# Patient Record
Sex: MALE | Race: WHITE | NOT HISPANIC OR LATINO | Employment: OTHER | URBAN - METROPOLITAN AREA
[De-identification: names, ages, dates, MRNs, and addresses within clinical notes are randomized per-mention and may not be internally consistent; named-entity substitution may affect disease eponyms.]

---

## 2017-01-04 ENCOUNTER — AMBULATORY - HEALTHEAST (OUTPATIENT)
Dept: SLEEP MEDICINE | Facility: CLINIC | Age: 72
End: 2017-01-04

## 2017-02-15 ENCOUNTER — AMBULATORY - HEALTHEAST (OUTPATIENT)
Dept: CARDIOLOGY | Facility: CLINIC | Age: 72
End: 2017-02-15

## 2017-02-15 DIAGNOSIS — Z79.899 LONG TERM USE OF DRUG: ICD-10-CM

## 2017-02-15 LAB — ALT SERPL W P-5'-P-CCNC: 35 U/L (ref 0–45)

## 2017-02-16 ENCOUNTER — AMBULATORY - HEALTHEAST (OUTPATIENT)
Dept: CARDIOLOGY | Facility: CLINIC | Age: 72
End: 2017-02-16

## 2017-04-20 ENCOUNTER — AMBULATORY - HEALTHEAST (OUTPATIENT)
Dept: CARDIOLOGY | Facility: CLINIC | Age: 72
End: 2017-04-20

## 2017-04-21 ENCOUNTER — COMMUNICATION - HEALTHEAST (OUTPATIENT)
Dept: CARDIOLOGY | Facility: CLINIC | Age: 72
End: 2017-04-21

## 2017-04-21 ENCOUNTER — AMBULATORY - HEALTHEAST (OUTPATIENT)
Dept: CARDIOLOGY | Facility: CLINIC | Age: 72
End: 2017-04-21

## 2017-04-21 ENCOUNTER — OFFICE VISIT - HEALTHEAST (OUTPATIENT)
Dept: CARDIOLOGY | Facility: CLINIC | Age: 72
End: 2017-04-21

## 2017-04-21 DIAGNOSIS — I48.3 TYPICAL ATRIAL FLUTTER (H): ICD-10-CM

## 2017-04-21 LAB
ATRIAL RATE - MUSE: 55 BPM
DIASTOLIC BLOOD PRESSURE - MUSE: NORMAL MMHG
INTERPRETATION ECG - MUSE: NORMAL
P AXIS - MUSE: 51 DEGREES
PR INTERVAL - MUSE: 248 MS
QRS DURATION - MUSE: 166 MS
QT - MUSE: 582 MS
QTC - MUSE: 556 MS
R AXIS - MUSE: 75 DEGREES
SYSTOLIC BLOOD PRESSURE - MUSE: NORMAL MMHG
T AXIS - MUSE: 45 DEGREES
VENTRICULAR RATE- MUSE: 55 BPM

## 2017-04-21 ASSESSMENT — MIFFLIN-ST. JEOR: SCORE: 1716.48

## 2017-05-22 ENCOUNTER — RECORDS - HEALTHEAST (OUTPATIENT)
Dept: LAB | Facility: CLINIC | Age: 72
End: 2017-05-22

## 2017-05-22 LAB
CHOLEST SERPL-MCNC: 163 MG/DL
FASTING STATUS PATIENT QL REPORTED: NORMAL
HDLC SERPL-MCNC: 44 MG/DL
LDLC SERPL CALC-MCNC: 92 MG/DL
TRIGL SERPL-MCNC: 136 MG/DL

## 2017-05-31 ENCOUNTER — AMBULATORY - HEALTHEAST (OUTPATIENT)
Dept: CARDIOLOGY | Facility: CLINIC | Age: 72
End: 2017-05-31

## 2017-07-21 ENCOUNTER — COMMUNICATION - HEALTHEAST (OUTPATIENT)
Dept: CARDIOLOGY | Facility: CLINIC | Age: 72
End: 2017-07-21

## 2017-07-25 ENCOUNTER — AMBULATORY - HEALTHEAST (OUTPATIENT)
Dept: PHYSICAL MEDICINE AND REHAB | Facility: CLINIC | Age: 72
End: 2017-07-25

## 2017-07-25 ENCOUNTER — RECORDS - HEALTHEAST (OUTPATIENT)
Dept: ADMINISTRATIVE | Facility: OTHER | Age: 72
End: 2017-07-25

## 2017-07-25 DIAGNOSIS — M48.061 LUMBAR SPINAL STENOSIS: ICD-10-CM

## 2017-07-28 ENCOUNTER — COMMUNICATION - HEALTHEAST (OUTPATIENT)
Dept: CARDIOLOGY | Facility: CLINIC | Age: 72
End: 2017-07-28

## 2017-07-28 DIAGNOSIS — I10 ESSENTIAL HYPERTENSION: ICD-10-CM

## 2017-07-28 DIAGNOSIS — I25.5 ISCHEMIC CARDIOMYOPATHY: ICD-10-CM

## 2017-07-31 ENCOUNTER — OFFICE VISIT - HEALTHEAST (OUTPATIENT)
Dept: NEUROSURGERY | Facility: CLINIC | Age: 72
End: 2017-07-31

## 2017-07-31 ENCOUNTER — HOSPITAL ENCOUNTER (OUTPATIENT)
Dept: PHYSICAL MEDICINE AND REHAB | Facility: CLINIC | Age: 72
Discharge: HOME | End: 2017-07-31
Attending: FAMILY MEDICINE

## 2017-07-31 DIAGNOSIS — M54.50 LUMBALGIA: ICD-10-CM

## 2017-07-31 DIAGNOSIS — M48.061 SPINAL STENOSIS OF LUMBAR REGION: ICD-10-CM

## 2017-07-31 DIAGNOSIS — M79.18 MYOFASCIAL PAIN: ICD-10-CM

## 2017-07-31 DIAGNOSIS — M54.16 LUMBAR RADICULITIS: ICD-10-CM

## 2017-07-31 DIAGNOSIS — R29.898 WEAKNESS OF BOTH LEGS: ICD-10-CM

## 2017-07-31 DIAGNOSIS — R53.1 WEAKNESS: ICD-10-CM

## 2017-07-31 ASSESSMENT — MIFFLIN-ST. JEOR
SCORE: 1721.02
SCORE: 1721.02

## 2017-08-01 ENCOUNTER — AMBULATORY - HEALTHEAST (OUTPATIENT)
Dept: CARDIOLOGY | Facility: CLINIC | Age: 72
End: 2017-08-01

## 2017-08-07 ENCOUNTER — AMBULATORY - HEALTHEAST (OUTPATIENT)
Dept: NEUROSURGERY | Facility: CLINIC | Age: 72
End: 2017-08-07

## 2017-08-15 ENCOUNTER — COMMUNICATION - HEALTHEAST (OUTPATIENT)
Dept: CARDIOLOGY | Facility: CLINIC | Age: 72
End: 2017-08-15

## 2017-08-15 ENCOUNTER — HOSPITAL ENCOUNTER (OUTPATIENT)
Dept: MRI IMAGING | Facility: HOSPITAL | Age: 72
Discharge: HOME | End: 2017-08-15
Attending: SURGERY

## 2017-08-15 DIAGNOSIS — I48.19 PERSISTENT ATRIAL FIBRILLATION (H): ICD-10-CM

## 2017-08-15 DIAGNOSIS — R53.1 WEAKNESS: ICD-10-CM

## 2017-08-16 ENCOUNTER — RECORDS - HEALTHEAST (OUTPATIENT)
Dept: ADMINISTRATIVE | Facility: OTHER | Age: 72
End: 2017-08-16

## 2017-08-24 ENCOUNTER — AMBULATORY - HEALTHEAST (OUTPATIENT)
Dept: CARDIOLOGY | Facility: CLINIC | Age: 72
End: 2017-08-24

## 2017-09-05 ENCOUNTER — AMBULATORY - HEALTHEAST (OUTPATIENT)
Dept: PHYSICAL MEDICINE AND REHAB | Facility: CLINIC | Age: 72
End: 2017-09-05

## 2017-09-29 ENCOUNTER — RECORDS - HEALTHEAST (OUTPATIENT)
Dept: ADMINISTRATIVE | Facility: OTHER | Age: 72
End: 2017-09-29

## 2017-10-02 ENCOUNTER — COMMUNICATION - HEALTHEAST (OUTPATIENT)
Dept: NEUROSURGERY | Facility: CLINIC | Age: 72
End: 2017-10-02

## 2017-10-08 ENCOUNTER — COMMUNICATION - HEALTHEAST (OUTPATIENT)
Dept: CARDIOLOGY | Facility: CLINIC | Age: 72
End: 2017-10-08

## 2017-10-08 DIAGNOSIS — I48.19 PERSISTENT ATRIAL FIBRILLATION (H): ICD-10-CM

## 2017-10-13 ENCOUNTER — RECORDS - HEALTHEAST (OUTPATIENT)
Dept: ADMINISTRATIVE | Facility: OTHER | Age: 72
End: 2017-10-13

## 2017-10-13 ENCOUNTER — AMBULATORY - HEALTHEAST (OUTPATIENT)
Dept: CARDIOLOGY | Facility: CLINIC | Age: 72
End: 2017-10-13

## 2017-10-16 ENCOUNTER — AMBULATORY - HEALTHEAST (OUTPATIENT)
Dept: NEUROSURGERY | Facility: CLINIC | Age: 72
End: 2017-10-16

## 2017-10-16 ENCOUNTER — OFFICE VISIT - HEALTHEAST (OUTPATIENT)
Dept: NEUROSURGERY | Facility: CLINIC | Age: 72
End: 2017-10-16

## 2017-10-16 DIAGNOSIS — M48.061 LUMBAR SPINAL STENOSIS: ICD-10-CM

## 2017-10-23 ENCOUNTER — OFFICE VISIT - HEALTHEAST (OUTPATIENT)
Dept: CARDIOLOGY | Facility: CLINIC | Age: 72
End: 2017-10-23

## 2017-10-23 DIAGNOSIS — I25.84 CORONARY ATHEROSCLEROSIS DUE TO CALCIFIED CORONARY LESION: ICD-10-CM

## 2017-10-23 DIAGNOSIS — I25.10 CORONARY ATHEROSCLEROSIS DUE TO CALCIFIED CORONARY LESION: ICD-10-CM

## 2017-10-23 ASSESSMENT — MIFFLIN-ST. JEOR: SCORE: 1707.41

## 2017-10-30 ENCOUNTER — COMMUNICATION - HEALTHEAST (OUTPATIENT)
Dept: CARDIOLOGY | Facility: CLINIC | Age: 72
End: 2017-10-30

## 2017-10-30 DIAGNOSIS — I48.19 PERSISTENT ATRIAL FIBRILLATION (H): ICD-10-CM

## 2017-11-09 ENCOUNTER — COMMUNICATION - HEALTHEAST (OUTPATIENT)
Dept: CARDIOLOGY | Facility: CLINIC | Age: 72
End: 2017-11-09

## 2017-12-01 ENCOUNTER — AMBULATORY - HEALTHEAST (OUTPATIENT)
Dept: CARDIOLOGY | Facility: CLINIC | Age: 72
End: 2017-12-01

## 2017-12-06 ENCOUNTER — AMBULATORY - HEALTHEAST (OUTPATIENT)
Dept: CARDIOLOGY | Facility: CLINIC | Age: 72
End: 2017-12-06

## 2017-12-06 DIAGNOSIS — Z79.899 LONG TERM USE OF DRUG: ICD-10-CM

## 2017-12-12 ENCOUNTER — COMMUNICATION - HEALTHEAST (OUTPATIENT)
Dept: SLEEP MEDICINE | Facility: CLINIC | Age: 72
End: 2017-12-12

## 2017-12-20 ENCOUNTER — AMBULATORY - HEALTHEAST (OUTPATIENT)
Dept: SLEEP MEDICINE | Facility: CLINIC | Age: 72
End: 2017-12-20

## 2017-12-20 ENCOUNTER — OFFICE VISIT - HEALTHEAST (OUTPATIENT)
Dept: SLEEP MEDICINE | Facility: CLINIC | Age: 72
End: 2017-12-20

## 2017-12-20 DIAGNOSIS — G47.33 OSA (OBSTRUCTIVE SLEEP APNEA): ICD-10-CM

## 2017-12-20 ASSESSMENT — MIFFLIN-ST. JEOR: SCORE: 1707.41

## 2018-01-02 ENCOUNTER — AMBULATORY - HEALTHEAST (OUTPATIENT)
Dept: SLEEP MEDICINE | Facility: CLINIC | Age: 73
End: 2018-01-02

## 2018-01-02 ENCOUNTER — COMMUNICATION - HEALTHEAST (OUTPATIENT)
Dept: SLEEP MEDICINE | Facility: CLINIC | Age: 73
End: 2018-01-02

## 2018-02-11 ENCOUNTER — COMMUNICATION - HEALTHEAST (OUTPATIENT)
Dept: CARDIOLOGY | Facility: CLINIC | Age: 73
End: 2018-02-11

## 2018-02-11 DIAGNOSIS — I48.19 PERSISTENT ATRIAL FIBRILLATION (H): ICD-10-CM

## 2018-02-27 ENCOUNTER — COMMUNICATION - HEALTHEAST (OUTPATIENT)
Dept: ADMINISTRATIVE | Facility: CLINIC | Age: 73
End: 2018-02-27

## 2018-05-02 ENCOUNTER — RECORDS - HEALTHEAST (OUTPATIENT)
Dept: LAB | Facility: CLINIC | Age: 73
End: 2018-05-02

## 2018-05-02 LAB
ALBUMIN SERPL-MCNC: 3.7 G/DL (ref 3.5–5)
ALP SERPL-CCNC: 98 U/L (ref 45–120)
ALT SERPL W P-5'-P-CCNC: 29 U/L (ref 0–45)
ANION GAP SERPL CALCULATED.3IONS-SCNC: 13 MMOL/L (ref 5–18)
AST SERPL W P-5'-P-CCNC: 30 U/L (ref 0–40)
BILIRUB SERPL-MCNC: 0.5 MG/DL (ref 0–1)
BUN SERPL-MCNC: 22 MG/DL (ref 8–28)
CALCIUM SERPL-MCNC: 9 MG/DL (ref 8.5–10.5)
CHLORIDE BLD-SCNC: 98 MMOL/L (ref 98–107)
CHOLEST SERPL-MCNC: 196 MG/DL
CO2 SERPL-SCNC: 26 MMOL/L (ref 22–31)
CREAT SERPL-MCNC: 1.33 MG/DL (ref 0.7–1.3)
FASTING STATUS PATIENT QL REPORTED: NO
GFR SERPL CREATININE-BSD FRML MDRD: 53 ML/MIN/1.73M2
GLUCOSE BLD-MCNC: 213 MG/DL (ref 70–125)
HDLC SERPL-MCNC: 41 MG/DL
LDLC SERPL CALC-MCNC: 115 MG/DL
POTASSIUM BLD-SCNC: 4.8 MMOL/L (ref 3.5–5)
PROT SERPL-MCNC: 7.1 G/DL (ref 6–8)
PSA SERPL-MCNC: 0.2 NG/ML (ref 0–6.5)
SODIUM SERPL-SCNC: 137 MMOL/L (ref 136–145)
TRIGL SERPL-MCNC: 201 MG/DL

## 2018-05-11 ENCOUNTER — OFFICE VISIT - HEALTHEAST (OUTPATIENT)
Dept: CARDIOLOGY | Facility: CLINIC | Age: 73
End: 2018-05-11

## 2018-05-11 DIAGNOSIS — I25.5 ISCHEMIC CARDIOMYOPATHY: ICD-10-CM

## 2018-05-11 ASSESSMENT — MIFFLIN-ST. JEOR: SCORE: 1722.83

## 2018-05-15 ENCOUNTER — AMBULATORY - HEALTHEAST (OUTPATIENT)
Dept: CARDIOLOGY | Facility: CLINIC | Age: 73
End: 2018-05-15

## 2018-07-06 ENCOUNTER — COMMUNICATION - HEALTHEAST (OUTPATIENT)
Dept: CARDIOLOGY | Facility: CLINIC | Age: 73
End: 2018-07-06

## 2018-07-06 DIAGNOSIS — I48.19 PERSISTENT ATRIAL FIBRILLATION (H): ICD-10-CM

## 2018-08-10 ENCOUNTER — COMMUNICATION - HEALTHEAST (OUTPATIENT)
Dept: CARDIOLOGY | Facility: CLINIC | Age: 73
End: 2018-08-10

## 2018-08-10 DIAGNOSIS — I48.19 PERSISTENT ATRIAL FIBRILLATION (H): ICD-10-CM

## 2018-08-17 ENCOUNTER — COMMUNICATION - HEALTHEAST (OUTPATIENT)
Dept: ADMINISTRATIVE | Facility: CLINIC | Age: 73
End: 2018-08-17

## 2018-09-06 ENCOUNTER — RECORDS - HEALTHEAST (OUTPATIENT)
Dept: LAB | Facility: CLINIC | Age: 73
End: 2018-09-06

## 2018-09-07 LAB — BACTERIA SPEC CULT: NO GROWTH

## 2018-10-24 ENCOUNTER — AMBULATORY - HEALTHEAST (OUTPATIENT)
Dept: CARDIOLOGY | Facility: CLINIC | Age: 73
End: 2018-10-24

## 2018-10-24 ENCOUNTER — RECORDS - HEALTHEAST (OUTPATIENT)
Dept: ADMINISTRATIVE | Facility: OTHER | Age: 73
End: 2018-10-24

## 2018-10-29 ENCOUNTER — OFFICE VISIT - HEALTHEAST (OUTPATIENT)
Dept: CARDIOLOGY | Facility: CLINIC | Age: 73
End: 2018-10-29

## 2018-10-29 DIAGNOSIS — I25.10 CORONARY ATHEROSCLEROSIS DUE TO CALCIFIED CORONARY LESION: ICD-10-CM

## 2018-10-29 DIAGNOSIS — I25.5 ISCHEMIC CARDIOMYOPATHY: ICD-10-CM

## 2018-10-29 DIAGNOSIS — I25.84 CORONARY ATHEROSCLEROSIS DUE TO CALCIFIED CORONARY LESION: ICD-10-CM

## 2018-10-29 ASSESSMENT — MIFFLIN-ST. JEOR: SCORE: 1728.39

## 2018-11-20 ENCOUNTER — AMBULATORY - HEALTHEAST (OUTPATIENT)
Dept: CARDIOLOGY | Facility: CLINIC | Age: 73
End: 2018-11-20

## 2018-11-20 DIAGNOSIS — Z79.899 LONG TERM USE OF DRUG: ICD-10-CM

## 2018-12-20 ENCOUNTER — AMBULATORY - HEALTHEAST (OUTPATIENT)
Dept: CARDIOLOGY | Facility: CLINIC | Age: 73
End: 2018-12-20

## 2018-12-20 ENCOUNTER — OFFICE VISIT - HEALTHEAST (OUTPATIENT)
Dept: SLEEP MEDICINE | Facility: CLINIC | Age: 73
End: 2018-12-20

## 2018-12-20 DIAGNOSIS — G47.33 OBSTRUCTIVE SLEEP APNEA: ICD-10-CM

## 2018-12-20 DIAGNOSIS — Z79.899 LONG TERM USE OF DRUG: ICD-10-CM

## 2018-12-20 DIAGNOSIS — G47.31 CENTRAL SLEEP APNEA: ICD-10-CM

## 2018-12-20 DIAGNOSIS — G47.8 SLEEP DYSFUNCTION WITH SLEEP STAGE DISTURBANCE: ICD-10-CM

## 2018-12-20 LAB
ALT SERPL W P-5'-P-CCNC: 28 U/L (ref 0–45)
TSH SERPL DL<=0.005 MIU/L-ACNC: 9.6 UIU/ML (ref 0.3–5)

## 2018-12-20 ASSESSMENT — MIFFLIN-ST. JEOR: SCORE: 1734.06

## 2018-12-21 ENCOUNTER — AMBULATORY - HEALTHEAST (OUTPATIENT)
Dept: CARDIOLOGY | Facility: CLINIC | Age: 73
End: 2018-12-21

## 2018-12-21 DIAGNOSIS — R79.89 ELEVATED TSH: ICD-10-CM

## 2018-12-21 DIAGNOSIS — Z79.899 LONG TERM CURRENT USE OF AMIODARONE: ICD-10-CM

## 2018-12-24 LAB
AMIODARONE SERPL-MCNC: 1.1 UG/ML (ref 0.5–2)
DESETHYLAMIODARONE SERPL-MCNC: 0.7 UG/ML

## 2018-12-26 ENCOUNTER — AMBULATORY - HEALTHEAST (OUTPATIENT)
Dept: CARDIOLOGY | Facility: CLINIC | Age: 73
End: 2018-12-26

## 2019-02-06 ENCOUNTER — COMMUNICATION - HEALTHEAST (OUTPATIENT)
Dept: CARDIOLOGY | Facility: CLINIC | Age: 74
End: 2019-02-06

## 2019-02-06 DIAGNOSIS — I48.19 PERSISTENT ATRIAL FIBRILLATION (H): ICD-10-CM

## 2019-03-20 ENCOUNTER — COMMUNICATION - HEALTHEAST (OUTPATIENT)
Dept: ADMINISTRATIVE | Facility: CLINIC | Age: 74
End: 2019-03-20

## 2019-04-02 ENCOUNTER — COMMUNICATION - HEALTHEAST (OUTPATIENT)
Dept: CARDIOLOGY | Facility: CLINIC | Age: 74
End: 2019-04-02

## 2019-04-02 DIAGNOSIS — I48.19 PERSISTENT ATRIAL FIBRILLATION (H): ICD-10-CM

## 2019-04-15 ENCOUNTER — RECORDS - HEALTHEAST (OUTPATIENT)
Dept: LAB | Facility: CLINIC | Age: 74
End: 2019-04-15

## 2019-04-15 LAB — TSH SERPL DL<=0.005 MIU/L-ACNC: 10.03 UIU/ML (ref 0.3–5)

## 2019-05-31 ENCOUNTER — AMBULATORY - HEALTHEAST (OUTPATIENT)
Dept: CARDIOLOGY | Facility: CLINIC | Age: 74
End: 2019-05-31

## 2019-05-31 ENCOUNTER — RECORDS - HEALTHEAST (OUTPATIENT)
Dept: ADMINISTRATIVE | Facility: OTHER | Age: 74
End: 2019-05-31

## 2019-06-07 ENCOUNTER — OFFICE VISIT - HEALTHEAST (OUTPATIENT)
Dept: CARDIOLOGY | Facility: CLINIC | Age: 74
End: 2019-06-07

## 2019-06-07 ENCOUNTER — SURGERY - HEALTHEAST (OUTPATIENT)
Dept: CARDIOLOGY | Facility: CLINIC | Age: 74
End: 2019-06-07

## 2019-06-07 ENCOUNTER — COMMUNICATION - HEALTHEAST (OUTPATIENT)
Dept: CARDIOLOGY | Facility: CLINIC | Age: 74
End: 2019-06-07

## 2019-06-07 ENCOUNTER — AMBULATORY - HEALTHEAST (OUTPATIENT)
Dept: CARDIOLOGY | Facility: CLINIC | Age: 74
End: 2019-06-07

## 2019-06-07 DIAGNOSIS — I48.3 TYPICAL ATRIAL FLUTTER (H): ICD-10-CM

## 2019-06-07 DIAGNOSIS — I49.5 SSS (SICK SINUS SYNDROME) (H): ICD-10-CM

## 2019-06-07 LAB
ATRIAL RATE - MUSE: 182 BPM
DIASTOLIC BLOOD PRESSURE - MUSE: NORMAL MMHG
INTERPRETATION ECG - MUSE: NORMAL
P AXIS - MUSE: 260 DEGREES
PR INTERVAL - MUSE: NORMAL MS
QRS DURATION - MUSE: 170 MS
QT - MUSE: 690 MS
QTC - MUSE: 555 MS
R AXIS - MUSE: 109 DEGREES
SYSTOLIC BLOOD PRESSURE - MUSE: NORMAL MMHG
T AXIS - MUSE: 137 DEGREES
VENTRICULAR RATE- MUSE: 39 BPM

## 2019-06-07 ASSESSMENT — MIFFLIN-ST. JEOR: SCORE: 1743.13

## 2019-06-09 ENCOUNTER — COMMUNICATION - HEALTHEAST (OUTPATIENT)
Dept: SCHEDULING | Facility: CLINIC | Age: 74
End: 2019-06-09

## 2019-06-11 ENCOUNTER — SURGERY - HEALTHEAST (OUTPATIENT)
Dept: CARDIOLOGY | Facility: CLINIC | Age: 74
End: 2019-06-11

## 2019-06-11 ASSESSMENT — MIFFLIN-ST. JEOR
SCORE: 1743.13
SCORE: 1753.62

## 2019-06-12 ASSESSMENT — MIFFLIN-ST. JEOR: SCORE: 1716.37

## 2019-06-13 ENCOUNTER — AMBULATORY - HEALTHEAST (OUTPATIENT)
Dept: CARDIOLOGY | Facility: CLINIC | Age: 74
End: 2019-06-13

## 2019-06-13 DIAGNOSIS — Z95.0 BIVENTRICULAR CARDIAC PACEMAKER IN SITU: ICD-10-CM

## 2019-06-14 LAB
HCC DEVICE COMMENTS: NORMAL
HCC DEVICE IMPLANTING PROVIDER: NORMAL
HCC DEVICE MANUFACTURE: NORMAL
HCC DEVICE MODEL: NORMAL
HCC DEVICE SERIAL NUMBER: NORMAL
HCC DEVICE TYPE: NORMAL

## 2019-06-17 ENCOUNTER — COMMUNICATION - HEALTHEAST (OUTPATIENT)
Dept: CARDIOLOGY | Facility: CLINIC | Age: 74
End: 2019-06-17

## 2019-06-18 ENCOUNTER — AMBULATORY - HEALTHEAST (OUTPATIENT)
Dept: CARDIOLOGY | Facility: CLINIC | Age: 74
End: 2019-06-18

## 2019-06-18 ENCOUNTER — RECORDS - HEALTHEAST (OUTPATIENT)
Dept: ADMINISTRATIVE | Facility: OTHER | Age: 74
End: 2019-06-18

## 2019-06-18 DIAGNOSIS — Z95.0 BIVENTRICULAR CARDIAC PACEMAKER IN SITU: ICD-10-CM

## 2019-06-18 ASSESSMENT — MIFFLIN-ST. JEOR: SCORE: 1700.69

## 2019-06-24 ENCOUNTER — AMBULATORY - HEALTHEAST (OUTPATIENT)
Dept: CARDIOLOGY | Facility: CLINIC | Age: 74
End: 2019-06-24

## 2019-06-24 ENCOUNTER — OFFICE VISIT - HEALTHEAST (OUTPATIENT)
Dept: CARDIOLOGY | Facility: CLINIC | Age: 74
End: 2019-06-24

## 2019-06-24 ENCOUNTER — RECORDS - HEALTHEAST (OUTPATIENT)
Dept: ADMINISTRATIVE | Facility: OTHER | Age: 74
End: 2019-06-24

## 2019-06-24 DIAGNOSIS — I25.5 ISCHEMIC CARDIOMYOPATHY: ICD-10-CM

## 2019-06-24 DIAGNOSIS — Z95.0 BIVENTRICULAR CARDIAC PACEMAKER IN SITU: ICD-10-CM

## 2019-06-24 DIAGNOSIS — I10 ESSENTIAL HYPERTENSION: ICD-10-CM

## 2019-06-24 DIAGNOSIS — I48.19 PERSISTENT ATRIAL FIBRILLATION (H): ICD-10-CM

## 2019-06-24 ASSESSMENT — MIFFLIN-ST. JEOR: SCORE: 1707.94

## 2019-07-01 ENCOUNTER — COMMUNICATION - HEALTHEAST (OUTPATIENT)
Dept: CARDIOLOGY | Facility: CLINIC | Age: 74
End: 2019-07-01

## 2019-07-01 DIAGNOSIS — I48.19 PERSISTENT ATRIAL FIBRILLATION (H): ICD-10-CM

## 2019-07-15 ENCOUNTER — AMBULATORY - HEALTHEAST (OUTPATIENT)
Dept: CARDIOLOGY | Facility: CLINIC | Age: 74
End: 2019-07-15

## 2019-07-15 DIAGNOSIS — Z95.0 BIVENTRICULAR CARDIAC PACEMAKER IN SITU: ICD-10-CM

## 2019-08-05 ENCOUNTER — COMMUNICATION - HEALTHEAST (OUTPATIENT)
Dept: CARDIOLOGY | Facility: CLINIC | Age: 74
End: 2019-08-05

## 2019-08-05 DIAGNOSIS — I48.19 PERSISTENT ATRIAL FIBRILLATION (H): ICD-10-CM

## 2019-09-19 ENCOUNTER — AMBULATORY - HEALTHEAST (OUTPATIENT)
Dept: CARDIOLOGY | Facility: CLINIC | Age: 74
End: 2019-09-19

## 2019-09-19 DIAGNOSIS — Z95.0 BIVENTRICULAR CARDIAC PACEMAKER IN SITU: ICD-10-CM

## 2019-09-19 ASSESSMENT — MIFFLIN-ST. JEOR: SCORE: 1695.5

## 2019-10-31 ENCOUNTER — RECORDS - HEALTHEAST (OUTPATIENT)
Dept: LAB | Facility: CLINIC | Age: 74
End: 2019-10-31

## 2019-10-31 LAB
ALBUMIN SERPL-MCNC: 4.4 G/DL (ref 3.5–5)
ALP SERPL-CCNC: 97 U/L (ref 45–120)
ALT SERPL W P-5'-P-CCNC: 20 U/L (ref 0–45)
ANION GAP SERPL CALCULATED.3IONS-SCNC: 12 MMOL/L (ref 5–18)
AST SERPL W P-5'-P-CCNC: 29 U/L (ref 0–40)
BILIRUB SERPL-MCNC: 0.5 MG/DL (ref 0–1)
BUN SERPL-MCNC: 22 MG/DL (ref 8–28)
CALCIUM SERPL-MCNC: 9.3 MG/DL (ref 8.5–10.5)
CHLORIDE BLD-SCNC: 100 MMOL/L (ref 98–107)
CHOLEST SERPL-MCNC: 154 MG/DL
CO2 SERPL-SCNC: 29 MMOL/L (ref 22–31)
CREAT SERPL-MCNC: 1.26 MG/DL (ref 0.7–1.3)
FASTING STATUS PATIENT QL REPORTED: NORMAL
GFR SERPL CREATININE-BSD FRML MDRD: 56 ML/MIN/1.73M2
GLUCOSE BLD-MCNC: 149 MG/DL (ref 70–125)
HDLC SERPL-MCNC: 45 MG/DL
LDLC SERPL CALC-MCNC: 80 MG/DL
POTASSIUM BLD-SCNC: 4.8 MMOL/L (ref 3.5–5)
PROT SERPL-MCNC: 7.5 G/DL (ref 6–8)
SODIUM SERPL-SCNC: 141 MMOL/L (ref 136–145)
TRIGL SERPL-MCNC: 147 MG/DL
TSH SERPL DL<=0.005 MIU/L-ACNC: 6.43 UIU/ML (ref 0.3–5)

## 2019-12-13 ENCOUNTER — RECORDS - HEALTHEAST (OUTPATIENT)
Dept: ADMINISTRATIVE | Facility: OTHER | Age: 74
End: 2019-12-13

## 2019-12-13 ENCOUNTER — AMBULATORY - HEALTHEAST (OUTPATIENT)
Dept: CARDIOLOGY | Facility: CLINIC | Age: 74
End: 2019-12-13

## 2019-12-16 ENCOUNTER — AMBULATORY - HEALTHEAST (OUTPATIENT)
Dept: CARDIOLOGY | Facility: CLINIC | Age: 74
End: 2019-12-16

## 2019-12-16 DIAGNOSIS — Z95.0 BIVENTRICULAR CARDIAC PACEMAKER IN SITU: ICD-10-CM

## 2019-12-16 DIAGNOSIS — I49.5 SSS (SICK SINUS SYNDROME) (H): ICD-10-CM

## 2019-12-19 ENCOUNTER — OFFICE VISIT - HEALTHEAST (OUTPATIENT)
Dept: CARDIOLOGY | Facility: CLINIC | Age: 74
End: 2019-12-19

## 2019-12-19 DIAGNOSIS — I50.32 CHRONIC DIASTOLIC CONGESTIVE HEART FAILURE (H): ICD-10-CM

## 2019-12-19 ASSESSMENT — MIFFLIN-ST. JEOR: SCORE: 1677.36

## 2019-12-31 ENCOUNTER — HOSPITAL ENCOUNTER (OUTPATIENT)
Dept: CARDIOLOGY | Facility: HOSPITAL | Age: 74
Discharge: HOME | End: 2019-12-31
Attending: INTERNAL MEDICINE

## 2019-12-31 DIAGNOSIS — I50.32 CHRONIC DIASTOLIC CONGESTIVE HEART FAILURE (H): ICD-10-CM

## 2019-12-31 LAB
AORTIC ROOT: 3.4 CM
AORTIC VALVE MEAN VELOCITY: 131 CM/S
ASCENDING AORTA: 3.5 CM
AV DIMENSIONLESS INDEX VTI: 0.6
AV MEAN GRADIENT: 8 MMHG
AV PEAK GRADIENT: 17.3 MMHG
AV VALVE AREA: 1.6 CM2
AV VELOCITY RATIO: 0.5
BSA FOR ECHO PROCEDURE: 2.15 M2
CV BLOOD PRESSURE: ABNORMAL MMHG
CV ECHO HEIGHT: 69.3 IN
CV ECHO WEIGHT: 209 LBS
DOP CALC AO PEAK VEL: 208 CM/S
DOP CALC AO VTI: 38.6 CM
DOP CALC LVOT AREA: 2.83 CM2
DOP CALC LVOT DIAMETER: 1.9 CM
DOP CALC LVOT PEAK VEL: 99.8 CM/S
DOP CALC LVOT STROKE VOLUME: 61.8 CM3
DOP CALCLVOT PEAK VEL VTI: 21.8 CM
EJECTION FRACTION: 67 % (ref 55–75)
FRACTIONAL SHORTENING: 27.7 % (ref 28–44)
INTERVENTRICULAR SEPTUM IN END DIASTOLE: 0.95 CM (ref 0.6–1)
IVS/PW RATIO: 0.9
LA AREA 1: 27 CM2
LA AREA 2: 25 CM2
LEFT ATRIUM LENGTH: 5.6 CM
LEFT ATRIUM SIZE: 4.7 CM
LEFT ATRIUM VOLUME INDEX: 47.7 ML/M2
LEFT ATRIUM VOLUME: 102.5 ML
LEFT VENTRICLE DIASTOLIC VOLUME INDEX: 59.5 CM3/M2 (ref 34–74)
LEFT VENTRICLE DIASTOLIC VOLUME: 128 CM3 (ref 62–150)
LEFT VENTRICLE HEART RATE: 69 BPM
LEFT VENTRICLE HEART RATE: 69 BPM
LEFT VENTRICLE MASS INDEX: 105.8 G/M2
LEFT VENTRICLE SYSTOLIC VOLUME INDEX: 19.9 CM3/M2 (ref 11–31)
LEFT VENTRICLE SYSTOLIC VOLUME: 42.8 CM3 (ref 21–61)
LEFT VENTRICULAR INTERNAL DIMENSION IN DIASTOLE: 5.6 CM (ref 4.2–5.8)
LEFT VENTRICULAR INTERNAL DIMENSION IN SYSTOLE: 4.05 CM (ref 2.5–4)
LEFT VENTRICULAR MASS: 227.4 G
LEFT VENTRICULAR OUTFLOW TRACT MEAN GRADIENT: 2 MMHG
LEFT VENTRICULAR OUTFLOW TRACT MEAN VELOCITY: 65.4 CM/S
LEFT VENTRICULAR OUTFLOW TRACT PEAK GRADIENT: 4 MMHG
LEFT VENTRICULAR POSTERIOR WALL IN END DIASTOLE: 1.1 CM (ref 0.6–1)
LV STROKE VOLUME INDEX: 28.7 ML/M2
MITRAL REGURGITANT VELOCITY TIME INTEGRAL: 86 CM
MITRAL VALVE DECELERATION SLOPE: 9350 MM/S2
MITRAL VALVE E/A RATIO: 1.3
MITRAL VALVE PRESSURE HALF-TIME: 39 MS
MR FLOW: 13 CM3
MR MEAN GRADIENT: 21 MMHG
MR MEAN VELOCITY: 186 CM/S
MR PEAK GRADIENT: 69.2 MMHG
MR PISA EROA: 0.1 CM2
MR PISA RADIUS: 0.5 CM
MR PISA VN NYQUIST: 38.5 CM/S
MV AVERAGE E/E' RATIO: 18.1 CM/S
MV DECELERATION TIME: 134 MS
MV E'TISSUE VEL-LAT: 4.25 CM/S
MV E'TISSUE VEL-MED: 9.57 CM/S
MV LATERAL E/E' RATIO: 29.4
MV MEDIAL E/E' RATIO: 13.1
MV PEAK A VELOCITY: 96 CM/S
MV PEAK E VELOCITY: 125 CM/S
MV REGURGITANT VOLUME: 12.5 CC
MV VALVE AREA PRESSURE 1/2 METHOD: 5.6 CM2
NUC REST DIASTOLIC VOLUME INDEX: 3344 LBS
NUC REST SYSTOLIC VOLUME INDEX: 69.25 IN
PISA MR PEAK VEL: 416 CM/S
PR MAX PG: 8 MMHG
PR PEAK VELOCITY: 141 CM/S
TRICUSPID REGURGITATION PEAK PRESSURE GRADIENT: 31.8 MMHG
TRICUSPID VALVE ANULAR PLANE SYSTOLIC EXCURSION: 2 CM
TRICUSPID VALVE PEAK REGURGITANT VELOCITY: 282 CM/S

## 2019-12-31 ASSESSMENT — MIFFLIN-ST. JEOR: SCORE: 1677.36

## 2020-03-18 ENCOUNTER — AMBULATORY - HEALTHEAST (OUTPATIENT)
Dept: CARDIOLOGY | Facility: CLINIC | Age: 75
End: 2020-03-18

## 2020-03-18 DIAGNOSIS — I49.5 SSS (SICK SINUS SYNDROME) (H): ICD-10-CM

## 2020-03-18 DIAGNOSIS — Z95.0 BIVENTRICULAR CARDIAC PACEMAKER IN SITU: ICD-10-CM

## 2020-04-14 ENCOUNTER — COMMUNICATION - HEALTHEAST (OUTPATIENT)
Dept: CARDIOLOGY | Facility: CLINIC | Age: 75
End: 2020-04-14

## 2020-04-14 DIAGNOSIS — I48.19 PERSISTENT ATRIAL FIBRILLATION (H): ICD-10-CM

## 2020-05-12 ENCOUNTER — RECORDS - HEALTHEAST (OUTPATIENT)
Dept: LAB | Facility: CLINIC | Age: 75
End: 2020-05-12

## 2020-05-12 LAB — TSH SERPL DL<=0.005 MIU/L-ACNC: 2.46 UIU/ML (ref 0.3–5)

## 2020-06-18 ENCOUNTER — AMBULATORY - HEALTHEAST (OUTPATIENT)
Dept: CARDIOLOGY | Facility: CLINIC | Age: 75
End: 2020-06-18

## 2020-06-18 DIAGNOSIS — Z95.0 BIVENTRICULAR CARDIAC PACEMAKER IN SITU: ICD-10-CM

## 2020-06-18 DIAGNOSIS — I25.5 ISCHEMIC CARDIOMYOPATHY: ICD-10-CM

## 2020-06-18 DIAGNOSIS — I48.19 PERSISTENT ATRIAL FIBRILLATION (H): ICD-10-CM

## 2020-06-18 DIAGNOSIS — I49.5 SSS (SICK SINUS SYNDROME) (H): ICD-10-CM

## 2020-07-20 ENCOUNTER — COMMUNICATION - HEALTHEAST (OUTPATIENT)
Dept: CARDIOLOGY | Facility: CLINIC | Age: 75
End: 2020-07-20

## 2020-07-20 DIAGNOSIS — I25.5 ISCHEMIC CARDIOMYOPATHY: ICD-10-CM

## 2020-07-20 DIAGNOSIS — I10 ESSENTIAL HYPERTENSION: ICD-10-CM

## 2020-09-23 ENCOUNTER — RECORDS - HEALTHEAST (OUTPATIENT)
Dept: LAB | Facility: CLINIC | Age: 75
End: 2020-09-23

## 2020-09-23 LAB
ALBUMIN SERPL-MCNC: 4.4 G/DL (ref 3.5–5)
ALP SERPL-CCNC: 102 U/L (ref 45–120)
ALT SERPL W P-5'-P-CCNC: 18 U/L (ref 0–45)
ANION GAP SERPL CALCULATED.3IONS-SCNC: 13 MMOL/L (ref 5–18)
AST SERPL W P-5'-P-CCNC: 20 U/L (ref 0–40)
BILIRUB SERPL-MCNC: 0.4 MG/DL (ref 0–1)
BUN SERPL-MCNC: 24 MG/DL (ref 8–28)
CALCIUM SERPL-MCNC: 9 MG/DL (ref 8.5–10.5)
CHLORIDE BLD-SCNC: 101 MMOL/L (ref 98–107)
CHOLEST SERPL-MCNC: 143 MG/DL
CO2 SERPL-SCNC: 25 MMOL/L (ref 22–31)
CREAT SERPL-MCNC: 1.21 MG/DL (ref 0.7–1.3)
FASTING STATUS PATIENT QL REPORTED: NORMAL
GFR SERPL CREATININE-BSD FRML MDRD: 59 ML/MIN/1.73M2
GLUCOSE BLD-MCNC: 225 MG/DL (ref 70–125)
HDLC SERPL-MCNC: 42 MG/DL
LDLC SERPL CALC-MCNC: 72 MG/DL
POTASSIUM BLD-SCNC: 4.6 MMOL/L (ref 3.5–5)
PROT SERPL-MCNC: 7.1 G/DL (ref 6–8)
SODIUM SERPL-SCNC: 139 MMOL/L (ref 136–145)
TRIGL SERPL-MCNC: 144 MG/DL

## 2020-09-24 ENCOUNTER — COMMUNICATION - HEALTHEAST (OUTPATIENT)
Dept: ADMINISTRATIVE | Facility: CLINIC | Age: 75
End: 2020-09-24

## 2020-10-09 ENCOUNTER — AMBULATORY - HEALTHEAST (OUTPATIENT)
Dept: CARDIOLOGY | Facility: CLINIC | Age: 75
End: 2020-10-09

## 2020-10-09 DIAGNOSIS — I45.10 RIGHT BUNDLE BRANCH BLOCK: ICD-10-CM

## 2020-10-09 DIAGNOSIS — I48.19 PERSISTENT ATRIAL FIBRILLATION (H): ICD-10-CM

## 2020-10-09 DIAGNOSIS — I48.3 TYPICAL ATRIAL FLUTTER (H): ICD-10-CM

## 2020-10-09 DIAGNOSIS — I49.5 SSS (SICK SINUS SYNDROME) (H): ICD-10-CM

## 2020-10-09 DIAGNOSIS — I25.5 ISCHEMIC CARDIOMYOPATHY: ICD-10-CM

## 2020-10-09 DIAGNOSIS — Z95.0 BIVENTRICULAR CARDIAC PACEMAKER IN SITU: ICD-10-CM

## 2020-11-04 ENCOUNTER — AMBULATORY - HEALTHEAST (OUTPATIENT)
Dept: CARDIOLOGY | Facility: CLINIC | Age: 75
End: 2020-11-04

## 2020-11-04 ENCOUNTER — RECORDS - HEALTHEAST (OUTPATIENT)
Dept: ADMINISTRATIVE | Facility: OTHER | Age: 75
End: 2020-11-04

## 2020-11-04 ENCOUNTER — COMMUNICATION - HEALTHEAST (OUTPATIENT)
Dept: CARDIOLOGY | Facility: CLINIC | Age: 75
End: 2020-11-04

## 2020-11-06 ENCOUNTER — OFFICE VISIT - HEALTHEAST (OUTPATIENT)
Dept: CARDIOLOGY | Facility: CLINIC | Age: 75
End: 2020-11-06

## 2020-11-06 DIAGNOSIS — I25.10 CORONARY ATHEROSCLEROSIS DUE TO CALCIFIED CORONARY LESION: ICD-10-CM

## 2020-11-06 DIAGNOSIS — I25.84 CORONARY ATHEROSCLEROSIS DUE TO CALCIFIED CORONARY LESION: ICD-10-CM

## 2020-11-06 ASSESSMENT — MIFFLIN-ST. JEOR: SCORE: 1677.36

## 2020-12-06 ENCOUNTER — COMMUNICATION - HEALTHEAST (OUTPATIENT)
Dept: CARDIOLOGY | Facility: CLINIC | Age: 75
End: 2020-12-06

## 2020-12-06 DIAGNOSIS — I25.5 ISCHEMIC CARDIOMYOPATHY: ICD-10-CM

## 2020-12-06 DIAGNOSIS — I10 ESSENTIAL HYPERTENSION: ICD-10-CM

## 2021-01-02 ENCOUNTER — COMMUNICATION - HEALTHEAST (OUTPATIENT)
Dept: CARDIOLOGY | Facility: CLINIC | Age: 76
End: 2021-01-02

## 2021-01-02 DIAGNOSIS — I48.19 PERSISTENT ATRIAL FIBRILLATION (H): ICD-10-CM

## 2021-01-11 ENCOUNTER — AMBULATORY - HEALTHEAST (OUTPATIENT)
Dept: CARDIOLOGY | Facility: CLINIC | Age: 76
End: 2021-01-11

## 2021-01-11 ENCOUNTER — COMMUNICATION - HEALTHEAST (OUTPATIENT)
Dept: CARDIOLOGY | Facility: CLINIC | Age: 76
End: 2021-01-11

## 2021-01-11 DIAGNOSIS — Z95.0 BIVENTRICULAR CARDIAC PACEMAKER IN SITU: ICD-10-CM

## 2021-01-11 DIAGNOSIS — I49.5 SSS (SICK SINUS SYNDROME) (H): ICD-10-CM

## 2021-04-05 ENCOUNTER — RECORDS - HEALTHEAST (OUTPATIENT)
Dept: LAB | Facility: CLINIC | Age: 76
End: 2021-04-05

## 2021-04-06 LAB
PATIENT'S ETHNICITY: ABNORMAL
PATIENT'S RACE: ABNORMAL
SARS-COV-2 AB SERPL QL IA: POSITIVE

## 2021-04-12 ENCOUNTER — AMBULATORY - HEALTHEAST (OUTPATIENT)
Dept: CARDIOLOGY | Facility: CLINIC | Age: 76
End: 2021-04-12

## 2021-04-12 DIAGNOSIS — I49.5 SSS (SICK SINUS SYNDROME) (H): ICD-10-CM

## 2021-04-12 DIAGNOSIS — Z95.0 BIVENTRICULAR CARDIAC PACEMAKER IN SITU: ICD-10-CM

## 2021-05-05 ENCOUNTER — AMBULATORY - HEALTHEAST (OUTPATIENT)
Dept: CARDIOLOGY | Facility: CLINIC | Age: 76
End: 2021-05-05

## 2021-05-05 DIAGNOSIS — R06.09 DYSPNEA ON EXERTION: ICD-10-CM

## 2021-05-05 DIAGNOSIS — I25.5 ISCHEMIC CARDIOMYOPATHY: ICD-10-CM

## 2021-05-06 ENCOUNTER — HOSPITAL ENCOUNTER (OUTPATIENT)
Dept: NUCLEAR MEDICINE | Facility: HOSPITAL | Age: 76
Discharge: HOME | End: 2021-05-06
Attending: INTERNAL MEDICINE
Payer: MEDICARE

## 2021-05-06 ENCOUNTER — HOSPITAL ENCOUNTER (OUTPATIENT)
Dept: CARDIOLOGY | Facility: HOSPITAL | Age: 76
Discharge: HOME | End: 2021-05-06
Attending: INTERNAL MEDICINE
Payer: MEDICARE

## 2021-05-06 DIAGNOSIS — R06.09 DYSPNEA ON EXERTION: ICD-10-CM

## 2021-05-06 DIAGNOSIS — I25.5 ISCHEMIC CARDIOMYOPATHY: ICD-10-CM

## 2021-05-06 LAB
CV STRESS CURRENT BP HE: NORMAL
CV STRESS CURRENT HR HE: 61
CV STRESS CURRENT HR HE: 62
CV STRESS CURRENT HR HE: 63
CV STRESS CURRENT HR HE: 63
CV STRESS CURRENT HR HE: 64
CV STRESS CURRENT HR HE: 65
CV STRESS CURRENT HR HE: 66
CV STRESS DEVIATION TIME HE: NORMAL
CV STRESS ECHO PERCENT HR HE: NORMAL
CV STRESS EXERCISE STAGE HE: NORMAL
CV STRESS FINAL RESTING BP HE: NORMAL
CV STRESS FINAL RESTING HR HE: 62
CV STRESS MAX HR HE: 69
CV STRESS MAX TREADMILL GRADE HE: 0
CV STRESS MAX TREADMILL SPEED HE: 0
CV STRESS PEAK DIA BP HE: NORMAL
CV STRESS PEAK SYS BP HE: NORMAL
CV STRESS PHASE HE: NORMAL
CV STRESS PROTOCOL HE: NORMAL
CV STRESS RESTING PT POSITION HE: NORMAL
CV STRESS ST DEVIATION AMOUNT HE: NORMAL
CV STRESS ST DEVIATION ELEVATION HE: NORMAL
CV STRESS ST EVELATION AMOUNT HE: NORMAL
CV STRESS TEST TYPE HE: NORMAL
CV STRESS TOTAL STAGE TIME MIN 1 HE: NORMAL
NUC STRESS EJECTION FRACTION: 53 %
RATE PRESSURE PRODUCT: 9729
STRESS ECHO BASELINE DIASTOLIC HE: 75
STRESS ECHO BASELINE HR: 61
STRESS ECHO BASELINE SYSTOLIC BP: 144
STRESS ECHO CALCULATED PERCENT HR: 48 %
STRESS ECHO LAST STRESS DIASTOLIC BP: 73
STRESS ECHO LAST STRESS HR: 61
STRESS ECHO LAST STRESS SYSTOLIC BP: 141
STRESS ECHO TARGET HR: 145

## 2021-05-06 ASSESSMENT — MIFFLIN-ST. JEOR: SCORE: 1685.86

## 2021-05-27 VITALS — HEIGHT: 70 IN | BODY MASS INDEX: 30.06 KG/M2 | WEIGHT: 210 LBS

## 2021-05-29 NOTE — PATIENT INSTRUCTIONS - HE
Thomas Steve,    It was a pleasure to see you today at the [facility].     My recommendations after this visit include:  - Please follow up with Dr Brumfield in 3 months  - Please follow up with Felicia Wild as needed  - No changes to your medications    Felicia Wild CNP    What is the [facility]?     The [facility] is a heart failure specialty clinic within [facility].  You will work with your cardiologist, nurse practitioner, and nurses to carefully adjust medications and learn how to live with heart failure.  The Heart Failure Program will help you:      Better understand your chronic heart condition    Feel better and avoid hospital stays    Monitoring for Symptoms      Call the Heart Failure Phone Line ([phone]) if you have any of these symptoms:     Increased shortness of breath/shortness of breath at rest    Waking up at night with difficulty breathing    Unable to lie down for sleep due to symptoms or needing to sit upright for sleep    Weight gain of 2 pounds a day for 2 days in a row OR 5 pounds in 1 week    Increased swelling in your ankles or legs    Dizziness or lightheadedness    Medications       Take your medications as prescribed    Bring all your medications in their original bottles to every appointment    Avoid non-steroidal anti-inflammatory medications (Advil, Aleve, Ibuprofen, Naprosyn, Naproxen, Celebrex)    Do not stop taking your medications or begin taking over-the-counter or herbal medications without first talking to your doctor or nurse practitioner    Diet and Lifestyle       Limit sodium/salt to 2000 mg daily   o Read food labels for sodium content  o Do not add salt when cooking or add salt at the table    Weigh yourself every day and record in your daily weight log   o Call if you gain 2 pounds a day for 2 days in a row OR 5 pounds in 1 week  o Bring daily weight log to every  appointment    Stay active, pace yourself, listen to your body, and rest when tired    Elevate your legs if they are swollen. Ask about using compression/support stockings    Stop smoking    Lose weight if you are overweight    Avoid drinking alcohol or limit amount    Stay updated on your immunizations including flu and pneumonia vaccines

## 2021-05-29 NOTE — TELEPHONE ENCOUNTER
Spoke to patient yesterday, 6-10-19 in great detail regarding pacemaker implant scheduled for 6-10-19.  Reviewed implant process, instructions pre procedure including NPO and medications.  Reviewed risks.    Pt states understanding and agrees to proceed. Reviewed contact information for futher questions or concerns.

## 2021-05-29 NOTE — PROGRESS NOTES
1945  Home:[phone] (home) Cell:[phone] (mobile)  Emergency Contact: Sirisha Steve [phone]    +++Important patient information for CSC/Cath Lab staff : None+++    [facility] EP Cath Lab Procedure Order     Device Implant/Revision:  Procedure: New Implant  Device Type: BIV Pacemaker  Device Company/Device Rep Needed for Procedure: [employer]    Diagnosis:  SSS, flutter with slow VR  Anticipated Case Duration:  Standard  Scheduling Needs/Timeframe:  Atiya would like Tuesday 6-11-19  Anesthesia:  Conscious Sedation  Research Protocol:  No    [facility] EP Cath Lab Prep   Ordering Provider: Dr Rajput  Ordering Date: 6/7/2019  Orders Status: Intial order placed and Order set placed    H&P:  Compled by Dr. Brumfield on 6-7-19 if scheduled within 30 days, pt to schedule with PMD if procedure outside of this timeframe  PCP: Moni Mcclain MD, [phone]    Pre-op Labs: Ordered AM of procedure    Medical Records Pertinent for Procedure:  Cardioversion 7-14-16;   Stress test 6-2-16 EF 48%, Holter 7-31-17 SR, PHUONG 7-14-16 EF 45% and EKG 6-7-19 Aflutter @ 39    Patient Education:    Teach with Patient: Will be completed via phone prior to procedure, and letter was also sent to pt via mail/mychart with written pre-procedural instructions.    Risks Reviewed:     Pacemaker Insertion    <1% for each of the following:  infection, bleeding, hematoma, pneumothorax, subclavian vein thrombosis, cardiac perforation, cardiac tamponade, arrhythmias, pectoral or diaphragmatic stimulation, air embolus, pocket erosion, device interactions.    <4% lead dislodgment, <1% lead fracture or generator  malfunction.    <0.5% CVA or MI.    <0.1% death.    If external defibrillation or CV is needed, 25% risk for superficial burn.    For patients on anticoagulation, the risk of bleeding, hematoma and tamponade are increased.      Biventricular Implants  In addition to standard risks for ICD or pacemaker implant, there is:    90% success of LV  lead placement.    10%  dislodgment (LV lead)    <3% risk venous rupture, cardiac tamponade    Patient counseled re: options if LV lead placement is not feasible transvenously.      Consent: Will be obtained in [facility] day of procedure    Pre-Procedure Instructions that were Reviewed with Patient:  NPO after midnight, Remove all jewelry prior to coming in for procedure, Shower prior to arrival, Notified patient of time and date of procedure by CV , Transportation arrangements needed s/p procedure, Post-procedure follow up process, Sedation plan/orders and Pre-procedure letter was sent to pt by CV     Pre-Procedure Medication Instructions:  Instructions given to pt regarding anticoagulants: Xarelto- instructed to continue anticoagulation uninterrupted through their procedure  Instructions given to pt regarding antiarrhythmic medication: None; N/A;  Amiodarone being held per DND and WTZ on 6-7-19  Instructions for medication, other than anticoagulants/antiarrhythmics listed above, given to pt: to hold Bumex the morning of procedure, and to take remaining medications with small sips of water      Allergies   Allergen Reactions     Codeine Nausea And Vomiting     Vicodin [Hydrocodone-Acetaminophen] Nausea And Vomiting       Current Outpatient Medications:      acetaminophen (TYLENOL) 500 MG tablet, Take 1,000 mg by mouth as needed. , Disp: , Rfl:      ascorbic acid, vitamin C, (ASCORBIC ACID WITH DEMOND HIPS) 500 MG tablet, Take 500 mg by mouth 3 (three) times a day., Disp: , Rfl:      blood sugar diagnostic (GLUCOSE BLOOD) Strp, OneTouch Test strip. Test 4 times daily., Disp: , Rfl:      bumetanide (BUMEX) 1 MG tablet, Take 3 tablets (3 mg total) by mouth daily., Disp: , Rfl: 0     cholecalciferol, vitamin D3, (VITAMIN D3) 2,000 unit cap, Take 1 capsule by mouth daily., Disp: , Rfl:      coenzyme Q10 (CO Q-10) 200 mg capsule, Take 200 mg by mouth daily., Disp: , Rfl:      ferrous sulfate 325 (65  FE) MG tablet, Take 1 tablet by mouth 3 (three) times a day with meals., Disp: , Rfl:      FREESTYLE 28 gauge lancets, , Disp: , Rfl:      gabapentin (NEURONTIN) 600 MG tablet, Take 1,200 mg by mouth 2 (two) times a day. , Disp: , Rfl:      glimepiride (AMARYL) 4 MG tablet, Take 4 mg by mouth every morning before breakfast., Disp: , Rfl:      insulin glargine (LANTUS) 100 unit/mL injection, Inject under the skin bedtime. 30 units, Disp: , Rfl:      LANCETS MISC, OneTouch Delica Lancets. Test 3 to 4 times daily as needed., Disp: , Rfl:      LANTUS SOLOSTAR 100 unit/mL (3 mL) pen, Inject 30 Units under the skin at bedtime. , Disp: , Rfl:      lisinopril (PRINIVIL,ZESTRIL) 5 MG tablet, TAKE ONE AND ONE-HALF TABLETS DAILY, Disp: 135 tablet, Rfl: 1     metFORMIN (GLUCOPHAGE) 1000 MG tablet, Take 1,000 mg by mouth 2 (two) times a day with meals., Disp: , Rfl:      OMEGA-3/DHA/EPA/FISH OIL (FISH OIL-OMEGA-3 FATTY ACIDS) 300-1,000 mg capsule, Take 1 g by mouth daily. , Disp: , Rfl:      omeprazole (PRILOSEC) 20 MG capsule, Take 20 mg by mouth daily., Disp: , Rfl:      rosuvastatin (CRESTOR) 5 MG tablet, Take 5 mg by mouth daily. , Disp: , Rfl:      XARELTO 20 mg Tab, TAKE 1 TABLET DAILY, Disp: 90 tablet, Rfl: 1    Documentation Date:6/7/2019 3:10 PM  Sally Baez RN

## 2021-05-29 NOTE — PATIENT INSTRUCTIONS - HE
Pacemaker Post-operative Checklist      The Device Registered Nurse (RN) reviewed the pacemaker function.      The Device RN did a wound assessment and wound care teaching.    Please call the Device Nurses with any signs of infection or questions regarding wound healing. Device Nurse Line: [phone], Option #3      The Device RN demonstrated and displayed the specific remote monitoring system for your pacemaker.      The Device RN reviewed the Partnership Agreement Form.    Patient Instructions    Do not lift your Left arm above the shoulder height, perform any vigorous arm movements such as swimming, golfing, washing windows, shoveling show, vacuuming or lifting greater than 10-15lbs with the affected arm for 4 weeks from the date of implant. Your last date of restrictions is July 9, 2019.      To reduce the risk of infection, try to avoid any dental procedures for the first 6 weeks after your pacemaker implant. If you have an emergent or urgent dental need during this time, contact the device clinic for a prescription for an antibiotic.      You will receive a device identification (ID) card in the mail from the device  within 6 weeks to replace the temporary ID card you were given in the hospital.      You may travel by any mode of transportation; just show your pacemaker ID card. You may be subject to a hand search or use of a handheld wand, but official should not keep the wand over the implant site for greater than 5-10 seconds.      For any surgery, let your doctor know you have a pacemaker.       Your pacemaker is MRI safe       Most household appliances, including a microwave, will not interfere with your pacemaker function. If you suspect interference, simply move away from the source. Cell phones do not cause a problem. Please refer to the device booklet from the  or their website under the section on electromagnetic interference (JANELL) for further guidelines on things that may  interfere with your pacemaker.       Device Clinic Contact Information  Device Nurses: [phone], Option #3    Device Remote Specialists: [phone], Option #2. For questions about your Remote Transmission or Transmission Schedule  Device Schedulers: [phone], Option #1

## 2021-05-29 NOTE — TELEPHONE ENCOUNTER
Verbal order per Dr. Rajput:  Set patient up for pacemaker, possible CRT-P, MDT, obtain ECHO am prior, evaluate HR and EF, tentative CRT-P implant.    Stay off Amiodarone till procedure, continue Xarelto.        Phone call to patient to discuss Pacemaker implant per Dr. Brumfield and Dr. Rajput.  Pt has been scheduled for Tuesday 6-11-19 with ECHO prior in [facility].  Arrival time 0600.  Left detailed message and will call patient again Monday 6-10-19 am.

## 2021-05-29 NOTE — PATIENT INSTRUCTIONS - HE
Thomas Steve,    It was a pleasure to see you today at the [facility].     My recommendations after this visit include:    Stop amiodarone  You will need pacemaker    W. Steven Brumfield MD, FACC, ZENAIDA

## 2021-05-29 NOTE — TELEPHONE ENCOUNTER
" Patient called device clinic with concerns about bruising at new implant site.  States he thinks he is \"bleeding internally\" and that it feels like \"the faucet is turned on, then turns back off.\"  I asked him more about what he meant by \"faucet\" and he states it just \"feels like there is more pressure under the skin sometimes and then it goes away.\" States incision in intact, no drainage, no bleeding, no redness. Biggest concern is feelings of pressure and large amount of bruising to left side of chest around device site. Denies that device site itself is growing in size, is not projecting out much further than when he left the hospital per his report. Steri strips are intact. States he was also worried that his dog jumped on the device the day he got home but remote from home shows leads are intact.     We discussed that bruising around site is quite common and may even spread a bit more and/or change in colors as time progresses but this is normal. Advised to try cool packs and tylenol/ibuprofen to help with \"pressure\" or soreness. Has post-op appointment tomorrow in clinic and the Device RN will do a full assessment of site and device lead/battery then. He lives about 100 miles away  from clinic, advised to go to local ER should he start seeing actual bleeding from site before then, he agrees. Denies any concerns at this time.    Remedios Madden RN    "

## 2021-05-29 NOTE — TELEPHONE ENCOUNTER
Pt calling that he has a pacemaker procedure scheduled 6/11/19 and knows his Amiodarone is on hold, however wants to know if he should hold Xarelto as well.  Triage RN read to pt the note from 6/7/29 stating Pre-Procedure Medication Instructions not to hold Xarelto and to hold Bumex morning of procedure.  Tessa Collins RN, [facility] RN Triage Nurse Advisor

## 2021-05-30 VITALS — BODY MASS INDEX: 30.78 KG/M2 | WEIGHT: 215 LBS | HEIGHT: 70 IN

## 2021-05-31 VITALS — HEIGHT: 70 IN | WEIGHT: 213 LBS | BODY MASS INDEX: 30.49 KG/M2

## 2021-05-31 VITALS — WEIGHT: 216 LBS | HEIGHT: 70 IN | BODY MASS INDEX: 30.92 KG/M2

## 2021-05-31 VITALS — BODY MASS INDEX: 30.92 KG/M2 | WEIGHT: 216 LBS | HEIGHT: 70 IN

## 2021-05-31 VITALS — HEIGHT: 70 IN | BODY MASS INDEX: 30.49 KG/M2 | WEIGHT: 213 LBS

## 2021-06-01 VITALS — HEIGHT: 70 IN | WEIGHT: 216.4 LBS | BODY MASS INDEX: 30.98 KG/M2

## 2021-06-02 VITALS — WEIGHT: 215 LBS | BODY MASS INDEX: 30.1 KG/M2 | HEIGHT: 71 IN

## 2021-06-02 VITALS — BODY MASS INDEX: 31.21 KG/M2 | HEIGHT: 70 IN | WEIGHT: 218 LBS

## 2021-06-03 ENCOUNTER — RECORDS - HEALTHEAST (OUTPATIENT)
Dept: ADMINISTRATIVE | Facility: CLINIC | Age: 76
End: 2021-06-03

## 2021-06-03 VITALS
HEART RATE: 80 BPM | RESPIRATION RATE: 16 BRPM | SYSTOLIC BLOOD PRESSURE: 124 MMHG | WEIGHT: 213 LBS | BODY MASS INDEX: 31.55 KG/M2 | DIASTOLIC BLOOD PRESSURE: 66 MMHG | HEIGHT: 69 IN

## 2021-06-03 VITALS — BODY MASS INDEX: 31.7 KG/M2 | HEIGHT: 69 IN | WEIGHT: 214 LBS

## 2021-06-03 VITALS — BODY MASS INDEX: 31.5 KG/M2 | WEIGHT: 220 LBS | HEIGHT: 70 IN

## 2021-06-03 VITALS — HEIGHT: 69 IN | WEIGHT: 215.6 LBS | BODY MASS INDEX: 31.93 KG/M2

## 2021-06-03 VITALS — HEIGHT: 70 IN | BODY MASS INDEX: 30.65 KG/M2 | WEIGHT: 214.1 LBS

## 2021-06-04 VITALS — WEIGHT: 209 LBS | HEIGHT: 69 IN | BODY MASS INDEX: 30.96 KG/M2

## 2021-06-04 VITALS
HEIGHT: 69 IN | HEART RATE: 75 BPM | DIASTOLIC BLOOD PRESSURE: 66 MMHG | RESPIRATION RATE: 16 BRPM | SYSTOLIC BLOOD PRESSURE: 108 MMHG | WEIGHT: 209 LBS | BODY MASS INDEX: 30.96 KG/M2

## 2021-06-04 NOTE — PROGRESS NOTES
Cardiology Progress Note    Assessment:  Coronary artery disease, status post coronary artery bypass graft surgery in 2011, stable, no angina  Ischemic cardiomyopathy with mildly depressed LV systolic function, compensated  Persistent atrial flutter status post  cardioversion ×2, recurrent with slow ventricular response, resolved  Persistent atrial fibrillation, recurrent, controlled ventricular response, on anticoagulation  Biventricular pacemaker, normal function  Right bundle branch block  Hypercholesterolemia, suboptimal control due to poor tolerance of statins  Diabetes mellitus  Sleep apnea on CPAP      Plan:  Reassess LV systolic function with echo after implantation of biventricular pacemaker    Clinically he does not appear to be fluid overloaded.  His weight is down from last spring.  I am pessimistic about our ability to restore normal sinus rhythm.  I do not think we should subject him to cardioversion.    Routine follow-up in 6 months    Subjective:   This is 74 y.o. male who comes in today for follow-up visit.  In June of this year he was to be bradycardic with recurrent atrial flutter and slow ventricular response.  He underwent implantation of the biventricular pacemaker.  He was paced out of flutter.  In September he redeveloped atrial fibrillation.  Ventricular rate has been well controlled according to pacemaker interrogations.  The patient is unaware of irregular heart rhythm.  He feels somewhat better than he did before pacemaker insertion although his expectation was much higher.  He denies weight gain.  His medevac he lost weight.  He has no PND and orthopnea.  He denies chest pains    Review of Systems:   General: WNL  Eyes: WNL  Ears/Nose/Throat: Hearing Loss  Lungs: WNL  Heart: WNL  Stomach: WNL  Bladder: WNL  Muscle/Joints: WNL  Skin: WNL  Nervous System: Daytime Sleepiness, WNL(Low Energy)  Mental Health: WNL     Blood: WNL    Objective:   /66 (Patient Site: Left Arm, Patient  "Position: Sitting, Cuff Size: Adult Regular)   Pulse 75   Resp 16   Ht 5' 9.25\" (1.759 m)   Wt 209 lb (94.8 kg)   BMI 30.64 kg/m    Physical Exam:  GENERAL: no distress  NECK: No JVD  LUNGS: Clear to auscultation.  CARDIAC: regular rhythm, S1 & S2 normal.  No heaves, thrills, gallops or murmurs.  ABDOMEN: flat, negative hepatosplenomegaly, soft and non-tender.  EXTREMITIES: No evidence of cyanosis, clubbing or edema.    Current Outpatient Medications   Medication Sig Dispense Refill     ascorbic acid, vitamin C, (ASCORBIC ACID WITH DEMOND HIPS) 500 MG tablet Take 500 mg by mouth 3 (three) times a day.       blood sugar diagnostic (GLUCOSE BLOOD) Strp OneTouch Test strip. Test 4 times daily.       bumetanide (BUMEX) 1 MG tablet Take 3 tablets (3 mg total) by mouth daily.  0     cholecalciferol, vitamin D3, (VITAMIN D3) 2,000 unit cap Take 1 capsule by mouth daily.       coenzyme Q10 (CO Q-10) 200 mg capsule Take 200 mg by mouth daily.       diphenhydrAMINE-acetaminophen (TYLENOL PM)  mg Tab Take 3 tablets by mouth at bedtime as needed.       ferrous sulfate 325 (65 FE) MG tablet Take 1 tablet by mouth 3 (three) times a day with meals.       FREESTYLE 28 gauge lancets        gabapentin (NEURONTIN) 600 MG tablet Take 1,200 mg by mouth 2 (two) times a day.        glimepiride (AMARYL) 4 MG tablet Take 4 mg by mouth every morning before breakfast.       insulin glargine (LANTUS) 100 unit/mL injection Inject under the skin bedtime. 30 units       LANCETS MISC OneTouch Delica Lancets. Test 3 to 4 times daily as needed.       lisinopril (PRINIVIL,ZESTRIL) 5 MG tablet TAKE ONE AND ONE-HALF TABLETS DAILY 135 tablet 1     metFORMIN (GLUCOPHAGE) 1000 MG tablet Take 1,000 mg by mouth 2 (two) times a day with meals.       OMEGA-3/DHA/EPA/FISH OIL (FISH OIL-OMEGA-3 FATTY ACIDS) 300-1,000 mg capsule Take 1 g by mouth daily.        omeprazole (PRILOSEC) 20 MG capsule Take 20 mg by mouth daily.       rosuvastatin (CRESTOR) 5 " MG tablet Take 5 mg by mouth daily.        XARELTO 20 mg tablet TAKE 1 TABLET DAILY 90 tablet 1     SYNTHROID 25 mcg tablet Take 25 mcg by mouth daily.       No current facility-administered medications for this visit.        Cardiographics:    Echo: June 2019    Normal left ventricular size and wall thickness.    Left ventricle ejection fraction is mildly decreased. The estimated left ventricular ejection fraction is 50%.    Right ventricle is mildly dilated with moderate systolic dysfunction.    Severe biatrial enlargement is present.    No hemodynamically significant valvular heart abnormalities.    When compared to the previous study dated 6/1/2016, no significant change.      Stress test: June 2016   1. Lexiscan stress nuclear study is negative for inducible myocardial ischemia; there is a small area of severe nontransmural infarction in the distal anterior and apical segments.  2. Left ventricular ejection fraction is 48%.    Lab Results:       Lab Results   Component Value Date    CHOL 154 10/31/2019    CHOL 196 05/02/2018    CHOL 163 05/22/2017     Lab Results   Component Value Date    HDL 45 10/31/2019    HDL 41 05/02/2018    HDL 44 05/22/2017     Lab Results   Component Value Date    LDLCALC 80 10/31/2019    LDLCALC 115 05/02/2018    LDLCALC 92 05/22/2017     Lab Results   Component Value Date    TRIG 147 10/31/2019    TRIG 201 (H) 05/02/2018    TRIG 136 05/22/2017     BNP   Date Value Ref Range Status   07/07/2016 134 (H) 0 - 67 pg/mL Final       Mac (Steven)  MD Brissa

## 2021-06-04 NOTE — PATIENT INSTRUCTIONS - HE
Thomas Steve,    It was a pleasure to see you today at the [facility].     My recommendations after this visit include:    Echo to reassess the strength of your heart    RONALDO Brumfield MD, FACC, ZENAIDA

## 2021-06-05 VITALS
OXYGEN SATURATION: 95 % | HEIGHT: 69 IN | HEART RATE: 64 BPM | DIASTOLIC BLOOD PRESSURE: 62 MMHG | SYSTOLIC BLOOD PRESSURE: 108 MMHG | WEIGHT: 209 LBS | BODY MASS INDEX: 30.96 KG/M2 | RESPIRATION RATE: 16 BRPM

## 2021-06-08 NOTE — PROGRESS NOTES
Order for Durable Medical Equipment was processed and equipment ordered.   DME provider: [facility]  Date Faxed: 1/4/2017  Ordering Provider: DR. CARLTON  Equipment ordered: CPAP/SUPPLIES

## 2021-06-09 NOTE — TELEPHONE ENCOUNTER
Recommendations reviewed with patient. Verbalized understanding. No further questions/concerns at this time. New prescription sent to [facility].  Pt will monitor BP and return call if further concerns. -kcl

## 2021-06-09 NOTE — TELEPHONE ENCOUNTER
PC from patient concerned about high BPs and heart rates. He also complains of a slight headache off and on. These symptoms all started after his last remote on 6/18/2020. He was concerned that we changed something, but I assured him the device was functioning well and we cannot program device remotely. He historically has persistent AFL and is BIVP 100%. Histograms show heart rates 60-100bpm.     Recent BP and heart rates are as follows:  Today: 167/86; 75bpm  7/19: 152/77; 80bpm  7/18: 151/76; 75bpm  7/17: 150/80; 76bpm    I was concerned about the higher BPs for this patient, however the heart rates are very normal. He has rate response on and we would want to see some heart rate variability even >/=80bpm with ADLs. I did not recommend another remote. He said amiodarone was stopped in June 2019. He saw Dr. Brumfield in 12/2019, medications are up to date. He does take lisinopril 7.5mg daily.     Routing to Dr. Brumfield.    Rose Marie Moralez, RN BSN  [facility]

## 2021-06-10 NOTE — PROGRESS NOTES
"Cardiology Progress Note    Assessment:  Coronary artery disease, status post coronary artery bypass graft surgery in 2011, stable, no angina  Ischemic cardiomyopathy with mildly depressed LV systolic function, no fluid overload and physical exam  Persistent atrial flutter status post recent cardioversion ×2, successful maintenance of normal sinus rhythm with amiodarone  Persistent atrial fibrillation, resolved, no new symptomatic episodes, on anticoagulation  Hyperlipidemia, suboptimal control due to poor tolerance of statins  Diabetes mellitus  Sinus node dysfunction, mild sinus bradycardia, probably asymptomatic  Sleep apnea on CPAP  Fatigue      Plan:  Discontinue carvedilol because of mild sinus bradycardia.  Will consider Holter monitor if bradycardia persists.  I suspect that mild sinus bradycardia is not responsible for her fatigue.  Follow-up in 6 months    Subjective:   This is 72 y.o. male who comes in today for follow-up visit.  He denies heart palpitations.  He has not had syncope.  He does get fatigued easily.  He has not had chest pains.  He has not had weight gain, PND, orthopnea.    Review of Systems:   General: WNL  Eyes: WNL  Ears/Nose/Throat: WNL  Lungs: Shortness of Breath  Heart: Shortness of Breath with activity, Leg Swelling  Stomach: WNL  Bladder: WNL  Muscle/Joints: Joint Pain, Muscle Weakness  Skin: WNL  Nervous System: Loss of Balance  Mental Health: WNL     Blood: Easy Bleeding, Easy Bruising    Objective:   /66 (Patient Site: Left Arm, Patient Position: Sitting, Cuff Size: Adult Regular)  Pulse 60  Resp 18  Ht 5' 10\" (1.778 m)  Wt 215 lb (97.5 kg)  SpO2 100%  BMI 30.85 kg/m2  Physical Exam:  GENERAL: no distress  NECK: No JVD  LUNGS: Clear to auscultation.  CARDIAC: regular rhythm, S1 & S2 normal.  No heaves, thrills, gallops or murmurs.  ABDOMEN: flat, negative hepatosplenomegaly, soft and non-tender.  EXTREMITIES: No evidence of cyanosis, clubbing or edema.    Current " Outpatient Prescriptions   Medication Sig Dispense Refill     acetaminophen (TYLENOL) 500 MG tablet Take 1,000 mg by mouth 3 (three) times a day.       amiodarone (PACERONE) 200 MG tablet Take 1 Tablet (200mg) twice daily until 8 and then decrease to 1 tab a day.. 90 tablet 3     blood sugar diagnostic (GLUCOSE BLOOD) Strp OneTouch Test strip. Test 4 times daily.       bumetanide (BUMEX) 1 MG tablet Take 3 tablets (3 mg total) by mouth daily.  0     carvedilol (COREG) 3.125 MG tablet Take 0.5 tablets (1.56 mg total) by mouth 2 (two) times a day with meals. 90 tablet 11     cholecalciferol, vitamin D3, (VITAMIN D3) 2,000 unit cap Take 1 capsule by mouth daily.       coenzyme Q10 (CO Q-10) 200 mg capsule Take 200 mg by mouth daily.       gabapentin (NEURONTIN) 600 MG tablet Take 1,200 mg by mouth 2 (two) times a day.        glimepiride (AMARYL) 4 MG tablet Take 4 mg by mouth every morning before breakfast.       insulin glargine (LANTUS) 100 unit/mL injection Inject under the skin bedtime. 30 units       LANCETS MISC OneTouch Delica Lancets. Test 3 to 4 times daily as needed.       lisinopril (PRINIVIL,ZESTRIL) 5 MG tablet Take 1.5 tablets (7.5 mg total) by mouth daily. 135 tablet 3     metFORMIN (GLUCOPHAGE) 1000 MG tablet Take 1,000 mg by mouth 2 (two) times a day with meals.       OMEGA-3/DHA/EPA/FISH OIL (FISH OIL-OMEGA-3 FATTY ACIDS) 300-1,000 mg capsule Take 1 g by mouth daily.        omeprazole (PRILOSEC) 20 MG capsule Take 20 mg by mouth daily.       rosuvastatin (CRESTOR) 5 MG tablet Take 5 mg by mouth. Pt taking 1 tab 3 times a week       XARELTO 20 mg Tab TAKE 1 TABLET DAILY 90 tablet 2     No current facility-administered medications for this visit.        Cardiographics:    EC2017 sinus bradycardia rate of 55 bpm first-degree AV block right bundle branch block    Echo: 2016 1. Technically difficult study despite utilization of Definity echo  contrast.  2. The left ventricle is normal in  size. Left ventricular systolic  performance is mild-moderately reduced. The ejection fraction is estimated  to be 40-45%.  3. There is moderate mid to distal anterior hypokinesis.  4. There is mild, to possibly mild-moderate, mitral regurgitation.  5. There is moderate tricuspid regurgitation.  6. The right ventricle appears enlarged and with reduced left ventricular  systolic performance though difficult to quantify due to poor acoustic  imaging.  7. The left atrium is mildly enlarged. The right atrium is mild-moderately  Enlarged.     Stress test: June 2016 1. Lexiscan stress nuclear study is negative for inducible myocardial ischemia; there is a small area of severe nontransmural infarction in the distal anterior and apical segments.  2. Left ventricular ejection fraction is 48%.    Lab Results:       Lab Results   Component Value Date    CHOL 135 04/20/2016    CHOL 176 08/31/2015    CHOL 160 07/18/2014     Lab Results   Component Value Date    HDL 34 (L) 04/20/2016    HDL 37 (L) 08/31/2015    HDL 41 07/18/2014     Lab Results   Component Value Date    LDLCALC 72 04/20/2016    LDLCALC  08/31/2015      Comment:      Invalid, Triglycerides >300    LDLCALC 77 07/18/2014     Lab Results   Component Value Date    TRIG 145 04/20/2016    TRIG 310 (H) 08/31/2015    TRIG 210 (H) 07/18/2014     No components found for: CHOLHDL  BNP   Date Value Ref Range Status   07/07/2016 134 (H) 0 - 67 pg/mL Final       Mac (Steven)  MD Brissa

## 2021-06-12 NOTE — PROGRESS NOTES
I talked with Dr. Mcclain, the primary care provider Mr. Steve regarding clearing him for the surgery with Dr. Samuel.  She stated that the cardiologist will clear him for the surgery if his hemoglobin returns to a level of 12.  She found that he has a hemoglobin level of 8.5, and the repeat lab as of today is at 10.4.  She states that he will be ready for surgery in 2 weeks.  Dr. Mcclain stated that the patient will follow up with Dr. Samuel to set up a time for the surgery.    ANA Samano, MPA-C

## 2021-06-12 NOTE — PATIENT INSTRUCTIONS - HE
Thomas Steve,    It was a pleasure to see you today at the [facility].     My recommendations after this visit include:    Stress test    RONALDO Brumfield MD, FACC, ZENAIDA

## 2021-06-12 NOTE — PROGRESS NOTES
This is a level 2 office consult.  Consult was requested by Mallory.  Thomas Steve  is a 72 y.o. male     Chief complaint: Weak legs    HPI: Patient's symptoms get worse with walking.  Equal bilaterally.  He has had symptoms for years.  He does not get leg pain.  He does not have significant numbness.  He does not have weakness of his arms or hands.  He does not have trouble with bladder control.    PMH/ROS: Generally poor health.  Currently wearing a Holter monitor.  He has the monitor for weakness, fatigue, and decreased energy.  He has a history of coronary artery bypass in 2011.  He has had atrial fibrillation and atrial flutter.  He has been cardioverted twice.  He is anticoagulated.  He has diabetes.  He is on insulin.  He says he has 75% of normal lung function.  He does not have cancer.    Exam: Wearing a Holter monitor.  Reasonably well-developed and well-nourished.  Gait okay.  Able to walk on tiptoes and heels.  Restricted range of motion.  Knee reflexes present ankle reflexes absent.  Good strength.  Sensation okay.  No atrophy.    Studies/imaging: Spinal stenosis at L4-5 and herniated disc L5-S1 on the left    Impression: Lumbar spinal stenosis.    Plan: It is peculiar that the patient does not have pseudo-claudication pain with walking.  Plan cervical MRI to look for spinal cord compression.  Patient is in very poor health.  Evaluation by Dr. Mcclain see if the patient could survive an operation.  It may take visits to  cardiology and pulmonary medicine as part of the assessment of surgical risk.

## 2021-06-12 NOTE — PROGRESS NOTES
Neurosurgery consultation was requested by: Mallory EASON  Pain: Minor back pain   Radicular Pain is present: Denies radicular pain   Lhermitte sign: No  Motor complaints: Weakness in both legs  Sensory complaints: Neuropathy in both feet from diabetes   Gait and balance issues: Yes  Bowel or bladder issues:  Denies   Duration of SX is: years, progressed in last year   The symptoms are worse with: Constant  The symptoms are better with: Constant  Injury: Denies   Severity is: Severe  Patient has tried the following conservative measures: Pt did PT and had a Left L5-S1 injection and had some minor relief.   MIREYA score is:  GERRI Hyatt

## 2021-06-12 NOTE — TELEPHONE ENCOUNTER
Wellness Screening Tool  Symptom Screening:  Do you have one of the following NEW symptoms:    Fever (subjective or >100.0)?  No    A new cough?  No    Shortness of breath?  No     Chills? No     New loss of taste or smell? No     Generalized body aches? No     New persistent headache? No     New sore throat? No     Nausea, vomiting, or diarrhea?  No    Within the past 2 weeks, have you been exposed to someone with a known positive illness below:    COVID-19 (known or suspected)?  No    Chicken pox?  No    Mealses?  No    Pertussis?  No    Patient notified of visitor policy- They may have one person accompany them to their appointment, but they will need to wear a mask and will be screened upon arrival for symptoms: Yes  Pt informed to wear a mask: Yes  Pt notified if they develop any symptoms listed above, prior to their appointment, they are to call the clinic directly at [phone] for further instructions.  Yes  Patient's appointment status: Patient will be seen in clinic as scheduled on 11/6

## 2021-06-12 NOTE — PROGRESS NOTES
Assessment / Plan:     Diagnoses and all orders for this visit:    Lumbalgia  -     Ambulatory referral to Neurosurgery    Lumbar radiculitis  -     Ambulatory referral to Neurosurgery    Spinal stenosis of lumbar region  -     Ambulatory referral to Neurosurgery    Myofascial pain  -     Ambulatory referral to Neurosurgery    Weakness of both legs  -     Ambulatory referral to Neurosurgery          Thomas Steve is a 72 y.o. y.o. male with past medical history significant for type 2 diabetes mellitus, hypertension, coronary artery disease, ischemic cardiomyopathy, persistent atrial fibrillation, right bundle branch block, hyperlipidemia, sleep apnea, congestive heart failure, diabetic peripheral neuropathy associated with type II diabetes mellitus.  Patient status post left TFESI L5-S1 on July 5, 2016 for lumbar radicular pain in the left L5-S1 with complete resolution of the radicular pain.  Today patient returns to the clinic reporting weakness and fatigue of the lower extremity without pain.  Lumbar MRI done on February 13, 2016 showed severe TREFOIL type spinal canal  stenosis at the L4-L5 that is likely contributing to patient current symptoms of neurogenic claudication.  Patient would like to explore possible surgical option to provide him with relief of weakness and the fatigue of the lower extremity.  I discussed this case with Dr. Samuel.  Patient will see Dr. Samuel today to discuss surgical options including laminectomy.  No red flags.    A shared decision making plan was used.  The patient's values and choices were respected.  The following represents what was discussed and decided upon by the physician and the patient.      1.  DIAGNOSTIC TESTS: I reviewed the lumbar MRI imaging and the report with the patient today.  He verbalizes understanding.  2.  PHYSICAL THERAPY: Patient will continue on physical therapy [facility] program.  3.  MEDICATIONS: Patient will continue on gabapentin 600 mg 3 tablets twice  daily.  4.  INTERVENTIONS: No therapeutic injections at this time.  5.  PATIENT EDUCATION: I thoroughly discussed the plan with the patient today.  He verbalizes understanding and agrees with the plan.  6.  FOLLOW-UP: Patient will follow up with me on as-needed basis.  All questions were answered.      ANA Samano, MPA-C      Subjective:     Thomas Steve is a 72 y.o. male who presents today for follow-up regarding chronic low back pain and weakness of the lower extremity.  The patient returns to clinic stating that his most concern is the weakness of the lower extremity.  He denies pain radiating to the lower extremity.  He stated that he would like to have a better quality of life, in order to do more walking and engaged in detail activity. Lumbar MRI of the lumbar spine done on June 13, 2016 shows at the L4-L5 there is severe trefoil-type spinal canal stenosis with impingement upon traversing L5 nerve root, mild bilateral neural foraminal stenosis.  Patient states that he would like to discuss treatment option for the lower extremity weakness and fatigue.  He would like to consider surgical options if available.  At this time he denies urinary or bowel incontinence, saddle anesthesia, unintentional weight loss.  He underwent physical therapy in the past and had therapeutic steroid injection.  He is not sure what provided him with pain relief in the past.  At this time he is taking gabapentin 600 mg 2 tablets twice daily.    Review of Systems:  Bilateral low back pain and weakness and fatigue of the lower extremity.Patient denies urinary or bowel incontinence, unintentional weight loss, saddle anesthesia, fever or chills, balance difficulties.       Objective:   CONSTITUTIONAL:  Vital signs as above.  No acute distress.  The patient is well nourished and well groomed.    PSYCHIATRIC:  The patient is awake, alert, oriented to person, place and time.  The patient is answering questions appropriately with  clear speech.  Normal affect.  SKIN:  Skin over the face, posterior torso, bilateral upper and lower extremities is clean, dry, intact without rashes.  MUSCULOSKELETAL:  Gait is non-antalgic.  The patient is able to heel and toe walk without any difficulty.  Mild tenderness over the L4-L5 L5-S1 Lumbar paraspinal muscles.      The patient has 5/5 strength for the bilateral hip flexors, knee flexors/extensors, ankle dorsiflexors/plantar flexors, ankle evertors/invertors.    NEUROLOGICAL:  2/4 patellar, medial hamstring, achilles reflexes which are symmetric bilaterally.  No ankle clonus bilaterally.  Sensation to light touch is intact in the bilateral L4, L5, and S1 dermatomes.  Negative straight leg raise bilaterally.  Negative at this point laterally.  Negative hip impingement bilaterally.        RESULTS:      RESULTS:  IMPRESSION:   CONCLUSION:  1. Mild to moderate lumbar spondylosis with developmental narrowing of the spinal canal due to shortened pedicles.  2. At L5-S1, left central/subarticular disc extrusion effaces the left lateral recess and impinges upon the left S1 nerve root. Mild central spinal canal stenosis and moderate right neural foraminal stenosis at this level.  3. At L4-L5, severe trefoil type spinal canal stenosis with impingement upon traversing L5 nerve roots. Mild bilateral neural foraminal stenosis.

## 2021-06-13 NOTE — PROGRESS NOTES
"Cardiology Progress Note    Assessment:  Coronary artery disease, status post coronary artery bypass graft surgery in 2011, stable, no angina  Ischemic cardiomyopathy with mildly depressed LV systolic function, compensated  Persistent atrial flutter status post recent cardioversion ×2, successful maintenance of normal sinus rhythm with amiodarone  Persistent atrial fibrillation, resolved, no new symptomatic episodes, on anticoagulation  Hyperlipidemia, suboptimal control due to poor tolerance of statins  Diabetes mellitus  Sinus bradycardia,  resolved after discontinuation of carvedilol  Sleep apnea on CPAP  Low back pain  Anemia    Plan:  He has been stable from cardiovascular standpoint.  He can undergo back surgery without any further cardiac testing.  He should stay on current cardiac medications routine follow-up in 6 months    Subjective:   This is 72 y.o. male who comes in today for follow-up visit.  He reports no new chest symptoms.  He was complaining of fatigue last time.  He was found to have profound anemia.  He responded to iron replacement.  He denies heart palpitations or syncope.    Review of Systems:   General: WNL  Eyes: WNL  Ears/Nose/Throat: WNL  Lungs: WNL  Heart: WNL  Stomach: WNL  Bladder: WNL  Muscle/Joints: WNL  Skin: WNL  Nervous System: WNL  Mental Health: WNL     Blood: WNL    Objective:   /72  Pulse 68  Resp 16  Ht 5' 10\" (1.778 m)  Wt 213 lb (96.6 kg)  BMI 30.56 kg/m2  Physical Exam:  GENERAL: no distress  NECK: No JVD  LUNGS: Clear to auscultation.  CARDIAC: regular rhythm, S1 & S2 normal.  No heaves, thrills, gallops or murmurs.  ABDOMEN: flat, negative hepatosplenomegaly, soft and non-tender.  EXTREMITIES: No evidence of cyanosis, clubbing or edema.    Current Outpatient Prescriptions   Medication Sig Dispense Refill     acetaminophen (TYLENOL) 500 MG tablet Take 1,000 mg by mouth as needed.        amiodarone (PACERONE) 200 MG tablet Take 1 tablet (200 mg total) by mouth " daily. 90 tablet 2     blood sugar diagnostic (GLUCOSE BLOOD) Strp OneTouch Test strip. Test 4 times daily.       bumetanide (BUMEX) 1 MG tablet Take 3 tablets (3 mg total) by mouth daily.  0     cholecalciferol, vitamin D3, (VITAMIN D3) 2,000 unit cap Take 1 capsule by mouth daily.       coenzyme Q10 (CO Q-10) 200 mg capsule Take 200 mg by mouth daily.       ferrous sulfate 325 (65 FE) MG tablet Take 1 tablet by mouth 3 (three) times a day with meals.       gabapentin (NEURONTIN) 600 MG tablet Take 1,200 mg by mouth 2 (two) times a day.        glimepiride (AMARYL) 4 MG tablet Take 4 mg by mouth every morning before breakfast.       insulin glargine (LANTUS) 100 unit/mL injection Inject under the skin bedtime. 30 units       LANCETS MISC OneTouch Delica Lancets. Test 3 to 4 times daily as needed.       lisinopril (PRINIVIL,ZESTRIL) 5 MG tablet TAKE ONE AND ONE-HALF TABLETS DAILY 135 tablet 1     metFORMIN (GLUCOPHAGE) 1000 MG tablet Take 1,000 mg by mouth 2 (two) times a day with meals.       OMEGA-3/DHA/EPA/FISH OIL (FISH OIL-OMEGA-3 FATTY ACIDS) 300-1,000 mg capsule Take 1 g by mouth daily.        omeprazole (PRILOSEC) 20 MG capsule Take 20 mg by mouth daily.       rosuvastatin (CRESTOR) 5 MG tablet Take 5 mg by mouth. Pt taking 1 tab 3 times a week       XARELTO 20 mg Tab TAKE 1 TABLET DAILY 90 tablet 1     No current facility-administered medications for this visit.        Cardiographics:    Echo: June 2016 1. Technically difficult study despite utilization of Definity echo  contrast.  2. The left ventricle is normal in size. Left ventricular systolic  performance is mild-moderately reduced. The ejection fraction is estimated  to be 40-45%.  3. There is moderate mid to distal anterior hypokinesis.  4. There is mild, to possibly mild-moderate, mitral regurgitation.  5. There is moderate tricuspid regurgitation.  6. The right ventricle appears enlarged and with reduced left ventricular  systolic performance though  difficult to quantify due to poor acoustic  imaging.  7. The left atrium is mildly enlarged. The right atrium is mild-moderately  Enlarged.      Stress test: June 2016 1. Lexiscan stress nuclear study is negative for inducible myocardial ischemia; there is a small area of severe nontransmural infarction in the distal anterior and apical segments.  2. Left ventricular ejection fraction is 48%.    Lab Results:       Lab Results   Component Value Date    CHOL 163 05/22/2017    CHOL 135 04/20/2016    CHOL 176 08/31/2015     Lab Results   Component Value Date    HDL 44 05/22/2017    HDL 34 (L) 04/20/2016    HDL 37 (L) 08/31/2015     Lab Results   Component Value Date    LDLCALC 92 05/22/2017    LDLCALC 72 04/20/2016    LDLCALC  08/31/2015      Comment:      Invalid, Triglycerides >300     Lab Results   Component Value Date    TRIG 136 05/22/2017    TRIG 145 04/20/2016    TRIG 310 (H) 08/31/2015     No components found for: CHOLHDL  BNP   Date Value Ref Range Status   07/07/2016 134 (H) 0 - 67 pg/mL Final       Mac (Steven)  MD Brissa

## 2021-06-13 NOTE — PROGRESS NOTES
Pt saw Dr. Samuel 7-31-17 for bilateral leg weakness.  He has since obtained cervical MRI and EMG.  He reports no pain.  Legs remain weak, but not as bad as July. Has neuropathy in feet from diabetes.   No falls. Does not use ambulatory assist device.  Denies bowel/bladder issues.  Hands are constantly sore with some weakness.  MIREYA=2%    Radha Jose RN

## 2021-06-13 NOTE — PROGRESS NOTES
Patient complains of weakness in both legs.  His cervical MRI does not look too bad to me.  He had an EMG that shows a demyelinating polyneuropathy of both legs but also active denervation in bilateral S1 nerve roots pointing to compression.  The patient's cardiologist is Dr. Brumfield.  A note from him says the patient does not need a stress test before surgery unless he has new angina.  The patient says he is not having angina.  The patient was found to be anemic and is getting iron.  A note from [facility] says that Dr. Mcclain says that the patient will be ready for surgery in 2 weeks.  I tried to contact Dr. Mcclain but she is out of the office.  I showed the lumbar MRI pictures to the patient and recommended a decompressive laminectomy at L4-5 and L5-S1 with probable removal of the herniated disc at L5-S1 on the left.  I included in the discussion the nature of the problem, nature of the proposed surgery, rationale for doing it, goals, benefits, alternatives, and significant risks and complications.  I answered all his questions.  He said he was satisfied with the explanation and understood.  He requested surgery.  He gave informed consent to go ahead with surgery.  He is going to call back to work on scheduling.  He is satisfied with the plan.  Total time 25 minutes, more than 50% spent counseling and/or coordinating care.

## 2021-06-14 NOTE — PROGRESS NOTES
Dear Dr. Moni Mcclain MD  [address]  [place] [address],    Thank you for the opportunity to participate in the care of Thomas Steve.     He is a 72 y.o. y/o male patient who comes to the sleep medicine clinic for his yearly PAP follow up.    The patient underwent and in-laboratory polysomnographic study on 11/11/2013 which revealed a complex sleep apnea (apnea hypopnea index at 74 events per  hour).  He has been using BiPAP AUTO SV advanced therapy.  Current PAP settings: EPAP 6 cwp to 11 cwp. PS 0 cwp to 10 cwp. P max= 20 cwp. Rate: auto     He denies any PAP intolerance. He is using the device every night. He is noticing some difficulties due to high pressure in the morning.     30-Days Efficacy and Compliance Data  Residual AHI: 1.0  Leak: 0%  Days used > 4 hours: 100%  Average usage per night: 6 hours 30 minutes    90th percentile EPAP: 9.9 cwp  Average PS: 4.4 cwp.    Mask Tolerance: excellent  Skin irritation: no    His current DME notified him that they won't accept Medicare any longer and he needs a new company.    Past Medical History:   Diagnosis Date     Acute sinusitis      Atrial fibrillation     Resolved after medication and CPAP     Back pain      Diabetes mellitus      Hyperlipemia      Joint pain      Lipoma of neck      Nocturia      Sciatic leg pain      Shoulder impingement syndrome     BILATERAL     Sleep apnea     uses CPAP     Typical atrial flutter 7/7/2016    Demonstrated on 12-lead EKG July 7, 2016       Past Surgical History:   Procedure Laterality Date     ME CABG, VEIN, SINGLE  2011    Description: CABG (CABG);  Proc Date: 09/26/2011;  Comments: LIMA^LAD, SVG^OM, SVG^distal RCA     ME CARDIOVERSION ELECTIVE ARRHYTHMIA EXTERNAL  06/03/2014    Description: Elective Cardioversion External;  Recorded: 06/03/2014;       Social History     Social History     Marital status:      Spouse name: N/A     Number of children: N/A     Years of education: N/A      Occupational History     Not on file.     Social History Main Topics     Smoking status: Former Smoker     Packs/day: 1.50     Years: 23.00     Quit date: 1986     Smokeless tobacco: Not on file     Alcohol use 0.6 oz/week     1 Cans of beer per week     Drug use: No     Sexual activity: No     Other Topics Concern     Not on file     Social History Narrative    .  3 children.  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       Review of Systems:  General: No weight gain, no weight loss  Eyes: No vision changes  ENT: No hearing changes  Cardio: No chest pain, no nocturnal dyspnea  Respiratory: No shortness of breath, no cough  Gastrointestinal: No diarrhea, no constipation  Genitourinary: No excessive urination  Tegumentary: No rashes  Neurological: No seizures, no loss of consciousness  Endo: No heat or cold intolerance.    Current Outpatient Prescriptions   Medication Sig Dispense Refill     acetaminophen (TYLENOL) 500 MG tablet Take 1,000 mg by mouth as needed.        amiodarone (PACERONE) 200 MG tablet Take 1 tablet (200 mg total) by mouth daily. 90 tablet 2     blood sugar diagnostic (GLUCOSE BLOOD) Strp OneTouch Test strip. Test 4 times daily.       bumetanide (BUMEX) 1 MG tablet Take 3 tablets (3 mg total) by mouth daily.  0     cholecalciferol, vitamin D3, (VITAMIN D3) 2,000 unit cap Take 1 capsule by mouth daily.       coenzyme Q10 (CO Q-10) 200 mg capsule Take 200 mg by mouth daily.       ferrous sulfate 325 (65 FE) MG tablet Take 1 tablet by mouth 3 (three) times a day with meals.       FREESTYLE 28 gauge lancets        gabapentin (NEURONTIN) 600 MG tablet Take 1,200 mg by mouth 2 (two) times a day.        glimepiride (AMARYL) 4 MG tablet Take 4 mg by mouth every morning before breakfast.       insulin glargine (LANTUS) 100 unit/mL injection Inject under the skin bedtime. 30 units       LANCETS MISC OneTouch Delica Lancets. Test 3 to 4 times daily as needed.       LANTUS SOLOSTAR 100  "unit/mL (3 mL) pen        lisinopril (PRINIVIL,ZESTRIL) 5 MG tablet TAKE ONE AND ONE-HALF TABLETS DAILY 135 tablet 1     metFORMIN (GLUCOPHAGE) 1000 MG tablet Take 1,000 mg by mouth 2 (two) times a day with meals.       OMEGA-3/DHA/EPA/FISH OIL (FISH OIL-OMEGA-3 FATTY ACIDS) 300-1,000 mg capsule Take 1 g by mouth daily.        omeprazole (PRILOSEC) 20 MG capsule Take 20 mg by mouth daily.       rosuvastatin (CRESTOR) 5 MG tablet Take 5 mg by mouth. Pt taking 1 tab 3 times a week       XARELTO 20 mg Tab TAKE 1 TABLET DAILY 90 tablet 1     No current facility-administered medications for this visit.        Allergies   Allergen Reactions     Codeine Nausea And Vomiting     Vicodin [Hydrocodone-Acetaminophen] Nausea And Vomiting       Physical Exam:  /74  Pulse 60  Ht 5' 10\" (1.778 m)  Wt 213 lb (96.6 kg)  BMI 30.56 kg/m2  BMI:Body mass index is 30.56 kg/(m^2).   GEN: NAD, obese  Neurological: Alert, oriented to time, place, and person.  Psych: normal mood, normal affect     Labs/Studies:     I reviewed the efficacy and compliance report from his device.    Lab Results   Component Value Date    WBC 8.6 10/02/2013    HGB 13.8 (L) 11/07/2013    HCT 38.7 (L) 10/02/2013    MCV 87 10/02/2013     10/02/2013         Chemistry        Component Value Date/Time     11/06/2017 1612    K 4.5 11/06/2017 1612    CL 98 11/06/2017 1612    CO2 28 11/06/2017 1612    BUN 24 11/06/2017 1612    CREATININE 1.23 11/06/2017 1612     11/06/2017 1612        Component Value Date/Time    CALCIUM 9.3 11/06/2017 1612    ALKPHOS 97 11/06/2017 1612    AST 31 11/06/2017 1612    ALT 33 11/06/2017 1612    BILITOT 0.4 11/06/2017 1612            Lab Results   Component Value Date    FERRITIN 32 11/06/2017           Assessment and Plan:  In summary Thomas Steve is a 72 y.o. year old male who is here for follow up.    1. Obstructive Sleep Apnea.  Residual AHI is excellent  Compliance is excellent  Patient is benefiting from " using PAP therapy.  We will change the pressure settings to: EPAP 6 to 11 cwp, PS 0 to 5 cwp, rate = auto  I will write a prescription for supplies.   He will need a new DME company. We checked his insurance's website and looked at companies that cover his zip code.   We will send the new prescription to [facility].  We counseled the patient on the importannce of using his PAP device every night and the risks of not treating sleep apnea.      Patient verbalized understanding of these issues, agrees with the plan and all questions were answered today. Patient was given an opportuntity to voice any other symptoms or concerns not listed above. Patient did not have any other symptoms or concerns.      Patient told to return in 12 months.     Rene Baird MD  [facility] Board Certified in Internal Medicine and Sleep Medicine  [facility].    We spent a total of 25 minutes during this visit and more than 50% of the time was used to  the patient about sleep apnea and treatment options.

## 2021-06-14 NOTE — TELEPHONE ENCOUNTER
Was able to reach patient today, states he just arrives in [place] for the next 3 months yesterday. He denies any shortness of breath, weight gain, edema, and any concerning symptoms at this time. Denies any missed medications even during his travels.     Advised that he pay close attention to sodium intake, medications, weights and any s/s of HF and to call promptly with any changes. Patient verbalized understanding and states he does have a scale to weight himself there so can remain compliant. Denies any other questions/concerns at this time.    Remedios Madden RN

## 2021-06-14 NOTE — PROGRESS NOTES
Order for Durable Medical Equipment was processed and equipment ordered.   DME provider: Derrek  Date Faxed: 12/20/17  Ordering Provider:   Equipment ordered: [facility]/Cpap supply renewal

## 2021-06-15 NOTE — PROGRESS NOTES
The patient has straight Medicare as his insurance, his home address is in [place], which [facility] does not cover. Informed patient of this and to call his insurance to see if they can give him a list of covered vendors.

## 2021-06-15 NOTE — PROGRESS NOTES
Order for Durable Medical Equipment was processed and equipment ordered.   DME provider: [employer]  Date Faxed: 1/02/2018  Ordering Provider: Dr. Baird  Equipment ordered: Supplies

## 2021-06-16 PROBLEM — I49.5 SSS (SICK SINUS SYNDROME) (H): Status: ACTIVE | Noted: 2019-06-07

## 2021-06-16 PROBLEM — Z95.0 BIVENTRICULAR CARDIAC PACEMAKER IN SITU: Status: ACTIVE | Noted: 2019-06-18

## 2021-06-17 NOTE — TELEPHONE ENCOUNTER
Telephone Encounter by Remedios Madden RN at 1/11/2021 11:45 AM     Author: Remedios Madden RN Service: -- Author Type: Registered Nurse    Filed: 1/12/2021  3:12 PM Encounter Date: 1/11/2021 Status: Addendum    : Remedios Madden RN (Registered Nurse)    Related Notes: Original Note by Remedios Madden RN (Registered Nurse) filed at 1/11/2021  5:29 PM         Type: routine remote CRT-P transmission.  Presenting rhythm: atrial fibrillation with biV pacing rate 60 bpm.  Battery/lead status: stable  Arrhythmias: since 10/9/20, AF is persistent, burden 100%, V-rates >/=120 bpm <5%. No ventricular high rate episodes detected. No ventricular high rate episodes detected.   Anticoagulant: Xarelto  Comments: normal pacemaker function. BiV pacing 99.6% (ineffective <0.1%). Routed to device RN for HF diagnostics review.

## 2021-06-17 NOTE — PROGRESS NOTES
"Cardiology Progress Note    Assessment:  Coronary artery disease, status post coronary artery bypass graft surgery in 2011, stable, no angina  Ischemic cardiomyopathy with mildly depressed LV systolic function, compensated  Persistent atrial flutter status post  cardioversion ×2, the most recent from July 2016, successful maintenance of normal sinus rhythm with amiodarone  Persistent atrial fibrillation, resolved, no new symptomatic episodes, on anticoagulation  Hypercholesterolemia, suboptimal control due to poor tolerance of statins  Diabetes mellitus  Sinus bradycardia,   improved after discontinuation of carvedilol  Sleep apnea on CPAP      Plan:  We discussed safety of long-term therapy with amiodarone.  He has routine labs to monitor drug toxicity.  I think would be worthwhile continue amiodarone as long as sinus rhythm is maintained.  He did have significant decline in the exercise tolerance when he was in atrial fibrillation and flutter.    Follow-up in 6 months    Subjective:   This is 73 y.o. male who comes in today for follow-up visit.  He has had uneventful last 6 months.  He denies increasing chest pain or shortness of breath.  His weight has been stable.  He has not experienced heart palpitations or syncope.  He has not had PND, orthopnea, pedal edema    Review of Systems:   General: WNL  Eyes: WNL  Ears/Nose/Throat: Hearing Loss  Lungs: WNL  Heart: WNL  Stomach: WNL  Bladder: WNL  Muscle/Joints: Joint Pain, Muscle Weakness  Skin: Poor Wound Healing  Nervous System: WNL  Mental Health: WNL     Blood: WNL    Objective:   /60 (Patient Site: Left Arm, Patient Position: Sitting, Cuff Size: Adult Regular)  Pulse (!) 56  Resp 20  Ht 5' 10\" (1.778 m)  Wt 216 lb 6.4 oz (98.2 kg) Comment: shoes on  BMI 31.05 kg/m2  Physical Exam:  GENERAL: no distress  NECK: No JVD  LUNGS: Clear to auscultation.  CARDIAC: regular rhythm, S1 & S2 normal.  No heaves, thrills, gallops or murmurs.  ABDOMEN: flat, " negative hepatosplenomegaly, soft and non-tender.  EXTREMITIES: No evidence of cyanosis, clubbing or edema.    Current Outpatient Prescriptions   Medication Sig Dispense Refill     acetaminophen (TYLENOL) 500 MG tablet Take 1,000 mg by mouth as needed.        amiodarone (PACERONE) 200 MG tablet Take 1 tablet (200 mg total) by mouth daily. 90 tablet 2     ascorbic acid, vitamin C, (ASCORBIC ACID WITH DEMOND HIPS) 500 MG tablet Take 500 mg by mouth 3 (three) times a day.       blood sugar diagnostic (GLUCOSE BLOOD) Strp OneTouch Test strip. Test 4 times daily.       bumetanide (BUMEX) 1 MG tablet Take 3 tablets (3 mg total) by mouth daily.  0     cholecalciferol, vitamin D3, (VITAMIN D3) 2,000 unit cap Take 1 capsule by mouth daily.       coenzyme Q10 (CO Q-10) 200 mg capsule Take 200 mg by mouth daily.       ferrous sulfate 325 (65 FE) MG tablet Take 1 tablet by mouth 3 (three) times a day with meals.       FREESTYLE 28 gauge lancets        gabapentin (NEURONTIN) 600 MG tablet Take 1,200 mg by mouth 2 (two) times a day.        glimepiride (AMARYL) 4 MG tablet Take 4 mg by mouth every morning before breakfast.       LANCETS MISC OneTouch Delica Lancets. Test 3 to 4 times daily as needed.       LANTUS SOLOSTAR 100 unit/mL (3 mL) pen Inject 30 Units under the skin at bedtime.        lisinopril (PRINIVIL,ZESTRIL) 5 MG tablet TAKE ONE AND ONE-HALF TABLETS DAILY 135 tablet 1     metFORMIN (GLUCOPHAGE) 1000 MG tablet Take 1,000 mg by mouth 2 (two) times a day with meals.       OMEGA-3/DHA/EPA/FISH OIL (FISH OIL-OMEGA-3 FATTY ACIDS) 300-1,000 mg capsule Take 1 g by mouth daily.        omeprazole (PRILOSEC) 20 MG capsule Take 20 mg by mouth daily.       rosuvastatin (CRESTOR) 5 MG tablet Take 5 mg by mouth daily.        XARELTO 20 mg Tab TAKE 1 TABLET DAILY 90 tablet 1     insulin glargine (LANTUS) 100 unit/mL injection Inject under the skin bedtime. 30 units       No current facility-administered medications for this visit.         Cardiographics:    Echo: June 2016   1. Technically difficult study despite utilization of Definity echo  contrast.  2. The left ventricle is normal in size. Left ventricular systolic  performance is mild-moderately reduced. The ejection fraction is estimated  to be 40-45%.  3. There is moderate mid to distal anterior hypokinesis.  4. There is mild, to possibly mild-moderate, mitral regurgitation.  5. There is moderate tricuspid regurgitation.  6. The right ventricle appears enlarged and with reduced left ventricular  systolic performance though difficult to quantify due to poor acoustic  imaging.  7. The left atrium is mildly enlarged. The right atrium is mild-moderately  Enlarged.      Stress test: June 2016   1. Lexiscan stress nuclear study is negative for inducible myocardial ischemia; there is a small area of severe nontransmural infarction in the distal anterior and apical segments.  2. Left ventricular ejection fraction is 48%.    Lab Results:       Lab Results   Component Value Date    CHOL 196 05/02/2018    CHOL 163 05/22/2017    CHOL 135 04/20/2016     Lab Results   Component Value Date    HDL 41 05/02/2018    HDL 44 05/22/2017    HDL 34 (L) 04/20/2016     Lab Results   Component Value Date    LDLCALC 115 05/02/2018    LDLCALC 92 05/22/2017    LDLCALC 72 04/20/2016     Lab Results   Component Value Date    TRIG 201 (H) 05/02/2018    TRIG 136 05/22/2017    TRIG 145 04/20/2016     No components found for: CHOLHDL  BNP   Date Value Ref Range Status   07/07/2016 134 (H) 0 - 67 pg/mL Final       Mac (Mat Brumfield MD

## 2021-06-17 NOTE — PROGRESS NOTES
Received an email from non-invasive to replace nuclear stress test order as previous in November has . Order from [facility] replaced. Email sent to non-invasive staff, Sharon, that this was done. -[facility]

## 2021-06-19 NOTE — LETTER
Letter by Mac Brumfield MD (Ted) at      Author: Mac Brumfield MD (Ted) Service: -- Author Type: --    Filed:  Encounter Date: 3/20/2019 Status: (Other)         Thomas Steve  [address] [address]      March 20, 2019      Dear Thomas,    This letter is to remind you that you will be due for your follow up appointment with Dr. Steven Brumfield  . To help ensure you are in the best health possible, a regular follow-up with your cardiologist is essential.     Please call our Patient Scheduling Line at [phone] to schedule your appointment at your earliest convenience.  If you have recently scheduled an appointment, please disregard this letter.    We look forward to seeing you again. As always, we are available at the number  above for any questions or concerns you may have.      Sincerely,     The Physicians and Staff of [facility]

## 2021-06-19 NOTE — LETTER
Letter by Aranza Lynn EPS at      Author: Aranza Lynn EPS Service: -- Author Type: --    Filed:  Encounter Date: 7/15/2019 Status: (Other)         Thomas Steve  [address] [address] [facility] [address]      July 15, 2019      Dear Mr. Steve,    RE: Remote Results    We are writing to you regarding your recent Remote Pacemaker check from home. Your transmission was received successfully. Battery status is satisfactory at this time.     Your results are within normal limits.    Your next device appointment will be a clinic visit on September 10th, 2019 at 9:50 am  at our  [place] location, [address].    To schedule or reschedule, please call [phone] and press 1.    NOTE: If you would like to do an extra transmission, please call [phone] and press 3 to speak to a nurse BEFORE transmitting. This ensures that the Device Clinic staff is aware of the reason you are sending a transmission, and can follow-up with you after it has been reviewed.    We will be checking your implanted device from home (remotely) every three months unless otherwise instructed. We will need to see you in the clinic at least once a year. You may need to be seen in the clinic sooner depending on the results of your check.    Please be aware:    The follow-up schedule is like a Physician prescription.    Your remote monitor is paired to your specific implanted device.      Sincerely,    [facility]

## 2021-06-20 NOTE — LETTER
Letter by Mac Brumfield MD (Ted) at      Author: Mac Brumfield MD (Ted) Service: -- Author Type: --    Filed:  Encounter Date: 9/24/2020 Status: (Other)         Thomas Steve  [address] [facility] [address]      September 24, 2020      Dear Mr. Steve,    RE: Remote Results    We are writing to you regarding your recent Remote Pacemaker check from home. Your transmission was received successfully. Battery status is satisfactory at this time.     Your results are showing no significant changes.    Your next device appointment will be a remote check on October 9, 2020; this will occur automatically.    To schedule or reschedule, please call [phone] and press 1.    NOTE: If you would like to do an extra transmission, please call [phone] and press 3 to speak to a nurse BEFORE transmitting. This ensures that the Device Clinic staff is aware of the reason you are sending a transmission, and can follow-up with you after it has been reviewed.    We will be checking your implanted device from home (remotely) every three months unless otherwise instructed. We will need to see you in the clinic at least once a year. You may need to be seen in the clinic sooner depending on the results of your check.    Please be aware:    The follow-up schedule is like a Physician prescription.    Your remote monitor is paired to your specific implanted device.      Sincerely,    [facility]

## 2021-06-20 NOTE — LETTER
Letter by Melisa Puentes RN at      Author: Melisa Puentes RN Service: -- Author Type: --    Filed:  Encounter Date: 10/9/2020 Status: (Other)         Thomas Steve  [address] [facility] [address]      October 9, 2020      Dear Mr. Steve,    RE: Remote Results    We are writing to you regarding your recent Remote Pacemaker check from home. Your transmission was received successfully. Battery status is satisfactory at this time.     Your results are within normal limits.    Your next device appointment will be a remote check on 1/11/21; this will occur automatically.    To schedule or reschedule, please call [phone] and press 1.    NOTE: If you would like to do an extra transmission, please call [phone] and press 3 to speak to a nurse BEFORE transmitting. This ensures that the Device Clinic staff is aware of the reason you are sending a transmission, and can follow-up with you after it has been reviewed.    We will be checking your implanted device from home (remotely) every three months unless otherwise instructed. We will need to see you in the clinic at least once a year. You may need to be seen in the clinic sooner depending on the results of your check.    Please be aware:    The follow-up schedule is like a Physician prescription.    Your remote monitor is paired to your specific implanted device.      Sincerely,    [facility]

## 2021-06-20 NOTE — LETTER
Letter by Debby Christianson at      Author: Debby Christianson Service: -- Author Type: --    Filed:  Encounter Date: 12/16/2019 Status: Signed         Thomas Steve  [address] [facility] [address] [address]      December 17, 2019      Dear Mr. Steve,    RE: Remote Results    We are writing to you regarding your recent Remote Pacemaker check from home. Your transmission was received successfully. Battery status is satisfactory at this time.     Your results are showing no significant changes.    Your next device appointment will be a remote check on March 19, 2020; this will occur automatically.    To schedule or reschedule, please call [phone] and press 1.    NOTE: If you would like to do an extra transmission, please call [phone] and press 3 to speak to a nurse BEFORE transmitting. This ensures that the Device Clinic staff is aware of the reason you are sending a transmission, and can follow-up with you after it has been reviewed.    We will be checking your implanted device from home (remotely) every three months unless otherwise instructed. We will need to see you in the clinic at least once a year. You may need to be seen in the clinic sooner depending on the results of your check.    Please be aware:    The follow-up schedule is like a Physician prescription.    Your remote monitor is paired to your specific implanted device.      Sincerely,    [facility]

## 2021-06-20 NOTE — LETTER
Letter by Rosana Jarrett EPS at      Author: Rosana Jarrett EPS Service: -- Author Type: --    Filed:  Encounter Date: 3/18/2020 Status: (Other)         Thomas Steve  [address] [facility] [address]      March 19, 2020      Dear Mr. Steve,    RE: Remote Results    We are writing to you regarding your recent Remote Pacemaker check from home. Your transmission was received successfully. Battery status is satisfactory at this time.     Your results are within normal limits.    Your next device appointment will be a remote check on June 18, 2020; this will occur automatically.    To schedule or reschedule, please call [phone] and press 1.    NOTE: If you would like to do an extra transmission, please call [phone] and press 3 to speak to a nurse BEFORE transmitting. This ensures that the Device Clinic staff is aware of the reason you are sending a transmission, and can follow-up with you after it has been reviewed.    We will be checking your implanted device from home (remotely) every three months unless otherwise instructed. We will need to see you in the clinic at least once a year. You may need to be seen in the clinic sooner depending on the results of your check.    Please be aware:    The follow-up schedule is like a Physician prescription.    Your remote monitor is paired to your specific implanted device.      Sincerely,    [facility]

## 2021-06-20 NOTE — LETTER
Letter by Melisa Puentes RN at      Author: Melisa Puentes RN Service: -- Author Type: --    Filed:  Encounter Date: 6/18/2020 Status: (Other)         Thomas Steve  [address] [facility] [address]      June 18, 2020      Dear Mr. Steve,    RE: Remote Results    We are writing to you regarding your recent Remote Pacemaker check from home. Your transmission was received successfully. Battery status is satisfactory at this time.     Your results are within normal limits.    Your next device appointment will be a clinic visit.  Please call in August 2020 to schedule.      To schedule or reschedule, please call [phone] and press 1.    NOTE: If you would like to do an extra transmission, please call [phone] and press 3 to speak to a nurse BEFORE transmitting. This ensures that the Device Clinic staff is aware of the reason you are sending a transmission, and can follow-up with you after it has been reviewed.    We will be checking your implanted device from home (remotely) every three months unless otherwise instructed. We will need to see you in the clinic at least once a year. You may need to be seen in the clinic sooner depending on the results of your check.    Please be aware:    The follow-up schedule is like a Physician prescription.    Your remote monitor is paired to your specific implanted device.      Sincerely,    [facility]

## 2021-06-21 NOTE — LETTER
Letter by Debby Christianson at      Author: Debby Christianson Service: -- Author Type: --    Filed:  Encounter Date: 4/12/2021 Status: (Other)         Thomas Steve  [address] [address] [facility] [address]      April 12, 2021      Dear Mr. Steve,    RE: Remote Results    We are writing to you regarding your recent Remote Pacemaker check from home. Your transmission was received successfully. Battery status is satisfactory at this time.     Your results are showing no significant changes.    Your next device appointment will be a remote check on July 22, 2021; this will occur automatically.    To schedule or reschedule, please call [phone] and press 1.    NOTE: If you would like to do an extra transmission, please call [phone] and press 3 to speak to a nurse BEFORE transmitting. This ensures that the Device Clinic staff is aware of the reason you are sending a transmission, and can follow-up with you after it has been reviewed.    We will be checking your implanted device from home (remotely) every three months unless otherwise instructed. We will need to see you in the clinic at least once a year. You may need to be seen in the clinic sooner depending on the results of your check.    Please be aware:    The follow-up schedule is like a Physician prescription.    Your remote monitor is paired to your specific implanted device.      Sincerely,    [facility]

## 2021-06-21 NOTE — PROGRESS NOTES
"Cardiology Progress Note    Assessment:  Coronary artery disease, status post coronary artery bypass graft surgery in 2011, stable, no angina  Ischemic cardiomyopathy with mildly depressed LV systolic function, compensated  Persistent atrial flutter status post  cardioversion ×2, the most recent from July 2016, successful maintenance of normal sinus rhythm with amiodarone  Persistent atrial fibrillation, resolved, no new symptomatic episodes, on anticoagulation  Hypercholesterolemia, suboptimal control due to poor tolerance of statins  Diabetes mellitus  Sinus bradycardia, resolved  Sleep apnea on CPAP      Plan:  Continue current cardiac medications  We discussed watchman device.  At present time he is not interested in pursuing left atrial appendage occlusion.    I encouraged him to stay physically active.    Follow-up in 6 months    Subjective:   This is 73 y.o. male who comes in today for routine follow-up visit.  He reports no new episodes of heart palpitations or syncope.  He has not had chest pain or increasing shortness of breath.  He denies PND and orthopnea.  He has chronic swelling of left lower extremity.    Review of Systems:   General: WNL  Eyes: WNL  Ears/Nose/Throat: Hearing Loss  Lungs: WNL  Heart: WNL  Stomach: WNL  Bladder: WNL  Muscle/Joints: Joint Pain, Muscle Weakness, Muscle Pain  Skin: Poor Wound Healing  Nervous System: Loss of Balance  Mental Health: WNL     Blood: Easy Bleeding, Easy Bruising    Objective:   /58 (Patient Site: Right Arm, Patient Position: Sitting, Cuff Size: Adult Large)  Pulse 60  Resp 20  Ht 5' 10.75\" (1.797 m)  Wt 215 lb (97.5 kg)  BMI 30.2 kg/m2  Physical Exam:  GENERAL: no distress  NECK: No JVD  LUNGS: Clear to auscultation.  CARDIAC: regular rhythm, S1 & S2 normal.  No heaves, thrills, gallops .  Soft ejection murmur at the aortic area  ABDOMEN: flat, negative hepatosplenomegaly, soft and non-tender.  EXTREMITIES: No evidence of cyanosis, clubbing 1+ " edema.    Current Outpatient Prescriptions   Medication Sig Dispense Refill     acetaminophen (TYLENOL) 500 MG tablet Take 1,000 mg by mouth as needed.        ascorbic acid, vitamin C, (ASCORBIC ACID WITH DEMOND HIPS) 500 MG tablet Take 500 mg by mouth 3 (three) times a day.       blood sugar diagnostic (GLUCOSE BLOOD) Strp OneTouch Test strip. Test 4 times daily.       bumetanide (BUMEX) 1 MG tablet Take 3 tablets (3 mg total) by mouth daily.  0     cholecalciferol, vitamin D3, (VITAMIN D3) 2,000 unit cap Take 1 capsule by mouth daily.       coenzyme Q10 (CO Q-10) 200 mg capsule Take 200 mg by mouth daily.       ferrous sulfate 325 (65 FE) MG tablet Take 1 tablet by mouth 3 (three) times a day with meals.       FREESTYLE 28 gauge lancets        gabapentin (NEURONTIN) 600 MG tablet Take 1,200 mg by mouth 2 (two) times a day.        glimepiride (AMARYL) 4 MG tablet Take 4 mg by mouth every morning before breakfast.       LANCETS MISC OneTouch Delica Lancets. Test 3 to 4 times daily as needed.       LANTUS SOLOSTAR 100 unit/mL (3 mL) pen Inject 30 Units under the skin at bedtime.        lisinopril (PRINIVIL,ZESTRIL) 5 MG tablet TAKE ONE AND ONE-HALF TABLETS DAILY 135 tablet 1     metFORMIN (GLUCOPHAGE) 1000 MG tablet Take 1,000 mg by mouth 2 (two) times a day with meals.       OMEGA-3/DHA/EPA/FISH OIL (FISH OIL-OMEGA-3 FATTY ACIDS) 300-1,000 mg capsule Take 1 g by mouth daily.        omeprazole (PRILOSEC) 20 MG capsule Take 20 mg by mouth daily.       PACERONE 200 mg tablet TAKE 1 TABLET DAILY 90 tablet 2     rosuvastatin (CRESTOR) 5 MG tablet Take 5 mg by mouth daily.        XARELTO 20 mg Tab TAKE 1 TABLET DAILY 90 tablet 1     insulin glargine (LANTUS) 100 unit/mL injection Inject under the skin bedtime. 30 units       No current facility-administered medications for this visit.        Cardiographics:    Echo: June 2016   1. Technically difficult study despite utilization of Definity echo  contrast.  2. The left  ventricle is normal in size. Left ventricular systolic  performance is mild-moderately reduced. The ejection fraction is estimated  to be 40-45%.  3. There is moderate mid to distal anterior hypokinesis.  4. There is mild, to possibly mild-moderate, mitral regurgitation.  5. There is moderate tricuspid regurgitation.  6. The right ventricle appears enlarged and with reduced left ventricular  systolic performance though difficult to quantify due to poor acoustic  imaging.  7. The left atrium is mildly enlarged. The right atrium is mild-moderately  Enlarged.      Stress test: June 2016   1. Lexiscan stress nuclear study is negative for inducible myocardial ischemia; there is a small area of severe nontransmural infarction in the distal anterior and apical segments.  2. Left ventricular ejection fraction is 48%.    Lab Results:       Lab Results   Component Value Date    CHOL 196 05/02/2018    CHOL 163 05/22/2017    CHOL 135 04/20/2016     Lab Results   Component Value Date    HDL 41 05/02/2018    HDL 44 05/22/2017    HDL 34 (L) 04/20/2016     Lab Results   Component Value Date    LDLCALC 115 05/02/2018    LDLCALC 92 05/22/2017    LDLCALC 72 04/20/2016     Lab Results   Component Value Date    TRIG 201 (H) 05/02/2018    TRIG 136 05/22/2017    TRIG 145 04/20/2016     BNP   Date Value Ref Range Status   07/07/2016 134 (H) 0 - 67 pg/mL Melida Watts (Steven)  MD Brissa

## 2021-06-21 NOTE — LETTER
Letter by Rika Dixon RDCS at      Author: Rika Dixon RDCS Service: -- Author Type: --    Filed:  Encounter Date: 1/11/2021 Status: (Other)         Thomas Steve  [address] [facility] [address]      January 11, 2021      Dear Mr. Steev,    RE: Remote Results    We are writing to you regarding your recent Remote Pacemaker check from home. Your transmission was received successfully. Battery status is satisfactory at this time.     Your results are being reviewed.  You will be contacted if further information is necessary.     Your next device appointment will be a remote check on April 12, 2021; this will occur automatically.    To schedule or reschedule, please call [phone] and press 1.    NOTE: If you would like to do an extra transmission, please call [phone] and press 3 to speak to a nurse BEFORE transmitting. This ensures that the Device Clinic staff is aware of the reason you are sending a transmission, and can follow-up with you after it has been reviewed.    We will be checking your implanted device from home (remotely) every three months unless otherwise instructed. We will need to see you in the clinic at least once a year. You may need to be seen in the clinic sooner depending on the results of your check.    Please be aware:    The follow-up schedule is like a Physician prescription.    Your remote monitor is paired to your specific implanted device.      Sincerely,    [facility]

## 2021-06-22 NOTE — PROGRESS NOTES
Dear Moni Marin MD  [address]  [place] [address],    Thank you for the opportunity to participate in the care of Thomas Steve.     He is a 73 y.o. y/o male patient who comes to the sleep medicine clinic for follow up.  The patient is here for transfer of care for his sleep disordered breathing.  He was diagnosed with severe obstructive sleep apnea in 11/11/2013.  His apnea hypopnea index was grossly elevated at 74.1 events per hour with the lowest O2 sat of 81%.  He subsequently underwent a titration study that showed that he has central sleep apnea.  The patient has been on ASV since that time.  The patient states that he is doing well and has no complaints.  His review of system is otherwise unremarkable.    Compliance Download data for 30 days:  Pressure setting: ASV  Residual AHI: 0.9 events per hour  Leak: Minimal  Compliance: 97%  Mask Tolerance: Good  Skin irritation: None    Sleep-Wake Cycle:    The patient likes to initiate sleep at around 11:30PM with a sleep latency of within 15 minutes. The patient has 2-3 nocturnal awakenings. Final wake up time is around 7:30-8AM.    Past Medical History:   Diagnosis Date     Acute sinusitis      Atrial fibrillation (H)     Resolved after medication and CPAP     Back pain      Diabetes mellitus (H)      Hyperlipemia      Joint pain      Lipoma of neck      Nocturia      Sciatic leg pain      Shoulder impingement syndrome     BILATERAL     Sleep apnea     uses CPAP     Typical atrial flutter (H) 7/7/2016    Demonstrated on 12-lead EKG July 7, 2016       Past Surgical History:   Procedure Laterality Date     DC CABG, VEIN, SINGLE  2011    Description: CABG (CABG);  Proc Date: 09/26/2011;  Comments: LIMA^LAD, SVG^OM, SVG^distal RCA     DC CARDIOVERSION ELECTIVE ARRHYTHMIA EXTERNAL  06/03/2014    Description: Elective Cardioversion External;  Recorded: 06/03/2014;       Social History     Socioeconomic History     Marital status:       Spouse name: Not on file     Number of children: Not on file     Years of education: Not on file     Highest education level: Not on file   Social Needs     Financial resource strain: Not on file     Food insecurity - worry: Not on file     Food insecurity - inability: Not on file     Transportation needs - medical: Not on file     Transportation needs - non-medical: Not on file   Occupational History     Not on file   Tobacco Use     Smoking status: Former Smoker     Packs/day: 1.50     Years: 23.00     Pack years: 34.50     Last attempt to quit:      Years since quittin.9     Smokeless tobacco: Never Used   Substance and Sexual Activity     Alcohol use: Yes     Alcohol/week: 0.6 oz     Types: 1 Cans of beer per week     Drug use: No     Sexual activity: No   Other Topics Concern     Not on file   Social History Narrative    .  3 children.  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       Review of Systems:  Constitutional: Negative except as noted in HPI.   Eyes: Negative except as noted in HPI.   ENT: Negative except as noted in HPI.   Cardiovascular: Negative except as noted in HPI.   Respiratory: Negative except as noted in HPI.   Gastrointestinal: Negative except as noted in HPI.   Genitourinary: Negative except as noted in HPI.   Musculoskeletal: Negative except as noted in HPI.   Integumentary: Negative except as noted in HPI.   Neurological: Negative except as noted in HPI.   Psychiatric: Negative except as noted in HPI.   Endocrine: Negative except as noted in HPI.   Hematologic/Lymphatic: Negative except as noted in HPI.       Current Outpatient Medications   Medication Sig Dispense Refill     acetaminophen (TYLENOL) 500 MG tablet Take 1,000 mg by mouth as needed.        ascorbic acid, vitamin C, (ASCORBIC ACID WITH DEMOND HIPS) 500 MG tablet Take 500 mg by mouth 3 (three) times a day.       blood sugar diagnostic (GLUCOSE BLOOD) Strp OneTouch Test strip. Test 4 times daily.        "bumetanide (BUMEX) 1 MG tablet Take 3 tablets (3 mg total) by mouth daily.  0     cholecalciferol, vitamin D3, (VITAMIN D3) 2,000 unit cap Take 1 capsule by mouth daily.       coenzyme Q10 (CO Q-10) 200 mg capsule Take 200 mg by mouth daily.       ferrous sulfate 325 (65 FE) MG tablet Take 1 tablet by mouth 3 (three) times a day with meals.       FREESTYLE 28 gauge lancets        gabapentin (NEURONTIN) 600 MG tablet Take 1,200 mg by mouth 2 (two) times a day.        glimepiride (AMARYL) 4 MG tablet Take 4 mg by mouth every morning before breakfast.       insulin glargine (LANTUS) 100 unit/mL injection Inject under the skin bedtime. 30 units       LANCETS MISC OneTouch Delica Lancets. Test 3 to 4 times daily as needed.       LANTUS SOLOSTAR 100 unit/mL (3 mL) pen Inject 30 Units under the skin at bedtime.        lisinopril (PRINIVIL,ZESTRIL) 5 MG tablet TAKE ONE AND ONE-HALF TABLETS DAILY 135 tablet 1     metFORMIN (GLUCOPHAGE) 1000 MG tablet Take 1,000 mg by mouth 2 (two) times a day with meals.       OMEGA-3/DHA/EPA/FISH OIL (FISH OIL-OMEGA-3 FATTY ACIDS) 300-1,000 mg capsule Take 1 g by mouth daily.        omeprazole (PRILOSEC) 20 MG capsule Take 20 mg by mouth daily.       PACERONE 200 mg tablet TAKE 1 TABLET DAILY 90 tablet 2     rosuvastatin (CRESTOR) 5 MG tablet Take 5 mg by mouth daily.        XARELTO 20 mg Tab TAKE 1 TABLET DAILY 90 tablet 1     No current facility-administered medications for this visit.        Allergies   Allergen Reactions     Codeine Nausea And Vomiting     Vicodin [Hydrocodone-Acetaminophen] Nausea And Vomiting       Physical Exam:  /60   Pulse (!) 50   Ht 5' 10.25\" (1.784 m)   Wt 218 lb (98.9 kg)   SpO2 98%   BMI 31.06 kg/m    BMI:Body mass index is 31.06 kg/m .   GEN: NAD, obese  Head: Normocephalic.  EYES: PERRLA, EOMI  ENT: Oropharynx is clear,  mallampatti class 4+ airway.   Neck: Thyroid is moist without erythema  CV: Regular rate and rhythm, S1 & S2 heard.  Within " normal limits  LUNGS: Bilateral breathsounds heard  ABDOMEN: All quadrant positive bowel sounds, soft, no rebound or guarding  MUSCULOSKELETAL: Bilateral trace leg swelling  SKIN: warm, dry, no rashes  Neurological: Alert, oriented to time, place, and person.  Psych: normal mood, normal affect    Labs/Studies:     I reviewed the efficacy and compliance report from his device. Data summarized on the HPI and the compliance flow sheet.     Lab Results   Component Value Date    WBC 8.6 10/02/2013    HGB 13.8 (L) 11/07/2013    HCT 38.7 (L) 10/02/2013    MCV 87 10/02/2013     10/02/2013         Chemistry        Component Value Date/Time     05/02/2018 1450    K 4.8 05/02/2018 1450    CL 98 05/02/2018 1450    CO2 26 05/02/2018 1450    BUN 22 05/02/2018 1450    CREATININE 1.33 (H) 05/02/2018 1450     (H) 05/02/2018 1450        Component Value Date/Time    CALCIUM 9.0 05/02/2018 1450    ALKPHOS 98 05/02/2018 1450    AST 30 05/02/2018 1450    ALT 29 05/02/2018 1450    BILITOT 0.5 05/02/2018 1450            Lab Results   Component Value Date    FERRITIN 32 11/06/2017            Assessment and Plan:  In summary Thomas Steve is a 73 y.o. year old male who is here for review of his compliance download data.    1.  Obstructive/central sleep apnea  I congratulated the patient on his excellent ASV usage.  I will keep him on the same pressure range for now.  2.  Other sleep disturbance     Patient verbalized understanding of these issues, agrees with the plan and all questions were answered today. Patient was given an opportuntity to voice any other symptoms or concerns not listed above. Patient did not have any other symptoms or concerns.      Patient told to return in 24 months. Patient instructed to stop at  to schedule appointment before leaving today.    James Somers DO  Board Certified in Internal Medicine and Sleep Medicine  [facility].    (Note created with Dragon voice  recognition and unintended spelling errors and word substitutions may occur)

## 2021-06-27 NOTE — PROGRESS NOTES
Progress Notes by Melisa Puentes RN at 6/18/2019  1:20 PM     Author: Melisa Puentes RN Service: -- Author Type: Registered Nurse    Filed: 6/18/2019  3:25 PM Encounter Date: 6/18/2019 Status: Signed    : Melisa Puentes RN (Registered Nurse)       DEVICE CLINIC RN POST-OP NOTE    Reason for visit: 1 week post-op pacemaker and wound check.      Device: Medtronic [id number] Percepta Quad CRT-P  Procedure date: 6/11/19  Implant Diagnosis: Sick sinus syndrome, atrial flutter.  Implanting Physician: Dr. Durga Rajput    Assessment  Subjective: Denies pain at the site.   Vitals:   Vitals:    06/18/19 1320   BP: 132/60   Pulse: 64   Resp: 16   Temp: 99.9  F (37.7  C)     Heart Sounds: normal  Lung Sounds: clear to auscultation bilaterally  Incision/device pocket: The steri-strips were removed from the incision and it was cleansed with adhesive remover and alcohol wipes. The incision is clean, dry and intact. There are no signs of infection present. The incision is well healed.    Other: Patient has hematoma at implant site measuring 9.5 cm X 9 cm. Patient reports site appears to be improved in the last two days. Site firm, able to palpate device border.                 Device Findings  Interrogation of device reveals normal sensing and capture thresholds  See the Paceart Report for a full summary of the device information.        Patient Education  Thomas Steve was accompanied today by his wife.      [facility]'s Partnership Agreement for Device Patients and Post-operative Checklist were presented and reviewed with patient at today's appointment.    Signs and symptoms of infection, poor wound healing, and device function were reviewed. Range of motion and weight restrictions for the left side are 4 weeks. He was issued a Carelink remote monitor and instructed on its set-up and use for remote monitoring by the [employer] representative prior to hospital discharge. These instructions were reviewed  with the patient at todays visit.       Plan  In clinic visit with Felicia Wild CNP on 6/24/19, will also see device RN for wound check prior to appointment.   Remote check on 7/15/19.   In clinic visit with device RN on 9/10/19.     Melisa Puentes  RN, BSN  [facility]

## 2021-06-27 NOTE — PROGRESS NOTES
Progress Notes by Mac Brumfield MD (Ted) at 6/7/2019 11:30 AM     Author: Mac Brumfield MD (Ted) Service: -- Author Type: Physician    Filed: 6/7/2019 12:21 PM Encounter Date: 6/7/2019 Status: Signed    : Mac Brumfield MD (Ted) (Physician)           Click to link to [facility] HEART [facility] NOTE    Thank you, Dr. Mcclain, for asking the [facility] to evaluate Mr. Thomas Steve.      Assessment/Recommendations   Assessment:  Coronary artery disease, status post coronary artery bypass graft surgery in 2011, stable, no angina  Ischemic cardiomyopathy with mildly depressed LV systolic function, compensated  Persistent atrial flutter status post  cardioversion ×2, recurrent with slow ventricular response  Persistent atrial fibrillation, resolved, no new symptomatic episodes, on anticoagulation  Right bundle branch block  Hypercholesterolemia, suboptimal control due to poor tolerance of statins  Diabetes mellitus  Sleep apnea on CPAP      Plan:  Worsening exercise tolerance appears to be related to redevelopment of atrial flutter with slow ventricular response.  I am concerned about severity of bradycardia.  I instructed him to stop taking amiodarone.  He should undergo implantation of permanent pacemaker.  I will discuss that with we will obtain echocardiogram prior to device implantation.  We may consider another cardioversion/ablation if pacing does not improve the symptoms.         History of Present Illness    Mr. Thomas Steve is a 74 y.o. male who comes in today for follow-up visit.  He reports worsening exercise tolerance and shortness of breath.  He denies chest pains.  He has not had syncope or heart palpitations.  His weight has been stable.  He does not have PND and orthopnea.    ECG: Personally reviewed.  Atrial flutter typical, ventricular response rates 39 bpm, right bundle branch block    Echo: June 2016   1. Technically  difficult study despite utilization of Definity echo  contrast.  2. The left ventricle is normal in size. Left ventricular systolic  performance is mild-moderately reduced. The ejection fraction is estimated  to be 40-45%.  3. There is moderate mid to distal anterior hypokinesis.  4. There is mild, to possibly mild-moderate, mitral regurgitation.  5. There is moderate tricuspid regurgitation.  6. The right ventricle appears enlarged and with reduced left ventricular  systolic performance though difficult to quantify due to poor acoustic  imaging.  7. The left atrium is mildly enlarged. The right atrium is mild-moderately  Enlarged.      Stress test: June 2016   1. Lexiscan stress nuclear study is negative for inducible myocardial ischemia; there is a small area of severe nontransmural infarction in the distal anterior and apical segments.  2. Left ventricular ejection fraction is 48%.         Physical Examination Review of Systems   Vitals:    06/07/19 1141   BP: 130/50   Pulse: (!) 45   Resp: 10     Body mass index is 31.34 kg/m .  Wt Readings from Last 3 Encounters:   06/07/19 220 lb (99.8 kg)   12/20/18 218 lb (98.9 kg)   10/29/18 215 lb (97.5 kg)     General Appearance:   Alert, cooperative, no distress, appears stated age   Head/ENT: Normocephalic, without obvious abnormality. Membranes moist      EYES:  no scleral icterus, normal conjunctivae   Neck: Supple, symmetrical, trachea midline, no adenopathy, thyroid: not enlarged, symmetric, no carotid bruit or JVD   Chest/Lungs:   Lungs are clear to auscultation, respirations unlabored. No tenderness or deformity    Cardiovascular:   Regular rhythm, S1, S2 normal, no murmur, rub or gallop.   Abdomen:  Soft, non-tender, bowel sounds active all four quadrants,  no masses, no organomegaly   Extremities: no cyanosis or clubbing. No edema   Skin: Skin color, texture, turgor normal, no rashes or lesions.    Psychiatric: Normal affect, calm   Neurologic: Alert and  oriented x 3, moving all four extremities.     General: WNL  Eyes: WNL  Ears/Nose/Throat: WNL  Lungs: WNL  Heart: WNL  Stomach: WNL  Bladder: WNL  Muscle/Joints: Joint Pain, Muscle Weakness  Skin: WNL  Nervous System: Loss of Balance  Mental Health: WNL     Blood: Easy Bleeding, Easy Bruising     Medical History  Surgical History Family History Social History   Past Medical History:   Diagnosis Date   ? Acute sinusitis    ? Atrial fibrillation (H)     Resolved after medication and CPAP   ? Back pain    ? Diabetes mellitus (H)    ? Hyperlipemia    ? Joint pain    ? Lipoma of neck    ? Nocturia    ? Sciatic leg pain    ? Shoulder impingement syndrome     BILATERAL   ? Sleep apnea     uses CPAP   ? Typical atrial flutter (H) 2016    Demonstrated on 12-lead EKG 2016    Past Surgical History:   Procedure Laterality Date   ? NM CABG, VEIN, SINGLE      Description: CABG (CABG);  Proc Date: 2011;  Comments: LIMA^LAD, SVG^OM, SVG^distal RCA   ? NM CARDIOVERSION ELECTIVE ARRHYTHMIA EXTERNAL  2014    Description: Elective Cardioversion External;  Recorded: 2014;    no family history of premature coronary artery disease Social History     Socioeconomic History   ? Marital status:      Spouse name: Not on file   ? Number of children: Not on file   ? Years of education: Not on file   ? Highest education level: Not on file   Occupational History   ? Not on file   Social Needs   ? Financial resource strain: Not on file   ? Food insecurity:     Worry: Not on file     Inability: Not on file   ? Transportation needs:     Medical: Not on file     Non-medical: Not on file   Tobacco Use   ? Smoking status: Former Smoker     Packs/day: 1.50     Years: 23.00     Pack years: 34.50     Last attempt to quit:      Years since quittin.4   ? Smokeless tobacco: Never Used   Substance and Sexual Activity   ? Alcohol use: Yes     Alcohol/week: 0.6 oz     Types: 1 Cans of beer per week   ? Drug use:  No   ? Sexual activity: Never   Lifestyle   ? Physical activity:     Days per week: Not on file     Minutes per session: Not on file   ? Stress: Not on file   Relationships   ? Social connections:     Talks on phone: Not on file     Gets together: Not on file     Attends [employer] service: Not on file     Active member of club or organization: Not on file     Attends meetings of clubs or organizations: Not on file     Relationship status: Not on file   ? Intimate partner violence:     Fear of current or ex partner: Not on file     Emotionally abused: Not on file     Physically abused: Not on file     Forced sexual activity: Not on file   Other Topics Concern   ? Not on file   Social History Narrative    .  3 children.  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          Medications  Allergies   Current Outpatient Medications   Medication Sig Dispense Refill   ? acetaminophen (TYLENOL) 500 MG tablet Take 1,000 mg by mouth as needed.      ? ascorbic acid, vitamin C, (ASCORBIC ACID WITH DEMOND HIPS) 500 MG tablet Take 500 mg by mouth 3 (three) times a day.     ? blood sugar diagnostic (GLUCOSE BLOOD) Strp OneTouch Test strip. Test 4 times daily.     ? bumetanide (BUMEX) 1 MG tablet Take 3 tablets (3 mg total) by mouth daily.  0   ? cholecalciferol, vitamin D3, (VITAMIN D3) 2,000 unit cap Take 1 capsule by mouth daily.     ? coenzyme Q10 (CO Q-10) 200 mg capsule Take 200 mg by mouth daily.     ? ferrous sulfate 325 (65 FE) MG tablet Take 1 tablet by mouth 3 (three) times a day with meals.     ? FREESTYLE 28 gauge lancets      ? gabapentin (NEURONTIN) 600 MG tablet Take 1,200 mg by mouth 2 (two) times a day.      ? glimepiride (AMARYL) 4 MG tablet Take 4 mg by mouth every morning before breakfast.     ? insulin glargine (LANTUS) 100 unit/mL injection Inject under the skin bedtime. 30 units     ? LANCETS MISC OneTouch Delica Lancets. Test 3 to 4 times daily as needed.     ? LANTUS SOLOSTAR 100 unit/mL (3 mL)  pen Inject 30 Units under the skin at bedtime.      ? lisinopril (PRINIVIL,ZESTRIL) 5 MG tablet TAKE ONE AND ONE-HALF TABLETS DAILY 135 tablet 1   ? metFORMIN (GLUCOPHAGE) 1000 MG tablet Take 1,000 mg by mouth 2 (two) times a day with meals.     ? OMEGA-3/DHA/EPA/FISH OIL (FISH OIL-OMEGA-3 FATTY ACIDS) 300-1,000 mg capsule Take 1 g by mouth daily.      ? omeprazole (PRILOSEC) 20 MG capsule Take 20 mg by mouth daily.     ? rosuvastatin (CRESTOR) 5 MG tablet Take 5 mg by mouth daily.      ? XARELTO 20 mg Tab TAKE 1 TABLET DAILY 90 tablet 1     No current facility-administered medications for this visit.       Allergies   Allergen Reactions   ? Codeine Nausea And Vomiting   ? Vicodin [Hydrocodone-Acetaminophen] Nausea And Vomiting         Lab Results    Chemistry/lipid CBC Cardiac Enzymes/BNP/TSH/INR   Lab Results   Component Value Date    CHOL 196 05/02/2018    HDL 41 05/02/2018    LDLCALC 115 05/02/2018    TRIG 201 (H) 05/02/2018    CREATININE 1.33 (H) 05/02/2018    BUN 22 05/02/2018    K 4.8 05/02/2018     05/02/2018    CL 98 05/02/2018    CO2 26 05/02/2018    Lab Results   Component Value Date    WBC 8.6 10/02/2013    HGB 13.8 (L) 11/07/2013    HCT 38.7 (L) 10/02/2013    MCV 87 10/02/2013     10/02/2013    Lab Results   Component Value Date    CKTOTAL 257 (H) 07/18/2014     (H) 07/07/2016    TSH 10.03 (H) 04/15/2019    INR 2.15 (H) 10/21/2011

## 2021-06-27 NOTE — PROGRESS NOTES
Progress Notes by Laura Thakkar RN at 6/24/2019  9:20 AM     Author: Laura Thakkar RN Service: -- Author Type: Registered Nurse    Filed: 6/24/2019 11:07 AM Encounter Date: 6/24/2019 Status: Signed    : Laura Thakkar RN (Registered Nurse)       Mr. Steve is being seen in the Device Clinic today for reassessment of his device pocket hematoma.  There is markedly less ecchymosis present, and what it present is resolving.  The hematoma per palpation continues to measure ~9.5 cm vertically and 8.5-9.0 cm horizontally at its widest point.  The girth of the pocket has gone down since post-op device check on 06/18/19 per pictures from that visit.   My colleague,  Melisa Puentes RN, who evaluated Mr. Steve's pocket at his post-op visit is also in the room today. She concurs with this assessment.  Mr. Steve remains afebrile, please see the full set of vital signs and physical assessment in Felicia Larkin, RN, CNP note from today.  Updated photos attached.  The margins of the hematoma were re-marked at today's visit.  Laura Thakkar RN, MA  Device Nurse  [facility]

## 2021-06-27 NOTE — PROGRESS NOTES
Progress Notes by Felicia Wild CNP at 6/24/2019  9:50 AM     Author: Felicia Wild CNP Service: -- Author Type: Nurse Practitioner    Filed: 6/24/2019 10:22 AM Encounter Date: 6/24/2019 Status: Signed    : Felicia Wild CNP (Nurse Practitioner)           Click to link to [facility] HEART CARE NOTE      Assessment/Recommendations   Assessment:    1. Ischemic cardiomyopathy with systolic dysfunction, improved LVEF 50%, NYHA class III: Compensated.  He states his lower extremity edema has improved since his device implant.  He continues to have fatigue and dyspnea on exertion.  We discussed monitoring symptoms, following a low-sodium diet, monitoring daily weights, and heart failure treatment.  His main concern today is his continued fatigue.  He is hoping that the device will improve this.    2.  Hypertension: Controlled.  Blood pressure 118/60    3.  Persistent atrial flutter: Rate controlled based on device check.  His BIV pacing is 99.6%.  He continues Xarelto for anticoagulation.  Please see device report for details.    4.  Obstructive sleep apnea: He wears his CPAP on a nightly basis.    Plan:  1.  Continue current medications  2.  Start daily weights and continue low-sodium diet  3.  Encouraged regular exercise  4.  Continue CPAP nightly    Thomas Steve will follow up with Dr. Brumfield in 3 months and in the heart failure clinic as needed.     History of Present Illness    Mr. Thomas Steve is a 74 y.o. male seen at [facility] heart failure clinic today for continued follow-up.  He follows up for ischemic cardiomyopathy with systolic dysfunction.  He had an echocardiogram June 11, 2019 which showed an ejection fraction of 50% and right ventricular moderate dysfunction.  His ejection fraction was 40 to 45% back in 2016.  He has a past medical history significant for hypertension, coronary artery disease, hyperlipidemia, persistent atrial  flutter, obstructive sleep apnea on CPAP, diabetes, and lymphedema.  He has a history of CABG in 2011.  He had a CRT-P placed June 11, 2019.    Today, he comes in with continued fatigue and dyspnea on exertion.  He states his lower extremity edema has improved since his device implant.  He denies orthopnea or PND.  He denies any chest pain.  He denies palpitations.  He denies lightheadedness, shortness of breath, orthopnea, PND, palpitations, chest pain and abdominal fullness/bloating.      He is not monitoring home weights.  He is following a low sodium diet.         ECHO 6/11/2019:   Summary       Normal left ventricular size and wall thickness.    Left ventricle ejection fraction is mildly decreased. The estimated left ventricular ejection fraction is 50%.    Right ventricle is mildly dilated with moderate systolic dysfunction.    Severe biatrial enlargement is present.    No hemodynamically significant valvular heart abnormalities.    When compared to the previous study dated 6/1/2016, no significant change.          Physical Examination Review of Systems   Vitals:    06/24/19 0915   BP: 118/60   Pulse: 68   Resp: 20   Temp: 98.2  F (36.8  C)     Body mass index is 31.57 kg/m .  Wt Readings from Last 3 Encounters:   06/24/19 215 lb 9.6 oz (97.8 kg)   06/18/19 214 lb (97.1 kg)   06/12/19 214 lb 1.6 oz (97.1 kg)       General Appearance:     Alert, cooperative and in no acute distress.   ENT/Mouth: membranes moist, no oral lesions or bleeding gums.      EYES:  no scleral icterus, normal conjunctivae   Neck: no carotid bruits or thyromegaly   Chest/Lungs:   lungs are clear to auscultation, no rales or wheezing, respirations unlabored   Cardiovascular:   Regular. Normal first and second heart sounds with no murmurs, rubs, or gallops; the carotid, radial and posterior tibial pulses are intact, Jugular venous pressure normal, trace edema bilateral lower extremities    Abdomen:  Soft, nontender, nondistended, bowel  sounds present   Extremities: no cyanosis or clubbing   Skin: warm, dry. Hematoma improving   Neurologic: mood and affect are appropriate, alert and oriented x3      General: WNL  Eyes: WNL  Ears/Nose/Throat: Hearing Loss  Lungs: WNL  Heart: Shortness of Breath with activity  Stomach: WNL  Bladder: WNL  Muscle/Joints: Joint Pain, Muscle Weakness, Muscle Pain  Skin: WNL  Nervous System: WNL  Mental Health: WNL     Blood: Easy Bruising, Easy Bleeding     Medical History  Surgical History Family History Social History   Past Medical History:   Diagnosis Date   ? Acute sinusitis    ? Atrial fibrillation (H)     Resolved after medication and CPAP   ? Back pain    ? Diabetes mellitus (H)    ? Hyperlipemia    ? Joint pain    ? Lipoma of neck    ? Nocturia    ? Sciatic leg pain    ? Shoulder impingement syndrome     BILATERAL   ? Sleep apnea     uses CPAP   ? Typical atrial flutter (H) 7/7/2016    Demonstrated on 12-lead EKG July 7, 2016    Past Surgical History:   Procedure Laterality Date   ? EP BIV PACEMAKER INSERT N/A 6/11/2019    Procedure: EP Biventricular Pacemaker Insertion;  Surgeon: Durga Rajput MD;  Location: [facility] Cath Lab;  Service: Cardiology   ? MO CABG, VEIN, SINGLE  2011    Description: CABG (CABG);  Proc Date: 09/26/2011;  Comments: LIMA^LAD, SVG^OM, SVG^distal RCA   ? [place] CARDIOVERSION ELECTIVE ARRHYTHMIA EXTERNAL  06/03/2014    Description: Elective Cardioversion External;  Recorded: 06/03/2014;    Family History   Problem Relation Age of Onset   ? Heart disease Mother    ? Snoring Father    ? Heart disease Father    ? Hypertension Father    ? Alzheimer's disease Father    ? No Medical Problems Daughter    ? No Medical Problems Daughter    ? No Medical Problems Daughter    ? COPD Sister    ? Hodgkin's lymphoma Brother    ? No Medical Problems Son    ? Diabetes Sister    ? Drug abuse Sister    ? COPD Brother    ? Heart disease Brother    ? Diabetes Brother    ? Drug abuse Brother     Social  History     Socioeconomic History   ? Marital status:      Spouse name: Not on file   ? Number of children: Not on file   ? Years of education: Not on file   ? Highest education level: Not on file   Occupational History   ? Not on file   Social Needs   ? Financial resource strain: Not on file   ? Food insecurity:     Worry: Not on file     Inability: Not on file   ? Transportation needs:     Medical: Not on file     Non-medical: Not on file   Tobacco Use   ? Smoking status: Former Smoker     Packs/day: 1.50     Years: 23.00     Pack years: 34.50     Last attempt to quit:      Years since quittin.4   ? Smokeless tobacco: Never Used   Substance and Sexual Activity   ? Alcohol use: Yes     Alcohol/week: 0.6 oz     Types: 1 Cans of beer per week   ? Drug use: No   ? Sexual activity: Never   Lifestyle   ? Physical activity:     Days per week: Not on file     Minutes per session: Not on file   ? Stress: Not on file   Relationships   ? Social connections:     Talks on phone: Not on file     Gets together: Not on file     Attends Che[employer] service: Not on file     Active member of club or organization: Not on file     Attends meetings of clubs or organizations: Not on file     Relationship status: Not on file   ? Intimate partner violence:     Fear of current or ex partner: Not on file     Emotionally abused: Not on file     Physically abused: Not on file     Forced sexual activity: Not on file   Other Topics Concern   ? Not on file   Social History Narrative    .  3 children.  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          Medications  Allergies   Current Outpatient Medications   Medication Sig Dispense Refill   ? ascorbic acid, vitamin C, (ASCORBIC ACID WITH DEMOND HIPS) 500 MG tablet Take 500 mg by mouth 3 (three) times a day.     ? blood sugar diagnostic (GLUCOSE BLOOD) Strp OneTouch Test strip. Test 4 times daily.     ? bumetanide (BUMEX) 1 MG tablet Take 3 tablets (3 mg total) by mouth  daily.  0   ? cholecalciferol, vitamin D3, (VITAMIN D3) 2,000 unit cap Take 1 capsule by mouth daily.     ? coenzyme Q10 (CO Q-10) 200 mg capsule Take 200 mg by mouth daily.     ? diphenhydrAMINE-acetaminophen (TYLENOL PM)  mg Tab Take 3 tablets by mouth at bedtime as needed.     ? ferrous sulfate 325 (65 FE) MG tablet Take 1 tablet by mouth 3 (three) times a day with meals.     ? FREESTYLE 28 gauge lancets      ? gabapentin (NEURONTIN) 600 MG tablet Take 1,200 mg by mouth 2 (two) times a day.      ? glimepiride (AMARYL) 4 MG tablet Take 4 mg by mouth every morning before breakfast.     ? insulin glargine (LANTUS) 100 unit/mL injection Inject under the skin bedtime. 30 units     ? LANCETS MISC OneTouch Delica Lancets. Test 3 to 4 times daily as needed.     ? lisinopril (PRINIVIL,ZESTRIL) 5 MG tablet TAKE ONE AND ONE-HALF TABLETS DAILY 135 tablet 1   ? metFORMIN (GLUCOPHAGE) 1000 MG tablet Take 1,000 mg by mouth 2 (two) times a day with meals.     ? OMEGA-3/DHA/EPA/FISH OIL (FISH OIL-OMEGA-3 FATTY ACIDS) 300-1,000 mg capsule Take 1 g by mouth daily.      ? omeprazole (PRILOSEC) 20 MG capsule Take 20 mg by mouth daily.     ? rosuvastatin (CRESTOR) 5 MG tablet Take 5 mg by mouth daily.      ? XARELTO 20 mg Tab TAKE 1 TABLET DAILY 90 tablet 1     No current facility-administered medications for this visit.       Allergies   Allergen Reactions   ? Codeine Nausea And Vomiting   ? Pollen Extracts      Scratchy throat   ? Vicodin [Hydrocodone-Acetaminophen] Nausea And Vomiting         Lab Results    Chemistry CBC BNP   Lab Results   Component Value Date    CREATININE 1.32 (H) 06/11/2019    BUN 37 (H) 06/11/2019     06/11/2019    K 4.5 06/11/2019     06/11/2019    CO2 28 06/11/2019     Creatinine (mg/dL)   Date Value   06/11/2019 1.32 (H)   05/02/2018 1.33 (H)   11/06/2017 1.23   07/21/2017 1.06    Lab Results   Component Value Date    WBC 7.5 06/11/2019    HGB 11.0 (L) 06/11/2019    HCT 34.3 (L) 06/11/2019     MCV 94 06/11/2019     06/11/2019    Lab Results   Component Value Date     (H) 07/07/2016     BNP (pg/mL)   Date Value   07/07/2016 134 (H)   05/09/2016 30   01/02/2014 88 (H)          25 minutes were spent with the patient with greater than 50% spent on education and counseling.      Felicia Wild, [facility]   [facility] Clinic

## 2021-06-29 NOTE — PROGRESS NOTES
Progress Notes by Mac Brumfield MD (Ted) at 11/6/2020  2:30 PM     Author: Mac Brumfield MD (Ted) Service: -- Author Type: Physician    Filed: 11/6/2020  3:12 PM Encounter Date: 11/6/2020 Status: Signed    : Mac Brumfield MD (Ted) (Physician)           Cardiology Progress Note    Assessment:  Coronary artery disease, status post coronary artery bypass graft surgery in 2011, stable, no angina  Ischemic cardiomyopathy with mildly depressed LV systolic function, clinically no fluid overload  Persistent atrial flutter, status post  cardioversion ×2, recurrent with slow ventricular response, resolved  Persistent atrial fibrillation, recurrent, controlled ventricular response, on anticoagulation  Biventricular pacemaker, normal function  Right bundle branch block  Hypercholesterolemia, suboptimal control due to poor tolerance of statins  Diabetes mellitus  Sleep apnea on CPAP  Fatigue    Plan:  I do not get a good sense why he does not feel as good as he think he should.  He does not appear to be fluid overloaded.  Pacemaker is working properly and LV systolic function is normal.  I think we will repeat ischemic evaluation with pharmacological perfusion scan.    Routine follow-up in 1 year    Subjective:   This is 75 y.o. male who comes in today for follow-up visit.  He reports no chest pain but he does feel more tired than before.  He relates that to insertion of permanent pacemaker.  He has not had syncope.  He reports a few pounds weight loss.  He denies PND and orthopnea.  He is compliant with cardiac medications    Review of Systems:   General: WNL  Eyes: WNL  Ears/Nose/Throat: WNL  Lungs: WNL  Heart: WNL  Stomach: WNL  Bladder: WNL  Muscle/Joints: Joint Pain, Muscle Weakness  Skin: WNL  Nervous System: WNL  Mental Health: WNL     Blood: Easy Bleeding, Easy Bruising    Objective:   /62 (Patient Site: Right Arm, Patient Position: Sitting, Cuff Size: Adult Regular)   " Pulse 64   Resp 16   Ht 5' 9.25\" (1.759 m)   Wt 209 lb (94.8 kg) Comment: With shoes.  SpO2 95%   BMI 30.64 kg/m    Physical Exam:  GENERAL: no distress  NECK: No JVD  LUNGS: Clear to auscultation.  CARDIAC: regular rhythm, S1 & S2 normal.  No heaves, thrills, gallops or murmurs.  ABDOMEN: flat, negative hepatosplenomegaly, soft and non-tender.  EXTREMITIES: No evidence of cyanosis, clubbing or edema.    Current Outpatient Medications   Medication Sig Dispense Refill   ? ascorbic acid, vitamin C, (ASCORBIC ACID WITH DEMOND HIPS) 500 MG tablet Take 500 mg by mouth 3 (three) times a day.     ? bumetanide (BUMEX) 1 MG tablet Take 3 tablets (3 mg total) by mouth daily.  0   ? cholecalciferol, vitamin D3, (VITAMIN D3) 2,000 unit cap Take 2 capsules by mouth daily.      ? coenzyme Q10 (CO Q-10) 200 mg capsule Take 200 mg by mouth daily.     ? diphenhydrAMINE-acetaminophen (TYLENOL PM)  mg Tab Take 3 tablets by mouth at bedtime as needed.     ? ferrous sulfate 325 (65 FE) MG tablet Take 1 tablet by mouth 3 (three) times a day with meals.     ? gabapentin (NEURONTIN) 600 MG tablet Take 1,200 mg by mouth 2 (two) times a day.      ? glimepiride (AMARYL) 4 MG tablet Take 4 mg by mouth every morning before breakfast.     ? insulin glargine (LANTUS) 100 unit/mL injection Inject under the skin bedtime. 30 units     ? lisinopriL (PRINIVIL,ZESTRIL) 10 MG tablet Take 1 tablet (10 mg total) by mouth daily. 90 tablet 1   ? metFORMIN (GLUCOPHAGE) 1000 MG tablet Take 1,000 mg by mouth 2 (two) times a day with meals.     ? OMEGA-3/DHA/EPA/FISH OIL (FISH OIL-OMEGA-3 FATTY ACIDS) 300-1,000 mg capsule Take 1 g by mouth daily.      ? omeprazole (PRILOSEC) 20 MG capsule Take 20 mg by mouth daily.     ? rosuvastatin (CRESTOR) 5 MG tablet Take 5 mg by mouth daily.      ? SYNTHROID 25 mcg tablet Take 25 mcg by mouth daily.     ? XARELTO 20 mg tablet TAKE 1 TABLET DAILY 90 tablet 2   ? blood sugar diagnostic (GLUCOSE BLOOD) Strp " OneTouch Test strip. Test 4 times daily.     ? FREESTYLE 28 gauge lancets      ? LANCETS MISC OneTouch Delica Lancets. Test 3 to 4 times daily as needed.       No current facility-administered medications for this visit.        Cardiographics:    Echo: June 2019    Normal left ventricular size and wall thickness.    Left ventricle ejection fraction is mildly decreased. The estimated left ventricular ejection fraction is 50%.    Right ventricle is mildly dilated with moderate systolic dysfunction.    Severe biatrial enlargement is present.    No hemodynamically significant valvular heart abnormalities.    When compared to the previous study dated 6/1/2016, no significant change.      Stress test: June 2016   1. Lexiscan stress nuclear study is negative for inducible myocardial ischemia; there is a small area of severe nontransmural infarction in the distal anterior and apical segments.  2. Left ventricular ejection fraction is 48%.    Lab Results:       Lab Results   Component Value Date    CHOL 143 09/23/2020    CHOL 154 10/31/2019    CHOL 196 05/02/2018     Lab Results   Component Value Date    HDL 42 09/23/2020    HDL 45 10/31/2019    HDL 41 05/02/2018     Lab Results   Component Value Date    LDLCALC 72 09/23/2020    LDLCALC 80 10/31/2019    LDLCALC 115 05/02/2018     Lab Results   Component Value Date    TRIG 144 09/23/2020    TRIG 147 10/31/2019    TRIG 201 (H) 05/02/2018     BNP   Date Value Ref Range Status   07/07/2016 134 (H) 0 - 67 pg/mL Final       Mac (Steven)  MD Brissa

## 2021-07-03 NOTE — ADDENDUM NOTE
Addendum Note by Israel Samuel MD at 7/31/2017  4:25 PM     Author: Israel Samuel MD Service: -- Author Type: Physician    Filed: 7/31/2017  4:25 PM Encounter Date: 7/31/2017 Status: Signed    : Israel Samuel MD (Physician)    Addended by: ISRAEL SAMUEL on: 7/31/2017 04:25 PM        Modules accepted: Orders

## 2021-07-22 ENCOUNTER — ANCILLARY PROCEDURE (OUTPATIENT)
Dept: CARDIOLOGY | Facility: CLINIC | Age: 76
End: 2021-07-22
Attending: INTERNAL MEDICINE
Payer: MEDICARE

## 2021-07-22 DIAGNOSIS — Z95.0 PRESENCE OF BIVENTRICULAR CARDIAC PACEMAKER: ICD-10-CM

## 2021-07-22 PROCEDURE — 93296 REM INTERROG EVL PM/IDS: CPT | Performed by: INTERNAL MEDICINE

## 2021-07-22 PROCEDURE — 93294 REM INTERROG EVL PM/LDLS PM: CPT | Performed by: INTERNAL MEDICINE

## 2021-07-23 LAB
MDC_IDC_EPISODE_DTM: NORMAL
MDC_IDC_EPISODE_DURATION: 10 S
MDC_IDC_EPISODE_DURATION: 11 S
MDC_IDC_EPISODE_DURATION: 16 S
MDC_IDC_EPISODE_DURATION: 7 S
MDC_IDC_EPISODE_DURATION: 9 S
MDC_IDC_EPISODE_ID: 1197
MDC_IDC_EPISODE_ID: 1198
MDC_IDC_EPISODE_ID: 1199
MDC_IDC_EPISODE_ID: 1200
MDC_IDC_EPISODE_ID: 1201
MDC_IDC_EPISODE_ID: 1202
MDC_IDC_EPISODE_ID: 1203
MDC_IDC_EPISODE_ID: 29
MDC_IDC_EPISODE_ID: 30
MDC_IDC_EPISODE_ID: 460
MDC_IDC_EPISODE_TYPE: NORMAL
MDC_IDC_LEAD_IMPLANT_DT: NORMAL
MDC_IDC_LEAD_LOCATION: NORMAL
MDC_IDC_LEAD_LOCATION_DETAIL_1: NORMAL
MDC_IDC_LEAD_MFG: NORMAL
MDC_IDC_LEAD_MODEL: NORMAL
MDC_IDC_LEAD_POLARITY_TYPE: NORMAL
MDC_IDC_LEAD_SERIAL: NORMAL
MDC_IDC_MSMT_BATTERY_DTM: NORMAL
MDC_IDC_MSMT_BATTERY_REMAINING_LONGEVITY: 116 MO
MDC_IDC_MSMT_BATTERY_RRT_TRIGGER: 2.6
MDC_IDC_MSMT_BATTERY_STATUS: NORMAL
MDC_IDC_MSMT_BATTERY_VOLTAGE: 2.99 V
MDC_IDC_MSMT_LEADCHNL_LV_IMPEDANCE_VALUE: 380 OHM
MDC_IDC_MSMT_LEADCHNL_LV_IMPEDANCE_VALUE: 399 OHM
MDC_IDC_MSMT_LEADCHNL_LV_IMPEDANCE_VALUE: 437 OHM
MDC_IDC_MSMT_LEADCHNL_LV_IMPEDANCE_VALUE: 456 OHM
MDC_IDC_MSMT_LEADCHNL_LV_IMPEDANCE_VALUE: 532 OHM
MDC_IDC_MSMT_LEADCHNL_LV_IMPEDANCE_VALUE: 722 OHM
MDC_IDC_MSMT_LEADCHNL_LV_IMPEDANCE_VALUE: 722 OHM
MDC_IDC_MSMT_LEADCHNL_LV_IMPEDANCE_VALUE: 779 OHM
MDC_IDC_MSMT_LEADCHNL_LV_IMPEDANCE_VALUE: 798 OHM
MDC_IDC_MSMT_LEADCHNL_LV_IMPEDANCE_VALUE: 893 OHM
MDC_IDC_MSMT_LEADCHNL_LV_PACING_THRESHOLD_AMPLITUDE: 0.62 V
MDC_IDC_MSMT_LEADCHNL_LV_PACING_THRESHOLD_PULSEWIDTH: 0.4 MS
MDC_IDC_MSMT_LEADCHNL_RA_IMPEDANCE_VALUE: 323 OHM
MDC_IDC_MSMT_LEADCHNL_RA_IMPEDANCE_VALUE: 380 OHM
MDC_IDC_MSMT_LEADCHNL_RA_PACING_THRESHOLD_AMPLITUDE: 0.5 V
MDC_IDC_MSMT_LEADCHNL_RA_PACING_THRESHOLD_PULSEWIDTH: 0.4 MS
MDC_IDC_MSMT_LEADCHNL_RA_SENSING_INTR_AMPL: 0.38 MV
MDC_IDC_MSMT_LEADCHNL_RA_SENSING_INTR_AMPL: 0.38 MV
MDC_IDC_MSMT_LEADCHNL_RV_IMPEDANCE_VALUE: 380 OHM
MDC_IDC_MSMT_LEADCHNL_RV_IMPEDANCE_VALUE: 456 OHM
MDC_IDC_MSMT_LEADCHNL_RV_PACING_THRESHOLD_AMPLITUDE: 0.38 V
MDC_IDC_MSMT_LEADCHNL_RV_PACING_THRESHOLD_PULSEWIDTH: 0.4 MS
MDC_IDC_MSMT_LEADCHNL_RV_SENSING_INTR_AMPL: 9.62 MV
MDC_IDC_MSMT_LEADCHNL_RV_SENSING_INTR_AMPL: 9.62 MV
MDC_IDC_PG_IMPLANT_DTM: NORMAL
MDC_IDC_PG_MFG: NORMAL
MDC_IDC_PG_MODEL: NORMAL
MDC_IDC_PG_SERIAL: NORMAL
MDC_IDC_PG_TYPE: NORMAL
MDC_IDC_SESS_CLINIC_NAME: NORMAL
MDC_IDC_SESS_DTM: NORMAL
MDC_IDC_SESS_TYPE: NORMAL
MDC_IDC_SET_BRADY_AT_MODE_SWITCH_RATE: 171 {BEATS}/MIN
MDC_IDC_SET_BRADY_LOWRATE: 60 {BEATS}/MIN
MDC_IDC_SET_BRADY_MAX_SENSOR_RATE: 130 {BEATS}/MIN
MDC_IDC_SET_BRADY_MAX_TRACKING_RATE: 130 {BEATS}/MIN
MDC_IDC_SET_BRADY_MODE: NORMAL
MDC_IDC_SET_BRADY_PAV_DELAY_LOW: 170 MS
MDC_IDC_SET_BRADY_SAV_DELAY_LOW: 110 MS
MDC_IDC_SET_CRT_LVRV_DELAY: 0 MS
MDC_IDC_SET_CRT_PACED_CHAMBERS: NORMAL
MDC_IDC_SET_LEADCHNL_LV_PACING_AMPLITUDE: 1.75 V
MDC_IDC_SET_LEADCHNL_LV_PACING_ANODE_ELECTRODE_1: NORMAL
MDC_IDC_SET_LEADCHNL_LV_PACING_ANODE_LOCATION_1: NORMAL
MDC_IDC_SET_LEADCHNL_LV_PACING_CAPTURE_MODE: NORMAL
MDC_IDC_SET_LEADCHNL_LV_PACING_CATHODE_ELECTRODE_1: NORMAL
MDC_IDC_SET_LEADCHNL_LV_PACING_CATHODE_LOCATION_1: NORMAL
MDC_IDC_SET_LEADCHNL_LV_PACING_POLARITY: NORMAL
MDC_IDC_SET_LEADCHNL_LV_PACING_PULSEWIDTH: 0.4 MS
MDC_IDC_SET_LEADCHNL_RA_PACING_AMPLITUDE: 2 V
MDC_IDC_SET_LEADCHNL_RA_PACING_ANODE_ELECTRODE_1: NORMAL
MDC_IDC_SET_LEADCHNL_RA_PACING_ANODE_LOCATION_1: NORMAL
MDC_IDC_SET_LEADCHNL_RA_PACING_CAPTURE_MODE: NORMAL
MDC_IDC_SET_LEADCHNL_RA_PACING_CATHODE_ELECTRODE_1: NORMAL
MDC_IDC_SET_LEADCHNL_RA_PACING_CATHODE_LOCATION_1: NORMAL
MDC_IDC_SET_LEADCHNL_RA_PACING_POLARITY: NORMAL
MDC_IDC_SET_LEADCHNL_RA_PACING_PULSEWIDTH: 0.4 MS
MDC_IDC_SET_LEADCHNL_RA_SENSING_ANODE_ELECTRODE_1: NORMAL
MDC_IDC_SET_LEADCHNL_RA_SENSING_ANODE_LOCATION_1: NORMAL
MDC_IDC_SET_LEADCHNL_RA_SENSING_CATHODE_ELECTRODE_1: NORMAL
MDC_IDC_SET_LEADCHNL_RA_SENSING_CATHODE_LOCATION_1: NORMAL
MDC_IDC_SET_LEADCHNL_RA_SENSING_POLARITY: NORMAL
MDC_IDC_SET_LEADCHNL_RA_SENSING_SENSITIVITY: 0.15 MV
MDC_IDC_SET_LEADCHNL_RV_PACING_AMPLITUDE: 2 V
MDC_IDC_SET_LEADCHNL_RV_PACING_ANODE_ELECTRODE_1: NORMAL
MDC_IDC_SET_LEADCHNL_RV_PACING_ANODE_LOCATION_1: NORMAL
MDC_IDC_SET_LEADCHNL_RV_PACING_CAPTURE_MODE: NORMAL
MDC_IDC_SET_LEADCHNL_RV_PACING_CATHODE_ELECTRODE_1: NORMAL
MDC_IDC_SET_LEADCHNL_RV_PACING_CATHODE_LOCATION_1: NORMAL
MDC_IDC_SET_LEADCHNL_RV_PACING_POLARITY: NORMAL
MDC_IDC_SET_LEADCHNL_RV_PACING_PULSEWIDTH: 0.4 MS
MDC_IDC_SET_LEADCHNL_RV_SENSING_ANODE_ELECTRODE_1: NORMAL
MDC_IDC_SET_LEADCHNL_RV_SENSING_ANODE_LOCATION_1: NORMAL
MDC_IDC_SET_LEADCHNL_RV_SENSING_CATHODE_ELECTRODE_1: NORMAL
MDC_IDC_SET_LEADCHNL_RV_SENSING_CATHODE_LOCATION_1: NORMAL
MDC_IDC_SET_LEADCHNL_RV_SENSING_POLARITY: NORMAL
MDC_IDC_SET_LEADCHNL_RV_SENSING_SENSITIVITY: 0.9 MV
MDC_IDC_SET_ZONE_DETECTION_INTERVAL: 350 MS
MDC_IDC_SET_ZONE_DETECTION_INTERVAL: 400 MS
MDC_IDC_SET_ZONE_TYPE: NORMAL
MDC_IDC_STAT_BRADY_DTM_END: NORMAL
MDC_IDC_STAT_BRADY_DTM_START: NORMAL
MDC_IDC_STAT_BRADY_RA_PERCENT_PACED: 0.01 %
MDC_IDC_STAT_BRADY_RV_PERCENT_PACED: 98.05 %
MDC_IDC_STAT_CRT_DTM_END: NORMAL
MDC_IDC_STAT_CRT_DTM_START: NORMAL
MDC_IDC_STAT_CRT_LV_PERCENT_PACED: 98.03 %
MDC_IDC_STAT_CRT_PERCENT_PACED: 98.03 %
MDC_IDC_STAT_EPISODE_RECENT_COUNT: 0
MDC_IDC_STAT_EPISODE_RECENT_COUNT: 0
MDC_IDC_STAT_EPISODE_RECENT_COUNT: 1
MDC_IDC_STAT_EPISODE_RECENT_COUNT_DTM_END: NORMAL
MDC_IDC_STAT_EPISODE_RECENT_COUNT_DTM_START: NORMAL
MDC_IDC_STAT_EPISODE_TOTAL_COUNT: 0
MDC_IDC_STAT_EPISODE_TOTAL_COUNT: 0
MDC_IDC_STAT_EPISODE_TOTAL_COUNT: 460
MDC_IDC_STAT_EPISODE_TOTAL_COUNT_DTM_END: NORMAL
MDC_IDC_STAT_EPISODE_TOTAL_COUNT_DTM_START: NORMAL
MDC_IDC_STAT_EPISODE_TYPE: NORMAL

## 2021-09-08 DIAGNOSIS — I25.5 ISCHEMIC CARDIOMYOPATHY: Primary | ICD-10-CM

## 2021-09-08 RX ORDER — LISINOPRIL 10 MG/1
TABLET ORAL
COMMUNITY
Start: 2021-06-05 | End: 2021-09-08

## 2021-09-08 RX ORDER — LISINOPRIL 10 MG/1
10 TABLET ORAL DAILY
Qty: 90 TABLET | Refills: 0 | Status: ON HOLD | OUTPATIENT
Start: 2021-09-08 | End: 2022-07-06

## 2021-10-11 ENCOUNTER — ANCILLARY PROCEDURE (OUTPATIENT)
Dept: CARDIOLOGY | Facility: CLINIC | Age: 76
End: 2021-10-11
Attending: INTERNAL MEDICINE
Payer: MEDICARE

## 2021-10-11 VITALS
BODY MASS INDEX: 29.49 KG/M2 | HEART RATE: 67 BPM | WEIGHT: 206 LBS | DIASTOLIC BLOOD PRESSURE: 60 MMHG | RESPIRATION RATE: 20 BRPM | SYSTOLIC BLOOD PRESSURE: 118 MMHG | HEIGHT: 70 IN

## 2021-10-11 DIAGNOSIS — I49.5 SSS (SICK SINUS SYNDROME) (H): Primary | ICD-10-CM

## 2021-10-11 DIAGNOSIS — Z95.0 CARDIAC PACEMAKER: ICD-10-CM

## 2021-10-11 DIAGNOSIS — I25.5 ISCHEMIC CARDIOMYOPATHY: ICD-10-CM

## 2021-10-11 LAB
MDC_IDC_EPISODE_DTM: NORMAL
MDC_IDC_EPISODE_DURATION: 10 S
MDC_IDC_EPISODE_DURATION: 13 S
MDC_IDC_EPISODE_DURATION: 13 S
MDC_IDC_EPISODE_DURATION: 34 S
MDC_IDC_EPISODE_DURATION: 6 S
MDC_IDC_EPISODE_DURATION: 9 S
MDC_IDC_EPISODE_DURATION: 9 S
MDC_IDC_EPISODE_ID: 1216
MDC_IDC_EPISODE_ID: 1217
MDC_IDC_EPISODE_ID: 1218
MDC_IDC_EPISODE_ID: 1219
MDC_IDC_EPISODE_ID: 1220
MDC_IDC_EPISODE_ID: 1221
MDC_IDC_EPISODE_ID: 1222
MDC_IDC_EPISODE_ID: 31
MDC_IDC_EPISODE_TYPE: NORMAL
MDC_IDC_LEAD_IMPLANT_DT: NORMAL
MDC_IDC_LEAD_LOCATION: NORMAL
MDC_IDC_LEAD_LOCATION_DETAIL_1: NORMAL
MDC_IDC_LEAD_MFG: NORMAL
MDC_IDC_LEAD_MODEL: NORMAL
MDC_IDC_LEAD_POLARITY_TYPE: NORMAL
MDC_IDC_LEAD_SERIAL: NORMAL
MDC_IDC_MSMT_BATTERY_DTM: NORMAL
MDC_IDC_MSMT_BATTERY_REMAINING_LONGEVITY: 115 MO
MDC_IDC_MSMT_BATTERY_RRT_TRIGGER: 2.6
MDC_IDC_MSMT_BATTERY_STATUS: NORMAL
MDC_IDC_MSMT_BATTERY_VOLTAGE: 2.99 V
MDC_IDC_MSMT_LEADCHNL_LV_IMPEDANCE_VALUE: 437 OHM
MDC_IDC_MSMT_LEADCHNL_LV_IMPEDANCE_VALUE: 456 OHM
MDC_IDC_MSMT_LEADCHNL_LV_IMPEDANCE_VALUE: 475 OHM
MDC_IDC_MSMT_LEADCHNL_LV_IMPEDANCE_VALUE: 551 OHM
MDC_IDC_MSMT_LEADCHNL_LV_IMPEDANCE_VALUE: 551 OHM
MDC_IDC_MSMT_LEADCHNL_LV_IMPEDANCE_VALUE: 798 OHM
MDC_IDC_MSMT_LEADCHNL_LV_IMPEDANCE_VALUE: 798 OHM
MDC_IDC_MSMT_LEADCHNL_LV_IMPEDANCE_VALUE: 855 OHM
MDC_IDC_MSMT_LEADCHNL_LV_IMPEDANCE_VALUE: 874 OHM
MDC_IDC_MSMT_LEADCHNL_LV_IMPEDANCE_VALUE: 931 OHM
MDC_IDC_MSMT_LEADCHNL_LV_PACING_THRESHOLD_AMPLITUDE: 0.62 V
MDC_IDC_MSMT_LEADCHNL_LV_PACING_THRESHOLD_AMPLITUDE: 0.75 V
MDC_IDC_MSMT_LEADCHNL_LV_PACING_THRESHOLD_PULSEWIDTH: 0.4 MS
MDC_IDC_MSMT_LEADCHNL_LV_PACING_THRESHOLD_PULSEWIDTH: 0.4 MS
MDC_IDC_MSMT_LEADCHNL_RA_IMPEDANCE_VALUE: 361 OHM
MDC_IDC_MSMT_LEADCHNL_RA_IMPEDANCE_VALUE: 399 OHM
MDC_IDC_MSMT_LEADCHNL_RA_PACING_THRESHOLD_AMPLITUDE: 0.5 V
MDC_IDC_MSMT_LEADCHNL_RA_PACING_THRESHOLD_PULSEWIDTH: 0.4 MS
MDC_IDC_MSMT_LEADCHNL_RA_SENSING_INTR_AMPL: 0.25 MV
MDC_IDC_MSMT_LEADCHNL_RA_SENSING_INTR_AMPL: 0.38 MV
MDC_IDC_MSMT_LEADCHNL_RV_IMPEDANCE_VALUE: 399 OHM
MDC_IDC_MSMT_LEADCHNL_RV_IMPEDANCE_VALUE: 494 OHM
MDC_IDC_MSMT_LEADCHNL_RV_PACING_THRESHOLD_AMPLITUDE: 0.5 V
MDC_IDC_MSMT_LEADCHNL_RV_PACING_THRESHOLD_PULSEWIDTH: 0.4 MS
MDC_IDC_MSMT_LEADCHNL_RV_SENSING_INTR_AMPL: 11.88 MV
MDC_IDC_MSMT_LEADCHNL_RV_SENSING_INTR_AMPL: 9.62 MV
MDC_IDC_PG_IMPLANT_DTM: NORMAL
MDC_IDC_PG_MFG: NORMAL
MDC_IDC_PG_MODEL: NORMAL
MDC_IDC_PG_SERIAL: NORMAL
MDC_IDC_PG_TYPE: NORMAL
MDC_IDC_SESS_CLINIC_NAME: NORMAL
MDC_IDC_SESS_DTM: NORMAL
MDC_IDC_SESS_TYPE: NORMAL
MDC_IDC_SET_BRADY_LOWRATE: 60 {BEATS}/MIN
MDC_IDC_SET_BRADY_MAX_SENSOR_RATE: 130 {BEATS}/MIN
MDC_IDC_SET_BRADY_MODE: NORMAL
MDC_IDC_SET_CRT_LVRV_DELAY: 0 MS
MDC_IDC_SET_CRT_PACED_CHAMBERS: NORMAL
MDC_IDC_SET_LEADCHNL_LV_PACING_AMPLITUDE: 1.75 V
MDC_IDC_SET_LEADCHNL_LV_PACING_ANODE_ELECTRODE_1: NORMAL
MDC_IDC_SET_LEADCHNL_LV_PACING_ANODE_LOCATION_1: NORMAL
MDC_IDC_SET_LEADCHNL_LV_PACING_CAPTURE_MODE: NORMAL
MDC_IDC_SET_LEADCHNL_LV_PACING_CATHODE_ELECTRODE_1: NORMAL
MDC_IDC_SET_LEADCHNL_LV_PACING_CATHODE_LOCATION_1: NORMAL
MDC_IDC_SET_LEADCHNL_LV_PACING_POLARITY: NORMAL
MDC_IDC_SET_LEADCHNL_LV_PACING_PULSEWIDTH: 0.4 MS
MDC_IDC_SET_LEADCHNL_RA_SENSING_ANODE_ELECTRODE_1: NORMAL
MDC_IDC_SET_LEADCHNL_RA_SENSING_ANODE_LOCATION_1: NORMAL
MDC_IDC_SET_LEADCHNL_RA_SENSING_CATHODE_ELECTRODE_1: NORMAL
MDC_IDC_SET_LEADCHNL_RA_SENSING_CATHODE_LOCATION_1: NORMAL
MDC_IDC_SET_LEADCHNL_RA_SENSING_POLARITY: NORMAL
MDC_IDC_SET_LEADCHNL_RA_SENSING_SENSITIVITY: NORMAL
MDC_IDC_SET_LEADCHNL_RV_PACING_AMPLITUDE: 2 V
MDC_IDC_SET_LEADCHNL_RV_PACING_ANODE_ELECTRODE_1: NORMAL
MDC_IDC_SET_LEADCHNL_RV_PACING_ANODE_LOCATION_1: NORMAL
MDC_IDC_SET_LEADCHNL_RV_PACING_CAPTURE_MODE: NORMAL
MDC_IDC_SET_LEADCHNL_RV_PACING_CATHODE_ELECTRODE_1: NORMAL
MDC_IDC_SET_LEADCHNL_RV_PACING_CATHODE_LOCATION_1: NORMAL
MDC_IDC_SET_LEADCHNL_RV_PACING_POLARITY: NORMAL
MDC_IDC_SET_LEADCHNL_RV_PACING_PULSEWIDTH: 0.4 MS
MDC_IDC_SET_LEADCHNL_RV_SENSING_ANODE_ELECTRODE_1: NORMAL
MDC_IDC_SET_LEADCHNL_RV_SENSING_ANODE_LOCATION_1: NORMAL
MDC_IDC_SET_LEADCHNL_RV_SENSING_CATHODE_ELECTRODE_1: NORMAL
MDC_IDC_SET_LEADCHNL_RV_SENSING_CATHODE_LOCATION_1: NORMAL
MDC_IDC_SET_LEADCHNL_RV_SENSING_POLARITY: NORMAL
MDC_IDC_SET_LEADCHNL_RV_SENSING_SENSITIVITY: 0.9 MV
MDC_IDC_SET_ZONE_DETECTION_INTERVAL: 400 MS
MDC_IDC_SET_ZONE_TYPE: NORMAL
MDC_IDC_STAT_BRADY_AP_VP_PERCENT: 75.24 %
MDC_IDC_STAT_BRADY_AP_VS_PERCENT: 0.05 %
MDC_IDC_STAT_BRADY_AS_VP_PERCENT: 28.03 %
MDC_IDC_STAT_BRADY_AS_VS_PERCENT: 0.7 %
MDC_IDC_STAT_BRADY_DTM_END: NORMAL
MDC_IDC_STAT_BRADY_DTM_START: NORMAL
MDC_IDC_STAT_BRADY_RA_PERCENT_PACED: 0.11 %
MDC_IDC_STAT_BRADY_RV_PERCENT_PACED: 98.97 %
MDC_IDC_STAT_CRT_DTM_END: NORMAL
MDC_IDC_STAT_CRT_DTM_START: NORMAL
MDC_IDC_STAT_CRT_LV_PERCENT_PACED: 98.95 %
MDC_IDC_STAT_CRT_PERCENT_PACED: 98.95 %
MDC_IDC_STAT_EPISODE_RECENT_COUNT: 0
MDC_IDC_STAT_EPISODE_RECENT_COUNT: 0
MDC_IDC_STAT_EPISODE_RECENT_COUNT: 202
MDC_IDC_STAT_EPISODE_RECENT_COUNT_DTM_END: NORMAL
MDC_IDC_STAT_EPISODE_RECENT_COUNT_DTM_START: NORMAL
MDC_IDC_STAT_EPISODE_TOTAL_COUNT: 0
MDC_IDC_STAT_EPISODE_TOTAL_COUNT: 0
MDC_IDC_STAT_EPISODE_TOTAL_COUNT: 460
MDC_IDC_STAT_EPISODE_TOTAL_COUNT_DTM_END: NORMAL
MDC_IDC_STAT_EPISODE_TOTAL_COUNT_DTM_START: NORMAL
MDC_IDC_STAT_EPISODE_TYPE: NORMAL

## 2021-10-11 PROCEDURE — 93281 PM DEVICE PROGR EVAL MULTI: CPT | Performed by: INTERNAL MEDICINE

## 2021-10-11 PROCEDURE — 99214 OFFICE O/P EST MOD 30 MIN: CPT | Performed by: INTERNAL MEDICINE

## 2021-10-11 RX ORDER — FERROUS SULFATE 325(65) MG
1 TABLET ORAL
COMMUNITY

## 2021-10-11 RX ORDER — ROSUVASTATIN CALCIUM 10 MG/1
10 TABLET, COATED ORAL AT BEDTIME
COMMUNITY

## 2021-10-11 RX ORDER — GLIMEPIRIDE 4 MG/1
4 TABLET ORAL DAILY
COMMUNITY
End: 2022-06-27

## 2021-10-11 RX ORDER — BUMETANIDE 1 MG/1
3 TABLET ORAL DAILY
Status: ON HOLD | COMMUNITY
Start: 2021-08-16 | End: 2022-07-06

## 2021-10-11 RX ORDER — GABAPENTIN 600 MG/1
1200 TABLET ORAL 2 TIMES DAILY
Status: ON HOLD | COMMUNITY
End: 2022-06-27

## 2021-10-11 RX ORDER — UBIDECARENONE 200 MG
200 CAPSULE ORAL DAILY
COMMUNITY

## 2021-10-11 RX ORDER — LEVOTHYROXINE SODIUM 25 UG/1
25 TABLET ORAL DAILY
COMMUNITY
Start: 2019-11-13

## 2021-10-11 RX ORDER — ASCORBIC ACID 500 MG
500 TABLET ORAL DAILY
COMMUNITY
End: 2022-01-05

## 2021-10-11 RX ORDER — CHLORAL HYDRATE 500 MG
1 CAPSULE ORAL DAILY
COMMUNITY
End: 2022-01-05

## 2021-10-11 ASSESSMENT — MIFFLIN-ST. JEOR: SCORE: 1667.72

## 2021-10-11 NOTE — PROGRESS NOTES
"    Cardiology Progress Note     Assessment:  Coronary artery disease, status post coronary artery bypass graft surgery in 2011, stable, no angina, negative stress test  Ischemic cardiomyopathy with mildly depressed LV systolic function, clinically no fluid overload  Permanent atrial fibrillation,  controlled ventricular response, on anticoagulation  History of atrial flutter  Biventricular pacemaker, normal function  Right bundle branch block  Hypercholesterolemia, suboptimal control due to poor tolerance of statins  Diabetes mellitus  Sleep apnea on CPAP  Fatigue      Plan:  Continue current medication without changes  We went over the results of recent stress test and pacemaker interrogation.  He does not appear to be fluid overloaded    Follow-up in 1 year    Subjective:   This is 76 year old male who comes in today for follow-up visit.  He reports no chest pain or increasing shortness of breath.  He does get tired after working outside for 2 or 3 hours.  He denies weight gain, PND, orthopnea.  He has not had heart palpitations or syncope.  He tolerates anticoagulation well.  He denies GI or  bleeding    Review of Systems:   Negative other than history of present illness    Objective:   /60 (BP Location: Right arm, Patient Position: Sitting, Cuff Size: Adult Large)   Pulse 67   Resp 20   Ht 1.765 m (5' 9.5\")   Wt 93.4 kg (206 lb)   BMI 29.98 kg/m    Physical Exam:  GENERAL: no distress  NECK: No JVD  LUNGS: Clear to auscultation.  CARDIAC: regular rhythm, S1 & S2 normal.  No heaves, thrills, gallops soft ejection murmur at aortic area  ABDOMEN: flat, negative hepatosplenomegaly, soft and non-tender.  EXTREMITIES: No evidence of cyanosis, clubbing or edema.    Current Outpatient Medications   Medication Sig Dispense Refill     bumetanide (BUMEX) 1 MG tablet Take 1 mg by mouth daily Thre tablets once daily       cholecalciferol 50 MCG (2000 UT) CAPS Take 2 capsules by mouth       coenzyme Q-10 200 MG " CAPS capsule Take 200 mg by mouth       diphenhydrAMINE-acetaminophen (TYLENOL PM)  MG tablet Take 3 tablets by mouth as needed       ferrous sulfate (FEROSUL) 325 (65 Fe) MG tablet Take 1 tablet by mouth 3 times daily       gabapentin (NEURONTIN) 600 MG tablet Take 600 mg by mouth 2 times daily       glimepiride (AMARYL) 4 MG tablet Take 4 mg by mouth daily       insulin glargine (LANTUS SOLOSTAR) 100 UNIT/ML pen Lantus Solostar U-100 Insulin 100 unit/mL (3 mL) subcutaneous pen       levothyroxine (SYNTHROID/LEVOTHROID) 25 MCG tablet Take 25 mcg by mouth daily       lisinopril (ZESTRIL) 10 MG tablet Take 1 tablet (10 mg) by mouth daily 90 tablet 0     metFORMIN (GLUCOPHAGE) 1000 MG tablet Take 1,000 mg by mouth 2 times daily (with meals)       Omega-3 1000 MG capsule Take 1 g by mouth daily       omeprazole (PRILOSEC) 20 MG DR capsule Take 20 mg by mouth daily       rivaroxaban ANTICOAGULANT (XARELTO ANTICOAGULANT) 20 MG TABS tablet Take 20 mg by mouth daily       rosuvastatin (CRESTOR) 10 MG tablet Take 10 mg by mouth daily       vitamin C (ASCORBIC ACID) 500 MG tablet Take 500 mg by mouth daily         Cardiographics:    Pacemaker interrogation: 99% BiV paced, permanent A. fib    Echo: June 2019    Normal left ventricular size and wall thickness.    Left ventricle ejection fraction is mildly decreased. The estimated left ventricular ejection fraction is 50%.    Right ventricle is mildly dilated with moderate systolic dysfunction.    Severe biatrial enlargement is present.    No hemodynamically significant valvular heart abnormalities.    When compared to the previous study dated 6/1/2016, no significant change.      Stress test: June 2016   1. Lexiscan stress nuclear study is negative for inducible myocardial ischemia; there is a small area of severe nontransmural infarction in the distal anterior and apical segments.  2. Left ventricular ejection fraction is 48%.    May 2021     The nuclear stress test is  abnormal.     Nuclear images demonstrate a large area of transmural infarction involving the mid to distal anteroseptal, anterior and apical wall.  Base of the inferior and anterior wall appear hyperdynamic.     The left ventricular ejection fraction at stress is 53% with akinesis of the mid to distal anteroseptal, anterior and apical walls..     The patient is at a low risk of future cardiac ischemic events.     A prior study was conducted on 6/10/2016.  Images appear similar     Lab Results    Chemistry/lipid CBC Cardiac Enzymes/BNP/TSH/INR   Recent Labs   Lab Test 09/23/20  1320   CHOL 143   HDL 42   LDL 72   TRIG 144     Recent Labs   Lab Test 09/23/20  1320 10/31/19  1200 05/02/18  1450   LDL 72 80 115     Recent Labs   Lab Test 09/23/20  1320      POTASSIUM 4.6   CHLORIDE 101   CO2 25   *   BUN 24   CR 1.21   GFRESTIMATED 59*   BARBARA 9.0     Recent Labs   Lab Test 09/23/20  1320 10/31/19  1200 06/11/19  1003   CR 1.21 1.26 1.32*     Recent Labs   Lab Test 06/12/19  0613   A1C 7.2*          Recent Labs   Lab Test 06/11/19  1003   WBC 7.5   HGB 11.0*   HCT 34.3*   MCV 94        Recent Labs   Lab Test 06/11/19  1003   HGB 11.0*    No results for input(s): TROPONINI in the last 64929 hours.  No results for input(s): BNP, NTBNPI, NTBNP in the last 77007 hours.  Recent Labs   Lab Test 05/12/20  1240   TSH 2.46     No results for input(s): INR in the last 94765 hours.

## 2021-10-11 NOTE — LETTER
"10/11/2021    Moni Mcclain MD  [facility] [address]  [facility] [address]    RE: Thomas Steve       Dear Colleague,    I had the pleasure of seeing Thomas Steve in the [facility].        Cardiology Progress Note     Assessment:  Coronary artery disease, status post coronary artery bypass graft surgery in 2011, stable, no angina, negative stress test  Ischemic cardiomyopathy with mildly depressed LV systolic function, clinically no fluid overload  Permanent atrial fibrillation,  controlled ventricular response, on anticoagulation  History of atrial flutter  Biventricular pacemaker, normal function  Right bundle branch block  Hypercholesterolemia, suboptimal control due to poor tolerance of statins  Diabetes mellitus  Sleep apnea on CPAP  Fatigue      Plan:  Continue current medication without changes  We went over the results of recent stress test and pacemaker interrogation.  He does not appear to be fluid overloaded    Follow-up in 1 year    Subjective:   This is 76 year old male who comes in today for follow-up visit.  He reports no chest pain or increasing shortness of breath.  He does get tired after working outside for 2 or 3 hours.  He denies weight gain, PND, orthopnea.  He has not had heart palpitations or syncope.  He tolerates anticoagulation well.  He denies GI or  bleeding    Review of Systems:   Negative other than history of present illness    Objective:   /60 (BP Location: Right arm, Patient Position: Sitting, Cuff Size: Adult Large)   Pulse 67   Resp 20   Ht 1.765 m (5' 9.5\")   Wt 93.4 kg (206 lb)   BMI 29.98 kg/m    Physical Exam:  GENERAL: no distress  NECK: No JVD  LUNGS: Clear to auscultation.  CARDIAC: regular rhythm, S1 & S2 normal.  No heaves, thrills, gallops soft ejection murmur at aortic area  ABDOMEN: flat, negative hepatosplenomegaly, soft and non-tender.  EXTREMITIES: No evidence of " cyanosis, clubbing or edema.    Current Outpatient Medications   Medication Sig Dispense Refill     bumetanide (BUMEX) 1 MG tablet Take 1 mg by mouth daily Thre tablets once daily       cholecalciferol 50 MCG (2000 UT) CAPS Take 2 capsules by mouth       coenzyme Q-10 200 MG CAPS capsule Take 200 mg by mouth       diphenhydrAMINE-acetaminophen (TYLENOL PM)  MG tablet Take 3 tablets by mouth as needed       ferrous sulfate (FEROSUL) 325 (65 Fe) MG tablet Take 1 tablet by mouth 3 times daily       gabapentin (NEURONTIN) 600 MG tablet Take 600 mg by mouth 2 times daily       glimepiride (AMARYL) 4 MG tablet Take 4 mg by mouth daily       insulin glargine (LANTUS SOLOSTAR) 100 UNIT/ML pen Lantus Solostar U-100 Insulin 100 unit/mL (3 mL) subcutaneous pen       levothyroxine (SYNTHROID/LEVOTHROID) 25 MCG tablet Take 25 mcg by mouth daily       lisinopril (ZESTRIL) 10 MG tablet Take 1 tablet (10 mg) by mouth daily 90 tablet 0     metFORMIN (GLUCOPHAGE) 1000 MG tablet Take 1,000 mg by mouth 2 times daily (with meals)       Omega-3 1000 MG capsule Take 1 g by mouth daily       omeprazole (PRILOSEC) 20 MG DR capsule Take 20 mg by mouth daily       rivaroxaban ANTICOAGULANT (XARELTO ANTICOAGULANT) 20 MG TABS tablet Take 20 mg by mouth daily       rosuvastatin (CRESTOR) 10 MG tablet Take 10 mg by mouth daily       vitamin C (ASCORBIC ACID) 500 MG tablet Take 500 mg by mouth daily         Cardiographics:    Pacemaker interrogation: 99% BiV paced, permanent A. fib    Echo: June 2019    Normal left ventricular size and wall thickness.    Left ventricle ejection fraction is mildly decreased. The estimated left ventricular ejection fraction is 50%.    Right ventricle is mildly dilated with moderate systolic dysfunction.    Severe biatrial enlargement is present.    No hemodynamically significant valvular heart abnormalities.    When compared to the previous study dated 6/1/2016, no significant change.      Stress test: June  2016   1. Lexiscan stress nuclear study is negative for inducible myocardial ischemia; there is a small area of severe nontransmural infarction in the distal anterior and apical segments.  2. Left ventricular ejection fraction is 48%.    May 2021     The nuclear stress test is abnormal.     Nuclear images demonstrate a large area of transmural infarction involving the mid to distal anteroseptal, anterior and apical wall.  Base of the inferior and anterior wall appear hyperdynamic.     The left ventricular ejection fraction at stress is 53% with akinesis of the mid to distal anteroseptal, anterior and apical walls..     The patient is at a low risk of future cardiac ischemic events.     A prior study was conducted on 6/10/2016.  Images appear similar     Lab Results    Chemistry/lipid CBC Cardiac Enzymes/BNP/TSH/INR   Recent Labs   Lab Test 09/23/20  1320   CHOL 143   HDL 42   LDL 72   TRIG 144     Recent Labs   Lab Test 09/23/20  1320 10/31/19  1200 05/02/18  1450   LDL 72 80 115     Recent Labs   Lab Test 09/23/20  1320      POTASSIUM 4.6   CHLORIDE 101   CO2 25   *   BUN 24   CR 1.21   GFRESTIMATED 59*   BARBARA 9.0     Recent Labs   Lab Test 09/23/20  1320 10/31/19  1200 06/11/19  1003   CR 1.21 1.26 1.32*     Recent Labs   Lab Test 06/12/19  0613   A1C 7.2*          Recent Labs   Lab Test 06/11/19  1003   WBC 7.5   HGB 11.0*   HCT 34.3*   MCV 94        Recent Labs   Lab Test 06/11/19  1003   HGB 11.0*    No results for input(s): TROPONINI in the last 84955 hours.  No results for input(s): BNP, NTBNPI, NTBNP in the last 49982 hours.  Recent Labs   Lab Test 05/12/20  1240   TSH 2.46     No results for input(s): INR in the last 94621 hours.                     Thank you for allowing me to participate in the care of your patient.      Sincerely,     Steven Brumfield MD     [facility]  cc:   Odin Rivera MD  [address] [place]  [address]

## 2021-10-19 ENCOUNTER — LAB REQUISITION (OUTPATIENT)
Dept: LAB | Facility: CLINIC | Age: 76
End: 2021-10-19
Payer: MEDICARE

## 2021-10-19 DIAGNOSIS — E11.40 TYPE 2 DIABETES MELLITUS WITH DIABETIC NEUROPATHY, UNSPECIFIED (H): ICD-10-CM

## 2021-10-19 DIAGNOSIS — E03.9 HYPOTHYROIDISM, UNSPECIFIED: ICD-10-CM

## 2021-10-19 LAB
ANION GAP SERPL CALCULATED.3IONS-SCNC: 12 MMOL/L (ref 5–18)
BUN SERPL-MCNC: 25 MG/DL (ref 8–28)
CALCIUM SERPL-MCNC: 9.1 MG/DL (ref 8.5–10.5)
CHLORIDE BLD-SCNC: 96 MMOL/L (ref 98–107)
CHOLEST SERPL-MCNC: 129 MG/DL
CO2 SERPL-SCNC: 26 MMOL/L (ref 22–31)
CREAT SERPL-MCNC: 1.15 MG/DL (ref 0.7–1.3)
FASTING STATUS PATIENT QL REPORTED: ABNORMAL
GFR SERPL CREATININE-BSD FRML MDRD: 62 ML/MIN/1.73M2
GLUCOSE BLD-MCNC: 315 MG/DL (ref 70–125)
HDLC SERPL-MCNC: 41 MG/DL
LDLC SERPL CALC-MCNC: 58 MG/DL
POTASSIUM BLD-SCNC: 4.3 MMOL/L (ref 3.5–5)
SODIUM SERPL-SCNC: 134 MMOL/L (ref 136–145)
TRIGL SERPL-MCNC: 152 MG/DL
TSH SERPL DL<=0.005 MIU/L-ACNC: 2.97 UIU/ML (ref 0.3–5)

## 2021-10-19 PROCEDURE — 80048 BASIC METABOLIC PNL TOTAL CA: CPT | Mod: ORL | Performed by: FAMILY MEDICINE

## 2021-10-19 PROCEDURE — 84443 ASSAY THYROID STIM HORMONE: CPT | Mod: ORL | Performed by: FAMILY MEDICINE

## 2021-10-19 PROCEDURE — 80061 LIPID PANEL: CPT | Mod: ORL | Performed by: FAMILY MEDICINE

## 2021-11-05 ENCOUNTER — TRANSFERRED RECORDS (OUTPATIENT)
Dept: HEALTH INFORMATION MANAGEMENT | Facility: CLINIC | Age: 76
End: 2021-11-05
Payer: MEDICARE

## 2021-11-05 LAB
ALT SERPL-CCNC: 23 U/L (ref 7–52)
AST SERPL-CCNC: 14 U/L (ref 13–39)
CREATININE (EXTERNAL): 1.2 MG-DL (ref 0.7–1.3)
GLUCOSE (EXTERNAL): 387 MG-DL (ref 70–105)
POTASSIUM (EXTERNAL): 4.4 MMOL-L (ref 3.5–5.1)

## 2021-11-10 ENCOUNTER — TELEPHONE (OUTPATIENT)
Dept: CARDIOLOGY | Facility: CLINIC | Age: 76
End: 2021-11-10
Payer: MEDICARE

## 2021-11-10 NOTE — TELEPHONE ENCOUNTER
----- Message from Almita Davies sent at 11/10/2021 11:32 AM CST -----  Regarding: TZ pt  General phone call:    Caller: Thomas   Primary cardiologist: SHAZIA   Detailed reason for call: He is experiencing weight loss since 10/26 about 10 pounds and frequent urination.  The device nurse turned off the afib portion of his pacer on 10./26        Best phone number: [phone]   Best time to contact: any  Ok to leave a detailedmessage? yes  Device? Pacer    Additional Info:       LM for patient to discuss his symptoms that he has been having. -[facility]

## 2021-11-11 NOTE — TELEPHONE ENCOUNTER
Called Thomas again and was able to get a hold of him and his wife. He had a list of things that he wanted to discuss that have been going on with him since his visit with [facility] on 10/11/21. It is a long list of non-cardiac related updates after review.      He states that he has been urinating a lot more and he is down about 10 lbs over the last month. He has felt more leg and muscle cramping along with cold hands. He is told his diabetes is in good control; CMP was faxed over from [facility] and kidney function was WNL (will sent to his to scan to chart) He fell while trying to cut down a tree and fractured a rib on his left side. He had a CXR at [facility] in [place], [place]. It revealed some fluid on the left lower lobe of his lung per his wife. He was given an rx for doxycycline. No other med changes were done and the [facility] center had faxed over fairly stable lab work. His wife is concerned about repeat CXR and the fluid that was found on his lungs.  We do not have access to these records- only blood tests.     She also states that his blood pressures have been in the 130-150's systolic and she is a little worried about that. We discussed how this is not too high and it may be due to some pain related to his rib fx. She verbalized understanding. She wanted to update [facility] but writer explained that it sounded like Thomas has a lot of updates and with the weight loss, this does not sound like it is a cardiac update. He is in a permanent Afib and he states his heart rates are controlled in the 60's. Encouraged pt to make appt with his PMD office post- ER visit to discuss above concerns to discuss recent pneumonia. They verbalized understanding. Will send update to [facility]. -[facility]       Dr. Brumfield,  Update only and FYI unless you have anything further. I will get HIS to obtain full ER visit from 11/5 records for thoroughness. Thomas and his wife called in with a list of updates including muscle  "aches and cramps, weight loss of 10 lbs in 1 month, recent rib fx with pneumonia on cxr and some elevated BP's in the 130-150's (perhaps pain related). This all seemed to stem from a ER visit in [place] post- fall while cutting down a tree. He did fracture a rib and was found to have \"fluid\" on his left lung and started on doxycycline. I could get get a firm grasp on what they really wanted from us but I encouraged a follow up visit with his PMD after this ER visit to make sure he is recovering well from this. Sorry for the long message.  Mal   "

## 2021-11-12 NOTE — TELEPHONE ENCOUNTER
Called patient and updated that luis reviewed message and had no further recommendations besides see Dr. Mcclain. He has an appt on 11/22 and she is with Roderick. He will call back if he has any other questions or concerns or if he is recommended to see LUIS. -[facility]

## 2021-11-22 ENCOUNTER — LAB REQUISITION (OUTPATIENT)
Dept: LAB | Facility: CLINIC | Age: 76
End: 2021-11-22
Payer: MEDICARE

## 2021-11-22 DIAGNOSIS — R63.4 ABNORMAL WEIGHT LOSS: ICD-10-CM

## 2021-11-22 LAB — ERYTHROCYTE [SEDIMENTATION RATE] IN BLOOD BY WESTERGREN METHOD: 32 MM/HR (ref 0–15)

## 2021-11-22 PROCEDURE — 85652 RBC SED RATE AUTOMATED: CPT | Mod: ORL | Performed by: FAMILY MEDICINE

## 2022-01-04 ENCOUNTER — MEDICAL CORRESPONDENCE (OUTPATIENT)
Dept: NEUROSURGERY | Facility: CLINIC | Age: 77
End: 2022-01-04
Payer: MEDICARE

## 2022-01-05 ENCOUNTER — OFFICE VISIT (OUTPATIENT)
Dept: NEUROSURGERY | Facility: CLINIC | Age: 77
End: 2022-01-05
Payer: MEDICARE

## 2022-01-05 VITALS
HEART RATE: 86 BPM | OXYGEN SATURATION: 97 % | WEIGHT: 206 LBS | HEIGHT: 70 IN | DIASTOLIC BLOOD PRESSURE: 67 MMHG | BODY MASS INDEX: 29.49 KG/M2 | SYSTOLIC BLOOD PRESSURE: 146 MMHG

## 2022-01-05 DIAGNOSIS — M43.16 SPONDYLOLISTHESIS OF LUMBAR REGION: Primary | ICD-10-CM

## 2022-01-05 PROCEDURE — 99203 OFFICE O/P NEW LOW 30 MIN: CPT | Performed by: SURGERY

## 2022-01-05 ASSESSMENT — MIFFLIN-ST. JEOR: SCORE: 1667.72

## 2022-01-05 NOTE — PROGRESS NOTES
The patient is a 76-year-old male.  He has back pain with pain down the right leg.  He has weakness in both legs.  He gets worse with walking.  He does not have left leg pain.  He does not have trouble with bladder control.  He is in generally poor health.  He has diabetes.  He takes insulin.  He has had a triple coronary artery bypass.  He is on Xarelto.  He has a pacemaker.  He does not have lung disease or cancer.  On MRI he has listhesis at L4-5 with spinal stenosis at that level.  He also has spinal stenosis at L5-S1 with a large disc herniation at L5-S1 on the left.  I showed him the pictures.  I would like to get a CT to look at his facet joints at L4-5 and also flexion-extension views to look for abnormal motion at L4-5.  If his facet joints are okay and he does not move excessively we could do just a decompressive laminectomy without a fusion.  In that case we would do a decompressive laminectomy at L4-5 with foraminotomies and at L5-S1 with foraminotomies plus removal of the herniated disc at L5-S1 on the left.  We talked about the nature of the problem, nature of the surgery, rationale for doing it, goals, benefits, alternatives, and significant risks and complications.  I answered all his questions.  He said he was satisfied with the explanation and understood.  I think he wants to go ahead with surgery.  He is going to let us know.  Total time 30 minutes, more than 50% spent counseling and/or coordinating care.

## 2022-01-05 NOTE — PATIENT INSTRUCTIONS
Orders Placed This Encounter   Procedures     CT Lumbar Spine w/o Contrast     XR Lumbar Spine w Bend Comp G/E 6 Views

## 2022-01-05 NOTE — NURSING NOTE
Neurosurgery consultation was requested by: Dr. Siddiqui  Pain: Low back pain , more towards right side   Radicular Pain is present: right buttock and posterior right thigh and calf   Lhermitte sign: no  Motor complaints: weakness in both legs   Sensory complaints: denies numbness and tingling   Gait and balance issues: denies   Bowel or bladder issues: sometimes    Duration of SX is: chronic but progressed recently.  The symptoms are worse with: moving certain ways   The symptoms are better with: stretching   Injury:denies   Severity is: chronic   Patient has tried the following conservative measures: not recent   MIREYA score is:  GERRI Hyatt

## 2022-01-05 NOTE — LETTER
1/5/2022         RE: Thomas Steve  [address] [facility] [address]        Dear Colleague,    Thank you for referring your patient, Thomas Steve, to the [facility]. Please see a copy of my visit note below.    The patient is a 76-year-old male.  He has back pain with pain down the right leg.  He has weakness in both legs.  He gets worse with walking.  He does not have left leg pain.  He does not have trouble with bladder control.  He is in generally poor health.  He has diabetes.  He takes insulin.  He has had a triple coronary artery bypass.  He is on Xarelto.  He has a pacemaker.  He does not have lung disease or cancer.  On MRI he has listhesis at L4-5 with spinal stenosis at that level.  He also has spinal stenosis at L5-S1 with a large disc herniation at L5-S1 on the left.  I showed him the pictures.  I would like to get a CT to look at his facet joints at L4-5 and also flexion-extension views to look for abnormal motion at L4-5.  If his facet joints are okay and he does not move excessively we could do just a decompressive laminectomy without a fusion.  In that case we would do a decompressive laminectomy at L4-5 with foraminotomies and at L5-S1 with foraminotomies plus removal of the herniated disc at L5-S1 on the left.  We talked about the nature of the problem, nature of the surgery, rationale for doing it, goals, benefits, alternatives, and significant risks and complications.  I answered all his questions.  He said he was satisfied with the explanation and understood.  I think he wants to go ahead with surgery.  He is going to let us know.  Total time 30 minutes, more than 50% spent counseling and/or coordinating care.      Again, thank you for allowing me to participate in the care of your patient.        Sincerely,        Israel Samuel MD

## 2022-01-18 ENCOUNTER — ANCILLARY PROCEDURE (OUTPATIENT)
Dept: CARDIOLOGY | Facility: CLINIC | Age: 77
End: 2022-01-18
Attending: INTERNAL MEDICINE
Payer: MEDICARE

## 2022-01-18 DIAGNOSIS — I49.5 SICK SINUS SYNDROME (H): ICD-10-CM

## 2022-01-18 DIAGNOSIS — Z95.0 PACEMAKER: ICD-10-CM

## 2022-01-18 LAB
MDC_IDC_EPISODE_DTM: NORMAL
MDC_IDC_EPISODE_DURATION: 10 S
MDC_IDC_EPISODE_DURATION: 7 S
MDC_IDC_EPISODE_DURATION: 9 S
MDC_IDC_EPISODE_ID: 1242
MDC_IDC_EPISODE_ID: 1243
MDC_IDC_EPISODE_ID: 1244
MDC_IDC_EPISODE_ID: 1245
MDC_IDC_EPISODE_ID: 1246
MDC_IDC_EPISODE_ID: 1247
MDC_IDC_EPISODE_ID: 1248
MDC_IDC_EPISODE_ID: 32
MDC_IDC_EPISODE_ID: 33
MDC_IDC_EPISODE_ID: 34
MDC_IDC_EPISODE_TYPE: NORMAL
MDC_IDC_LEAD_IMPLANT_DT: NORMAL
MDC_IDC_LEAD_LOCATION: NORMAL
MDC_IDC_LEAD_LOCATION_DETAIL_1: NORMAL
MDC_IDC_LEAD_MFG: NORMAL
MDC_IDC_LEAD_MODEL: NORMAL
MDC_IDC_LEAD_POLARITY_TYPE: NORMAL
MDC_IDC_LEAD_SERIAL: NORMAL
MDC_IDC_MSMT_BATTERY_DTM: NORMAL
MDC_IDC_MSMT_BATTERY_REMAINING_LONGEVITY: 116 MO
MDC_IDC_MSMT_BATTERY_RRT_TRIGGER: 2.6
MDC_IDC_MSMT_BATTERY_STATUS: NORMAL
MDC_IDC_MSMT_BATTERY_VOLTAGE: 3 V
MDC_IDC_MSMT_LEADCHNL_LV_IMPEDANCE_VALUE: 437 OHM
MDC_IDC_MSMT_LEADCHNL_LV_IMPEDANCE_VALUE: 437 OHM
MDC_IDC_MSMT_LEADCHNL_LV_IMPEDANCE_VALUE: 456 OHM
MDC_IDC_MSMT_LEADCHNL_LV_IMPEDANCE_VALUE: 513 OHM
MDC_IDC_MSMT_LEADCHNL_LV_IMPEDANCE_VALUE: 551 OHM
MDC_IDC_MSMT_LEADCHNL_LV_IMPEDANCE_VALUE: 779 OHM
MDC_IDC_MSMT_LEADCHNL_LV_IMPEDANCE_VALUE: 798 OHM
MDC_IDC_MSMT_LEADCHNL_LV_IMPEDANCE_VALUE: 855 OHM
MDC_IDC_MSMT_LEADCHNL_LV_IMPEDANCE_VALUE: 855 OHM
MDC_IDC_MSMT_LEADCHNL_LV_IMPEDANCE_VALUE: 912 OHM
MDC_IDC_MSMT_LEADCHNL_LV_PACING_THRESHOLD_AMPLITUDE: 0.75 V
MDC_IDC_MSMT_LEADCHNL_LV_PACING_THRESHOLD_PULSEWIDTH: 0.4 MS
MDC_IDC_MSMT_LEADCHNL_RA_IMPEDANCE_VALUE: 361 OHM
MDC_IDC_MSMT_LEADCHNL_RA_IMPEDANCE_VALUE: 399 OHM
MDC_IDC_MSMT_LEADCHNL_RA_PACING_THRESHOLD_AMPLITUDE: 0.5 V
MDC_IDC_MSMT_LEADCHNL_RA_PACING_THRESHOLD_PULSEWIDTH: 0.4 MS
MDC_IDC_MSMT_LEADCHNL_RA_SENSING_INTR_AMPL: 0.25 MV
MDC_IDC_MSMT_LEADCHNL_RA_SENSING_INTR_AMPL: 0.38 MV
MDC_IDC_MSMT_LEADCHNL_RV_IMPEDANCE_VALUE: 380 OHM
MDC_IDC_MSMT_LEADCHNL_RV_IMPEDANCE_VALUE: 456 OHM
MDC_IDC_MSMT_LEADCHNL_RV_PACING_THRESHOLD_AMPLITUDE: 0.5 V
MDC_IDC_MSMT_LEADCHNL_RV_PACING_THRESHOLD_PULSEWIDTH: 0.4 MS
MDC_IDC_MSMT_LEADCHNL_RV_SENSING_INTR_AMPL: 10.25 MV
MDC_IDC_MSMT_LEADCHNL_RV_SENSING_INTR_AMPL: 10.25 MV
MDC_IDC_PG_IMPLANT_DTM: NORMAL
MDC_IDC_PG_MFG: NORMAL
MDC_IDC_PG_MODEL: NORMAL
MDC_IDC_PG_SERIAL: NORMAL
MDC_IDC_PG_TYPE: NORMAL
MDC_IDC_SESS_CLINIC_NAME: NORMAL
MDC_IDC_SESS_DTM: NORMAL
MDC_IDC_SESS_TYPE: NORMAL
MDC_IDC_SET_BRADY_LOWRATE: 60 {BEATS}/MIN
MDC_IDC_SET_BRADY_MAX_SENSOR_RATE: 130 {BEATS}/MIN
MDC_IDC_SET_BRADY_MODE: NORMAL
MDC_IDC_SET_CRT_LVRV_DELAY: 0 MS
MDC_IDC_SET_CRT_PACED_CHAMBERS: NORMAL
MDC_IDC_SET_LEADCHNL_LV_PACING_AMPLITUDE: 1.75 V
MDC_IDC_SET_LEADCHNL_LV_PACING_ANODE_ELECTRODE_1: NORMAL
MDC_IDC_SET_LEADCHNL_LV_PACING_ANODE_LOCATION_1: NORMAL
MDC_IDC_SET_LEADCHNL_LV_PACING_CAPTURE_MODE: NORMAL
MDC_IDC_SET_LEADCHNL_LV_PACING_CATHODE_ELECTRODE_1: NORMAL
MDC_IDC_SET_LEADCHNL_LV_PACING_CATHODE_LOCATION_1: NORMAL
MDC_IDC_SET_LEADCHNL_LV_PACING_POLARITY: NORMAL
MDC_IDC_SET_LEADCHNL_LV_PACING_PULSEWIDTH: 0.4 MS
MDC_IDC_SET_LEADCHNL_RA_SENSING_ANODE_ELECTRODE_1: NORMAL
MDC_IDC_SET_LEADCHNL_RA_SENSING_ANODE_LOCATION_1: NORMAL
MDC_IDC_SET_LEADCHNL_RA_SENSING_CATHODE_ELECTRODE_1: NORMAL
MDC_IDC_SET_LEADCHNL_RA_SENSING_CATHODE_LOCATION_1: NORMAL
MDC_IDC_SET_LEADCHNL_RA_SENSING_POLARITY: NORMAL
MDC_IDC_SET_LEADCHNL_RA_SENSING_SENSITIVITY: NORMAL
MDC_IDC_SET_LEADCHNL_RV_PACING_AMPLITUDE: 1.5 V
MDC_IDC_SET_LEADCHNL_RV_PACING_ANODE_ELECTRODE_1: NORMAL
MDC_IDC_SET_LEADCHNL_RV_PACING_ANODE_LOCATION_1: NORMAL
MDC_IDC_SET_LEADCHNL_RV_PACING_CAPTURE_MODE: NORMAL
MDC_IDC_SET_LEADCHNL_RV_PACING_CATHODE_ELECTRODE_1: NORMAL
MDC_IDC_SET_LEADCHNL_RV_PACING_CATHODE_LOCATION_1: NORMAL
MDC_IDC_SET_LEADCHNL_RV_PACING_POLARITY: NORMAL
MDC_IDC_SET_LEADCHNL_RV_PACING_PULSEWIDTH: 0.4 MS
MDC_IDC_SET_LEADCHNL_RV_SENSING_ANODE_ELECTRODE_1: NORMAL
MDC_IDC_SET_LEADCHNL_RV_SENSING_ANODE_LOCATION_1: NORMAL
MDC_IDC_SET_LEADCHNL_RV_SENSING_CATHODE_ELECTRODE_1: NORMAL
MDC_IDC_SET_LEADCHNL_RV_SENSING_CATHODE_LOCATION_1: NORMAL
MDC_IDC_SET_LEADCHNL_RV_SENSING_POLARITY: NORMAL
MDC_IDC_SET_LEADCHNL_RV_SENSING_SENSITIVITY: 0.9 MV
MDC_IDC_SET_ZONE_DETECTION_INTERVAL: 400 MS
MDC_IDC_SET_ZONE_TYPE: NORMAL
MDC_IDC_STAT_BRADY_AP_VP_PERCENT: 0 %
MDC_IDC_STAT_BRADY_AP_VS_PERCENT: 0 %
MDC_IDC_STAT_BRADY_AS_VP_PERCENT: 99.38 %
MDC_IDC_STAT_BRADY_AS_VS_PERCENT: 0.62 %
MDC_IDC_STAT_BRADY_DTM_END: NORMAL
MDC_IDC_STAT_BRADY_DTM_START: NORMAL
MDC_IDC_STAT_BRADY_RA_PERCENT_PACED: 0 %
MDC_IDC_STAT_BRADY_RV_PERCENT_PACED: 99.37 %
MDC_IDC_STAT_CRT_DTM_END: NORMAL
MDC_IDC_STAT_CRT_DTM_START: NORMAL
MDC_IDC_STAT_CRT_LV_PERCENT_PACED: 99.35 %
MDC_IDC_STAT_CRT_PERCENT_PACED: 99.35 %
MDC_IDC_STAT_EPISODE_RECENT_COUNT: 0
MDC_IDC_STAT_EPISODE_RECENT_COUNT_DTM_END: NORMAL
MDC_IDC_STAT_EPISODE_RECENT_COUNT_DTM_START: NORMAL
MDC_IDC_STAT_EPISODE_TOTAL_COUNT: 0
MDC_IDC_STAT_EPISODE_TOTAL_COUNT: 460
MDC_IDC_STAT_EPISODE_TOTAL_COUNT_DTM_END: NORMAL
MDC_IDC_STAT_EPISODE_TOTAL_COUNT_DTM_START: NORMAL
MDC_IDC_STAT_EPISODE_TYPE: NORMAL

## 2022-01-18 PROCEDURE — 93294 REM INTERROG EVL PM/LDLS PM: CPT | Performed by: INTERNAL MEDICINE

## 2022-01-18 PROCEDURE — 93296 REM INTERROG EVL PM/IDS: CPT | Performed by: INTERNAL MEDICINE

## 2022-04-20 DIAGNOSIS — M43.10 ACQUIRED SPONDYLOLISTHESIS: Primary | ICD-10-CM

## 2022-04-26 ENCOUNTER — HOSPITAL ENCOUNTER (OUTPATIENT)
Dept: CT IMAGING | Facility: HOSPITAL | Age: 77
Discharge: HOME | End: 2022-04-26
Attending: SURGERY
Payer: MEDICARE

## 2022-04-26 ENCOUNTER — HOSPITAL ENCOUNTER (OUTPATIENT)
Dept: RADIOLOGY | Facility: HOSPITAL | Age: 77
Discharge: HOME | End: 2022-04-26
Attending: SURGERY
Payer: MEDICARE

## 2022-04-26 DIAGNOSIS — M43.10 ACQUIRED SPONDYLOLISTHESIS: ICD-10-CM

## 2022-04-26 PROCEDURE — 72114 X-RAY EXAM L-S SPINE BENDING: CPT

## 2022-04-26 PROCEDURE — 72131 CT LUMBAR SPINE W/O DYE: CPT

## 2022-04-29 ENCOUNTER — ANCILLARY PROCEDURE (OUTPATIENT)
Dept: CARDIOLOGY | Facility: CLINIC | Age: 77
End: 2022-04-29
Attending: INTERNAL MEDICINE
Payer: MEDICARE

## 2022-04-29 DIAGNOSIS — I50.9 CONGESTIVE HEART FAILURE (CHF) (H): ICD-10-CM

## 2022-04-29 DIAGNOSIS — Z95.0 PACEMAKER: ICD-10-CM

## 2022-04-29 DIAGNOSIS — I49.5 SICK SINUS SYNDROME (H): ICD-10-CM

## 2022-04-29 PROCEDURE — 93296 REM INTERROG EVL PM/IDS: CPT | Performed by: INTERNAL MEDICINE

## 2022-04-29 PROCEDURE — 93294 REM INTERROG EVL PM/LDLS PM: CPT | Performed by: INTERNAL MEDICINE

## 2022-05-03 LAB
MDC_IDC_EPISODE_DTM: NORMAL
MDC_IDC_EPISODE_DURATION: 1 S
MDC_IDC_EPISODE_DURATION: 10 S
MDC_IDC_EPISODE_DURATION: 11 S
MDC_IDC_EPISODE_DURATION: 11 S
MDC_IDC_EPISODE_DURATION: 14 S
MDC_IDC_EPISODE_DURATION: 14 S
MDC_IDC_EPISODE_DURATION: 18 S
MDC_IDC_EPISODE_DURATION: 8 S
MDC_IDC_EPISODE_ID: 1269
MDC_IDC_EPISODE_ID: 1270
MDC_IDC_EPISODE_ID: 1271
MDC_IDC_EPISODE_ID: 1272
MDC_IDC_EPISODE_ID: 1273
MDC_IDC_EPISODE_ID: 1274
MDC_IDC_EPISODE_ID: 1275
MDC_IDC_EPISODE_ID: 461
MDC_IDC_EPISODE_TYPE: NORMAL
MDC_IDC_LEAD_IMPLANT_DT: NORMAL
MDC_IDC_LEAD_LOCATION: NORMAL
MDC_IDC_LEAD_LOCATION_DETAIL_1: NORMAL
MDC_IDC_LEAD_MFG: NORMAL
MDC_IDC_LEAD_MODEL: NORMAL
MDC_IDC_LEAD_POLARITY_TYPE: NORMAL
MDC_IDC_LEAD_SERIAL: NORMAL
MDC_IDC_MSMT_BATTERY_DTM: NORMAL
MDC_IDC_MSMT_BATTERY_REMAINING_LONGEVITY: 111 MO
MDC_IDC_MSMT_BATTERY_RRT_TRIGGER: 2.6
MDC_IDC_MSMT_BATTERY_STATUS: NORMAL
MDC_IDC_MSMT_BATTERY_VOLTAGE: 3 V
MDC_IDC_MSMT_LEADCHNL_LV_IMPEDANCE_VALUE: 361 OHM
MDC_IDC_MSMT_LEADCHNL_LV_IMPEDANCE_VALUE: 380 OHM
MDC_IDC_MSMT_LEADCHNL_LV_IMPEDANCE_VALUE: 418 OHM
MDC_IDC_MSMT_LEADCHNL_LV_IMPEDANCE_VALUE: 437 OHM
MDC_IDC_MSMT_LEADCHNL_LV_IMPEDANCE_VALUE: 532 OHM
MDC_IDC_MSMT_LEADCHNL_LV_IMPEDANCE_VALUE: 684 OHM
MDC_IDC_MSMT_LEADCHNL_LV_IMPEDANCE_VALUE: 703 OHM
MDC_IDC_MSMT_LEADCHNL_LV_IMPEDANCE_VALUE: 779 OHM
MDC_IDC_MSMT_LEADCHNL_LV_IMPEDANCE_VALUE: 798 OHM
MDC_IDC_MSMT_LEADCHNL_LV_IMPEDANCE_VALUE: 874 OHM
MDC_IDC_MSMT_LEADCHNL_LV_PACING_THRESHOLD_AMPLITUDE: 0.62 V
MDC_IDC_MSMT_LEADCHNL_LV_PACING_THRESHOLD_PULSEWIDTH: 0.4 MS
MDC_IDC_MSMT_LEADCHNL_RA_IMPEDANCE_VALUE: 361 OHM
MDC_IDC_MSMT_LEADCHNL_RA_IMPEDANCE_VALUE: 399 OHM
MDC_IDC_MSMT_LEADCHNL_RA_PACING_THRESHOLD_AMPLITUDE: 0.5 V
MDC_IDC_MSMT_LEADCHNL_RA_PACING_THRESHOLD_PULSEWIDTH: 0.4 MS
MDC_IDC_MSMT_LEADCHNL_RA_SENSING_INTR_AMPL: 0.25 MV
MDC_IDC_MSMT_LEADCHNL_RA_SENSING_INTR_AMPL: 0.38 MV
MDC_IDC_MSMT_LEADCHNL_RV_IMPEDANCE_VALUE: 361 OHM
MDC_IDC_MSMT_LEADCHNL_RV_IMPEDANCE_VALUE: 437 OHM
MDC_IDC_MSMT_LEADCHNL_RV_PACING_THRESHOLD_AMPLITUDE: 0.38 V
MDC_IDC_MSMT_LEADCHNL_RV_PACING_THRESHOLD_PULSEWIDTH: 0.4 MS
MDC_IDC_MSMT_LEADCHNL_RV_SENSING_INTR_AMPL: 9.75 MV
MDC_IDC_MSMT_LEADCHNL_RV_SENSING_INTR_AMPL: 9.75 MV
MDC_IDC_PG_IMPLANT_DTM: NORMAL
MDC_IDC_PG_MFG: NORMAL
MDC_IDC_PG_MODEL: NORMAL
MDC_IDC_PG_SERIAL: NORMAL
MDC_IDC_PG_TYPE: NORMAL
MDC_IDC_SESS_CLINIC_NAME: NORMAL
MDC_IDC_SESS_DTM: NORMAL
MDC_IDC_SESS_TYPE: NORMAL
MDC_IDC_SET_BRADY_LOWRATE: 60 {BEATS}/MIN
MDC_IDC_SET_BRADY_MAX_SENSOR_RATE: 130 {BEATS}/MIN
MDC_IDC_SET_BRADY_MODE: NORMAL
MDC_IDC_SET_CRT_LVRV_DELAY: 0 MS
MDC_IDC_SET_CRT_PACED_CHAMBERS: NORMAL
MDC_IDC_SET_LEADCHNL_LV_PACING_AMPLITUDE: 1.75 V
MDC_IDC_SET_LEADCHNL_LV_PACING_ANODE_ELECTRODE_1: NORMAL
MDC_IDC_SET_LEADCHNL_LV_PACING_ANODE_LOCATION_1: NORMAL
MDC_IDC_SET_LEADCHNL_LV_PACING_CAPTURE_MODE: NORMAL
MDC_IDC_SET_LEADCHNL_LV_PACING_CATHODE_ELECTRODE_1: NORMAL
MDC_IDC_SET_LEADCHNL_LV_PACING_CATHODE_LOCATION_1: NORMAL
MDC_IDC_SET_LEADCHNL_LV_PACING_POLARITY: NORMAL
MDC_IDC_SET_LEADCHNL_LV_PACING_PULSEWIDTH: 0.4 MS
MDC_IDC_SET_LEADCHNL_RA_SENSING_ANODE_ELECTRODE_1: NORMAL
MDC_IDC_SET_LEADCHNL_RA_SENSING_ANODE_LOCATION_1: NORMAL
MDC_IDC_SET_LEADCHNL_RA_SENSING_CATHODE_ELECTRODE_1: NORMAL
MDC_IDC_SET_LEADCHNL_RA_SENSING_CATHODE_LOCATION_1: NORMAL
MDC_IDC_SET_LEADCHNL_RA_SENSING_POLARITY: NORMAL
MDC_IDC_SET_LEADCHNL_RA_SENSING_SENSITIVITY: NORMAL
MDC_IDC_SET_LEADCHNL_RV_PACING_AMPLITUDE: 1.5 V
MDC_IDC_SET_LEADCHNL_RV_PACING_ANODE_ELECTRODE_1: NORMAL
MDC_IDC_SET_LEADCHNL_RV_PACING_ANODE_LOCATION_1: NORMAL
MDC_IDC_SET_LEADCHNL_RV_PACING_CAPTURE_MODE: NORMAL
MDC_IDC_SET_LEADCHNL_RV_PACING_CATHODE_ELECTRODE_1: NORMAL
MDC_IDC_SET_LEADCHNL_RV_PACING_CATHODE_LOCATION_1: NORMAL
MDC_IDC_SET_LEADCHNL_RV_PACING_POLARITY: NORMAL
MDC_IDC_SET_LEADCHNL_RV_PACING_PULSEWIDTH: 0.4 MS
MDC_IDC_SET_LEADCHNL_RV_SENSING_ANODE_ELECTRODE_1: NORMAL
MDC_IDC_SET_LEADCHNL_RV_SENSING_ANODE_LOCATION_1: NORMAL
MDC_IDC_SET_LEADCHNL_RV_SENSING_CATHODE_ELECTRODE_1: NORMAL
MDC_IDC_SET_LEADCHNL_RV_SENSING_CATHODE_LOCATION_1: NORMAL
MDC_IDC_SET_LEADCHNL_RV_SENSING_POLARITY: NORMAL
MDC_IDC_SET_LEADCHNL_RV_SENSING_SENSITIVITY: 0.9 MV
MDC_IDC_SET_ZONE_DETECTION_INTERVAL: 400 MS
MDC_IDC_SET_ZONE_TYPE: NORMAL
MDC_IDC_STAT_BRADY_AP_VP_PERCENT: 0 %
MDC_IDC_STAT_BRADY_AP_VS_PERCENT: 0 %
MDC_IDC_STAT_BRADY_AS_VP_PERCENT: 99.37 %
MDC_IDC_STAT_BRADY_AS_VS_PERCENT: 0.63 %
MDC_IDC_STAT_BRADY_DTM_END: NORMAL
MDC_IDC_STAT_BRADY_DTM_START: NORMAL
MDC_IDC_STAT_BRADY_RA_PERCENT_PACED: 0 %
MDC_IDC_STAT_BRADY_RV_PERCENT_PACED: 99.36 %
MDC_IDC_STAT_CRT_DTM_END: NORMAL
MDC_IDC_STAT_CRT_DTM_START: NORMAL
MDC_IDC_STAT_CRT_LV_PERCENT_PACED: 99.34 %
MDC_IDC_STAT_CRT_PERCENT_PACED: 99.34 %
MDC_IDC_STAT_EPISODE_RECENT_COUNT: 0
MDC_IDC_STAT_EPISODE_RECENT_COUNT: 1
MDC_IDC_STAT_EPISODE_RECENT_COUNT_DTM_END: NORMAL
MDC_IDC_STAT_EPISODE_RECENT_COUNT_DTM_START: NORMAL
MDC_IDC_STAT_EPISODE_TOTAL_COUNT: 0
MDC_IDC_STAT_EPISODE_TOTAL_COUNT: 1
MDC_IDC_STAT_EPISODE_TOTAL_COUNT: 460
MDC_IDC_STAT_EPISODE_TOTAL_COUNT_DTM_END: NORMAL
MDC_IDC_STAT_EPISODE_TOTAL_COUNT_DTM_START: NORMAL
MDC_IDC_STAT_EPISODE_TYPE: NORMAL

## 2022-05-10 ENCOUNTER — TELEPHONE (OUTPATIENT)
Dept: NEUROSURGERY | Facility: CLINIC | Age: 77
End: 2022-05-10
Payer: MEDICARE

## 2022-05-10 ENCOUNTER — PREP FOR PROCEDURE (OUTPATIENT)
Dept: NEUROSURGERY | Facility: CLINIC | Age: 77
End: 2022-05-10
Payer: MEDICARE

## 2022-05-10 DIAGNOSIS — M48.061 SPINAL STENOSIS, LUMBAR REGION, WITHOUT NEUROGENIC CLAUDICATION: ICD-10-CM

## 2022-05-10 DIAGNOSIS — M51.26 LUMBAR HERNIATED DISC: Primary | ICD-10-CM

## 2022-05-10 DIAGNOSIS — Z01.818 PRE-OP TESTING: Primary | ICD-10-CM

## 2022-05-10 NOTE — LETTER
Dear Mr. Steve,    This letter will help in preparation for your upcoming surgery. Please contact us with any additional questions you may have regarding your surgery. Contact information for your surgery scheduler:   Dr. Adams: [phone] ~ Vania Drake and Jimmie: [phone] ~ Garry    You are scheduled for: Lumbar Laminectomy  With: Israel Samuel MD  Date/Time: Thursday June 16, 2022 at 7:20 a.m. (time subject to change)  Location: [place] [address]    Check in at the Welcome Desk inside the main doors of the hospital. They will direct you to the surgery waiting area. Please arrive at 5:20 a.m. A nurse from [facility] (surgery admit unit) at the hospital will call you with your exact arrival time to the hospital - typically two hours prior to your scheduled surgery time.     In the event of an emergency surgery case, there may be an adjustment to your start time for surgery.     PREPARING FOR YOUR SURGERY    *Pre-op Physical: 06/08/2022 at 3:00 p.m., with Dr. Mcclain at [facility]. Please arrive at 2:45 p.m..     *Please discuss the necessity of receiving a pneumococcal vaccine prior to surgery at your pre-op physical. Recommended for all patients over the age of 65 or based on certain medical conditions.     *After the pre-op physical is complete, please have your clinic fax the visit note to our office at [phone].    *Pre-op Lab Work: 06/15/2022 at 10:00 a.m., at [facility]. Stop at the Welcome Desk, inside the main doors, and someone will direct you to the outpatient lab.     *Covid-19 Pre-procedure Test: 06/13/2022 at 1:00 p.m. at [facility]. [address] [place] [address]. This is a drive-thru test. The clinic is near the [facility]    *CPAP Machine: Please bring with you day of surgery.    *Readiness Visit: Dr. Samuel's nurse will call  you prior to the day of surgery to go over the results of your pre-op physical/lab results/Covid result, along with additional information you will need for the day of surgery.     *Ensure that you have completed your pre-op physical, along with any other necessary tests/appointments (listed above), prior to your Readiness Visit.                   ADDITIONAL INFORMATION REGARDING YOUR SURGERY    Medications    Bring a list ALL medications, including any over-the-counter vitamins and herbal supplements with you to the hospital on the day of surgery.    DO NOT bring your medications with you the day of surgery.    Please see attached third sheet for more details on medications/vitamins/herbal supplements that should be discontinued prior to your surgery date.     If you are unsure if you should discontinue a medication/ vitamin/herbal supplement, please call our office and discuss with a nurse.    Continue taking your medications/vitamins/herbal supplements unless they are on the attached list.     Failure to follow the instructions regarding medications/vitamins/herbal supplements will result in cancellation of your surgery.    Day BEFORE Surgery    DO NOT shave near your surgical site. This can cause irritation of the skin    Using a washcloth and provided bottle of Hibiclens, shower the night BEFORE surgery, using a half bottle of Hibiclens to wash your body, avoiding face and genitals. The morning OF surgery, shower and use the second half of the bottle to wash your body, avoiding face and genitals. If you are unable to take a shower the morning of surgery, please discuss your options with the nurse at your readiness visit.     NOTHING to eat after 11:00 p.m. the night prior to surgery.    CLEAR LIQUIDS: May have the following liquids up to two (2) hours before your arrival time at the hospital: water, plain black coffee (no cream or milk), plain black tea or plain green tea (no cream or milk), Gatorade or Propel  Water.    SMOKING: Stop smoking as far before surgery as possible, or as directed by your surgeon. NO tobacco products of any kind (cigarettes, e-cigarettes, chewing tobacco) beginning at 11:00 p.m. the night prior to your procedure.     ALCOHOL: You should stop drinking alcohol beginning at 11:00 p.m. the night prior to your procedure    Contact our office if you have symptoms of illness such as a fever of 101 or greater, chills, cough, sore throat, or if you develop a rash or any open sore    Day OF Surgery    If you ve been instructed to take a medication(s) on the morning of surgery, please take with a very small sip of water.    Wear loose & comfortable clothing and flat shoes, Leave jewelry/valuables at home. If you wear contact lenses, remove them at home and wear glasses. Remove any body piercings. Remove nail polish.     Planning for Discharge    Start planning for your care after discharge as soon as you receive this letter.    If you have not made arrangements for someone to take you home and stay with you for the first 48 hours after discharge, your surgery may be cancelled.                        PRE-OPERATIVE MEDICATION INSTRUCTIONS    Review this information with your primary care physician prior to discontinuing any of the medications listed below.  Notify your primary care physician that you have been instructed to discontinue these medications.    TEN (10) Days Prior to Surgery, STOP the Following Medications   Jyotsna-Cary  Anacin  Aspirin  Excedrin  Pepto Bismol    **Before taking ANY over-the-counter medications, check the label for Aspirin   Non-steroidal   Anti-inflammatory Medications (NSAIDS)    Celebrex  Diclofenac (Cataflam)  Etodolac (Iodine)  Fenoprofen (Nalfon)  Ibuprofen (Advil, Motrin, Nuprin)  Indomethacin (Indocin)  Ketoprofen  Ketorolac (Toradol)  Meloxicam (Mobic)  Naproxen (AnaProx, Aleve, Naprosyn)  Relafen (Nabumetone)  Sulindac (Clinoril)   Herbal Supplements (this is a  partial list of herbals to be discontinued)    Nahum Webb  Ephedra  Feverfew  Fish Oil  Flaxseed Oil  Garlic  Emily  Gingko  Ginseng  Goldenseal  Imitrex (Sumatriptan)  Kava  Krill Oil  Licorice  Multi Vitamins  Epi s Wort  Turmeric  Valerian  Vitamin E  Yohimbe   CHECK WITH YOUR PRESCRIBING DOCTOR BEFORE STOPPING ANY BLOOD THINNERS (approximately 7 days prior to surgery)  (Coumadin, Plavix, Eliquis, Xarelto, Pradaxa, Platel, Aggrenox, Effient (Prasugrel), Ticlid)      ALWAYS CHECK WITH YOUR PRESCRIBING DOCTOR REGARDING THE MEDICATIONS LISTED BELOW; RECOMMENDED STOP TIME IS ALSO LISTED      If you are taking Lovenox, discontinue 24 HOURS prior to surgery    If you are taking weight loss medication, discontinue 7 days prior to surgery    If you are taking Metformin or Simvastatin, check with your primary care physician (or whoever has prescribed you this medication) regarding when to discontinue prior to surgery

## 2022-05-10 NOTE — TELEPHONE ENCOUNTER
ORDER FROM: Dr. Samuel    PRE AUTHORIZATION: No PA required    METHOD OF PATIENT CONTACT: Spoke with patient over the phone; Best number to reach him: [phone]    PROCEDURE: Decompressive lumbar laminectomy L4-L5 and L5-S1 bilateral - left side first - plus bilateral foraminotomies plus removal of herniated disc L5-S1 left    SURGICAL DATE: 06.16.22 @ 7:20 a.m. *[facility]*    COVID TEST: 06.13.22 @ [facility]    READINESS VISIT: Please call    PCP, CLINIC, PHONE#: Dr. Moni Mcclain; [facility]; [phone]; Pre-op physical: 06.08.22 @ 3:00 p.m. Cardiac clearance: 06.03.22 w/ Dr. Brumfield at [facility]    FILM INFO: Lumbar MRI: 12.28.21 @ [facility]; Lumbar CT: 04.26.22 @ [facility]; Lumbar XR: 04.26.22 @ [facility]    SURGICAL LETTER: Mailed 05.11.22

## 2022-06-03 ENCOUNTER — OFFICE VISIT (OUTPATIENT)
Dept: CARDIOLOGY | Facility: CLINIC | Age: 77
End: 2022-06-03
Payer: MEDICARE

## 2022-06-03 VITALS
RESPIRATION RATE: 20 BRPM | HEIGHT: 70 IN | HEART RATE: 72 BPM | DIASTOLIC BLOOD PRESSURE: 62 MMHG | WEIGHT: 209.8 LBS | BODY MASS INDEX: 30.03 KG/M2 | SYSTOLIC BLOOD PRESSURE: 124 MMHG

## 2022-06-03 DIAGNOSIS — I25.10 ATHEROSCLEROSIS OF NATIVE CORONARY ARTERY OF NATIVE HEART WITHOUT ANGINA PECTORIS: Primary | ICD-10-CM

## 2022-06-03 DIAGNOSIS — I48.21 PERMANENT ATRIAL FIBRILLATION (H): ICD-10-CM

## 2022-06-03 PROCEDURE — 99214 OFFICE O/P EST MOD 30 MIN: CPT | Performed by: INTERNAL MEDICINE

## 2022-06-03 RX ORDER — ZINC GLUCONATE 50 MG
100 TABLET ORAL DAILY
Status: ON HOLD | COMMUNITY
End: 2022-06-27

## 2022-06-03 NOTE — PATIENT INSTRUCTIONS
Thomas Steve,    It was a pleasure to see you today at the [facility].     My recommendations after this visit include:    Proceed with back surgery without delay for cardiac testing    RONALDO Brumfield MD, FACC, ZENAIDA

## 2022-06-03 NOTE — PROGRESS NOTES
"    Cardiology Progress Note     Assessment:  Coronary artery disease, status post coronary artery bypass graft surgery in 2011, stable, no angina, negative stress test  Ischemic cardiomyopathy with mildly depressed LV systolic function, clinically no fluid overload  Permanent atrial fibrillation,  controlled ventricular response, on anticoagulation  History of atrial flutter  Biventricular pacemaker, normal function  Right bundle branch block  Hypercholesterolemia, suboptimal control due to poor tolerance of statins  Diabetes mellitus  Sleep apnea on CPAP  Fatigue, chronic      Plan:  He appears to be stable from cardiac standpoint to undergo low back surgery.  He does not need any additional cardiac testing before the surgery.    Xarelto can be stopped 5 days before surgery and be restarted within 2 or 3 days after surgery according to orthopedic surgeon preference.  He does not need bridging.  Routine follow-up in 6 months        Subjective:   This is 77 year old male who comes in today for follow-up visit.  He has been having worsening low back pain.  He is scheduled to undergo low back surgery in about 2 weeks from now.  He reports no new cardiac symptoms.  He denies chest pain or shortness of breath.  He has not had weight gain, PND, orthopnea.  He denies heart palpitations syncope.  He continues to feel fatigued easily.  He is hoping to become more energetic after resolution of low back pain.    Review of Systems:   Negative other than history of present illness    Objective:   /62 (BP Location: Left arm, Patient Position: Sitting, Cuff Size: Adult Large)   Pulse 72   Resp 20   Ht 1.765 m (5' 9.5\")   Wt 95.2 kg (209 lb 12.8 oz)   BMI 30.54 kg/m    Physical Exam:  GENERAL: no distress  NECK: No JVD  LUNGS: Clear to auscultation.  CARDIAC: regular rhythm, S1 & S2 normal.  No heaves, thrills, gallops or murmurs.  ABDOMEN: flat, negative hepatosplenomegaly, soft and non-tender.  EXTREMITIES: No " evidence of cyanosis, clubbing or edema.    Current Outpatient Medications   Medication Sig Dispense Refill     bumetanide (BUMEX) 1 MG tablet Take 1 mg by mouth daily Thre tablets once daily       cholecalciferol 50 MCG (2000 UT) CAPS Take 2 capsules by mouth daily       coenzyme Q-10 200 MG CAPS capsule Take 200 mg by mouth daily       diphenhydrAMINE-acetaminophen (TYLENOL PM)  MG tablet Take 3 tablets by mouth as needed       ferrous sulfate (FEROSUL) 325 (65 Fe) MG tablet Take 1 tablet by mouth 3 times daily       gabapentin (NEURONTIN) 600 MG tablet Take 600 mg by mouth 2 times daily       glimepiride (AMARYL) 4 MG tablet Take 4 mg by mouth daily       insulin glargine (LANTUS PEN) 100 UNIT/ML pen Inject 30 Units Subcutaneous daily       levothyroxine (SYNTHROID/LEVOTHROID) 25 MCG tablet Take 25 mcg by mouth daily       lisinopril (ZESTRIL) 10 MG tablet Take 1 tablet (10 mg) by mouth daily 90 tablet 0     metFORMIN (GLUCOPHAGE) 1000 MG tablet Take 1,000 mg by mouth 2 times daily (with meals)       omeprazole (PRILOSEC) 20 MG DR capsule Take 20 mg by mouth 2 times daily       rosuvastatin (CRESTOR) 10 MG tablet Take 10 mg by mouth daily       XARELTO ANTICOAGULANT 20 MG TABS tablet TAKE 1 TABLET DAILY 90 tablet 2     zinc gluconate 50 MG tablet Take 50 mg by mouth 2 times daily         Cardiographics:    Pacemaker interrogation April 2022: 99% BiV paced, permanent A. fib, brief run of 5 beats of nonsustained VT     Echo: June 2019    Normal left ventricular size and wall thickness.    Left ventricle ejection fraction is mildly decreased. The estimated left ventricular ejection fraction is 50%.    Right ventricle is mildly dilated with moderate systolic dysfunction.    Severe biatrial enlargement is present.    No hemodynamically significant valvular heart abnormalities.    When compared to the previous study dated 6/1/2016, no significant change.      Stress test: June 2016   1. Lexiscan stress nuclear  study is negative for inducible myocardial ischemia; there is a small area of severe nontransmural infarction in the distal anterior and apical segments.  2. Left ventricular ejection fraction is 48%.     May 2021     The nuclear stress test is abnormal.     Nuclear images demonstrate a large area of transmural infarction involving the mid to distal anteroseptal, anterior and apical wall.  Base of the inferior and anterior wall appear hyperdynamic.     The left ventricular ejection fraction at stress is 53% with akinesis of the mid to distal anteroseptal, anterior and apical walls..     The patient is at a low risk of future cardiac ischemic events.     A prior study was conducted on 6/10/2016.  Images appear similar     Lab Results    Chemistry/lipid CBC Cardiac Enzymes/BNP/TSH/INR   Recent Labs   Lab Test 10/19/21  1332   CHOL 129   HDL 41   LDL 58   TRIG 152*     Recent Labs   Lab Test 10/19/21  1332 09/23/20  1320 10/31/19  1200   LDL 58 72 80     Recent Labs   Lab Test 10/19/21  1332   *   POTASSIUM 4.3   CHLORIDE 96*   CO2 26   *   BUN 25   CR 1.15   GFRESTIMATED 62   BARBARA 9.1     Recent Labs   Lab Test 10/19/21  1332 09/23/20  1320 10/31/19  1200   CR 1.15 1.21 1.26     Recent Labs   Lab Test 06/12/19  0613   A1C 7.2*          Recent Labs   Lab Test 06/11/19  1003   WBC 7.5   HGB 11.0*   HCT 34.3*   MCV 94        Recent Labs   Lab Test 06/11/19  1003   HGB 11.0*    No results for input(s): TROPONINI in the last 18424 hours.  No results for input(s): BNP, NTBNPI, NTBNP in the last 94303 hours.  Recent Labs   Lab Test 10/19/21  1332   TSH 2.97     No results for input(s): INR in the last 57279 hours.

## 2022-06-03 NOTE — LETTER
"6/3/2022    Moni Mcclain MD  [facility] [address]  [facility] [address]    RE: Thomas Steve       Dear Colleague,     I had the pleasure of seeing Thomas Steve in the [facility].      Cardiology Progress Note     Assessment:  Coronary artery disease, status post coronary artery bypass graft surgery in 2011, stable, no angina, negative stress test  Ischemic cardiomyopathy with mildly depressed LV systolic function, clinically no fluid overload  Permanent atrial fibrillation,  controlled ventricular response, on anticoagulation  History of atrial flutter  Biventricular pacemaker, normal function  Right bundle branch block  Hypercholesterolemia, suboptimal control due to poor tolerance of statins  Diabetes mellitus  Sleep apnea on CPAP  Fatigue, chronic      Plan:  He appears to be stable from cardiac standpoint to undergo low back surgery.  He does not need any additional cardiac testing before the surgery.    Xarelto can be stopped 5 days before surgery and be restarted within 2 or 3 days after surgery according to orthopedic surgeon preference.  He does not need bridging.  Routine follow-up in 6 months        Subjective:   This is 77 year old male who comes in today for follow-up visit.  He has been having worsening low back pain.  He is scheduled to undergo low back surgery in about 2 weeks from now.  He reports no new cardiac symptoms.  He denies chest pain or shortness of breath.  He has not had weight gain, PND, orthopnea.  He denies heart palpitations syncope.  He continues to feel fatigued easily.  He is hoping to become more energetic after resolution of low back pain.    Review of Systems:   Negative other than history of present illness    Objective:   /62 (BP Location: Left arm, Patient Position: Sitting, Cuff Size: Adult Large)   Pulse 72   Resp 20   Ht 1.765 m (5' 9.5\")   Wt 95.2 kg (209 lb 12.8 oz)   BMI 30.54 kg/m    Physical " Exam:  GENERAL: no distress  NECK: No JVD  LUNGS: Clear to auscultation.  CARDIAC: regular rhythm, S1 & S2 normal.  No heaves, thrills, gallops or murmurs.  ABDOMEN: flat, negative hepatosplenomegaly, soft and non-tender.  EXTREMITIES: No evidence of cyanosis, clubbing or edema.    Current Outpatient Medications   Medication Sig Dispense Refill     bumetanide (BUMEX) 1 MG tablet Take 1 mg by mouth daily Thre tablets once daily       cholecalciferol 50 MCG (2000 UT) CAPS Take 2 capsules by mouth daily       coenzyme Q-10 200 MG CAPS capsule Take 200 mg by mouth daily       diphenhydrAMINE-acetaminophen (TYLENOL PM)  MG tablet Take 3 tablets by mouth as needed       ferrous sulfate (FEROSUL) 325 (65 Fe) MG tablet Take 1 tablet by mouth 3 times daily       gabapentin (NEURONTIN) 600 MG tablet Take 600 mg by mouth 2 times daily       glimepiride (AMARYL) 4 MG tablet Take 4 mg by mouth daily       insulin glargine (LANTUS PEN) 100 UNIT/ML pen Inject 30 Units Subcutaneous daily       levothyroxine (SYNTHROID/LEVOTHROID) 25 MCG tablet Take 25 mcg by mouth daily       lisinopril (ZESTRIL) 10 MG tablet Take 1 tablet (10 mg) by mouth daily 90 tablet 0     metFORMIN (GLUCOPHAGE) 1000 MG tablet Take 1,000 mg by mouth 2 times daily (with meals)       omeprazole (PRILOSEC) 20 MG DR capsule Take 20 mg by mouth 2 times daily       rosuvastatin (CRESTOR) 10 MG tablet Take 10 mg by mouth daily       XARELTO ANTICOAGULANT 20 MG TABS tablet TAKE 1 TABLET DAILY 90 tablet 2     zinc gluconate 50 MG tablet Take 50 mg by mouth 2 times daily         Cardiographics:    Pacemaker interrogation April 2022: 99% BiV paced, permanent A. fib, brief run of 5 beats of nonsustained VT     Echo: June 2019    Normal left ventricular size and wall thickness.    Left ventricle ejection fraction is mildly decreased. The estimated left ventricular ejection fraction is 50%.    Right ventricle is mildly dilated with moderate systolic  dysfunction.    Severe biatrial enlargement is present.    No hemodynamically significant valvular heart abnormalities.    When compared to the previous study dated 6/1/2016, no significant change.      Stress test: June 2016   1. Lexiscan stress nuclear study is negative for inducible myocardial ischemia; there is a small area of severe nontransmural infarction in the distal anterior and apical segments.  2. Left ventricular ejection fraction is 48%.     May 2021     The nuclear stress test is abnormal.     Nuclear images demonstrate a large area of transmural infarction involving the mid to distal anteroseptal, anterior and apical wall.  Base of the inferior and anterior wall appear hyperdynamic.     The left ventricular ejection fraction at stress is 53% with akinesis of the mid to distal anteroseptal, anterior and apical walls..     The patient is at a low risk of future cardiac ischemic events.     A prior study was conducted on 6/10/2016.  Images appear similar     Lab Results    Chemistry/lipid CBC Cardiac Enzymes/BNP/TSH/INR   Recent Labs   Lab Test 10/19/21  1332   CHOL 129   HDL 41   LDL 58   TRIG 152*     Recent Labs   Lab Test 10/19/21  1332 09/23/20  1320 10/31/19  1200   LDL 58 72 80     Recent Labs   Lab Test 10/19/21  1332   *   POTASSIUM 4.3   CHLORIDE 96*   CO2 26   *   BUN 25   CR 1.15   GFRESTIMATED 62   BARBARA 9.1     Recent Labs   Lab Test 10/19/21  1332 09/23/20  1320 10/31/19  1200   CR 1.15 1.21 1.26     Recent Labs   Lab Test 06/12/19  0613   A1C 7.2*          Recent Labs   Lab Test 06/11/19  1003   WBC 7.5   HGB 11.0*   HCT 34.3*   MCV 94        Recent Labs   Lab Test 06/11/19  1003   HGB 11.0*    No results for input(s): TROPONINI in the last 18097 hours.  No results for input(s): BNP, NTBNPI, NTBNP in the last 00986 hours.  Recent Labs   Lab Test 10/19/21  1332   TSH 2.97     No results for input(s): INR in the last 72855 hours.         Thank you for allowing me to  participate in the care of your patient.    Sincerely,   Steven Brumfield MD   [facility]

## 2022-06-08 ENCOUNTER — LAB REQUISITION (OUTPATIENT)
Dept: LAB | Facility: CLINIC | Age: 77
End: 2022-06-08
Payer: MEDICARE

## 2022-06-08 DIAGNOSIS — E03.9 HYPOTHYROIDISM, UNSPECIFIED: ICD-10-CM

## 2022-06-08 DIAGNOSIS — E78.5 HYPERLIPIDEMIA, UNSPECIFIED: ICD-10-CM

## 2022-06-08 DIAGNOSIS — E11.40 TYPE 2 DIABETES MELLITUS WITH DIABETIC NEUROPATHY, UNSPECIFIED (H): ICD-10-CM

## 2022-06-08 DIAGNOSIS — I48.19 OTHER PERSISTENT ATRIAL FIBRILLATION (H): ICD-10-CM

## 2022-06-08 LAB
ALBUMIN SERPL-MCNC: 3.8 G/DL (ref 3.5–5)
ALP SERPL-CCNC: 136 U/L (ref 45–120)
ALT SERPL W P-5'-P-CCNC: 22 U/L (ref 0–45)
ANION GAP SERPL CALCULATED.3IONS-SCNC: 12 MMOL/L (ref 5–18)
AST SERPL W P-5'-P-CCNC: 25 U/L (ref 0–40)
BILIRUB SERPL-MCNC: 0.4 MG/DL (ref 0–1)
BUN SERPL-MCNC: 20 MG/DL (ref 8–28)
CALCIUM SERPL-MCNC: 9.1 MG/DL (ref 8.5–10.5)
CHLORIDE BLD-SCNC: 100 MMOL/L (ref 98–107)
CHOLEST SERPL-MCNC: 118 MG/DL
CO2 SERPL-SCNC: 26 MMOL/L (ref 22–31)
CREAT SERPL-MCNC: 1.11 MG/DL (ref 0.7–1.3)
GFR SERPL CREATININE-BSD FRML MDRD: 69 ML/MIN/1.73M2
GLUCOSE BLD-MCNC: 266 MG/DL (ref 70–125)
HDLC SERPL-MCNC: 36 MG/DL
LDLC SERPL CALC-MCNC: 42 MG/DL
POTASSIUM BLD-SCNC: 4 MMOL/L (ref 3.5–5)
PROT SERPL-MCNC: 6.7 G/DL (ref 6–8)
SODIUM SERPL-SCNC: 138 MMOL/L (ref 136–145)
TRIGL SERPL-MCNC: 202 MG/DL
TSH SERPL DL<=0.005 MIU/L-ACNC: 2.05 UIU/ML (ref 0.3–5)

## 2022-06-08 PROCEDURE — 80061 LIPID PANEL: CPT | Mod: ORL | Performed by: FAMILY MEDICINE

## 2022-06-08 PROCEDURE — 84443 ASSAY THYROID STIM HORMONE: CPT | Mod: ORL | Performed by: FAMILY MEDICINE

## 2022-06-08 PROCEDURE — 80053 COMPREHEN METABOLIC PANEL: CPT | Mod: ORL | Performed by: FAMILY MEDICINE

## 2022-06-14 ENCOUNTER — MEDICAL CORRESPONDENCE (OUTPATIENT)
Dept: NEUROSURGERY | Facility: CLINIC | Age: 77
End: 2022-06-14
Payer: MEDICARE

## 2022-06-15 ENCOUNTER — ANESTHESIA EVENT (OUTPATIENT)
Dept: SURGERY | Facility: HOSPITAL | Age: 77
DRG: 518 | End: 2022-06-15
Payer: MEDICARE

## 2022-06-15 ENCOUNTER — TELEPHONE (OUTPATIENT)
Dept: NEUROSURGERY | Facility: CLINIC | Age: 77
End: 2022-06-15

## 2022-06-15 ENCOUNTER — LAB (OUTPATIENT)
Dept: LAB | Facility: CLINIC | Age: 77
End: 2022-06-15
Payer: MEDICARE

## 2022-06-15 DIAGNOSIS — Z01.818 PRE-OP TESTING: ICD-10-CM

## 2022-06-15 LAB
ANION GAP SERPL CALCULATED.3IONS-SCNC: 13 MMOL/L (ref 5–18)
APTT PPP: 30 SECONDS (ref 22–38)
BUN SERPL-MCNC: 26 MG/DL (ref 8–28)
CALCIUM SERPL-MCNC: 9.8 MG/DL (ref 8.5–10.5)
CHLORIDE BLD-SCNC: 101 MMOL/L (ref 98–107)
CLOSURE TME COLL+EPINEP BLD: 116 SECONDS
CO2 SERPL-SCNC: 26 MMOL/L (ref 22–31)
CREAT SERPL-MCNC: 1.15 MG/DL (ref 0.7–1.3)
ERYTHROCYTE [DISTWIDTH] IN BLOOD BY AUTOMATED COUNT: 14.6 % (ref 10–15)
GFR SERPL CREATININE-BSD FRML MDRD: 66 ML/MIN/1.73M2
GLUCOSE BLD-MCNC: 146 MG/DL (ref 70–125)
HCT VFR BLD AUTO: 36.7 % (ref 40–53)
HGB BLD-MCNC: 11.4 G/DL (ref 13.3–17.7)
INR PPP: 1.04 (ref 0.85–1.15)
MCH RBC QN AUTO: 27.9 PG (ref 26.5–33)
MCHC RBC AUTO-ENTMCNC: 31.1 G/DL (ref 31.5–36.5)
MCV RBC AUTO: 90 FL (ref 78–100)
PLATELET # BLD AUTO: 203 10E3/UL (ref 150–450)
POTASSIUM BLD-SCNC: 4.5 MMOL/L (ref 3.5–5)
RBC # BLD AUTO: 4.09 10E6/UL (ref 4.4–5.9)
SODIUM SERPL-SCNC: 140 MMOL/L (ref 136–145)
WBC # BLD AUTO: 8.3 10E3/UL (ref 4–11)

## 2022-06-15 PROCEDURE — 85027 COMPLETE CBC AUTOMATED: CPT

## 2022-06-15 PROCEDURE — 85610 PROTHROMBIN TIME: CPT | Mod: GA

## 2022-06-15 PROCEDURE — 36415 COLL VENOUS BLD VENIPUNCTURE: CPT

## 2022-06-15 PROCEDURE — 85730 THROMBOPLASTIN TIME PARTIAL: CPT | Mod: GA

## 2022-06-15 PROCEDURE — 85576 BLOOD PLATELET AGGREGATION: CPT

## 2022-06-15 PROCEDURE — 82310 ASSAY OF CALCIUM: CPT

## 2022-06-15 RX ORDER — ACETAMINOPHEN 500 MG
TABLET ORAL EVERY 6 HOURS PRN
COMMUNITY

## 2022-06-15 NOTE — TELEPHONE ENCOUNTER
Called patient, discussed surgery, post-op course, expectations, follow up plan.    Reviewed H&P from 06/08/2022 - cleared for surgery  Labs - pending    MRI done on 12/28/2021 - in [place]    To OR as planned.     Check in - 0520    Nothing to eat or drink after midnight the night before surgery.     Reviewed with patient: Arrive 2 hours prior to scheduled surgery.    Bring all pertinent films to hospital the day of surgery.     Continue to refrain from NSAIDS (Ibuprofen, Aleve, Naprosyn), ASA, Over the counter herbal medications or supplements, anti-coagulants and blood thinners.     Patient confirmed they have help/assistance in place at home upon discharge    Instructions: using a washcloth and a bottle of provided Hibiclens, wash your body, avoiding your face and genitals. Preferably, shower the night before surgery and the morning of surgery using a half a bottle each time for your whole body shower.        Patient was informed that we will provide up to 1 week prescription of pain medication for post-operative pain.      Instructed patient about the following: After your surgery, if you will be staying in-patient, a nursing provider team will be monitoring you closely throughout your stay and communicate your health status to your surgeon and other providers.  You will be seen by Advanced Practice Providers (e.g., nurse practitioners, clinic nurse specialist, and physician assistants) who will check on you regularly to assess the status of your surgery.     Jaja Aaron RN, CNRN

## 2022-06-16 ENCOUNTER — ANESTHESIA (OUTPATIENT)
Dept: SURGERY | Facility: HOSPITAL | Age: 77
DRG: 518 | End: 2022-06-16
Payer: MEDICARE

## 2022-06-16 ENCOUNTER — APPOINTMENT (OUTPATIENT)
Dept: RADIOLOGY | Facility: HOSPITAL | Age: 77
DRG: 518 | End: 2022-06-16
Attending: SURGERY
Payer: MEDICARE

## 2022-06-16 ENCOUNTER — HOSPITAL ENCOUNTER (INPATIENT)
Facility: HOSPITAL | Age: 77
LOS: 20 days | Discharge: NURSING, REHAB OR LONG-TERM CARE | DRG: 518 | End: 2022-07-06
Attending: SURGERY | Admitting: SURGERY
Payer: MEDICARE

## 2022-06-16 ENCOUNTER — APPOINTMENT (OUTPATIENT)
Dept: RADIOLOGY | Facility: HOSPITAL | Age: 77
DRG: 518 | End: 2022-06-16
Attending: NURSE PRACTITIONER
Payer: MEDICARE

## 2022-06-16 DIAGNOSIS — G89.29 CHRONIC LEFT SHOULDER PAIN: ICD-10-CM

## 2022-06-16 DIAGNOSIS — M25.512 CHRONIC LEFT SHOULDER PAIN: ICD-10-CM

## 2022-06-16 DIAGNOSIS — I50.21 ACUTE SYSTOLIC HEART FAILURE (H): Primary | ICD-10-CM

## 2022-06-16 DIAGNOSIS — I50.33 ACUTE ON CHRONIC DIASTOLIC CONGESTIVE HEART FAILURE (H): ICD-10-CM

## 2022-06-16 DIAGNOSIS — Z79.4 TYPE 2 DIABETES MELLITUS WITH HYPERGLYCEMIA, WITH LONG-TERM CURRENT USE OF INSULIN (H): ICD-10-CM

## 2022-06-16 DIAGNOSIS — M48.062 SPINAL STENOSIS OF LUMBAR REGION WITH NEUROGENIC CLAUDICATION: ICD-10-CM

## 2022-06-16 DIAGNOSIS — E11.65 TYPE 2 DIABETES MELLITUS WITH HYPERGLYCEMIA, WITH LONG-TERM CURRENT USE OF INSULIN (H): ICD-10-CM

## 2022-06-16 DIAGNOSIS — M48.062 LUMBAR STENOSIS WITH NEUROGENIC CLAUDICATION: ICD-10-CM

## 2022-06-16 DIAGNOSIS — I82.411 ACUTE DEEP VEIN THROMBOSIS (DVT) OF FEMORAL VEIN OF RIGHT LOWER EXTREMITY (H): ICD-10-CM

## 2022-06-16 DIAGNOSIS — M48.061 SPINAL STENOSIS OF LUMBAR REGION, UNSPECIFIED WHETHER NEUROGENIC CLAUDICATION PRESENT: ICD-10-CM

## 2022-06-16 DIAGNOSIS — T81.41XD INFECTION OF SUPERFICIAL INCISIONAL SURGICAL SITE AFTER PROCEDURE, SUBSEQUENT ENCOUNTER: ICD-10-CM

## 2022-06-16 DIAGNOSIS — E56.9 VITAMIN DEFICIENCY: ICD-10-CM

## 2022-06-16 PROBLEM — E03.9 HYPOTHYROIDISM: Status: ACTIVE | Noted: 2022-06-16

## 2022-06-16 PROBLEM — D64.9 NORMOCYTIC ANEMIA: Status: ACTIVE | Noted: 2022-06-16

## 2022-06-16 PROBLEM — R09.81 NASAL CONGESTION: Status: ACTIVE | Noted: 2022-06-16

## 2022-06-16 PROBLEM — K21.9 GASTROESOPHAGEAL REFLUX DISEASE WITHOUT ESOPHAGITIS: Status: ACTIVE | Noted: 2022-06-16

## 2022-06-16 LAB
GLUCOSE BLDC GLUCOMTR-MCNC: 120 MG/DL (ref 70–99)
GLUCOSE BLDC GLUCOMTR-MCNC: 168 MG/DL (ref 70–99)
GLUCOSE BLDC GLUCOMTR-MCNC: 172 MG/DL (ref 70–99)

## 2022-06-16 PROCEDURE — 250N000011 HC RX IP 250 OP 636: Performed by: NURSE ANESTHETIST, CERTIFIED REGISTERED

## 2022-06-16 PROCEDURE — 250N000013 HC RX MED GY IP 250 OP 250 PS 637: Performed by: SURGERY

## 2022-06-16 PROCEDURE — 250N000013 HC RX MED GY IP 250 OP 250 PS 637: Performed by: ANESTHESIOLOGY

## 2022-06-16 PROCEDURE — 99223 1ST HOSP IP/OBS HIGH 75: CPT | Performed by: FAMILY MEDICINE

## 2022-06-16 PROCEDURE — 63048 LAM FACETEC &FORAMOT EA ADDL: CPT | Mod: AS | Performed by: NURSE PRACTITIONER

## 2022-06-16 PROCEDURE — 272N000004 HC RX 272: Performed by: SURGERY

## 2022-06-16 PROCEDURE — 250N000009 HC RX 250: Performed by: SURGERY

## 2022-06-16 PROCEDURE — 250N000013 HC RX MED GY IP 250 OP 250 PS 637: Performed by: NURSE PRACTITIONER

## 2022-06-16 PROCEDURE — 258N000003 HC RX IP 258 OP 636: Performed by: SURGERY

## 2022-06-16 PROCEDURE — 999N000063 XR CROSSTABLE LATERAL LUMBAR SPINE PORTABLE

## 2022-06-16 PROCEDURE — 250N000011 HC RX IP 250 OP 636: Performed by: SURGERY

## 2022-06-16 PROCEDURE — 63047 LAM FACETEC & FORAMOT LUMBAR: CPT | Performed by: SURGERY

## 2022-06-16 PROCEDURE — 250N000009 HC RX 250: Performed by: NURSE ANESTHETIST, CERTIFIED REGISTERED

## 2022-06-16 PROCEDURE — 370N000017 HC ANESTHESIA TECHNICAL FEE, PER MIN: Performed by: SURGERY

## 2022-06-16 PROCEDURE — 250N000025 HC SEVOFLURANE, PER MIN: Performed by: SURGERY

## 2022-06-16 PROCEDURE — 00UT0KZ SUPPLEMENT SPINAL MENINGES WITH NONAUTOLOGOUS TISSUE SUBSTITUTE, OPEN APPROACH: ICD-10-PCS | Performed by: SURGERY

## 2022-06-16 PROCEDURE — 82962 GLUCOSE BLOOD TEST: CPT

## 2022-06-16 PROCEDURE — 999N000141 HC STATISTIC PRE-PROCEDURE NURSING ASSESSMENT: Performed by: SURGERY

## 2022-06-16 PROCEDURE — 272N000001 HC OR GENERAL SUPPLY STERILE: Performed by: SURGERY

## 2022-06-16 PROCEDURE — 258N000003 HC RX IP 258 OP 636: Performed by: ANESTHESIOLOGY

## 2022-06-16 PROCEDURE — 250N000011 HC RX IP 250 OP 636: Performed by: NURSE PRACTITIONER

## 2022-06-16 PROCEDURE — 0SB40ZZ EXCISION OF LUMBOSACRAL DISC, OPEN APPROACH: ICD-10-PCS | Performed by: SURGERY

## 2022-06-16 PROCEDURE — 258N000003 HC RX IP 258 OP 636: Performed by: NURSE ANESTHETIST, CERTIFIED REGISTERED

## 2022-06-16 PROCEDURE — 120N000001 HC R&B MED SURG/OB

## 2022-06-16 PROCEDURE — 63048 LAM FACETEC &FORAMOT EA ADDL: CPT | Performed by: SURGERY

## 2022-06-16 PROCEDURE — 710N000009 HC RECOVERY PHASE 1, LEVEL 1, PER MIN: Performed by: SURGERY

## 2022-06-16 PROCEDURE — 250N000013 HC RX MED GY IP 250 OP 250 PS 637: Performed by: FAMILY MEDICINE

## 2022-06-16 PROCEDURE — 01NB0ZZ RELEASE LUMBAR NERVE, OPEN APPROACH: ICD-10-PCS | Performed by: SURGERY

## 2022-06-16 PROCEDURE — 63047 LAM FACETEC & FORAMOT LUMBAR: CPT | Mod: AS | Performed by: NURSE PRACTITIONER

## 2022-06-16 PROCEDURE — 69990 MICROSURGERY ADD-ON: CPT | Mod: 59 | Performed by: SURGERY

## 2022-06-16 PROCEDURE — 250N000012 HC RX MED GY IP 250 OP 636 PS 637: Performed by: FAMILY MEDICINE

## 2022-06-16 PROCEDURE — 360N000077 HC SURGERY LEVEL 4, PER MIN: Performed by: SURGERY

## 2022-06-16 PROCEDURE — 250N000009 HC RX 250: Performed by: FAMILY MEDICINE

## 2022-06-16 PROCEDURE — 01NR0ZZ RELEASE SACRAL NERVE, OPEN APPROACH: ICD-10-PCS | Performed by: SURGERY

## 2022-06-16 PROCEDURE — C1763 CONN TISS, NON-HUMAN: HCPCS | Performed by: SURGERY

## 2022-06-16 PROCEDURE — 999N000157 HC STATISTIC RCP TIME EA 10 MIN

## 2022-06-16 DEVICE — ALLOGRAFT DURAGEN PLUS 2 X 2 DP5022: Type: IMPLANTABLE DEVICE | Site: SPINE LUMBAR | Status: FUNCTIONAL

## 2022-06-16 RX ORDER — GINSENG 100 MG
CAPSULE ORAL PRN
Status: DISCONTINUED | OUTPATIENT
Start: 2022-06-16 | End: 2022-06-16 | Stop reason: HOSPADM

## 2022-06-16 RX ORDER — LORATADINE 10 MG/1
10 TABLET ORAL DAILY PRN
Status: DISCONTINUED | OUTPATIENT
Start: 2022-06-16 | End: 2022-07-06 | Stop reason: HOSPADM

## 2022-06-16 RX ORDER — ACETAMINOPHEN 325 MG/1
975 TABLET ORAL ONCE
Status: DISCONTINUED | OUTPATIENT
Start: 2022-06-16 | End: 2022-06-16

## 2022-06-16 RX ORDER — PROPOFOL 10 MG/ML
INJECTION, EMULSION INTRAVENOUS PRN
Status: DISCONTINUED | OUTPATIENT
Start: 2022-06-16 | End: 2022-06-16

## 2022-06-16 RX ORDER — FENTANYL CITRATE 50 UG/ML
25 INJECTION, SOLUTION INTRAMUSCULAR; INTRAVENOUS
Status: DISCONTINUED | OUTPATIENT
Start: 2022-06-16 | End: 2022-06-16 | Stop reason: HOSPADM

## 2022-06-16 RX ORDER — LISINOPRIL 5 MG/1
10 TABLET ORAL DAILY
Status: DISCONTINUED | OUTPATIENT
Start: 2022-06-16 | End: 2022-06-29

## 2022-06-16 RX ORDER — FENTANYL CITRATE 50 UG/ML
INJECTION, SOLUTION INTRAMUSCULAR; INTRAVENOUS PRN
Status: DISCONTINUED | OUTPATIENT
Start: 2022-06-16 | End: 2022-06-16

## 2022-06-16 RX ORDER — HYDROMORPHONE HCL IN WATER/PF 6 MG/30 ML
0.2 PATIENT CONTROLLED ANALGESIA SYRINGE INTRAVENOUS
Status: DISCONTINUED | OUTPATIENT
Start: 2022-06-16 | End: 2022-07-06 | Stop reason: HOSPADM

## 2022-06-16 RX ORDER — SODIUM CHLORIDE, SODIUM LACTATE, POTASSIUM CHLORIDE, CALCIUM CHLORIDE 600; 310; 30; 20 MG/100ML; MG/100ML; MG/100ML; MG/100ML
INJECTION, SOLUTION INTRAVENOUS CONTINUOUS
Status: DISCONTINUED | OUTPATIENT
Start: 2022-06-16 | End: 2022-06-16 | Stop reason: HOSPADM

## 2022-06-16 RX ORDER — CEFAZOLIN SODIUM 1 G/3ML
INJECTION, POWDER, FOR SOLUTION INTRAMUSCULAR; INTRAVENOUS
Status: DISCONTINUED
Start: 2022-06-16 | End: 2022-06-16 | Stop reason: HOSPADM

## 2022-06-16 RX ORDER — AMOXICILLIN 500 MG
1200 CAPSULE ORAL DAILY
COMMUNITY

## 2022-06-16 RX ORDER — ONDANSETRON 4 MG/1
4 TABLET, ORALLY DISINTEGRATING ORAL EVERY 30 MIN PRN
Status: DISCONTINUED | OUTPATIENT
Start: 2022-06-16 | End: 2022-06-16 | Stop reason: HOSPADM

## 2022-06-16 RX ORDER — ROSUVASTATIN CALCIUM 10 MG/1
10 TABLET, COATED ORAL AT BEDTIME
Status: DISCONTINUED | OUTPATIENT
Start: 2022-06-16 | End: 2022-07-06 | Stop reason: HOSPADM

## 2022-06-16 RX ORDER — ONDANSETRON 4 MG/1
4 TABLET, ORALLY DISINTEGRATING ORAL EVERY 6 HOURS PRN
Status: DISCONTINUED | OUTPATIENT
Start: 2022-06-16 | End: 2022-07-06 | Stop reason: HOSPADM

## 2022-06-16 RX ORDER — LIDOCAINE 50 MG/G
OINTMENT TOPICAL 2 TIMES DAILY
Status: DISCONTINUED | OUTPATIENT
Start: 2022-06-16 | End: 2022-07-06 | Stop reason: HOSPADM

## 2022-06-16 RX ORDER — NALOXONE HYDROCHLORIDE 0.4 MG/ML
0.4 INJECTION, SOLUTION INTRAMUSCULAR; INTRAVENOUS; SUBCUTANEOUS
Status: DISCONTINUED | OUTPATIENT
Start: 2022-06-16 | End: 2022-06-28

## 2022-06-16 RX ORDER — DEXTROSE MONOHYDRATE 25 G/50ML
25-50 INJECTION, SOLUTION INTRAVENOUS
Status: DISCONTINUED | OUTPATIENT
Start: 2022-06-16 | End: 2022-07-06 | Stop reason: HOSPADM

## 2022-06-16 RX ORDER — ACETAMINOPHEN 325 MG/1
650 TABLET ORAL EVERY 4 HOURS PRN
Status: DISCONTINUED | OUTPATIENT
Start: 2022-06-19 | End: 2022-07-06 | Stop reason: HOSPADM

## 2022-06-16 RX ORDER — NALOXONE HYDROCHLORIDE 0.4 MG/ML
0.2 INJECTION, SOLUTION INTRAMUSCULAR; INTRAVENOUS; SUBCUTANEOUS
Status: DISCONTINUED | OUTPATIENT
Start: 2022-06-16 | End: 2022-06-28

## 2022-06-16 RX ORDER — IPRATROPIUM BROMIDE 21 UG/1
2 SPRAY, METERED NASAL 3 TIMES DAILY
Status: DISCONTINUED | OUTPATIENT
Start: 2022-06-16 | End: 2022-07-06 | Stop reason: HOSPADM

## 2022-06-16 RX ORDER — HYDROMORPHONE HCL IN WATER/PF 6 MG/30 ML
0.4 PATIENT CONTROLLED ANALGESIA SYRINGE INTRAVENOUS
Status: DISCONTINUED | OUTPATIENT
Start: 2022-06-16 | End: 2022-07-06 | Stop reason: HOSPADM

## 2022-06-16 RX ORDER — ONDANSETRON 2 MG/ML
4 INJECTION INTRAMUSCULAR; INTRAVENOUS EVERY 6 HOURS PRN
Status: DISCONTINUED | OUTPATIENT
Start: 2022-06-16 | End: 2022-07-06 | Stop reason: HOSPADM

## 2022-06-16 RX ORDER — LIDOCAINE 40 MG/G
CREAM TOPICAL
Status: DISCONTINUED | OUTPATIENT
Start: 2022-06-16 | End: 2022-06-16 | Stop reason: HOSPADM

## 2022-06-16 RX ORDER — MULTIVITAMIN,THERAPEUTIC
1 TABLET ORAL DAILY
Status: DISCONTINUED | OUTPATIENT
Start: 2022-06-17 | End: 2022-07-06 | Stop reason: HOSPADM

## 2022-06-16 RX ORDER — VITAMIN B COMPLEX
100 TABLET ORAL DAILY
Status: DISCONTINUED | OUTPATIENT
Start: 2022-06-16 | End: 2022-07-06 | Stop reason: HOSPADM

## 2022-06-16 RX ORDER — GLIMEPIRIDE 1 MG/1
4 TABLET ORAL DAILY
Status: DISCONTINUED | OUTPATIENT
Start: 2022-06-17 | End: 2022-06-26

## 2022-06-16 RX ORDER — FERROUS SULFATE 325(65) MG
325 TABLET ORAL
Status: DISCONTINUED | OUTPATIENT
Start: 2022-06-16 | End: 2022-07-06 | Stop reason: HOSPADM

## 2022-06-16 RX ORDER — POLYETHYLENE GLYCOL 3350 17 G/17G
17 POWDER, FOR SOLUTION ORAL DAILY
Status: DISCONTINUED | OUTPATIENT
Start: 2022-06-17 | End: 2022-06-22

## 2022-06-16 RX ORDER — FAMOTIDINE 20 MG/1
20 TABLET, FILM COATED ORAL ONCE
Status: COMPLETED | OUTPATIENT
Start: 2022-06-16 | End: 2022-06-16

## 2022-06-16 RX ORDER — FENTANYL CITRATE 50 UG/ML
25 INJECTION, SOLUTION INTRAMUSCULAR; INTRAVENOUS EVERY 5 MIN PRN
Status: DISCONTINUED | OUTPATIENT
Start: 2022-06-16 | End: 2022-06-16 | Stop reason: HOSPADM

## 2022-06-16 RX ORDER — HYDRALAZINE HYDROCHLORIDE 25 MG/1
25 TABLET, FILM COATED ORAL EVERY 6 HOURS PRN
Status: DISCONTINUED | OUTPATIENT
Start: 2022-06-16 | End: 2022-07-06 | Stop reason: HOSPADM

## 2022-06-16 RX ORDER — NICOTINE POLACRILEX 4 MG
15-30 LOZENGE BUCCAL
Status: DISCONTINUED | OUTPATIENT
Start: 2022-06-16 | End: 2022-07-06 | Stop reason: HOSPADM

## 2022-06-16 RX ORDER — ACETAMINOPHEN 325 MG/1
975 TABLET ORAL EVERY 8 HOURS
Status: COMPLETED | OUTPATIENT
Start: 2022-06-16 | End: 2022-06-19

## 2022-06-16 RX ORDER — AMOXICILLIN 250 MG
1 CAPSULE ORAL 2 TIMES DAILY
Status: DISCONTINUED | OUTPATIENT
Start: 2022-06-16 | End: 2022-06-20

## 2022-06-16 RX ORDER — CEFAZOLIN SODIUM/WATER 2 G/20 ML
2 SYRINGE (ML) INTRAVENOUS
Status: DISCONTINUED | OUTPATIENT
Start: 2022-06-16 | End: 2022-06-16 | Stop reason: HOSPADM

## 2022-06-16 RX ORDER — ASCORBIC ACID 500 MG
500 TABLET ORAL
COMMUNITY

## 2022-06-16 RX ORDER — HYDROMORPHONE HYDROCHLORIDE 1 MG/ML
0.2 INJECTION, SOLUTION INTRAMUSCULAR; INTRAVENOUS; SUBCUTANEOUS EVERY 5 MIN PRN
Status: DISCONTINUED | OUTPATIENT
Start: 2022-06-16 | End: 2022-06-16 | Stop reason: HOSPADM

## 2022-06-16 RX ORDER — MEPERIDINE HYDROCHLORIDE 25 MG/ML
12.5 INJECTION INTRAMUSCULAR; INTRAVENOUS; SUBCUTANEOUS
Status: DISCONTINUED | OUTPATIENT
Start: 2022-06-16 | End: 2022-06-16 | Stop reason: HOSPADM

## 2022-06-16 RX ORDER — ONDANSETRON 2 MG/ML
INJECTION INTRAMUSCULAR; INTRAVENOUS PRN
Status: DISCONTINUED | OUTPATIENT
Start: 2022-06-16 | End: 2022-06-16

## 2022-06-16 RX ORDER — ACETAMINOPHEN 325 MG/1
975 TABLET ORAL ONCE
Status: COMPLETED | OUTPATIENT
Start: 2022-06-16 | End: 2022-06-16

## 2022-06-16 RX ORDER — LIDOCAINE HYDROCHLORIDE 10 MG/ML
INJECTION, SOLUTION INFILTRATION; PERINEURAL PRN
Status: DISCONTINUED | OUTPATIENT
Start: 2022-06-16 | End: 2022-06-16

## 2022-06-16 RX ORDER — OXYCODONE HYDROCHLORIDE 5 MG/1
5 TABLET ORAL EVERY 4 HOURS PRN
Status: DISCONTINUED | OUTPATIENT
Start: 2022-06-16 | End: 2022-06-25

## 2022-06-16 RX ORDER — MULTIVIT WITH MINERALS/LUTEIN
500 TABLET ORAL 3 TIMES DAILY
Status: DISCONTINUED | OUTPATIENT
Start: 2022-06-16 | End: 2022-07-06 | Stop reason: HOSPADM

## 2022-06-16 RX ORDER — BISACODYL 10 MG
10 SUPPOSITORY, RECTAL RECTAL DAILY PRN
Status: DISCONTINUED | OUTPATIENT
Start: 2022-06-16 | End: 2022-07-06 | Stop reason: HOSPADM

## 2022-06-16 RX ORDER — LEVOTHYROXINE SODIUM 25 UG/1
25 TABLET ORAL DAILY
Status: DISCONTINUED | OUTPATIENT
Start: 2022-06-17 | End: 2022-07-06 | Stop reason: HOSPADM

## 2022-06-16 RX ORDER — DEXAMETHASONE SODIUM PHOSPHATE 4 MG/ML
INJECTION, SOLUTION INTRA-ARTICULAR; INTRALESIONAL; INTRAMUSCULAR; INTRAVENOUS; SOFT TISSUE PRN
Status: DISCONTINUED | OUTPATIENT
Start: 2022-06-16 | End: 2022-06-16

## 2022-06-16 RX ORDER — ONDANSETRON 2 MG/ML
4 INJECTION INTRAMUSCULAR; INTRAVENOUS EVERY 30 MIN PRN
Status: DISCONTINUED | OUTPATIENT
Start: 2022-06-16 | End: 2022-06-16 | Stop reason: HOSPADM

## 2022-06-16 RX ORDER — PANTOPRAZOLE SODIUM 40 MG/1
40 TABLET, DELAYED RELEASE ORAL
Status: DISCONTINUED | OUTPATIENT
Start: 2022-06-16 | End: 2022-07-06 | Stop reason: HOSPADM

## 2022-06-16 RX ORDER — OXYCODONE HYDROCHLORIDE 5 MG/1
10 TABLET ORAL EVERY 4 HOURS PRN
Status: DISCONTINUED | OUTPATIENT
Start: 2022-06-16 | End: 2022-06-25

## 2022-06-16 RX ORDER — CEFAZOLIN SODIUM/WATER 2 G/20 ML
2 SYRINGE (ML) INTRAVENOUS SEE ADMIN INSTRUCTIONS
Status: DISCONTINUED | OUTPATIENT
Start: 2022-06-16 | End: 2022-06-16 | Stop reason: HOSPADM

## 2022-06-16 RX ORDER — SODIUM CHLORIDE 9 MG/ML
INJECTION, SOLUTION INTRAVENOUS CONTINUOUS
Status: DISCONTINUED | OUTPATIENT
Start: 2022-06-16 | End: 2022-06-16

## 2022-06-16 RX ORDER — OXYCODONE HYDROCHLORIDE 5 MG/1
5 TABLET ORAL EVERY 4 HOURS PRN
Status: DISCONTINUED | OUTPATIENT
Start: 2022-06-16 | End: 2022-06-16 | Stop reason: HOSPADM

## 2022-06-16 RX ORDER — PROCHLORPERAZINE MALEATE 5 MG
5 TABLET ORAL EVERY 6 HOURS PRN
Status: DISCONTINUED | OUTPATIENT
Start: 2022-06-16 | End: 2022-07-06 | Stop reason: HOSPADM

## 2022-06-16 RX ORDER — GABAPENTIN 300 MG/1
1200 CAPSULE ORAL 2 TIMES DAILY
Status: DISCONTINUED | OUTPATIENT
Start: 2022-06-16 | End: 2022-06-24

## 2022-06-16 RX ORDER — SODIUM CHLORIDE, SODIUM LACTATE, POTASSIUM CHLORIDE, CALCIUM CHLORIDE 600; 310; 30; 20 MG/100ML; MG/100ML; MG/100ML; MG/100ML
INJECTION, SOLUTION INTRAVENOUS CONTINUOUS PRN
Status: DISCONTINUED | OUTPATIENT
Start: 2022-06-16 | End: 2022-06-16

## 2022-06-16 RX ADMIN — INSULIN GLARGINE 30 UNITS: 100 INJECTION, SOLUTION SUBCUTANEOUS at 22:23

## 2022-06-16 RX ADMIN — FERROUS SULFATE TAB 325 MG (65 MG ELEMENTAL FE) 325 MG: 325 (65 FE) TAB at 19:32

## 2022-06-16 RX ADMIN — ACETAMINOPHEN 975 MG: 325 TABLET, FILM COATED ORAL at 19:32

## 2022-06-16 RX ADMIN — MIDAZOLAM 1 MG: 1 INJECTION INTRAMUSCULAR; INTRAVENOUS at 07:56

## 2022-06-16 RX ADMIN — FENTANYL CITRATE 50 MCG: 50 INJECTION, SOLUTION INTRAMUSCULAR; INTRAVENOUS at 09:47

## 2022-06-16 RX ADMIN — LIDOCAINE: 50 OINTMENT TOPICAL at 22:17

## 2022-06-16 RX ADMIN — SENNOSIDES AND DOCUSATE SODIUM 1 TABLET: 8.6; 5 TABLET ORAL at 22:12

## 2022-06-16 RX ADMIN — MIDAZOLAM 1 MG: 1 INJECTION INTRAMUSCULAR; INTRAVENOUS at 07:46

## 2022-06-16 RX ADMIN — HYDROMORPHONE HYDROCHLORIDE 1 MG: 1 INJECTION, SOLUTION INTRAMUSCULAR; INTRAVENOUS; SUBCUTANEOUS at 10:42

## 2022-06-16 RX ADMIN — SODIUM CHLORIDE, POTASSIUM CHLORIDE, SODIUM LACTATE AND CALCIUM CHLORIDE: 600; 310; 30; 20 INJECTION, SOLUTION INTRAVENOUS at 07:39

## 2022-06-16 RX ADMIN — DEXAMETHASONE SODIUM PHOSPHATE 4 MG: 4 INJECTION, SOLUTION INTRA-ARTICULAR; INTRALESIONAL; INTRAMUSCULAR; INTRAVENOUS; SOFT TISSUE at 12:49

## 2022-06-16 RX ADMIN — ROSUVASTATIN CALCIUM 10 MG: 10 TABLET, FILM COATED ORAL at 22:12

## 2022-06-16 RX ADMIN — Medication 100 MCG: at 19:35

## 2022-06-16 RX ADMIN — GABAPENTIN 1200 MG: 300 CAPSULE ORAL at 22:12

## 2022-06-16 RX ADMIN — BUMETANIDE 3 MG: 1 TABLET ORAL at 19:32

## 2022-06-16 RX ADMIN — SODIUM CHLORIDE, POTASSIUM CHLORIDE, SODIUM LACTATE AND CALCIUM CHLORIDE: 600; 310; 30; 20 INJECTION, SOLUTION INTRAVENOUS at 07:23

## 2022-06-16 RX ADMIN — LIDOCAINE HYDROCHLORIDE 30 MG: 10 INJECTION, SOLUTION INFILTRATION; PERINEURAL at 07:56

## 2022-06-16 RX ADMIN — FENTANYL CITRATE 50 MCG: 50 INJECTION, SOLUTION INTRAMUSCULAR; INTRAVENOUS at 10:20

## 2022-06-16 RX ADMIN — DEXAMETHASONE SODIUM PHOSPHATE 4 MG: 4 INJECTION, SOLUTION INTRA-ARTICULAR; INTRALESIONAL; INTRAMUSCULAR; INTRAVENOUS; SOFT TISSUE at 08:34

## 2022-06-16 RX ADMIN — PANTOPRAZOLE SODIUM 40 MG: 40 TABLET, DELAYED RELEASE ORAL at 19:33

## 2022-06-16 RX ADMIN — SUGAMMADEX 200 MG: 100 INJECTION, SOLUTION INTRAVENOUS at 15:20

## 2022-06-16 RX ADMIN — PHENYLEPHRINE HYDROCHLORIDE 100 MCG: 10 INJECTION INTRAVENOUS at 13:23

## 2022-06-16 RX ADMIN — Medication 2 G: at 11:50

## 2022-06-16 RX ADMIN — SODIUM CHLORIDE, POTASSIUM CHLORIDE, SODIUM LACTATE AND CALCIUM CHLORIDE: 600; 310; 30; 20 INJECTION, SOLUTION INTRAVENOUS at 14:36

## 2022-06-16 RX ADMIN — SODIUM CHLORIDE, POTASSIUM CHLORIDE, SODIUM LACTATE AND CALCIUM CHLORIDE: 600; 310; 30; 20 INJECTION, SOLUTION INTRAVENOUS at 10:03

## 2022-06-16 RX ADMIN — PROPOFOL 150 MG: 10 INJECTION, EMULSION INTRAVENOUS at 07:56

## 2022-06-16 RX ADMIN — ONDANSETRON 4 MG: 2 INJECTION INTRAMUSCULAR; INTRAVENOUS at 15:01

## 2022-06-16 RX ADMIN — ROCURONIUM BROMIDE 50 MG: 50 INJECTION, SOLUTION INTRAVENOUS at 08:40

## 2022-06-16 RX ADMIN — FENTANYL CITRATE 100 MCG: 50 INJECTION, SOLUTION INTRAMUSCULAR; INTRAVENOUS at 07:56

## 2022-06-16 RX ADMIN — PHENYLEPHRINE HYDROCHLORIDE 50 MCG: 10 INJECTION INTRAVENOUS at 09:21

## 2022-06-16 RX ADMIN — Medication 2 G: at 07:50

## 2022-06-16 RX ADMIN — SODIUM CHLORIDE, POTASSIUM CHLORIDE, SODIUM LACTATE AND CALCIUM CHLORIDE: 600; 310; 30; 20 INJECTION, SOLUTION INTRAVENOUS at 10:48

## 2022-06-16 RX ADMIN — PHENYLEPHRINE HYDROCHLORIDE 0.3 MCG/KG/MIN: 10 INJECTION INTRAVENOUS at 09:31

## 2022-06-16 RX ADMIN — FAMOTIDINE 20 MG: 20 TABLET ORAL at 07:24

## 2022-06-16 RX ADMIN — DEXAMETHASONE SODIUM PHOSPHATE 4 MG: 4 INJECTION, SOLUTION INTRA-ARTICULAR; INTRALESIONAL; INTRAMUSCULAR; INTRAVENOUS; SOFT TISSUE at 10:13

## 2022-06-16 RX ADMIN — DICLOFENAC SODIUM 2 G: 10 GEL TOPICAL at 22:09

## 2022-06-16 RX ADMIN — LISINOPRIL 10 MG: 5 TABLET ORAL at 19:32

## 2022-06-16 RX ADMIN — PHENYLEPHRINE HYDROCHLORIDE 50 MCG: 10 INJECTION INTRAVENOUS at 09:26

## 2022-06-16 RX ADMIN — Medication 500 MG: at 22:12

## 2022-06-16 RX ADMIN — ROCURONIUM BROMIDE 50 MG: 50 INJECTION, SOLUTION INTRAVENOUS at 07:57

## 2022-06-16 RX ADMIN — ACETAMINOPHEN 975 MG: 325 TABLET ORAL at 07:24

## 2022-06-16 ASSESSMENT — ACTIVITIES OF DAILY LIVING (ADL)
ADLS_ACUITY_SCORE: 22
ADLS_ACUITY_SCORE: 24
ADLS_ACUITY_SCORE: 24
ADLS_ACUITY_SCORE: 22

## 2022-06-16 ASSESSMENT — ENCOUNTER SYMPTOMS: DYSRHYTHMIAS: 1

## 2022-06-16 NOTE — INTERVAL H&P NOTE
"I have reviewed the surgical (or preoperative) H&P that is linked to this encounter, and examined the patient. There are no significant changes    Clinical Conditions Present on Arrival:  Clinically Significant Risk Factors Present on Admission                 # Coagulation Defect: home medication list includes an anticoagulant medication   # Overweight: Estimated body mass index is 29.95 kg/m  as calculated from the following:    Height as of this encounter: 1.778 m (5' 10\").    Weight as of this encounter: 94.7 kg (208 lb 11.2 oz).       "

## 2022-06-16 NOTE — ANESTHESIA PROCEDURE NOTES
Airway       Patient location during procedure: OR       Procedure Start/Stop Times: 6/16/2022 7:59 AM  Staff -        CRNA: Ricky Jones APRN CRNA       Performed By: CRNA  Consent for Airway        Urgency: elective  Indications and Patient Condition       Indications for airway management: demetrius-procedural       Induction type:intravenous       Mask difficulty assessment: 1 - vent by mask    Final Airway Details       Final airway type: endotracheal airway       Successful airway: ETT - single  Endotracheal Airway Details        ETT size (mm): 7.5       Cuffed: yes       Successful intubation technique: direct laryngoscopy       DL Blade Type: Saba 2       Grade View of Cords: 1       Adjucts: stylet and tooth guard       Position: Right       Measured from: lips       Bite block used: None    Post intubation assessment        Placement verified by: capnometry, equal breath sounds and chest rise        Number of attempts at approach: 1       Number of other approaches attempted: 0       Secured with: silk tape       Ease of procedure: easy       Dentition: Intact and Unchanged    Medication(s) Administered   Medication Administration Time: 6/16/2022 7:59 AM

## 2022-06-16 NOTE — PHARMACY-ADMISSION MEDICATION HISTORY
Pharmacy Note - Admission Medication History     ______________________________________________________________________    Prior To Admission (PTA) med list completed and updated in EMR.       PTA Med List   Medication Sig Last Dose     acetaminophen (TYLENOL) 500 MG tablet Take 1,000-1,500 mg by mouth every 6 hours as needed for mild pain Past Week     bumetanide (BUMEX) 1 MG tablet Take 3 mg by mouth daily 6/15/2022     cholecalciferol 50 MCG (2000 UT) CAPS Take 2 capsules by mouth daily 6/9/2022     coenzyme Q-10 200 MG CAPS capsule Take 200 mg by mouth daily 6/9/2022     diphenhydrAMINE-acetaminophen (TYLENOL PM)  MG tablet Take 3 tablets by mouth At Bedtime 6/15/2022     ferrous sulfate (FEROSUL) 325 (65 Fe) MG tablet Take 1 tablet by mouth 3 times daily 6/9/2022     gabapentin (NEURONTIN) 600 MG tablet Take 1,200 mg by mouth 2 times daily 6/16/2022 at am     glimepiride (AMARYL) 4 MG tablet Take 4 mg by mouth daily 6/15/2022     insulin glargine (LANTUS PEN) 100 UNIT/ML pen Inject 30 Units Subcutaneous At Bedtime 6/15/2022     levothyroxine (SYNTHROID/LEVOTHROID) 25 MCG tablet Take 25 mcg by mouth daily 6/16/2022     lisinopril (ZESTRIL) 10 MG tablet Take 1 tablet (10 mg) by mouth daily 6/15/2022     metFORMIN (GLUCOPHAGE) 1000 MG tablet Take 1,000 mg by mouth 2 times daily (with meals) 6/15/2022     Omega-3 Fatty Acids (FISH OIL) 1200 MG capsule Take 1,200 mg by mouth daily 6/9/2022     omeprazole (PRILOSEC) 20 MG DR capsule Take 20 mg by mouth 2 times daily (before meals) 6/16/2022 at am     rosuvastatin (CRESTOR) 10 MG tablet Take 10 mg by mouth At Bedtime 6/15/2022     vitamin C (ASCORBIC ACID) 500 MG tablet Take 500 mg by mouth 3 times daily Take with iron supplement 6/9/2022     XARELTO ANTICOAGULANT 20 MG TABS tablet TAKE 1 TABLET DAILY (Patient taking differently: Take 10 mg by mouth daily (with dinner)) 6/10/2022     zinc gluconate 50 MG tablet Take 100 mg by mouth daily 6/9/2022        Information source(s): Hospital records and [facility]  Method of interview communication: N/A    In the past week, patient estimated taking medication this percent of the time:  greater than 90%.    Allergies were reviewed, assessed, and updated with the patient.      Patient does not use any multi-dose medications prior to admission.    The information provided in this note is only as accurate as the sources available at the time of the update(s).    Thank you for the opportunity to participate in the care of this patient.    Idania Waters [facility]  6/16/2022 6:41 PM

## 2022-06-16 NOTE — PROGRESS NOTES
Arachnoid bleb in surgery, covered with duragen and duraseal. No CSF leak noted. Plan bedrest for 24 hours then advance activity as tolerated.     Melisa Lara CNP  [facility] Neurosurgery  O: [phone]  P: [phone]

## 2022-06-16 NOTE — OR NURSING
PACU note: during handoff from the OR CRNA, there was a possible CSF leak. This RN called VICTORIANO Morelos, to confirm this. According to the operative note, Dr. Obregon states no mention of a CSF leak. The patient is stable at this time. The patient denies pain or headache pain. The patient is A&O x3, BALDEMAR HAMILTONE.

## 2022-06-16 NOTE — ANESTHESIA PREPROCEDURE EVALUATION
Anesthesia Pre-Procedure Evaluation    Patient: Thomas Steve   MRN: [id number] : 1945        Procedure : Procedure(s):  Decompressive lumbar laminectomy Lumbar 4 -Lumbar 5 and Lumbar 5 - Sacral 1 bilateral - left side first - plus bilateral foraminotomies plus removal of herniated disc Lumbar 5 - Sacral 1 left          Past Medical History:   Diagnosis Date     Acute sinusitis      Atrial fibrillation (H)     Resolved after medication and CPAP     Back pain      CHF (congestive heart failure) (H)      Coronary artery disease      Diabetes mellitus (H)      Gastroesophageal reflux disease with esophagitis      HTN (hypertension)      Hyperlipemia      Joint pain      Lipoma of neck      Nocturia      Sciatic leg pain      Shoulder impingement syndrome     BILATERAL     Sleep apnea     uses CPAP     Typical atrial flutter (H) 2016    Demonstrated on 12-lead EKG 2016      Past Surgical History:   Procedure Laterality Date     EP BIV PACEMAKER INSERT N/A 2019    Procedure: EP Biventricular Pacemaker Insertion;  Surgeon: Durga Rajput MD;  Location: [facility] Cath Lab;  Service: Cardiology     SD CARDIOVERSION ELECTIVE ARRHYTHMIA EXTERNAL  2014    Description: Elective Cardioversion External;  Recorded: 2014;     [facility] CABG, VEIN, SINGLE      Description: CABG (CABG);  Proc Date: 2011;  Comments: LIMA^LAD, SVG^OM, SVG^distal RCA      Allergies   Allergen Reactions     Aspirin Other (See Comments)     Contraindicated due to xarelto use     Codeine Nausea and Vomiting     Pollen Extracts [Pollen Extract] Unknown     Scratchy throat     Vicodin [Hydrocodone-Acetaminophen] Nausea and Vomiting      Social History     Tobacco Use     Smoking status: Former Smoker     Packs/day: 1.50     Years: 23.00     Pack years: 34.50     Quit date: 1986     Years since quittin.4     Smokeless tobacco: Never Used   Substance Use Topics     Alcohol use: Yes     Alcohol/week: 1.0  standard drink     Comment: one beer per day      Wt Readings from Last 1 Encounters:   06/16/22 94.7 kg (208 lb 11.2 oz)        Anesthesia Evaluation            ROS/MED HX  ENT/Pulmonary:     (+) sleep apnea, uses CPAP,     Neurologic:  - neg neurologic ROS     Cardiovascular: Comment: 5/2021 NM Stress     The nuclear stress test is abnormal.      Nuclear images demonstrate a large area of transmural infarction   involving the mid to distal anteroseptal, anterior and apical wall.  Base   of the inferior and anterior wall appear hyperdynamic.      The left ventricular ejection fraction at stress is 53% with akinesis   of the mid to distal anteroseptal, anterior and apical walls..      The patient is at a low risk of future cardiac ischemic events.      A prior study was conducted on 6/10/2016.  Images appear similar.      Pacemaker for sick sinus syndrome/CRT-D, he states that the ICD function has been disabled    (+) hypertension--CAD ---CHF pacemaker, dysrhythmias, a-fib,     METS/Exercise Tolerance:     Hematologic:     (+) anemia,     Musculoskeletal:       GI/Hepatic:  - neg GI/hepatic ROS     Renal/Genitourinary:       Endo:     (+) type II DM, Using insulin, Obesity,     Psychiatric/Substance Use:       Infectious Disease:       Malignancy:       Other:            Physical Exam    Airway  airway exam normal      Mallampati: II   TM distance: > 3 FB   Neck ROM: full   Mouth opening: > 3 cm    Respiratory Devices and Support         Dental       (+) chipped and missing    B=Bridge, C=Chipped, L=Loose, M=Missing    Cardiovascular   cardiovascular exam normal       Rhythm and rate: regular and normal     Pulmonary   pulmonary exam normal        breath sounds clear to auscultation           OUTSIDE LABS:  CBC:   Lab Results   Component Value Date    WBC 8.3 06/15/2022    WBC 7.5 06/11/2019    HGB 11.4 (L) 06/15/2022    HGB 11.0 (L) 06/11/2019    HCT 36.7 (L) 06/15/2022    HCT 34.3 (L) 06/11/2019      06/15/2022     06/11/2019     BMP:   Lab Results   Component Value Date     06/15/2022     06/08/2022    POTASSIUM 4.5 06/15/2022    POTASSIUM 4.0 06/08/2022    CHLORIDE 101 06/15/2022    CHLORIDE 100 06/08/2022    CO2 26 06/15/2022    CO2 26 06/08/2022    BUN 26 06/15/2022    BUN 20 06/08/2022    CR 1.15 06/15/2022    CR 1.11 06/08/2022     (H) 06/15/2022     (H) 06/08/2022     COAGS:   Lab Results   Component Value Date    PTT 30 06/15/2022    INR 1.04 06/15/2022     POC: No results found for: BGM, HCG, HCGS  HEPATIC:   Lab Results   Component Value Date    ALBUMIN 3.8 06/08/2022    PROTTOTAL 6.7 06/08/2022    ALT 22 06/08/2022    AST 25 06/08/2022    ALKPHOS 136 (H) 06/08/2022    BILITOTAL 0.4 06/08/2022     OTHER:   Lab Results   Component Value Date    A1C 7.2 (H) 06/12/2019    BARBARA 9.8 06/15/2022    TSH 2.05 06/08/2022    SED 32 (H) 11/22/2021       Anesthesia Plan    ASA Status:  4      Anesthesia Type: General.     - Airway: ETT              Consents    Anesthesia Plan(s) and associated risks, benefits, and realistic alternatives discussed. Questions answered and patient/representative(s) expressed understanding.    - Discussed:     - Discussed with:  Patient         Postoperative Care    Pain management: Multi-modal analgesia.   PONV prophylaxis: Ondansetron (or other 5HT-3), Dexamethasone or Solumedrol     Comments:    Other Comments:     Last dose xarelto 6/10; Magnet available             David Woodard MD

## 2022-06-16 NOTE — OR NURSING
The patient does have periods of apnea. This RN contacted VICTORIANO Morelos and he stated he would change the post anesthesia orders to reflect that the patient does have a potential  Apnea condition.

## 2022-06-16 NOTE — OR NURSING
The patient informed VICTORIANO Morelos, that the patient's BP was elevated and was in the 190/systolic range upon arrival from the OR. At the time this RN called, the patient's BP had declined to the low 170/x range. No intervention was ordered since the blood pressure was normalizing.

## 2022-06-16 NOTE — ANESTHESIA POSTPROCEDURE EVALUATION
Patient: Thomas Steve    Procedure: Procedure(s):  DECOMPRESSIVE LUMBAR LAMINECTOMY LUMBAR 4-LUMBAR 5 AND LUMBAR 5-SACRAL 1 BILATERAL-LEFT SIDE FIRST-PLUS BILATERAL FORAMINOTOMIES PLUS REMOVAL OF HERNIATED DISC LUMBAR 5-SACRAL 1 LEFT       Anesthesia Type:  General    Note:  Disposition: Inpatient   Postop Pain Control: Uneventful            Sign Out: Well controlled pain   PONV: No   Neuro/Psych: Uneventful            Sign Out: Acceptable/Baseline neuro status   Airway/Respiratory: Uneventful            Sign Out: Acceptable/Baseline resp. status   CV/Hemodynamics: Uneventful            Sign Out: Acceptable CV status; No obvious hypovolemia; No obvious fluid overload   Other NRE: NONE   DID A NON-ROUTINE EVENT OCCUR? No           Last vitals:  Vitals Value Taken Time   /80 06/16/22 1707   Temp     Pulse 60 06/16/22 1729   Resp 20 06/16/22 1729   SpO2 100 % 06/16/22 1729   Vitals shown include unvalidated device data.    Electronically Signed By: Jeff Pelayo MD  June 16, 2022  5:37 PM

## 2022-06-16 NOTE — CONSULTS
Assessment & Plan   77-year-old male with CAD, HF MAF, DM2, HTN, atrial fibrillation, hypothyroidism presented for planned neurosurgical intervention to treat herniated lumbar disc.  [facility] consulted for management of diabetes, HTN    Active Problems:    Type 2 diabetes mellitus, with long-term current use of insulin (H)    Benign essential hypertension    CAD (coronary artery disease)    Chronic atrial fibrillation (H)    CORAZON (obstructive sleep apnea)    Lumbar stenosis with neurogenic claudication    Gastroesophageal reflux disease without esophagitis    Hypothyroidism    Normocytic anemia    Chronic left shoulder pain    Nasal congestion    Present on Admission:    Lumbar stenosis with neurogenic claudication      Lumbar spinal stenosis with lumbar disc herniation  -Patient underwent scheduled decompressive lumbar laminectomy of L4-L5, L5-S1 with bilateral foraminotomies and removal of herniated disc in the L1-S1 region with neurosurgery on 6/16/2022.  -Scheduled Tylenol 3 times daily  -Gabapentin 1200 mg p.o. twice daily  -Pain management per neurosurgery  -Rest of postoperative management per neurosurgery    CAD, HFmEF  -Patient with CABG in 2011, SPECT MPI 5/6/2021 abnormal with a large area of transmural infarction of the mid to distal anterior septal, anterior, apical wall.  TTE 12/31/2019 with ejection fraction of 50 to 55%, abnormal septal wall motion consistent with ventricular paced rhythm, mild aortic stenosis and mild to moderate tricuspid insufficiency.  Patient appears euvolemic  -Stop IVF  -Bumex 3 mg p.o. daily  -Simvastatin 10 mg p.o. daily    Chronic atrial fibrillation, sick sinus syndrome  -Ventricular pacemaker in place  -Continue to hold home rivaroxaban, may restart 2 to 3 days after surgery when neurosurgeon allows.    DM2  -No A1C since 2019, will check  -Hold home glimepiride and metformin for now, resume on 6/17 with full breakfast  -Glargine 30 units subcu q. Bedtime  -Sensitive scale  "aspart insulin 3 times daily AC.  -Accu-Chek 3 times daily AC at bedtime  -Hypoglycemic protocol    Nasal congestion, postnasal drip  -Exacerbated in the setting of needing to lie flat for 24 hours  -Start ipratropium nasal spray twice daily    Chronic left shoulder pain  -Start lidocaine and Voltaren trading on and off 4 times daily    HTN  -Lisinopril 10 mg p.o. daily    GERD  -Pantoprazole 40 mg p.o. daily    CORAZON   -CPAP    Hypothyroidism  -TSH 2.05  -Levothyroxine 25 mcg p.o. daily    Normocytic anemia  -Hemoglobin 11.4 with MCV of 90     Clinically Significant Risk Factors Present on Admission               # Coagulation Defect: home medication list includes an anticoagulant medication    # Overweight: Estimated body mass index is 29.95 kg/m  as calculated from the following:    Height as of this encounter: 1.778 m (5' 10\").    Weight as of this encounter: 94.7 kg (208 lb 11.2 oz).        Electrolytes: within normal limits  Fluids: stop IVF  Diet: advance to diabetic per neurosurgery  VTE prophylaxis: SCD boots    COVID19 symptom check 6/16/2022  Fevers/Chills/myalgias: NEGATIVE TO ALL  Sick contacts/COVID19 exposure: NEGATIVE TO ALL  Cough/trouble breathing/SOB/sore throat: NEGATIVE TO ALL  Diarrhea: NEGATIVE    Expected Discharge: 06/17/2022     Anticipated discharge location: home with family    Delays:        Subjective  Cc: Back pain    Pt is new to be today.  Personally conducted extensive chart review prior to visit including labs, imaging, past provider notes and interventions.  Patient says he is feeling all right after surgery with only mild lower back pain.  He states he has had a lot of nasal congestion and laying flat makes it feel like snot is constantly dripping into the back of his throat and is hard for him to clear this while laying flat.  Denies chest pain, cough, shortness of breath, abdominal pain.  States he has chronic left shoulder pain and he knows that it will start hurting " soon.    Review of Systems: History obtained from the patient  General ROS: negative  for  - chills, fatigue, fever  ENT ROS: negative for - headaches, hearing change, positive for nasal congestion, for nasal discharge  Hematological and Lymphatic ROS: negative for - bleeding problems, blood clots, bruising  Respiratory ROS: negative for - cough, pleuritic pain, shortness of breath  Cardiovascular ROS: negative for - chest pain, dyspnea on exertion, edema  Gastrointestinal ROS: negative for - abdominal pain, constipation, diarrhea, and nausea/vomiting  Genito-Urinary ROS: negative for -  dysuria, hematuria  Musculoskeletal ROS: Positive for mild lower back pain and chronic left shoulder pain  Neurological ROS: negative for - behavioral changes, confusion, dizziness, numbness/tingling   Dermatological ROS: negative for pruritus, rash    Objective    Physical Examination:   General appearance - alert, well appearing, and in no distress and oriented to person, place, and time  Mental status - alert, oriented to person, place, and time, normal mood, behavior, speech, dress, motor activity, and thought processes  HEENT - sclera anicteric, left eye normal, right eye normal, nares normal and patent, no erythema,  Respiratory - clear to auscultation, no wheezes, rales or rhonchi, symmetric air entry, no tachypnea, retractions or cyanosis, nasal cannula in place, patient frequently clearing his throat and attempting to spit out phlegm  Cardiac - normal rate, regular rhythm, normal S1, S2, blowing systolic murmur present, rubs, clicks or gallops, no JVD  Abdomen - soft, nontender, nondistended, no masses or organomegaly no rebound tenderness noted bowel sounds normal, no bladder distension noted  Neurological - alert, oriented, normal speech, no focal findings or movement disorder noted  Musculoskeletal - no joint tenderness, deformity or swelling, full range of motion without pain  Extremities - peripheral pulses normal, no  pedal edema, no clubbing or cyanosis  Skin - normal coloration and turgor, no rashes, no suspicious skin lesions noted    Temp:  [98.2  F (36.8  C)-99.8  F (37.7  C)] 99.8  F (37.7  C)  Pulse:  [60-68] 60  Resp:  [13-22] 16  BP: (144-212)/(61-96) 144/67  SpO2:  [94 %-100 %] 96 %      Intake/Output Summary (Last 24 hours) at 6/16/2022 1826  Last data filed at 6/16/2022 1733  Gross per 24 hour   Intake 3505 ml   Output 1450 ml   Net 2055 ml       Results    Recent Results (from the past 24 hour(s))   Glucose by meter    Collection Time: 06/16/22  6:32 AM   Result Value Ref Range    GLUCOSE BY METER POCT 120 (H) 70 - 99 mg/dL   Glucose by meter    Collection Time: 06/16/22  1:37 PM   Result Value Ref Range    GLUCOSE BY METER POCT 168 (H) 70 - 99 mg/dL   Glucose by meter    Collection Time: 06/16/22  3:51 PM   Result Value Ref Range    GLUCOSE BY METER POCT 172 (H) 70 - 99 mg/dL          Lab Results personally reviewed 6/16  Imaging Results personally reviewed 6/16  Discussed with patient and his wife  Reviewed old records     Advanced Care Planning  Discharge Planning discussed with patient and family  Discussed care with patient and family for 70 minutes with greater than 50% of time spent in counseling and coordination of care.    Updated patient's wife Sirisha at bedside on 6/16    Barbara Steward DO  Hospitalist Service  [facility]  Text page via [employer] Paging/Directory     This note was created using dragon dictation, any spelling and grammatical errors are unintentional.

## 2022-06-16 NOTE — ANESTHESIA CARE TRANSFER NOTE
Patient: Thomas Steve    Procedure: Procedure(s):  DECOMPRESSIVE LUMBAR LAMINECTOMY LUMBAR 4-LUMBAR 5 AND LUMBAR 5-SACRAL 1 BILATERAL-LEFT SIDE FIRST-PLUS BILATERAL FORAMINOTOMIES PLUS REMOVAL OF HERNIATED DISC LUMBAR 5-SACRAL 1 LEFT       Diagnosis: Lumbar herniated disc [M51.26]  Spinal stenosis, lumbar region, without neurogenic claudication [M48.061]  Diagnosis Additional Information: No value filed.    Anesthesia Type:   General     Note:    Oropharynx: oropharynx clear of all foreign objects and spontaneously breathing  Level of Consciousness: awake  Oxygen Supplementation: face mask  Level of Supplemental Oxygen (L/min / FiO2): 8  Independent Airway: airway patency satisfactory and stable  Dentition: dentition unchanged  Vital Signs Stable: post-procedure vital signs reviewed and stable  Report to RN Given: handoff report given  Patient transferred to: PACU    Handoff Report: Identifed the Patient, Identified the Reponsible Provider, Reviewed the pertinent medical history, Discussed the surgical course, Reviewed Intra-OP anesthesia mangement and issues during anesthesia, Set expectations for post-procedure period and Allowed opportunity for questions and acknowledgement of understanding      Vitals:  Vitals Value Taken Time   /86 06/16/22 1540   Temp     Pulse 64 06/16/22 1543   Resp 17 06/16/22 1543   SpO2 100 % 06/16/22 1543   Vitals shown include unvalidated device data.    Electronically Signed By: VALERIE Mota CRNA  June 16, 2022  3:45 PM

## 2022-06-17 PROBLEM — M48.061 LUMBAR SPINAL STENOSIS: Status: ACTIVE | Noted: 2022-06-17

## 2022-06-17 LAB
GLUCOSE BLDC GLUCOMTR-MCNC: 161 MG/DL (ref 70–99)
GLUCOSE BLDC GLUCOMTR-MCNC: 171 MG/DL (ref 70–99)
GLUCOSE BLDC GLUCOMTR-MCNC: 238 MG/DL (ref 70–99)
GLUCOSE BLDC GLUCOMTR-MCNC: 242 MG/DL (ref 70–99)
HBA1C MFR BLD: 7.5 %
HGB BLD-MCNC: 9.2 G/DL (ref 13.3–17.7)

## 2022-06-17 PROCEDURE — 36415 COLL VENOUS BLD VENIPUNCTURE: CPT | Performed by: NURSE PRACTITIONER

## 2022-06-17 PROCEDURE — 85018 HEMOGLOBIN: CPT | Performed by: NURSE PRACTITIONER

## 2022-06-17 PROCEDURE — 120N000001 HC R&B MED SURG/OB

## 2022-06-17 PROCEDURE — 999N000157 HC STATISTIC RCP TIME EA 10 MIN

## 2022-06-17 PROCEDURE — 250N000013 HC RX MED GY IP 250 OP 250 PS 637: Performed by: FAMILY MEDICINE

## 2022-06-17 PROCEDURE — 83036 HEMOGLOBIN GLYCOSYLATED A1C: CPT | Performed by: FAMILY MEDICINE

## 2022-06-17 PROCEDURE — 250N000013 HC RX MED GY IP 250 OP 250 PS 637: Performed by: NURSE PRACTITIONER

## 2022-06-17 PROCEDURE — 250N000013 HC RX MED GY IP 250 OP 250 PS 637: Performed by: SURGERY

## 2022-06-17 PROCEDURE — 99233 SBSQ HOSP IP/OBS HIGH 50: CPT | Performed by: INTERNAL MEDICINE

## 2022-06-17 RX ADMIN — Medication 500 MG: at 14:10

## 2022-06-17 RX ADMIN — SENNOSIDES AND DOCUSATE SODIUM 1 TABLET: 8.6; 5 TABLET ORAL at 20:34

## 2022-06-17 RX ADMIN — PANTOPRAZOLE SODIUM 40 MG: 40 TABLET, DELAYED RELEASE ORAL at 16:51

## 2022-06-17 RX ADMIN — METFORMIN HYDROCHLORIDE 1000 MG: 500 TABLET, FILM COATED ORAL at 09:05

## 2022-06-17 RX ADMIN — GLIMEPIRIDE 4 MG: 1 TABLET ORAL at 12:21

## 2022-06-17 RX ADMIN — Medication 500 MG: at 09:10

## 2022-06-17 RX ADMIN — ACETAMINOPHEN 975 MG: 325 TABLET, FILM COATED ORAL at 10:38

## 2022-06-17 RX ADMIN — DICLOFENAC SODIUM 2 G: 10 GEL TOPICAL at 16:48

## 2022-06-17 RX ADMIN — FERROUS SULFATE TAB 325 MG (65 MG ELEMENTAL FE) 325 MG: 325 (65 FE) TAB at 16:50

## 2022-06-17 RX ADMIN — BUMETANIDE 3 MG: 1 TABLET ORAL at 09:09

## 2022-06-17 RX ADMIN — LIDOCAINE: 50 OINTMENT TOPICAL at 20:42

## 2022-06-17 RX ADMIN — ROSUVASTATIN CALCIUM 10 MG: 10 TABLET, FILM COATED ORAL at 20:34

## 2022-06-17 RX ADMIN — Medication 50 MCG: at 09:06

## 2022-06-17 RX ADMIN — INSULIN GLARGINE 30 UNITS: 100 INJECTION, SOLUTION SUBCUTANEOUS at 20:37

## 2022-06-17 RX ADMIN — Medication 500 MG: at 20:34

## 2022-06-17 RX ADMIN — ACETAMINOPHEN 975 MG: 325 TABLET, FILM COATED ORAL at 03:30

## 2022-06-17 RX ADMIN — GABAPENTIN 1200 MG: 300 CAPSULE ORAL at 20:34

## 2022-06-17 RX ADMIN — METFORMIN HYDROCHLORIDE 1000 MG: 500 TABLET, FILM COATED ORAL at 17:36

## 2022-06-17 RX ADMIN — SENNOSIDES AND DOCUSATE SODIUM 1 TABLET: 8.6; 5 TABLET ORAL at 09:09

## 2022-06-17 RX ADMIN — ACETAMINOPHEN 975 MG: 325 TABLET, FILM COATED ORAL at 18:32

## 2022-06-17 RX ADMIN — FERROUS SULFATE TAB 325 MG (65 MG ELEMENTAL FE) 325 MG: 325 (65 FE) TAB at 09:09

## 2022-06-17 RX ADMIN — PANTOPRAZOLE SODIUM 40 MG: 40 TABLET, DELAYED RELEASE ORAL at 06:27

## 2022-06-17 RX ADMIN — GABAPENTIN 1200 MG: 300 CAPSULE ORAL at 09:11

## 2022-06-17 RX ADMIN — DICLOFENAC SODIUM 2 G: 10 GEL TOPICAL at 09:23

## 2022-06-17 RX ADMIN — THERA TABS 1 TABLET: TAB at 09:05

## 2022-06-17 RX ADMIN — LEVOTHYROXINE SODIUM 25 MCG: 0.03 TABLET ORAL at 06:27

## 2022-06-17 RX ADMIN — FERROUS SULFATE TAB 325 MG (65 MG ELEMENTAL FE) 325 MG: 325 (65 FE) TAB at 12:21

## 2022-06-17 ASSESSMENT — ACTIVITIES OF DAILY LIVING (ADL)
ADLS_ACUITY_SCORE: 22

## 2022-06-17 NOTE — PROGRESS NOTES
Assessment & Plan   77-year-old male with CAD, HF MAF, DM2, HTN, atrial fibrillation, hypothyroidism presented for planned neurosurgical intervention to treat herniated lumbar disc.  [facility] consulted for management of diabetes, HTN    Active Problems:    Type 2 diabetes mellitus, with long-term current use of insulin (H)    Benign essential hypertension    CAD (coronary artery disease)    Chronic atrial fibrillation (H)    CORAZON (obstructive sleep apnea)    Lumbar stenosis with neurogenic claudication    Gastroesophageal reflux disease without esophagitis    Hypothyroidism    Normocytic anemia    Chronic left shoulder pain    Nasal congestion    Lumbar spinal stenosis    Present on Admission:    Lumbar stenosis with neurogenic claudication    Lumbar spinal stenosis    6/17 :     Neuro status stable  HUSSEIN drain present  Blood sugars in 200's, change sliding scale insulin - to high resistant      Not medically ready for discharge at this time.        A/p :       Lumbar spinal stenosis with lumbar disc herniation  -Patient underwent scheduled decompressive lumbar laminectomy of L4-L5, L5-S1 with bilateral foraminotomies and removal of herniated disc in the L1-S1 region with neurosurgery on 6/16/2022.  -Scheduled Tylenol 3 times daily  -Gabapentin 1200 mg p.o. twice daily  -Pain management per neurosurgery  -Rest of postoperative management per neurosurgery    CAD, HFmEF  -Patient with CABG in 2011, SPECT MPI 5/6/2021 abnormal with a large area of transmural infarction of the mid to distal anterior septal, anterior, apical wall.  TTE 12/31/2019 with ejection fraction of 50 to 55%, abnormal septal wall motion consistent with ventricular paced rhythm, mild aortic stenosis and mild to moderate tricuspid insufficiency.  Patient appears euvolemic  -Stop IVF  -Bumex 3 mg p.o. daily  -Simvastatin 10 mg p.o. daily    Chronic atrial fibrillation, sick sinus syndrome  -Ventricular pacemaker in place  -Continue to hold home rivaroxaban,  "may restart 2 to 3 days after surgery when neurosurgeon allows.    DM2  -No A1C since 2019, will check  -Hold home glimepiride and metformin for now, resume on 6/17 with full breakfast  -Glargine 30 units subcu q. Bedtime  -Sensitive scale aspart insulin 3 times daily AC.  -Accu-Chek 3 times daily AC at bedtime  -Hypoglycemic protocol    Nasal congestion, postnasal drip  -Exacerbated in the setting of needing to lie flat for 24 hours  -Start ipratropium nasal spray twice daily    Chronic left shoulder pain  -Start lidocaine and Voltaren trading on and off 4 times daily    HTN  -Lisinopril 10 mg p.o. daily    GERD  -Pantoprazole 40 mg p.o. daily    CORAZON   -CPAP    Hypothyroidism  -TSH 2.05  -Levothyroxine 25 mcg p.o. daily    Normocytic anemia  -Hemoglobin 11.4 with MCV of 90     Clinically Significant Risk Factors Present on Admission              # Diabetes, type II: last A1C 7.5 % (Ref range: <=5.6 %)  # Overweight: Estimated body mass index is 29.95 kg/m  as calculated from the following:    Height as of this encounter: 1.778 m (5' 10\").    Weight as of this encounter: 94.7 kg (208 lb 11.2 oz).        Electrolytes: within normal limits  Fluids: stop IVF  Diet: advance to diabetic per neurosurgery  VTE prophylaxis: SCD boots    COVID19 symptom check 6/16/2022  Fevers/Chills/myalgias: NEGATIVE TO ALL  Sick contacts/COVID19 exposure: NEGATIVE TO ALL  Cough/trouble breathing/SOB/sore throat: NEGATIVE TO ALL  Diarrhea: NEGATIVE    Expected Discharge: 06/19/2022     Anticipated discharge location: home    Delays:            Review of Systems: History obtained from the patient  General ROS: negative  for  - chills, fatigue, fever  ENT ROS: negative for - headaches, hearing change, positive for nasal congestion, for nasal discharge  Hematological and Lymphatic ROS: negative for - bleeding problems, blood clots, bruising  Respiratory ROS: negative for - cough, pleuritic pain, shortness of breath  Cardiovascular ROS: " negative for - chest pain, dyspnea on exertion, edema  Gastrointestinal ROS: negative for - abdominal pain, constipation, diarrhea, and nausea/vomiting  Genito-Urinary ROS: negative for -  dysuria, hematuria  Musculoskeletal ROS: Positive for mild lower back pain and chronic left shoulder pain  Neurological ROS: negative for - behavioral changes, confusion, dizziness, numbness/tingling   Dermatological ROS: negative for pruritus, rash    Objective    Physical Examination:   General appearance - alert, well appearing, and in no distress and oriented to person, place, and time  Mental status - alert, oriented to person, place, and time, normal mood, behavior, speech, dress, motor activity, and thought processes  HEENT - sclera anicteric, left eye normal, right eye normal, nares normal and patent, no erythema,  Respiratory - clear to auscultation, no wheezes, rales or rhonchi, symmetric air entry, no tachypnea, retractions or cyanosis, nasal cannula in place, patient frequently clearing his throat and attempting to spit out phlegm  Cardiac - normal rate, regular rhythm, normal S1, S2, blowing systolic murmur present, rubs, clicks or gallops, no JVD  Abdomen - soft, nontender, nondistended, no masses or organomegaly no rebound tenderness noted bowel sounds normal, no bladder distension noted  Neurological - alert, oriented, normal speech, no focal findings or movement disorder noted  Musculoskeletal - no joint tenderness, deformity or swelling, full range of motion without pain  Extremities - peripheral pulses normal, no pedal edema, no clubbing or cyanosis  Skin - normal coloration and turgor, no rashes, no suspicious skin lesions noted    Temp:  [97.5  F (36.4  C)-99.8  F (37.7  C)] 97.5  F (36.4  C)  Pulse:  [60-61] 60  Resp:  [16-20] 18  BP: (101-169)/(51-75) 106/55  SpO2:  [92 %-99 %] 92 %      Intake/Output Summary (Last 24 hours) at 6/16/2022 1826  Last data filed at 6/16/2022 1733  Gross per 24 hour   Intake  3505 ml   Output 1450 ml   Net 2055 ml       Results    Recent Results (from the past 24 hour(s))   Glucose by meter    Collection Time: 06/16/22 10:18 PM   Result Value Ref Range    GLUCOSE BY METER POCT 238 (H) 70 - 99 mg/dL   Hemoglobin    Collection Time: 06/17/22  5:36 AM   Result Value Ref Range    Hemoglobin 9.2 (L) 13.3 - 17.7 g/dL   Hemoglobin A1c    Collection Time: 06/17/22  5:36 AM   Result Value Ref Range    Hemoglobin A1C 7.5 (H) <=5.6 %   Glucose by meter    Collection Time: 06/17/22  7:44 AM   Result Value Ref Range    GLUCOSE BY METER POCT 171 (H) 70 - 99 mg/dL   Glucose by meter    Collection Time: 06/17/22 11:47 AM   Result Value Ref Range    GLUCOSE BY METER POCT 242 (H) 70 - 99 mg/dL   Glucose by meter    Collection Time: 06/17/22  5:13 PM   Result Value Ref Range    GLUCOSE BY METER POCT 161 (H) 70 - 99 mg/dL          Lab Results personally reviewed 6/16  Imaging Results personally reviewed 6/16  Discussed with patient and his wife  Reviewed old records     Advanced Care Planning  Discharge Planning discussed with patient and family  Discussed care with patient and family for 70 minutes with greater than 50% of time spent in counseling and coordination of care.

## 2022-06-17 NOTE — PLAN OF CARE
"  Problem: Bleeding (Spinal Surgery)  Goal: Absence of Bleeding  Outcome: Ongoing, Progressing     Problem: Fluid and Electrolyte Imbalance (Spinal Surgery)  Goal: Fluid and Electrolyte Balance  Outcome: Ongoing, Progressing     Problem: Pain (Spinal Surgery)  Goal: Acceptable Pain Control  Outcome: Ongoing, Progressing     Problem: Postoperative Urinary Retention (Spinal Surgery)  Goal: Effective Urinary Elimination  Outcome: Ongoing, Progressing   Goal Outcome Evaluation:      Patient had a good night. Able to sleep between cares.  Bedrest x24 hours. Minimal pain and declined need for PRN pain meds. Rated pain \"2\" when given scheduled Tylenol. HUSSEIN 70 cc sanguineous drain age. Dressing intact. Carpenter patent  875cc output. Patient is able to communicate his needs, call light within reach.                 "

## 2022-06-17 NOTE — PROGRESS NOTES
"Neurosurgery   June 17, 2022    A/P: Thomas Steve is a 77 year old male POD #1 DECOMPRESSIVE LUMBAR LAMINECTOMY LUMBAR 4-LUMBAR 5 AND LUMBAR 5-SACRAL 1 BILATERAL-LEFT SIDE FIRST-PLUS BILATERAL FORAMINOTOMIES PLUS REMOVAL OF HERNIATED DISC LUMBAR 5-SACRAL 1 LEFT with Dr Samuel. Arachnoid bleb intraop, covered with durgen, duraseal. No CSF leak.     Thomas reports no significant pain. Legs, feet feel same as pre-op with baseline neuropathy. Tolerating bedrest. HUSSEIN drain with serousanginous output.     Discharge timing will be determined by pain control, mobility tolerance, HUSSEIN drain output.     PLAN:   1. Off bedrest at 1600 today. Advance HOB slowly and if tolerating, can increase activity to out of bed. Monitor for headache, nausea   2. HUSSEIN drain - monitor and record output  3. Xarelto (Afib) - resume POD #5, SCD, Lovenox POD #2   4. PT, OT once off bedrest  5. Pain control appears adequate   6. Carpenter out once off bedrest   7. A1C 7.5 - maintain adequate BG control to aide wound healing     HPI: Presented with back pain, right leg pain. Weakness bilateral LE. Worse with walking. No bowel or bladder issues. Diabetes, CABG, AFIB, Xarelto. MRI with listhesis L4-5, spinal stenosis. Stenosis also at L5-S1 with large disc herniation L5-S1 on the left.     Subjective: no real complaints. Baseline neuropathy. No significant back pain. No questions about his bedrest.     Physical Exam  /51 (BP Location: Right arm)   Pulse 60   Temp 97.9  F (36.6  C) (Oral)   Resp 16   Ht 1.778 m (5' 10\")   Wt 94.7 kg (208 lb 11.2 oz)   SpO2 96%   BMI 29.95 kg/m      General: alert, oriented. KIM. Speech clear.     Motor: normal bulk and tone     Strength: Full strength LE bilaterally     Sensation: intact to light touch; baseline neuropathy bilaterally     Incision: Not viewed; surgical dressing in place I did not remove     HUSSEIN: Moderate amount serosanguinous output    Carpenter in place     Labs: reviewed     Imaging: No imaging " required     SAAD Manrique  [facility]  O: [phone]

## 2022-06-17 NOTE — PROGRESS NOTES
COPD EDUCATION by COPD CLINICAL EDUCATOR  10/31/2018 at 6:00 AM by Windy Montoya     Patient reviewed by COPD education team. Patient does not qualify for COPD program.   Patient has home Bipap here. Bipap is clean, functional, setup and ready for use.

## 2022-06-17 NOTE — PROGRESS NOTES
"SPIRITUAL HEALTH SERVICES NOTE  [facility]/    SPIRITUAL ASSESSMENT  Recovering from surgery yesterday   Concerned about his wife (also here in the hospital)  In good spirits  Limited support around him  Growing in his ability to ask for help    CARE PROVIDED  Empathic listening and presence   Normalized/validated his feelings of hopefulness and concern  Explored sources of support  Encouraged asking for help  Facilitated communication with his wife    SPIRITUAL CARE NOTE  Met with Thomas due to admission screening response. He was laying flat on his back due to a laminectomy surgery yesterday. He is in relatively good spirits and seems to be handling it well. At the time of my visit, he had about 2 more hours of laying flat ahead of him. Thomas says that he already feels a difference since surgery. He says that, prior to surgery he noticed progressive weakness in his legs saying \"I would take 10-12 steps and feel like I had run a marathon.\" He notes that his mobility is a concern not only for himself, but for his wife. He notes that she has her own mobility issues and that he often helps care for her. At the time of this visit, she was in the [facility] ED and had not spoken with him since before surgery. At the end of our visit, I obtained her room phone # in the ED so they could talk to one another.     When asked, Thomas identifies his son (who lives in [place]) and some neighbors as his main source of support. He acknowledges that he is reluctant to ask for help, but will do so if necessary. I encouraged him to see this time as an appropriate opportunity to allow others to help.     Visit Length: 15 minutes    Plan of Care: Will remain available for further support as patient/family needs/desires.    Milagro Saba M.Div.      Office: [phone] (for non-urgent requests)  Please Vocera or page through [facility] for time-sensitive requests    "

## 2022-06-17 NOTE — OP NOTE
Prior to surgery I discussed with the patient the nature of the problem, the nature of the proposed surgery, the rationale for doing it, the goals, benefits, alternatives, and significant risks and complications.  I answered all his questions.  He said he was satisfied with the explanation and understood.  He requested surgery.  He gave informed consent to go ahead with surgery.  The operation was performed under general endotracheal anesthesia.  The patient had usual preop precautions like preoperative antibiotics, Carpenter catheter, pneumoboots, and so forth.   He was log rolled from supine to prone.  His body lay on a padded frame.  Pressure points were padded.  The operative site in the lumbar region was shaved, prepped, and draped in the usual manner.  Spinal needles were placed, and a lateral x-ray was taken for localization.  The pedicles of L4 and L5 and S1 were marked on the drape.  A vertical midline incision was made centered on those structures.  The incision was carried down through subcutaneous fat down to the spinous processes.  Paravertebral muscle was taken down off the spinous processes and laminae bilaterally with Valleylab cautery and a Vela elevator.  Angled cerebellar retractors were used to maintain exposure.  An operating microscope was used for micro-dissection using microsurgical techniques.  Another x-ray was taken for localization with a metal probe on the pedicle of L4.  The x-ray was discussed with a radiologist.  The spinous process of L4 was removed.  The stump was waxed.  A midline laminectomy was done at L4 proceeding from caudal to cephalad.  Bone was thinned with a Midas AM8 drill and removed with pneumatic Kerrisons.  At the cephalad end the laminectomy defect was carried from medial to lateral by thinning the bone with the Midas drill and removing the thin shell of remaining bone with 90 degree Kerrisons.  This took us out to the pedicle of L4 on the left.  A similar procedure was  done at L5 to expose the pedicle of L5 on the left.  The pars interarticularis and inferior articular process of L4 were cleaned off with the DoubleDutch cautery on a low setting.  A channel was then drilled from the medial aspect of the pedicle of L4 to the medial aspect of the pedicle of L5 preserving a good healthy strut of pars interarticularis and inferior articular process.  Bone medial to the channel was removed by thinning it with the Midas drill and removing it with Kerrison rongeurs.  A foraminotomy was then done over the L4 nerve root by working from the pedicle of L4 on down and from the pedicle of L5 on up with a combination of Link rongeurs and angled curettes of increasing size.  In this way a good foraminotomy was done using an undercutting technique that preserved the pars interarticularis and inferior articular process and therefore the facet joint.  We could pass a Galan probe out into the L4-5 foramen easily without meeting significant resistance.  A similar laminectomy and foraminotomy were done at L5-S1 on the left.  At this level we explored under the dural sac and found a large extruded free fragment disc.  This was removed with a combination of down-biting curettes and biopsy forceps until no disc could be found in the spinal canal or under the dural sac or under the S1 nerve root.  The disc space was incised and entered with biopsy forceps and cleaned out with both straight and up-biting biopsy forceps.  No further disc could be found down in the disc space on exploring with a long nerve hook including both endplates and up under the annulus.  The microscope and drill and cautery were then moved to the opposite side of the table and similar laminectomies and foraminotomies were done at L4-5 and L5-S1 on the right.  We did not remove disc on the right side.  Hemostasis was obtained with a combination of bone wax and bipolar cautery on a low setting and Surgiflo and epidural Gelfoam soaked in  thrombin.  Blood loss was replaced with Cell Saver.  There was a tiny slit in the dura about 2 mm in length with intact arachnoid and no spinal fluid leak.  This slit was covered with bloody DuraGen and DuraSeal.  Lee Lara CNP, closed.  The wound was copiously irrigated with antibiotics and with Betadine.  A 10 mm flat Cyrus-Peralta drain was left in the epidural space and brought out through a separate stab incision and secured with 2-0 silk.  The retractors were removed.  The muscle fascia was closed with 0 Vicryl.  Subcutaneous fat was closed with 2-0 Vicryl.  Skin was closed with staples.  A sterile dressing was applied.  The patient was log rolled from prone back to supine.  As far as I can tell he tolerated the procedure well.  As far as I can tell no significant complications were encountered.

## 2022-06-17 NOTE — PLAN OF CARE
Problem: Neurologic Impairment (Spinal Surgery)  Goal: Optimal Neurologic Function  Intervention: Optimize Neurologic Function  Recent Flowsheet Documentation  Taken 6/17/2022 1330 by Mireya Kent, RN  Body Position: supine  Taken 6/17/2022 0900 by Mireya Kent, RN  Body Position: supine   Goal Outcome Evaluation:  Patient c/o some lower back and left shoulder discomfort 5/10. Patient requested only the scheduled tylenol which was given. Relief to 2/10. Refused offer of cold pack. HUSSEIN 55 ml of serosanguinous fluids. Has remained flat in bed with occasional rolling side to side. Dressing has remained dry and intact. Numbness and tingling bilaterally both feet and numbness  left leg. Patient states that this is not new. Good strength in lower extremities. Blood sugar checks 171 and 242. Patient refuses sliding scale insulin coverage. This nurse especially encouraged coverage with the 242 level, but patient still refused. Patient aware that flat bedrest will be done at 1600. Patient's wife is a patient down in the ER. Was able to speak with her this afternoon.

## 2022-06-17 NOTE — PLAN OF CARE
Problem: Risk for Delirium  Goal: Optimal Coping  Outcome: Ongoing, Progressing  Goal: Improved Behavioral Control  Outcome: Ongoing, Progressing  Goal: Improved Attention and Thought Clarity  Outcome: Ongoing, Progressing  Goal: Improved Sleep  Outcome: Ongoing, Progressing     Problem: Bleeding (Spinal Surgery)  Goal: Absence of Bleeding  Outcome: Ongoing, Progressing     Problem: Bowel Motility Impaired (Spinal Surgery)  Goal: Effective Bowel Elimination  Outcome: Ongoing, Progressing     Problem: Fluid and Electrolyte Imbalance (Spinal Surgery)  Goal: Fluid and Electrolyte Balance  Outcome: Ongoing, Progressing     Problem: Functional Ability Impaired (Spinal Surgery)  Goal: Optimal Functional Ability  Outcome: Ongoing, Progressing     Problem: Infection (Spinal Surgery)  Goal: Absence of Infection Signs and Symptoms  Outcome: Ongoing, Progressing     Problem: Neurologic Impairment (Spinal Surgery)  Goal: Optimal Neurologic Function  Outcome: Ongoing, Progressing     Problem: Ongoing Anesthesia Effects (Spinal Surgery)  Goal: Anesthesia/Sedation Recovery  Outcome: Ongoing, Progressing  Intervention: Optimize Anesthesia Recovery  Recent Flowsheet Documentation  Taken 6/16/2022 1751 by Telly Luke RN  Safety Promotion/Fall Prevention:   activity supervised   bed alarm on   clutter free environment maintained   fall prevention program maintained   lighting adjusted   room door open   supervised activity     Problem: Pain (Spinal Surgery)  Goal: Acceptable Pain Control  Outcome: Ongoing, Progressing  Intervention: Prevent or Manage Pain  Recent Flowsheet Documentation  Taken 6/16/2022 2208 by Telly Luke, RN  Pain Management Interventions: medication offered but refused     Problem: Postoperative Nausea and Vomiting (Spinal Surgery)  Goal: Nausea and Vomiting Relief  Outcome: Ongoing, Progressing     Problem: Postoperative Urinary Retention (Spinal Surgery)  Goal: Effective Urinary Elimination  Outcome:  Ongoing, Progressing     Problem: Respiratory Compromise (Spinal Surgery)  Goal: Effective Oxygenation and Ventilation  Outcome: Ongoing, Progressing    Pt alert and oriented x4. Pt c/o back pain and was given scheduled acetaminophen at this time, which have been effective. Pt reminded to notify staff if needing PRN pain medication. Pt stated understanding. Pt to be flat, but able to turn side to side for 24 hrs due to an arachnoid bleb. Pt has been supine this shift. Back surgical dressing had slight serosanguinous drainage to distal portion. No drainage coming out of dressing. HUSSEIN drain is intact and draining sanguineous output. Total output from PACU and PM shift was 100 mL. BP trending down since HS bumetanide and lisinopril. BP of 144/67 at 2046. Pt has numbness and tingling to bilateral hands and feet, which is pt's baseline. Pt able to move all extremities. Pt utilizing BiPAP from home for CORAZON. Pt given meds crushed w/ applesauce this shift. Pt tolerating oral liquids. Call light is within reach.

## 2022-06-18 ENCOUNTER — APPOINTMENT (OUTPATIENT)
Dept: OCCUPATIONAL THERAPY | Facility: HOSPITAL | Age: 77
DRG: 518 | End: 2022-06-18
Attending: SURGERY
Payer: MEDICARE

## 2022-06-18 ENCOUNTER — APPOINTMENT (OUTPATIENT)
Dept: OCCUPATIONAL THERAPY | Facility: HOSPITAL | Age: 77
DRG: 518 | End: 2022-06-18
Attending: NURSE PRACTITIONER
Payer: MEDICARE

## 2022-06-18 ENCOUNTER — APPOINTMENT (OUTPATIENT)
Dept: PHYSICAL THERAPY | Facility: HOSPITAL | Age: 77
DRG: 518 | End: 2022-06-18
Attending: NURSE PRACTITIONER
Payer: MEDICARE

## 2022-06-18 LAB
CREAT SERPL-MCNC: 1.16 MG/DL (ref 0.7–1.3)
GFR SERPL CREATININE-BSD FRML MDRD: 65 ML/MIN/1.73M2
GLUCOSE BLDC GLUCOMTR-MCNC: 116 MG/DL (ref 70–99)
GLUCOSE BLDC GLUCOMTR-MCNC: 130 MG/DL (ref 70–99)
GLUCOSE BLDC GLUCOMTR-MCNC: 136 MG/DL (ref 70–99)
GLUCOSE BLDC GLUCOMTR-MCNC: 250 MG/DL (ref 70–99)
GLUCOSE BLDC GLUCOMTR-MCNC: 84 MG/DL (ref 70–99)
HGB BLD-MCNC: 9.1 G/DL (ref 13.3–17.7)

## 2022-06-18 PROCEDURE — 97530 THERAPEUTIC ACTIVITIES: CPT | Mod: GP

## 2022-06-18 PROCEDURE — 97166 OT EVAL MOD COMPLEX 45 MIN: CPT | Mod: GO

## 2022-06-18 PROCEDURE — 36415 COLL VENOUS BLD VENIPUNCTURE: CPT | Performed by: INTERNAL MEDICINE

## 2022-06-18 PROCEDURE — 99233 SBSQ HOSP IP/OBS HIGH 50: CPT | Performed by: INTERNAL MEDICINE

## 2022-06-18 PROCEDURE — 250N000013 HC RX MED GY IP 250 OP 250 PS 637: Performed by: INTERNAL MEDICINE

## 2022-06-18 PROCEDURE — 120N000001 HC R&B MED SURG/OB

## 2022-06-18 PROCEDURE — 250N000013 HC RX MED GY IP 250 OP 250 PS 637: Performed by: NURSE PRACTITIONER

## 2022-06-18 PROCEDURE — 97162 PT EVAL MOD COMPLEX 30 MIN: CPT | Mod: GP

## 2022-06-18 PROCEDURE — 250N000013 HC RX MED GY IP 250 OP 250 PS 637: Performed by: FAMILY MEDICINE

## 2022-06-18 PROCEDURE — 82565 ASSAY OF CREATININE: CPT | Performed by: INTERNAL MEDICINE

## 2022-06-18 PROCEDURE — 250N000011 HC RX IP 250 OP 636: Performed by: NURSE PRACTITIONER

## 2022-06-18 PROCEDURE — 97535 SELF CARE MNGMENT TRAINING: CPT | Mod: GO

## 2022-06-18 PROCEDURE — 250N000013 HC RX MED GY IP 250 OP 250 PS 637: Performed by: SURGERY

## 2022-06-18 PROCEDURE — 85018 HEMOGLOBIN: CPT | Performed by: NURSE PRACTITIONER

## 2022-06-18 PROCEDURE — 97116 GAIT TRAINING THERAPY: CPT | Mod: GP

## 2022-06-18 RX ORDER — METHOCARBAMOL 500 MG/1
500 TABLET, FILM COATED ORAL 4 TIMES DAILY PRN
Status: DISCONTINUED | OUTPATIENT
Start: 2022-06-18 | End: 2022-06-20

## 2022-06-18 RX ORDER — ENOXAPARIN SODIUM 100 MG/ML
40 INJECTION SUBCUTANEOUS EVERY 24 HOURS
Status: DISCONTINUED | OUTPATIENT
Start: 2022-06-18 | End: 2022-06-20

## 2022-06-18 RX ORDER — TAMSULOSIN HYDROCHLORIDE 0.4 MG/1
0.4 CAPSULE ORAL DAILY
Status: DISCONTINUED | OUTPATIENT
Start: 2022-06-18 | End: 2022-07-06 | Stop reason: HOSPADM

## 2022-06-18 RX ADMIN — BUMETANIDE 3 MG: 1 TABLET ORAL at 08:13

## 2022-06-18 RX ADMIN — IPRATROPIUM BROMIDE 2 SPRAY: 21 SPRAY, METERED NASAL at 20:22

## 2022-06-18 RX ADMIN — METHOCARBAMOL 500 MG: 500 TABLET ORAL at 13:17

## 2022-06-18 RX ADMIN — Medication 500 MG: at 08:11

## 2022-06-18 RX ADMIN — GABAPENTIN 1200 MG: 300 CAPSULE ORAL at 08:12

## 2022-06-18 RX ADMIN — THERA TABS 1 TABLET: TAB at 08:12

## 2022-06-18 RX ADMIN — TAMSULOSIN HYDROCHLORIDE 0.4 MG: 0.4 CAPSULE ORAL at 14:57

## 2022-06-18 RX ADMIN — LEVOTHYROXINE SODIUM 25 MCG: 0.03 TABLET ORAL at 06:28

## 2022-06-18 RX ADMIN — INSULIN GLARGINE 30 UNITS: 100 INJECTION, SOLUTION SUBCUTANEOUS at 20:24

## 2022-06-18 RX ADMIN — ACETAMINOPHEN 975 MG: 325 TABLET, FILM COATED ORAL at 18:51

## 2022-06-18 RX ADMIN — ENOXAPARIN SODIUM 40 MG: 40 INJECTION SUBCUTANEOUS at 21:25

## 2022-06-18 RX ADMIN — GLIMEPIRIDE 4 MG: 1 TABLET ORAL at 08:13

## 2022-06-18 RX ADMIN — ROSUVASTATIN CALCIUM 10 MG: 10 TABLET, FILM COATED ORAL at 20:13

## 2022-06-18 RX ADMIN — LISINOPRIL 10 MG: 5 TABLET ORAL at 08:13

## 2022-06-18 RX ADMIN — Medication 500 MG: at 13:17

## 2022-06-18 RX ADMIN — POLYETHYLENE GLYCOL 3350 17 G: 17 POWDER, FOR SOLUTION ORAL at 08:09

## 2022-06-18 RX ADMIN — METFORMIN HYDROCHLORIDE 1000 MG: 500 TABLET, FILM COATED ORAL at 08:12

## 2022-06-18 RX ADMIN — FERROUS SULFATE TAB 325 MG (65 MG ELEMENTAL FE) 325 MG: 325 (65 FE) TAB at 08:12

## 2022-06-18 RX ADMIN — PANTOPRAZOLE SODIUM 40 MG: 40 TABLET, DELAYED RELEASE ORAL at 06:28

## 2022-06-18 RX ADMIN — SENNOSIDES AND DOCUSATE SODIUM 1 TABLET: 8.6; 5 TABLET ORAL at 20:12

## 2022-06-18 RX ADMIN — OXYCODONE HYDROCHLORIDE 5 MG: 5 TABLET ORAL at 08:10

## 2022-06-18 RX ADMIN — ACETAMINOPHEN 975 MG: 325 TABLET, FILM COATED ORAL at 02:18

## 2022-06-18 RX ADMIN — FERROUS SULFATE TAB 325 MG (65 MG ELEMENTAL FE) 325 MG: 325 (65 FE) TAB at 11:42

## 2022-06-18 RX ADMIN — DICLOFENAC SODIUM 2 G: 10 GEL TOPICAL at 08:15

## 2022-06-18 RX ADMIN — OXYCODONE HYDROCHLORIDE 5 MG: 5 TABLET ORAL at 20:12

## 2022-06-18 RX ADMIN — Medication 100 MCG: at 08:11

## 2022-06-18 RX ADMIN — LIDOCAINE: 50 OINTMENT TOPICAL at 20:22

## 2022-06-18 RX ADMIN — METFORMIN HYDROCHLORIDE 1000 MG: 500 TABLET, FILM COATED ORAL at 16:59

## 2022-06-18 RX ADMIN — PANTOPRAZOLE SODIUM 40 MG: 40 TABLET, DELAYED RELEASE ORAL at 16:59

## 2022-06-18 RX ADMIN — GABAPENTIN 1200 MG: 300 CAPSULE ORAL at 20:12

## 2022-06-18 RX ADMIN — ACETAMINOPHEN 975 MG: 325 TABLET, FILM COATED ORAL at 11:42

## 2022-06-18 RX ADMIN — SENNOSIDES AND DOCUSATE SODIUM 1 TABLET: 8.6; 5 TABLET ORAL at 08:13

## 2022-06-18 RX ADMIN — FERROUS SULFATE TAB 325 MG (65 MG ELEMENTAL FE) 325 MG: 325 (65 FE) TAB at 17:00

## 2022-06-18 RX ADMIN — METHOCARBAMOL 500 MG: 500 TABLET ORAL at 21:28

## 2022-06-18 RX ADMIN — Medication 500 MG: at 20:12

## 2022-06-18 ASSESSMENT — ACTIVITIES OF DAILY LIVING (ADL)
ADLS_ACUITY_SCORE: 22
ADLS_ACUITY_SCORE: 22
ADLS_ACUITY_SCORE: 28
ADLS_ACUITY_SCORE: 28
ADLS_ACUITY_SCORE: 22
ADLS_ACUITY_SCORE: 28
PREVIOUS_RESPONSIBILITIES: MEAL PREP;LAUNDRY;HOUSEKEEPING;SHOPPING;MEDICATION MANAGEMENT;FINANCES;DRIVING
ADLS_ACUITY_SCORE: 22
ADLS_ACUITY_SCORE: 28
DEPENDENT_IADLS:: INDEPENDENT

## 2022-06-18 NOTE — PLAN OF CARE
Problem: Pain (Spinal Surgery)  Goal: Acceptable Pain Control  Outcome: Ongoing, Progressing     Problem: Postoperative Urinary Retention (Spinal Surgery)  Goal: Effective Urinary Elimination  Outcome: Ongoing, Progressing     Problem: Respiratory Compromise (Spinal Surgery)  Goal: Effective Oxygenation and Ventilation  Outcome: Ongoing, Progressing   Goal Outcome Evaluation:      Patient continues to report minimal incisional pain. Rated pain between 3-4 throughout the night. Medicated with scheduled Tylenol only. Patient declined offer  for stronger pain meds. Voiding since Carpenter dcd. Patient stated feels like he is emptying his bladder. PVRs were [phone]. Previous shift reported 153.

## 2022-06-18 NOTE — PLAN OF CARE
Patient was on flat bedrest until 1600. After the bedrest period, had of bed was gradually raised while monitoring tolerance.  Patient tolerated having head of bed at 30 degrees. Proceeded to sit by the bedside. Ate 100% supper. Carpenter catheter discontinued at approximately 1800. Patient voided x 1.  ml. Patient using CPAP at bedtime.  Problem: Bleeding (Spinal Surgery)  Goal: Absence of Bleeding  Outcome: Ongoing, Progressing     Problem: Neurologic Impairment (Spinal Surgery)  Goal: Optimal Neurologic Function  Outcome: Ongoing, Progressing  Intervention: Optimize Neurologic Function  Recent Flowsheet Documentation  Taken 6/17/2022 2000 by Brooke Malik, RN  Body Position: sitting up in bed  Taken 6/17/2022 1600 by Brooke Malik, RN  Body Position: supine     Problem: Pain (Spinal Surgery)  Goal: Acceptable Pain Control  Outcome: Ongoing, Progressing   Goal Outcome Evaluation:

## 2022-06-18 NOTE — CONSULTS
Care Management Initial Consult    General Information  Assessment completed with: Sony Thomas  Type of CM/SW Visit: Initial Assessment    Primary Care Provider verified and updated as needed: Yes   Readmission within the last 30 days: no previous admission in last 30 days      Reason for Consult: discharge planning  Advance Care Planning: Advance Care Planning Reviewed: other (see comments) (pt states is working on a DLC but not finished with it yet)          Communication Assessment  Patient's communication style: spoken language (English or Bilingual)    Hearing Difficulty or Deaf: no   Wear Glasses or Blind: yes    Cognitive  Cognitive/Neuro/Behavioral: WDL                      Living Environment:   People in home: spouse  wife Sirisha  Current living Arrangements: house      Able to return to prior arrangements: no (needs TCU)       Family/Social Support:  Care provided by: self  Provides care for: spouse  Marital Status:   Wife, Children  Sirisha       Description of Support System: Supportive, Involved    Support Assessment: Adequate family and caregiver support, Adequate social supports    Current Resources:   Patient receiving home care services: No     Community Resources: None  Equipment currently used at home: raised toilet seat, grab bar, toilet, grab bar, tub/shower, shower chair, walker, rolling, other (see comments) (bipap)  Supplies currently used at home: None    Employment/Financial:  Employment Status:          Financial Concerns:             Lifestyle & Psychosocial Needs:  Social Determinants of Health     Tobacco Use: Medium Risk     Smoking Tobacco Use: Former Smoker     Smokeless Tobacco Use: Never Used   Alcohol Use: Not on file   Financial Resource Strain: Not on file   Food Insecurity: Not on file   Transportation Needs: Not on file   Physical Activity: Not on file   Stress: Not on file   Social Connections: Not on file   Intimate Partner Violence: Not on file   Depression: Not on  "file   Housing Stability: Not on file       Functional Status:  Prior to admission patient needed assistance:   Dependent ADLs:: Independent  Dependent IADLs:: Independent  Assesssment of Functional Status: Not at baseline with mobility, Needs placement in a SNF/TCF for rehabilitation    Mental Health Status:          Chemical Dependency Status:                Values/Beliefs:  Spiritual, Cultural Beliefs, Latter-day Practices, Values that affect care:                 Additional Information:    SW met with pt to complete assessment, address consult, and discuss discharge planning.  Pt and wife live in [place], [place].  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  Per pt, wife has neuropathy in her legs and back.  Pt's wife Sirisha is currently also at [facility] on another floor.  Both pt and wife have recommendations for them to go to TCU.  SW coordinating with pt's wife's KISHORE Nielson today to send referrals to same facilities, as pt and wife would like to go to same TCU if possible.  Pt stated that he had a bad experience at [facility] in 2011 where the facility did not notice that he put on 23 pounds of water weight.  He stated that he tried to tell them.  Three days after leaving TCU he could not breathe and had to be rehospitalized.  Pt re[prted that he has  (secondary insurance) for veterans and he is also \"agent orange disabled.\"  Pt requests that TCU referrals be sent to facilities in [place] as close as possible to [place].  Pt anticipates his son will be able to transport he and wife to TCU.  Pt's son lives in [place], [place].  SW sent TCU referrals to [facility], [facility], [facility], and [facility] (all pending).  Needs PAS.        TONY Thompson, SHABBIR 06/18/22 6:55 PM            "

## 2022-06-18 NOTE — PROGRESS NOTES
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 She has difficulties with mobility.   General Information   Onset of Illness/Injury or Date of Surgery 06/16/22   Referring Physician Melisa Lara, NP   Patient/Family Therapy Goals Statement (PT) return home   Pertinent History of Current Problem (include personal factors and/or comorbidities that impact the POC) 77 year old male POD #2 DECOMPRESSIVE LUMBAR LAMINECTOMY LUMBAR 4-LUMBAR 5 AND LUMBAR 5-SACRAL 1 BILATERAL-LEFT SIDE FIRST-PLUS BILATERAL FORAMINOTOMIES PLUS REMOVAL OF HERNIATED DISC LUMBAR 5-SACRAL 1 LEFT with Dr Samuel. Arachnoid bleb intraop, covered with durgen, duraseal. No CSF leak.   Existing Precautions/Restrictions spinal   Strength (Manual Muscle Testing)   Strength (Manual Muscle Testing) Deficits observed during functional mobility   Bed Mobility   Bed Mobility supine-sit;sit-supine   Supine-Sit [place] (Bed Mobility) supervision;verbal cues   Sit-Supine [place] (Bed Mobility) supervision;verbal cues   Bed Mobility Limitations decreased ability to use arms for pushing/pulling;impaired ability to control trunk for mobility   Impairments Contributing to Impaired Bed Mobility decreased strength   Assistive Device (Bed Mobility) bed rails   Transfers   Transfers sit-stand transfer   Sit-Stand Transfer   Sit-Stand [place] (Transfers) contact guard   Assistive Device (Sit-Stand Transfers) walker, front-wheeled   Gait/Stairs (Locomotion)   [place] Level (Gait) contact guard    Assistive Device (Gait) walker, front-wheeled   Distance in Feet (Required for LE Total Joints) 20'   Pattern (Gait) step-through   Deviations/Abnormal Patterns (Gait) festinating/shuffling;gait speed decreased   Clinical Impression   Criteria for Skilled Therapeutic Intervention Yes, treatment indicated   PT Diagnosis (PT) Impaired functional mobility   Influenced by the following impairments weakness, fatigue, post-op   Functional limitations due to impairments Impaired strength, impaired transfers, impaired gait   Clinical Presentation (PT Evaluation Complexity) Stable/Uncomplicated   Clinical Presentation Rationale Presents as diagnosed   Clinical Decision Making (Complexity) moderate complexity   Planned Therapy Interventions (PT) balance training;bed mobility training;gait training;strengthening;transfer training   Risk & Benefits of therapy have been explained patient   PT Discharge Planning   PT Discharge Recommendation (DC Rec) Transitional Care Facility;home with assist;home with home care physical therapy   PT Rationale for DC Rec Pt is caregiver 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 Pt req. Ax1 with mobility at this time. TCU may be best option.   Plan of Care Review   Plan of Care Reviewed With patient   Total Evaluation Time   Total Evaluation Time (Minutes) 10   Physical Therapy Goals   PT Frequency 2x/day   PT Predicted Duration/Target Date for Goal Attainment 06/25/22   PT Goals Bed Mobility;Transfers;Gait   PT: Bed Mobility Independent;Supine to/from sit;Within precautions   PT: Transfers Modified independent;Sit to/from stand;Bed to/from chair;Assistive device;Within precautions   PT: Gait Supervision/stand-by assist;Rolling walker;Within precautions;Greater than 200 feet       Debby Baez, PT, DPT  6/18/2022

## 2022-06-18 NOTE — PROGRESS NOTES
"Neurosurgery   June 18, 2022    A/P: Thomas Steve is a 77 year old male POD #2 DECOMPRESSIVE LUMBAR LAMINECTOMY LUMBAR 4-LUMBAR 5 AND LUMBAR 5-SACRAL 1 BILATERAL-LEFT SIDE FIRST-PLUS BILATERAL FORAMINOTOMIES PLUS REMOVAL OF HERNIATED DISC LUMBAR 5-SACRAL 1 LEFT with Dr Samuel. Arachnoid bleb intraop, covered with durgen, duraseal. No CSF leak.     Thomas reports expected post op incisional back pain. Legs, feet feel same as pre-op with baseline neuropathy. He has utilized very little pain medicine. Does not like ice on his back.     Discharge timing will be determined by pain control, mobility tolerance, HUSSEIN drain output. Discharge disposition depending on wife's status. She is evidently admitted to P1. Patient would prefer to go home vs TCU. May be ready tomorrow vs Monday.     PLAN:   1.  Continue pain management - seems adequate  2. HUSSEIN drain - monitor and record output; moderate output yet so will likely leave today   3. Xarelto (Afib) - resume POD #5, SCD, Lovenox POD #2   4. PT, OT   5. Carpenter out - need PVR  6. A1C 7.5 - maintain adequate BG control to aide wound healing     HPI: Presented with back pain, right leg pain. Weakness bilateral LE. Worse with walking. No bowel or bladder issues. Diabetes, CABG, AFIB, Xarelto. MRI with listhesis L4-5, spinal stenosis. Stenosis also at L5-S1 with large disc herniation L5-S1 on the left.     Subjective: no real complaints. Baseline neuropathy. Denies headache, nausea, leg pain.     Physical Exam  /57 (BP Location: Right arm)   Pulse 60   Temp 98.1  F (36.7  C) (Oral)   Resp 16   Ht 1.778 m (5' 10\")   Wt 94.7 kg (208 lb 11.2 oz)   SpO2 95%   BMI 29.95 kg/m      General: alert, oriented. KIM. Speech clear.     Motor: normal bulk and tone     Strength: Full strength LE bilaterally     Sensation: intact to light touch; baseline neuropathy bilaterally     Incision: Dressing changed. Area surrounding staples soft, flat, non tender. No drainage noted from wound. " Dressing was Saturday.     HUSSEIN: Moderate amount serosanguinous output. Drain stripped.     Labs: reviewed. Hgb stable 9.1.    Imaging: No imaging required     SAAD Manrique  [facility] Neurosurgery  O: [phone]

## 2022-06-18 NOTE — PLAN OF CARE
Goal Outcome Evaluation:  Problem: Postoperative Urinary Retention (Spinal Surgery)  Goal: Effective Urinary Elimination  Outcome: Ongoing, Progressing   Thomas voided at 1000, PVR= 266,  Patient up ambulating in aguirre and room, Voided again at 1400. PVR = 359. Patient stated that he didn't think he could void any more at the time. Straight cathed for 350 ml.   Problem: Pain (Spinal Surgery)  Goal: Acceptable Pain Control  Outcome: Ongoing, Progressing  Intervention: Prevent or Manage Pain  Recent Flowsheet Documentation  Taken 6/18/2022 1317 by Mireya Kent, RN  Pain Management Interventions: medication (see MAR)  Taken 6/18/2022 0909 by Mireya Kent, RN  Pain Management Interventions: cold applied  Taken 6/18/2022 0810 by Mireya Kent, RN  Pain Management Interventions: medication (see MAR)  Pain level 4-5/10, oxycodone 5 mg given x 1 with much encouragement. Then worked with PT. Pain level remained at 4/10, Ice to area. Patient refused offer of narcotic, Tylenol given with some relief, Robaxin given x1 with some relief noted. HUSSEIN output 75 ml of serosanguinous fluid, Dressing remains dry and intact after it was changed in the AM.  Encouraged up in chair and ambulating with SBA, walker and gait belt

## 2022-06-18 NOTE — PROGRESS NOTES
06/18/22 1059   Quick Adds   Type of Visit Initial Occupational Therapy Evaluation   Living Environment   People in Home spouse   Current Living Arrangements house   Home Accessibility wheelchair accessible  (ramp to enter)   Transportation Anticipated family or friend will provide   Self-Care   Usual Activity Tolerance good   Current Activity Tolerance moderate   Regular Exercise Yes   Activity/Exercise Type walking   Exercise Amount/Frequency 45 mins;daily;other (see comments)  (walked dogs 2x/day)   Equipment Currently Used at Home raised toilet seat;grab bar, toilet;grab bar, tub/shower;shower chair;other (see comments);walker, rolling  (platform 4WW)   Fall history within last six months no   Instrumental Activities of Daily Living (IADL)   Previous Responsibilities meal prep;laundry;housekeeping;shopping;medication management;finances;driving   IADL Comments (S)  Primary caregiver for wife who has mobility difficulties. Pt provides IADL task for wife.   General Information   Onset of Illness/Injury or Date of Surgery 06/16/22   Referring Physician Melisa Lara NP   Patient/Family Therapy Goal Statement (OT) get home and be functional   Additional Occupational Profile Info/Pertinent History of Current Problem 77-year-old male with CAD, HF MAF, DM2, HTN, atrial fibrillation, hypothyroidism presented for planned neurosurgical intervention to treat herniated lumbar disc.  [facility] consulted for management of diabetes, HTN. Patient underwent scheduled decompressive lumbar laminectomy of L4-L5, L5-S1 with bilateral foraminotomies and removal of herniated disc in the L1-S1 region with neurosurgery on 6/16/2022   Existing Precautions/Restrictions spinal;brace worn when out of bed   Cognitive Status Examination   Orientation Status orientation to person, place and time   Visual Perception   Visual Impairment/Limitations corrective lenses full-time;WNL   Sensory   Sensory Quick Adds Other (describe)    Integumentary/Edema   Integumentary/Edema no deficits were identifed   Posture   Posture forward head position   Posture Comments corrected with cue   Range of Motion Comprehensive   Comment, General Range of Motion L shldr limited d/t rot cuff injury 4 yrs ago exacerbated by reinjury this past winter ~100 flex AROM   Strength Comprehensive (MMT)   Comment, General Manual Muscle Testing (MMT) Assessment  WFL   Coordination   Upper Extremity Coordination No deficits were identified   Bed Mobility   Bed Mobility rolling left;rolling right;supine-sit   Rolling Left [place] (Bed Mobility) verbal cues;contact guard   Rolling Right [place] (Bed Mobility) verbal cues;contact guard   Supine-Sit [place] (Bed Mobility) verbal cues;minimum assist (75% patient effort)   Transfers   Transfers sit-stand transfer   Sit-Stand Transfer   Sit-Stand [place] (Transfers) contact guard;verbal cues   Assistive Device (Sit-Stand Transfers) walker, front-wheeled   Balance   Balance Assessment standing balance: dynamic   Balance Comments CGA - 1 minor LOB with CG/Min A to recover during functional mob   Activities of Daily Living   BADL Assessment/Intervention lower body dressing   Lower Body Dressing Assessment/Training   Position (Lower Body Dressing) edge of bed sitting   Comment, (Lower Body Dressing) DIfficulty flexing LE's to bring feet up to hands.   [place] Level (Lower Body Dressing) moderate assist (50% patient effort)   Clinical Impression   Criteria for Skilled Therapeutic Interventions Met (OT) Yes, treatment indicated   OT Diagnosis decreased ADL/IADL skills, decreased functional mobility   OT Problem List-Impairments impacting ADL problems related to;activity tolerance impaired;balance;flexibility;pain;post-surgical precautions   Assessment of Occupational Performance 3-5 Performance Deficits   Identified Performance Deficits dressing, grooming, toileting, bathing, and all IADL   Planned Therapy  Interventions (OT) ADL retraining;transfer training   Clinical Decision Making Complexity (OT) moderate complexity   Anticipated Equipment Needs Upon Discharge (OT) dressing equipment  (pt states wife has AE for drsg)   Risk & Benefits of therapy have been explained evaluation/treatment results reviewed;care plan/treatment goals reviewed;patient   OT Discharge Planning   OT Discharge Recommendation (DC Rec) Transitional Care Facility;home with assist;home with home care occupational therapy   OT Rationale for DC Rec Pt currently requires assist for BADL and functional mobility to prevent falls. Pending progress may be able to return home with increased support/assistance for him and for wife.   Total Evaluation Time (Minutes)   Total Evaluation Time (Minutes) 10   OT Goals   Therapy Frequency (OT) 2 times/day   OT Predicted Duration/Target Date for Goal Attainment 06/22/22   OT Goals Hygiene/Grooming;Lower Body Dressing;Toilet Transfer/Toileting;Transfers   OT: Hygiene/Grooming modified independent   OT: Lower Body Dressing Modified independent   OT: Transfer Modified independent   OT: Toilet Transfer/Toileting Modified independent

## 2022-06-18 NOTE — PROGRESS NOTES
Assessment & Plan   77-year-old male with CAD, HF MAF, DM2, HTN, atrial fibrillation, hypothyroidism presented for planned neurosurgical intervention to treat herniated lumbar disc.  [facility] consulted for management of diabetes, HTN    Active Problems:    Type 2 diabetes mellitus, with long-term current use of insulin (H)    Benign essential hypertension    CAD (coronary artery disease)    Chronic atrial fibrillation (H)    CORAZON (obstructive sleep apnea)    Lumbar stenosis with neurogenic claudication    Gastroesophageal reflux disease without esophagitis    Hypothyroidism    Normocytic anemia    Chronic left shoulder pain    Nasal congestion    Lumbar spinal stenosis    Present on Admission:    Lumbar stenosis with neurogenic claudication    Lumbar spinal stenosis        6/18 :     Neuro status stable  HUSSEIN drain present  Blood sugars in 200's, change sliding scale insulin - to high resistant    Urinary retention, straight cath/segura prn  Started flomax      Not medically ready for discharge at this time.  Tcu versus home        A/p :       Lumbar spinal stenosis with lumbar disc herniation  -Patient underwent scheduled decompressive lumbar laminectomy of L4-L5, L5-S1 with bilateral foraminotomies and removal of herniated disc in the L1-S1 region with neurosurgery on 6/16/2022.  -Scheduled Tylenol 3 times daily  -Gabapentin 1200 mg p.o. twice daily  -Pain management per neurosurgery  -Rest of postoperative management per neurosurgery    CAD, HFmEF  -Patient with CABG in 2011, SPECT MPI 5/6/2021 abnormal with a large area of transmural infarction of the mid to distal anterior septal, anterior, apical wall.  TTE 12/31/2019 with ejection fraction of 50 to 55%, abnormal septal wall motion consistent with ventricular paced rhythm, mild aortic stenosis and mild to moderate tricuspid insufficiency.  Patient appears euvolemic  -Stop IVF  -Bumex 3 mg p.o. daily  -Simvastatin 10 mg p.o. daily    Chronic atrial fibrillation, sick  "sinus syndrome  -Ventricular pacemaker in place  -Continue to hold home rivaroxaban, may restart 2 to 3 days after surgery when neurosurgeon allows.    DM2  -No A1C since 2019, will check  -Hold home glimepiride and metformin for now, resume on 6/17 with full breakfast  -Glargine 30 units subcu q. Bedtime  -Sensitive scale aspart insulin 3 times daily AC.  -Accu-Chek 3 times daily AC at bedtime  -Hypoglycemic protocol    Nasal congestion, postnasal drip  -Exacerbated in the setting of needing to lie flat for 24 hours  -Start ipratropium nasal spray twice daily    Chronic left shoulder pain  -Start lidocaine and Voltaren trading on and off 4 times daily    HTN  -Lisinopril 10 mg p.o. daily    GERD  -Pantoprazole 40 mg p.o. daily    CORAZON   -CPAP    Hypothyroidism  -TSH 2.05  -Levothyroxine 25 mcg p.o. daily    Normocytic anemia  -Hemoglobin 11.4 with MCV of 90     Clinically Significant Risk Factors Present on Admission              # Diabetes, type II: last A1C 7.5 % (Ref range: <=5.6 %)  # Overweight: Estimated body mass index is 29.95 kg/m  as calculated from the following:    Height as of this encounter: 1.778 m (5' 10\").    Weight as of this encounter: 94.7 kg (208 lb 11.2 oz).        Electrolytes: within normal limits  Fluids: stop IVF  Diet: advance to diabetic per neurosurgery  VTE prophylaxis: SCD boots    COVID19 symptom check 6/16/2022  Fevers/Chills/myalgias: NEGATIVE TO ALL  Sick contacts/COVID19 exposure: NEGATIVE TO ALL  Cough/trouble breathing/SOB/sore throat: NEGATIVE TO ALL  Diarrhea: NEGATIVE    Expected Discharge: 06/19/2022     Anticipated discharge location: home    Delays:            Review of Systems: History obtained from the patient  General ROS: negative  for  - chills, fatigue, fever  ENT ROS: negative for - headaches, hearing change, positive for nasal congestion, for nasal discharge  Hematological and Lymphatic ROS: negative for - bleeding problems, blood clots, bruising  Respiratory ROS: " negative for - cough, pleuritic pain, shortness of breath  Cardiovascular ROS: negative for - chest pain, dyspnea on exertion, edema  Gastrointestinal ROS: negative for - abdominal pain, constipation, diarrhea, and nausea/vomiting  Genito-Urinary ROS: negative for -  dysuria, hematuria  Musculoskeletal ROS: Positive for mild lower back pain and chronic left shoulder pain  Neurological ROS: negative for - behavioral changes, confusion, dizziness, numbness/tingling   Dermatological ROS: negative for pruritus, rash    Objective    Physical Examination:   General appearance - alert, well appearing, and in no distress and oriented to person, place, and time  Mental status - alert, oriented to person, place, and time, normal mood, behavior, speech, dress, motor activity, and thought processes  HEENT - sclera anicteric, left eye normal, right eye normal, nares normal and patent, no erythema,  Respiratory - clear to auscultation, no wheezes, rales or rhonchi, symmetric air entry, no tachypnea, retractions or cyanosis, nasal cannula in place, patient frequently clearing his throat and attempting to spit out phlegm  Cardiac - normal rate, regular rhythm, normal S1, S2, blowing systolic murmur present, rubs, clicks or gallops, no JVD  Abdomen - soft, nontender, nondistended, no masses or organomegaly no rebound tenderness noted bowel sounds normal, no bladder distension noted  Neurological - alert, oriented, normal speech, no focal findings or movement disorder noted  Musculoskeletal - no joint tenderness, deformity or swelling, full range of motion without pain  Extremities - peripheral pulses normal, no pedal edema, no clubbing or cyanosis  Skin - normal coloration and turgor, no rashes, no suspicious skin lesions noted    Temp:  [98  F (36.7  C)-100.8  F (38.2  C)] 98.1  F (36.7  C)  Pulse:  [60] 60  Resp:  [16] 16  BP: (103-117)/(55-65) 115/57  SpO2:  [91 %-95 %] 95 %      Intake/Output Summary (Last 24 hours) at 6/16/2022  1826  Last data filed at 6/16/2022 1733  Gross per 24 hour   Intake 3505 ml   Output 1450 ml   Net 2055 ml       Results    Recent Results (from the past 24 hour(s))   Glucose by meter    Collection Time: 06/17/22  5:13 PM   Result Value Ref Range    GLUCOSE BY METER POCT 161 (H) 70 - 99 mg/dL   Glucose by meter    Collection Time: 06/17/22  9:21 PM   Result Value Ref Range    GLUCOSE BY METER POCT 250 (H) 70 - 99 mg/dL   Hemoglobin    Collection Time: 06/18/22  5:39 AM   Result Value Ref Range    Hemoglobin 9.1 (L) 13.3 - 17.7 g/dL   Creatinine    Collection Time: 06/18/22  5:39 AM   Result Value Ref Range    Creatinine 1.16 0.70 - 1.30 mg/dL    GFR Estimate 65 >60 mL/min/1.73m2   Glucose by meter    Collection Time: 06/18/22  7:38 AM   Result Value Ref Range    GLUCOSE BY METER POCT 84 70 - 99 mg/dL   Glucose by meter    Collection Time: 06/18/22 12:36 PM   Result Value Ref Range    GLUCOSE BY METER POCT 130 (H) 70 - 99 mg/dL          Lab Results personally reviewed 6/16  Imaging Results personally reviewed 6/16  Discussed with patient and his wife  Reviewed old records     Advanced Care Planning  Discharge Planning discussed with patient and family  Discussed care with patient and family for 70 minutes with greater than 50% of time spent in counseling and coordination of care.

## 2022-06-19 ENCOUNTER — APPOINTMENT (OUTPATIENT)
Dept: PHYSICAL THERAPY | Facility: HOSPITAL | Age: 77
DRG: 518 | End: 2022-06-19
Attending: SURGERY
Payer: MEDICARE

## 2022-06-19 ENCOUNTER — APPOINTMENT (OUTPATIENT)
Dept: OCCUPATIONAL THERAPY | Facility: HOSPITAL | Age: 77
DRG: 518 | End: 2022-06-19
Attending: SURGERY
Payer: MEDICARE

## 2022-06-19 LAB
ALBUMIN UR-MCNC: NEGATIVE MG/DL
APPEARANCE UR: CLEAR
BILIRUB UR QL STRIP: NEGATIVE
COLOR UR AUTO: NORMAL
ERYTHROCYTE [DISTWIDTH] IN BLOOD BY AUTOMATED COUNT: 15.1 % (ref 10–15)
GLUCOSE BLDC GLUCOMTR-MCNC: 151 MG/DL (ref 70–99)
GLUCOSE BLDC GLUCOMTR-MCNC: 166 MG/DL (ref 70–99)
GLUCOSE BLDC GLUCOMTR-MCNC: 189 MG/DL (ref 70–99)
GLUCOSE BLDC GLUCOMTR-MCNC: 88 MG/DL (ref 70–99)
GLUCOSE UR STRIP-MCNC: NEGATIVE MG/DL
HCT VFR BLD AUTO: 28.4 % (ref 40–53)
HGB BLD-MCNC: 8.9 G/DL (ref 13.3–17.7)
HGB BLD-MCNC: 9.1 G/DL (ref 13.3–17.7)
HGB UR QL STRIP: NEGATIVE
KETONES UR STRIP-MCNC: NEGATIVE MG/DL
LACTATE SERPL-SCNC: 1.6 MMOL/L (ref 0.7–2)
LEUKOCYTE ESTERASE UR QL STRIP: NEGATIVE
MCH RBC QN AUTO: 29.1 PG (ref 26.5–33)
MCHC RBC AUTO-ENTMCNC: 31.3 G/DL (ref 31.5–36.5)
MCV RBC AUTO: 93 FL (ref 78–100)
NITRATE UR QL: NEGATIVE
PH UR STRIP: 5 [PH] (ref 5–7)
PLATELET # BLD AUTO: 152 10E3/UL (ref 150–450)
RBC # BLD AUTO: 3.06 10E6/UL (ref 4.4–5.9)
SP GR UR STRIP: 1.02 (ref 1–1.03)
UROBILINOGEN UR STRIP-MCNC: <2 MG/DL
WBC # BLD AUTO: 13.5 10E3/UL (ref 4–11)

## 2022-06-19 PROCEDURE — 99232 SBSQ HOSP IP/OBS MODERATE 35: CPT | Performed by: INTERNAL MEDICINE

## 2022-06-19 PROCEDURE — 250N000013 HC RX MED GY IP 250 OP 250 PS 637: Performed by: NURSE PRACTITIONER

## 2022-06-19 PROCEDURE — 250N000011 HC RX IP 250 OP 636

## 2022-06-19 PROCEDURE — 87040 BLOOD CULTURE FOR BACTERIA: CPT

## 2022-06-19 PROCEDURE — 85018 HEMOGLOBIN: CPT

## 2022-06-19 PROCEDURE — 81003 URINALYSIS AUTO W/O SCOPE: CPT

## 2022-06-19 PROCEDURE — 83605 ASSAY OF LACTIC ACID: CPT

## 2022-06-19 PROCEDURE — 97116 GAIT TRAINING THERAPY: CPT | Mod: GP

## 2022-06-19 PROCEDURE — 36415 COLL VENOUS BLD VENIPUNCTURE: CPT | Performed by: NURSE PRACTITIONER

## 2022-06-19 PROCEDURE — 36415 COLL VENOUS BLD VENIPUNCTURE: CPT

## 2022-06-19 PROCEDURE — 85018 HEMOGLOBIN: CPT | Performed by: NURSE PRACTITIONER

## 2022-06-19 PROCEDURE — 97530 THERAPEUTIC ACTIVITIES: CPT | Mod: GP

## 2022-06-19 PROCEDURE — 250N000011 HC RX IP 250 OP 636: Performed by: NURSE PRACTITIONER

## 2022-06-19 PROCEDURE — 120N000001 HC R&B MED SURG/OB

## 2022-06-19 PROCEDURE — 250N000013 HC RX MED GY IP 250 OP 250 PS 637: Performed by: INTERNAL MEDICINE

## 2022-06-19 PROCEDURE — 258N000003 HC RX IP 258 OP 636

## 2022-06-19 PROCEDURE — 250N000013 HC RX MED GY IP 250 OP 250 PS 637: Performed by: SURGERY

## 2022-06-19 PROCEDURE — 5A09357 ASSISTANCE WITH RESPIRATORY VENTILATION, LESS THAN 24 CONSECUTIVE HOURS, CONTINUOUS POSITIVE AIRWAY PRESSURE: ICD-10-PCS | Performed by: FAMILY MEDICINE

## 2022-06-19 PROCEDURE — 97110 THERAPEUTIC EXERCISES: CPT | Mod: GP

## 2022-06-19 PROCEDURE — 97535 SELF CARE MNGMENT TRAINING: CPT | Mod: GO

## 2022-06-19 PROCEDURE — 250N000013 HC RX MED GY IP 250 OP 250 PS 637: Performed by: FAMILY MEDICINE

## 2022-06-19 RX ORDER — LIDOCAINE 4 G/G
2 PATCH TOPICAL
Status: DISCONTINUED | OUTPATIENT
Start: 2022-06-19 | End: 2022-07-05

## 2022-06-19 RX ORDER — PIPERACILLIN SODIUM, TAZOBACTAM SODIUM 3; .375 G/15ML; G/15ML
3.38 INJECTION, POWDER, LYOPHILIZED, FOR SOLUTION INTRAVENOUS EVERY 8 HOURS
Status: DISCONTINUED | OUTPATIENT
Start: 2022-06-19 | End: 2022-06-19

## 2022-06-19 RX ORDER — PIPERACILLIN SODIUM, TAZOBACTAM SODIUM 3; .375 G/15ML; G/15ML
3.38 INJECTION, POWDER, LYOPHILIZED, FOR SOLUTION INTRAVENOUS EVERY 8 HOURS
Status: DISCONTINUED | OUTPATIENT
Start: 2022-06-20 | End: 2022-06-24

## 2022-06-19 RX ORDER — PIPERACILLIN SODIUM, TAZOBACTAM SODIUM 3; .375 G/15ML; G/15ML
3.38 INJECTION, POWDER, LYOPHILIZED, FOR SOLUTION INTRAVENOUS ONCE
Status: COMPLETED | OUTPATIENT
Start: 2022-06-19 | End: 2022-06-19

## 2022-06-19 RX ADMIN — PANTOPRAZOLE SODIUM 40 MG: 40 TABLET, DELAYED RELEASE ORAL at 06:05

## 2022-06-19 RX ADMIN — LIDOCAINE: 50 OINTMENT TOPICAL at 21:55

## 2022-06-19 RX ADMIN — Medication 500 MG: at 08:19

## 2022-06-19 RX ADMIN — GLIMEPIRIDE 4 MG: 1 TABLET ORAL at 08:23

## 2022-06-19 RX ADMIN — LEVOTHYROXINE SODIUM 25 MCG: 0.03 TABLET ORAL at 06:05

## 2022-06-19 RX ADMIN — SENNOSIDES AND DOCUSATE SODIUM 1 TABLET: 8.6; 5 TABLET ORAL at 21:06

## 2022-06-19 RX ADMIN — Medication 100 MCG: at 08:24

## 2022-06-19 RX ADMIN — ROSUVASTATIN CALCIUM 10 MG: 10 TABLET, FILM COATED ORAL at 21:06

## 2022-06-19 RX ADMIN — INSULIN GLARGINE 30 UNITS: 100 INJECTION, SOLUTION SUBCUTANEOUS at 21:55

## 2022-06-19 RX ADMIN — ENOXAPARIN SODIUM 40 MG: 40 INJECTION SUBCUTANEOUS at 21:55

## 2022-06-19 RX ADMIN — FERROUS SULFATE TAB 325 MG (65 MG ELEMENTAL FE) 325 MG: 325 (65 FE) TAB at 16:45

## 2022-06-19 RX ADMIN — ACETAMINOPHEN 975 MG: 325 TABLET, FILM COATED ORAL at 02:08

## 2022-06-19 RX ADMIN — ACETAMINOPHEN 650 MG: 325 TABLET, FILM COATED ORAL at 21:06

## 2022-06-19 RX ADMIN — GABAPENTIN 1200 MG: 300 CAPSULE ORAL at 21:06

## 2022-06-19 RX ADMIN — PIPERACILLIN AND TAZOBACTAM 3.38 G: 3; .375 INJECTION, POWDER, LYOPHILIZED, FOR SOLUTION INTRAVENOUS at 21:06

## 2022-06-19 RX ADMIN — THERA TABS 1 TABLET: TAB at 08:23

## 2022-06-19 RX ADMIN — GABAPENTIN 1200 MG: 300 CAPSULE ORAL at 08:19

## 2022-06-19 RX ADMIN — METHOCARBAMOL 500 MG: 500 TABLET ORAL at 06:05

## 2022-06-19 RX ADMIN — DICLOFENAC SODIUM 2 G: 10 GEL TOPICAL at 08:28

## 2022-06-19 RX ADMIN — OXYCODONE HYDROCHLORIDE 5 MG: 5 TABLET ORAL at 12:42

## 2022-06-19 RX ADMIN — VANCOMYCIN HYDROCHLORIDE 1500 MG: 5 INJECTION, POWDER, LYOPHILIZED, FOR SOLUTION INTRAVENOUS at 21:55

## 2022-06-19 RX ADMIN — ONDANSETRON 4 MG: 2 INJECTION INTRAMUSCULAR; INTRAVENOUS at 08:15

## 2022-06-19 RX ADMIN — OXYCODONE HYDROCHLORIDE 5 MG: 5 TABLET ORAL at 08:27

## 2022-06-19 RX ADMIN — BUMETANIDE 3 MG: 1 TABLET ORAL at 08:20

## 2022-06-19 RX ADMIN — Medication 500 MG: at 21:06

## 2022-06-19 RX ADMIN — POLYETHYLENE GLYCOL 3350 17 G: 17 POWDER, FOR SOLUTION ORAL at 08:18

## 2022-06-19 RX ADMIN — LIDOCAINE 2 PATCH: 246 PATCH TOPICAL at 12:22

## 2022-06-19 RX ADMIN — Medication 500 MG: at 14:41

## 2022-06-19 RX ADMIN — PANTOPRAZOLE SODIUM 40 MG: 40 TABLET, DELAYED RELEASE ORAL at 16:44

## 2022-06-19 RX ADMIN — DICLOFENAC SODIUM 2 G: 10 GEL TOPICAL at 16:45

## 2022-06-19 RX ADMIN — METHOCARBAMOL 500 MG: 500 TABLET ORAL at 10:24

## 2022-06-19 RX ADMIN — METFORMIN HYDROCHLORIDE 1000 MG: 500 TABLET, FILM COATED ORAL at 08:24

## 2022-06-19 RX ADMIN — ACETAMINOPHEN 975 MG: 325 TABLET, FILM COATED ORAL at 10:24

## 2022-06-19 RX ADMIN — SENNOSIDES AND DOCUSATE SODIUM 1 TABLET: 8.6; 5 TABLET ORAL at 08:24

## 2022-06-19 RX ADMIN — FERROUS SULFATE TAB 325 MG (65 MG ELEMENTAL FE) 325 MG: 325 (65 FE) TAB at 08:23

## 2022-06-19 RX ADMIN — METFORMIN HYDROCHLORIDE 1000 MG: 500 TABLET, FILM COATED ORAL at 16:45

## 2022-06-19 RX ADMIN — TAMSULOSIN HYDROCHLORIDE 0.4 MG: 0.4 CAPSULE ORAL at 08:22

## 2022-06-19 ASSESSMENT — ACTIVITIES OF DAILY LIVING (ADL)
ADLS_ACUITY_SCORE: 28
ADLS_ACUITY_SCORE: 34
ADLS_ACUITY_SCORE: 28
ADLS_ACUITY_SCORE: 28
ADLS_ACUITY_SCORE: 34
ADLS_ACUITY_SCORE: 28

## 2022-06-19 NOTE — PROGRESS NOTES
Assessment & Plan   77-year-old male with CAD, HF MAF, DM2, HTN, atrial fibrillation, hypothyroidism presented for planned neurosurgical intervention to treat herniated lumbar disc.  [facility] [place] of [place], HTN    Active Problems:    Type 2 diabetes mellitus, with long-term current use of insulin (H)    Benign essential hypertension    CAD (coronary artery disease)    Chronic atrial fibrillation (H)    CORAZON (obstructive sleep apnea)    Lumbar stenosis with neurogenic claudication    Gastroesophageal reflux disease without esophagitis    Hypothyroidism    Normocytic anemia    Chronic left shoulder pain    Nasal congestion    Lumbar spinal stenosis    Present on Admission:    Lumbar stenosis with neurogenic claudication    Lumbar spinal stenosis        6/19 :     Neuro status stable  HUSSEIN drain discontinued  Blood sugars in 200's, change sliding scale insulin - to high resistant    Urinary retention, straight cath/segura prn  Started flomax      Discharge once placement found        A/p :       Lumbar spinal stenosis with lumbar disc herniation  -Patient underwent scheduled decompressive lumbar laminectomy of L4-L5, L5-S1 with bilateral foraminotomies and removal of herniated disc in the L1-S1 region with neurosurgery on 6/16/2022.  -Scheduled Tylenol 3 times daily  -Gabapentin 1200 mg p.o. twice daily  -Pain management per neurosurgery  -Rest of postoperative management per neurosurgery    CAD, HFmEF  -Patient with CABG in 2011, SPECT MPI 5/6/2021 abnormal with a large area of transmural infarction of the mid to distal anterior septal, anterior, apical wall.  TTE 12/31/2019 with ejection fraction of 50 to 55%, abnormal septal wall motion consistent with ventricular paced rhythm, mild aortic stenosis and mild to moderate tricuspid insufficiency.  Patient appears euvolemic  -Stop IVF  -Bumex 3 mg p.o. daily  -Simvastatin 10 mg p.o. daily    Chronic atrial fibrillation, sick sinus syndrome  -Ventricular  "pacemaker in place  -Continue to hold home rivaroxaban, may restart 2 to 3 days after surgery when neurosurgeon allows.    DM2  -No A1C since 2019, will check  -Hold home glimepiride and metformin for now, resume on 6/17 with full breakfast  -Glargine 30 units subcu q. Bedtime  -Sensitive scale aspart insulin 3 times daily AC.  -Accu-Chek 3 times daily AC at bedtime  -Hypoglycemic protocol    Nasal congestion, postnasal drip  -Exacerbated in the setting of needing to lie flat for 24 hours  -Start ipratropium nasal spray twice daily    Chronic left shoulder pain  -Start lidocaine and Voltaren trading on and off 4 times daily    HTN  -Lisinopril 10 mg p.o. daily    GERD  -Pantoprazole 40 mg p.o. daily    CORAZON   -CPAP    Hypothyroidism  -TSH 2.05  -Levothyroxine 25 mcg p.o. daily    Normocytic anemia  -Hemoglobin 11.4 with MCV of 90     Clinically Significant Risk Factors Present on Admission              # Diabetes, type II: last A1C 7.5 % (Ref range: <=5.6 %)  # Overweight: Estimated body mass index is 29.95 kg/m  as calculated from the following:    Height as of this encounter: 1.778 m (5' 10\").    Weight as of this encounter: 94.7 kg (208 lb 11.2 oz).        Electrolytes: within normal limits  Fluids: stop IVF  Diet: advance to diabetic per neurosurgery  VTE prophylaxis: SCD boots    COVID19 symptom check 6/16/2022  Fevers/Chills/myalgias: NEGATIVE TO ALL  Sick contacts/COVID19 exposure: NEGATIVE TO ALL  Cough/trouble breathing/SOB/sore throat: NEGATIVE TO ALL  Diarrhea: NEGATIVE    Expected Discharge: 06/20/2022     Anticipated discharge location: other (comment) (TCU)    Delays:     Placement - TCU          Review of Systems: History obtained from the patient  General ROS: negative  for  - chills, fatigue, fever  ENT ROS: negative for - headaches, hearing change, positive for nasal congestion, for nasal discharge  Hematological and Lymphatic ROS: negative for - bleeding problems, blood clots, bruising  Respiratory " ROS: negative for - cough, pleuritic pain, shortness of breath  Cardiovascular ROS: negative for - chest pain, dyspnea on exertion, edema  Gastrointestinal ROS: negative for - abdominal pain, constipation, diarrhea, and nausea/vomiting  Genito-Urinary ROS: negative for -  dysuria, hematuria  Musculoskeletal ROS: Positive for mild lower back pain and chronic left shoulder pain  Neurological ROS: negative for - behavioral changes, confusion, dizziness, numbness/tingling   Dermatological ROS: negative for pruritus, rash    Objective    Physical Examination:   General appearance - alert, well appearing, and in no distress and oriented to person, place, and time  Mental status - alert, oriented to person, place, and time, normal mood, behavior, speech, dress, motor activity, and thought processes  HEENT - sclera anicteric, left eye normal, right eye normal, nares normal and patent, no erythema,  Respiratory - clear to auscultation, no wheezes, rales or rhonchi, symmetric air entry, no tachypnea, retractions or cyanosis, nasal cannula in place, patient frequently clearing his throat and attempting to spit out phlegm  Cardiac - normal rate, regular rhythm, normal S1, S2, blowing systolic murmur present, rubs, clicks or gallops, no JVD  Abdomen - soft, nontender, nondistended, no masses or organomegaly no rebound tenderness noted bowel sounds normal, no bladder distension noted  Neurological - alert, oriented, normal speech, no focal findings or movement disorder noted  Musculoskeletal - no joint tenderness, deformity or swelling, full range of motion without pain  Extremities - peripheral pulses normal, no pedal edema, no clubbing or cyanosis  Skin - normal coloration and turgor, no rashes, no suspicious skin lesions noted    Temp:  [98  F (36.7  C)-100.7  F (38.2  C)] 100.7  F (38.2  C)  Pulse:  [60-61] 60  Resp:  [17-18] 18  BP: ()/(44-58) 91/44  SpO2:  [92 %-99 %] 93 %      Intake/Output Summary (Last 24 hours) at  6/16/2022 1826  Last data filed at 6/16/2022 1733  Gross per 24 hour   Intake 3505 ml   Output 1450 ml   Net 2055 ml       Results    Recent Results (from the past 24 hour(s))   Glucose by meter    Collection Time: 06/18/22  8:26 PM   Result Value Ref Range    GLUCOSE BY METER POCT 136 (H) 70 - 99 mg/dL   Glucose by meter    Collection Time: 06/19/22  7:53 AM   Result Value Ref Range    GLUCOSE BY METER POCT 88 70 - 99 mg/dL   Hemoglobin    Collection Time: 06/19/22 10:40 AM   Result Value Ref Range    Hemoglobin 9.1 (L) 13.3 - 17.7 g/dL   Glucose by meter    Collection Time: 06/19/22 11:17 AM   Result Value Ref Range    GLUCOSE BY METER POCT 151 (H) 70 - 99 mg/dL   Glucose by meter    Collection Time: 06/19/22  4:35 PM   Result Value Ref Range    GLUCOSE BY METER POCT 189 (H) 70 - 99 mg/dL          Lab Results personally reviewed 6/16  Imaging Results personally reviewed 6/16  Discussed with patient and his wife  Reviewed old records     Advanced Care Planning  Discharge Planning discussed with patient and family  Discussed care with patient and family for 70 minutes with greater than 50% of time spent in counseling and coordination of care.

## 2022-06-19 NOTE — PLAN OF CARE
Goal Outcome Evaluation:    Problem: Pain (Spinal Surgery)  Goal: Acceptable Pain Control  Outcome: Ongoing, Progressing  Intervention: Prevent or Manage Pain  Recent Flowsheet Documentation  Taken 6/19/2022 1242 by Mireya Kent, RN  Pain Management Interventions: medication (see MAR)  Taken 6/19/2022 1024 by Mireya Kent, RN  Pain Management Interventions: medication (see MAR)  Taken 6/19/2022 0827 by Mireya Kent, RN  Pain Management Interventions: medication (see MAR)   Patient c/o nausea in AM> Zofran given x 1 with relief noted. Patient c/o pain 6/10 at 0827, Oxycodone 5 mg given with no significant pain control noted. Robaxin given for 6/10 at 1024 with some relief noted, oxycodone repeated at 1242 for 6/10 pain. Patient did experience more relief to 3/10. Lidocaine patches on. Patient very fatigued today stating he didn't get much sleep last night. Continuous pulse ox on as patient uses a CPAP/BIPAP at home when he sleeps, Patient voided 150 ml at 1215, PVR = 337. Patient requested that we wait to straight cath him and check a little later. Patient voided 100 ml at 1412 with a PVR of 263. Will continue to monitor PVR's. Dressing change done as HUSSEIN site oozing moderate amount of serosanguinous fluid in the AM. No further oozing noted. Encouraged patient to take in more po fluids and to call with any needs

## 2022-06-19 NOTE — SIGNIFICANT EVENT
"Significant Event Note    Time of event: 6:32 PM June 19, 2022    Description of event:  House officer paged about patient spiking a fever. Briefly, patient admitted for laminectomy and is POD#3 today. Patient's current pain is 5/10 which is stable. Will get labs and may start antibiotics pending lab results.    BP (!) 141/50 (BP Location: Right arm, Patient Position: Chair)   Pulse 60   Temp (!) 102.7  F (39.3  C) (Oral)   Resp 16   Ht 1.778 m (5' 10\")   Wt 94.7 kg (208 lb 11.2 oz)   SpO2 93%   BMI 29.95 kg/m        Plan:  -CBC, lactate, blood cultures, UA/UC  -pending these results may start antibiotics   -continue previously scheduled Tylenol     Discussed with: nurse and paged cross-cover hospitalist    Asmita Pearce MD    8:28 PM  WBC returned at 13.6 -- was previously normal. Lactate normal. Blood cultures sent. Patient continuing to have fever. Given this and elevated white count will start on broad spectrum antibiotics at this time.   -start vanc/zosyn      "

## 2022-06-19 NOTE — PLAN OF CARE
Goal Outcome Evaluation:  Pt bladder scanned at 2000 for 492, encouraged pt to get up to toilet to void, pt was succesfull, bladder scanned post void was 32cc. Encouraged pt to call for the need to void.

## 2022-06-19 NOTE — PROGRESS NOTES
"Neurosurgery   June 19, 2022    A/P: Thomas Steve is a 77 year old male POD #3 DECOMPRESSIVE LUMBAR LAMINECTOMY LUMBAR 4-LUMBAR 5 AND LUMBAR 5-SACRAL 1 BILATERAL-LEFT SIDE FIRST-PLUS BILATERAL FORAMINOTOMIES PLUS REMOVAL OF HERNIATED DISC LUMBAR 5-SACRAL 1 LEFT with Dr Samuel. Arachnoid bleb intraop, covered with durgen, duraseal. No CSF leak.     Thomas reports expected post op incisional back pain but does report more pain today than prior days. New reports today of posterior left thigh pain that he feels limits his movement. States its been present since surgery. He does have antigravity. Will monitor for now. Legs, feet feel same as pre-op with baseline neuropathy. He has utilized very little pain medicine. Does not like ice on his back.     Discharge timing will be determined by pain control, mobility tolerance.  Discharge disposition depending on wife's status. She is evidently admitted to . Patient would prefer to go home vs TCU but understands TCU may be beneficial. May be ready tomorrow.     PLAN:   1.  Continue pain management - seems adequate. Hold if sedated.   2. HUSSEIN drain was discontinued   3. Xarelto (Afib) - resume POD #5, SCD, Lovenox POD #2   4. PT, OT   5. Carpenter out - bladder scans mixed  6. A1C 7.5 - maintain adequate BG control to aide wound healing     HPI: Presented with back pain, right leg pain. Weakness bilateral LE. Worse with walking. No bowel or bladder issues. Diabetes, CABG, AFIB, Xarelto. MRI with listhesis L4-5, spinal stenosis. Stenosis also at L5-S1 with large disc herniation L5-S1 on the left.     Subjective: More painful today in his back and posterior left thigh. Some nausea this am. He reports this is not abnormal for him. No BM to date. Will monitor. Baseline neuropathy. Denies headache, nausea, leg pain.     Physical Exam  /54 (BP Location: Right arm)   Pulse 60   Temp 98  F (36.7  C) (Oral)   Resp 18   Ht 1.778 m (5' 10\")   Wt 94.7 kg (208 lb 11.2 oz)   SpO2 99% "   BMI 29.95 kg/m      General: alert, oriented. KIM. Speech clear. Seated in the chair.     Motor: normal bulk and tone     Strength: Full strength LE bilaterally   Left hip flexor more difficult than right.     Sensation: intact to light touch; baseline neuropathy bilaterally     Incision: Dressing changed by nursing.     HUSSEIN: HUSSEIN was removed by nursing.     Labs: reviewed. Hgb stable 9.1 yesterday.     Imaging: No imaging required     SAAD Manrique  [facility] Neurosurgery  O: [phone]

## 2022-06-19 NOTE — PLAN OF CARE
"Goal Outcome Evaluation:                      Problem: Bleeding (Spinal Surgery)  Goal: Absence of Bleeding  Outcome: Ongoing, Progressing     Problem: Pain (Spinal Surgery)  Goal: Acceptable Pain Control  Outcome: Ongoing, Progressing     Problem: Postoperative Urinary Retention (Spinal Surgery)  Goal: Effective Urinary Elimination  Outcome: Ongoing, Progressing   Patient had increased pain this morning. Medicated with PRN Robaxin. At 0200 patient voided (unmeasured output) and had a . Patient refused straight cath stating, \"I will be ok after I take my Bumex in the morning. That's what I do at home. The Water pills 3 of them \"clean\" me out.\" Later voided 200 ml in hat . Encouraged fluids. HUSSEIN 30ml; removed and covered site with gauze.  Dressing had a small amount of bloody drainage. Removed dressing, incision is intact with staples. Covered with primapore.  "

## 2022-06-20 ENCOUNTER — APPOINTMENT (OUTPATIENT)
Dept: RADIOLOGY | Facility: HOSPITAL | Age: 77
DRG: 518 | End: 2022-06-20
Attending: NURSE PRACTITIONER
Payer: MEDICARE

## 2022-06-20 ENCOUNTER — APPOINTMENT (OUTPATIENT)
Dept: PHYSICAL THERAPY | Facility: HOSPITAL | Age: 77
DRG: 518 | End: 2022-06-20
Attending: SURGERY
Payer: MEDICARE

## 2022-06-20 ENCOUNTER — APPOINTMENT (OUTPATIENT)
Dept: CARDIOLOGY | Facility: HOSPITAL | Age: 77
DRG: 518 | End: 2022-06-20
Attending: INTERNAL MEDICINE
Payer: MEDICARE

## 2022-06-20 ENCOUNTER — APPOINTMENT (OUTPATIENT)
Dept: CT IMAGING | Facility: HOSPITAL | Age: 77
DRG: 518 | End: 2022-06-20
Attending: INTERNAL MEDICINE
Payer: MEDICARE

## 2022-06-20 ENCOUNTER — DOCUMENTATION ONLY (OUTPATIENT)
Dept: CARDIOLOGY | Facility: CLINIC | Age: 77
End: 2022-06-20
Payer: MEDICARE

## 2022-06-20 ENCOUNTER — APPOINTMENT (OUTPATIENT)
Dept: ULTRASOUND IMAGING | Facility: HOSPITAL | Age: 77
DRG: 518 | End: 2022-06-20
Attending: INTERNAL MEDICINE
Payer: MEDICARE

## 2022-06-20 ENCOUNTER — APPOINTMENT (OUTPATIENT)
Dept: OCCUPATIONAL THERAPY | Facility: HOSPITAL | Age: 77
DRG: 518 | End: 2022-06-20
Attending: SURGERY
Payer: MEDICARE

## 2022-06-20 LAB
ANION GAP SERPL CALCULATED.3IONS-SCNC: 10 MMOL/L (ref 5–18)
BNP SERPL-MCNC: 108 PG/ML (ref 0–78)
BUN SERPL-MCNC: 65 MG/DL (ref 8–28)
CALCIUM SERPL-MCNC: 9 MG/DL (ref 8.5–10.5)
CHLORIDE BLD-SCNC: 99 MMOL/L (ref 98–107)
CO2 SERPL-SCNC: 25 MMOL/L (ref 22–31)
CREAT SERPL-MCNC: 1.7 MG/DL (ref 0.7–1.3)
CREAT SERPL-MCNC: 2 MG/DL (ref 0.7–1.3)
CREAT UR-MCNC: 63 MG/DL
ERYTHROCYTE [DISTWIDTH] IN BLOOD BY AUTOMATED COUNT: 15.2 % (ref 10–15)
FRACT EXCRET NA UR+SERPL-RTO: NORMAL %
GFR SERPL CREATININE-BSD FRML MDRD: 34 ML/MIN/1.73M2
GFR SERPL CREATININE-BSD FRML MDRD: 41 ML/MIN/1.73M2
GLUCOSE BLD-MCNC: 146 MG/DL (ref 70–125)
GLUCOSE BLDC GLUCOMTR-MCNC: 102 MG/DL (ref 70–99)
GLUCOSE BLDC GLUCOMTR-MCNC: 126 MG/DL (ref 70–99)
GLUCOSE BLDC GLUCOMTR-MCNC: 134 MG/DL (ref 70–99)
GLUCOSE BLDC GLUCOMTR-MCNC: 169 MG/DL (ref 70–99)
HCT VFR BLD AUTO: 27.4 % (ref 40–53)
HGB BLD-MCNC: 8.4 G/DL (ref 13.3–17.7)
LVEF ECHO: NORMAL
MCH RBC QN AUTO: 28.6 PG (ref 26.5–33)
MCHC RBC AUTO-ENTMCNC: 30.7 G/DL (ref 31.5–36.5)
MCV RBC AUTO: 93 FL (ref 78–100)
PLATELET # BLD AUTO: 150 10E3/UL (ref 150–450)
POTASSIUM BLD-SCNC: 4.7 MMOL/L (ref 3.5–5)
RADIOLOGIST FLAGS: ABNORMAL
RBC # BLD AUTO: 2.94 10E6/UL (ref 4.4–5.9)
SODIUM SERPL-SCNC: 134 MMOL/L (ref 136–145)
SODIUM SERPL-SCNC: 135 MMOL/L (ref 136–145)
SODIUM UR-SCNC: <20 MMOL/L
WBC # BLD AUTO: 11.7 10E3/UL (ref 4–11)

## 2022-06-20 PROCEDURE — 999N000208 ECHOCARDIOGRAM COMPLETE

## 2022-06-20 PROCEDURE — 250N000013 HC RX MED GY IP 250 OP 250 PS 637: Performed by: INTERNAL MEDICINE

## 2022-06-20 PROCEDURE — 85027 COMPLETE CBC AUTOMATED: CPT | Performed by: NURSE PRACTITIONER

## 2022-06-20 PROCEDURE — 93306 TTE W/DOPPLER COMPLETE: CPT | Mod: 26 | Performed by: INTERNAL MEDICINE

## 2022-06-20 PROCEDURE — 84300 ASSAY OF URINE SODIUM: CPT | Performed by: INTERNAL MEDICINE

## 2022-06-20 PROCEDURE — 84295 ASSAY OF SERUM SODIUM: CPT | Performed by: SURGERY

## 2022-06-20 PROCEDURE — 250N000011 HC RX IP 250 OP 636

## 2022-06-20 PROCEDURE — 36415 COLL VENOUS BLD VENIPUNCTURE: CPT | Performed by: NURSE PRACTITIONER

## 2022-06-20 PROCEDURE — 80048 BASIC METABOLIC PNL TOTAL CA: CPT | Performed by: NURSE PRACTITIONER

## 2022-06-20 PROCEDURE — 250N000013 HC RX MED GY IP 250 OP 250 PS 637: Performed by: SURGERY

## 2022-06-20 PROCEDURE — 97535 SELF CARE MNGMENT TRAINING: CPT | Mod: GO

## 2022-06-20 PROCEDURE — 255N000002 HC RX 255 OP 636: Performed by: SURGERY

## 2022-06-20 PROCEDURE — 36415 COLL VENOUS BLD VENIPUNCTURE: CPT | Performed by: INTERNAL MEDICINE

## 2022-06-20 PROCEDURE — 76770 US EXAM ABDO BACK WALL COMP: CPT

## 2022-06-20 PROCEDURE — 97530 THERAPEUTIC ACTIVITIES: CPT | Mod: GP

## 2022-06-20 PROCEDURE — 71250 CT THORAX DX C-: CPT

## 2022-06-20 PROCEDURE — 250N000013 HC RX MED GY IP 250 OP 250 PS 637: Performed by: NURSE PRACTITIONER

## 2022-06-20 PROCEDURE — 258N000003 HC RX IP 258 OP 636

## 2022-06-20 PROCEDURE — 97129 THER IVNTJ 1ST 15 MIN: CPT | Mod: GO

## 2022-06-20 PROCEDURE — 71045 X-RAY EXAM CHEST 1 VIEW: CPT

## 2022-06-20 PROCEDURE — 97116 GAIT TRAINING THERAPY: CPT | Mod: GP

## 2022-06-20 PROCEDURE — 999N000157 HC STATISTIC RCP TIME EA 10 MIN

## 2022-06-20 PROCEDURE — 99233 SBSQ HOSP IP/OBS HIGH 50: CPT | Performed by: INTERNAL MEDICINE

## 2022-06-20 PROCEDURE — 258N000003 HC RX IP 258 OP 636: Performed by: INTERNAL MEDICINE

## 2022-06-20 PROCEDURE — 120N000001 HC R&B MED SURG/OB

## 2022-06-20 PROCEDURE — 250N000013 HC RX MED GY IP 250 OP 250 PS 637: Performed by: FAMILY MEDICINE

## 2022-06-20 PROCEDURE — 36415 COLL VENOUS BLD VENIPUNCTURE: CPT | Performed by: SURGERY

## 2022-06-20 PROCEDURE — 250N000011 HC RX IP 250 OP 636: Performed by: INTERNAL MEDICINE

## 2022-06-20 PROCEDURE — 83880 ASSAY OF NATRIURETIC PEPTIDE: CPT | Performed by: INTERNAL MEDICINE

## 2022-06-20 PROCEDURE — 82565 ASSAY OF CREATININE: CPT | Performed by: SURGERY

## 2022-06-20 RX ORDER — HYDROXYZINE HYDROCHLORIDE 25 MG/1
25 TABLET, FILM COATED ORAL EVERY 6 HOURS PRN
Status: DISCONTINUED | OUTPATIENT
Start: 2022-06-20 | End: 2022-07-06 | Stop reason: HOSPADM

## 2022-06-20 RX ORDER — HYDROXYZINE HYDROCHLORIDE 25 MG/1
50 TABLET, FILM COATED ORAL EVERY 6 HOURS PRN
Status: DISCONTINUED | OUTPATIENT
Start: 2022-06-20 | End: 2022-07-06 | Stop reason: HOSPADM

## 2022-06-20 RX ORDER — AMOXICILLIN 250 MG
2 CAPSULE ORAL 2 TIMES DAILY
Status: DISCONTINUED | OUTPATIENT
Start: 2022-06-20 | End: 2022-06-29

## 2022-06-20 RX ORDER — ACETAMINOPHEN 325 MG/1
975 TABLET ORAL EVERY 8 HOURS
Status: DISPENSED | OUTPATIENT
Start: 2022-06-20 | End: 2022-06-23

## 2022-06-20 RX ORDER — HEPARIN SODIUM 5000 [USP'U]/.5ML
5000 INJECTION, SOLUTION INTRAVENOUS; SUBCUTANEOUS EVERY 12 HOURS
Status: DISCONTINUED | OUTPATIENT
Start: 2022-06-20 | End: 2022-06-23

## 2022-06-20 RX ORDER — SODIUM CHLORIDE 9 MG/ML
INJECTION, SOLUTION INTRAVENOUS CONTINUOUS
Status: DISCONTINUED | OUTPATIENT
Start: 2022-06-20 | End: 2022-06-24

## 2022-06-20 RX ADMIN — ACETAMINOPHEN 975 MG: 325 TABLET, FILM COATED ORAL at 21:32

## 2022-06-20 RX ADMIN — Medication 500 MG: at 08:31

## 2022-06-20 RX ADMIN — GLIMEPIRIDE 4 MG: 1 TABLET ORAL at 08:32

## 2022-06-20 RX ADMIN — PERFLUTREN 2.5 ML: 6.52 INJECTION, SUSPENSION INTRAVENOUS at 14:05

## 2022-06-20 RX ADMIN — DICLOFENAC SODIUM 2 G: 10 GEL TOPICAL at 17:25

## 2022-06-20 RX ADMIN — SODIUM CHLORIDE: 900 INJECTION INTRAVENOUS at 12:42

## 2022-06-20 RX ADMIN — VANCOMYCIN HYDROCHLORIDE 1500 MG: 5 INJECTION, POWDER, LYOPHILIZED, FOR SOLUTION INTRAVENOUS at 21:16

## 2022-06-20 RX ADMIN — FERROUS SULFATE TAB 325 MG (65 MG ELEMENTAL FE) 325 MG: 325 (65 FE) TAB at 08:31

## 2022-06-20 RX ADMIN — SODIUM PHOSPHATE, DIBASIC AND SODIUM PHOSPHATE, MONOBASIC 1 ENEMA: 7; 19 ENEMA RECTAL at 17:28

## 2022-06-20 RX ADMIN — DICLOFENAC SODIUM 2 G: 10 GEL TOPICAL at 08:46

## 2022-06-20 RX ADMIN — Medication 500 MG: at 21:33

## 2022-06-20 RX ADMIN — GABAPENTIN 1200 MG: 300 CAPSULE ORAL at 08:31

## 2022-06-20 RX ADMIN — Medication 500 MG: at 13:46

## 2022-06-20 RX ADMIN — PANTOPRAZOLE SODIUM 40 MG: 40 TABLET, DELAYED RELEASE ORAL at 17:20

## 2022-06-20 RX ADMIN — Medication 100 MCG: at 08:38

## 2022-06-20 RX ADMIN — SENNOSIDES AND DOCUSATE SODIUM 1 TABLET: 8.6; 5 TABLET ORAL at 08:31

## 2022-06-20 RX ADMIN — ROSUVASTATIN CALCIUM 10 MG: 10 TABLET, FILM COATED ORAL at 21:33

## 2022-06-20 RX ADMIN — PIPERACILLIN AND TAZOBACTAM 3.38 G: 3; .375 INJECTION, POWDER, FOR SOLUTION INTRAVENOUS at 14:27

## 2022-06-20 RX ADMIN — PIPERACILLIN AND TAZOBACTAM 3.38 G: 3; .375 INJECTION, POWDER, FOR SOLUTION INTRAVENOUS at 22:45

## 2022-06-20 RX ADMIN — INSULIN GLARGINE 30 UNITS: 100 INJECTION, SOLUTION SUBCUTANEOUS at 21:31

## 2022-06-20 RX ADMIN — ACETAMINOPHEN 650 MG: 325 TABLET, FILM COATED ORAL at 05:52

## 2022-06-20 RX ADMIN — IPRATROPIUM BROMIDE 2 SPRAY: 21 SPRAY, METERED NASAL at 21:33

## 2022-06-20 RX ADMIN — SENNOSIDES AND DOCUSATE SODIUM 2 TABLET: 8.6; 5 TABLET ORAL at 21:34

## 2022-06-20 RX ADMIN — FERROUS SULFATE TAB 325 MG (65 MG ELEMENTAL FE) 325 MG: 325 (65 FE) TAB at 13:50

## 2022-06-20 RX ADMIN — LIDOCAINE 2 PATCH: 246 PATCH TOPICAL at 08:45

## 2022-06-20 RX ADMIN — TAMSULOSIN HYDROCHLORIDE 0.4 MG: 0.4 CAPSULE ORAL at 08:31

## 2022-06-20 RX ADMIN — FERROUS SULFATE TAB 325 MG (65 MG ELEMENTAL FE) 325 MG: 325 (65 FE) TAB at 17:20

## 2022-06-20 RX ADMIN — LIDOCAINE: 50 OINTMENT TOPICAL at 21:42

## 2022-06-20 RX ADMIN — LEVOTHYROXINE SODIUM 25 MCG: 0.03 TABLET ORAL at 05:52

## 2022-06-20 RX ADMIN — METFORMIN HYDROCHLORIDE 1000 MG: 500 TABLET, FILM COATED ORAL at 08:31

## 2022-06-20 RX ADMIN — POLYETHYLENE GLYCOL 3350 17 G: 17 POWDER, FOR SOLUTION ORAL at 08:32

## 2022-06-20 RX ADMIN — ACETAMINOPHEN 975 MG: 325 TABLET, FILM COATED ORAL at 14:30

## 2022-06-20 RX ADMIN — TIZANIDINE 4 MG: 4 TABLET ORAL at 21:33

## 2022-06-20 RX ADMIN — HEPARIN SODIUM 5000 UNITS: 10000 INJECTION, SOLUTION INTRAVENOUS; SUBCUTANEOUS at 21:40

## 2022-06-20 RX ADMIN — PIPERACILLIN AND TAZOBACTAM 3.38 G: 3; .375 INJECTION, POWDER, FOR SOLUTION INTRAVENOUS at 02:23

## 2022-06-20 RX ADMIN — THERA TABS 1 TABLET: TAB at 08:31

## 2022-06-20 RX ADMIN — PANTOPRAZOLE SODIUM 40 MG: 40 TABLET, DELAYED RELEASE ORAL at 05:52

## 2022-06-20 RX ADMIN — METHOCARBAMOL 500 MG: 500 TABLET ORAL at 02:16

## 2022-06-20 RX ADMIN — TIZANIDINE 4 MG: 4 TABLET ORAL at 13:50

## 2022-06-20 ASSESSMENT — ACTIVITIES OF DAILY LIVING (ADL)
ADLS_ACUITY_SCORE: 30
ADLS_ACUITY_SCORE: 30
ADLS_ACUITY_SCORE: 34
ADLS_ACUITY_SCORE: 30
ADLS_ACUITY_SCORE: 34
ADLS_ACUITY_SCORE: 34
ADLS_ACUITY_SCORE: 30
ADLS_ACUITY_SCORE: 31

## 2022-06-20 NOTE — PHARMACY-VANCOMYCIN DOSING SERVICE
"Pharmacy Vancomycin Initial Note  Date of Service 2022  Patient's  1945  77 year old, male    Indication: Postoperative Infection    Current estimated CrCl = Estimated Creatinine Clearance: 62.6 mL/min (based on SCr of 1.16 mg/dL).    Creatinine for last 3 days  2022:  5:39 AM Creatinine 1.16 mg/dL    Recent Vancomycin Level(s) for last 3 days  No results found for requested labs within last 72 hours.      Vancomycin IV Administrations (past 72 hours)      No vancomycin orders with administrations in past 72 hours.                Nephrotoxins and other renal medications (From now, onward)    Start     Dose/Rate Route Frequency Ordered Stop    22 0234  piperacillin-tazobactam (ZOSYN) 3.375 g vial to attach to  mL bag        Note to Pharmacy: Extended infusion dosing to start 6 hours after initial infusion.   \"Followed by\" Linked Group Details    3.375 g  over 240 Minutes Intravenous EVERY 8 HOURS 22 2100  piperacillin-tazobactam (ZOSYN) 3.375 g vial to attach to  mL bag        \"Followed by\" Linked Group Details    3.375 g  over 30 Minutes Intravenous ONCE 22  vancomycin (VANCOCIN) 1,500 mg in sodium chloride 0.9 % 250 mL intermittent infusion         1,500 mg  over 90 Minutes Intravenous EVERY 24 HOURS 22  bumetanide (BUMEX) tablet 3 mg         3 mg Oral DAILY 22  lisinopril (ZESTRIL) tablet 10 mg         10 mg Oral DAILY 22            Contrast Orders - past 72 hours (72h ago, onward)    None          [employer] Prediction of Planned Initial Vancomycin Regimen  Regimen: 1500 mg IV every 24 hours.  Start time: 20:54 on 2022  Exposure target: AUC24 (range)400-600 mg/L.hr   AUC24,ss: 474 mg/L.hr  Probability of AUC24 > 400: 70 %  Ctrough,ss: 14.0 mg/L  Probability of Ctrough,ss > 20: 19 %  Probability of nephrotoxicity (Lodise KISHORE ): 9 %   "      Plan:  1. Start vancomycin  1500 mg IV q24h.   2. Vancomycin monitoring method: AUC  3. Vancomycin therapeutic monitoring goal: 400-600 mg*h/L  4. Pharmacy will check vancomycin levels as appropriate in 1-3 Days.    5. Serum creatinine levels will be ordered daily for the first week of therapy and at least twice weekly for subsequent weeks.      Flor Potter RPH

## 2022-06-20 NOTE — PROGRESS NOTES
Chest xray from this morning shows left side lung nodule - recommend pa/lat chest xray or chest CT - results sent to Dr. Francois

## 2022-06-20 NOTE — PROGRESS NOTES
Is the implanted device safe for MRI Exam?  Yes  Is this device 3T compatible? Yes  Device Type: Pacemaker      Device Information:  Make: Medtronic   Model: [id number] Percepta Quad CRT-P    Cardiology Orders for Device Programming     -- Yes -- The patient has a MRI conditional pulse generator and leads from the same     -- Yes -- The pulse generator and leads have been implanted for at least 6 weeks    -- Yes-- The device is implanted in the right or left pectoral region    -- Yes -- There are not any additional active cardiac devices, abandoned leads, lead extenders or adapters    -- Yes -- The device lead impedance measurements are within the normal range. (Manufacture recommendations: Medtronic Advisa and Revo 200-1,500 ohms; Medtronic ICD and CRT's 200-3000 ohms and defibrillation lead impedance   ohms)    -- N/A -- If the patient is pacemaker dependent the thresholds are less than or equal to 2.0V @ 0.4ms.     Date of last in-office/remote Device check: 4/29/2022 (remote used, Auto-cap on)   Results of last in-office Device check:  1.   Right atrium impedance: 399 Ohms   2.   Right ventricle impedance: 437 Ohms   3.   Left ventricle impedance: 874 Ohms      4.   Right atrium threshold: N/A -AF  5.   Right ventricle threshold: 0.375V @ 0.4 ms   6.   Left ventricle threshold: 0.625V @ 0.4 ms      Device programming during the scan guidelines   Pacing Mode (check one): VOO  Pacing Rate: 80  bpm or > intrinsic rates    Remedios Madden RN

## 2022-06-20 NOTE — PROGRESS NOTES
Assessment & Plan   77-year-old male with CAD, HF MAF, DM2, HTN, atrial fibrillation, hypothyroidism presented for planned neurosurgical intervention to treat herniated lumbar disc.  [facility] consulted for management of diabetes, HTN    Active Problems:    Type 2 diabetes mellitus, with long-term current use of insulin (H)    Benign essential hypertension    CAD (coronary artery disease)    Chronic atrial fibrillation (H)    CORAZON (obstructive sleep apnea)    Lumbar stenosis with neurogenic claudication    Gastroesophageal reflux disease without esophagitis    Hypothyroidism    Normocytic anemia    Chronic left shoulder pain    Nasal congestion    Lumbar spinal stenosis    Present on Admission:    Lumbar stenosis with neurogenic claudication    Lumbar spinal stenosis        6/16 : S/p  decompressive lumbar laminectomy of L4-L5, L5-S1 with bilateral foraminotomies and removal of herniated disc in the L1-S1 region with neurosurgery       6/19 : HUSSEIN drain discontinued    6/20    Spiked fever yesterday  No cough or phlegm, no cp, no sob  No abdominal symptoms, no urinary symptoms  Backache same, no new focal weakness    Blood cultures sent 6/20  Urinalysis normal, ordered CT chest, MRI spine ordered  Started on empiric iv antibiotics - zosyn + vancomycin      Creatinine trending up 1.16---2.00 ( baseline around 1.2-1.3)  Held bumex, lisinopril, gabapentin  Gentle iv hydration, FENA, renal ultrasound    Urinary retention  Started flomax, s/p seguar now    Blood sugars stable, change sliding scale insulin - to high resistant        Not medically ready for discharge at this time.  Multi day stay at this time        A/p :       Lumbar spinal stenosis with lumbar disc herniation  -Patient underwent scheduled decompressive lumbar laminectomy of L4-L5, L5-S1 with bilateral foraminotomies and removal of herniated disc in the L1-S1 region with neurosurgery on 6/16/2022.  -Scheduled Tylenol 3 times daily  -Gabapentin 1200 mg p.o. twice  "daily  -Pain management per neurosurgery  -Rest of postoperative management per neurosurgery    CAD, HFmEF  -Patient with CABG in 2011, SPECT MPI 5/6/2021 abnormal with a large area of transmural infarction of the mid to distal anterior septal, anterior, apical wall.  TTE 12/31/2019 with ejection fraction of 50 to 55%, abnormal septal wall motion consistent with ventricular paced rhythm, mild aortic stenosis and mild to moderate tricuspid insufficiency.  Patient appears euvolemic  -Stop IVF  -Bumex 3 mg p.o. daily  -Simvastatin 10 mg p.o. daily    Chronic atrial fibrillation, sick sinus syndrome  -Ventricular pacemaker in place  -Continue to hold home rivaroxaban, may restart 2 to 3 days after surgery when neurosurgeon allows.    DM2  -No A1C since 2019, will check  -Hold home glimepiride and metformin for now, resume on 6/17 with full breakfast  -Glargine 30 units subcu q. Bedtime  -Sensitive scale aspart insulin 3 times daily AC.  -Accu-Chek 3 times daily AC at bedtime  -Hypoglycemic protocol    Nasal congestion, postnasal drip  -Exacerbated in the setting of needing to lie flat for 24 hours  -Start ipratropium nasal spray twice daily    Chronic left shoulder pain  -Start lidocaine and Voltaren trading on and off 4 times daily    HTN  -Lisinopril 10 mg p.o. daily    GERD  -Pantoprazole 40 mg p.o. daily    CORAZON   -CPAP    Hypothyroidism  -TSH 2.05  -Levothyroxine 25 mcg p.o. daily    Normocytic anemia  -Hemoglobin 11.4 with MCV of 90     Clinically Significant Risk Factors Present on Admission              # Diabetes, type II: last A1C 7.5 % (Ref range: <=5.6 %)  # Overweight: Estimated body mass index is 29.95 kg/m  as calculated from the following:    Height as of this encounter: 1.778 m (5' 10\").    Weight as of this encounter: 94.7 kg (208 lb 11.2 oz).        Electrolytes: within normal limits  Fluids: stop IVF  Diet: advance to diabetic per neurosurgery  VTE prophylaxis: SCD boots    COVID19 symptom check " 6/16/2022  Fevers/Chills/myalgias: NEGATIVE TO ALL  Sick contacts/COVID19 exposure: NEGATIVE TO ALL  Cough/trouble breathing/SOB/sore throat: NEGATIVE TO ALL  Diarrhea: NEGATIVE    Expected Discharge: 06/21/2022     Anticipated discharge location: other (comment) (TCU)    Delays:     Placement - TCU          Review of Systems: History obtained from the patient  General ROS: negative  for  - chills, fatigue, fever  ENT ROS: negative for - headaches, hearing change, positive for nasal congestion, for nasal discharge  Hematological and Lymphatic ROS: negative for - bleeding problems, blood clots, bruising  Respiratory ROS: negative for - cough, pleuritic pain, shortness of breath  Cardiovascular ROS: negative for - chest pain, dyspnea on exertion, edema  Gastrointestinal ROS: negative for - abdominal pain, constipation, diarrhea, and nausea/vomiting  Genito-Urinary ROS: negative for -  dysuria, hematuria  Musculoskeletal ROS: Positive for mild lower back pain and chronic left shoulder pain  Neurological ROS: negative for - behavioral changes, confusion, dizziness, numbness/tingling   Dermatological ROS: negative for pruritus, rash    Objective    Physical Examination:   General appearance - alert, well appearing, and in no distress and oriented to person, place, and time  Mental status - alert, oriented to person, place, and time, normal mood, behavior, speech, dress, motor activity, and thought processes  HEENT - sclera anicteric, left eye normal, right eye normal, nares normal and patent, no erythema,  Respiratory - clear to auscultation, no wheezes, rales or rhonchi, symmetric air entry, no tachypnea, retractions or cyanosis, nasal cannula in place, patient frequently clearing his throat and attempting to spit out phlegm  Cardiac - normal rate, regular rhythm, normal S1, S2, blowing systolic murmur present, rubs, clicks or gallops, no JVD  Abdomen - soft, nontender, nondistended, no masses or organomegaly no rebound  tenderness noted bowel sounds normal, no bladder distension noted  Neurological - alert, oriented, normal speech, no focal findings or movement disorder noted  Musculoskeletal - no joint tenderness, deformity or swelling, full range of motion without pain  Extremities - peripheral pulses normal, no pedal edema, no clubbing or cyanosis  Skin - normal coloration and turgor, no rashes, no suspicious skin lesions noted    Temp:  [97.6  F (36.4  C)-102.7  F (39.3  C)] 97.6  F (36.4  C)  Pulse:  [60] 60  Resp:  [16-19] 19  BP: ()/(44-59) 109/55  SpO2:  [90 %-99 %] 99 %      Intake/Output Summary (Last 24 hours) at 6/16/2022 1826  Last data filed at 6/16/2022 1733  Gross per 24 hour   Intake 3505 ml   Output 1450 ml   Net 2055 ml       Results    Recent Results (from the past 24 hour(s))   Glucose by meter    Collection Time: 06/19/22  4:35 PM   Result Value Ref Range    GLUCOSE BY METER POCT 189 (H) 70 - 99 mg/dL   Blood Culture Arm, Left    Collection Time: 06/19/22  6:48 PM    Specimen: Arm, Left; Blood   Result Value Ref Range    Culture No growth after 12 hours    Blood Culture Hand, Right    Collection Time: 06/19/22  6:48 PM    Specimen: Hand, Right; Blood   Result Value Ref Range    Culture No growth after 12 hours    Lactic acid whole blood    Collection Time: 06/19/22  6:48 PM   Result Value Ref Range    Lactic Acid 1.6 0.7 - 2.0 mmol/L   CBC with platelets    Collection Time: 06/19/22  6:48 PM   Result Value Ref Range    WBC Count 13.5 (H) 4.0 - 11.0 10e3/uL    RBC Count 3.06 (L) 4.40 - 5.90 10e6/uL    Hemoglobin 8.9 (L) 13.3 - 17.7 g/dL    Hematocrit 28.4 (L) 40.0 - 53.0 %    MCV 93 78 - 100 fL    MCH 29.1 26.5 - 33.0 pg    MCHC 31.3 (L) 31.5 - 36.5 g/dL    RDW 15.1 (H) 10.0 - 15.0 %    Platelet Count 152 150 - 450 10e3/uL   UA reflex to Microscopic and Culture    Collection Time: 06/19/22  8:10 PM    Specimen: Urine, Carpenter Catheter   Result Value Ref Range    Color Urine Light Yellow Colorless, Straw,  Light Yellow, Yellow    Appearance Urine Clear Clear    Glucose Urine Negative Negative mg/dL    Bilirubin Urine Negative Negative    Ketones Urine Negative Negative mg/dL    Specific Gravity Urine 1.016 1.001 - 1.030    Blood Urine Negative Negative    pH Urine 5.0 5.0 - 7.0    Protein Albumin Urine Negative Negative mg/dL    Urobilinogen Urine <2.0 <2.0 mg/dL    Nitrite Urine Negative Negative    Leukocyte Esterase Urine Negative Negative   Glucose by meter    Collection Time: 06/19/22  8:22 PM   Result Value Ref Range    GLUCOSE BY METER POCT 166 (H) 70 - 99 mg/dL   Glucose by meter    Collection Time: 06/20/22  8:00 AM   Result Value Ref Range    GLUCOSE BY METER POCT 102 (H) 70 - 99 mg/dL   CBC with platelets    Collection Time: 06/20/22 10:19 AM   Result Value Ref Range    WBC Count 11.7 (H) 4.0 - 11.0 10e3/uL    RBC Count 2.94 (L) 4.40 - 5.90 10e6/uL    Hemoglobin 8.4 (L) 13.3 - 17.7 g/dL    Hematocrit 27.4 (L) 40.0 - 53.0 %    MCV 93 78 - 100 fL    MCH 28.6 26.5 - 33.0 pg    MCHC 30.7 (L) 31.5 - 36.5 g/dL    RDW 15.2 (H) 10.0 - 15.0 %    Platelet Count 150 150 - 450 10e3/uL   Basic metabolic panel    Collection Time: 06/20/22 10:19 AM   Result Value Ref Range    Sodium 134 (L) 136 - 145 mmol/L    Potassium 4.7 3.5 - 5.0 mmol/L    Chloride 99 98 - 107 mmol/L    Carbon Dioxide (CO2) 25 22 - 31 mmol/L    Anion Gap 10 5 - 18 mmol/L    Urea Nitrogen 65 (H) 8 - 28 mg/dL    Creatinine 2.00 (H) 0.70 - 1.30 mg/dL    Calcium 9.0 8.5 - 10.5 mg/dL    Glucose 146 (H) 70 - 125 mg/dL    GFR Estimate 34 (L) >60 mL/min/1.73m2   XR Chest Port 1 View    Collection Time: 06/20/22 10:42 AM   Result Value Ref Range    Radiologist flags (Urgent)      Left side lung nodule needing follow-up PA and lateral chest x-ray or chest CT.          Lab Results personally reviewed 6/16  Imaging Results personally reviewed 6/16  Discussed with patient and his wife  Reviewed old records     Advanced Care Planning  Discharge Planning discussed  with patient and family  Discussed care with patient and family for 70 minutes with greater than 50% of time spent in counseling and coordination of care.

## 2022-06-20 NOTE — PROGRESS NOTES
Neurosurgery   June 20, 2022    A/P: Thomas Steve is a 77 year old male POD #4 DECOMPRESSIVE LUMBAR LAMINECTOMY LUMBAR 4-LUMBAR 5 AND LUMBAR 5-SACRAL 1 BILATERAL-LEFT SIDE FIRST-PLUS BILATERAL FORAMINOTOMIES PLUS REMOVAL OF HERNIATED DISC LUMBAR 5-SACRAL 1 LEFT with Dr Samuel. Arachnoid bleb intraop, covered with durgen, duraseal. No CSF leak.     Thomas reports more pain today than the last few days. He has taken very little pain medication since day of surgery. He feels the medication makes him drowsy. Will make adjustments. New yesterday he reported posterior left thigh pain stopping at the knee. Today he feels he also has this pain on the right. He reports significant buttocks pain when standing. He also has incisional back pain. Incision not draining but does seem tight surrounding staples. He has full strength on my exam. He states his legs feel weak when he stands. He had a fever yesterday - Max 101.7. Pan cultured. WBC elevated but could be surgery related. UA looks negative. He did have urinary retention. Carpenter has been placed. BC pending. CXR was not done. Added today. He has not had a bowel movement despite suppository. Enema ordered.     Consider MRI lumbar if no improvement and prior to starting Xarelto. ADDENDUM 1200: Discussed with Dr Samuel concerns of increase in pain, fever. Will order MRI - contrast if meets criteria with bump in creatinine. Labs reviewed. Follow Hgb. Discussed exam and concerns with Dr Francois. IV fluids have been ordered. Lovenox changed to heparin for renal purposes. CXR reviewed - will order two view to start for ovoid seen with rec's for additional imaging.     Not ready for discharge. Unfortunately Thomas's wife is admitted to [facility] as well. Care management is trying to arrange TCU for both of them.     PLAN:   1.  Continue pain management - changed methocarbamol to tizanidine today and to scheduled. Continue tylenol, lidocaine patches. Prn oxycodone. Ice packs. Already  "on 1200 gabapentin twice daily. Steroids considered but diabetic and possible infection.   2. CXR added to work up for fever yesterday. UA negative. hospitalist started Abx. BC pending. WBC elevated but could be surgery related. Two view ordered per rec's with abnormality noted on today XR  3. Xarelto (Afib) - resume POD #5, SCD, Lovenox POD #2   4. PT, OT   5. Carpenter replaced for retention, flomax started.   6. A1C 7.5 - maintain adequate BG control to aide wound healing   7. Push IS   8. No bowel movement since surgery. Enema today. Had suppository over the weekend in addition to daily medications. Constipation likely contributing to urinary retention  9. Monitor labs - Hgb 8.4, creatinine bump 2 today     HPI: Presented with back pain, right leg pain. Weakness bilateral LE. Worse with walking. No bowel or bladder issues. Diabetes, CABG, AFIB, Xarelto. MRI with listhesis L4-5, spinal stenosis. Stenosis also at L5-S1 with large disc herniation L5-S1 on the left.     Subjective: More painful today in his back and bilateral posterior thigh's, buttocks. No nausea today. No leg numbness or tingling more than normal - baseline neuropathy. Passing flatus but no BM since surgery.     Physical Exam  /55 (BP Location: Left arm)   Pulse 60   Temp 97.6  F (36.4  C) (Oral)   Resp 19   Ht 1.778 m (5' 10\")   Wt 94.7 kg (208 lb 11.2 oz)   SpO2 99%   BMI 29.95 kg/m      General: drowsy. oriented. KIM. Speech clear. Seated in the chair.     Motor: normal bulk and tone     Strength: Full strength LE bilaterally     Sensation: intact to light touch; baseline neuropathy bilaterally     Incision: Dressing changed personally. No incisional drainage. Dressing with drainage from HUSSEIN drain site. Incision seems tight, edema surrounding staples. No redness, tenderness.     Labs: reviewed. Ordered recheck for today. No source of infection noted in labs. BC pending.     Imaging: No imaging required. CXR ordered due to fever " yesterday.     SAAD Manrique  [facility]  O: [phone]

## 2022-06-20 NOTE — PLAN OF CARE
Patient was Alert and oriented this morning. However patient stated he was having pain and did not feel well this morning. Vitals have been stable. MD and neurosurgery aware. Creatinine was 2.0 and Urea nitrogen was 65 MD updated.   Started IV fluids NS at 100 ml/hr. Orders for chest xray, CT scan, Kidney ultrasound, Echocardiogram, and MRI orders added. Urine lab sent as well. Waiting to go downstairs for scans. Tizanidine ordered to be given scheduled. Passed onto continue to monitor. Milagro Saba RN     Problem: Bowel Motility Impaired (Spinal Surgery)  Goal: Effective Bowel Elimination  6/20/2022 1612 by Milagro Saba RN  Outcome: Ongoing, Not Progressing  6/20/2022 1612 by Milagro Saba RN  Outcome: Ongoing, Not Progressing     Problem: Fluid and Electrolyte Imbalance (Spinal Surgery)  Goal: Fluid and Electrolyte Balance  6/20/2022 1612 by Milagro Saba RN  Outcome: Ongoing, Not Progressing  6/20/2022 1612 by Milagro Saba RN  Outcome: Ongoing, Not Progressing     Problem: Infection (Spinal Surgery)  Goal: Absence of Infection Signs and Symptoms  Outcome: Ongoing, Not Progressing     Problem: Pain (Spinal Surgery)  Goal: Acceptable Pain Control  Outcome: Ongoing, Not Progressing  Intervention: Prevent or Manage Pain  Recent Flowsheet Documentation  Taken 6/20/2022 1430 by Milagro Saba RN  Pain Management Interventions: medication (see MAR)  Taken 6/20/2022 0910 by Milagro Saba RN  Pain Management Interventions: (lidocaine patch placed) medication (see MAR)   Goal Outcome Evaluation:

## 2022-06-20 NOTE — PLAN OF CARE
Problem: Bowel Motility Impaired (Spinal Surgery)  Goal: Effective Bowel Elimination  Outcome: Ongoing, Progressing   Goal Outcome Evaluation:      Pt has not had a BM since 6/15. Bowel sounds positive. Abdomen appears moderately distended. Pt received Miralax and Senna and has declined supp. Will report status to following shift RN for pt to receive supp in AM.      Problem: Infection (Spinal Surgery)  Goal: Absence of Infection Signs and Symptoms  Outcome: Ongoing, Progressing     Pt having increased weakness and fatigue. Temp found to be 102.7. Blood cultures and lactic were drawn, WBC was 13.5, Lactic 1.6. MD was notified. Vanco and Zosyn were started. Tylenol given for fever. Cont to monitor.       Problem: Postoperative Urinary Retention (Spinal Surgery)  Goal: Effective Urinary Elimination  Outcome: Ongoing, Progressing   Pt unable to void this shift. Found there to be >600 in bladder with bladder scan. Carpenter placed per neurogenic bladder protocol and UA/UC sent.

## 2022-06-21 ENCOUNTER — APPOINTMENT (OUTPATIENT)
Dept: RADIOLOGY | Facility: HOSPITAL | Age: 77
DRG: 518 | End: 2022-06-21
Attending: NURSE PRACTITIONER
Payer: MEDICARE

## 2022-06-21 ENCOUNTER — APPOINTMENT (OUTPATIENT)
Dept: OCCUPATIONAL THERAPY | Facility: HOSPITAL | Age: 77
DRG: 518 | End: 2022-06-21
Attending: SURGERY
Payer: MEDICARE

## 2022-06-21 ENCOUNTER — APPOINTMENT (OUTPATIENT)
Dept: PHYSICAL THERAPY | Facility: HOSPITAL | Age: 77
DRG: 518 | End: 2022-06-21
Attending: SURGERY
Payer: MEDICARE

## 2022-06-21 ENCOUNTER — APPOINTMENT (OUTPATIENT)
Dept: MRI IMAGING | Facility: HOSPITAL | Age: 77
DRG: 518 | End: 2022-06-21
Attending: NURSE PRACTITIONER
Payer: MEDICARE

## 2022-06-21 LAB
ANION GAP SERPL CALCULATED.3IONS-SCNC: 9 MMOL/L (ref 5–18)
BASOPHILS # BLD AUTO: 0 10E3/UL (ref 0–0.2)
BASOPHILS NFR BLD AUTO: 0 %
BUN SERPL-MCNC: 62 MG/DL (ref 8–28)
C REACTIVE PROTEIN LHE: 17.4 MG/DL (ref 0–0.8)
CALCIUM SERPL-MCNC: 8.6 MG/DL (ref 8.5–10.5)
CHLORIDE BLD-SCNC: 103 MMOL/L (ref 98–107)
CO2 SERPL-SCNC: 23 MMOL/L (ref 22–31)
CREAT SERPL-MCNC: 1.75 MG/DL (ref 0.7–1.3)
EOSINOPHIL # BLD AUTO: 0 10E3/UL (ref 0–0.7)
EOSINOPHIL NFR BLD AUTO: 0 %
ERYTHROCYTE [DISTWIDTH] IN BLOOD BY AUTOMATED COUNT: 15.1 % (ref 10–15)
ERYTHROCYTE [SEDIMENTATION RATE] IN BLOOD BY WESTERGREN METHOD: 116 MM/HR (ref 0–15)
GFR SERPL CREATININE-BSD FRML MDRD: 40 ML/MIN/1.73M2
GLUCOSE BLD-MCNC: 121 MG/DL (ref 70–125)
GLUCOSE BLDC GLUCOMTR-MCNC: 142 MG/DL (ref 70–99)
GLUCOSE BLDC GLUCOMTR-MCNC: 146 MG/DL (ref 70–99)
GLUCOSE BLDC GLUCOMTR-MCNC: 150 MG/DL (ref 70–99)
GLUCOSE BLDC GLUCOMTR-MCNC: 208 MG/DL (ref 70–99)
HCT VFR BLD AUTO: 26.7 % (ref 40–53)
HGB BLD-MCNC: 8.1 G/DL (ref 13.3–17.7)
IMM GRANULOCYTES # BLD: 0.1 10E3/UL
IMM GRANULOCYTES NFR BLD: 1 %
LYMPHOCYTES # BLD AUTO: 1.2 10E3/UL (ref 0.8–5.3)
LYMPHOCYTES NFR BLD AUTO: 13 %
MCH RBC QN AUTO: 28.7 PG (ref 26.5–33)
MCHC RBC AUTO-ENTMCNC: 30.3 G/DL (ref 31.5–36.5)
MCV RBC AUTO: 95 FL (ref 78–100)
MONOCYTES # BLD AUTO: 1.2 10E3/UL (ref 0–1.3)
MONOCYTES NFR BLD AUTO: 14 %
NEUTROPHILS # BLD AUTO: 6.5 10E3/UL (ref 1.6–8.3)
NEUTROPHILS NFR BLD AUTO: 72 %
NRBC # BLD AUTO: 0 10E3/UL
NRBC BLD AUTO-RTO: 0 /100
PLATELET # BLD AUTO: 149 10E3/UL (ref 150–450)
POTASSIUM BLD-SCNC: 4.5 MMOL/L (ref 3.5–5)
RBC # BLD AUTO: 2.82 10E6/UL (ref 4.4–5.9)
SODIUM SERPL-SCNC: 135 MMOL/L (ref 136–145)
WBC # BLD AUTO: 8.9 10E3/UL (ref 4–11)

## 2022-06-21 PROCEDURE — 250N000011 HC RX IP 250 OP 636: Performed by: INTERNAL MEDICINE

## 2022-06-21 PROCEDURE — 36415 COLL VENOUS BLD VENIPUNCTURE: CPT | Performed by: NURSE PRACTITIONER

## 2022-06-21 PROCEDURE — 250N000013 HC RX MED GY IP 250 OP 250 PS 637: Performed by: SURGERY

## 2022-06-21 PROCEDURE — 250N000013 HC RX MED GY IP 250 OP 250 PS 637: Performed by: FAMILY MEDICINE

## 2022-06-21 PROCEDURE — 71046 X-RAY EXAM CHEST 2 VIEWS: CPT

## 2022-06-21 PROCEDURE — 85652 RBC SED RATE AUTOMATED: CPT | Performed by: PHYSICIAN ASSISTANT

## 2022-06-21 PROCEDURE — 250N000013 HC RX MED GY IP 250 OP 250 PS 637: Performed by: NURSE PRACTITIONER

## 2022-06-21 PROCEDURE — 86140 C-REACTIVE PROTEIN: CPT | Performed by: PHYSICIAN ASSISTANT

## 2022-06-21 PROCEDURE — 250N000013 HC RX MED GY IP 250 OP 250 PS 637: Performed by: INTERNAL MEDICINE

## 2022-06-21 PROCEDURE — 97116 GAIT TRAINING THERAPY: CPT | Mod: GP

## 2022-06-21 PROCEDURE — 80048 BASIC METABOLIC PNL TOTAL CA: CPT | Performed by: NURSE PRACTITIONER

## 2022-06-21 PROCEDURE — 85025 COMPLETE CBC W/AUTO DIFF WBC: CPT | Performed by: INTERNAL MEDICINE

## 2022-06-21 PROCEDURE — 120N000001 HC R&B MED SURG/OB

## 2022-06-21 PROCEDURE — A9585 GADOBUTROL INJECTION: HCPCS | Performed by: SURGERY

## 2022-06-21 PROCEDURE — 255N000002 HC RX 255 OP 636: Performed by: SURGERY

## 2022-06-21 PROCEDURE — 250N000011 HC RX IP 250 OP 636: Performed by: SURGERY

## 2022-06-21 PROCEDURE — 97535 SELF CARE MNGMENT TRAINING: CPT | Mod: GO

## 2022-06-21 PROCEDURE — 258N000003 HC RX IP 258 OP 636: Performed by: INTERNAL MEDICINE

## 2022-06-21 PROCEDURE — 97530 THERAPEUTIC ACTIVITIES: CPT | Mod: GP

## 2022-06-21 PROCEDURE — 97112 NEUROMUSCULAR REEDUCATION: CPT | Mod: GP

## 2022-06-21 PROCEDURE — 999N000157 HC STATISTIC RCP TIME EA 10 MIN

## 2022-06-21 PROCEDURE — 72158 MRI LUMBAR SPINE W/O & W/DYE: CPT

## 2022-06-21 PROCEDURE — 99233 SBSQ HOSP IP/OBS HIGH 50: CPT | Performed by: INTERNAL MEDICINE

## 2022-06-21 PROCEDURE — 36415 COLL VENOUS BLD VENIPUNCTURE: CPT | Performed by: PHYSICIAN ASSISTANT

## 2022-06-21 PROCEDURE — 250N000011 HC RX IP 250 OP 636

## 2022-06-21 RX ORDER — VANCOMYCIN HYDROCHLORIDE 1 G/200ML
1000 INJECTION, SOLUTION INTRAVENOUS EVERY 24 HOURS
Status: DISCONTINUED | OUTPATIENT
Start: 2022-06-21 | End: 2022-06-22

## 2022-06-21 RX ORDER — GADOBUTROL 604.72 MG/ML
10 INJECTION INTRAVENOUS ONCE
Status: COMPLETED | OUTPATIENT
Start: 2022-06-21 | End: 2022-06-21

## 2022-06-21 RX ORDER — DEXAMETHASONE SODIUM PHOSPHATE 4 MG/ML
4 INJECTION, SOLUTION INTRA-ARTICULAR; INTRALESIONAL; INTRAMUSCULAR; INTRAVENOUS; SOFT TISSUE ONCE
Status: DISCONTINUED | OUTPATIENT
Start: 2022-06-21 | End: 2022-06-21

## 2022-06-21 RX ADMIN — INSULIN GLARGINE 30 UNITS: 100 INJECTION, SOLUTION SUBCUTANEOUS at 21:03

## 2022-06-21 RX ADMIN — LEVOTHYROXINE SODIUM 25 MCG: 0.03 TABLET ORAL at 06:22

## 2022-06-21 RX ADMIN — Medication 500 MG: at 08:57

## 2022-06-21 RX ADMIN — TIZANIDINE 4 MG: 4 TABLET ORAL at 20:50

## 2022-06-21 RX ADMIN — GADOBUTROL 10 ML: 604.72 INJECTION INTRAVENOUS at 11:57

## 2022-06-21 RX ADMIN — PIPERACILLIN AND TAZOBACTAM 3.38 G: 3; .375 INJECTION, POWDER, FOR SOLUTION INTRAVENOUS at 21:55

## 2022-06-21 RX ADMIN — THERA TABS 1 TABLET: TAB at 08:59

## 2022-06-21 RX ADMIN — ROSUVASTATIN CALCIUM 10 MG: 10 TABLET, FILM COATED ORAL at 20:50

## 2022-06-21 RX ADMIN — PANTOPRAZOLE SODIUM 40 MG: 40 TABLET, DELAYED RELEASE ORAL at 06:22

## 2022-06-21 RX ADMIN — FERROUS SULFATE TAB 325 MG (65 MG ELEMENTAL FE) 325 MG: 325 (65 FE) TAB at 17:05

## 2022-06-21 RX ADMIN — LIDOCAINE: 50 OINTMENT TOPICAL at 20:53

## 2022-06-21 RX ADMIN — SENNOSIDES AND DOCUSATE SODIUM 2 TABLET: 8.6; 5 TABLET ORAL at 08:56

## 2022-06-21 RX ADMIN — GLIMEPIRIDE 4 MG: 1 TABLET ORAL at 08:58

## 2022-06-21 RX ADMIN — FERROUS SULFATE TAB 325 MG (65 MG ELEMENTAL FE) 325 MG: 325 (65 FE) TAB at 13:21

## 2022-06-21 RX ADMIN — ACETAMINOPHEN 975 MG: 325 TABLET, FILM COATED ORAL at 21:01

## 2022-06-21 RX ADMIN — PANTOPRAZOLE SODIUM 40 MG: 40 TABLET, DELAYED RELEASE ORAL at 17:05

## 2022-06-21 RX ADMIN — ACETAMINOPHEN 650 MG: 325 TABLET, FILM COATED ORAL at 17:15

## 2022-06-21 RX ADMIN — SODIUM CHLORIDE: 900 INJECTION INTRAVENOUS at 21:59

## 2022-06-21 RX ADMIN — VANCOMYCIN HYDROCHLORIDE 1000 MG: 1 INJECTION, SOLUTION INTRAVENOUS at 20:26

## 2022-06-21 RX ADMIN — DICLOFENAC SODIUM 2 G: 10 GEL TOPICAL at 17:06

## 2022-06-21 RX ADMIN — ACETAMINOPHEN 975 MG: 325 TABLET, FILM COATED ORAL at 06:22

## 2022-06-21 RX ADMIN — ACETAMINOPHEN 975 MG: 325 TABLET, FILM COATED ORAL at 13:21

## 2022-06-21 RX ADMIN — LIDOCAINE 2 PATCH: 246 PATCH TOPICAL at 08:52

## 2022-06-21 RX ADMIN — HEPARIN SODIUM 5000 UNITS: 10000 INJECTION, SOLUTION INTRAVENOUS; SUBCUTANEOUS at 08:56

## 2022-06-21 RX ADMIN — TIZANIDINE 4 MG: 4 TABLET ORAL at 08:59

## 2022-06-21 RX ADMIN — Medication 100 MCG: at 08:57

## 2022-06-21 RX ADMIN — TIZANIDINE 4 MG: 4 TABLET ORAL at 13:21

## 2022-06-21 RX ADMIN — LIDOCAINE: 50 OINTMENT TOPICAL at 13:22

## 2022-06-21 RX ADMIN — POLYETHYLENE GLYCOL 3350 17 G: 17 POWDER, FOR SOLUTION ORAL at 08:59

## 2022-06-21 RX ADMIN — HEPARIN SODIUM 5000 UNITS: 10000 INJECTION, SOLUTION INTRAVENOUS; SUBCUTANEOUS at 20:52

## 2022-06-21 RX ADMIN — Medication 500 MG: at 13:21

## 2022-06-21 RX ADMIN — OXYCODONE HYDROCHLORIDE 5 MG: 5 TABLET ORAL at 08:56

## 2022-06-21 RX ADMIN — PIPERACILLIN AND TAZOBACTAM 3.38 G: 3; .375 INJECTION, POWDER, FOR SOLUTION INTRAVENOUS at 06:29

## 2022-06-21 RX ADMIN — FERROUS SULFATE TAB 325 MG (65 MG ELEMENTAL FE) 325 MG: 325 (65 FE) TAB at 08:57

## 2022-06-21 RX ADMIN — PIPERACILLIN AND TAZOBACTAM 3.38 G: 3; .375 INJECTION, POWDER, FOR SOLUTION INTRAVENOUS at 14:15

## 2022-06-21 RX ADMIN — Medication 500 MG: at 20:50

## 2022-06-21 RX ADMIN — SENNOSIDES AND DOCUSATE SODIUM 2 TABLET: 8.6; 5 TABLET ORAL at 20:50

## 2022-06-21 RX ADMIN — TAMSULOSIN HYDROCHLORIDE 0.4 MG: 0.4 CAPSULE ORAL at 08:57

## 2022-06-21 ASSESSMENT — ACTIVITIES OF DAILY LIVING (ADL)
ADLS_ACUITY_SCORE: 31

## 2022-06-21 NOTE — PROGRESS NOTES
Care Management Follow Up    Length of Stay (days): 5    Expected Discharge Date: 06/22/2022     Concerns to be Addressed: discharge planning       Patient plan of care discussed at interdisciplinary rounds: Yes    Anticipated Discharge Disposition: Transitional Care     Anticipated Discharge Services: Other (see comment) (therapy services)    Anticipated Discharge DME: None    Patient/family educated on Medicare website which has current facility and service quality ratings: yes    Education Provided on the Discharge Plan:  Yes    Patient/Family in Agreement with the Plan: yes    Referrals Placed by CM/ELBA: Post Acute Facilities    Private pay costs discussed: Not applicable    Additional Information: ELBA following up on TCU referrals.  ELBA left message with  at [facility] - everyone is in meeting - she will have Clarisa call ELBA back.  Noted that no return call from [facility] today.  ELBA spoke with Kimberlee at [facility] -  she will review and call ELBA back.  ELBA received a voicemail message later from Kimberlee stating that she is reviewing and wants to make sure we are looking at placing both pt and his wife at same facility.  ELBA spoke with Veda at [facility] - needs updated therapy notes for pt faxed over to her.  ELBA and Donna ([phone]) at [facility][note 2] playing voicemail tag today.  Last message from Donna received today stated that pt was accepted and that they were still reviewing pt's wife.       ELBA updated by Dr Francois that pt had a fever and his kidney function is concerning.  More work up ordered.  Pt will likely not discharge tomorrow - possibly Wednesday.      ELBA met briefly with pt and reviewed that pt accepted at [facility][note 2] and that they are reviewing wife still.  Pt stated that he is okay with going there, although he would prefer going to [facility], as that is closest facility to his home.  He stated that SW could contact  his son Abelardo regarding transportation at discharge.      ELBA spoke with pt's son Abelardo ([phone]) regarding discharge planning.  SW updated Abelardo regarding pt accepted at [facility] and facility still reviewing his mother (pt's wife) for placement at same facility.  He confirmed that he could transport both parents to TCU once they are medically stable.  He stated that later in the day and early evening is usually better for transport, although he shared that he was laid off today from his job so will have some more availability than usual.  He stated that neither he nor his wife Vania ([phone]) have heard from the doctors or a nurse regarding status of pt and wife.  ELBA will speak with doctor tomorrow with request for medical update to son.        TONY Thompson, SHABBIR 06/21/22 6:39 PM

## 2022-06-21 NOTE — PLAN OF CARE
Patient is drowsy throughout shift. He is A&O to person and place. He does know which medications he takes. Enema given. Only smear of stool and small amt of liquid out. Pain is controlled by with oral pain meds. IV abx infusing and IV flushing well. Patient will have MRI at 11 AM tomorrow.  Problem: Bowel Motility Impaired (Spinal Surgery)  Goal: Effective Bowel Elimination  Outcome: Ongoing, Not Progressing     Problem: Bleeding (Spinal Surgery)  Goal: Absence of Bleeding  Outcome: Ongoing, Progressing     Problem: Fluid and Electrolyte Imbalance (Spinal Surgery)  Goal: Fluid and Electrolyte Balance  Outcome: Ongoing, Progressing     Problem: Infection (Spinal Surgery)  Goal: Absence of Infection Signs and Symptoms  Outcome: Ongoing, Progressing     Problem: Risk for Delirium  Goal: Improved Behavioral Control  Intervention: Prevent and Manage Agitation  Recent Flowsheet Documentation  Taken 6/20/2022 1700 by Wen Ramos RN  Complementary Therapy: aromatherapy utilized     Problem: Functional Ability Impaired (Spinal Surgery)  Goal: Optimal Functional Ability  Intervention: Optimize Functional Status  Recent Flowsheet Documentation  Taken 6/20/2022 1700 by Wen Ramos RN  Activity Management: ambulated to bathroom  Positioning/Transfer Devices: pillows     Problem: Neurologic Impairment (Spinal Surgery)  Goal: Optimal Neurologic Function  Intervention: Optimize Neurologic Function  Recent Flowsheet Documentation  Taken 6/20/2022 1700 by Wen Ramos RN  Body Position:   left   turned     Problem: Ongoing Anesthesia Effects (Spinal Surgery)  Goal: Anesthesia/Sedation Recovery  Intervention: Optimize Anesthesia Recovery  Recent Flowsheet Documentation  Taken 6/20/2022 1700 by Wen Ramos, RN  Safety Promotion/Fall Prevention: activity supervised  Administration (IS): instruction provided, follow-up  Level Incentive Spirometer (mL): 2000  Incentive Spirometer Predicted Level (mL): 2500  Number of  Repetitions (IS): 5  Patient Tolerance (IS): good     Problem: Pain (Spinal Surgery)  Goal: Acceptable Pain Control  Intervention: Prevent or Manage Pain  Recent Flowsheet Documentation  Taken 6/20/2022 1800 by Wen Ramos RN  Pain Management Interventions: medication (see MAR)  Taken 6/20/2022 1700 by Wen Ramos RN  Complementary Therapy: aromatherapy utilized     Problem: Respiratory Compromise (Spinal Surgery)  Goal: Effective Oxygenation and Ventilation  Intervention: Optimize Oxygenation and Ventilation  Recent Flowsheet Documentation  Taken 6/20/2022 1700 by Wen Ramos RN  Head of Bed (HOB) Positioning: HOB at 30-45 degrees     Problem: Plan of Care - These are the overarching goals to be used throughout the patient stay.    Goal: Absence of Hospital-Acquired Illness or Injury  Intervention: Identify and Manage Fall Risk  Recent Flowsheet Documentation  Taken 6/20/2022 1700 by Wen Ramos RN  Safety Promotion/Fall Prevention: activity supervised  Intervention: Prevent Skin Injury  Recent Flowsheet Documentation  Taken 6/20/2022 1700 by Wen Ramos RN  Body Position:   left   turned  Intervention: Prevent and Manage VTE (Venous Thromboembolism) Risk  Recent Flowsheet Documentation  Taken 6/20/2022 1700 by Wen Ramos RN  VTE Prevention/Management: SCDs (sequential compression devices) on  Activity Management: ambulated to bathroom  Goal: Optimal Comfort and Wellbeing  Intervention: Monitor Pain and Promote Comfort  Recent Flowsheet Documentation  Taken 6/20/2022 1800 by Wen Ramos RN  Pain Management Interventions: medication (see MAR)   Goal Outcome Evaluation:

## 2022-06-21 NOTE — PLAN OF CARE
Problem: Risk for Delirium  Goal: Improved Sleep  Outcome: Ongoing, Progressing     Problem: Bowel Motility Impaired (Spinal Surgery)  Goal: Effective Bowel Elimination  Outcome: Ongoing, Progressing     Problem: Fluid and Electrolyte Imbalance (Spinal Surgery)  Goal: Fluid and Electrolyte Balance  Outcome: Ongoing, Progressing     Problem: Functional Ability Impaired (Spinal Surgery)  Goal: Optimal Functional Ability  Intervention: Optimize Functional Status  Recent Flowsheet Documentation  Taken 6/21/2022 0045 by Radha Tobias RN  Activity Management: activity adjusted per tolerance  Positioning/Transfer Devices:   pillows   in use     Problem: Infection (Spinal Surgery)  Goal: Absence of Infection Signs and Symptoms  Outcome: Ongoing, Progressing     Problem: Neurologic Impairment (Spinal Surgery)  Goal: Optimal Neurologic Function  Outcome: Ongoing, Progressing  Intervention: Optimize Neurologic Function  Recent Flowsheet Documentation  Taken 6/21/2022 0300 by Radha Tobias RN  Body Position:   turned   supine  Taken 6/21/2022 0045 by Radha Tobias RN  Body Position:   log-rolled   turned   right   left   Goal Outcome Evaluation:    Pt had a quiet night.  Slept the whole night through.  Pt awakes easily to answer orientation questions but falls back to sleep quickly.  Needs anticipated as pt did not use call light.  Carpenter patent with adequate urine output.  Iv fluids infusing at 100ml/hour.  Pt able to move all extremities and has strong grasps.  Abdomen distended. Bowel sounds active though no bm this shift.  Denies nausea.

## 2022-06-21 NOTE — PLAN OF CARE
Problem: Risk for Delirium  Goal: Improved Attention and Thought Clarity  Outcome: Ongoing, Progressing     Problem: Bleeding (Spinal Surgery)  Goal: Absence of Bleeding  Outcome: Ongoing, Progressing     Problem: Bowel Motility Impaired (Spinal Surgery)  Goal: Effective Bowel Elimination  Outcome: Ongoing, Progressing     Problem: Infection (Spinal Surgery)  Goal: Absence of Infection Signs and Symptoms  Outcome: Ongoing, Progressing     Problem: Neurologic Impairment (Spinal Surgery)  Goal: Optimal Neurologic Function  Outcome: Ongoing, Progressing     Problem: Respiratory Compromise (Spinal Surgery)  Goal: Effective Oxygenation and Ventilation  Outcome: Ongoing, Progressing   Goal Outcome Evaluation:      Patient reported pain at 5/10 this morning. Dressing to incision removed by Neurosurgery NP. Noted blood drainage on dressing.Incision intact. No redness or swelling noted around site staples intact, new dressing applied. A&O, Had large BM. Continues on IV antibiotics, CMS intact.  and 150 patient declined sliding scale insulin both times this shift. Went down for MRI and chest xray. Patient had an episode of feeling nauseated this afternoon when PT got patient sitting on EOB, it did pass and was able to ambulate with therapy. Will contiue to monitor. After OT this afternoon patient was very tired and would fall asleep while talking to him NP with neurosurgery was in room at the time. Patient said he was fine just tired. Will continue to monitor

## 2022-06-21 NOTE — PROGRESS NOTES
Assessment & Plan   77-year-old male with CAD, HF MAF, DM2, HTN, atrial fibrillation, hypothyroidism presented for planned neurosurgical intervention to treat herniated lumbar disc.  [facility] [place] consulted for management of diabetes, HTN    Active Problems:    Type 2 diabetes mellitus, with long-term current use of insulin (H)    Benign essential hypertension    CAD (coronary artery disease)    Chronic atrial fibrillation (H)    CORAZON (obstructive sleep apnea)    Lumbar stenosis with neurogenic claudication    Gastroesophageal reflux disease without esophagitis    Hypothyroidism    Normocytic anemia    Chronic left shoulder pain    Nasal congestion    Lumbar spinal stenosis    Present on Admission:    Lumbar stenosis with neurogenic claudication    Lumbar spinal stenosis        6/16 : S/p  decompressive lumbar laminectomy of L4-L5, L5-S1 with bilateral foraminotomies and removal of herniated disc in the L1-S1 region with neurosurgery       6/19 : HUSSEIN drain discontinued    6/21    Spiked fever 6/19 and improving  No cough or phlegm, no cp, no sob  No abdominal symptoms, no urinary symptoms  Backache same, no new focal weakness    Blood cultures sent 6/20 : NGTD  Urinalysis normal, ordered CT chest - negative, MRI spine ordered  Started on empiric iv antibiotics - zosyn + vancomycin      Creatinine trending up 1.16---2.00---1.75 ( baseline around 1.2-1.3)  Held bumex, lisinopril, gabapentin  Gentle iv hydration, FENA, renal ultrasound normal  Nephrology following    Urinary retention  Started flomax, s/p segura now    Blood sugars stable, change sliding scale insulin - to high resistant        Not medically ready for discharge at this time.  Multi day stay at this time        A/p :       Lumbar spinal stenosis with lumbar disc herniation  -Patient underwent scheduled decompressive lumbar laminectomy of L4-L5, L5-S1 with bilateral foraminotomies and removal of herniated disc in the L1-S1 region with neurosurgery on  6/16/2022.  -Scheduled Tylenol 3 times daily  -Gabapentin 1200 mg p.o. twice daily  -Pain management per neurosurgery  -Rest of postoperative management per neurosurgery    CAD, HFmEF  -Patient with CABG in 2011, SPECT MPI 5/6/2021 abnormal with a large area of transmural infarction of the mid to distal anterior septal, anterior, apical wall.  TTE 12/31/2019 with ejection fraction of 50 to 55%, abnormal septal wall motion consistent with ventricular paced rhythm, mild aortic stenosis and mild to moderate tricuspid insufficiency.  Patient appears euvolemic  -Stop IVF  -Bumex 3 mg p.o. daily  -Simvastatin 10 mg p.o. daily    Chronic atrial fibrillation, sick sinus syndrome  -Ventricular pacemaker in place  -Continue to hold home rivaroxaban, may restart 2 to 3 days after surgery when neurosurgeon allows.    DM2  -No A1C since 2019, will check  -Hold home glimepiride and metformin for now, resume on 6/17 with full breakfast  -Glargine 30 units subcu q. Bedtime  -Sensitive scale aspart insulin 3 times daily AC.  -Accu-Chek 3 times daily AC at bedtime  -Hypoglycemic protocol    Nasal congestion, postnasal drip  -Exacerbated in the setting of needing to lie flat for 24 hours  -Start ipratropium nasal spray twice daily    Chronic left shoulder pain  -Start lidocaine and Voltaren trading on and off 4 times daily    HTN  -Lisinopril 10 mg p.o. daily    GERD  -Pantoprazole 40 mg p.o. daily    CORAZON   -CPAP    Hypothyroidism  -TSH 2.05  -Levothyroxine 25 mcg p.o. daily    Normocytic anemia  -Hemoglobin 11.4 with MCV of 90     Clinically Significant Risk Factors Present on Admission                       Electrolytes: within normal limits  Fluids: stop IVF  Diet: advance to diabetic per neurosurgery  VTE prophylaxis: SCD boots    COVID19 symptom check 6/16/2022  Fevers/Chills/myalgias: NEGATIVE TO ALL  Sick contacts/COVID19 exposure: NEGATIVE TO ALL  Cough/trouble breathing/SOB/sore throat: NEGATIVE TO ALL  Diarrhea:  NEGATIVE    Expected Discharge: 06/23/2022     Anticipated discharge location: other (comment) (TCU)    Delays:     Placement - TCU          Review of Systems: History obtained from the patient  General ROS: negative  for  - chills, fatigue, fever  ENT ROS: negative for - headaches, hearing change, positive for nasal congestion, for nasal discharge  Hematological and Lymphatic ROS: negative for - bleeding problems, blood clots, bruising  Respiratory ROS: negative for - cough, pleuritic pain, shortness of breath  Cardiovascular ROS: negative for - chest pain, dyspnea on exertion, edema  Gastrointestinal ROS: negative for - abdominal pain, constipation, diarrhea, and nausea/vomiting  Genito-Urinary ROS: negative for -  dysuria, hematuria  Musculoskeletal ROS: Positive for mild lower back pain and chronic left shoulder pain  Neurological ROS: negative for - behavioral changes, confusion, dizziness, numbness/tingling   Dermatological ROS: negative for pruritus, rash    Objective    Physical Examination:   General appearance - alert, well appearing, and in no distress and oriented to person, place, and time  Mental status - alert, oriented to person, place, and time, normal mood, behavior, speech, dress, motor activity, and thought processes  HEENT - sclera anicteric, left eye normal, right eye normal, nares normal and patent, no erythema,  Respiratory - clear to auscultation, no wheezes, rales or rhonchi, symmetric air entry, no tachypnea, retractions or cyanosis, nasal cannula in place, patient frequently clearing his throat and attempting to spit out phlegm  Cardiac - normal rate, regular rhythm, normal S1, S2, blowing systolic murmur present, rubs, clicks or gallops, no JVD  Abdomen - soft, nontender, nondistended, no masses or organomegaly no rebound tenderness noted bowel sounds normal, no bladder distension noted  Neurological - alert, oriented, normal speech, no focal findings or movement disorder  noted  Musculoskeletal - no joint tenderness, deformity or swelling, full range of motion without pain  Extremities - peripheral pulses normal, no pedal edema, no clubbing or cyanosis  Skin - normal coloration and turgor, no rashes, no suspicious skin lesions noted    Temp:  [97.3  F (36.3  C)-99.1  F (37.3  C)] 99.1  F (37.3  C)  Pulse:  [60-61] 61  Resp:  [18-22] 20  BP: (106-120)/(57-60) 107/58  SpO2:  [93 %-100 %] 93 %      Intake/Output Summary (Last 24 hours) at 6/16/2022 1826  Last data filed at 6/16/2022 1733  Gross per 24 hour   Intake 3505 ml   Output 1450 ml   Net 2055 ml       Results    Recent Results (from the past 24 hour(s))   Glucose by meter    Collection Time: 06/20/22  5:26 PM   Result Value Ref Range    GLUCOSE BY METER POCT 126 (H) 70 - 99 mg/dL   Glucose by meter    Collection Time: 06/20/22  9:22 PM   Result Value Ref Range    GLUCOSE BY METER POCT 169 (H) 70 - 99 mg/dL   Basic metabolic panel    Collection Time: 06/21/22  5:35 AM   Result Value Ref Range    Sodium 135 (L) 136 - 145 mmol/L    Potassium 4.5 3.5 - 5.0 mmol/L    Chloride 103 98 - 107 mmol/L    Carbon Dioxide (CO2) 23 22 - 31 mmol/L    Anion Gap 9 5 - 18 mmol/L    Urea Nitrogen 62 (H) 8 - 28 mg/dL    Creatinine 1.75 (H) 0.70 - 1.30 mg/dL    Calcium 8.6 8.5 - 10.5 mg/dL    Glucose 121 70 - 125 mg/dL    GFR Estimate 40 (L) >60 mL/min/1.73m2   CBC with platelets and differential    Collection Time: 06/21/22  5:35 AM   Result Value Ref Range    WBC Count 8.9 4.0 - 11.0 10e3/uL    RBC Count 2.82 (L) 4.40 - 5.90 10e6/uL    Hemoglobin 8.1 (L) 13.3 - 17.7 g/dL    Hematocrit 26.7 (L) 40.0 - 53.0 %    MCV 95 78 - 100 fL    MCH 28.7 26.5 - 33.0 pg    MCHC 30.3 (L) 31.5 - 36.5 g/dL    RDW 15.1 (H) 10.0 - 15.0 %    Platelet Count 149 (L) 150 - 450 10e3/uL    % Neutrophils 72 %    % Lymphocytes 13 %    % Monocytes 14 %    % Eosinophils 0 %    % Basophils 0 %    % Immature Granulocytes 1 %    NRBCs per 100 WBC 0 <1 /100    Absolute Neutrophils  6.5 1.6 - 8.3 10e3/uL    Absolute Lymphocytes 1.2 0.8 - 5.3 10e3/uL    Absolute Monocytes 1.2 0.0 - 1.3 10e3/uL    Absolute Eosinophils 0.0 0.0 - 0.7 10e3/uL    Absolute Basophils 0.0 0.0 - 0.2 10e3/uL    Absolute Immature Granulocytes 0.1 <=0.4 10e3/uL    Absolute NRBCs 0.0 10e3/uL   Glucose by meter    Collection Time: 06/21/22  7:49 AM   Result Value Ref Range    GLUCOSE BY METER POCT 142 (H) 70 - 99 mg/dL   Glucose by meter    Collection Time: 06/21/22 12:38 PM   Result Value Ref Range    GLUCOSE BY METER POCT 150 (H) 70 - 99 mg/dL   Erythrocyte sedimentation rate auto    Collection Time: 06/21/22  2:26 PM   Result Value Ref Range    Erythrocyte Sedimentation Rate 116 (H) 0 - 15 mm/hr   CRP inflammation    Collection Time: 06/21/22  2:26 PM   Result Value Ref Range    CRP 17.4 (H) 0.0 - 0.8 mg/dL          Lab Results personally reviewed 6/16  Imaging Results personally reviewed 6/16  Discussed with patient and his wife  Reviewed old records     Advanced Care Planning  Discharge Planning discussed with patient and family  Discussed care with patient and family for 70 minutes with greater than 50% of time spent in counseling and coordination of care.

## 2022-06-21 NOTE — CONSULTS
NEPHROLOGY CONSULTATION    REASON FOR CONSULTATION:    Acute kidney injury.    HISTORY OF PRESENT ILLNESS:  Patient is a 77-year-old male with known history of diabetes mellitus type 2 on insulin, longstanding benign hypertension, coronary artery disease, chronic atrial fibrillation, obstructive sleep apnea, lumbar spinal stenosis with neurogenic claudication, gastroesophageal reflux disease, hypothyroidism, chronic nasal congestion, chronic low back pain.    Baseline creatinine 1.11-1.16 earlier this year, estimated EGFR 65-69.  Creatinine 1.16 (6/18/2022)    On 6/16/2022 patient underwent decompressive lumbar laminectomy, also removal of herniated disc at L5-S1 level.  Patient has been doing well.  Urine surgery patient had persistent hypotension requiring administration of phenylephrine.    Follow-up laboratories on 6/20/2022 demonstrated creatinine 2.0, follow-up creatinine today 1.75 (6/21).  Urine output has been satisfactory with 1600 mL on 6/20.    Patient overall is doing well, he denies any chest pain, shortness of breath, no nausea, no vomiting, no uremic symptoms, no peripheral edema.    Laboratories today (6/21) show sodium 135, potassium 4.5, chloride 103, CO2 23, BUN 62, creatinine 1.75, calcium 8.6, hemoglobin 8.1.          REVIEW OF SYSTEMS:   See above.     Past Medical History:   Diagnosis Date     Acute sinusitis      Atrial fibrillation (H)     Resolved after medication and CPAP     Back pain      CHF (congestive heart failure) (H)      Coronary artery disease      Diabetes mellitus (H)      Gastroesophageal reflux disease with esophagitis      HTN (hypertension)      Hyperlipemia      Joint pain      Lipoma of neck      Nocturia      Sciatic leg pain      Shoulder impingement syndrome     BILATERAL     Sleep apnea     uses CPAP     Typical atrial flutter (H) 07/07/2016    Demonstrated on 12-lead EKG July 7, 2016       Social History     Socioeconomic History     Marital status:       Spouse name: Not on file     Number of children: Not on file     Years of education: Not on file     Highest education level: Not on file   Occupational History     Not on file   Tobacco Use     Smoking status: Former Smoker     Packs/day: 1.50     Years: 23.00     Pack years: 34.50     Quit date: 1986     Years since quittin.4     Smokeless tobacco: Never Used   Substance and Sexual Activity     Alcohol use: Yes     Alcohol/week: 1.0 standard drink     Comment: one beer per day     Drug use: No     Sexual activity: Never   Other Topics Concern     Not on file   Social History Narrative    .  3 children.  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     Social Determinants of Health     Financial Resource Strain: Not on file   Food Insecurity: Not on file   Transportation Needs: Not on file   Physical Activity: Not on file   Stress: Not on file   Social Connections: Not on file   Intimate Partner Violence: Not on file   Housing Stability: Not on file       Family History   Problem Relation Age of Onset     Heart Disease Mother      Snoring Father      Heart Disease Father      Hypertension Father      Alzheimer Disease Father      No Known Problems Daughter      No Known Problems Daughter      No Known Problems Daughter      Chronic Obstructive Pulmonary Disease Sister      Hodgkin's lymphoma Brother      No Known Problems Son      Diabetes Sister      Substance Abuse Sister      Chronic Obstructive Pulmonary Disease Brother      Heart Disease Brother      Diabetes Brother      Substance Abuse Brother        Allergies   Allergen Reactions     Aspirin Other (See Comments)     Contraindicated due to xarelto use     Codeine Nausea and Vomiting     Pollen Extracts [Pollen Extract] Unknown     Scratchy throat     Vicodin [Hydrocodone-Acetaminophen] Nausea and Vomiting       MEDICATIONS:    acetaminophen  975 mg Oral Q8H     [Held by provider] bumetanide  3 mg Oral Daily     diclofenac  2 g  "Topical BID     ferrous sulfate  325 mg Oral TID w/meals     [Held by provider] gabapentin  1,200 mg Oral BID     glimepiride  4 mg Oral Daily     heparin ANTICOAGULANT  5,000 Units Subcutaneous Q12H     insulin aspart  1-10 Units Subcutaneous TID AC     insulin aspart  1-7 Units Subcutaneous At Bedtime     insulin glargine  30 Units Subcutaneous At Bedtime     ipratropium  2 spray Both Nostrils TID     levothyroxine  25 mcg Oral Daily     lidocaine  2 patch Transdermal Q24H     lidocaine   Topical BID     lidocaine   Transdermal Q8H [facility]     [Held by provider] lisinopril  10 mg Oral Daily     [Held by provider] metFORMIN  1,000 mg Oral BID w/meals     multivitamin, therapeutic  1 tablet Oral Daily     pantoprazole  40 mg Oral BID AC     piperacillin-tazobactam  3.375 g Intravenous Q8H     polyethylene glycol  17 g Oral Daily     rosuvastatin  10 mg Oral At Bedtime     senna-docusate  2 tablet Oral BID     tamsulosin  0.4 mg Oral Daily     tiZANidine  4 mg Oral TID     vancomycin  1,000 mg Intravenous Q24H     vitamin C  500 mg Oral TID     cholecalciferol  100 mcg Oral Daily         PHYSICAL EXAM    /60 (BP Location: Left arm)   Pulse 60   Temp 98.1  F (36.7  C) (Oral)   Resp 18   Ht 1.778 m (5' 10\")   Wt 94.7 kg (208 lb 11.2 oz)   SpO2 94%   BMI 29.95 kg/m        Intake/Output Summary (Last 24 hours) at 6/21/2022 1405  Last data filed at 6/21/2022 0929  Gross per 24 hour   Intake 2010 ml   Output 1575 ml   Net 435 ml     Resp: 18      GENERAL: Chronically ill and debilitated  HEAD: Normal  HEENT: Equal pupils. Normal hearing. No eyelid edema.  CARDIOVASCULAR: No JVD present. Trade peripheral edema  PULMONARY: No respiratory distress. No cyanosis  GASTROINTESTINAL: No ascites present  MSK: Diffuse muscle wasting.   NEURO: Alert, no gross focal findings  PSYCHIATRIC: Adequate mood and interaction  SKIN: Pale, no jaundice, no rash.    LABORATORIES    Recent Labs   Lab 06/21/22  0535 06/20/22  1019 " 06/19/22  1848   WBC 8.9 11.7* 13.5*   HGB 8.1* 8.4* 8.9*   HCT 26.7* 27.4* 28.4*   * 150 152     Recent Labs   Lab 06/21/22  0535 06/20/22  1556 06/20/22  1019 06/15/22  1527   * 135* 134* 140   CO2 23  --  25 26   BUN 62*  --  65* 26     Recent Labs   Lab 06/15/22  1527   INR 1.04   PTT 30     Invalid input(s): FERRITIN  No results for input(s): IRON in the last 168 hours.    Invalid input(s): TIBC    RADIOLOGY  EXAM: US RENAL COMPLETE  LOCATION: [facility]  DATE/TIME: 6/20/2022 4:12 PM     INDICATION: YOLANDA  COMPARISON: Abdominal ultrasound dated 10/11/2013.  TECHNIQUE: Routine Bilateral Renal and Bladder Ultrasound.     FINDINGS:     RIGHT KIDNEY: 10.9 x 6.5 x 5.7 cm. Renal cortical thickness is 1.5 cm Normal without hydronephrosis or masses.      LEFT KIDNEY: 11.6 x 6.5 x 5.1 cm. Renal cortex thickness is 1.8 cm. Normal without hydronephrosis or masses.      BLADDER: Nondistended                                                                      IMPRESSION:  1.  Normal kidney ultrasound.    ECHOCARDIOGRAM    ASSESSMENT/PLAN:    1. Acute kidney injury.  Acute tubular necrosis.  Secondary to intraoperative hypotension with contribution from required administration of phenylephrine drip during surgery.  Patient has a baseline creatinine 1.11-1.16, creatinine went up to 2.0 on 6/20, renal function is slowly improving at present, creatinine 1.75 on 6/21.  Adequate urine output at present.  Electrolytes are satisfactory.  Volume status is adequate.    Check random urine osmolality, random urine sodium.    Renal ultrasound (6/20)- No hydronephrosis, normal renal size bilaterally   2. S/p lumbar laminectomy (6/16/2022) patient seems to be doing well, continue management as per neurosurgery service.  3. Hypertension.  Adequate control at present.  4. Diabetes mellitus type 2.  We will check urinalysis and assess for urine protein excretion.  Continue diabetes management as per  primary service.    Condition and plan of care discussed with patient at the bedside during the visit.      Wilfred Aranda MD  Nephrology

## 2022-06-21 NOTE — PROGRESS NOTES
Neurosurgery Treatment Plan:   Patient just completed PT and appears to be very lethargic   States that pain is well controlled and denies any radicular lower extremity pain. Feels pain is better than yesterday   Able to ambulate in [place] with walker   No fever since 6/19/2022       Images:   MRI reviewed with postoperative fluid collection unable to rule out possible infection   WBS normal      Plan:   Will obtain CRP and sed rate   Repeat CBC tomorrow am   Will make NPO at midnight for possibility of IR aspiration unsure if large enough fluid collection      Joan French PA-C  [facility] Neurosurgery  O: [phone]

## 2022-06-21 NOTE — PROGRESS NOTES
"Neurosurgery Progress Note  06/21/22      A/P: Thomas Steve is a 77 year old male POD #5 DECOMPRESSIVE LUMBAR LAMINECTOMY LUMBAR 4-LUMBAR 5 AND LUMBAR 5-SACRAL 1 BILATERAL-LEFT SIDE FIRST-PLUS BILATERAL FORAMINOTOMIES PLUS REMOVAL OF HERNIATED DISC LUMBAR 5-SACRAL 1 LEFT with Dr Samuel. Arachnoid bleb intraop, covered with durgen, duraseal. No CSF leak.     Pain today is better, reports no pain radiating down the legs. Incision is without redness, swelling, drainage. With incision clean and pain improving, have low suspicion for infection. Discussed with hospitalist - prefer to do MRI lumbar to evaluate for cause. Patient did have duragen placed intraop which can complicate results but await MRI.     Dispo pending MRI results. Reviewed with social work - planning TCU tomorrow.     PLAN:   1. MRI lumbar today, can consider steroids vs nsaids if pain continues  2. Continue tizanidine and prn oxycodone.   3. On antibiotics, no cause of fever yet identified  4. Resume anticoagulants pending MRI  5. Carpenter replaced for retention, flomax started.   6. A1C 7.5 - maintain adequate BG control to aide wound healing   7.+ BM    HPI: Presented with back pain, right leg pain. Weakness bilateral LE. Worse with walking. No bowel or bladder issues. Diabetes, CABG, AFIB, Xarelto. MRI with listhesis L4-5, spinal stenosis. Stenosis also at L5-S1 with large disc herniation L5-S1 on the left.     Subjective:Denies leg pain today, would be okay with holding off on MRI. Feels he is better able to move around.     Physical Exam  /60 (BP Location: Left arm)   Pulse 60   Temp 98.1  F (36.7  C) (Oral)   Resp 18   Ht 1.778 m (5' 10\")   Wt 94.7 kg (208 lb 11.2 oz)   SpO2 96%   BMI 29.95 kg/m      General:  oriented. KIM. Speech clear. Seated in the chair.     Motor: normal bulk and tone     Strength: Full strength LE bilaterally     Sensation: intact to light touch; baseline neuropathy bilaterally     Incision: Dressing changed " personally. No incisional drainage, no redness or obvious swelling - entire back seems somewhat tight but not localized to incision.     Labs: reviewed. Ordered recheck for today. No source of infection noted in labs. No growth on blood cultures    Imaging: No imaging required. Awaiting MRI     Melisa Lara CNP  [facility] Neurosurgery  O: [phone]  P: [phone]

## 2022-06-22 ENCOUNTER — APPOINTMENT (OUTPATIENT)
Dept: RADIOLOGY | Facility: HOSPITAL | Age: 77
DRG: 518 | End: 2022-06-22
Attending: INTERNAL MEDICINE
Payer: MEDICARE

## 2022-06-22 ENCOUNTER — APPOINTMENT (OUTPATIENT)
Dept: PHYSICAL THERAPY | Facility: HOSPITAL | Age: 77
DRG: 518 | End: 2022-06-22
Attending: SURGERY
Payer: MEDICARE

## 2022-06-22 ENCOUNTER — APPOINTMENT (OUTPATIENT)
Dept: OCCUPATIONAL THERAPY | Facility: HOSPITAL | Age: 77
DRG: 518 | End: 2022-06-22
Attending: SURGERY
Payer: MEDICARE

## 2022-06-22 LAB
ANION GAP SERPL CALCULATED.3IONS-SCNC: 10 MMOL/L (ref 5–18)
BASOPHILS # BLD AUTO: 0 10E3/UL (ref 0–0.2)
BASOPHILS NFR BLD AUTO: 1 %
BUN SERPL-MCNC: 60 MG/DL (ref 8–28)
C REACTIVE PROTEIN LHE: 16 MG/DL (ref 0–?)
CALCIUM SERPL-MCNC: 8.5 MG/DL (ref 8.5–10.5)
CHLORIDE BLD-SCNC: 102 MMOL/L (ref 98–107)
CO2 SERPL-SCNC: 21 MMOL/L (ref 22–31)
CREAT SERPL-MCNC: 1.44 MG/DL (ref 0.7–1.3)
EOSINOPHIL # BLD AUTO: 0.1 10E3/UL (ref 0–0.7)
EOSINOPHIL NFR BLD AUTO: 1 %
ERYTHROCYTE [DISTWIDTH] IN BLOOD BY AUTOMATED COUNT: 14.6 % (ref 10–15)
GFR SERPL CREATININE-BSD FRML MDRD: 50 ML/MIN/1.73M2
GLUCOSE BLD-MCNC: 127 MG/DL (ref 70–125)
GLUCOSE BLDC GLUCOMTR-MCNC: 110 MG/DL (ref 70–99)
GLUCOSE BLDC GLUCOMTR-MCNC: 116 MG/DL (ref 70–99)
GLUCOSE BLDC GLUCOMTR-MCNC: 134 MG/DL (ref 70–99)
GLUCOSE BLDC GLUCOMTR-MCNC: 195 MG/DL (ref 70–99)
HCT VFR BLD AUTO: 25.6 % (ref 40–53)
HGB BLD-MCNC: 8.1 G/DL (ref 13.3–17.7)
IMM GRANULOCYTES # BLD: 0.1 10E3/UL
IMM GRANULOCYTES NFR BLD: 1 %
LYMPHOCYTES # BLD AUTO: 1 10E3/UL (ref 0.8–5.3)
LYMPHOCYTES NFR BLD AUTO: 13 %
MCH RBC QN AUTO: 28.5 PG (ref 26.5–33)
MCHC RBC AUTO-ENTMCNC: 31.6 G/DL (ref 31.5–36.5)
MCV RBC AUTO: 90 FL (ref 78–100)
MONOCYTES # BLD AUTO: 1 10E3/UL (ref 0–1.3)
MONOCYTES NFR BLD AUTO: 12 %
NEUTROPHILS # BLD AUTO: 6.2 10E3/UL (ref 1.6–8.3)
NEUTROPHILS NFR BLD AUTO: 72 %
NRBC # BLD AUTO: 0 10E3/UL
NRBC BLD AUTO-RTO: 0 /100
OSMOLALITY UR: 523 MMOL/KG (ref 100–1200)
PLATELET # BLD AUTO: 188 10E3/UL (ref 150–450)
POTASSIUM BLD-SCNC: 3.8 MMOL/L (ref 3.5–5)
PROCALCITONIN SERPL-MCNC: 0.25 NG/ML (ref 0–0.49)
RBC # BLD AUTO: 2.84 10E6/UL (ref 4.4–5.9)
SODIUM SERPL-SCNC: 133 MMOL/L (ref 136–145)
SODIUM UR-SCNC: <20 MMOL/L
VANCOMYCIN SERPL-MCNC: 19.9 MG/L
WBC # BLD AUTO: 8.3 10E3/UL (ref 4–11)

## 2022-06-22 PROCEDURE — 250N000011 HC RX IP 250 OP 636: Performed by: INTERNAL MEDICINE

## 2022-06-22 PROCEDURE — 97535 SELF CARE MNGMENT TRAINING: CPT | Mod: GO

## 2022-06-22 PROCEDURE — 120N000001 HC R&B MED SURG/OB

## 2022-06-22 PROCEDURE — 250N000013 HC RX MED GY IP 250 OP 250 PS 637: Performed by: FAMILY MEDICINE

## 2022-06-22 PROCEDURE — 258N000003 HC RX IP 258 OP 636: Performed by: INTERNAL MEDICINE

## 2022-06-22 PROCEDURE — 99233 SBSQ HOSP IP/OBS HIGH 50: CPT | Performed by: INTERNAL MEDICINE

## 2022-06-22 PROCEDURE — 250N000011 HC RX IP 250 OP 636: Performed by: SURGERY

## 2022-06-22 PROCEDURE — 80202 ASSAY OF VANCOMYCIN: CPT | Performed by: SURGERY

## 2022-06-22 PROCEDURE — 250N000013 HC RX MED GY IP 250 OP 250 PS 637: Performed by: NURSE PRACTITIONER

## 2022-06-22 PROCEDURE — 84300 ASSAY OF URINE SODIUM: CPT | Performed by: INTERNAL MEDICINE

## 2022-06-22 PROCEDURE — 74018 RADEX ABDOMEN 1 VIEW: CPT

## 2022-06-22 PROCEDURE — 36415 COLL VENOUS BLD VENIPUNCTURE: CPT | Performed by: INTERNAL MEDICINE

## 2022-06-22 PROCEDURE — 250N000013 HC RX MED GY IP 250 OP 250 PS 637: Performed by: INTERNAL MEDICINE

## 2022-06-22 PROCEDURE — 82310 ASSAY OF CALCIUM: CPT | Performed by: INTERNAL MEDICINE

## 2022-06-22 PROCEDURE — 85004 AUTOMATED DIFF WBC COUNT: CPT | Performed by: INTERNAL MEDICINE

## 2022-06-22 PROCEDURE — 250N000011 HC RX IP 250 OP 636

## 2022-06-22 PROCEDURE — 97110 THERAPEUTIC EXERCISES: CPT | Mod: GP

## 2022-06-22 PROCEDURE — 86140 C-REACTIVE PROTEIN: CPT | Performed by: INTERNAL MEDICINE

## 2022-06-22 PROCEDURE — 258N000003 HC RX IP 258 OP 636: Performed by: SURGERY

## 2022-06-22 PROCEDURE — 83935 ASSAY OF URINE OSMOLALITY: CPT | Performed by: INTERNAL MEDICINE

## 2022-06-22 PROCEDURE — 84145 PROCALCITONIN (PCT): CPT | Performed by: INTERNAL MEDICINE

## 2022-06-22 PROCEDURE — 250N000013 HC RX MED GY IP 250 OP 250 PS 637: Performed by: SURGERY

## 2022-06-22 PROCEDURE — 99223 1ST HOSP IP/OBS HIGH 75: CPT | Performed by: INTERNAL MEDICINE

## 2022-06-22 PROCEDURE — 250N000013 HC RX MED GY IP 250 OP 250 PS 637: Performed by: PHYSICIAN ASSISTANT

## 2022-06-22 RX ORDER — POLYETHYLENE GLYCOL 3350 17 G/17G
17 POWDER, FOR SOLUTION ORAL 2 TIMES DAILY
Status: DISCONTINUED | OUTPATIENT
Start: 2022-06-22 | End: 2022-06-29

## 2022-06-22 RX ORDER — BISACODYL 10 MG
10 SUPPOSITORY, RECTAL RECTAL DAILY PRN
Status: DISCONTINUED | OUTPATIENT
Start: 2022-06-22 | End: 2022-06-22

## 2022-06-22 RX ORDER — TIZANIDINE 2 MG/1
2 TABLET ORAL 3 TIMES DAILY
Status: DISCONTINUED | OUTPATIENT
Start: 2022-06-22 | End: 2022-06-24

## 2022-06-22 RX ORDER — CEFAZOLIN SODIUM 1 G/50ML
1250 SOLUTION INTRAVENOUS EVERY 24 HOURS
Status: DISCONTINUED | OUTPATIENT
Start: 2022-06-22 | End: 2022-06-23

## 2022-06-22 RX ADMIN — POLYETHYLENE GLYCOL 3350 17 G: 17 POWDER, FOR SOLUTION ORAL at 08:49

## 2022-06-22 RX ADMIN — PANTOPRAZOLE SODIUM 40 MG: 40 TABLET, DELAYED RELEASE ORAL at 17:36

## 2022-06-22 RX ADMIN — LIDOCAINE: 50 OINTMENT TOPICAL at 22:10

## 2022-06-22 RX ADMIN — POLYETHYLENE GLYCOL 3350 17 G: 17 POWDER, FOR SOLUTION ORAL at 22:17

## 2022-06-22 RX ADMIN — SODIUM CHLORIDE: 900 INJECTION INTRAVENOUS at 19:31

## 2022-06-22 RX ADMIN — INSULIN GLARGINE 30 UNITS: 100 INJECTION, SOLUTION SUBCUTANEOUS at 22:11

## 2022-06-22 RX ADMIN — OXYCODONE HYDROCHLORIDE 10 MG: 5 TABLET ORAL at 22:15

## 2022-06-22 RX ADMIN — ACETAMINOPHEN 975 MG: 325 TABLET, FILM COATED ORAL at 13:17

## 2022-06-22 RX ADMIN — LIDOCAINE: 50 OINTMENT TOPICAL at 13:17

## 2022-06-22 RX ADMIN — FERROUS SULFATE TAB 325 MG (65 MG ELEMENTAL FE) 325 MG: 325 (65 FE) TAB at 08:49

## 2022-06-22 RX ADMIN — HEPARIN SODIUM 5000 UNITS: 10000 INJECTION, SOLUTION INTRAVENOUS; SUBCUTANEOUS at 22:12

## 2022-06-22 RX ADMIN — DICLOFENAC SODIUM 2 G: 10 GEL TOPICAL at 17:39

## 2022-06-22 RX ADMIN — Medication 100 MCG: at 08:48

## 2022-06-22 RX ADMIN — LIDOCAINE 2 PATCH: 246 PATCH TOPICAL at 08:48

## 2022-06-22 RX ADMIN — TAMSULOSIN HYDROCHLORIDE 0.4 MG: 0.4 CAPSULE ORAL at 08:49

## 2022-06-22 RX ADMIN — ACETAMINOPHEN 650 MG: 325 TABLET, FILM COATED ORAL at 09:16

## 2022-06-22 RX ADMIN — PIPERACILLIN AND TAZOBACTAM 3.38 G: 3; .375 INJECTION, POWDER, FOR SOLUTION INTRAVENOUS at 13:17

## 2022-06-22 RX ADMIN — THERA TABS 1 TABLET: TAB at 08:49

## 2022-06-22 RX ADMIN — Medication 500 MG: at 22:15

## 2022-06-22 RX ADMIN — LEVOTHYROXINE SODIUM 25 MCG: 0.03 TABLET ORAL at 06:34

## 2022-06-22 RX ADMIN — ACETAMINOPHEN 975 MG: 325 TABLET, FILM COATED ORAL at 05:30

## 2022-06-22 RX ADMIN — PANTOPRAZOLE SODIUM 40 MG: 40 TABLET, DELAYED RELEASE ORAL at 08:49

## 2022-06-22 RX ADMIN — Medication 500 MG: at 13:17

## 2022-06-22 RX ADMIN — Medication 500 MG: at 08:48

## 2022-06-22 RX ADMIN — FERROUS SULFATE TAB 325 MG (65 MG ELEMENTAL FE) 325 MG: 325 (65 FE) TAB at 13:15

## 2022-06-22 RX ADMIN — TIZANIDINE 2 MG: 2 TABLET ORAL at 08:48

## 2022-06-22 RX ADMIN — PIPERACILLIN AND TAZOBACTAM 3.38 G: 3; .375 INJECTION, POWDER, FOR SOLUTION INTRAVENOUS at 06:15

## 2022-06-22 RX ADMIN — VANCOMYCIN HYDROCHLORIDE 1250 MG: 5 INJECTION, POWDER, LYOPHILIZED, FOR SOLUTION INTRAVENOUS at 22:20

## 2022-06-22 RX ADMIN — DICLOFENAC SODIUM 2 G: 10 GEL TOPICAL at 08:49

## 2022-06-22 RX ADMIN — PIPERACILLIN AND TAZOBACTAM 3.38 G: 3; .375 INJECTION, POWDER, FOR SOLUTION INTRAVENOUS at 22:17

## 2022-06-22 RX ADMIN — SENNOSIDES AND DOCUSATE SODIUM 2 TABLET: 8.6; 5 TABLET ORAL at 22:15

## 2022-06-22 RX ADMIN — SODIUM CHLORIDE: 900 INJECTION INTRAVENOUS at 09:18

## 2022-06-22 RX ADMIN — SENNOSIDES AND DOCUSATE SODIUM 2 TABLET: 8.6; 5 TABLET ORAL at 08:49

## 2022-06-22 RX ADMIN — ROSUVASTATIN CALCIUM 10 MG: 10 TABLET, FILM COATED ORAL at 22:15

## 2022-06-22 RX ADMIN — TIZANIDINE 2 MG: 2 TABLET ORAL at 22:15

## 2022-06-22 RX ADMIN — GLIMEPIRIDE 4 MG: 1 TABLET ORAL at 08:48

## 2022-06-22 RX ADMIN — HEPARIN SODIUM 5000 UNITS: 10000 INJECTION, SOLUTION INTRAVENOUS; SUBCUTANEOUS at 08:49

## 2022-06-22 RX ADMIN — ACETAMINOPHEN 975 MG: 325 TABLET, FILM COATED ORAL at 22:14

## 2022-06-22 RX ADMIN — TIZANIDINE 2 MG: 2 TABLET ORAL at 13:17

## 2022-06-22 RX ADMIN — OXYCODONE HYDROCHLORIDE 5 MG: 5 TABLET ORAL at 06:34

## 2022-06-22 RX ADMIN — FERROUS SULFATE TAB 325 MG (65 MG ELEMENTAL FE) 325 MG: 325 (65 FE) TAB at 17:36

## 2022-06-22 ASSESSMENT — ACTIVITIES OF DAILY LIVING (ADL)
ADLS_ACUITY_SCORE: 30
ADLS_ACUITY_SCORE: 30
ADLS_ACUITY_SCORE: 31
ADLS_ACUITY_SCORE: 30
ADLS_ACUITY_SCORE: 31
ADLS_ACUITY_SCORE: 30
ADLS_ACUITY_SCORE: 31
ADLS_ACUITY_SCORE: 30

## 2022-06-22 NOTE — PROGRESS NOTES
"      RENAL PROGRESS NOTE     CC:     ROS: No new issues present. No nausea. No vomiting. No headache. No fevers. No angina. No shortness of breath. No itching. No uremic symptoms.     Assessment and Plan:     1. Acute kidney injury.  Secondary to intraoperative hypotension with contribution from required administration of phenylephrine drip during surgery.  Patient has a baseline creatinine 1.11-1.16, creatinine went up to 2.0 on 6/20.    Renal function improving.    Creatinine 2.0 (6/20)-->-->1.44 (6/22)    Renal ultrasound (6/20)- No hydronephrosis, normal renal size bilaterally     Monitor renal function    Check vancomycin closely  2. S/p lumbar laminectomy (6/16/2022) patient seems to be doing well, continue management as per neurosurgery service.    On zosyn and vancomycin.    Vancomycin level 19.9 (6/22), continue to closely monitor.   3. Hypertension.  Adequate control at present.  4. Diabetes mellitus type 2.  We will check urinalysis and assess for urine protein excretion.  Continue diabetes management as per primary service.       Wilfred Aranda MD  Nephrology    PHYSICAL EXAM  BP (!) 153/70 (BP Location: Left arm)   Pulse 63   Temp 98.9  F (37.2  C) (Oral)   Resp 20   Ht 1.778 m (5' 10\")   Wt 104.5 kg (230 lb 4.8 oz)   SpO2 93%   BMI 33.04 kg/m          Intake/Output Summary (Last 24 hours) at 6/22/2022 1352  Last data filed at 6/22/2022 0638  Gross per 24 hour   Intake 3701 ml   Output 1825 ml   Net 1876 ml     Resp: 20    Wt Readings from Last 3 Encounters:   06/22/22 104.5 kg (230 lb 4.8 oz)   06/03/22 95.2 kg (209 lb 12.8 oz)   01/05/22 93.4 kg (206 lb)       GENERAL: Chronically ill and debilitated.  HEAD: Normal  HEENT: Equal pupils. Normal hearing. No eyelid edema.  CARDIOVASCULAR: No JVD present.  No peripheral edema  PULMONARY: No respiratory distress. No cyanosis  GASTROINTESTINAL: No ascites present  MSK: Diffuse muscle wasting, no joint deformities  : Carpenter catheter in place, clear " urine in bag.  NEURO: Alert, no gross focal findings  PSYCHIATRIC: Adequate mood and interaction  SKIN: Pale, no jaundice, no rash.    LABORATORIES  Recent Labs   Lab 06/22/22  0659 06/21/22  0535 06/20/22  1019   WBC 8.3 8.9 11.7*   HGB 8.1* 8.1* 8.4*   HCT 25.6* 26.7* 27.4*    149* 150     Recent Labs   Lab 06/22/22  0659 06/21/22  0535 06/20/22  1556 06/20/22  1019   * 135* 135* 134*   CO2 21* 23  --  25   BUN 60* 62*  --  65*     Recent Labs   Lab 06/15/22  1527   INR 1.04   PTT 30     No results for input(s): ABORH in the last 168 hours.  Invalid input(s): MG    RADIOLOGY REPORTS    ECHOCARDIOGRAM    MEDICATIONS    acetaminophen  975 mg Oral Q8H     [Held by provider] bumetanide  3 mg Oral Daily     diclofenac  2 g Topical BID     ferrous sulfate  325 mg Oral TID w/meals     [Held by provider] gabapentin  1,200 mg Oral BID     glimepiride  4 mg Oral Daily     heparin ANTICOAGULANT  5,000 Units Subcutaneous Q12H     insulin aspart  1-10 Units Subcutaneous TID AC     insulin aspart  1-7 Units Subcutaneous At Bedtime     insulin glargine  30 Units Subcutaneous At Bedtime     ipratropium  2 spray Both Nostrils TID     levothyroxine  25 mcg Oral Daily     lidocaine  2 patch Transdermal Q24H     lidocaine   Topical BID     lidocaine   Transdermal Q8H DAX     [Held by provider] lisinopril  10 mg Oral Daily     [Held by provider] metFORMIN  1,000 mg Oral BID w/meals     multivitamin, therapeutic  1 tablet Oral Daily     pantoprazole  40 mg Oral BID AC     piperacillin-tazobactam  3.375 g Intravenous Q8H     polyethylene glycol  17 g Oral Daily     rosuvastatin  10 mg Oral At Bedtime     senna-docusate  2 tablet Oral BID     tamsulosin  0.4 mg Oral Daily     tiZANidine  2 mg Oral TID     vancomycin  1,250 mg Intravenous Q24H     vitamin C  500 mg Oral TID     cholecalciferol  100 mcg Oral Daily     acetaminophen, sore throat lozenge, bisacodyl, glucose **OR** dextrose **OR** glucagon, hydrALAZINE,  HYDROmorphone **OR** HYDROmorphone, hydrOXYzine **OR** hydrOXYzine, loratadine, magnesium hydroxide, naloxone **OR** naloxone **OR** naloxone **OR** naloxone, ondansetron **OR** ondansetron, oxyCODONE **OR** oxyCODONE, prochlorperazine **OR** prochlorperazine      Above laboratories and medications were personally reviewed during evaluation today.    RADIOLOGY  EXAM: US RENAL COMPLETE  LOCATION: [facility]  DATE/TIME: 6/20/2022 4:12 PM     INDICATION: YOLANDA  COMPARISON: Abdominal ultrasound dated 10/11/2013.  TECHNIQUE: Routine Bilateral Renal and Bladder Ultrasound.     FINDINGS:     RIGHT KIDNEY: 10.9 x 6.5 x 5.7 cm. Renal cortical thickness is 1.5 cm Normal without hydronephrosis or masses.      LEFT KIDNEY: 11.6 x 6.5 x 5.1 cm. Renal cortex thickness is 1.8 cm. Normal without hydronephrosis or masses.      BLADDER: Nondistended                                                                      IMPRESSION:  1.  Normal kidney ultrasound.

## 2022-06-22 NOTE — PLAN OF CARE
Problem: Risk for Delirium  Goal: Improved Sleep  Outcome: Ongoing, Progressing   Goal Outcome Evaluation:    Pt slept between cares this shift.  Has a bit more pain this shift and required prn Oxycodone x1 in addition to scheduled tylenol.  Pt stood at the bedside.  NPO for possible procedure today.

## 2022-06-22 NOTE — CONSULTS
Consultation - INFECTIOUS DISEASE CONSULTATION  Thomas Steve ,  1945, MRN [id number]      Lumbar herniated disc [M51.26]  Spinal stenosis, lumbar region, without neurogenic claudication [M48.061]  Lumbar stenosis with neurogenic claudication [M48.062]  Lumbar spinal stenosis [M48.061]    PCP: Moni Mcclain, [phone]   Code status:  Full Code               Assessment:  1. Fever: post op day 3. Workup with MRI suggestive of possible superimposed infection with a rim-enhancing ventral epidural collection extending from the L2 level cranially to the L5-S1 level caudally. CRP and SED elevated. WBC normalized. BC NGTD.   2. YOLANDA: nephrology following  3. CAD, pacer in place  4. Comorbid conditions affecting immune system: DM2    Active Problems:    Type 2 diabetes mellitus, with long-term current use of insulin (H)    Benign essential hypertension    CAD (coronary artery disease)    Chronic atrial fibrillation (H)    CORAZON (obstructive sleep apnea)    Lumbar stenosis with neurogenic claudication    Gastroesophageal reflux disease without esophagitis    Hypothyroidism    Normocytic anemia    Chronic left shoulder pain    Nasal congestion    Lumbar spinal stenosis        Recommendations:   - recommend aspiration of fluid collection; could be sterile due to antibiotic administration however if purulence is removed then would need I&D. Send aerobic and anaerobic cultures  - continue pip-tazo IV and vancomycin IV for now  - further recommendations to follow clinical course  - ID will follow, thanks    Carla Amanda MD  [facility]  [phone]       HPI:    Thomas Steve is a 77 year old male. History is provided by patient and chart.  Admitted on  for planned surgery to treat herniated lumbar disc, s/p decompressive lumbar laminectomy L4-5, L5-S1 with bilateral foraminotomies and removal of herniated disc in L1-S1 region. Did ok post operatively. Had some urinary retention  with segura placed. On 6/19, developed fever to 102.7 and was started on pip-tazo IV. MRI with possible infected fluid collection. ID consulted today for antibiotic management.   He didn't feel warm with the fevers and no fevers since. Not much chills/night sweats. Complains of persistent weakness/fatigue. No abdominal pain. Slight cough. No rashes or diarrhea. Back incision painful states difficult to get comfortable at time. Tolerating antibiotics.   He states he's having a procedure on Friday, unclear what this is.     Chief complaint: Active Problems:    Type 2 diabetes mellitus, with long-term current use of insulin (H)    Benign essential hypertension    CAD (coronary artery disease)    Chronic atrial fibrillation (H)    CORAZON (obstructive sleep apnea)    Lumbar stenosis with neurogenic claudication    Gastroesophageal reflux disease without esophagitis    Hypothyroidism    Normocytic anemia    Chronic left shoulder pain    Nasal congestion    Lumbar spinal stenosis      Medical History  Active Ambulatory Problems     Diagnosis Date Noted     Type 2 diabetes mellitus, with long-term current use of insulin (H)      Benign essential hypertension      CAD (coronary artery disease)      Ischemic cardiomyopathy      Right Bundle Branch Block      Mixed hyperlipidemia      Chronic atrial fibrillation (H)      CORAZON (obstructive sleep apnea)      Congestive Heart Failure      Typical atrial flutter (H) 07/07/2016     Leg pain, bilateral 07/25/2016     Leg swelling 07/25/2016     Venous insufficiency of both lower extremities 07/25/2016     Acquired lymphedema of leg 07/25/2016     Claudication of both lower extremities (H) 07/25/2016     Diabetic peripheral neuropathy associated with type 2 diabetes mellitus (H) 07/25/2016     SSS (sick sinus syndrome) (H) 06/07/2019     Acute systolic heart failure (H)      Biventricular cardiac pacemaker in situ 06/18/2019     Resolved Ambulatory Problems     Diagnosis Date Noted     No  Resolved Ambulatory Problems     Past Medical History:   Diagnosis Date     Acute sinusitis      Atrial fibrillation (H)      Back pain      CHF (congestive heart failure) (H)      Coronary artery disease      Diabetes mellitus (H)      Gastroesophageal reflux disease with esophagitis      HTN (hypertension)      Hyperlipemia      Joint pain      Lipoma of neck      Nocturia      Sciatic leg pain      Shoulder impingement syndrome      Sleep apnea          Surgical History  He  has a past surgical history that includes CABG, VEIN, SINGLE (2011); Pr Cardioversion Elective Arrhythmia External (06/03/2014); Ep Biv Pacemaker Insert (N/A, 6/11/2019); and Laminectomy lumbar two levels (Bilateral, 6/16/2022).       Social History  Reviewed, and he  reports that he quit smoking about 36 years ago. He has a 34.50 pack-year smoking history. He has never used smokeless tobacco. He reports current alcohol use of about 1.0 standard drink of alcohol per week. He reports that he does not use drugs.        Family History  Reviewed and noncontributory to present problem, and family history includes Alzheimer Disease in his father; Chronic Obstructive Pulmonary Disease in his brother and sister; Diabetes in his brother and sister; Heart Disease in his brother, father, and mother; Hodgkin's lymphoma in his brother; Hypertension in his father; No Known Problems in his daughter, daughter, daughter, and son; Snoring in his father; Substance Abuse in his brother and sister.   No FH frequent infections   Psychosocial Needs  Social History     Social History Narrative    .  3 children.  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     Additional psychosocial needs reviewed per nursing assessment.       Allergies   Allergen Reactions     Aspirin Other (See Comments)     Contraindicated due to xarelto use     Codeine Nausea and Vomiting     Pollen Extracts [Pollen Extract] Unknown     Scratchy throat     Vicodin  [Hydrocodone-Acetaminophen] Nausea and Vomiting      Medications Prior to Admission   Medication Sig Dispense Refill Last Dose     acetaminophen (TYLENOL) 500 MG tablet Take 1,000-1,500 mg by mouth every 6 hours as needed for mild pain   Past Week     bumetanide (BUMEX) 1 MG tablet Take 3 mg by mouth daily   6/15/2022     cholecalciferol 50 MCG (2000 UT) CAPS Take 2 capsules by mouth daily   6/9/2022     coenzyme Q-10 200 MG CAPS capsule Take 200 mg by mouth daily   6/9/2022     diphenhydrAMINE-acetaminophen (TYLENOL PM)  MG tablet Take 3 tablets by mouth At Bedtime   6/15/2022     ferrous sulfate (FEROSUL) 325 (65 Fe) MG tablet Take 1 tablet by mouth 3 times daily   6/9/2022     gabapentin (NEURONTIN) 600 MG tablet Take 1,200 mg by mouth 2 times daily   6/16/2022 at am     glimepiride (AMARYL) 4 MG tablet Take 4 mg by mouth daily   6/15/2022     insulin glargine (LANTUS PEN) 100 UNIT/ML pen Inject 30 Units Subcutaneous At Bedtime   6/15/2022     levothyroxine (SYNTHROID/LEVOTHROID) 25 MCG tablet Take 25 mcg by mouth daily   6/16/2022     lisinopril (ZESTRIL) 10 MG tablet Take 1 tablet (10 mg) by mouth daily 90 tablet 0 6/15/2022     metFORMIN (GLUCOPHAGE) 1000 MG tablet Take 1,000 mg by mouth 2 times daily (with meals)   6/15/2022     Omega-3 Fatty Acids (FISH OIL) 1200 MG capsule Take 1,200 mg by mouth daily   6/9/2022     omeprazole (PRILOSEC) 20 MG DR capsule Take 20 mg by mouth 2 times daily (before meals)   6/16/2022 at am     rosuvastatin (CRESTOR) 10 MG tablet Take 10 mg by mouth At Bedtime   6/15/2022     vitamin C (ASCORBIC ACID) 500 MG tablet Take 500 mg by mouth 3 times daily Take with iron supplement   6/9/2022     XARELTO ANTICOAGULANT 20 MG TABS tablet TAKE 1 TABLET DAILY (Patient taking differently: Take 10 mg by mouth daily (with dinner)) 90 tablet 2 6/10/2022     zinc gluconate 50 MG tablet Take 100 mg by mouth daily   6/9/2022        Review of Systems:  A 12 point comprehensive review of  systems was negative except as noted. Physical Exam:  Temp:  [97.5  F (36.4  C)-99.1  F (37.3  C)] 98.9  F (37.2  C)  Pulse:  [61-63] 63  Resp:  [20-22] 20  BP: (107-163)/(58-89) 153/70  SpO2:  [93 %-97 %] 93 %    GEN: alert and oriented x3, NAD  HEAD: atraumatic  ENT: moist membranes, no thrush, anicteric sclera, poor dentition  NECK: supple, no nuchal rigidity  CARDIOVASCULAR: regular rate and rhythm, no murmurs, rubs, or gallops  PULMONARY: lungs clear to ausculation bilaterally  ABDOMEN: soft, nontender, nondistended. Normal bowel sounds  SKIN: no rashes or lesions. No stigma of endocarditis. Incision with some bloody drainage.   LYMPHADENOPATHY: no cervical, supraclavicular lymphadenopathy  PSYCH: grossly intact  MUSCULOSKELETAL: no synovitis               Pertinent Labs  personally reviewed.   CBC RESULTS:   Recent Labs   Lab Test 06/22/22  0659   WBC 8.3   RBC 2.84*   HGB 8.1*   HCT 25.6*   MCV 90   MCH 28.5   MCHC 31.6   RDW 14.6           Last Comprehensive Metabolic Panel:  Sodium   Date Value Ref Range Status   06/22/2022 133 (L) 136 - 145 mmol/L Final     Potassium   Date Value Ref Range Status   06/22/2022 3.8 3.5 - 5.0 mmol/L Final     Chloride   Date Value Ref Range Status   06/22/2022 102 98 - 107 mmol/L Final     Carbon Dioxide (CO2)   Date Value Ref Range Status   06/22/2022 21 (L) 22 - 31 mmol/L Final     Anion Gap   Date Value Ref Range Status   06/22/2022 10 5 - 18 mmol/L Final     Glucose   Date Value Ref Range Status   06/22/2022 127 (H) 70 - 125 mg/dL Final     GLUCOSE BY METER POCT   Date Value Ref Range Status   06/22/2022 116 (H) 70 - 99 mg/dL Final     Urea Nitrogen   Date Value Ref Range Status   06/22/2022 60 (H) 8 - 28 mg/dL Final     Creatinine   Date Value Ref Range Status   06/22/2022 1.44 (H) 0.70 - 1.30 mg/dL Final   02/28/2011 0.75 0.66 - 1.25 mg/dL Final     Comment:     New IDMS-traceable calibration  beginning 5/1/08     GFR Estimate   Date Value Ref Range Status    06/22/2022 50 (L) >60 mL/min/1.73m2 Final     Comment:     Effective December 21, 2021 eGFRcr in adults is calculated using the 2021 CKD-EPI creatinine equation which includes age and gender (Cynthia et al., NEJM, DOI: 10.1056/IHVTvl9958831)   09/23/2020 59 (L) >60 mL/min/1.73m2 Final   02/28/2011 >90 >60 mL/min/1.7m2 Final     Calcium   Date Value Ref Range Status   06/22/2022 8.5 8.5 - 10.5 mg/dL Final       The following microbiology studies were personally reviewed:  No results found for: CULT    Urine Studies    Recent Labs   Lab Test 06/19/22 2010   LEUKEST Negative       Vancomycin Levels    Recent Labs   Lab Test 06/22/22  0659   VANCOMYCIN 19.9       MICROBIOLOGY DATA:  6/19 BC NGTD    Pertinent Radiology  personally reviewed.     1.  Lung parenchymal evaluation is limited by motion-related blurring. No findings to suggest an active lung or airway inflammatory process.  2.  Small left and trace right pleural effusions.  3.  Four-chamber cardiac enlargement with CRT-P in place.  4.  Mild wall thickening of the distal esophagus which is often related to gastroesophageal reflux.  Recent Results (from the past 24 hour(s))   MR Lumbar Spine w/o & w Contrast    Addendum: 6/21/2022    Findings conveyed to the Neurosurgery team (Joan) at 1427 hours on 6/21/2022.      Narrative    EXAM: MR LUMBAR SPINE W/O and W CONTRAST  LOCATION: [facility]  DATE/TIME: 6/21/2022 11:52 AM    INDICATION: History of back pain.  COMPARISON: MRI lumbar spine 12/28/2021  CONTRAST: Gadolinium 10 mL IV  TECHNIQUE: Routine Lumbar Spine MRI without and with IV contrast.    FINDINGS: Nomenclature is based on 5 lumbar type vertebral bodies. Minimal grade 1 anterolisthesis of L4 on L5. Vertebral body heights and alignment otherwise maintained. Mildly heterogeneous bone marrow signal. No focal destructive osseous lesion.   Normal distal spinal cord and cauda equina with conus medullaris at L1-L2. Laminectomies L4-L5  and L5-S1. Patchy enhancement and edema throughout the operative bed. Small 6 mm maximum axial diameter fluid collection along the right L5-S1 facet joint   (series 10, image 15). Midline paraspinal collection largest at the L3-L4 level, measuring 5 x 2 cm in axial dimension (series 10, image 22). Ventral rim-enhancing epidural collection extending from the L2 level cranially to the L5-S1 level caudally   (series 9, image 15), contributing to spinal canal stenosis detailed below. Small dorsal subdural collection/effusion (series 5, image 31). 1.8 x 0.9 cm axial dimension fluid collection within the L5-S1 laminectomy bed that appears to efface the adjacent   caudal thecal sac in combination with the ventral epidural collection and L5-S1 disc extrusion.    T12-L1: Normal disc height and signal. No herniation. Normal facets. No spinal canal or neural foraminal stenosis.     L1-L2: Disc desiccation with relative preservation of height. No herniation. Normal facets. No spinal canal or neural foraminal stenosis.    L2-L3: Disc desiccation with relative preservation of height. No herniation. Disc bulge. Normal facets. No spinal canal or neural foraminal stenosis.     L3-L4: Disc desiccation with relative preservation of height. No herniation. Severe concentric narrowing of the spinal canal secondary to a disc bulge, facet arthropathy and ligamentum flavum thickening. Moderate right and mild left neural foraminal   stenosis.    L4-L5: Disc desiccation with relative preservation of height. No herniation. Disc bulge. Facet arthropathy. Severe spinal canal stenosis. Moderate neural foraminal stenosis bilaterally.    L5-S1: Fluid signal within the disc space, likely on a degenerative or reactive basis with Modic 1 degenerative endplate changes and adjacent inflammatory changes present. Central disc extrusion. Facet arthropathy. Moderate to severe neural foraminal   stenosis bilaterally.      Impression     IMPRESSION:    Combination of postoperative changes, as well as additional findings suggestive of possible superimposed infection with a rim-enhancing ventral epidural collection extending from the L2 level cranially to the L5-S1 level caudally (series 9, image 15),   contributing to multilevel high-grade spinal canal stenosis. Additional multicompartmental collections as detailed above.     Extensive background lumbar spondylosis.

## 2022-06-22 NOTE — PHARMACY-VANCOMYCIN DOSING SERVICE
"Pharmacy Vancomycin Note  Date of Service 2022  Patient's  1945   77 year old, male    Indication: Postoperative Infection  Day of Therapy: 3  Current vancomycin regimen:  1000 mg IV q24h  Current vancomycin monitoring method: AUC  Current vancomycin therapeutic monitoring goal: 400-600 mg*h/L    [employer] Prediction of Current Vancomycin Regimen  Regimen: 1000 mg IV every 24 hours.  Start time: 10:16 on 2022  Exposure target: AUC24 (range)400-600 mg/L.hr   AUC24,ss: 404 mg/L.hr  Probability of AUC24 > 400: 52 %  Ctrough,ss: 12.5 mg/L  Probability of Ctrough,ss > 20: 6 %  Probability of nephrotoxicity (Lodise KISHORE ): 8 %    Current estimated CrCl = Estimated Creatinine Clearance: 52.8 mL/min (A) (based on SCr of 1.44 mg/dL (H)).    Creatinine for last 3 days  2022: 10:19 AM Creatinine 2.00 mg/dL;  3:56 PM Creatinine 1.70 mg/dL  2022:  5:35 AM Creatinine 1.75 mg/dL  2022:  6:59 AM Creatinine 1.44 mg/dL    Recent Vancomycin Levels (past 3 days)  2022:  6:59 AM Vancomycin 19.9 mg/L    Vancomycin IV Administrations (past 72 hours)                   vancomycin (VANCOCIN) 1000 mg in dextrose 5% 200 mL PREMIX (mg) 1,000 mg New Bag 22    vancomycin (VANCOCIN) 1,500 mg in sodium chloride 0.9 % 250 mL intermittent infusion (mg) 1,500 mg New Bag 22     1,500 mg New Bag 22 215                Nephrotoxins and other renal medications (From now, onward)    Start     Dose/Rate Route Frequency Ordered Stop    22 2100  vancomycin (VANCOCIN) 1000 mg in dextrose 5% 200 mL PREMIX         1,000 mg  200 mL/hr over 1 Hours Intravenous EVERY 24 HOURS 22 0941      22 0234  piperacillin-tazobactam (ZOSYN) 3.375 g vial to attach to  mL bag        Note to Pharmacy: Extended infusion dosing to start 6 hours after initial infusion.   \"Followed by\" Linked Group Details    3.375 g  over 240 Minutes Intravenous EVERY 8 HOURS 22 " 2000  [Held by provider]  bumetanide (BUMEX) tablet 3 mg        (Held by provider since Mon 6/20/2022 at 1116 by Gomez Francois MD.Hold Reason: Acute Kidney Injury.)    3 mg Oral DAILY 06/16/22 1858      06/16/22 2000  [Held by provider]  lisinopril (ZESTRIL) tablet 10 mg        (Held by provider since Mon 6/20/2022 at 1116 by Gomez Francois MD.Hold Reason: Acute Kidney Injury.)    10 mg Oral DAILY 06/16/22 1858               Contrast Orders - past 72 hours (72h ago, onward)    Start     Dose/Rate Route Frequency Stop    06/21/22 1200  gadobutrol (GADAVIST) injection 10 mL         10 mL Intravenous ONCE 06/21/22 1157    06/20/22 1405  perflutren lipid microsphere (DEFINITY) injection SUSP 2.5 mL         2.5 mL Intravenous ONCE 06/20/22 1405          Interpretation of levels and current regimen:  Vancomycin level is reflective of -600    Has serum creatinine changed greater than 50% in last 72 hours: Yes    Urine output:  good urine output    Renal Function: Improving    [employer] Prediction of Planned New Vancomycin Regimen  Regimen: 1250 mg IV every 24 hours.  Start time: 10:16 on 06/22/2022  Exposure target: AUC24 (range)400-600 mg/L.hr   AUC24,ss: 495 mg/L.hr  Probability of AUC24 > 400: 85 %  Ctrough,ss: 15.3 mg/L  Probability of Ctrough,ss > 20: 18 %  Probability of nephrotoxicity (Lodise KISHORE 2009): 11 %    Plan:  1. Increase Dose to 1250 mg IV every 24 hours ---Yesterday his vancomycin dose was decreased in response to his creatinine showing no improvement. Today it improved.   2. Vancomycin monitoring method: AUC  3. Vancomycin therapeutic monitoring goal: 400-600 mg*h/L  4. Pharmacy will check vancomycin levels as appropriate in 1-3 Days.  5. Serum creatinine levels will be ordered daily for the first week of therapy and at least twice weekly for subsequent weeks.    Raul Kamara, [facility]

## 2022-06-22 NOTE — PLAN OF CARE
"  Problem: Risk for Delirium  Goal: Improved Attention and Thought Clarity  Outcome: Ongoing, Progressing     Problem: Bleeding (Spinal Surgery)  Goal: Absence of Bleeding  Outcome: Ongoing, Progressing     Problem: Bowel Motility Impaired (Spinal Surgery)  Goal: Effective Bowel Elimination  Outcome: Ongoing, Progressing     Problem: Infection (Spinal Surgery)  Goal: Absence of Infection Signs and Symptoms  Outcome: Ongoing, Progressing     Problem: Neurologic Impairment (Spinal Surgery)  Goal: Optimal Neurologic Function  Outcome: Ongoing, Progressing     Problem: Pain (Spinal Surgery)  Goal: Acceptable Pain Control  Outcome: Ongoing, Progressing  Intervention: Prevent or Manage Pain  Recent Flowsheet Documentation  Taken 6/22/2022 0857 by Aydee Connell RN  Pain Management Interventions: medication (see MAR)     Problem: Postoperative Urinary Retention (Spinal Surgery)  Goal: Effective Urinary Elimination  Outcome: Ongoing, Progressing     Problem: Diabetes Comorbidity  Goal: Blood Glucose Level Within Targeted Range  Outcome: Ongoing, Progressing   Goal Outcome Evaluation:      Patient appears uncomfortable while lying in bed, noted to be readjusting self quite a bit, assisted with NA to get patient up higher in bed, per patient seemed to help.Reports that discomfort Is at incision site. Also c/o feeling \"full\" from lunch and couldn't get comfortable. Had a large BM this morning, declined suppository, abdomen distended, non tender. MD ordered xray abdomen for bowel.  Given PRN and scheduled tylenol today, lidocaine patches in place. Updated about possible CT aspiration per ID. Dressing to incision in place, noted bloody dried drainage.  Continues on IV antibiotics. CMS intact. A&O.   and 110.                 "

## 2022-06-22 NOTE — PLAN OF CARE
Problem: Bowel Motility Impaired (Spinal Surgery)  Goal: Effective Bowel Elimination  Outcome: Ongoing, Progressing     Problem: Fluid and Electrolyte Imbalance (Spinal Surgery)  Goal: Fluid and Electrolyte Balance  Outcome: Ongoing, Progressing     Problem: Pain (Spinal Surgery)  Goal: Acceptable Pain Control  Outcome: Ongoing, Progressing  Intervention: Prevent or Manage Pain  Recent Flowsheet Documentation  Taken 6/21/2022 1700 by Vania Patel RN  Pain Management Interventions: medication (see MAR)     Problem: Postoperative Urinary Retention (Spinal Surgery)  Goal: Effective Urinary Elimination  Outcome: Ongoing, Progressing   Goal Outcome Evaluation:      Pt. pleasant and cooperative this shift, able to make needs known and uses call light when needing help. Carpenter in place, draining clear yellow urine. Refused dinner and HS sliding scale Novolog. C/O pain and was given PRN tylenol at 1715, which was effective. Incision dressing has a small amount of drainage noted. Has been sleepy this shift but able to have conversation, once conversation is stopped pt. has been falling right to sleep but is easily aroused.  Continues on IV ABX and has NS running at 100 mL/hr.

## 2022-06-22 NOTE — PROGRESS NOTES
Care Management Follow Up    Length of Stay (days): 6    Expected Discharge Date: 06/23/2022     Concerns to be Addressed: discharge planning       Patient plan of care discussed at interdisciplinary rounds: Yes    Anticipated Discharge Disposition: Transitional Care     Anticipated Discharge Services: Other (see comment) (therapy services)    Anticipated Discharge DME: None    Patient/family educated on Medicare website which has current facility and service quality ratings: yes    Education Provided on the Discharge Plan:  Yes    Patient/Family in Agreement with the Plan: yes    Referrals Placed by CM/SW: Post Acute Facilities    Private pay costs discussed: Not applicable    Additional Information: ELBA called [facility] and got Donna's voicemail again and left message for her.  On voicemail, it gives another name and number to try (Renita, [phone]).  ELBA called Renita and then Renita also put Donna on the line.  SW spoke to both of them regarding pt status and anticipated discharge date.  They are still reviewing pt's wife - now cannot take pt's wife because wife on observation and their facility is not accepting waiver of three night inpatient stay due to being burned by Medicare on other pts.  ELBA provided with name and number of  over at [facility] (Uma, [phone]) after speaking with Donna and Renita about no one getting back to SW from over there after leaving voicemail message yesterday morning.  SW to keep them updated about if pt will still be coming.  ELBA updated CM for pt's wife that [facility] will not take her due to being on observation status and no three night inpatient stay.      ELBA spoke briefly with Uma for [facility], who stated that she did not get referrals on pt and wife (they were sent back on Saturday).  She stated that maybe she does not have access to referrals over at facility (she is off site).  She will check into this and  try to locate referrals and then call SW back.  Noted that Uma did not call back and so SW left her a voicemail message at end of day asking her to please call back regarding referral status.  SW let her know she was sending referral again on pt.      ELBA spoke with Kimberlee at [facility] - both pt and wife accepted clinically.  However, they are holding admissions for likely rest of week due to remodeling and renovation of rooms at facility.  If anything changes, she will let SW know.      ELBA updated by Dr Francois that pt will still be here a couple more days - anticipate discharge on Thursday.  Pt had fever back on 6/19 - cause not determined.  Pt to have MRI today at 11.        TONY Thompson, SHABBIR 06/22/22 6:48 PM

## 2022-06-22 NOTE — PROGRESS NOTES
"Neurosurgery Progress Note  06/22/22      A/P: Thomas Steve is a 77 year old male POD #6 DECOMPRESSIVE LUMBAR LAMINECTOMY LUMBAR 4-LUMBAR 5 AND LUMBAR 5-SACRAL 1 BILATERAL-LEFT SIDE FIRST-PLUS BILATERAL FORAMINOTOMIES PLUS REMOVAL OF HERNIATED DISC LUMBAR 5-SACRAL 1 LEFT with Dr Samuel. Arachnoid bleb intraop, covered with durgen, duraseal. No CSF leak.     Pain today is better, denies radicular leg pain, currently notes complaint of incisional pain that is worsened with movements but gets relief with medications, less sedated this morning vs yesterday afternoon     No plan for intervention today okay to restart diet will plan for repeat MRI in 1 week, continue to work with PT and continue antibiotics, no fever since 6/19, could discharge if cleared by ID with plans for follow up next week       Discussed patient with Dr. Samuel who agrees with plan     PLAN:   1. Increase activity with PT/OT patient to be in bedside chair for all meals   2. Reduced Tizandine to 2mg as patient with increase lethergy yesterday    3. Consult ID- antibiotics management   4. Continue to hold Xarelto- okay for short acting oral anticoagulant if needed such as Coumadin   5. Carpenter replaced for retention, flomax started. -trail void next week   6. A1C 7.5 - maintain adequate BG control to aide wound healing   7. bowel regimen- has had BM following surgery but feels full   8. Planned CT aspiration will follow results     HPI: Presented with back pain, right leg pain. Weakness bilateral LE. Worse with walking. No bowel or bladder issues. Diabetes, CABG, AFIB, Xarelto. MRI with listhesis L4-5, spinal stenosis. Stenosis also at L5-S1 with large disc herniation L5-S1 on the left.     Subjective: states that he is doing okay today has complaint of incisional pain but denies radicular pain     Physical Exam  BP (!) 153/70 (BP Location: Left arm)   Pulse 63   Temp 98.9  F (37.2  C) (Oral)   Resp 20   Ht 1.778 m (5' 10\")   Wt 104.5 kg (230 lb " 4.8 oz)   SpO2 93%   BMI 33.04 kg/m      General:  oriented. KIM. Speech clear. Seated in the chair.     Motor: normal bulk and tone     Strength: Full strength LE bilaterally     Sensation: intact to light touch; baseline neuropathy bilaterally     Incision: Dressing changed personally. No incisional drainage, no redness or obvious swelling     Labs: reviewed. Ordered recheck for today. No source of infection noted in labs. No growth on blood cultures    Imaging: MRI reviewed personally noted fluid collection infection vs postoperative   Combination of postoperative changes, as well as additional findings suggestive of possible superimposed infection with a rim-enhancing ventral epidural collection extending from the L2 level cranially to the L5-S1 level caudally (series 9, image 15),   contributing to multilevel high-grade spinal canal stenosis. Additional multicompartmental collections as detailed above.     CRP slight decrease from yesterday 16 previously 17.4   Sed rate 116        Joan French PA-C  [facility] Neurosurgery  O: [phone]

## 2022-06-22 NOTE — PROGRESS NOTES
Assessment & Plan   77-year-old male with CAD, HF MAF, DM2, HTN, atrial fibrillation, hypothyroidism presented for planned neurosurgical intervention to treat herniated lumbar disc.  [facility] consulted for management of diabetes, HTN    Active Problems:    Type 2 diabetes mellitus, with long-term current use of insulin (H)    Benign essential hypertension    CAD (coronary artery disease)    Chronic atrial fibrillation (H)    CORAZON (obstructive sleep apnea)    Lumbar stenosis with neurogenic claudication    Gastroesophageal reflux disease without esophagitis    Hypothyroidism    Normocytic anemia    Chronic left shoulder pain    Nasal congestion    Lumbar spinal stenosis    Present on Admission:    Lumbar stenosis with neurogenic claudication    Lumbar spinal stenosis        6/16 : S/p  decompressive lumbar laminectomy of L4-L5, L5-S1 with bilateral foraminotomies and removal of herniated disc in the L1-S1 region with neurosurgery       6/19 : HUSSEIN drain discontinued, started to  Jalen fever, started on zosyn +  vanco      6/22    Fevers improving  No cough or phlegm, no cp, no sob  No abdominal symptoms, no urinary symptoms  Backache same, no new focal weakness    Blood cultures sent 6/20 : NGTD  Urinalysis normal, ordered CT chest - negative  MRI spine : 6/22 : suggestive of possible superimposed infection with a rim-enhancing ventral epidural collection extending from the L2 level cranially to the L5-S1 level caudally  ID following  Plan for aspiration of fluid collection; could be sterile due to antibiotic administration however if purulence is removed then would need I&D. Send aerobic and anaerobic cultures  Continue on empiric iv antibiotics - zosyn + vancomycin    Creatinine trending down :  1.16---2.00---1.75 --1.44 ( baseline around 1.2-1.3)  Held bumex, lisinopril, gabapentin  Gentle iv hydration, FENA, renal ultrasound normal  Nephrology following    Abdominal distension, likely from constipation  KUB :  unremarkable  Continue senna, docusate, miralax, suppository prn      Not medically ready for discharge at this time.  Multi day stay at this time        A/p :         Lumbar spinal stenosis with lumbar disc herniation  -Patient underwent scheduled decompressive lumbar laminectomy of L4-L5, L5-S1 with bilateral foraminotomies and removal of herniated disc in the L1-S1 region with neurosurgery on 6/16/2022.  -Scheduled Tylenol 3 times daily  -Gabapentin 1200 mg p.o. twice daily  -Pain management per neurosurgery  -Rest of postoperative management per neurosurgery        Post op fever 6/19 :   Blood cultures sent 6/20 : NGTD  Urinalysis normal,  CT chest - negative  MRI spine : 6/22 : suggestive of possible superimposed infection with a rim-enhancing ventral epidural collection extending from the L2 level cranially to the L5-S1 level caudally  ID following  Plan for aspiration of fluid collection; could be sterile due to antibiotic administration however if purulence is removed then would need I&D. Send aerobic and anaerobic cultures  Continue on empiric iv antibiotics - zosyn + vancomycin        YOLANDA  Creatinine trending down :  1.16---2.00---1.75 --1.44 ( baseline around 1.2-1.3)  Held bumex, lisinopril, gabapentin  Gentle iv hydration, FENA, renal ultrasound normal  Nephrology following        Post op urinary retention : started flomax, s/p segura, outpatient urology f/u      Abdominal distension, likely from constipation  KUB : unremarkable  Continue senna, docusate, miralax, suppository prn      CAD, HFmEF  -Patient with CABG in 2011, SPECT MPI 5/6/2021 abnormal with a large area of transmural infarction of the mid to distal anterior septal, anterior, apical wall.  TTE 12/31/2019 with ejection fraction of 50 to 55%, abnormal septal wall motion consistent with ventricular paced rhythm, mild aortic stenosis and mild to moderate tricuspid insufficiency.  Patient appears euvolemic  -Stop IVF  -Bumex 3 mg p.o. daily -  on hold due to YOLANDA  -Simvastatin 10 mg p.o. daily      Chronic atrial fibrillation, sick sinus syndrome  -Ventricular pacemaker in place  -Continue to hold home rivaroxaban, may restart post op day 5 - but now holding as patient needs aspiration of fluid collection in spine.      DM2  -No A1C since 2019, will check  -Hold home glimepiride and metformin for now, resume on 6/17 with full breakfast  -Glargine 30 units subcu q. Bedtime  -Sensitive scale aspart insulin 3 times daily AC.  -Accu-Chek 3 times daily AC at bedtime  -Hypoglycemic protocol      Nasal congestion, postnasal drip  -Exacerbated in the setting of needing to lie flat for 24 hours  -Start ipratropium nasal spray twice daily      Chronic left shoulder pain  -Start lidocaine and Voltaren trading on and off 4 times daily      HTN  -Lisinopril 10 mg p.o. daily : holding for YOLANDA      GERD  -Pantoprazole 40 mg p.o. daily      CORAZON   -CPAP      Hypothyroidism  -TSH 2.05  -Levothyroxine 25 mcg p.o. daily      Normocytic anemia  -Hemoglobin 11.4 with MCV of 90     Clinically Significant Risk Factors Present on Admission                       Electrolytes: within normal limits  Fluids: stop IVF  Diet: advance to diabetic per neurosurgery  VTE prophylaxis: SCD boots    COVID19 symptom check 6/16/2022  Fevers/Chills/myalgias: NEGATIVE TO ALL  Sick contacts/COVID19 exposure: NEGATIVE TO ALL  Cough/trouble breathing/SOB/sore throat: NEGATIVE TO ALL  Diarrhea: NEGATIVE    Expected Discharge:    Expected Discharge Date: 06/24/2022    Discharge Delays: Placement - TCU  Destination: other (comment) (TCU)  Discharge Comments: Neurosurgery Clearance. TCU placement.  possibly to IR today for aspiration     Anticipated discharge location: other (comment) (TCU)    Delays:       Discharge Delays: Placement - TCU        Review of Systems: History obtained from the patient  General ROS: negative  for  - chills, fatigue, fever  ENT ROS: negative for - headaches, hearing change,  positive for nasal congestion, for nasal discharge  Hematological and Lymphatic ROS: negative for - bleeding problems, blood clots, bruising  Respiratory ROS: negative for - cough, pleuritic pain, shortness of breath  Cardiovascular ROS: negative for - chest pain, dyspnea on exertion, edema  Gastrointestinal ROS: negative for - abdominal pain, constipation, diarrhea, and nausea/vomiting  Genito-Urinary ROS: negative for -  dysuria, hematuria  Musculoskeletal ROS: Positive for mild lower back pain and chronic left shoulder pain  Neurological ROS: negative for - behavioral changes, confusion, dizziness, numbness/tingling   Dermatological ROS: negative for pruritus, rash    Objective    Physical Examination:   General appearance - alert, well appearing, and in no distress and oriented to person, place, and time  Mental status - alert, oriented to person, place, and time, normal mood, behavior, speech, dress, motor activity, and thought processes  HEENT - sclera anicteric, left eye normal, right eye normal, nares normal and patent, no erythema,  Respiratory - clear to auscultation, no wheezes, rales or rhonchi, symmetric air entry, no tachypnea, retractions or cyanosis, nasal cannula in place, patient frequently clearing his throat and attempting to spit out phlegm  Cardiac - normal rate, regular rhythm, normal S1, S2, blowing systolic murmur present, rubs, clicks or gallops, no JVD  Abdomen - soft, nontender, nondistended, no masses or organomegaly no rebound tenderness noted bowel sounds normal, no bladder distension noted  Neurological - alert, oriented, normal speech, no focal findings or movement disorder noted  Musculoskeletal - no joint tenderness, deformity or swelling, full range of motion without pain  Extremities - peripheral pulses normal, no pedal edema, no clubbing or cyanosis  Skin - normal coloration and turgor, no rashes, no suspicious skin lesions noted    Temp:  [97.5  F (36.4  C)-98.9  F (37.2  C)]  98.4  F (36.9  C)  Pulse:  [61-64] 64  Resp:  [18-22] 18  BP: (111-163)/(66-89) 146/72  SpO2:  [93 %-97 %] 96 %      Intake/Output Summary (Last 24 hours) at 6/16/2022 1826  Last data filed at 6/16/2022 1733  Gross per 24 hour   Intake 3505 ml   Output 1450 ml   Net 2055 ml       Results    Recent Results (from the past 24 hour(s))   Glucose by meter    Collection Time: 06/21/22  8:49 PM   Result Value Ref Range    GLUCOSE BY METER POCT 208 (H) 70 - 99 mg/dL   Sodium random urine    Collection Time: 06/22/22  6:37 AM   Result Value Ref Range    Sodium Urine mmol/L <20 mmol/L   Osmolality urine    Collection Time: 06/22/22  6:37 AM   Result Value Ref Range    Osmolality Urine 523 100 - 1,200 mmol/kg   Vancomycin level    Collection Time: 06/22/22  6:59 AM   Result Value Ref Range    Vancomycin 19.9   mg/L   Basic metabolic panel    Collection Time: 06/22/22  6:59 AM   Result Value Ref Range    Sodium 133 (L) 136 - 145 mmol/L    Potassium 3.8 3.5 - 5.0 mmol/L    Chloride 102 98 - 107 mmol/L    Carbon Dioxide (CO2) 21 (L) 22 - 31 mmol/L    Anion Gap 10 5 - 18 mmol/L    Urea Nitrogen 60 (H) 8 - 28 mg/dL    Creatinine 1.44 (H) 0.70 - 1.30 mg/dL    Calcium 8.5 8.5 - 10.5 mg/dL    Glucose 127 (H) 70 - 125 mg/dL    GFR Estimate 50 (L) >60 mL/min/1.73m2   CRP inflammation    Collection Time: 06/22/22  6:59 AM   Result Value Ref Range    CRP 16.0 (H) 0.0 - <0.8 mg/dL   Procalcitonin    Collection Time: 06/22/22  6:59 AM   Result Value Ref Range    Procalcitonin 0.25 0.00 - 0.49 ng/mL   CBC with platelets and differential    Collection Time: 06/22/22  6:59 AM   Result Value Ref Range    WBC Count 8.3 4.0 - 11.0 10e3/uL    RBC Count 2.84 (L) 4.40 - 5.90 10e6/uL    Hemoglobin 8.1 (L) 13.3 - 17.7 g/dL    Hematocrit 25.6 (L) 40.0 - 53.0 %    MCV 90 78 - 100 fL    MCH 28.5 26.5 - 33.0 pg    MCHC 31.6 31.5 - 36.5 g/dL    RDW 14.6 10.0 - 15.0 %    Platelet Count 188 150 - 450 10e3/uL    % Neutrophils 72 %    % Lymphocytes 13 %    %  Monocytes 12 %    % Eosinophils 1 %    % Basophils 1 %    % Immature Granulocytes 1 %    NRBCs per 100 WBC 0 <1 /100    Absolute Neutrophils 6.2 1.6 - 8.3 10e3/uL    Absolute Lymphocytes 1.0 0.8 - 5.3 10e3/uL    Absolute Monocytes 1.0 0.0 - 1.3 10e3/uL    Absolute Eosinophils 0.1 0.0 - 0.7 10e3/uL    Absolute Basophils 0.0 0.0 - 0.2 10e3/uL    Absolute Immature Granulocytes 0.1 <=0.4 10e3/uL    Absolute NRBCs 0.0 10e3/uL   Glucose by meter    Collection Time: 06/22/22  7:46 AM   Result Value Ref Range    GLUCOSE BY METER POCT 116 (H) 70 - 99 mg/dL   Glucose by meter    Collection Time: 06/22/22 11:54 AM   Result Value Ref Range    GLUCOSE BY METER POCT 110 (H) 70 - 99 mg/dL          Lab Results personally reviewed 6/16  Imaging Results personally reviewed 6/16  Discussed with patient and his wife  Reviewed old records     Advanced Care Planning  Discharge Planning discussed with patient and family  Discussed care with patient and family for 70 minutes with greater than 50% of time spent in counseling and coordination of care.

## 2022-06-23 ENCOUNTER — APPOINTMENT (OUTPATIENT)
Dept: INTERVENTIONAL RADIOLOGY/VASCULAR | Facility: HOSPITAL | Age: 77
DRG: 518 | End: 2022-06-23
Attending: NURSE PRACTITIONER
Payer: MEDICARE

## 2022-06-23 LAB
ALBUMIN SERPL-MCNC: 2.5 G/DL (ref 3.5–5)
ANION GAP SERPL CALCULATED.3IONS-SCNC: 8 MMOL/L (ref 5–18)
ANION GAP SERPL CALCULATED.3IONS-SCNC: 8 MMOL/L (ref 5–18)
BASOPHILS # BLD AUTO: 0 10E3/UL (ref 0–0.2)
BASOPHILS # BLD AUTO: 0 10E3/UL (ref 0–0.2)
BASOPHILS NFR BLD AUTO: 0 %
BASOPHILS NFR BLD AUTO: 1 %
BUN SERPL-MCNC: 41 MG/DL (ref 8–28)
BUN SERPL-MCNC: 41 MG/DL (ref 8–28)
C REACTIVE PROTEIN LHE: 11 MG/DL (ref 0–?)
CALCIUM SERPL-MCNC: 8.6 MG/DL (ref 8.5–10.5)
CALCIUM SERPL-MCNC: 8.6 MG/DL (ref 8.5–10.5)
CHLORIDE BLD-SCNC: 108 MMOL/L (ref 98–107)
CHLORIDE BLD-SCNC: 108 MMOL/L (ref 98–107)
CO2 SERPL-SCNC: 22 MMOL/L (ref 22–31)
CO2 SERPL-SCNC: 22 MMOL/L (ref 22–31)
CREAT SERPL-MCNC: 1.1 MG/DL (ref 0.7–1.3)
CREAT SERPL-MCNC: 1.1 MG/DL (ref 0.7–1.3)
EOSINOPHIL # BLD AUTO: 0.1 10E3/UL (ref 0–0.7)
EOSINOPHIL # BLD AUTO: 0.1 10E3/UL (ref 0–0.7)
EOSINOPHIL NFR BLD AUTO: 1 %
EOSINOPHIL NFR BLD AUTO: 1 %
ERYTHROCYTE [DISTWIDTH] IN BLOOD BY AUTOMATED COUNT: 14.8 % (ref 10–15)
ERYTHROCYTE [DISTWIDTH] IN BLOOD BY AUTOMATED COUNT: 15.1 % (ref 10–15)
GFR SERPL CREATININE-BSD FRML MDRD: 70 ML/MIN/1.73M2
GFR SERPL CREATININE-BSD FRML MDRD: 70 ML/MIN/1.73M2
GLUCOSE BLD-MCNC: 54 MG/DL (ref 70–125)
GLUCOSE BLD-MCNC: 54 MG/DL (ref 70–125)
GLUCOSE BLDC GLUCOMTR-MCNC: 100 MG/DL (ref 70–99)
GLUCOSE BLDC GLUCOMTR-MCNC: 105 MG/DL (ref 70–99)
GLUCOSE BLDC GLUCOMTR-MCNC: 119 MG/DL (ref 70–99)
GLUCOSE BLDC GLUCOMTR-MCNC: 140 MG/DL (ref 70–99)
GLUCOSE BLDC GLUCOMTR-MCNC: 49 MG/DL (ref 70–99)
GLUCOSE BLDC GLUCOMTR-MCNC: 80 MG/DL (ref 70–99)
GLUCOSE BLDC GLUCOMTR-MCNC: 95 MG/DL (ref 70–99)
HCT VFR BLD AUTO: 24.8 % (ref 40–53)
HCT VFR BLD AUTO: 27.9 % (ref 40–53)
HGB BLD-MCNC: 7.8 G/DL (ref 13.3–17.7)
HGB BLD-MCNC: 8.7 G/DL (ref 13.3–17.7)
HOLD SPECIMEN: NORMAL
IMM GRANULOCYTES # BLD: 0.1 10E3/UL
IMM GRANULOCYTES # BLD: 0.1 10E3/UL
IMM GRANULOCYTES NFR BLD: 1 %
IMM GRANULOCYTES NFR BLD: 1 %
INR PPP: 1.35 (ref 0.85–1.15)
LYMPHOCYTES # BLD AUTO: 0.7 10E3/UL (ref 0.8–5.3)
LYMPHOCYTES # BLD AUTO: 1.4 10E3/UL (ref 0.8–5.3)
LYMPHOCYTES NFR BLD AUTO: 17 %
LYMPHOCYTES NFR BLD AUTO: 9 %
MCH RBC QN AUTO: 28.5 PG (ref 26.5–33)
MCH RBC QN AUTO: 28.5 PG (ref 26.5–33)
MCHC RBC AUTO-ENTMCNC: 31.2 G/DL (ref 31.5–36.5)
MCHC RBC AUTO-ENTMCNC: 31.5 G/DL (ref 31.5–36.5)
MCV RBC AUTO: 91 FL (ref 78–100)
MCV RBC AUTO: 92 FL (ref 78–100)
MONOCYTES # BLD AUTO: 1.1 10E3/UL (ref 0–1.3)
MONOCYTES # BLD AUTO: 1.1 10E3/UL (ref 0–1.3)
MONOCYTES NFR BLD AUTO: 13 %
MONOCYTES NFR BLD AUTO: 13 %
NEUTROPHILS # BLD AUTO: 5.6 10E3/UL (ref 1.6–8.3)
NEUTROPHILS # BLD AUTO: 6.4 10E3/UL (ref 1.6–8.3)
NEUTROPHILS NFR BLD AUTO: 67 %
NEUTROPHILS NFR BLD AUTO: 76 %
NRBC # BLD AUTO: 0 10E3/UL
NRBC # BLD AUTO: 0 10E3/UL
NRBC BLD AUTO-RTO: 0 /100
NRBC BLD AUTO-RTO: 0 /100
PHOSPHATE SERPL-MCNC: 3.5 MG/DL (ref 2.5–4.5)
PLATELET # BLD AUTO: 217 10E3/UL (ref 150–450)
PLATELET # BLD AUTO: 230 10E3/UL (ref 150–450)
POTASSIUM BLD-SCNC: 3.4 MMOL/L (ref 3.5–5)
POTASSIUM BLD-SCNC: 3.4 MMOL/L (ref 3.5–5)
PROCALCITONIN SERPL-MCNC: 0.16 NG/ML (ref 0–0.49)
RBC # BLD AUTO: 2.74 10E6/UL (ref 4.4–5.9)
RBC # BLD AUTO: 3.05 10E6/UL (ref 4.4–5.9)
SODIUM SERPL-SCNC: 138 MMOL/L (ref 136–145)
SODIUM SERPL-SCNC: 138 MMOL/L (ref 136–145)
WBC # BLD AUTO: 8.1 10E3/UL (ref 4–11)
WBC # BLD AUTO: 8.2 10E3/UL (ref 4–11)

## 2022-06-23 PROCEDURE — 77003 FLUOROGUIDE FOR SPINE INJECT: CPT

## 2022-06-23 PROCEDURE — 87075 CULTR BACTERIA EXCEPT BLOOD: CPT | Performed by: INTERNAL MEDICINE

## 2022-06-23 PROCEDURE — 250N000013 HC RX MED GY IP 250 OP 250 PS 637: Performed by: PHYSICIAN ASSISTANT

## 2022-06-23 PROCEDURE — 250N000011 HC RX IP 250 OP 636

## 2022-06-23 PROCEDURE — 84145 PROCALCITONIN (PCT): CPT | Performed by: INTERNAL MEDICINE

## 2022-06-23 PROCEDURE — 258N000003 HC RX IP 258 OP 636: Performed by: INTERNAL MEDICINE

## 2022-06-23 PROCEDURE — 80069 RENAL FUNCTION PANEL: CPT | Performed by: INTERNAL MEDICINE

## 2022-06-23 PROCEDURE — 86140 C-REACTIVE PROTEIN: CPT | Performed by: INTERNAL MEDICINE

## 2022-06-23 PROCEDURE — 250N000011 HC RX IP 250 OP 636: Performed by: NURSE PRACTITIONER

## 2022-06-23 PROCEDURE — 258N000001 HC RX 258: Performed by: FAMILY MEDICINE

## 2022-06-23 PROCEDURE — 250N000011 HC RX IP 250 OP 636: Performed by: SURGERY

## 2022-06-23 PROCEDURE — 250N000011 HC RX IP 250 OP 636: Performed by: FAMILY MEDICINE

## 2022-06-23 PROCEDURE — 250N000013 HC RX MED GY IP 250 OP 250 PS 637: Performed by: NURSE PRACTITIONER

## 2022-06-23 PROCEDURE — 85610 PROTHROMBIN TIME: CPT | Performed by: NURSE PRACTITIONER

## 2022-06-23 PROCEDURE — 250N000013 HC RX MED GY IP 250 OP 250 PS 637: Performed by: FAMILY MEDICINE

## 2022-06-23 PROCEDURE — 99232 SBSQ HOSP IP/OBS MODERATE 35: CPT | Performed by: FAMILY MEDICINE

## 2022-06-23 PROCEDURE — 120N000001 HC R&B MED SURG/OB

## 2022-06-23 PROCEDURE — 250N000013 HC RX MED GY IP 250 OP 250 PS 637: Performed by: INTERNAL MEDICINE

## 2022-06-23 PROCEDURE — 36415 COLL VENOUS BLD VENIPUNCTURE: CPT | Performed by: NURSE PRACTITIONER

## 2022-06-23 PROCEDURE — 85025 COMPLETE CBC W/AUTO DIFF WBC: CPT | Performed by: NURSE PRACTITIONER

## 2022-06-23 PROCEDURE — 87070 CULTURE OTHR SPECIMN AEROBIC: CPT | Performed by: INTERNAL MEDICINE

## 2022-06-23 PROCEDURE — 85025 COMPLETE CBC W/AUTO DIFF WBC: CPT | Performed by: INTERNAL MEDICINE

## 2022-06-23 PROCEDURE — 36415 COLL VENOUS BLD VENIPUNCTURE: CPT | Performed by: INTERNAL MEDICINE

## 2022-06-23 PROCEDURE — 99232 SBSQ HOSP IP/OBS MODERATE 35: CPT | Performed by: INTERNAL MEDICINE

## 2022-06-23 PROCEDURE — 258N000003 HC RX IP 258 OP 636: Performed by: SURGERY

## 2022-06-23 PROCEDURE — 250N000009 HC RX 250: Performed by: NURSE PRACTITIONER

## 2022-06-23 PROCEDURE — 62267 INTERDISCAL PERQ ASPIR DX: CPT

## 2022-06-23 PROCEDURE — 250N000013 HC RX MED GY IP 250 OP 250 PS 637: Performed by: SURGERY

## 2022-06-23 RX ORDER — ENOXAPARIN SODIUM 100 MG/ML
1 INJECTION SUBCUTANEOUS EVERY 12 HOURS
Status: DISCONTINUED | OUTPATIENT
Start: 2022-06-23 | End: 2022-06-24

## 2022-06-23 RX ORDER — FLUMAZENIL 0.1 MG/ML
0.2 INJECTION, SOLUTION INTRAVENOUS
Status: DISCONTINUED | OUTPATIENT
Start: 2022-06-23 | End: 2022-07-06 | Stop reason: HOSPADM

## 2022-06-23 RX ORDER — NALOXONE HYDROCHLORIDE 0.4 MG/ML
0.2 INJECTION, SOLUTION INTRAMUSCULAR; INTRAVENOUS; SUBCUTANEOUS
Status: DISCONTINUED | OUTPATIENT
Start: 2022-06-23 | End: 2022-07-06 | Stop reason: HOSPADM

## 2022-06-23 RX ORDER — FENTANYL CITRATE 50 UG/ML
25-50 INJECTION, SOLUTION INTRAMUSCULAR; INTRAVENOUS EVERY 5 MIN PRN
Status: DISCONTINUED | OUTPATIENT
Start: 2022-06-23 | End: 2022-06-24

## 2022-06-23 RX ORDER — NALOXONE HYDROCHLORIDE 0.4 MG/ML
0.4 INJECTION, SOLUTION INTRAMUSCULAR; INTRAVENOUS; SUBCUTANEOUS
Status: DISCONTINUED | OUTPATIENT
Start: 2022-06-23 | End: 2022-07-06 | Stop reason: HOSPADM

## 2022-06-23 RX ADMIN — Medication 100 MCG: at 08:18

## 2022-06-23 RX ADMIN — PANTOPRAZOLE SODIUM 40 MG: 40 TABLET, DELAYED RELEASE ORAL at 08:18

## 2022-06-23 RX ADMIN — IPRATROPIUM BROMIDE 2 SPRAY: 21 SPRAY, METERED NASAL at 08:18

## 2022-06-23 RX ADMIN — FERROUS SULFATE TAB 325 MG (65 MG ELEMENTAL FE) 325 MG: 325 (65 FE) TAB at 16:48

## 2022-06-23 RX ADMIN — PIPERACILLIN AND TAZOBACTAM 3.38 G: 3; .375 INJECTION, POWDER, FOR SOLUTION INTRAVENOUS at 05:34

## 2022-06-23 RX ADMIN — VANCOMYCIN HYDROCHLORIDE 1500 MG: 5 INJECTION, POWDER, LYOPHILIZED, FOR SOLUTION INTRAVENOUS at 20:16

## 2022-06-23 RX ADMIN — ENOXAPARIN SODIUM 100 MG: 100 INJECTION SUBCUTANEOUS at 20:03

## 2022-06-23 RX ADMIN — LIDOCAINE: 50 OINTMENT TOPICAL at 20:04

## 2022-06-23 RX ADMIN — SODIUM CHLORIDE: 900 INJECTION INTRAVENOUS at 14:03

## 2022-06-23 RX ADMIN — LEVOTHYROXINE SODIUM 25 MCG: 0.03 TABLET ORAL at 11:37

## 2022-06-23 RX ADMIN — HYDROXYZINE HYDROCHLORIDE 25 MG: 25 TABLET, FILM COATED ORAL at 21:11

## 2022-06-23 RX ADMIN — LIDOCAINE: 50 OINTMENT TOPICAL at 13:45

## 2022-06-23 RX ADMIN — ONDANSETRON 4 MG: 2 INJECTION INTRAMUSCULAR; INTRAVENOUS at 20:11

## 2022-06-23 RX ADMIN — Medication 500 MG: at 13:45

## 2022-06-23 RX ADMIN — LIDOCAINE 2 PATCH: 246 PATCH TOPICAL at 10:52

## 2022-06-23 RX ADMIN — TIZANIDINE 2 MG: 2 TABLET ORAL at 08:18

## 2022-06-23 RX ADMIN — Medication 500 MG: at 20:06

## 2022-06-23 RX ADMIN — LIDOCAINE HYDROCHLORIDE 5 ML: 10 INJECTION, SOLUTION EPIDURAL; INFILTRATION; INTRACAUDAL; PERINEURAL at 09:36

## 2022-06-23 RX ADMIN — ACETAMINOPHEN 650 MG: 325 TABLET, FILM COATED ORAL at 23:54

## 2022-06-23 RX ADMIN — OXYCODONE HYDROCHLORIDE 10 MG: 5 TABLET ORAL at 03:46

## 2022-06-23 RX ADMIN — TIZANIDINE 2 MG: 2 TABLET ORAL at 20:05

## 2022-06-23 RX ADMIN — ROSUVASTATIN CALCIUM 10 MG: 10 TABLET, FILM COATED ORAL at 20:06

## 2022-06-23 RX ADMIN — PIPERACILLIN AND TAZOBACTAM 3.38 G: 3; .375 INJECTION, POWDER, FOR SOLUTION INTRAVENOUS at 13:45

## 2022-06-23 RX ADMIN — OXYCODONE HYDROCHLORIDE 10 MG: 5 TABLET ORAL at 19:54

## 2022-06-23 RX ADMIN — OXYCODONE HYDROCHLORIDE 10 MG: 5 TABLET ORAL at 23:53

## 2022-06-23 RX ADMIN — DEXTROSE MONOHYDRATE 25 ML: 25 INJECTION, SOLUTION INTRAVENOUS at 06:56

## 2022-06-23 RX ADMIN — SODIUM CHLORIDE: 900 INJECTION INTRAVENOUS at 23:49

## 2022-06-23 RX ADMIN — PIPERACILLIN AND TAZOBACTAM 3.38 G: 3; .375 INJECTION, POWDER, FOR SOLUTION INTRAVENOUS at 22:13

## 2022-06-23 RX ADMIN — FERROUS SULFATE TAB 325 MG (65 MG ELEMENTAL FE) 325 MG: 325 (65 FE) TAB at 08:18

## 2022-06-23 RX ADMIN — THERA TABS 1 TABLET: TAB at 08:18

## 2022-06-23 RX ADMIN — OXYCODONE HYDROCHLORIDE 10 MG: 5 TABLET ORAL at 14:02

## 2022-06-23 RX ADMIN — DICLOFENAC SODIUM 2 G: 10 GEL TOPICAL at 08:18

## 2022-06-23 RX ADMIN — DEXTROSE MONOHYDRATE 25 ML: 25 INJECTION, SOLUTION INTRAVENOUS at 06:34

## 2022-06-23 RX ADMIN — TAMSULOSIN HYDROCHLORIDE 0.4 MG: 0.4 CAPSULE ORAL at 08:18

## 2022-06-23 RX ADMIN — SENNOSIDES AND DOCUSATE SODIUM 2 TABLET: 8.6; 5 TABLET ORAL at 08:18

## 2022-06-23 RX ADMIN — PANTOPRAZOLE SODIUM 40 MG: 40 TABLET, DELAYED RELEASE ORAL at 16:47

## 2022-06-23 RX ADMIN — POLYETHYLENE GLYCOL 3350 17 G: 17 POWDER, FOR SOLUTION ORAL at 20:08

## 2022-06-23 RX ADMIN — TIZANIDINE 2 MG: 2 TABLET ORAL at 13:45

## 2022-06-23 RX ADMIN — MIDAZOLAM HYDROCHLORIDE 1 MG: 1 INJECTION, SOLUTION INTRAMUSCULAR; INTRAVENOUS at 09:32

## 2022-06-23 RX ADMIN — Medication 500 MG: at 08:18

## 2022-06-23 RX ADMIN — FERROUS SULFATE TAB 325 MG (65 MG ELEMENTAL FE) 325 MG: 325 (65 FE) TAB at 11:37

## 2022-06-23 RX ADMIN — DICLOFENAC SODIUM 2 G: 10 GEL TOPICAL at 16:48

## 2022-06-23 RX ADMIN — SENNOSIDES AND DOCUSATE SODIUM 2 TABLET: 8.6; 5 TABLET ORAL at 20:06

## 2022-06-23 RX ADMIN — FENTANYL CITRATE 50 MCG: 50 INJECTION, SOLUTION INTRAMUSCULAR; INTRAVENOUS at 09:36

## 2022-06-23 RX ADMIN — DEXTROSE MONOHYDRATE 25 ML: 25 INJECTION, SOLUTION INTRAVENOUS at 11:37

## 2022-06-23 ASSESSMENT — MIFFLIN-ST. JEOR: SCORE: 1781.25

## 2022-06-23 ASSESSMENT — ACTIVITIES OF DAILY LIVING (ADL)
ADLS_ACUITY_SCORE: 30

## 2022-06-23 NOTE — PRE-PROCEDURE
GENERAL PRE-PROCEDURE:   Procedure:  Fluid aspiration  Date/Time:  6/23/2022 9:19 AM    Written consent obtained?: Yes    Risks and benefits: Risks, benefits and alternatives were discussed    Consent given by:  Patient  Patient states understanding of procedure being performed: Yes    Patient's understanding of procedure matches consent: Yes    Procedure consent matches procedure scheduled: Yes    Expected level of sedation:  Moderate  Appropriately NPO:  Yes  ASA Class:  3  Mallampati  :  Grade 2- soft palate, base of uvula, tonsillar pillars, and portion of posterior pharyngeal wall visible  Lungs:  Lungs clear with good breath sounds bilaterally  Heart:  Normal heart sounds and rate  History & Physical reviewed:  History and physical reviewed and no updates needed  Statement of review:  I have reviewed the lab findings, diagnostic data, medications, and the plan for sedation

## 2022-06-23 NOTE — PROGRESS NOTES
"      RENAL PROGRESS NOTE     CC:     ROS: No new issues present. No nausea. No vomiting. No headache. No fevers. No angina. No shortness of breath. No itching. No uremic symptoms.     Assessment and Plan:     1. Acute kidney injury - Acute Tubular Necrosis - Resolved. Secondary to intraoperative hypotension with contribution from required administration of phenylephrine drip during surgery. Patient has a baseline creatinine 1.11-1.16, creatinine went up to 2.0 on 6/20.    Renal function improving.    Creatinine 2.0 (6/20)-->-->1.44 (6/22)-->1.1 (6/23)    Renal ultrasound (6/20)- No hydronephrosis, normal renal size bilaterally     Check vancomycin closely  2. S/p lumbar laminectomy (6/16/2022) patient seems to be doing well, continue management as per neurosurgery service.    On zosyn and vancomycin.    Vancomycin level 19.9 (6/22), continue to closely monitor.   3. Hypertension.  Adequate control at present.  4. Diabetes mellitus type 2.  We will check urinalysis and assess for urine protein excretion.  Continue diabetes management as per primary service.       Wilfred Aranda MD  Nephrology    PHYSICAL EXAM  BP (!) 144/76 (BP Location: Left arm)   Pulse 62   Temp 97.5  F (36.4  C) (Oral)   Resp 18   Ht 1.778 m (5' 10\")   Wt 104.5 kg (230 lb 4.8 oz)   SpO2 99%   BMI 33.04 kg/m          Intake/Output Summary (Last 24 hours) at 6/22/2022 1352  Last data filed at 6/22/2022 0638  Gross per 24 hour   Intake 3701 ml   Output 1825 ml   Net 1876 ml     Resp: 18    Wt Readings from Last 3 Encounters:   06/22/22 104.5 kg (230 lb 4.8 oz)   06/03/22 95.2 kg (209 lb 12.8 oz)   01/05/22 93.4 kg (206 lb)       GENERAL: Chronically ill and debilitated.  HEAD: Normal  HEENT: Equal pupils. Normal hearing. No eyelid edema.  CARDIOVASCULAR: No JVD present.  No peripheral edema  PULMONARY: No respiratory distress. No cyanosis  GASTROINTESTINAL: No ascites present  MSK: Diffuse muscle wasting, no joint deformities  : Carpenter " catheter in place, clear urine in bag.  NEURO: Alert, no gross focal findings  PSYCHIATRIC: Adequate mood and interaction  SKIN: Pale, no jaundice, no rash.    LABORATORIES  Recent Labs   Lab 06/23/22  0506 06/22/22  0659 06/21/22  0535   WBC 8.1 8.3 8.9   HGB 7.8* 8.1* 8.1*   HCT 24.8* 25.6* 26.7*    188 149*     Recent Labs   Lab 06/23/22  0506 06/22/22  0659 06/21/22  0535     138 133* 135*   CO2 22 22 21* 23   BUN 41*  41* 60* 62*     Recent Labs   Lab 06/23/22  0506   INR 1.35*     No results for input(s): ABORH in the last 168 hours.  Invalid input(s): MG    RADIOLOGY REPORTS    ECHOCARDIOGRAM    MEDICATIONS    acetaminophen  975 mg Oral Q8H     [Held by provider] bumetanide  3 mg Oral Daily     diclofenac  2 g Topical BID     ferrous sulfate  325 mg Oral TID w/meals     [Held by provider] gabapentin  1,200 mg Oral BID     glimepiride  4 mg Oral Daily     heparin ANTICOAGULANT  5,000 Units Subcutaneous Q12H     insulin aspart  1-10 Units Subcutaneous TID AC     insulin aspart  1-7 Units Subcutaneous At Bedtime     insulin glargine  28 Units Subcutaneous At Bedtime     ipratropium  2 spray Both Nostrils TID     levothyroxine  25 mcg Oral Daily     lidocaine  2 patch Transdermal Q24H     lidocaine   Topical BID     lidocaine   Transdermal Q8H DAX     [Held by provider] lisinopril  10 mg Oral Daily     [Held by provider] metFORMIN  1,000 mg Oral BID w/meals     multivitamin, therapeutic  1 tablet Oral Daily     pantoprazole  40 mg Oral BID AC     piperacillin-tazobactam  3.375 g Intravenous Q8H     polyethylene glycol  17 g Oral BID     rosuvastatin  10 mg Oral At Bedtime     senna-docusate  2 tablet Oral BID     tamsulosin  0.4 mg Oral Daily     tiZANidine  2 mg Oral TID     vancomycin  1,500 mg Intravenous Q24H     vitamin C  500 mg Oral TID     cholecalciferol  100 mcg Oral Daily     acetaminophen, sore throat lozenge, bisacodyl, glucose **OR** dextrose **OR** glucagon, fentaNYL, flumazenil,  HOLD MEDICATION (one time), hydrALAZINE, HYDROmorphone **OR** HYDROmorphone, hydrOXYzine **OR** hydrOXYzine, loratadine, magnesium hydroxide, midazolam, naloxone **OR** naloxone **OR** naloxone **OR** naloxone, naloxone **OR** naloxone **OR** naloxone **OR** naloxone, ondansetron **OR** ondansetron, oxyCODONE **OR** oxyCODONE, prochlorperazine **OR** prochlorperazine      Above laboratories and medications were personally reviewed during evaluation today.    RADIOLOGY  EXAM: US RENAL COMPLETE  LOCATION: [facility]  DATE/TIME: 6/20/2022 4:12 PM     INDICATION: YOLANDA  COMPARISON: Abdominal ultrasound dated 10/11/2013.  TECHNIQUE: Routine Bilateral Renal and Bladder Ultrasound.     FINDINGS:     RIGHT KIDNEY: 10.9 x 6.5 x 5.7 cm. Renal cortical thickness is 1.5 cm Normal without hydronephrosis or masses.      LEFT KIDNEY: 11.6 x 6.5 x 5.1 cm. Renal cortex thickness is 1.8 cm. Normal without hydronephrosis or masses.      BLADDER: Nondistended                                                                      IMPRESSION:  1.  Normal kidney ultrasound.

## 2022-06-23 NOTE — PROGRESS NOTES
"  Neurointerventional Surgery  Pre Sedation Assessment    Possible infection    HPI: status post decompressive lumbar laminectomy L4-5, L5-S1. Bilateral foraminotomies POD 7.  Post op fever. MRI suggestive of possible infection.  ID has requested aspiration of the fluid.       Allergies   Allergen Reactions     Aspirin Other (See Comments)     Contraindicated due to xarelto use     Codeine Nausea and Vomiting     Pollen Extracts [Pollen Extract] Unknown     Scratchy throat     Vicodin [Hydrocodone-Acetaminophen] Nausea and Vomiting       Labs: Hgb 8.1, plt 188, INR 1.04    Sedation history: Previous problems with sedation. No   History of sleep apnea Yes    Exam:   /70 (BP Location: Left arm)   Pulse 60   Temp 98.7  F (37.1  C) (Oral)   Resp 20   Ht 5' 10\" (1.778 m)   Wt 230 lb 4.8 oz (104.5 kg)   SpO2 95%   BMI 33.04 kg/m    Neuro: A/O x4, speech clear and fluent. KIM, strength equal  Cardiac: regular  Lungs: clear  Mallampati:  II - Faucial pillars and soft palate may be seen, but uvula is masked by the base of the tongue  ASA: 3 - Severe systemic disease, but not incapacitating  NPO since last evening    Okay to proceed with planned procedure using moderate IV sedation  Procedure its risks, benefits, and alternatives were discussed with the patient.  Questions and answered and they give written and verbal consent to proceed.    VALERIE Mike CNP    Office: [phone]  "

## 2022-06-23 NOTE — PROGRESS NOTES
Assessment & Plan   77-year-old male with CAD, HF MAF, DM2, HTN, atrial fibrillation, hypothyroidism presented for planned neurosurgical intervention to treat herniated lumbar disc.  [facility] consulted for management of diabetes, HTN    Active Problems:    Type 2 diabetes mellitus, with long-term current use of insulin (H)    Benign essential hypertension    CAD (coronary artery disease)    Chronic atrial fibrillation (H)    CORAZON (obstructive sleep apnea)    Lumbar stenosis with neurogenic claudication    Gastroesophageal reflux disease without esophagitis    Hypothyroidism    Normocytic anemia    Chronic left shoulder pain    Nasal congestion    Lumbar spinal stenosis    Present on Admission:    Lumbar stenosis with neurogenic claudication    Lumbar spinal stenosis        6/16 : S/p  decompressive lumbar laminectomy of L4-L5, L5-S1 with bilateral foraminotomies and removal of herniated disc in the L1-S1 region with neurosurgery       6/19 : HUSSEIN drain discontinued, started to  Jalen fever, started on zosyn +  vanco      6/22    Fevers improving  No cough or phlegm, no cp, no sob  No abdominal symptoms, no urinary symptoms  Backache same, no new focal weakness    Blood cultures sent 6/20 : NGTD  Urinalysis normal, ordered CT chest - negative    ID following  Plan for aspiration of fluid collection; could be sterile due to antibiotic administration however if purulence is removed then would need I&D. Send aerobic and anaerobic cultures  Continue on empiric iv antibiotics - zosyn + vancomycin    Creatinine trending down :  1.16---2.00---1.75 --1.44 ( baseline around 1.2-1.3)  Held bumex, lisinopril, gabapentin  Gentle iv hydration, FENA, renal ultrasound normal  Nephrology following    Abdominal distension, likely from constipation  KUB : unremarkable  Continue senna, docusate, miralax, suppository prn      Not medically ready for discharge at this time.  Multi day stay at this time        A/p :         Lumbar spinal stenosis  with lumbar disc herniation  -Patient underwent scheduled decompressive lumbar laminectomy of L4-L5, L5-S1 with bilateral foraminotomies and removal of herniated disc in the L1-S1 region with neurosurgery on 6/16/2022.  -Scheduled Tylenol 3 times daily  -Gabapentin 1200 mg p.o. twice daily  -Post op management per neurosurgery          Post op fever 6/19 :   Blood cultures sent 6/20 : NGTD  Urinalysis normal,  CT chest - negative  MRI spine : 6/22 : suggestive of possible superimposed infection with a rim-enhancing ventral epidural collection extending from the L2 level cranially to the L5-S1 level caudally  ID following  Just back from aspiration of fluid collection when I saw him; could be sterile due to antibiotic administration however if purulence is removed then would need I&D. Send aerobic and anaerobic cultures  Continue on empiric iv antibiotics - zosyn + vancomycin        YOLANDA  Creatinine trending down :  1.16---2.00---1.75 --1.44 ( baseline around 1.2-1.3)  Held bumex, lisinopril, gabapentin  Gentle iv hydration, FENA, renal ultrasound normal  Nephrology following        Post op urinary retention : started flomax, s/p segura, outpatient urology f/u      Abdominal distension, likely from constipation  KUB : unremarkable  Continue senna, docusate, miralax, suppository prn      CAD, HFmEF  -Patient with CABG in 2011, SPECT MPI 5/6/2021 abnormal with a large area of transmural infarction of the mid to distal anterior septal, anterior, apical wall.  TTE 12/31/2019 with ejection fraction of 50 to 55%, abnormal septal wall motion consistent with ventricular paced rhythm, mild aortic stenosis and mild to moderate tricuspid insufficiency.  Patient appears euvolemic  -Stop IVF  -Bumex 3 mg p.o. daily - on hold due to YOLANDA  -Simvastatin 10 mg p.o. daily      Chronic atrial fibrillation, sick sinus syndrome  -Ventricular pacemaker in place  -Continue to hold home rivaroxaban, may start coumadin because it can be  reversed otherwise he is currently on lovenox 1 mg/kg bid which was switched from heparin subcutaneous due to renal recovery.      DM2 with hypoglycemia  -No A1C since 2019, will check  -Hold home glimepiride and metformin for now, resume on 6/17 with full breakfast  -Glargine decreased to 28 units subcu q. Bedtime  -Sensitive scale aspart insulin 3 times daily AC.  -Accu-Chek 3 times daily AC at bedtime  -Hypoglycemic protocol      Nasal congestion, postnasal drip  -Exacerbated in the setting of needing to lie flat for 24 hours  -Start ipratropium nasal spray twice daily      Chronic left shoulder pain  -Start lidocaine and Voltaren trading on and off 4 times daily      HTN  -Lisinopril 10 mg p.o. daily : holding for YOLANDA      GERD  -Pantoprazole 40 mg p.o. daily      CORAZON   -CPAP      Hypothyroidism  -TSH 2.05  -Levothyroxine 25 mcg p.o. daily      Normocytic anemia  -Hemoglobin 11.4 with MCV of 90     Clinically Significant Risk Factors Present on Admission                       Electrolytes: within normal limits  Fluids: stop IVF  Diet: advance to diabetic per neurosurgery  VTE prophylaxis: SCD boots    COVID19 symptom check 6/16/2022  Fevers/Chills/myalgias: NEGATIVE TO ALL  Sick contacts/COVID19 exposure: NEGATIVE TO ALL  Cough/trouble breathing/SOB/sore throat: NEGATIVE TO ALL  Diarrhea: NEGATIVE    Expected Discharge:    Expected Discharge Date: 06/24/2022    Discharge Delays: Placement - TCU  Destination: other (comment) (TCU)  Discharge Comments: TCU placement.     Anticipated discharge location: other (comment) (TCU)    Delays:       Discharge Delays: Placement - TCU        Review of Systems: 5 system review was negative    Objective    Physical Examination:   General appearance - alert, well appearing, and in no distress and oriented to person, place, and time  Mental status - alert, oriented to person, place sleepy post procedure    Respiratory - clear to auscultation, no wheezes,   Cardiac - normal rate,  regular rhythm, normal S1, S2, blowing systolic murmur present, rubs,  Abdomen - soft, nontender, nondistended,   Neurological - alert, oriented, normal speech, no focal findings or movement disorder noted  Musculoskeletal - no joint tenderness, deformity or swelling, full range of motion without pain  Extremities - peripheral pulses normal, no pedal edema, no clubbing or cyanosis  Skin - normal coloration and turgor, no rashes, no suspicious skin lesions noted    Temp:  [97.5  F (36.4  C)-99.1  F (37.3  C)] 99.1  F (37.3  C)  Pulse:  [59-65] 65  Resp:  [18-26] 18  BP: (125-151)/(60-81) 144/72  SpO2:  [90 %-99 %] 95 %      Intake/Output Summary (Last 24 hours) at 6/16/2022 1826  Last data filed at 6/16/2022 1733  Gross per 24 hour   Intake 3505 ml   Output 1450 ml   Net 2055 ml       Results    Recent Results (from the past 24 hour(s))   Glucose by meter    Collection Time: 06/22/22  9:25 PM   Result Value Ref Range    GLUCOSE BY METER POCT 134 (H) 70 - 99 mg/dL   CRP inflammation    Collection Time: 06/23/22  5:06 AM   Result Value Ref Range    CRP 11.0 (H) 0.0 - <0.8 mg/dL   Renal panel    Collection Time: 06/23/22  5:06 AM   Result Value Ref Range    Sodium 138 136 - 145 mmol/L    Potassium 3.4 (L) 3.5 - 5.0 mmol/L    Chloride 108 (H) 98 - 107 mmol/L    Carbon Dioxide (CO2) 22 22 - 31 mmol/L    Anion Gap 8 5 - 18 mmol/L    Urea Nitrogen 41 (H) 8 - 28 mg/dL    Creatinine 1.10 0.70 - 1.30 mg/dL    Calcium 8.6 8.5 - 10.5 mg/dL    Glucose 54 (LL) 70 - 125 mg/dL    Albumin 2.5 (L) 3.5 - 5.0 g/dL    Phosphorus 3.5 2.5 - 4.5 mg/dL    GFR Estimate 70 >60 mL/min/1.73m2   INR    Collection Time: 06/23/22  5:06 AM   Result Value Ref Range    INR 1.35 (H) 0.85 - 1.15   Procalcitonin    Collection Time: 06/23/22  5:06 AM   Result Value Ref Range    Procalcitonin 0.16 0.00 - 0.49 ng/mL   CBC with platelets and differential    Collection Time: 06/23/22  5:06 AM   Result Value Ref Range    WBC Count 8.1 4.0 - 11.0 10e3/uL    RBC  Count 2.74 (L) 4.40 - 5.90 10e6/uL    Hemoglobin 7.8 (L) 13.3 - 17.7 g/dL    Hematocrit 24.8 (L) 40.0 - 53.0 %    MCV 91 78 - 100 fL    MCH 28.5 26.5 - 33.0 pg    MCHC 31.5 31.5 - 36.5 g/dL    RDW 14.8 10.0 - 15.0 %    Platelet Count 217 150 - 450 10e3/uL    % Neutrophils 67 %    % Lymphocytes 17 %    % Monocytes 13 %    % Eosinophils 1 %    % Basophils 1 %    % Immature Granulocytes 1 %    NRBCs per 100 WBC 0 <1 /100    Absolute Neutrophils 5.6 1.6 - 8.3 10e3/uL    Absolute Lymphocytes 1.4 0.8 - 5.3 10e3/uL    Absolute Monocytes 1.1 0.0 - 1.3 10e3/uL    Absolute Eosinophils 0.1 0.0 - 0.7 10e3/uL    Absolute Basophils 0.0 0.0 - 0.2 10e3/uL    Absolute Immature Granulocytes 0.1 <=0.4 10e3/uL    Absolute NRBCs 0.0 10e3/uL   Basic metabolic panel    Collection Time: 06/23/22  5:06 AM   Result Value Ref Range    Sodium 138 136 - 145 mmol/L    Potassium 3.4 (L) 3.5 - 5.0 mmol/L    Chloride 108 (H) 98 - 107 mmol/L    Carbon Dioxide (CO2) 22 22 - 31 mmol/L    Anion Gap 8 5 - 18 mmol/L    Urea Nitrogen 41 (H) 8 - 28 mg/dL    Creatinine 1.10 0.70 - 1.30 mg/dL    Calcium 8.6 8.5 - 10.5 mg/dL    Glucose 54 (LL) 70 - 125 mg/dL    GFR Estimate 70 >60 mL/min/1.73m2   Glucose by meter    Collection Time: 06/23/22  6:51 AM   Result Value Ref Range    GLUCOSE BY METER POCT 80 70 - 99 mg/dL   Glucose by meter    Collection Time: 06/23/22  7:19 AM   Result Value Ref Range    GLUCOSE BY METER POCT 105 (H) 70 - 99 mg/dL   Glucose by meter    Collection Time: 06/23/22  7:48 AM   Result Value Ref Range    GLUCOSE BY METER POCT 95 70 - 99 mg/dL   Abscess Aerobic Bacterial Culture Routine with Gram Stain    Collection Time: 06/23/22  9:44 AM    Specimen: Back, Lower; Abscess   Result Value Ref Range    Gram Stain Result No organisms seen     Gram Stain Result 1+ WBC seen    Glucose by meter    Collection Time: 06/23/22 12:02 PM   Result Value Ref Range    GLUCOSE BY METER POCT 100 (H) 70 - 99 mg/dL   CBC with platelets and differential     Collection Time: 06/23/22  2:34 PM   Result Value Ref Range    WBC Count 8.2 4.0 - 11.0 10e3/uL    RBC Count 3.05 (L) 4.40 - 5.90 10e6/uL    Hemoglobin 8.7 (L) 13.3 - 17.7 g/dL    Hematocrit 27.9 (L) 40.0 - 53.0 %    MCV 92 78 - 100 fL    MCH 28.5 26.5 - 33.0 pg    MCHC 31.2 (L) 31.5 - 36.5 g/dL    RDW 15.1 (H) 10.0 - 15.0 %    Platelet Count 230 150 - 450 10e3/uL    % Neutrophils 76 %    % Lymphocytes 9 %    % Monocytes 13 %    % Eosinophils 1 %    % Basophils 0 %    % Immature Granulocytes 1 %    NRBCs per 100 WBC 0 <1 /100    Absolute Neutrophils 6.4 1.6 - 8.3 10e3/uL    Absolute Lymphocytes 0.7 (L) 0.8 - 5.3 10e3/uL    Absolute Monocytes 1.1 0.0 - 1.3 10e3/uL    Absolute Eosinophils 0.1 0.0 - 0.7 10e3/uL    Absolute Basophils 0.0 0.0 - 0.2 10e3/uL    Absolute Immature Granulocytes 0.1 <=0.4 10e3/uL    Absolute NRBCs 0.0 10e3/uL   Extra Green Top (Lithium Heparin) Tube    Collection Time: 06/23/22  2:34 PM   Result Value Ref Range    Hold Specimen JIC    Glucose by meter    Collection Time: 06/23/22  4:48 PM   Result Value Ref Range    GLUCOSE BY METER POCT 140 (H) 70 - 99 mg/dL

## 2022-06-23 NOTE — PLAN OF CARE
Problem: Bowel Motility Impaired (Spinal Surgery)  Goal: Effective Bowel Elimination  Outcome: Ongoing, Progressing     Problem: Fluid and Electrolyte Imbalance (Spinal Surgery)  Goal: Fluid and Electrolyte Balance  Outcome: Ongoing, Progressing     Problem: Infection (Spinal Surgery)  Goal: Absence of Infection Signs and Symptoms  Outcome: Ongoing, Progressing     Problem: Pain (Spinal Surgery)  Goal: Acceptable Pain Control  Outcome: Ongoing, Progressing  Intervention: Prevent or Manage Pain  Recent Flowsheet Documentation  Taken 6/23/2022 1402 by Shama Marcano RN  Pain Management Interventions: medication (see MAR)     Problem: Postoperative Nausea and Vomiting (Spinal Surgery)  Goal: Nausea and Vomiting Relief  Outcome: Ongoing, Progressing   Goal Outcome Evaluation:             Pt denied pain this morning, but this afternoon was 8/10, received Oxycodone 10mg oral.  Pt has segura cath for urinary retention.  Had aspiration for fluid from lumbar spine this morning in IR, sample collected and sent to lab.

## 2022-06-23 NOTE — PLAN OF CARE
Problem: Risk for Delirium  Goal: Optimal Coping  Outcome: Ongoing, Progressing     Problem: Fluid and Electrolyte Imbalance (Spinal Surgery)  Goal: Fluid and Electrolyte Balance  Outcome: Ongoing, Progressing     Problem: Pain (Spinal Surgery)  Goal: Acceptable Pain Control  6/22/2022 2303 by Vania Patel RN  Outcome: Ongoing, Progressing  6/22/2022 2302 by Vania Patel RN  Outcome: Ongoing, Progressing  Intervention: Prevent or Manage Pain  Recent Flowsheet Documentation  Taken 6/22/2022 1740 by Vania Patel RN  Pain Management Interventions: medication offered but refused     Problem: Postoperative Urinary Retention (Spinal Surgery)  Goal: Effective Urinary Elimination  6/22/2022 2303 by Vania Patel RN  Outcome: Ongoing, Progressing  6/22/2022 2302 by Vania Patel RN  Outcome: Ongoing, Progressing     Problem: Plan of Care - These are the overarching goals to be used throughout the patient stay.    Goal: Optimal Comfort and Wellbeing  Intervention: Monitor Pain and Promote Comfort  Recent Flowsheet Documentation  Taken 6/22/2022 1740 by Vania Patel RN  Pain Management Interventions: medication offered but refused   Goal Outcome Evaluation:        Pt. C/O pain to his back this shift, given Oxycodone. Carpenter in place, draining clear yellow urine. NPO at midnight tonight. Continues to have Chronic numbness and tingling in bilateral hands and feet per pt. Refused sliding scale Novolog at dinner. Refused suppository tonight even with encouragement.

## 2022-06-23 NOTE — PROGRESS NOTES
06/23/22 0500   Home O2/Equipment Set-Up   Home O2 Device CPAP   Pt Owned Device Yes;Functional;Pt. Demonstrates Use

## 2022-06-23 NOTE — PROCEDURES
Neurointerventional Surgery  Post-procedure     Patient Name: Thomas Steve  MRN: [id number]  Date of Procedure: June 23, 2022    Procedure: lumbar fluid aspiration  Radiologist: Chano Rubio MD    Contrast: 0 ml Isovue  Fluoro Time: 0.4 minutes  Air Kerma: 15 mGy    Medications: Versed 1 mg IV                       Fentanyl 50 mcg IV  Sedation Time: 10 minutes    EBL: 0 ml   Complications: none noted    Patient reevaluated immediately prior to sedation and prior to procedure.    Preliminary Report:   (See dictation for full detail)  Lumbar fluid abscess aspiration  0.5 ml reddish, yellowish fluid aspirated and sent to the lab    Assess/Plan:  Bedrest x 1-2 hours post sedation      Chano Rubio MD MD  Emergency pager: [phone]  Office: [phone]

## 2022-06-23 NOTE — PROGRESS NOTES
Care Management Follow Up    Length of Stay (days): 7    Expected Discharge Date: 06/24/2022     Concerns to be Addressed: discharge planning       Patient plan of care discussed at interdisciplinary rounds: Yes    Anticipated Discharge Disposition: Transitional Care     Anticipated Discharge Services: Other (see comment) (therapy services)    Anticipated Discharge DME: None    Patient/family educated on Medicare website which has current facility and service quality ratings: yes    Education Provided on the Discharge Plan:  Yes    Patient/Family in Agreement with the Plan: yes    Referrals Placed by CM/SW: Post Acute Facilities    Private pay costs discussed: Not applicable    Additional Information: ELBA spoke with Renita at [facility], who stated that they were not even sure if could take pt tomorrow now due to just getting in four more people last night through the ER.  They mainly have swing beds - are not a full scale TCU.  SW to check in with Renita tomorrow to see where things are at.      ELBA spoke with Clarisa at [facility] to discuss status of pt and pt's wife referrals.  ELBA updated Clarisa regarding having spoken with Uma briefly yesterday but then no return call received.  Clarisa informed ELBA that if accepted both pt and wife would need second Covid booster.  She requested that ELBA send referral information to Uma for her review via fax at [phone].  She stated that Uma is from their corporate office and is located in the state [facility].  lCarisa also asked that ELBA send the most recent therapy notes on pt's wife along with initial referral information previously sent on both pt and wife.  Discussed that anticipated discharge for both pt and wife is tomorrow.      ELBA updated by Joan from neurosurgery that pt now not discharging until Friday due to needing to do repeat MRI because suspicion for infection.      ELBA met with pt to discuss discharge planning.  ELBA updated pt regarding that [facility] had been unable to accept pt's wife due to her being on observation status.  ELBA informed pt that [facility] is reviewing both pt and wife for potential placement.      ELBA spoke with pt's wife's CM and discussed that this SW would fax requested information on both pt and wife to [place] for [facility].  Pt's wife's CM updated SW that pt's wife was changed to inpatient by UR.  ELBA tried faxing all documentation two times and both times failed.  ELBA scanned all documents to e-mail and will attempt to send secure e-mail to [place].  ELBA left message for Uma asking for her e-mail address so SW can try to send information that way and asked for call back.  ELBA spoke with Renita at [facility] to see if she had an e-mail address for Uma - she did not.  ELBA updated Renita regarding pt's wife's change in status to inpatient.  Pt's wife will still need three day inpatient stay.      ELBA spoke with Clarisa at [facility] again and let her know that had tried to fax referral information to [place] and it was not going through.  ELBA asked for Uma's e-mail address to send her the information.  Clarisa stated she was not in her office at the moment and did not have e-mail address handy.  Clarisa provided ELBA with her own e-mail address to send the information to and she will then forward it to Uma and CC this ELBA so SW will have Uma's e-mail address.  ELBA sent information to Clarisa via secure e-mail.  Noted that ELBA did receive copy of e-mail that Clarisa had sent to Uma.        TONY Thompson, SHABBIR 06/23/22 8:16 AM

## 2022-06-23 NOTE — PROGRESS NOTES
"INFECTIOUS DISEASE FOLLOW UP NOTE    Date: 2022   CHIEF COMPLAINT: No chief complaint on file.       ASSESSMENT:  1. Fever: post op day 3. Workup with MRI suggestive of possible superimposed infection with a rim-enhancing ventral epidural collection extending from the L2 level cranially to the L5-S1 level caudally s/p aspiration  with red/yellow fluid removed-GS pending. CRP and SED elevated. WBC normalized. BC NGTD.   2. YOLANDA: nephrology following  3. CAD, pacer in place  4. Comorbid conditions affecting immune system: DM2    PLAN:  - follow aspiration GS, cultures, could be sterile due to abx  - continue pip-tazo IV and vancomycin IV for now  - further recommendations to follow clinical course  - ID will follow, thanks    Carla Amanda MD  [facility]   On-Call: [phone]     ______________________________________________________________________    SUBJECTIVE / INTERVAL HISTORY: continued pain. Aspiration this morning with small red yellow fluid. Tolerating abx.     ROS: All other systems negative except as listed above.    SH/FH/Habits/PMH reviewed and unchanged.    OBJECTIVE:  BP (!) 144/76 (BP Location: Left arm)   Pulse 62   Temp 97.5  F (36.4  C) (Oral)   Resp 18   Ht 1.778 m (5' 10\")   Wt 104.5 kg (230 lb 4.8 oz)   SpO2 99%   BMI 33.04 kg/m       Resp: 18      Vital Signs  Temp: 97.5  F (36.4  C)  Temp src: Oral  Resp: 18  Pulse: 62  Pulse Rate Source: Monitor  BP: (!) 144/76  BP Location: Left arm    Temp (24hrs), Av.2  F (36.8  C), Min:97.5  F (36.4  C), Max:98.7  F (37.1  C)      GEN: No acute distress.    RESPIRATORY:  Normal breathing pattern.   CARDIOVASCULAR:  Regular   ABDOMEN:  Soft, normal bowel sounds, non-tender,   EXTREMITIES: No edema.  SKIN/HAIR/NAILS:  No rashes  IV: peripheral IV    Antibiotics:  Pip-tazo   vanc    Pertinent labs:  CRP   Date Value Ref Range Status   2022 11.0 (H) 0.0 - <0.8 mg/dL Final      CBC RESULTS:   Recent Labs " Patient Call: Transplant Lab  Reason for call: Discuss lab results       Lab Test 06/23/22  0506   WBC 8.1   RBC 2.74*   HGB 7.8*   HCT 24.8*   MCV 91   MCH 28.5   MCHC 31.5   RDW 14.8         Last Comprehensive Metabolic Panel:  Sodium   Date Value Ref Range Status   06/23/2022 138 136 - 145 mmol/L Final   06/23/2022 138 136 - 145 mmol/L Final     Potassium   Date Value Ref Range Status   06/23/2022 3.4 (L) 3.5 - 5.0 mmol/L Final   06/23/2022 3.4 (L) 3.5 - 5.0 mmol/L Final     Chloride   Date Value Ref Range Status   06/23/2022 108 (H) 98 - 107 mmol/L Final   06/23/2022 108 (H) 98 - 107 mmol/L Final     Carbon Dioxide (CO2)   Date Value Ref Range Status   06/23/2022 22 22 - 31 mmol/L Final   06/23/2022 22 22 - 31 mmol/L Final     Anion Gap   Date Value Ref Range Status   06/23/2022 8 5 - 18 mmol/L Final   06/23/2022 8 5 - 18 mmol/L Final     Glucose   Date Value Ref Range Status   06/23/2022 54 (LL) 70 - 125 mg/dL Final   06/23/2022 54 (LL) 70 - 125 mg/dL Final     GLUCOSE BY METER POCT   Date Value Ref Range Status   06/23/2022 100 (H) 70 - 99 mg/dL Final     Urea Nitrogen   Date Value Ref Range Status   06/23/2022 41 (H) 8 - 28 mg/dL Final   06/23/2022 41 (H) 8 - 28 mg/dL Final     Creatinine   Date Value Ref Range Status   06/23/2022 1.10 0.70 - 1.30 mg/dL Final   06/23/2022 1.10 0.70 - 1.30 mg/dL Final   02/28/2011 0.75 0.66 - 1.25 mg/dL Final     Comment:     New IDMS-traceable calibration  beginning 5/1/08     GFR Estimate   Date Value Ref Range Status   06/23/2022 70 >60 mL/min/1.73m2 Final     Comment:     Effective December 21, 2021 eGFRcr in adults is calculated using the 2021 CKD-EPI creatinine equation which includes age and gender (Cynthia et al., NEJM, DOI: 10.1056/XGVFlg8264524)   06/23/2022 70 >60 mL/min/1.73m2 Final     Comment:     Effective December 21, 2021 eGFRcr in adults is calculated using the 2021 CKD-EPI creatinine equation which includes age and gender (Cynthia cho al., NEJ, DOI: 10.1056/YWAQuc5079125)  Effective December 21, 2021 eGFRcr in adults is  calculated using the 2021 CKD-EPI creatinine equation which includes age and gender (Cynthia et al., NE, DOI: 10.1056/LXPUiv5579888)   09/23/2020 59 (L) >60 mL/min/1.73m2 Final   02/28/2011 >90 >60 mL/min/1.7m2 Final     Calcium   Date Value Ref Range Status   06/23/2022 8.6 8.5 - 10.5 mg/dL Final   06/23/2022 8.6 8.5 - 10.5 mg/dL Final        MICROBIOLOGY DATA:  Personally reviewed.  6/19 BC NGTD  6/23 aspiration pending    RADIOLOGY:  Personally Reviewed.  Recent Results (from the past 24 hour(s))   XR Abdomen Port 1 View    Narrative    EXAM: XR ABDOMEN PORT 1 VIEWS  LOCATION: [facility]  DATE/TIME: 6/22/2022 2:35 PM    INDICATION: abdominal distension  COMPARISON: None.      Impression    IMPRESSION: No distended air-filled loops of small bowel. There is a small amount of throughout the colon. No definite intraperitoneal free air identified. Pacemaker leads are partially included in the field-of-view. Prior median sternotomy noted. There   are surgical staples over the abdomen and pelvis.         Active Problems:    Type 2 diabetes mellitus, with long-term current use of insulin (H)    Benign essential hypertension    CAD (coronary artery disease)    Chronic atrial fibrillation (H)    CORAZON (obstructive sleep apnea)    Lumbar stenosis with neurogenic claudication    Gastroesophageal reflux disease without esophagitis    Hypothyroidism    Normocytic anemia    Chronic left shoulder pain    Nasal congestion    Lumbar spinal stenosis

## 2022-06-23 NOTE — PLAN OF CARE
Problem: Risk for Delirium  Goal: Improved Sleep  Outcome: Ongoing, Progressing   Goal Outcome Evaluation:     Pt slept well for first half of shift, woke up from 02 sats dropping due to placement of forehead probe, when fixed patients 02 went back to mid to high 90's. Pt complained of back pain at 0345, prn oxycodone given with some sips of water. Pt has been npo overnight for aspiration of the fluid at incision site. Morning meds held due to npo status, when inform oncoming shift.

## 2022-06-23 NOTE — PROGRESS NOTES
Neurosurgery Progress Note  06/23/22    A/P: Thomas Steve is a 77 year old male POD #7 DECOMPRESSIVE LUMBAR LAMINECTOMY LUMBAR 4-LUMBAR 5 AND LUMBAR 5-SACRAL 1 BILATERAL-LEFT SIDE FIRST-PLUS BILATERAL FORAMINOTOMIES PLUS REMOVAL OF HERNIATED DISC LUMBAR 5-SACRAL 1 LEFT with Dr Samuel. Arachnoid bleb intraop, covered with durgen, duraseal. No CSF leak.     Thomas is scheduled for fluid aspirate today per ID for fluid collection at surgical site. He is complaining of feeling fatigued and is concerned. Will review meds and see if changes make sense. Check some labs. He endorses he has not slept well all week. Reports he is eating. He denies his abdomen is bigger than usual but also endorses it feels tight. XR yesterday did not show ileus/obstruction. Reports BM earlier this week. Not passing much gas today. Distended abdomen may be impacting breathing effort. Discussed with Hospitalist.     Discharge pending CT aspirate results and clinic stability. Consider repeat MRI lumbar next week.     PLAN:   1. Increase activity with PT/OT patient to be in bedside chair for all meals   2. Reduced Tizandine to 2mg as patient with increase lethergy yesterday    3. Appreciate ID involvement  - antibiotics management   4. Continue to hold Xarelto- okay to use coumadin if needed as its reversible in case we would need to return to OR  5. Carpenter replaced for retention, flomax started. -trail void next week; once constipation improves   6. A1C 7.5 - maintain adequate BG control to aide wound healing   7. bowel regimen- has had BM following surgery but feels full   8. Planned CT aspiration will follow results   9. IV fluids management by other teams.     HPI: Presented with back pain, right leg pain. Weakness bilateral LE. Worse with walking. No bowel or bladder issues. Diabetes, CABG, AFIB, Xarelto. MRI with listhesis L4-5, spinal stenosis. Stenosis also at L5-S1 with large disc herniation L5-S1 on the left.     Subjective: Feels  "fatigued. Worries he is losing all his strength. Pain control tolerable. States he feels the same as he has. When asked about posterior thigh pain he doesn't endorse this. Chronic neuropathy bilateral feet. Feels skin is itchy. No rash noted. Slept good last night but first night in a long time. No nausea. Not passing much gas. BM yesterday. Drinks one beer a day.     Physical Exam  /70 (BP Location: Left arm)   Pulse 60   Temp 98.7  F (37.1  C) (Oral)   Resp 20   Ht 1.778 m (5' 10\")   Wt 104.5 kg (230 lb 4.8 oz)   SpO2 95%   BMI 33.04 kg/m      General: oriented. KIM. Speech clear. Laying in bed.     Motor: normal bulk and tone     Strength: Full strength LE bilaterally     Sensation: intact to light touch; baseline neuropathy bilaterally     Incision: Dressing changed personally. No incisional drainage, no redness or obvious swelling     Labs: reviewed. No growth on blood cultures  CRP continues to improve - 11 today; highest 17.4  WBC normal   Sed rate 116   UA negative     Imaging: MRI reviewed personally reviewed as has Dr Samuel   noted fluid collection infection vs postoperative   Combination of postoperative changes, as well as additional findings suggestive of possible superimposed infection with a rim-enhancing ventral epidural collection extending from the L2 level cranially to the L5-S1 level caudally (series 9, image 15), contributing to multilevel high-grade spinal canal stenosis.     CXR negative    SAAD Manrique  [facility]  O: [phone]        "

## 2022-06-23 NOTE — IR NOTE
Bedside report given to Shama DU RN, no questions at the end of report, pt alert and interacting in plan of care.

## 2022-06-24 ENCOUNTER — APPOINTMENT (OUTPATIENT)
Dept: OCCUPATIONAL THERAPY | Facility: HOSPITAL | Age: 77
DRG: 518 | End: 2022-06-24
Attending: SURGERY
Payer: MEDICARE

## 2022-06-24 LAB
ANION GAP SERPL CALCULATED.3IONS-SCNC: 9 MMOL/L (ref 5–18)
BUN SERPL-MCNC: 23 MG/DL (ref 8–28)
CALCIUM SERPL-MCNC: 8.4 MG/DL (ref 8.5–10.5)
CHLORIDE BLD-SCNC: 109 MMOL/L (ref 98–107)
CO2 SERPL-SCNC: 20 MMOL/L (ref 22–31)
CREAT SERPL-MCNC: 0.88 MG/DL (ref 0.7–1.3)
GFR SERPL CREATININE-BSD FRML MDRD: 89 ML/MIN/1.73M2
GLUCOSE BLD-MCNC: 46 MG/DL (ref 70–125)
GLUCOSE BLDC GLUCOMTR-MCNC: 124 MG/DL (ref 70–99)
GLUCOSE BLDC GLUCOMTR-MCNC: 124 MG/DL (ref 70–99)
GLUCOSE BLDC GLUCOMTR-MCNC: 125 MG/DL (ref 70–99)
GLUCOSE BLDC GLUCOMTR-MCNC: 128 MG/DL (ref 70–99)
GLUCOSE BLDC GLUCOMTR-MCNC: 78 MG/DL (ref 70–99)
GLUCOSE BLDC GLUCOMTR-MCNC: 97 MG/DL (ref 70–99)
POTASSIUM BLD-SCNC: 3.5 MMOL/L (ref 3.5–5)
SODIUM SERPL-SCNC: 138 MMOL/L (ref 136–145)
VANCOMYCIN SERPL-MCNC: 17.8 MG/L

## 2022-06-24 PROCEDURE — 250N000013 HC RX MED GY IP 250 OP 250 PS 637: Performed by: INTERNAL MEDICINE

## 2022-06-24 PROCEDURE — 97535 SELF CARE MNGMENT TRAINING: CPT | Mod: GO

## 2022-06-24 PROCEDURE — 250N000013 HC RX MED GY IP 250 OP 250 PS 637: Performed by: FAMILY MEDICINE

## 2022-06-24 PROCEDURE — 99233 SBSQ HOSP IP/OBS HIGH 50: CPT | Performed by: HOSPITALIST

## 2022-06-24 PROCEDURE — 250N000013 HC RX MED GY IP 250 OP 250 PS 637: Performed by: SURGERY

## 2022-06-24 PROCEDURE — 80048 BASIC METABOLIC PNL TOTAL CA: CPT | Performed by: INTERNAL MEDICINE

## 2022-06-24 PROCEDURE — 250N000011 HC RX IP 250 OP 636

## 2022-06-24 PROCEDURE — 99233 SBSQ HOSP IP/OBS HIGH 50: CPT | Performed by: INTERNAL MEDICINE

## 2022-06-24 PROCEDURE — 250N000011 HC RX IP 250 OP 636: Performed by: HOSPITALIST

## 2022-06-24 PROCEDURE — 250N000013 HC RX MED GY IP 250 OP 250 PS 637: Performed by: NURSE PRACTITIONER

## 2022-06-24 PROCEDURE — 250N000013 HC RX MED GY IP 250 OP 250 PS 637: Performed by: PHYSICIAN ASSISTANT

## 2022-06-24 PROCEDURE — 120N000001 HC R&B MED SURG/OB

## 2022-06-24 PROCEDURE — 258N000001 HC RX 258: Performed by: FAMILY MEDICINE

## 2022-06-24 PROCEDURE — 250N000011 HC RX IP 250 OP 636: Performed by: FAMILY MEDICINE

## 2022-06-24 PROCEDURE — 258N000003 HC RX IP 258 OP 636: Performed by: INTERNAL MEDICINE

## 2022-06-24 PROCEDURE — 36415 COLL VENOUS BLD VENIPUNCTURE: CPT | Performed by: INTERNAL MEDICINE

## 2022-06-24 PROCEDURE — 80202 ASSAY OF VANCOMYCIN: CPT | Performed by: SURGERY

## 2022-06-24 RX ORDER — FUROSEMIDE 10 MG/ML
40 INJECTION INTRAMUSCULAR; INTRAVENOUS EVERY 8 HOURS
Status: DISCONTINUED | OUTPATIENT
Start: 2022-06-24 | End: 2022-06-26

## 2022-06-24 RX ORDER — GABAPENTIN 300 MG/1
600 CAPSULE ORAL 2 TIMES DAILY
Status: DISCONTINUED | OUTPATIENT
Start: 2022-06-24 | End: 2022-07-06 | Stop reason: HOSPADM

## 2022-06-24 RX ORDER — DOXYCYCLINE 100 MG/1
100 CAPSULE ORAL EVERY 12 HOURS SCHEDULED
Status: COMPLETED | OUTPATIENT
Start: 2022-06-24 | End: 2022-07-01

## 2022-06-24 RX ORDER — TIZANIDINE 2 MG/1
2 TABLET ORAL 3 TIMES DAILY PRN
Status: DISCONTINUED | OUTPATIENT
Start: 2022-06-24 | End: 2022-07-06 | Stop reason: HOSPADM

## 2022-06-24 RX ORDER — ACETAMINOPHEN 325 MG/1
975 TABLET ORAL 3 TIMES DAILY
Status: DISCONTINUED | OUTPATIENT
Start: 2022-06-24 | End: 2022-07-06 | Stop reason: HOSPADM

## 2022-06-24 RX ORDER — BISACODYL 10 MG
10 SUPPOSITORY, RECTAL RECTAL DAILY
Status: DISCONTINUED | OUTPATIENT
Start: 2022-06-24 | End: 2022-07-04

## 2022-06-24 RX ORDER — ENOXAPARIN SODIUM 100 MG/ML
40 INJECTION SUBCUTANEOUS EVERY 12 HOURS
Status: DISCONTINUED | OUTPATIENT
Start: 2022-06-25 | End: 2022-06-24

## 2022-06-24 RX ORDER — FUROSEMIDE 10 MG/ML
20 INJECTION INTRAMUSCULAR; INTRAVENOUS EVERY 8 HOURS
Status: DISCONTINUED | OUTPATIENT
Start: 2022-06-24 | End: 2022-06-24

## 2022-06-24 RX ORDER — ENOXAPARIN SODIUM 100 MG/ML
1 INJECTION SUBCUTANEOUS EVERY 12 HOURS
Status: DISCONTINUED | OUTPATIENT
Start: 2022-06-24 | End: 2022-06-29

## 2022-06-24 RX ADMIN — TIZANIDINE 2 MG: 2 TABLET ORAL at 08:46

## 2022-06-24 RX ADMIN — POLYETHYLENE GLYCOL 3350 17 G: 17 POWDER, FOR SOLUTION ORAL at 08:45

## 2022-06-24 RX ADMIN — Medication 500 MG: at 08:46

## 2022-06-24 RX ADMIN — LIDOCAINE 2 PATCH: 246 PATCH TOPICAL at 08:46

## 2022-06-24 RX ADMIN — ENOXAPARIN SODIUM 100 MG: 100 INJECTION SUBCUTANEOUS at 05:33

## 2022-06-24 RX ADMIN — Medication 100 MCG: at 08:46

## 2022-06-24 RX ADMIN — Medication 500 MG: at 14:12

## 2022-06-24 RX ADMIN — DICLOFENAC SODIUM 2 G: 10 GEL TOPICAL at 08:47

## 2022-06-24 RX ADMIN — THERA TABS 1 TABLET: TAB at 08:46

## 2022-06-24 RX ADMIN — GABAPENTIN 600 MG: 300 CAPSULE ORAL at 20:19

## 2022-06-24 RX ADMIN — OXYCODONE HYDROCHLORIDE 10 MG: 5 TABLET ORAL at 16:00

## 2022-06-24 RX ADMIN — DEXTROSE MONOHYDRATE 25 ML: 25 INJECTION, SOLUTION INTRAVENOUS at 07:29

## 2022-06-24 RX ADMIN — LEVOTHYROXINE SODIUM 25 MCG: 0.03 TABLET ORAL at 05:32

## 2022-06-24 RX ADMIN — ACETAMINOPHEN 975 MG: 325 TABLET, FILM COATED ORAL at 20:20

## 2022-06-24 RX ADMIN — GLIMEPIRIDE 4 MG: 1 TABLET ORAL at 08:46

## 2022-06-24 RX ADMIN — OXYCODONE HYDROCHLORIDE 10 MG: 5 TABLET ORAL at 05:32

## 2022-06-24 RX ADMIN — AMOXICILLIN AND CLAVULANATE POTASSIUM 1 TABLET: 875; 125 TABLET, FILM COATED ORAL at 20:20

## 2022-06-24 RX ADMIN — Medication 500 MG: at 21:57

## 2022-06-24 RX ADMIN — PANTOPRAZOLE SODIUM 40 MG: 40 TABLET, DELAYED RELEASE ORAL at 05:33

## 2022-06-24 RX ADMIN — TAMSULOSIN HYDROCHLORIDE 0.4 MG: 0.4 CAPSULE ORAL at 08:46

## 2022-06-24 RX ADMIN — FUROSEMIDE 40 MG: 10 INJECTION, SOLUTION INTRAMUSCULAR; INTRAVENOUS at 12:48

## 2022-06-24 RX ADMIN — FERROUS SULFATE TAB 325 MG (65 MG ELEMENTAL FE) 325 MG: 325 (65 FE) TAB at 08:46

## 2022-06-24 RX ADMIN — FERROUS SULFATE TAB 325 MG (65 MG ELEMENTAL FE) 325 MG: 325 (65 FE) TAB at 12:47

## 2022-06-24 RX ADMIN — ROSUVASTATIN CALCIUM 10 MG: 10 TABLET, FILM COATED ORAL at 20:20

## 2022-06-24 RX ADMIN — FERROUS SULFATE TAB 325 MG (65 MG ELEMENTAL FE) 325 MG: 325 (65 FE) TAB at 16:01

## 2022-06-24 RX ADMIN — SODIUM CHLORIDE: 900 INJECTION INTRAVENOUS at 10:07

## 2022-06-24 RX ADMIN — DICLOFENAC SODIUM 2 G: 10 GEL TOPICAL at 17:58

## 2022-06-24 RX ADMIN — PANTOPRAZOLE SODIUM 40 MG: 40 TABLET, DELAYED RELEASE ORAL at 16:01

## 2022-06-24 RX ADMIN — IPRATROPIUM BROMIDE 2 SPRAY: 21 SPRAY, METERED NASAL at 14:13

## 2022-06-24 RX ADMIN — LIDOCAINE: 50 OINTMENT TOPICAL at 20:22

## 2022-06-24 RX ADMIN — ENOXAPARIN SODIUM 90 MG: 100 INJECTION SUBCUTANEOUS at 20:21

## 2022-06-24 RX ADMIN — SENNOSIDES AND DOCUSATE SODIUM 2 TABLET: 8.6; 5 TABLET ORAL at 08:46

## 2022-06-24 RX ADMIN — OXYCODONE HYDROCHLORIDE 10 MG: 5 TABLET ORAL at 21:57

## 2022-06-24 RX ADMIN — DOXYCYCLINE 100 MG: 100 CAPSULE ORAL at 20:20

## 2022-06-24 RX ADMIN — ACETAMINOPHEN 650 MG: 325 TABLET, FILM COATED ORAL at 16:01

## 2022-06-24 RX ADMIN — FUROSEMIDE 40 MG: 10 INJECTION, SOLUTION INTRAMUSCULAR; INTRAVENOUS at 20:19

## 2022-06-24 RX ADMIN — LIDOCAINE: 50 OINTMENT TOPICAL at 12:48

## 2022-06-24 RX ADMIN — ACETAMINOPHEN 975 MG: 325 TABLET, FILM COATED ORAL at 14:12

## 2022-06-24 RX ADMIN — PIPERACILLIN AND TAZOBACTAM 3.38 G: 3; .375 INJECTION, POWDER, FOR SOLUTION INTRAVENOUS at 05:33

## 2022-06-24 ASSESSMENT — ACTIVITIES OF DAILY LIVING (ADL)
ADLS_ACUITY_SCORE: 30

## 2022-06-24 NOTE — PROGRESS NOTES
Care Management Follow Up    Length of Stay (days): 7    Expected Discharge Date: 06/24/2022      Concerns to be Addressed: discharge planning       Patient plan of care discussed at interdisciplinary rounds: Yes    Anticipated Discharge Disposition: Transitional Care     Anticipated Discharge Services: Other (see comment) (therapy services)    Anticipated Discharge DME: None    Patient/family educated on Medicare website which has current facility and service quality ratings: yes    Education Provided on the Discharge Plan:  Yes    Patient/Family in Agreement with the Plan: yes    Referrals Placed by CM/SW: Post Acute Facilities    Private pay costs discussed: Not applicable    Additional Information: ELBA spent much of the day communicating with both Clarisa and Uma with [facility] via telephone and via e-mail regarding referrals for pt and pt's wife for potential TCU admission.  ELBA informed by Clarisa and Uma that [facility] unable to open full referrals on both pt and pt's wife that SW had sent to them yesterday afternoon.  They were able to open the updated therapy notes for pt's wife.  Clarisa requested that ELBA send facesheets and H & P for both pt and pt's wife, as well as updated therapy notes for pt, to Uma via e-mail and to GREG Mckenna (sent).  Clarisa stated that she does the review of referrals for approval clinically and that Uma (who is in [place]) reviews referrals for the financial piece.  She stated that their business person on site in [place] is on vacation this week and so that is why Uma is involved with taking care of the financials.  Uma requested further documentation be sent to her on pt's wife (notes for the past two days, including UR note discussing rationale for changing pt's wife to inpatient).  ELBA sent this information to Uma via secure e-mail per her request.  Both Clarisa and Uma informed ELBA that both pt and pt's wife look good clinically.  Uma must have their Medicare nurse review pt's wife  for admission via Medicare waiver (pt's wife just changed to inpatient yesterday).  Discussed that pt and pt's wife have both had initial two doses of Covid vaccine as well as one booster.  They would both need to have second boosters here at hospital before coming if they do not want to have to quarantine once they get there.  Uma will call Mayra TEJADA at [phone] tomorrow to update her regarding final confirmation for pt and wife to admit to facility potentially tomorrow.  Son to transport.  No PAS needed for [place] facility.      Number for Uma: [phone]  Numbers for Clarisa: [phone]; cell [phone]        TONY Thompson, SHABBIR 06/23/22 9:07 PM

## 2022-06-24 NOTE — PROGRESS NOTES
"INFECTIOUS DISEASE FOLLOW UP NOTE    Date: 2022   CHIEF COMPLAINT: No chief complaint on file.       ASSESSMENT:  1. Fever: post op day 3. Workup with MRI suggestive of possible superimposed infection with a rim-enhancing ventral epidural collection extending from the L2 level cranially to the L5-S1 level caudally s/p aspiration  with red/yellow fluid removed-GS pending. CRP and SED elevated. WBC normalized. BC NGTD. Active issue.   2. YOLANDA: nephrology following  3. CAD, pacer in place  4. Comorbid conditions affecting immune system: DM2    PLAN:  - follow aspiration-no organism on GS. Fluid red/yellow. Could be sterile due to antibiotics but reassuring not freda pus.   - switch to augmentin and doxycyline PO, plan 7 days  - further recommendations to follow clinical course  - agree with plans for repeat imaging next week per neurosurgery  - follow up with neurosurgery  - please call with questions    Carla Amanda MD  [facility]   On-Call: [phone]     ______________________________________________________________________    SUBJECTIVE / INTERVAL HISTORY: some pain and continued weakness. Tolerating antibiotics.     ROS: All other systems negative except as listed above.    SH/FH/Habits/PMH reviewed and unchanged.    OBJECTIVE:  BP (!) 172/79 (BP Location: Left arm)   Pulse 60   Temp 97.3  F (36.3  C) (Oral)   Resp 20   Ht 1.778 m (5' 10\")   Wt 104.5 kg (230 lb 4.8 oz)   SpO2 99%   BMI 33.04 kg/m       Resp: 20      Vital Signs  Temp: 97.5  F (36.4  C)  Temp src: Oral  Resp: 18  Pulse: 62  Pulse Rate Source: Monitor  BP: (!) 144/76  BP Location: Left arm    Temp (24hrs), Av.2  F (36.8  C), Min:97.5  F (36.4  C), Max:98.7  F (37.1  C)      GEN: No acute distress.    RESPIRATORY:  Normal breathing pattern.   CARDIOVASCULAR:  Regular   ABDOMEN:  Soft, normal bowel sounds, non-tender,   EXTREMITIES: No edema.  SKIN/HAIR/NAILS:  No rashes  IV: peripheral " IV    Antibiotics:  Pip-tazo   vanc    Pertinent labs:  CRP   Date Value Ref Range Status   06/23/2022 11.0 (H) 0.0 - <0.8 mg/dL Final      CBC RESULTS:   Recent Labs   Lab Test 06/23/22  0506   WBC 8.1   RBC 2.74*   HGB 7.8*   HCT 24.8*   MCV 91   MCH 28.5   MCHC 31.5   RDW 14.8         Last Comprehensive Metabolic Panel:  Sodium   Date Value Ref Range Status   06/24/2022 138 136 - 145 mmol/L Final     Potassium   Date Value Ref Range Status   06/24/2022 3.5 3.5 - 5.0 mmol/L Final     Chloride   Date Value Ref Range Status   06/24/2022 109 (H) 98 - 107 mmol/L Final     Carbon Dioxide (CO2)   Date Value Ref Range Status   06/24/2022 20 (L) 22 - 31 mmol/L Final     Anion Gap   Date Value Ref Range Status   06/24/2022 9 5 - 18 mmol/L Final     Glucose   Date Value Ref Range Status   06/24/2022 46 (LL) 70 - 125 mg/dL Final     GLUCOSE BY METER POCT   Date Value Ref Range Status   06/24/2022 125 (H) 70 - 99 mg/dL Final     Urea Nitrogen   Date Value Ref Range Status   06/24/2022 23 8 - 28 mg/dL Final     Creatinine   Date Value Ref Range Status   06/24/2022 0.88 0.70 - 1.30 mg/dL Final   02/28/2011 0.75 0.66 - 1.25 mg/dL Final     Comment:     New IDMS-traceable calibration  beginning 5/1/08     GFR Estimate   Date Value Ref Range Status   06/24/2022 89 >60 mL/min/1.73m2 Final     Comment:     Effective December 21, 2021 eGFRcr in adults is calculated using the 2021 CKD-EPI creatinine equation which includes age and gender (Cynthia cho al., NEJM, DOI: 10.1056/ESYEbs1430580)   09/23/2020 59 (L) >60 mL/min/1.73m2 Final   02/28/2011 >90 >60 mL/min/1.7m2 Final     Calcium   Date Value Ref Range Status   06/24/2022 8.4 (L) 8.5 - 10.5 mg/dL Final        MICROBIOLOGY DATA:  Personally reviewed.  6/19 BC NGTD  6/23 aspiration GS no organisms    RADIOLOGY:  Personally Reviewed.  Recent Results (from the past 24 hour(s))   XR Abdomen Port 1 View    Narrative    EXAM: XR ABDOMEN PORT 1 VIEWS  LOCATION: [facility]  DATE/TIME: 6/22/2022 2:35 PM    INDICATION: abdominal distension  COMPARISON: None.      Impression    IMPRESSION: No distended air-filled loops of small bowel. There is a small amount of throughout the colon. No definite intraperitoneal free air identified. Pacemaker leads are partially included in the field-of-view. Prior median sternotomy noted. There   are surgical staples over the abdomen and pelvis.         Active Problems:    Type 2 diabetes mellitus, with long-term current use of insulin (H)    Benign essential hypertension    CAD (coronary artery disease)    Chronic atrial fibrillation (H)    CORAZON (obstructive sleep apnea)    Lumbar stenosis with neurogenic claudication    Gastroesophageal reflux disease without esophagitis    Hypothyroidism    Normocytic anemia    Chronic left shoulder pain    Nasal congestion    Lumbar spinal stenosis

## 2022-06-24 NOTE — PROGRESS NOTES
"Neurosurgery Progress Note  06/24/22      A/P: Thomas Steve is a 77 year old male POD #8 DECOMPRESSIVE LUMBAR LAMINECTOMY LUMBAR 4-LUMBAR 5 AND LUMBAR 5-SACRAL 1 BILATERAL-LEFT SIDE FIRST-PLUS BILATERAL FORAMINOTOMIES PLUS REMOVAL OF HERNIATED DISC LUMBAR 5-SACRAL 1 LEFT with Dr Samuel. Arachnoid bleb intraop, covered with durgen, duraseal. No CSF leak.     NGTD on fluid aspirate, though imaging findings could be superimposed infection would be robust so soon after surgery. We will plan to repeat an MRI next week. Defer abx plan to ID. Patient requiring supplemental oxygen, diuresis, etc - not yet medically ready for discharge. Will decrease sedating meds to see if this helps with his continued fatigue.     Plan, exam, imaging discussed today with Dr. Samuel         PLAN:   1. Increase activity with PT/OT patient to be in bedside chair for all meals   2. Tizanidine made PRN, scheduled tylenol, resume gabapentin   3. Appreciate ID involvement  - antibiotics management   4. Continue to hold Xarelto- okay for lovenox subcutaneous until discharge, likely okay to resume xarelto at that time  5. Carpenter replaced for retention, flomax started, consider voiding trial after patient adequately diuresed.   6. A1C 7.5 - maintain adequate BG control to aide wound healing   7. bowel regimen- off supp daily      HPI: Presented with back pain, right leg pain. Weakness bilateral LE. Worse with walking. No bowel or bladder issues. Diabetes, CABG, AFIB, Xarelto. MRI with listhesis L4-5, spinal stenosis. Stenosis also at L5-S1 with large disc herniation L5-S1 on the left.     Subjective: Continued fatigue feeling, on oxygen, uncomfortable laying in bed but pain not as big of a complaint as visits prior with me. No nausea/vomiting today.      Physical Exam  BP (!) 172/79 (BP Location: Left arm)   Pulse 60   Temp 97.3  F (36.3  C) (Oral)   Resp 20   Ht 1.778 m (5' 10\")   Wt 104.5 kg (230 lb 4.8 oz)   SpO2 99%   BMI 33.04 kg/m  "     General: oriented. KIM. Speech clear. Laying in bed.     Motor: normal bulk and tone     Strength: Full strength LE bilaterally     Sensation: intact to light touch; baseline neuropathy bilaterally     Incision: Dressing changed personally. No incisional drainage, no redness or obvious swelling     Labs: reviewed. No growth on blood cultures      Imaging: MRI reviewed personally reviewed as has Dr Samuel   noted fluid collection infection vs postoperative   Combination of postoperative changes, as well as additional findings suggestive of possible superimposed infection with a rim-enhancing ventral epidural collection extending from the L2 level cranially to the L5-S1 level caudally (series 9, image 15), contributing to multilevel high-grade spinal canal stenosis.     Melisa Lara CNP  [facility]  O: [phone]  P: [phone]

## 2022-06-24 NOTE — PLAN OF CARE
Problem: Risk for Delirium  Goal: Improved Sleep  Outcome: Ongoing, Progressing     Problem: Pain (Spinal Surgery)  Goal: Acceptable Pain Control  Outcome: Ongoing, Progressing     Problem: Respiratory Compromise (Spinal Surgery)  Goal: Effective Oxygenation and Ventilation  Intervention: Optimize Oxygenation and Ventilation  Recent Flowsheet Documentation  Taken 6/24/2022 0100 by Katherine Amin, RN  Head of Bed (HOB) Positioning: HOB at 20-30 degrees   Goal Outcome Evaluation:  Pt slept well throughout the night, awake only with cares.   Pt complained of back pain at start of shift, prn oxycodone given X2 this shift for pain 7/10. Pt cont to be on 02 at 3LPM and sats are in mid to high 90's.

## 2022-06-24 NOTE — PROGRESS NOTES
"      RENAL PROGRESS NOTE     CC: Acute kidney injury.    ROS: No new issues present.   Resting in bed.  No respiratory distress.  No new issues or complaints      Assessment and Plan:     1. Acute kidney injury - Acute Tubular Necrosis - Resolved. Secondary to intraoperative hypotension with contribution from required administration of phenylephrine drip during surgery. Patient has a baseline creatinine 1.11-1.16, creatinine went up to 2.0 on 6/20.    Renal function back to normal.     Creatinine 2.0 (6/20)-->-->1.44 (6/22)--> 1.1 (6/23)-->0.88 (6/24)    Renal ultrasound (6/20)- No hydronephrosis, normal renal size bilaterally     OK for diuretics to treat edema.   2. S/p lumbar laminectomy (6/16/2022) patient seems to be doing well, continue management as per neurosurgery service.    On zosyn and vancomycin.    Vancomycin level 19.9 (6/22), continue to closely monitor.   3. Hypertension.  Adequate control at present.  4. Diabetes mellitus type 2.  We will check urinalysis and assess for urine protein excretion.  Continue diabetes management as per primary service.     Discussed with Dr. Smith    We sign off.  Call with questions      Wilfred Aranda MD  Nephrology    PHYSICAL EXAM  BP (!) 172/79 (BP Location: Left arm)   Pulse 60   Temp 97.3  F (36.3  C) (Oral)   Resp 20   Ht 1.778 m (5' 10\")   Wt 104.5 kg (230 lb 4.8 oz)   SpO2 99%   BMI 33.04 kg/m          Intake/Output Summary (Last 24 hours) at 6/22/2022 1352  Last data filed at 6/22/2022 0638  Gross per 24 hour   Intake 3701 ml   Output 1825 ml   Net 1876 ml     Resp: 20    Wt Readings from Last 3 Encounters:   06/22/22 104.5 kg (230 lb 4.8 oz)   06/03/22 95.2 kg (209 lb 12.8 oz)   01/05/22 93.4 kg (206 lb)       GENERAL: Chronically ill and debilitated.  HEAD: Normal  HEENT: Equal pupils. Normal hearing. No eyelid edema.  CARDIOVASCULAR: No JVD present.  2+ peripheral edema  PULMONARY: No respiratory distress. No cyanosis  GASTROINTESTINAL: No ascites " present  MSK: Diffuse muscle wasting, no joint deformities  : Carpenter catheter in place, clear urine in bag.  NEURO: Alert, no gross focal findings  PSYCHIATRIC: Adequate mood and interaction  SKIN: Pale, no jaundice, no rash.    LABORATORIES  Recent Labs   Lab 06/23/22  1434 06/23/22  0506 06/22/22  0659   WBC 8.2 8.1 8.3   HGB 8.7* 7.8* 8.1*   HCT 27.9* 24.8* 25.6*    217 188     Recent Labs   Lab 06/24/22  0654 06/23/22  0506 06/22/22  0659    138  138 133*   CO2 20* 22  22 21*   BUN 23 41*  41* 60*     Recent Labs   Lab 06/23/22  0506   INR 1.35*     No results for input(s): ABORH in the last 168 hours.  Invalid input(s): MG    RADIOLOGY REPORTS    ECHOCARDIOGRAM    MEDICATIONS    acetaminophen  975 mg Oral TID     bisacodyl  10 mg Rectal Daily     [Held by provider] bumetanide  3 mg Oral Daily     diclofenac  2 g Topical BID     enoxaparin ANTICOAGULANT  1 mg/kg Subcutaneous Q12H     ferrous sulfate  325 mg Oral TID w/meals     furosemide  40 mg Intravenous Q8H     gabapentin  600 mg Oral BID     [Held by provider] glimepiride  4 mg Oral Daily     insulin aspart  1-10 Units Subcutaneous TID AC     insulin glargine  20 Units Subcutaneous At Bedtime     ipratropium  2 spray Both Nostrils TID     levothyroxine  25 mcg Oral Daily     lidocaine  2 patch Transdermal Q24H     lidocaine   Topical BID     lidocaine   Transdermal Q8H DAX     [Held by provider] lisinopril  10 mg Oral Daily     [Held by provider] metFORMIN  1,000 mg Oral BID w/meals     multivitamin, therapeutic  1 tablet Oral Daily     pantoprazole  40 mg Oral BID AC     piperacillin-tazobactam  3.375 g Intravenous Q8H     polyethylene glycol  17 g Oral BID     rosuvastatin  10 mg Oral At Bedtime     senna-docusate  2 tablet Oral BID     tamsulosin  0.4 mg Oral Daily     vancomycin  1,500 mg Intravenous Q24H     vitamin C  500 mg Oral TID     cholecalciferol  100 mcg Oral Daily     acetaminophen, sore throat lozenge, bisacodyl, glucose  **OR** dextrose **OR** glucagon, flumazenil, HOLD MEDICATION (one time), hydrALAZINE, HYDROmorphone **OR** HYDROmorphone, hydrOXYzine **OR** hydrOXYzine, loratadine, magnesium hydroxide, naloxone **OR** naloxone **OR** naloxone **OR** naloxone, naloxone **OR** naloxone **OR** naloxone **OR** naloxone, ondansetron **OR** ondansetron, oxyCODONE **OR** oxyCODONE, prochlorperazine **OR** prochlorperazine, tiZANidine      Above laboratories and medications were personally reviewed during evaluation today.    RADIOLOGY  EXAM: US RENAL COMPLETE  LOCATION: [facility]  DATE/TIME: 6/20/2022 4:12 PM     INDICATION: YOLANDA  COMPARISON: Abdominal ultrasound dated 10/11/2013.  TECHNIQUE: Routine Bilateral Renal and Bladder Ultrasound.     FINDINGS:     RIGHT KIDNEY: 10.9 x 6.5 x 5.7 cm. Renal cortical thickness is 1.5 cm Normal without hydronephrosis or masses.      LEFT KIDNEY: 11.6 x 6.5 x 5.1 cm. Renal cortex thickness is 1.8 cm. Normal without hydronephrosis or masses.      BLADDER: Nondistended                                                                      IMPRESSION:  1.  Normal kidney ultrasound.

## 2022-06-24 NOTE — PLAN OF CARE
Problem: Risk for Delirium  Goal: Improved Attention and Thought Clarity  Outcome: Ongoing, Progressing     Problem: Pain (Spinal Surgery)  Goal: Acceptable Pain Control  Outcome: Ongoing, Progressing  Intervention: Prevent or Manage Pain  Recent Flowsheet Documentation  Taken 6/23/2022 1651 by Vania Patel, RN  Pain Management Interventions: medication offered but refused   Goal Outcome Evaluation:             Problem: Diabetes Comorbidity  Goal: Blood Glucose Level Within Targeted Range  6/23/2022 2100 by Vania Patel, RN  Outcome: Ongoing, Progressing  6/23/2022 2100 by Vania Patel RN  Outcome: Ongoing, Progressing        Pt. had yellow emesis x2 this shift at 2000, given ondansetron x1 which was effective. Pt. stated he didn't think he ate much today.   C/O pain in back this shift, given Oxycodone at 1954, which was effective.   Carpenter in place, draining radha colored urine   Pt. was restless tonight, repositioned multiple times which finally was effective.   Placed on 2L of oxygen via NC  Dressing to back incision has some dried drainage.

## 2022-06-24 NOTE — PROGRESS NOTES
Care Management Follow Up    Length of Stay (days): 8    Expected Discharge Date: 06/25/2022     Concerns to be Addressed: discharge planning     Patient plan of care discussed at interdisciplinary rounds: Yes    Anticipated Discharge Disposition: Transitional Care     Anticipated Discharge Services: Other (see comment) (therapy services)  Anticipated Discharge DME: None    Patient/family educated on Medicare website which has current facility and service quality ratings: yes  Education Provided on the Discharge Plan:    Patient/Family in Agreement with the Plan: yes    Referrals Placed by CM/SW: Post Acute Facilities  Private pay costs discussed: Not applicable    Additional Information:  Pt accepted to [facility]; Pt's wife was also accepted to this TCU and discharged today w/their son Abelardo ([phone])  transporting.  Abelardo agreeable to transporting Pt when Pt is medically ready to discharge.  Per MD, Pt not ready, may need another day or two.      ELBA spoke with Uma, coordinator for [facility] ([phone]) and Pt's bed will be held throughout weekend.  Pt's immunizations were faxed to Uma (for Covid vax status) at [phone] and emailed via secure email to Uma at [email or web address] as Uma stated she did not receive fax.  Uma will need discharge orders for Pt prior to 3:30 p.m. due to the facility's pharmacy order; they can admit Pt up to 5:30 p.m.  Facility is about 90 minutes from [facility].    ELBA updated Pt in room that he was accepted to TCU and wife was discharging to facility today.  Pt verbalized understanding and expressed happiness he and wife can be at same facility.      ELBA following for progression and discharge planning.      Jonathan Mcelroy, ALEXISSW

## 2022-06-24 NOTE — PROGRESS NOTES
[facility] Hospitalist Progress Note  [facility]  Summary:    77M with -year-old male with CAD, HFmEF, DM2, HTN, atrial fibrillation, hypothyroidism presented for planned neurosurgical intervention to treat herniated lumbar disc. 06/16 underwent decompressive lumbar laminectomy.  06/19 with post-op fever and started on vanco and zosyn.    06/22 MRI with fluid collection with possible rim enhancement which was aspirated.  Cultures negative thus far.  Also with YOLANDA this admission.         Assessment/Plan    #Acute on chronic HFmEF; CAD s/p CABG - volume up.  Stop IVF and start lasix 40mg IV q8h.    #YOLANDA - likely hemodynamic demetrius-operatively.  Resolved.     #post-op fever.  Post-op fluid collection - culture no growth while on abx.  Plan for augmentin and doxy x 1 week and then re-image    #Lumbar spinal stenosis with lumbar disc herniation  -Patient underwent scheduled decompressive lumbar laminectomy of L4-L5, L5-S1 with bilateral foraminotomies and removal of herniated disc in the L1-S1 region with neurosurgery on 6/16/2022.  -Scheduled Tylenol 3 times daily  -Gabapentin 1200 mg p.o. twice daily  -Post op management per neurosurgery     #post op urinary retention : started flomax, s/p segura, outpatient urology f/u     #constipation - bowel regimen.       #Chronic atrial fibrillation, sick sinus syndrome  -Ventricular pacemaker in place  -on lovenox 90mgBID.   Resume xarelto when ok from sx standpoint.     #DM2 with recurrent AM hypoglycemia  -stop glimiperide, hold metformin for now.    -reduce lantus to 20units, sliding scale insulin    -Nasal congestion, postnasal drip  -Exacerbated in the setting of needing to lie flat for 24 hours  -Start ipratropium nasal spray twice daily     -Chronic left shoulder pain  -Start lidocaine and Voltaren trading on and off 4 times daily     -GERD  -Pantoprazole 40 mg p.o. daily     -CORAZON   -CPAP     -Hypothyroidism  -TSH 2.05  -Levothyroxine 25 mcg p.o.  daily     -Normocytic anemia  -Hemoglobin 11.4 with MCV of 90          Clinically Significant Risk Factors Present on Admission                          Checklist:  Code Status: Full Code    Diet: Moderate Consistent Carb (60 g CHO per Meal) Diet  Snacks/Supplements Adult: Glucerna; With Meals  Snacks/Supplements Adult: Gelatein sugar-free; Between Meals    Carpenter Catheter: PRESENT, indication: Retention, Other (Comment) (Removed)  Central Lines: None  DVT px:  As above           Expected Discharge Date: 06/27/2022    Discharge Delays: Placement - TCU  Destination: other (comment) (TCU)  Discharge Comments: Plan for tx of infection  [facility]CU; will hold bed through weekend.  Unable to admit over weekend.       Expected discharge: Monday hopefully    Overnight Events/Subjective/Notable results:  Feeling puffy.  Chronic XAVIER. Feels weak    4 point ROS otherwise negative    Objective    Vital signs in last 24 hours  Temp:  [97.3  F (36.3  C)-99.5  F (37.5  C)] 97.9  F (36.6  C)  Pulse:  [60-75] 61  Resp:  [18-22] 18  BP: (151-172)/(73-79) 163/76  SpO2:  [90 %-99 %] 91 % O2 Device: None (Room air)    Weight:   230 lbs 4.8 oz    Intake/Output last 3 shifts  I/O last 3 completed shifts:  In: 2721 [P.O.:480; I.V.:2241]  Out: 1450 [Urine:1450]  Body mass index is 33.04 kg/m .    Physical Exam  General:  Alert, cooperative, no distress,  Appears stated age  Neurologic:  oriented, facialsymmetry preserved, fluent speech.  Generalized weakness  Psych: calm, mood and affect appropriate to situation  HEENT:  Anicteric, MMM, unremarkable dentition, nasal cannula  CV: RRR no MRG, normal S1 and S2, ++ LE edema  Lungs: CTAB.  Easyrespirations  Abd: soft, NT, normoactive BS  Skin: no rashes or jaundice noted on exposed skin.    Central Lines and Tubes: Carpenter  I have reviewed all labs, medications, imaging studies in the last 24 hours.  Pertinent findings&changes discussed above.    Data     Discussed with: Boni Kate  MD Sarah  Internal Medicine Hospitalist

## 2022-06-24 NOTE — PLAN OF CARE
Problem: Fluid and Electrolyte Imbalance (Spinal Surgery)  Goal: Fluid and Electrolyte Balance  Outcome: Ongoing, Progressing     Problem: Diabetes Comorbidity  Goal: Blood Glucose Level Within Targeted Range  Outcome: Ongoing, Progressing   Goal Outcome Evaluation:             IV fluids discontinued today and IV antibiotics changed to oral antibiotics.  Pt initially via lab had BG of 46 this morning.  Received D50 x 1 this shift and BG has been ok.  Refused insulin at noon BG of 146.  MD decreased Lantus again today and put oral meds on hold for Diabetes.  MD ordered Dulco Supp. Daily and pt refused.  Nurse offered prune juice and/or MOM and pt also refused.  Pt is on Miralax and demetrius-colace scheduled already.

## 2022-06-24 NOTE — PLAN OF CARE
Problem: Oral Intake Inadequate  Goal: Improved Oral Intake  Outcome: Ongoing, Not Progressing     Problem: Diabetes Comorbidity  Goal: Blood Glucose Level Within Targeted Range  Outcome: Ongoing, Not Progressing     Problem: Fluid and Electrolyte Imbalance (Spinal Surgery)  Goal: Fluid and Electrolyte Balance  Outcome: Ongoing, Not Progressing   Goal Outcome Evaluation:

## 2022-06-25 ENCOUNTER — APPOINTMENT (OUTPATIENT)
Dept: OCCUPATIONAL THERAPY | Facility: HOSPITAL | Age: 77
DRG: 518 | End: 2022-06-25
Attending: SURGERY
Payer: MEDICARE

## 2022-06-25 LAB
ANION GAP SERPL CALCULATED.3IONS-SCNC: 9 MMOL/L (ref 5–18)
BACTERIA BLD CULT: NO GROWTH
BACTERIA BLD CULT: NO GROWTH
BASOPHILS # BLD AUTO: 0 10E3/UL (ref 0–0.2)
BASOPHILS NFR BLD AUTO: 0 %
BUN SERPL-MCNC: 23 MG/DL (ref 8–28)
C REACTIVE PROTEIN LHE: 4.8 MG/DL (ref 0–?)
CALCIUM SERPL-MCNC: 8.4 MG/DL (ref 8.5–10.5)
CHLORIDE BLD-SCNC: 106 MMOL/L (ref 98–107)
CO2 SERPL-SCNC: 23 MMOL/L (ref 22–31)
CREAT SERPL-MCNC: 1.05 MG/DL (ref 0.7–1.3)
CREAT SERPL-MCNC: 1.18 MG/DL (ref 0.7–1.3)
EOSINOPHIL # BLD AUTO: 0.2 10E3/UL (ref 0–0.7)
EOSINOPHIL NFR BLD AUTO: 3 %
ERYTHROCYTE [DISTWIDTH] IN BLOOD BY AUTOMATED COUNT: 15.8 % (ref 10–15)
ERYTHROCYTE [SEDIMENTATION RATE] IN BLOOD BY WESTERGREN METHOD: 89 MM/HR (ref 0–15)
GFR SERPL CREATININE-BSD FRML MDRD: 64 ML/MIN/1.73M2
GFR SERPL CREATININE-BSD FRML MDRD: 74 ML/MIN/1.73M2
GLUCOSE BLD-MCNC: 76 MG/DL (ref 70–125)
GLUCOSE BLDC GLUCOMTR-MCNC: 152 MG/DL (ref 70–99)
GLUCOSE BLDC GLUCOMTR-MCNC: 160 MG/DL (ref 70–99)
GLUCOSE BLDC GLUCOMTR-MCNC: 181 MG/DL (ref 70–99)
GLUCOSE BLDC GLUCOMTR-MCNC: 196 MG/DL (ref 70–99)
GLUCOSE BLDC GLUCOMTR-MCNC: 50 MG/DL (ref 70–99)
GLUCOSE BLDC GLUCOMTR-MCNC: 68 MG/DL (ref 70–99)
GLUCOSE BLDC GLUCOMTR-MCNC: 88 MG/DL (ref 70–99)
HCT VFR BLD AUTO: 29.6 % (ref 40–53)
HGB BLD-MCNC: 8.9 G/DL (ref 13.3–17.7)
HOLD SPECIMEN: NORMAL
IMM GRANULOCYTES # BLD: 0.1 10E3/UL
IMM GRANULOCYTES NFR BLD: 1 %
LYMPHOCYTES # BLD AUTO: 1.9 10E3/UL (ref 0.8–5.3)
LYMPHOCYTES NFR BLD AUTO: 25 %
MCH RBC QN AUTO: 28.5 PG (ref 26.5–33)
MCHC RBC AUTO-ENTMCNC: 30.1 G/DL (ref 31.5–36.5)
MCV RBC AUTO: 95 FL (ref 78–100)
MONOCYTES # BLD AUTO: 1.2 10E3/UL (ref 0–1.3)
MONOCYTES NFR BLD AUTO: 16 %
NEUTROPHILS # BLD AUTO: 4.3 10E3/UL (ref 1.6–8.3)
NEUTROPHILS NFR BLD AUTO: 55 %
NRBC # BLD AUTO: 0 10E3/UL
NRBC BLD AUTO-RTO: 0 /100
PLATELET # BLD AUTO: 266 10E3/UL (ref 150–450)
POTASSIUM BLD-SCNC: 3.6 MMOL/L (ref 3.5–5)
RBC # BLD AUTO: 3.12 10E6/UL (ref 4.4–5.9)
SODIUM SERPL-SCNC: 138 MMOL/L (ref 136–145)
WBC # BLD AUTO: 7.6 10E3/UL (ref 4–11)

## 2022-06-25 PROCEDURE — 85652 RBC SED RATE AUTOMATED: CPT | Performed by: NURSE PRACTITIONER

## 2022-06-25 PROCEDURE — 82565 ASSAY OF CREATININE: CPT | Performed by: SURGERY

## 2022-06-25 PROCEDURE — 0S923ZX DRAINAGE OF LUMBAR VERTEBRAL DISC, PERCUTANEOUS APPROACH, DIAGNOSTIC: ICD-10-PCS | Performed by: RADIOLOGY

## 2022-06-25 PROCEDURE — 250N000013 HC RX MED GY IP 250 OP 250 PS 637: Performed by: INTERNAL MEDICINE

## 2022-06-25 PROCEDURE — 86140 C-REACTIVE PROTEIN: CPT | Performed by: NURSE PRACTITIONER

## 2022-06-25 PROCEDURE — 250N000013 HC RX MED GY IP 250 OP 250 PS 637: Performed by: NURSE PRACTITIONER

## 2022-06-25 PROCEDURE — 99233 SBSQ HOSP IP/OBS HIGH 50: CPT | Performed by: HOSPITALIST

## 2022-06-25 PROCEDURE — 85025 COMPLETE CBC W/AUTO DIFF WBC: CPT | Performed by: NURSE PRACTITIONER

## 2022-06-25 PROCEDURE — 250N000011 HC RX IP 250 OP 636: Performed by: NURSE PRACTITIONER

## 2022-06-25 PROCEDURE — 82310 ASSAY OF CALCIUM: CPT | Performed by: HOSPITALIST

## 2022-06-25 PROCEDURE — 250N000011 HC RX IP 250 OP 636: Performed by: HOSPITALIST

## 2022-06-25 PROCEDURE — 97535 SELF CARE MNGMENT TRAINING: CPT | Mod: GO

## 2022-06-25 PROCEDURE — 36415 COLL VENOUS BLD VENIPUNCTURE: CPT | Performed by: SURGERY

## 2022-06-25 PROCEDURE — 120N000001 HC R&B MED SURG/OB

## 2022-06-25 PROCEDURE — 250N000013 HC RX MED GY IP 250 OP 250 PS 637: Performed by: FAMILY MEDICINE

## 2022-06-25 PROCEDURE — 250N000013 HC RX MED GY IP 250 OP 250 PS 637: Performed by: SURGERY

## 2022-06-25 RX ADMIN — GABAPENTIN 600 MG: 300 CAPSULE ORAL at 20:58

## 2022-06-25 RX ADMIN — SENNOSIDES AND DOCUSATE SODIUM 2 TABLET: 8.6; 5 TABLET ORAL at 09:06

## 2022-06-25 RX ADMIN — ENOXAPARIN SODIUM 90 MG: 100 INJECTION SUBCUTANEOUS at 20:34

## 2022-06-25 RX ADMIN — GABAPENTIN 600 MG: 300 CAPSULE ORAL at 09:06

## 2022-06-25 RX ADMIN — DICLOFENAC SODIUM 2 G: 10 GEL TOPICAL at 09:06

## 2022-06-25 RX ADMIN — FUROSEMIDE 40 MG: 10 INJECTION, SOLUTION INTRAMUSCULAR; INTRAVENOUS at 03:55

## 2022-06-25 RX ADMIN — POLYETHYLENE GLYCOL 3350 17 G: 17 POWDER, FOR SOLUTION ORAL at 09:04

## 2022-06-25 RX ADMIN — AMOXICILLIN AND CLAVULANATE POTASSIUM 1 TABLET: 875; 125 TABLET, FILM COATED ORAL at 09:06

## 2022-06-25 RX ADMIN — ENOXAPARIN SODIUM 90 MG: 100 INJECTION SUBCUTANEOUS at 09:07

## 2022-06-25 RX ADMIN — FERROUS SULFATE TAB 325 MG (65 MG ELEMENTAL FE) 325 MG: 325 (65 FE) TAB at 11:47

## 2022-06-25 RX ADMIN — ACETAMINOPHEN 975 MG: 325 TABLET, FILM COATED ORAL at 09:06

## 2022-06-25 RX ADMIN — LIDOCAINE: 50 OINTMENT TOPICAL at 20:51

## 2022-06-25 RX ADMIN — DOXYCYCLINE 100 MG: 100 CAPSULE ORAL at 20:58

## 2022-06-25 RX ADMIN — FUROSEMIDE 40 MG: 10 INJECTION, SOLUTION INTRAMUSCULAR; INTRAVENOUS at 20:46

## 2022-06-25 RX ADMIN — HYDRALAZINE HYDROCHLORIDE 25 MG: 25 TABLET, FILM COATED ORAL at 23:53

## 2022-06-25 RX ADMIN — ACETAMINOPHEN 975 MG: 325 TABLET, FILM COATED ORAL at 20:57

## 2022-06-25 RX ADMIN — FERROUS SULFATE TAB 325 MG (65 MG ELEMENTAL FE) 325 MG: 325 (65 FE) TAB at 09:06

## 2022-06-25 RX ADMIN — LIDOCAINE: 50 OINTMENT TOPICAL at 14:29

## 2022-06-25 RX ADMIN — DICLOFENAC SODIUM 2 G: 10 GEL TOPICAL at 16:28

## 2022-06-25 RX ADMIN — SENNOSIDES AND DOCUSATE SODIUM 2 TABLET: 8.6; 5 TABLET ORAL at 20:57

## 2022-06-25 RX ADMIN — LIDOCAINE 2 PATCH: 246 PATCH TOPICAL at 09:07

## 2022-06-25 RX ADMIN — OXYCODONE HYDROCHLORIDE 5 MG: 5 TABLET ORAL at 06:08

## 2022-06-25 RX ADMIN — AMOXICILLIN AND CLAVULANATE POTASSIUM 1 TABLET: 875; 125 TABLET, FILM COATED ORAL at 20:57

## 2022-06-25 RX ADMIN — DOXYCYCLINE 100 MG: 100 CAPSULE ORAL at 09:05

## 2022-06-25 RX ADMIN — Medication 500 MG: at 09:05

## 2022-06-25 RX ADMIN — LEVOTHYROXINE SODIUM 25 MCG: 0.03 TABLET ORAL at 05:55

## 2022-06-25 RX ADMIN — POLYETHYLENE GLYCOL 3350 17 G: 17 POWDER, FOR SOLUTION ORAL at 20:54

## 2022-06-25 RX ADMIN — PANTOPRAZOLE SODIUM 40 MG: 40 TABLET, DELAYED RELEASE ORAL at 05:58

## 2022-06-25 RX ADMIN — DEXAMETHASONE 3 MG: 2 TABLET ORAL at 11:47

## 2022-06-25 RX ADMIN — DEXAMETHASONE 3 MG: 2 TABLET ORAL at 20:56

## 2022-06-25 RX ADMIN — ACETAMINOPHEN 975 MG: 325 TABLET, FILM COATED ORAL at 14:28

## 2022-06-25 RX ADMIN — FUROSEMIDE 40 MG: 10 INJECTION, SOLUTION INTRAMUSCULAR; INTRAVENOUS at 11:46

## 2022-06-25 RX ADMIN — FERROUS SULFATE TAB 325 MG (65 MG ELEMENTAL FE) 325 MG: 325 (65 FE) TAB at 16:27

## 2022-06-25 RX ADMIN — TAMSULOSIN HYDROCHLORIDE 0.4 MG: 0.4 CAPSULE ORAL at 09:06

## 2022-06-25 RX ADMIN — PANTOPRAZOLE SODIUM 40 MG: 40 TABLET, DELAYED RELEASE ORAL at 16:27

## 2022-06-25 RX ADMIN — HYDRALAZINE HYDROCHLORIDE 25 MG: 25 TABLET, FILM COATED ORAL at 16:27

## 2022-06-25 RX ADMIN — ROSUVASTATIN CALCIUM 10 MG: 10 TABLET, FILM COATED ORAL at 20:57

## 2022-06-25 RX ADMIN — Medication 500 MG: at 14:29

## 2022-06-25 RX ADMIN — Medication 100 MCG: at 09:05

## 2022-06-25 RX ADMIN — THERA TABS 1 TABLET: TAB at 09:06

## 2022-06-25 RX ADMIN — Medication 500 MG: at 20:57

## 2022-06-25 ASSESSMENT — ACTIVITIES OF DAILY LIVING (ADL)
ADLS_ACUITY_SCORE: 30

## 2022-06-25 NOTE — PROGRESS NOTES
Set patient's home CPAP machine up at bedside. Patient is on RA and stated that he can place on himself.    Gloria Nj, RRT

## 2022-06-25 NOTE — PROVIDER NOTIFICATION
Talked with house MD about use of long acting insulin over night and lower intake for dinner. After observing trends from previous days MD states to lower dose to 10 Units at bedtime tonight. ONE TIME ONLY. Skylar White RN on 6/24/2022 at 10:53 PM

## 2022-06-25 NOTE — PLAN OF CARE
"  Problem: Bowel Motility Impaired (Spinal Surgery)  Goal: Effective Bowel Elimination  Outcome: Ongoing, Not Progressing     Problem: Postoperative Urinary Retention (Spinal Surgery)  Goal: Effective Urinary Elimination  Outcome: Ongoing, Not Progressing     Problem: Respiratory Compromise (Spinal Surgery)  Goal: Effective Oxygenation and Ventilation  Outcome: Ongoing, Not Progressing  Intervention: Optimize Oxygenation and Ventilation  Recent Flowsheet Documentation  Taken 6/25/2022 0900 by Shama Marcano RN  Head of Bed (HOB) Positioning: HOB at 20-30 degrees   Goal Outcome Evaluation:          Problem: Bleeding (Spinal Surgery)  Goal: Absence of Bleeding  Outcome: Ongoing, Progressing       Pt had moderate serous sang drainage on old dressing this morning, new dressing applied.  Remains on oxygen at 3l/c and O2 sat 94%.  Had urinary retention and has segura cath.  Neuro spine starting oral Dexamethasone today.  Pt has not had BM x 2 days.  Refused Dulco supp, stated, \"I am passing gas\".  Received Miralax and demetrius-colace.  Also offered MOM and/or prune juice, which pt also declined.              "

## 2022-06-25 NOTE — PROGRESS NOTES
[facility] Hospitalist Progress Note  [facility]  Summary:    77M with -year-old male with CAD, HFmEF, DM2, HTN, atrial fibrillation, hypothyroidism presented for planned neurosurgical intervention to treat herniated lumbar disc. 06/16 underwent decompressive lumbar laminectomy.  06/19 with post-op fever and started on vanco and zosyn.    06/22 MRI with fluid collection with possible rim enhancement which was aspirated.  Cultures negative thus far.  Also with YOLANDA this admission.         Assessment/Plan    #Acute on chronic HFmEF; CAD s/p CABG - volume up with R sided findings.  ECHO with -50%, severe TR, mild MR, mild pulm HTN.  -Stop IVF and start lasix 40mg IV q8h.  -cards f/u    #YOLANDA on CKD3 - baseline 1.1s.  Injury likely hemodynamic demetrius-operatively.  Resolved.     #post-op fever.  Post-op fluid collection - culture no growth while on abx.  Plan for augmentin and doxy x 1 week and then re-image    #Lumbar spinal stenosis with lumbar disc herniation  -Patient underwent scheduled decompressive lumbar laminectomy of L4-L5, L5-S1 with bilateral foraminotomies and removal of herniated disc in the L1-S1 region with neurosurgery on 6/16/2022.  -Scheduled Tylenol 3 times daily  -Gabapentin 1200 mg p.o. twice daily  -Post op management per neurosurgery     #post op urinary retention : started flomax.  Has segura.  voiding trial after diuresed vs. outpatient urology f/u     #constipation - bowel regimen.       #Chronic atrial fibrillation, sick sinus syndrome  -Ventricular pacemaker in place  -lovenox 90mgBID.   Resume xarelto when ok from sx standpoint.     #DM2 with recurrent AM hypoglycemia  -stop glimiperide (got dose 06/24), hold metformin  -reduce lantus to 10units, sliding scale insulin    -Nasal congestion, postnasal drip  -Exacerbated in the setting of needing to lie flat for 24 hours  -Start ipratropium nasal spray twice daily     -Chronic left shoulder pain  -Start lidocaine and Voltaren  trading on and off 4 times daily     -GERD  -Pantoprazole 40 mg p.o. daily     #CORAZON -CPAP     #Hypothyroidism  -TSH 2.05  -Levothyroxine 25 mcg p.o. daily     #Normocytic anemia  -Hemoglobin 11.4 with MCV of 90      Clinically Significant Risk Factors Present on Admission                          Checklist:  Code Status: Full Code    Diet: Moderate Consistent Carb (60 g CHO per Meal) Diet  Snacks/Supplements Adult: Glucerna; With Meals  Snacks/Supplements Adult: Gelatein sugar-free; Between Meals    Carpenter Catheter: PRESENT, indication: Retention, Other (Comment) (Removed)  Central Lines: None  DVT px:  As above           Expected Discharge Date: 06/27/2022    Discharge Delays: Placement - TCU  Destination: other (comment) (TCU)  Discharge Comments: Plan for tx of infection  [facility] TCU; will hold bed through weekend.  Unable to admit over weekend.       Expected discharge: Monday hopefully    Overnight Events/Subjective/Notable results:  AM hypoglycemia 50s.  Only got 10units lantus last night but did get amaryl before it was stopped yesterday  Feeling puffy.  Chronic XAVIER. Feels weak    4 point ROS otherwise negative    Objective    Vital signs in last 24 hours  Temp:  [97.8  F (36.6  C)-98.3  F (36.8  C)] 98.3  F (36.8  C)  Pulse:  [60-61] 61  Resp:  [18-20] 20  BP: (136-163)/(64-76) 141/71  SpO2:  [91 %-96 %] 94 % O2 Device: None (Room air)    Weight:   236 lbs 4.8 oz    Intake/Output last 3 shifts  I/O last 3 completed shifts:  In: 2477 [P.O.:236; I.V.:2241]  Out: 3400 [Urine:3400]  Body mass index is 33.91 kg/m .    Physical Exam  General:  Alert, cooperative, no distress,  Appears stated age  Neurologic:  oriented, facialsymmetry preserved, fluent speech.  Generalized weakness  Psych: calm, mood and affect appropriate to situation  HEENT:  Anicteric, MMM, unremarkable dentition, nasal cannula  CV: RRR no MRG, normal S1 and S2, ++ LE edema  Lungs: CTAB.  Easyrespirations  Abd: soft, NT, normoactive  BS  Skin: no rashes or jaundice noted on exposed skin.    Central Lines and Tubes: Carpenter  I have reviewed all labs, medications, imaging studies in the last 24 hours.  Pertinent findings&changes discussed above.    Data     Discussed with: Boni Smith MD  Internal Medicine Hospitalist

## 2022-06-25 NOTE — PROGRESS NOTES
Care Management Follow Up    Length of Stay (days): 9    Expected Discharge Date: 06/27/2022     Concerns to be Addressed: discharge planning     Patient plan of care discussed at interdisciplinary rounds: Yes    Anticipated Discharge Disposition: Transitional Care [facility]     Anticipated Discharge Services: Other (see comment) (therapy services)  Anticipated Discharge DME: None    Patient/family educated on Medicare website which has current facility and service quality ratings: yes  Education Provided on the Discharge Plan:    Patient/Family in Agreement with the Plan: yes    Referrals Placed by CM/SW: Post Acute Facilities  Private pay costs discussed: Not applicable    Additional Information:  Pt accepted to [facility] TCU and there is a bed hold for over the weekend as he was to discharge there on Friday but this was canceled per MD as pt was not medically ready.  Per neurosurgery pt will need MRI next week, and this can be done as an out patient. Paged neurosurgery to see if pt can have MRI in the AM on Monday before he goes to TCU will wait to hear back from them on this. As long as pt medically ready on Monday they can accept him at [facility] ([place] [phone]) and needs to be there before 1500. Pt son Abelardo is agreeable to transport pt he may need O2? CM to follow for medical progression recommendations and final discharge plan.      Michelle Márquez RN

## 2022-06-25 NOTE — PLAN OF CARE
Problem: Risk for Delirium  Goal: Optimal Coping  Outcome: Ongoing, Progressing  Goal: Improved Behavioral Control  Outcome: Ongoing, Progressing  Goal: Improved Attention and Thought Clarity  Outcome: Ongoing, Progressing  Goal: Improved Sleep  Outcome: Ongoing, Progressing     Problem: Bleeding (Spinal Surgery)  Goal: Absence of Bleeding  Outcome: Ongoing, Progressing     Problem: Bowel Motility Impaired (Spinal Surgery)  Goal: Effective Bowel Elimination  Outcome: Ongoing, Progressing     Problem: Fluid and Electrolyte Imbalance (Spinal Surgery)  Goal: Fluid and Electrolyte Balance  Outcome: Ongoing, Progressing     Problem: Functional Ability Impaired (Spinal Surgery)  Goal: Optimal Functional Ability  Outcome: Ongoing, Progressing  Intervention: Optimize Functional Status  Recent Flowsheet Documentation  Taken 6/24/2022 2000 by Skylar White RN  Activity Management: bedrest  Positioning/Transfer Devices:   pillows   in use     Problem: Infection (Spinal Surgery)  Goal: Absence of Infection Signs and Symptoms  Outcome: Ongoing, Progressing     Problem: Neurologic Impairment (Spinal Surgery)  Goal: Optimal Neurologic Function  Outcome: Ongoing, Progressing  Intervention: Optimize Neurologic Function  Recent Flowsheet Documentation  Taken 6/24/2022 2000 by Skylar White RN  Body Position: position maintained     Problem: Ongoing Anesthesia Effects (Spinal Surgery)  Goal: Anesthesia/Sedation Recovery  Outcome: Ongoing, Progressing  Intervention: Optimize Anesthesia Recovery  Recent Flowsheet Documentation  Taken 6/24/2022 2000 by Skylar White RN  Safety Promotion/Fall Prevention:   bed alarm on   patient and family education     Problem: Pain (Spinal Surgery)  Goal: Acceptable Pain Control  Outcome: Ongoing, Progressing  Intervention: Prevent or Manage Pain  Recent Flowsheet Documentation  Taken 6/24/2022 2000 by Skylar White, RN  Pain Management Interventions:   medication (see MAR)   repositioned    "environmental changes   emotional support     Problem: Postoperative Nausea and Vomiting (Spinal Surgery)  Goal: Nausea and Vomiting Relief  Outcome: Ongoing, Progressing     Problem: Postoperative Urinary Retention (Spinal Surgery)  Goal: Effective Urinary Elimination  Outcome: Ongoing, Progressing     Problem: Respiratory Compromise (Spinal Surgery)  Goal: Effective Oxygenation and Ventilation  Outcome: Ongoing, Progressing  Intervention: Optimize Oxygenation and Ventilation  Recent Flowsheet Documentation  Taken 6/24/2022 2000 by Skylar White RN  Head of Bed (HOB) Positioning: HOB at 30-45 degrees     Problem: Plan of Care - These are the overarching goals to be used throughout the patient stay.    Goal: Plan of Care Review/Shift Note  Description: The Plan of Care Review/Shift note should be completed every shift.  The Outcome Evaluation is a brief statement about your assessment that the patient is improving, declining, or no change.  This information will be displayed automatically on your shift note.  Outcome: Ongoing, Progressing  Flowsheets (Taken 6/24/2022 2222)  Plan of Care Reviewed With: patient  Overall Patient Progress: no change  Goal: Patient-Specific Goal (Individualized)  Description: You can add care plan individualizations to a care plan. Examples of Individualization might be:  \"Parent requests to be called daily at 9am for status\", \"I have a hard time hearing out of my right ear\", or \"Do not touch me to wake me up as it startles me\".  Outcome: Ongoing, Progressing  Goal: Absence of Hospital-Acquired Illness or Injury  Outcome: Ongoing, Progressing  Intervention: Identify and Manage Fall Risk  Recent Flowsheet Documentation  Taken 6/24/2022 2000 by Skylar White RN  Safety Promotion/Fall Prevention:   bed alarm on   patient and family education  Intervention: Prevent Skin Injury  Recent Flowsheet Documentation  Taken 6/24/2022 2000 by Skylar White RN  Body Position: position " "maintained  Intervention: Prevent and Manage VTE (Venous Thromboembolism) Risk  Recent Flowsheet Documentation  Taken 6/24/2022 2000 by Skylar White RN  VTE Prevention/Management: compression stockings on  Activity Management: bedrest  Goal: Optimal Comfort and Wellbeing  Outcome: Ongoing, Progressing  Intervention: Monitor Pain and Promote Comfort  Recent Flowsheet Documentation  Taken 6/24/2022 2000 by Skylar White RN  Pain Management Interventions:   medication (see MAR)   repositioned   environmental changes   emotional support  Goal: Readiness for Transition of Care  Outcome: Ongoing, Progressing     Problem: Diabetes Comorbidity  Goal: Blood Glucose Level Within Targeted Range  Outcome: Ongoing, Progressing     Problem: Oral Intake Inadequate  Goal: Improved Oral Intake  Outcome: Ongoing, Progressing   Goal Outcome Evaluation:    Plan of Care Reviewed With: patient     Overall Patient Progress: no change  Patient alert and orientated X3. States being confused about what to expect from the physician and wants to have a bowel movement tomorrow as he feels \"full of bowel\". Refuses miralx and senna even after nurse educates on use to help with bowels. He will talk with MD tomorrow at rounds. States continued pain in back and can not get comfortable in bed. States ace wraps on legs help with swelling and pain and want to keep in place 24/7. Denies shortness of breath, continues to become short of breath when adjusting position in bed. Doing well on RA.       "

## 2022-06-25 NOTE — PLAN OF CARE
Problem: Postoperative Urinary Retention (Spinal Surgery)  Goal: Effective Urinary Elimination  Outcome: Ongoing, Progressing     Problem: Gas Exchange Impaired  Goal: Optimal Gas Exchange  Outcome: Ongoing, Progressing     Problem: Hyperglycemia  Goal: Blood Glucose Level Within Targeted Range  Outcome: Ongoing, Progressing   Goal Outcome Evaluation:                Pt is alert and oriented x 4,low BG of 50 at 2 am. Pt was asymptomatic. OJ and turkey sambuch given, recheck BG came up to 88,House officer notified. PRn oxycodone given for back pain 6/10 . Pt using CPAP,sats 96 %. Carpenter patent draining radha urine.slept most of the night.

## 2022-06-25 NOTE — PROGRESS NOTES
"Neurosurgery Progress Note  06/25/22      A/P: Thomas Steve is a 77 year old male POD #9 DECOMPRESSIVE LUMBAR LAMINECTOMY LUMBAR 4-LUMBAR 5 AND LUMBAR 5-SACRAL 1 BILATERAL-LEFT SIDE FIRST-PLUS BILATERAL FORAMINOTOMIES PLUS REMOVAL OF HERNIATED DISC LUMBAR 5-SACRAL 1 LEFT with Dr Samuel. Arachnoid bleb intraop, covered with durgen, duraseal. No CSF leak.     Continued weakness/fatigue, overall activity intolerance. Strength testing intact in bed but thomas tells me he does not feel like he can stand with therapy. Trial dexamethasone burst. TCU cannot accept patient over the weekend, will consider repeating MRI Monday prior to discharge pending progress.     PLAN:   1. Daily dressing changes  2. Tizanidine made PRN, scheduled tylenol, resume gabapentin   3. Appreciate ID involvement , plans for oral abx noted   4. Resume xarelto at discharge  5. Carpenter replaced for retention, flomax started - voiding trial this weekend?  6. Discussed bowel regimen today, encouraged meds   7. TCU potentially monday    HPI: Presented with back pain, right leg pain. Weakness bilateral LE. Worse with walking. No bowel or bladder issues. Diabetes, CABG, AFIB, Xarelto. MRI with listhesis L4-5, spinal stenosis. Stenosis also at L5-S1 with large disc herniation L5-S1 on the left.     Subjective: Tired, a little but off/fuzzy feeling - he wonders about low blood sugar. Feels he cannot stand at the bedside due to generalized fatigue and legs are feeling like they cannot hold him. Wife was discharged to TCU yesterday, he looks forward to being back with her.     Physical Exam  BP (!) 141/71 (BP Location: Right arm)   Pulse 61   Temp 98.3  F (36.8  C) (Oral)   Resp 20   Ht 1.778 m (5' 10\")   Wt 107.2 kg (236 lb 4.8 oz)   SpO2 94%   BMI 33.91 kg/m      General: oriented. KIM. Speech clear. Laying in bed.     Motor: normal bulk and tone     Strength: Full strength LE bilaterally in bed, sensation intact to touch. Baseline lower extremity " neuropathy.   Can feel tug on the segura.     Sensation: intact to light touch; baseline neuropathy bilaterally     Incision:     Labs: reviewed. No growth on blood cultures      Imaging: MRI reviewed personally reviewed   noted fluid collection infection vs postoperative   Combination of postoperative changes, as well as additional findings suggestive of possible superimposed infection with a rim-enhancing ventral epidural collection extending from the L2 level cranially to the L5-S1 level caudally (series 9, image 15), contributing to multilevel high-grade spinal canal stenosis.     Melisa Lara CNP  [facility] Neurosurgery  O: [phone]  P: [phone]

## 2022-06-26 ENCOUNTER — APPOINTMENT (OUTPATIENT)
Dept: PHYSICAL THERAPY | Facility: HOSPITAL | Age: 77
DRG: 518 | End: 2022-06-26
Attending: SURGERY
Payer: MEDICARE

## 2022-06-26 LAB
ANION GAP SERPL CALCULATED.3IONS-SCNC: 9 MMOL/L (ref 5–18)
BUN SERPL-MCNC: 28 MG/DL (ref 8–28)
CALCIUM SERPL-MCNC: 8.8 MG/DL (ref 8.5–10.5)
CHLORIDE BLD-SCNC: 103 MMOL/L (ref 98–107)
CO2 SERPL-SCNC: 24 MMOL/L (ref 22–31)
CREAT SERPL-MCNC: 1.26 MG/DL (ref 0.7–1.3)
CREAT SERPL-MCNC: 1.26 MG/DL (ref 0.7–1.3)
GFR SERPL CREATININE-BSD FRML MDRD: 59 ML/MIN/1.73M2
GFR SERPL CREATININE-BSD FRML MDRD: 59 ML/MIN/1.73M2
GLUCOSE BLD-MCNC: 292 MG/DL (ref 70–125)
GLUCOSE BLD-MCNC: 475 MG/DL (ref 70–125)
GLUCOSE BLDC GLUCOMTR-MCNC: 255 MG/DL (ref 70–99)
GLUCOSE BLDC GLUCOMTR-MCNC: 260 MG/DL (ref 70–99)
GLUCOSE BLDC GLUCOMTR-MCNC: 292 MG/DL (ref 70–99)
GLUCOSE BLDC GLUCOMTR-MCNC: 325 MG/DL (ref 70–99)
GLUCOSE BLDC GLUCOMTR-MCNC: 328 MG/DL (ref 70–99)
GLUCOSE BLDC GLUCOMTR-MCNC: 329 MG/DL (ref 70–99)
GLUCOSE BLDC GLUCOMTR-MCNC: 379 MG/DL (ref 70–99)
GLUCOSE BLDC GLUCOMTR-MCNC: 424 MG/DL (ref 70–99)
HOLD SPECIMEN: NORMAL
POTASSIUM BLD-SCNC: 4.1 MMOL/L (ref 3.5–5)
SODIUM SERPL-SCNC: 136 MMOL/L (ref 136–145)

## 2022-06-26 PROCEDURE — 120N000001 HC R&B MED SURG/OB

## 2022-06-26 PROCEDURE — 250N000011 HC RX IP 250 OP 636: Performed by: NURSE PRACTITIONER

## 2022-06-26 PROCEDURE — 97116 GAIT TRAINING THERAPY: CPT | Mod: GP

## 2022-06-26 PROCEDURE — 97110 THERAPEUTIC EXERCISES: CPT | Mod: GP

## 2022-06-26 PROCEDURE — 250N000011 HC RX IP 250 OP 636: Performed by: HOSPITALIST

## 2022-06-26 PROCEDURE — 250N000013 HC RX MED GY IP 250 OP 250 PS 637: Performed by: FAMILY MEDICINE

## 2022-06-26 PROCEDURE — 250N000013 HC RX MED GY IP 250 OP 250 PS 637: Performed by: SURGERY

## 2022-06-26 PROCEDURE — 250N000013 HC RX MED GY IP 250 OP 250 PS 637: Performed by: INTERNAL MEDICINE

## 2022-06-26 PROCEDURE — 82947 ASSAY GLUCOSE BLOOD QUANT: CPT | Performed by: HOSPITALIST

## 2022-06-26 PROCEDURE — 80048 BASIC METABOLIC PNL TOTAL CA: CPT | Performed by: HOSPITALIST

## 2022-06-26 PROCEDURE — 250N000013 HC RX MED GY IP 250 OP 250 PS 637: Performed by: NURSE PRACTITIONER

## 2022-06-26 PROCEDURE — 250N000013 HC RX MED GY IP 250 OP 250 PS 637: Performed by: HOSPITALIST

## 2022-06-26 PROCEDURE — 99233 SBSQ HOSP IP/OBS HIGH 50: CPT | Performed by: HOSPITALIST

## 2022-06-26 PROCEDURE — 36415 COLL VENOUS BLD VENIPUNCTURE: CPT | Performed by: HOSPITALIST

## 2022-06-26 PROCEDURE — 82565 ASSAY OF CREATININE: CPT | Performed by: SURGERY

## 2022-06-26 PROCEDURE — 36415 COLL VENOUS BLD VENIPUNCTURE: CPT | Performed by: SURGERY

## 2022-06-26 RX ORDER — FUROSEMIDE 10 MG/ML
40 INJECTION INTRAMUSCULAR; INTRAVENOUS
Status: DISCONTINUED | OUTPATIENT
Start: 2022-06-26 | End: 2022-06-28

## 2022-06-26 RX ORDER — FUROSEMIDE 10 MG/ML
40 INJECTION INTRAMUSCULAR; INTRAVENOUS 2 TIMES DAILY
Status: DISCONTINUED | OUTPATIENT
Start: 2022-06-26 | End: 2022-06-26

## 2022-06-26 RX ADMIN — TAMSULOSIN HYDROCHLORIDE 0.4 MG: 0.4 CAPSULE ORAL at 08:58

## 2022-06-26 RX ADMIN — FERROUS SULFATE TAB 325 MG (65 MG ELEMENTAL FE) 325 MG: 325 (65 FE) TAB at 12:18

## 2022-06-26 RX ADMIN — POLYETHYLENE GLYCOL 3350 17 G: 17 POWDER, FOR SOLUTION ORAL at 08:56

## 2022-06-26 RX ADMIN — DEXAMETHASONE 3 MG: 2 TABLET ORAL at 20:31

## 2022-06-26 RX ADMIN — GABAPENTIN 600 MG: 300 CAPSULE ORAL at 08:57

## 2022-06-26 RX ADMIN — AMOXICILLIN AND CLAVULANATE POTASSIUM 1 TABLET: 875; 125 TABLET, FILM COATED ORAL at 20:33

## 2022-06-26 RX ADMIN — ENOXAPARIN SODIUM 90 MG: 100 INJECTION SUBCUTANEOUS at 08:59

## 2022-06-26 RX ADMIN — DICLOFENAC SODIUM 2 G: 10 GEL TOPICAL at 09:08

## 2022-06-26 RX ADMIN — ROSUVASTATIN CALCIUM 10 MG: 10 TABLET, FILM COATED ORAL at 20:31

## 2022-06-26 RX ADMIN — POLYETHYLENE GLYCOL 3350 17 G: 17 POWDER, FOR SOLUTION ORAL at 20:26

## 2022-06-26 RX ADMIN — ACETAMINOPHEN 975 MG: 325 TABLET, FILM COATED ORAL at 08:58

## 2022-06-26 RX ADMIN — SENNOSIDES AND DOCUSATE SODIUM 2 TABLET: 8.6; 5 TABLET ORAL at 08:58

## 2022-06-26 RX ADMIN — PANTOPRAZOLE SODIUM 40 MG: 40 TABLET, DELAYED RELEASE ORAL at 16:04

## 2022-06-26 RX ADMIN — FERROUS SULFATE TAB 325 MG (65 MG ELEMENTAL FE) 325 MG: 325 (65 FE) TAB at 16:04

## 2022-06-26 RX ADMIN — DEXAMETHASONE 3 MG: 2 TABLET ORAL at 08:58

## 2022-06-26 RX ADMIN — ENOXAPARIN SODIUM 90 MG: 100 INJECTION SUBCUTANEOUS at 20:27

## 2022-06-26 RX ADMIN — DOXYCYCLINE 100 MG: 100 CAPSULE ORAL at 08:58

## 2022-06-26 RX ADMIN — ACETAMINOPHEN 975 MG: 325 TABLET, FILM COATED ORAL at 13:11

## 2022-06-26 RX ADMIN — Medication 500 MG: at 08:57

## 2022-06-26 RX ADMIN — METFORMIN HYDROCHLORIDE 500 MG: 500 TABLET, FILM COATED ORAL at 08:59

## 2022-06-26 RX ADMIN — ACETAMINOPHEN 975 MG: 325 TABLET, FILM COATED ORAL at 20:30

## 2022-06-26 RX ADMIN — OXYCODONE HYDROCHLORIDE 2.5 MG: 5 TABLET ORAL at 12:33

## 2022-06-26 RX ADMIN — LIDOCAINE: 50 OINTMENT TOPICAL at 20:26

## 2022-06-26 RX ADMIN — THERA TABS 1 TABLET: TAB at 08:58

## 2022-06-26 RX ADMIN — Medication 100 MCG: at 08:57

## 2022-06-26 RX ADMIN — DOXYCYCLINE 100 MG: 100 CAPSULE ORAL at 20:33

## 2022-06-26 RX ADMIN — METFORMIN HYDROCHLORIDE 500 MG: 500 TABLET, FILM COATED ORAL at 16:04

## 2022-06-26 RX ADMIN — PANTOPRAZOLE SODIUM 40 MG: 40 TABLET, DELAYED RELEASE ORAL at 06:44

## 2022-06-26 RX ADMIN — Medication 500 MG: at 20:30

## 2022-06-26 RX ADMIN — SENNOSIDES AND DOCUSATE SODIUM 2 TABLET: 8.6; 5 TABLET ORAL at 20:33

## 2022-06-26 RX ADMIN — Medication 500 MG: at 13:11

## 2022-06-26 RX ADMIN — FERROUS SULFATE TAB 325 MG (65 MG ELEMENTAL FE) 325 MG: 325 (65 FE) TAB at 08:58

## 2022-06-26 RX ADMIN — AMOXICILLIN AND CLAVULANATE POTASSIUM 1 TABLET: 875; 125 TABLET, FILM COATED ORAL at 08:58

## 2022-06-26 RX ADMIN — LIDOCAINE: 50 OINTMENT TOPICAL at 12:18

## 2022-06-26 RX ADMIN — HYDRALAZINE HYDROCHLORIDE 25 MG: 25 TABLET, FILM COATED ORAL at 07:21

## 2022-06-26 RX ADMIN — LIDOCAINE 2 PATCH: 246 PATCH TOPICAL at 08:56

## 2022-06-26 RX ADMIN — LEVOTHYROXINE SODIUM 25 MCG: 0.03 TABLET ORAL at 06:44

## 2022-06-26 RX ADMIN — FUROSEMIDE 40 MG: 10 INJECTION, SOLUTION INTRAMUSCULAR; INTRAVENOUS at 18:50

## 2022-06-26 RX ADMIN — FUROSEMIDE 40 MG: 10 INJECTION, SOLUTION INTRAMUSCULAR; INTRAVENOUS at 04:36

## 2022-06-26 RX ADMIN — GABAPENTIN 600 MG: 300 CAPSULE ORAL at 20:33

## 2022-06-26 ASSESSMENT — ACTIVITIES OF DAILY LIVING (ADL)
ADLS_ACUITY_SCORE: 30

## 2022-06-26 NOTE — PLAN OF CARE
Problem: Infection (Spinal Surgery)  Goal: Absence of Infection Signs and Symptoms  Outcome: Ongoing, Progressing   Afebrile.  Problem: Postoperative Nausea and Vomiting (Spinal Surgery)  Goal: Nausea and Vomiting Relief  Outcome: Ongoing, Progressing   Denies nausea or vomit.  Problem: Postoperative Urinary Retention (Spinal Surgery)  Goal: Effective Urinary Elimination  Outcome: Ongoing, Progressing   Goal Outcome Evaluation:

## 2022-06-26 NOTE — PROGRESS NOTES
"Neurosurgery Progress Note  06/26/22        A/P: Thomas Steve is a 77 year old male POD #10 DECOMPRESSIVE LUMBAR LAMINECTOMY LUMBAR 4-LUMBAR 5 AND LUMBAR 5-SACRAL 1 BILATERAL-LEFT SIDE FIRST-PLUS BILATERAL FORAMINOTOMIES PLUS REMOVAL OF HERNIATED DISC LUMBAR 5-SACRAL 1 LEFT with Dr Samuel. Arachnoid bleb intraop, covered with durgen, duraseal. No CSF leak.     Improvement in fatigue, lower extremity symptoms since addition of steroids yesterday. Will repeat an MRI prior to discharge tomorrow to ensure no worsening then likely two weeks after that. Continue steroids one more day given the improvement.       PLAN:   1. Daily dressing changes  2. Tizanidine made PRN, scheduled tylenol, resume gabapentin   3. Appreciate ID involvement , plans for oral abx noted   4. Resume xarelto at discharge  5. Voiding trial today  6. Dexamethasone x 3 days total  7. TCU potentially Monday      HPI: Presented with back pain, right leg pain. Weakness bilateral LE. Worse with walking. No bowel or bladder issues. Diabetes, CABG, AFIB, Xarelto. MRI with listhesis L4-5, spinal stenosis. Stenosis also at L5-S1 with large disc herniation L5-S1 on the left.     Subjective: More bright, alert. Tells me he ambulated in the hallway today which he had not done for the past few days. Back and legs are feeling a bit better. Felt more comfortable overnight and was able to get some sleep.     Physical Exam  BP (!) 174/76 (BP Location: Left arm)   Pulse 62   Temp 97.5  F (36.4  C) (Oral)   Resp 20   Ht 1.778 m (5' 10\")   Wt 107.2 kg (236 lb 4.8 oz)   SpO2 95%   BMI 33.91 kg/m      General: oriented. KIM. Speech clear. Laying in bed.     Motor: normal bulk and tone     Strength: Full strength LE bilaterally in bed, sensation intact to touch. Baseline lower extremity neuropathy.   Can feel tug on the segura.   Still with lower extremity edema, sacral edema     Sensation: intact to light touch; baseline neuropathy bilaterally     Incision: " visualized yesterday, clean/intact    Labs: reviewed. NGTD on aspiration cultures      Imaging: MRI reviewed personally reviewed   noted fluid collection infection vs postoperative   Combination of postoperative changes, as well as additional findings suggestive of possible superimposed infection with a rim-enhancing ventral epidural collection extending from the L2 level cranially to the L5-S1 level caudally (series 9, image 15), contributing to multilevel high-grade spinal canal stenosis.     Melisa Lara CNP  [facility] Neurosurgery  O: [phone]  P: [phone]

## 2022-06-26 NOTE — PLAN OF CARE
Problem: Bowel Motility Impaired (Spinal Surgery)  Goal: Effective Bowel Elimination  Outcome: Ongoing, Progressing     Problem: Pain (Spinal Surgery)  Goal: Acceptable Pain Control  Outcome: Ongoing, Progressing  Intervention: Prevent or Manage Pain  Recent Flowsheet Documentation  Taken 6/26/2022 0858 by Shama Maracno RN  Pain Management Interventions: repositioned     Problem: Postoperative Urinary Retention (Spinal Surgery)  Goal: Effective Urinary Elimination  Outcome: Ongoing, Progressing   Goal Outcome Evaluation:        Pt just had segura cath removed at 1430 for another void trial.  Pt's pain went from 5-6 to 2-3 after receiving Oxycodone 2.5mg prn.  Pt's BP elevated this morning and reduced after receiving Hydralazine IV.  Tet placed to Hospitalist asking about possible restarting Lisinopril on hold.  Pt's BG this morning 260 and pt refused insulin.  At 12:30pm Bg was 325 and was able to to talk pt into receiving sliding scale insulin.

## 2022-06-26 NOTE — PROGRESS NOTES
[facility] Hospitalist Progress Note  [facility]  Summary:    77M with -year-old male with CAD, HFmEF, DM2, HTN, atrial fibrillation, hypothyroidism presented for planned neurosurgical intervention to treat herniated lumbar disc. 06/16 underwent decompressive lumbar laminectomy.  06/19 with post-op fever and started on vanco and zosyn.    06/22 MRI with fluid collection with possible rim enhancement which was aspirated.  Cultures negative thus far.  Also with YOLANDA this admission and volume overload.         Assessment/Plan    #Acute on chronic HFmEF; CAD s/p CABG - volume up with R sided findings.  ECHO with -50%, severe TR, mild MR, mild pulm HTN.  -Stop IVF and start lasix 40mg IV q8h.  Transition to bumex in near future  -cards f/u as outpatient    #YOLANDA on CKD3 - baseline 1.1-1.2s.  Post-op injury likely hemodynamic demetrius-operatively.  Resolved.     #post-op fever.  Post-op fluid collection - culture no growth while on abx.  Plan for augmentin and doxy x 1 week and then re-image per Nsx.    #Lumbar spinal stenosis with lumbar disc herniation  -Patient underwent scheduled decompressive lumbar laminectomy of L4-L5, L5-S1 with bilateral foraminotomies and removal of herniated disc in the L1-S1 region with neurosurgery on 6/16/2022.  -Scheduled Tylenol 3 times daily  -Gabapentin 1200 mg p.o. twice daily  -Post op management per neurosurgery  -decadron burst per Nsx     #post op urinary retention : started flomax.  Has segura.  voiding trial after diuresed vs. outpatient urology f/u     #constipation - bowel regimen.       #Chronic atrial fibrillation, sick sinus syndrome s/p PPM  -lovenox 90mgBID.   Resume xarelto when ok from sx standpoint.     #DM2 with recurrent AM hypoglycemia - reports he is eating healthier here in hospital with much reduced insulin needs. BGs a bit higher now with steroids.  -stop glimiperide and would not resume  -lantus at reduced dose of 10units, resume metformin at 500BID,  sliding scale insulin    -Nasal congestion, postnasal drip  -Exacerbated in the setting of needing to lie flat for 24 hours  -Start ipratropium nasal spray twice daily     -Chronic left shoulder pain  -Start lidocaine and Voltaren trading on and off 4 times daily     -GERD  -Pantoprazole 40 mg p.o. daily     #CORAZON -CPAP     #Hypothyroidism  -TSH 2.05  -Levothyroxine 25 mcg p.o. daily     #Normocytic anemia  -Hemoglobin 11.4 with MCV of 90      Clinically Significant Risk Factors Present on Admission                          Checklist:  Code Status: Full Code    Diet: Moderate Consistent Carb (60 g CHO per Meal) Diet  Snacks/Supplements Adult: Glucerna; With Meals  Snacks/Supplements Adult: Gelatein sugar-free; Between Meals    Carpenter Catheter: PRESENT, indication: Retention, Other (Comment) (Removed)  Central Lines: None  DVT px:  As above          Expected Discharge Date: 06/27/2022    Discharge Delays: Placement - TCU  Destination: other (comment) (TCU)  Discharge Comments: Plan for tx of infection  [facility] TCU; will hold bed through weekend.  Unable to admit over weekend.       Expected discharge: Monday hopefully    Overnight Events/Subjective/Notable results:  Decadron burst started yesterday.  Ambulating with less pain today  -3.8L  BGs higher with steroids  Still with LE edema    4 point ROS otherwise negative    Objective    Vital signs in last 24 hours  Temp:  [97.5  F (36.4  C)-98.9  F (37.2  C)] 97.5  F (36.4  C)  Pulse:  [60-62] 60  Resp:  [20-22] 20  BP: (141-193)/(71-90) 193/90  SpO2:  [94 %-96 %] 95 % O2 Device: None (Room air)    Weight:   236 lbs 4.8 oz    Intake/Output last 3 shifts  I/O last 3 completed shifts:  In: 200 [P.O.:200]  Out: 4050 [Urine:4050]  Body mass index is 33.91 kg/m .    Physical Exam  General:  Alert, cooperative, no distress,  Appears stated age  Neurologic:  oriented, facialsymmetry preserved, fluent speech.  Generalized weakness  Psych: calm, mood and affect  appropriate to situation  HEENT:  Anicteric, MMM, unremarkable dentition, nasal cannula  CV: RRR no MRG, normal S1 and S2, + LE edema  Lungs: CTAB.  Easyrespirations  Abd: softly distended, NT, normoactive BS  Skin: no rashes or jaundice noted on exposed skin.    Central Lines and Tubes: Carpenter  I have reviewed all labs, medications, imaging studies in the last 24 hours.  Pertinent findings&changes discussed above.    Data       Humble Smith MD  Internal Medicine Hospitalist

## 2022-06-26 NOTE — PROGRESS NOTES
Care Management Follow Up    Length of Stay (days): 10    Expected Discharge Date: 06/27/2022     Concerns to be Addressed: discharge planning     Patient plan of care discussed at interdisciplinary rounds: No    Anticipated Discharge Disposition: [facility]     Anticipated Discharge Services: TCU  Anticipated Discharge DME: None    Patient/family educated on Medicare website which has current facility and service quality ratings: yes  Education Provided on the Discharge Plan: yes   Patient/Family in Agreement with the Plan: yes    Referrals Placed by CM/SW: Post Acute Facilities  Private pay costs discussed: transportation costs    Additional Information:  [facility] transport with O2 set up for pt tomorrow at 1300. His son was going to transport but is starting a new job tomorrow. Son is asking if pt can get the Covid Booster shot before he goes as his mom the pts wife went there Friday and she had to quarantine because she did not get the booster.  I have paged the provider the CM tomorrow will need to look into this and send record to TCU. Pt will also need MRI done in the am before he goes to TCU. We discussed the possible cost of the WC ride and pt is accepting of this.  CM to follow for final discharge.      Michelle Márquez RN

## 2022-06-26 NOTE — PROVIDER NOTIFICATION
Sent text page to attending provider to inform of Hyperglycemia. No new orders, to dose per existing order and monitor.

## 2022-06-26 NOTE — PROGRESS NOTES
Patient blood glucose check before dinner resulted very high. Patient without symptoms. Provider was updated. Serum glucose ordered and collected.Insulin given.  Will recheck and continue to monitor.

## 2022-06-26 NOTE — PROGRESS NOTES
BGs now 400s with decadron.     Increase lantus to 20 for tonight and add carb exchange.  Increase metformin to home dose.    3 more doses decadron planned    Please note Mr Power was having AM hypoglycemia earlier prior steroids and DM regimen will likely need to be de-escalated when decadron completed.    Humble Smith MD  Internal Medicine Hospitalist  6/26/2022

## 2022-06-27 ENCOUNTER — APPOINTMENT (OUTPATIENT)
Dept: RADIOLOGY | Facility: HOSPITAL | Age: 77
DRG: 518 | End: 2022-06-27
Attending: NURSE PRACTITIONER
Payer: MEDICARE

## 2022-06-27 ENCOUNTER — APPOINTMENT (OUTPATIENT)
Dept: OCCUPATIONAL THERAPY | Facility: HOSPITAL | Age: 77
DRG: 518 | End: 2022-06-27
Attending: SURGERY
Payer: MEDICARE

## 2022-06-27 ENCOUNTER — APPOINTMENT (OUTPATIENT)
Dept: PHYSICAL THERAPY | Facility: HOSPITAL | Age: 77
DRG: 518 | End: 2022-06-27
Attending: SURGERY
Payer: MEDICARE

## 2022-06-27 ENCOUNTER — DOCUMENTATION ONLY (OUTPATIENT)
Dept: CARDIOLOGY | Facility: CLINIC | Age: 77
End: 2022-06-27

## 2022-06-27 ENCOUNTER — APPOINTMENT (OUTPATIENT)
Dept: MRI IMAGING | Facility: HOSPITAL | Age: 77
DRG: 518 | End: 2022-06-27
Attending: NURSE PRACTITIONER
Payer: MEDICARE

## 2022-06-27 LAB
GLUCOSE BLDC GLUCOMTR-MCNC: 206 MG/DL (ref 70–99)
GLUCOSE BLDC GLUCOMTR-MCNC: 250 MG/DL (ref 70–99)
GLUCOSE BLDC GLUCOMTR-MCNC: 318 MG/DL (ref 70–99)

## 2022-06-27 PROCEDURE — 97110 THERAPEUTIC EXERCISES: CPT | Mod: GP

## 2022-06-27 PROCEDURE — 250N000013 HC RX MED GY IP 250 OP 250 PS 637: Performed by: INTERNAL MEDICINE

## 2022-06-27 PROCEDURE — 250N000013 HC RX MED GY IP 250 OP 250 PS 637: Performed by: NURSE PRACTITIONER

## 2022-06-27 PROCEDURE — 97116 GAIT TRAINING THERAPY: CPT | Mod: GP

## 2022-06-27 PROCEDURE — 999N000127 HC STATISTIC PERIPHERAL IV START W US GUIDANCE

## 2022-06-27 PROCEDURE — 120N000001 HC R&B MED SURG/OB

## 2022-06-27 PROCEDURE — 97535 SELF CARE MNGMENT TRAINING: CPT | Mod: GO

## 2022-06-27 PROCEDURE — A9585 GADOBUTROL INJECTION: HCPCS | Performed by: SURGERY

## 2022-06-27 PROCEDURE — 250N000011 HC RX IP 250 OP 636: Performed by: HOSPITALIST

## 2022-06-27 PROCEDURE — 71046 X-RAY EXAM CHEST 2 VIEWS: CPT

## 2022-06-27 PROCEDURE — 255N000002 HC RX 255 OP 636: Performed by: SURGERY

## 2022-06-27 PROCEDURE — 250N000011 HC RX IP 250 OP 636: Performed by: NURSE PRACTITIONER

## 2022-06-27 PROCEDURE — 250N000013 HC RX MED GY IP 250 OP 250 PS 637: Performed by: FAMILY MEDICINE

## 2022-06-27 PROCEDURE — G1010 CDSM STANSON: HCPCS

## 2022-06-27 PROCEDURE — 99233 SBSQ HOSP IP/OBS HIGH 50: CPT | Performed by: INTERNAL MEDICINE

## 2022-06-27 PROCEDURE — 72158 MRI LUMBAR SPINE W/O & W/DYE: CPT | Mod: MF

## 2022-06-27 PROCEDURE — 250N000013 HC RX MED GY IP 250 OP 250 PS 637: Performed by: SURGERY

## 2022-06-27 PROCEDURE — 250N000013 HC RX MED GY IP 250 OP 250 PS 637: Performed by: HOSPITALIST

## 2022-06-27 PROCEDURE — 999N000157 HC STATISTIC RCP TIME EA 10 MIN

## 2022-06-27 RX ORDER — ZINC GLUCONATE 50 MG
50 TABLET ORAL DAILY
DISCHARGE
Start: 2022-06-27

## 2022-06-27 RX ORDER — GABAPENTIN 300 MG/1
600 CAPSULE ORAL 2 TIMES DAILY
Start: 2022-06-27 | End: 2022-09-21 | Stop reason: DRUGHIGH

## 2022-06-27 RX ORDER — INSULIN LISPRO 100 [IU]/ML
INJECTION, SOLUTION INTRAVENOUS; SUBCUTANEOUS
Qty: 15 ML | Refills: 0 | Status: SHIPPED | OUTPATIENT
Start: 2022-06-27 | End: 2023-05-26

## 2022-06-27 RX ORDER — AMOXICILLIN 250 MG
2 CAPSULE ORAL 2 TIMES DAILY
Start: 2022-06-27 | End: 2022-07-13

## 2022-06-27 RX ORDER — LIDOCAINE 50 MG/G
OINTMENT TOPICAL 2 TIMES DAILY
Start: 2022-06-27 | End: 2022-09-21

## 2022-06-27 RX ORDER — TAMSULOSIN HYDROCHLORIDE 0.4 MG/1
0.4 CAPSULE ORAL DAILY
Start: 2022-06-28 | End: 2022-09-21

## 2022-06-27 RX ORDER — POLYETHYLENE GLYCOL 3350 17 G/17G
17 POWDER, FOR SOLUTION ORAL 2 TIMES DAILY
Start: 2022-06-27 | End: 2022-07-13

## 2022-06-27 RX ORDER — LIDOCAINE 4 G/G
2 PATCH TOPICAL EVERY 24 HOURS
Start: 2022-06-27 | End: 2022-07-05

## 2022-06-27 RX ORDER — GADOBUTROL 604.72 MG/ML
10 INJECTION INTRAVENOUS ONCE
Status: COMPLETED | OUTPATIENT
Start: 2022-06-27 | End: 2022-06-27

## 2022-06-27 RX ORDER — DOXYCYCLINE 100 MG/1
100 CAPSULE ORAL EVERY 12 HOURS
Qty: 8 CAPSULE | Refills: 0 | Status: SHIPPED | OUTPATIENT
Start: 2022-06-27 | End: 2022-07-06

## 2022-06-27 RX ADMIN — FUROSEMIDE 40 MG: 10 INJECTION, SOLUTION INTRAMUSCULAR; INTRAVENOUS at 08:08

## 2022-06-27 RX ADMIN — DICLOFENAC SODIUM 2 G: 10 GEL TOPICAL at 16:43

## 2022-06-27 RX ADMIN — Medication 500 MG: at 20:35

## 2022-06-27 RX ADMIN — AMOXICILLIN AND CLAVULANATE POTASSIUM 1 TABLET: 875; 125 TABLET, FILM COATED ORAL at 08:03

## 2022-06-27 RX ADMIN — POLYETHYLENE GLYCOL 3350 17 G: 17 POWDER, FOR SOLUTION ORAL at 08:00

## 2022-06-27 RX ADMIN — Medication 500 MG: at 12:55

## 2022-06-27 RX ADMIN — DEXAMETHASONE 3 MG: 2 TABLET ORAL at 08:04

## 2022-06-27 RX ADMIN — LIDOCAINE 1 G: 50 OINTMENT TOPICAL at 12:57

## 2022-06-27 RX ADMIN — PANTOPRAZOLE SODIUM 40 MG: 40 TABLET, DELAYED RELEASE ORAL at 16:42

## 2022-06-27 RX ADMIN — ENOXAPARIN SODIUM 90 MG: 100 INJECTION SUBCUTANEOUS at 20:36

## 2022-06-27 RX ADMIN — ACETAMINOPHEN 650 MG: 325 TABLET, FILM COATED ORAL at 16:42

## 2022-06-27 RX ADMIN — DOXYCYCLINE 100 MG: 100 CAPSULE ORAL at 08:07

## 2022-06-27 RX ADMIN — GABAPENTIN 600 MG: 300 CAPSULE ORAL at 08:03

## 2022-06-27 RX ADMIN — DOXYCYCLINE 100 MG: 100 CAPSULE ORAL at 20:35

## 2022-06-27 RX ADMIN — FERROUS SULFATE TAB 325 MG (65 MG ELEMENTAL FE) 325 MG: 325 (65 FE) TAB at 16:42

## 2022-06-27 RX ADMIN — METFORMIN HYDROCHLORIDE 1000 MG: 500 TABLET, FILM COATED ORAL at 08:01

## 2022-06-27 RX ADMIN — METFORMIN HYDROCHLORIDE 1000 MG: 500 TABLET, FILM COATED ORAL at 16:42

## 2022-06-27 RX ADMIN — ENOXAPARIN SODIUM 90 MG: 100 INJECTION SUBCUTANEOUS at 08:07

## 2022-06-27 RX ADMIN — TAMSULOSIN HYDROCHLORIDE 0.4 MG: 0.4 CAPSULE ORAL at 08:00

## 2022-06-27 RX ADMIN — HYDRALAZINE HYDROCHLORIDE 25 MG: 25 TABLET, FILM COATED ORAL at 08:04

## 2022-06-27 RX ADMIN — FERROUS SULFATE TAB 325 MG (65 MG ELEMENTAL FE) 325 MG: 325 (65 FE) TAB at 12:56

## 2022-06-27 RX ADMIN — ACETAMINOPHEN 975 MG: 325 TABLET, FILM COATED ORAL at 08:00

## 2022-06-27 RX ADMIN — GADOBUTROL 10 ML: 604.72 INJECTION INTRAVENOUS at 12:23

## 2022-06-27 RX ADMIN — PANTOPRAZOLE SODIUM 40 MG: 40 TABLET, DELAYED RELEASE ORAL at 06:22

## 2022-06-27 RX ADMIN — SENNOSIDES AND DOCUSATE SODIUM 2 TABLET: 8.6; 5 TABLET ORAL at 08:04

## 2022-06-27 RX ADMIN — GABAPENTIN 600 MG: 300 CAPSULE ORAL at 20:34

## 2022-06-27 RX ADMIN — LIDOCAINE: 50 OINTMENT TOPICAL at 20:36

## 2022-06-27 RX ADMIN — Medication 500 MG: at 08:00

## 2022-06-27 RX ADMIN — ROSUVASTATIN CALCIUM 10 MG: 10 TABLET, FILM COATED ORAL at 20:35

## 2022-06-27 RX ADMIN — OXYCODONE HYDROCHLORIDE 5 MG: 5 TABLET ORAL at 21:18

## 2022-06-27 RX ADMIN — LEVOTHYROXINE SODIUM 25 MCG: 0.03 TABLET ORAL at 06:22

## 2022-06-27 RX ADMIN — AMOXICILLIN AND CLAVULANATE POTASSIUM 1 TABLET: 875; 125 TABLET, FILM COATED ORAL at 20:36

## 2022-06-27 RX ADMIN — THERA TABS 1 TABLET: TAB at 08:03

## 2022-06-27 RX ADMIN — ACETAMINOPHEN 975 MG: 325 TABLET, FILM COATED ORAL at 12:55

## 2022-06-27 RX ADMIN — LIDOCAINE 2 PATCH: 246 PATCH TOPICAL at 12:50

## 2022-06-27 RX ADMIN — Medication 100 MCG: at 08:11

## 2022-06-27 RX ADMIN — DICLOFENAC SODIUM 2 G: 10 GEL TOPICAL at 08:07

## 2022-06-27 RX ADMIN — FERROUS SULFATE TAB 325 MG (65 MG ELEMENTAL FE) 325 MG: 325 (65 FE) TAB at 08:03

## 2022-06-27 RX ADMIN — FUROSEMIDE 40 MG: 10 INJECTION, SOLUTION INTRAMUSCULAR; INTRAVENOUS at 17:49

## 2022-06-27 RX ADMIN — ACETAMINOPHEN 975 MG: 325 TABLET, FILM COATED ORAL at 20:35

## 2022-06-27 ASSESSMENT — ACTIVITIES OF DAILY LIVING (ADL)
ADLS_ACUITY_SCORE: 30

## 2022-06-27 NOTE — PLAN OF CARE
Problem: Functional Ability Impaired (Spinal Surgery)  Goal: Optimal Functional Ability  Intervention: Optimize Functional Status   Goal Outcome Evaluation:     Up in chair with Lower extremities elevated. Lymphedema wraps on LE. 2+ generalized edema. IV lasix administered as ordered. Surgical dressing intact to lower back. CMS+ Chronic numbness/tingling all extremities. Minimal discomfort-extra strength tylenol administered as scheduled. MRI today at 11am. Will discharge to TCU today at 1300.  Michelle Toure RN

## 2022-06-27 NOTE — PROGRESS NOTES
Is the implanted device safe for MRI Exam?  Yes  Is this device 3T compatible? Yes  Device Type: Pacemaker        Device Information:  Make: Medtronic   Model: [id number] Percepta Quad CRT-P     Cardiology Orders for Device Programming      -- Yes -- The patient has a MRI conditional pulse generator and leads from the same      -- Yes -- The pulse generator and leads have been implanted for at least 6 weeks     -- Yes-- The device is implanted in the right or left pectoral region     -- Yes -- There are not any additional active cardiac devices, abandoned leads, lead extenders or adapters     -- Yes -- The device lead impedance measurements are within the normal range. (Manufacture recommendations: Medtronic Advisa and Revo 200-1,500 ohms; Medtronic ICD and CRT's 200-3000 ohms and defibrillation lead impedance   ohms)     -- N/A -- If the patient is pacemaker dependent the thresholds are less than or equal to 2.0V @ 0.4ms.     Date of last in-office/remote Device check: 6/21/22 (Rep check used, Auto-cap on)   Results of last in-office Device check:  1.   Right atrium impedance: 399 Ohms   2.   Right ventricle impedance: 437 Ohms   3.   Left ventricle impedance: 874 Ohms      4.   Right atrium threshold: N/A -AF  5.   Right ventricle threshold: 0.5V @ 0.4 ms   6.   Left ventricle threshold: 0.75V @ 0.4 ms      Device programming during the scan guidelines   Pacing Mode (check one): VOO  Pacing Rate: 80  bpm or > intrinsic rates     Remedios Madden RN

## 2022-06-27 NOTE — PLAN OF CARE
Patient with very high blood sugars this shift. Patient asymptomatic. Protocol followed, MTACO updated insulin changes made and patient treated.  Report and continue to monitor.  Problem: Bowel Motility Impaired (Spinal Surgery)  Goal: Effective Bowel Elimination  Outcome: Ongoing, Progressing     Problem: Hyperglycemia  Goal: Blood Glucose Level Within Targeted Range  Outcome: Ongoing, Not Progressing   Goal Outcome Evaluation:

## 2022-06-27 NOTE — PLAN OF CARE
Problem: Bowel Motility Impaired (Spinal Surgery)  Goal: Effective Bowel Elimination  Outcome: Ongoing, Progressing   Goal Outcome Evaluation:      Patient states he has had 2 Bms today.      Problem: Neurologic Impairment (Spinal Surgery)  Goal: Optimal Neurologic Function  Outcome: Ongoing, Progressing  Intervention: Optimize Neurologic Function  Recent Flowsheet Documentation  Taken 6/27/2022 1600 by Breanne Maria RN  Body Position: position changed independently     Neuros are being checked every 4 hours and they remain intact.      Problem: Pain (Spinal Surgery)  Goal: Acceptable Pain Control  Outcome: Ongoing, Progressing  Intervention: Prevent or Manage Pain  Recent Flowsheet Documentation  Taken 6/27/2022 1600 by Breanne Maria RN  Complementary Therapy: aromatherapy utilized   Pain in back was rated 5/10.  Patient wanted to take Tylenol instead of Oxycodone.  He also has chronic pain in left shoulder for which he gets Voltaren gel applied to tid.

## 2022-06-27 NOTE — PROGRESS NOTES
"Neurosurgery Progress Note  06/27/22          A/P: Thomas Steve is a 77 year old male POD #11 DECOMPRESSIVE LUMBAR LAMINECTOMY LUMBAR 4-LUMBAR 5 AND LUMBAR 5-SACRAL 1 BILATERAL-LEFT SIDE FIRST-PLUS BILATERAL FORAMINOTOMIES PLUS REMOVAL OF HERNIATED DISC LUMBAR 5-SACRAL 1 LEFT with Dr Samuel. Arachnoid bleb intraop, covered with durgen, duraseal. No CSF leak.     Despite improvements in exam and function, MRI with worsening fluid collection with increased compression on the cauda equina. Thomas currently has no cauda equina symptoms. Discussed imaging with Dr. Samuel, will monitor for next 2-3 days and repeat MRI at that time.      PLAN:   1. Hold off on discharge  2. Continue PT while in house  3. MRI in 3 days  4. Repeat inflammatory markers  5. Neuro checks q4, monitor for s/sx of cauda equina  6. Bladder scan q shift       HPI: Presented with back pain, right leg pain. Weakness bilateral LE. Worse with walking. No bowel or bladder issues. Diabetes, CABG, AFIB, Xarelto. MRI with listhesis L4-5, spinal stenosis. Stenosis also at L5-S1 with large disc herniation L5-S1 on the left.     Subjective: Back and legs again feeling better, walked to the bathroom for bowel movement today. No incontinence.     Physical Exam  BP (!) 158/75 (BP Location: Right arm)   Pulse 59   Temp 98  F (36.7  C) (Oral)   Resp 20   Ht 1.778 m (5' 10\")   Wt 107.2 kg (236 lb 4.8 oz)   SpO2 96%   BMI 33.91 kg/m      General: oriented. KIM. Speech clear. Laying in bed.     Motor: normal bulk and tone     Strength: Full strength LE   Baseline neuropathy  Carpenter out and voiding   Still with lower extremity edema, sacral edema     Sensation: intact to light touch; baseline neuropathy bilaterally     Incision: visualized yesterday, clean/intact    Labs: reviewed. NGTD on aspiration cultures      Imaging: MRI reviewed personally reviewed   Increased fluid collection in the laminectomy bed causing increase compression on the cauda " teresa Lara, CNP  [facility] Neurosurgery  O: [phone]  P: [phone]

## 2022-06-27 NOTE — PROGRESS NOTES
Care Management Follow Up    Length of Stay (days): 11    Expected Discharge Date: 06/29/2022     Concerns to be Addressed: discharge planning     Patient plan of care discussed at interdisciplinary rounds: Yes    Anticipated Discharge Disposition: Transitional Care     Anticipated Discharge Services: Other (see comment) (therapy services)  Anticipated Discharge DME: None    Patient/family educated on Medicare website which has current facility and service quality ratings: yes  Education Provided on the Discharge Plan:  yes  Patient/Family in Agreement with the Plan: yes    Referrals Placed by CM/SW: Post Acute Facilities  Private pay costs discussed: Not applicable    Additional Information:  Pt had been planned to discharge today to [facility]; however, after MRI it was decided by neurosurgery Pt not medically stable to discharge and plan is for Pt to stay and repeat MRI in 2-3 days.    ELBA had transport cancelled and contacted [facility] ([phone]) to notify admissions that Pt unable to discharge.  ELBA requested to speak with admissions; was told that Clarisa was not at her desk.  ELBA asked individual who answered to please ask admissions to call SW back and provided call back number.  ELBA explained that Pt was to discharge this afternoon, Pt's wife is at [facility], and SW would like to talk with admissions about plan for Pt's bed at the TCU.      ELBA/KISHORE following for progression and discharge planning.    Addendum:  2:48 PM  ELBA left voice mail for Pt's son Abelardo updating him of situation and provided call back number.    Clarisa with [facility] [phone] left voice mail asking for call back.  ELBA returned call to [place].  [facility] will hold bed for Pt.  Clarisa inquired if Pt is able to get 2nd Covid booster while at hospital.  ELBA said she would inquire and let TCU know.    ELBA spoke with HUC and Charge; ELBA informed that if MD orders Covid booster it can be administered here at  [facility].      SW will follow up with TCU regarding booster and coordinate new admit date as well as transport.    ALEXIS BartlettSW

## 2022-06-27 NOTE — PROGRESS NOTES
Hospitalist Progress Note    Assessment/Plan  77M with -year-old male with CAD, HFmEF, DM2, HTN, atrial fibrillation, hypothyroidism presented for planned neurosurgical intervention to treat herniated lumbar disc. 06/16 underwent decompressive lumbar laminectomy.  06/19 with post-op fever and started on vanco and zosyn.    06/22 MRI with fluid collection with possible rim enhancement which was aspirated.  Cultures negative thus far.  Also with YOLANDA this admission and volume overload.            #Acute on chronic HFmEF; CAD s/p CABG - volume up with R sided findings.  ECHO with -50%, severe TR, mild MR, mild pulm HTN.  -continue lasix 40mg IV q8h while inpatient.  Transition to bumex at discharge   - lymphedema wraps at TCU   -cards f/u as outpatient     #YOLANDA on CKD3 - baseline 1.1-1.2s.  Post-op injury likely hemodynamic demetrius-operatively.  Resolved.      #post-op fever.  Post-op fluid collection - culture no growth while on abx.  Plan for augmentin and doxy x 1 week and then re-image per Nsx.     #Lumbar spinal stenosis with lumbar disc herniation  -Patient underwent scheduled decompressive lumbar laminectomy of L4-L5, L5-S1 with bilateral foraminotomies and removal of herniated disc in the L1-S1 region with neurosurgery on 6/16/2022.  -Scheduled Tylenol 3 times daily  -Gabapentin 1200 mg p.o. twice daily  -Post op management per neurosurgery  -decadron burst per Nsx     #post op urinary retention : started flomax.  Has segura.  voiding trial after diuresed vs. outpatient urology f/u     #constipation - bowel regimen.       #Chronic atrial fibrillation, sick sinus syndrome s/p PPM  -lovenox 90mgBID.   Resume xarelto when ok from sx standpoint.      #DM2 with recurrent AM hypoglycemia - reports he is eating healthier here in hospital with much reduced insulin needs.  Steroid induced hyperglycemia with sugars 300s-400s - now steroids stopped   -stop glimiperide and would not resume  -lantus increased last night,  on metformin at 1000 BID, sliding scale insulin     -Nasal congestion, postnasal drip  -Exacerbated in the setting of needing to lie flat for 24 hours  -Start ipratropium nasal spray twice daily     -Chronic left shoulder pain  -Start lidocaine and Voltaren trading on and off 4 times daily     -GERD  -Pantoprazole 40 mg p.o. daily     #CORAZON -CPAP     #Hypothyroidism  -TSH 2.05  -Levothyroxine 25 mcg p.o. daily     #Normocytic anemia  -Hemoglobin 11.4 with MCV of 90      Barriers to Discharge:  Pending MRI     Anticipated discharge date/Disposition: probably TCU today     Subjective  Patient reports incisional pain 2-3 severity.  Leg edema has somewhat improved.  Diuresing well.  No chest pain or dyspnea.  On room air.  Good oral intake.  Having BM but abdomen is distended and feels constipated     Objective    Vital signs in last 24 hours  Temp:  [98  F (36.7  C)-98.6  F (37  C)] 98  F (36.7  C)  Pulse:  [58-62] 61  Resp:  [16-22] 16  BP: (141-191)/(40-85) 191/85  SpO2:  [95 %-98 %] 98 % @LASTSAO2(12)@ O2 Device: None (Room air)    Weight:   Wt Readings from Last 3 Encounters:   06/25/22 107.2 kg (236 lb 4.8 oz)   06/03/22 95.2 kg (209 lb 12.8 oz)   01/05/22 93.4 kg (206 lb)      Weight change:     Intake/Output last 3 shifts  I/O last 3 completed shifts:  In: 240 [P.O.:240]  Out: 1825 [Urine:1825]  Body mass index is 33.91 kg/m .    Physical Exam    General Appearance:    Obese male, Alert, cooperative, no distress, appears stated age   Lungs:     Scattered basilar crackles    Cardiovascular:    Regular rate ands rhythm.  Nornal S1, S2.  No murmur, rub or gallop.  2 + LE edema with ace wraps on    Abdomen:     Distended,  non-tender, bowel sounds active all four quadrants   Neurologic:   Awake, alert, oriented x 3.  Grossly nonfocal      Pertinent Labs   Lab Results: personally reviewed.   Recent Labs   Lab 06/26/22  0513 06/25/22  0434 06/24/22  0654 06/23/22  0506    138 138 138  138   CO2 24 23 20* 22  22    BUN 28 23 23 41*  41*   ALBUMIN  --   --   --  2.5*     Recent Labs   Lab 06/25/22  0434 06/23/22  1434 06/23/22  0506   WBC 7.6 8.2 8.1   HGB 8.9* 8.7* 7.8*   HCT 29.6* 27.9* 24.8*    230 217     No results for input(s): CKTOTAL, TROPONINI in the last 168 hours.    Invalid input(s): TROPONINT, CKMBINDEX  Invalid input(s): POCGLUFGR    Medications  Current Facility-Administered Medications   Medication     acetaminophen (TYLENOL) tablet 650 mg     acetaminophen (TYLENOL) tablet 975 mg     amoxicillin-clavulanate (AUGMENTIN) 875-125 MG per tablet 1 tablet     benzocaine-menthol (CEPACOL) 15-3.6 MG lozenge 1 lozenge     bisacodyl (DULCOLAX) Suppository 10 mg     bisacodyl (DULCOLAX) Suppository 10 mg     [Held by provider] bumetanide (BUMEX) tablet 3 mg     glucose gel 15-30 g    Or     dextrose 50 % injection 25-50 mL    Or     glucagon injection 1 mg     diclofenac (VOLTAREN) 1 % topical gel 2 g     doxycycline monohydrate (MONODOX) capsule 100 mg     enoxaparin ANTICOAGULANT (LOVENOX) injection 90 mg     ferrous sulfate (FEROSUL) tablet 325 mg     flumazenil (ROMAZICON) injection 0.2 mg     furosemide (LASIX) injection 40 mg     gabapentin (NEURONTIN) capsule 600 mg     HOLD MEDICATION (one time)     hydrALAZINE (APRESOLINE) tablet 25 mg     HYDROmorphone (DILAUDID) injection 0.2 mg    Or     HYDROmorphone (DILAUDID) injection 0.4 mg     hydrOXYzine (ATARAX) tablet 25 mg    Or     hydrOXYzine (ATARAX) tablet 50 mg     insulin aspart (NovoLOG) injection (RAPID ACTING)     insulin aspart (NovoLOG) injection (RAPID ACTING)     insulin aspart (NovoLOG) injection (RAPID ACTING)     insulin glargine (LANTUS PEN) injection 20 Units     ipratropium (ATROVENT) 0.03 % spray 2 spray     levothyroxine (SYNTHROID/LEVOTHROID) tablet 25 mcg     Lidocaine (LIDOCARE) 4 % Patch 2 patch     lidocaine (XYLOCAINE) 5 % ointment     lidocaine patch in PLACE     [Held by provider] lisinopril (ZESTRIL) tablet 10 mg     loratadine  (CLARITIN) tablet 10 mg     magnesium hydroxide (MILK OF MAGNESIA) suspension 30 mL     metFORMIN (GLUCOPHAGE) tablet 1,000 mg     multivitamin, therapeutic (THERA-VIT) tablet 1 tablet     naloxone (NARCAN) injection 0.2 mg    Or     naloxone (NARCAN) injection 0.4 mg    Or     naloxone (NARCAN) injection 0.2 mg    Or     naloxone (NARCAN) injection 0.4 mg     naloxone (NARCAN) injection 0.2 mg    Or     naloxone (NARCAN) injection 0.4 mg    Or     naloxone (NARCAN) injection 0.2 mg    Or     naloxone (NARCAN) injection 0.4 mg     ondansetron (ZOFRAN ODT) ODT tab 4 mg    Or     ondansetron (ZOFRAN) injection 4 mg     oxyCODONE IR (ROXICODONE) half-tab 2.5-5 mg     pantoprazole (PROTONIX) EC tablet 40 mg     polyethylene glycol (MIRALAX) Packet 17 g     prochlorperazine (COMPAZINE) injection 5 mg    Or     prochlorperazine (COMPAZINE) tablet 5 mg     rosuvastatin (CRESTOR) tablet 10 mg     senna-docusate (SENOKOT-S/PERICOLACE) 8.6-50 MG per tablet 2 tablet     tamsulosin (FLOMAX) capsule 0.4 mg     tiZANidine (ZANAFLEX) tablet 2 mg     vitamin C (ASCORBIC ACID) tablet 500 mg     Vitamin D3 (CHOLECALCIFEROL) tablet 100 mcg       Pertinent Radiology   Radiology Results: Personally reviewed   Results for orders placed or performed during the hospital encounter of 06/16/22   Lateral Lumbar Spine for Level Confirmation [XR LUMBAR SPINE PORT 1  VIEW]    Impression    IMPRESSION: There are 3 needles overlying the posterior soft tissues. The most superior needle is at the level of the L3-L4 disc space. The middle needle is at the level of the L4-L5 disc space. The most inferior needle is at the level of the superior   aspect of the S1 vertebral body.   XR Lumbar Spine Port 1 View    Impression    IMPRESSION: Single portable crosstable lateral radiograph the lumbar spine was obtained. Surgical retraction devices over the posterior soft tissues at the L4 level. There is a surgical probe with the distal tip at the level of the  L4 pedicles. Grade 1   anterolisthesis of L4 on L5.   XR Chest Port 1 View   Result Value Ref Range    Radiologist flags (Urgent)      Left side lung nodule needing follow-up PA and lateral chest x-ray or chest CT.    Impression    IMPRESSION: Poststernotomy changes with multi lead left subclavian venous pacer. Leads are intact. Heart is slightly enlarged but unchanged.    There is a 9 x 5 mm ovoid, relatively dense nodule projecting over the anterior left third rib, not seen before. It's unclear if this is in the rib or the lung.     When patient is able, consider a follow-up formal PA and lateral chest x-ray or a noncontrast chest CT for reevaluation.    Lungs are otherwise clear without signs of pneumonia or failure.      [Access Center: Left side lung nodule needing follow-up PA and lateral chest x-ray or chest CT.       This report will be copied to the [facility] to ensure a provider acknowledges the finding. Access Center is available Monday through Friday 8am-3:30 pm.      US Renal Complete    Impression    IMPRESSION:  1.  Normal kidney ultrasound.   CT Chest w/o Contrast    Impression    IMPRESSION:     1.  Lung parenchymal evaluation is limited by motion-related blurring. No findings to suggest an active lung or airway inflammatory process.  2.  Small left and trace right pleural effusions.  3.  Four-chamber cardiac enlargement with CRT-P in place.  4.  Mild wall thickening of the distal esophagus which is often related to gastroesophageal reflux.     MR Lumbar Spine w/o & w Contrast    Impression    IMPRESSION:    Combination of postoperative changes, as well as additional findings suggestive of possible superimposed infection with a rim-enhancing ventral epidural collection extending from the L2 level cranially to the L5-S1 level caudally (series 9, image 15),   contributing to multilevel high-grade spinal canal stenosis. Additional multicompartmental collections as detailed above.      Extensive background lumbar spondylosis.       XR Chest 2 Views    Impression    IMPRESSION: Poststernotomy changes. Triple lead left subclavian venous pacer again noted.     Lungs are otherwise clear. Heart is slightly enlarged but unchanged. No signs of failure. Small bilateral pleural effusions are again seen noted on the lateral view. Changes of DISH in the thoracic spine.   XR Abdomen Port 1 View    Impression    IMPRESSION: No distended air-filled loops of small bowel. There is a small amount of throughout the colon. No definite intraperitoneal free air identified. Pacemaker leads are partially included in the field-of-view. Prior median sternotomy noted. There   are surgical staples over the abdomen and pelvis.     IR Fine Needle Aspiration w Imaging    Impression    CONCLUSION:     1. Technically successful fluoroscopically-guided aspiration of a posterior paraspinal lumbar fluid collection, with retrieval of 0.5 cc of yellowish, reddish fluid from the L5 level which was sent for further analysis.  _____________________________________________    CPT codes included for physician reference only: 80222/29011   Echocardiogram Complete   Result Value Ref Range    LVEF  45-50% (mildly reduced)          Advanced Care Planning:  Discharge Planning discussed with patient, Neurosurgery, RN      Ewa Cruz MD  Internal Medicine Hospitalist  6/27/2022

## 2022-06-27 NOTE — PLAN OF CARE
Problem: Risk for Delirium  Goal: Optimal Coping  Outcome: Ongoing, Progressing     Problem: Ongoing Anesthesia Effects (Spinal Surgery)  Goal: Anesthesia/Sedation Recovery  Intervention: Optimize Anesthesia Recovery  Recent Flowsheet Documentation  Taken 6/27/2022 0047 by Becki Steen RN  Safety Promotion/Fall Prevention:   activity supervised   assistive device/personal items within reach   fall prevention program maintained   safety round/check completed  Administration (IS): self-administered     Problem: Plan of Care - These are the overarching goals to be used throughout the patient stay.    Goal: Absence of Hospital-Acquired Illness or Injury  Intervention: Identify and Manage Fall Risk  Recent Flowsheet Documentation  Taken 6/27/2022 0047 by Becki Steen RN  Safety Promotion/Fall Prevention:   activity supervised   assistive device/personal items within reach   fall prevention program maintained   safety round/check completed   Goal Outcome Evaluation:      Pt has slept through NOC between rounds. VSS. Denies pain. Utilizes call light appropriately and is able to verbalize needs

## 2022-06-28 ENCOUNTER — APPOINTMENT (OUTPATIENT)
Dept: PHYSICAL THERAPY | Facility: HOSPITAL | Age: 77
DRG: 518 | End: 2022-06-28
Attending: SURGERY
Payer: MEDICARE

## 2022-06-28 LAB
ANION GAP SERPL CALCULATED.3IONS-SCNC: 7 MMOL/L (ref 5–18)
BACTERIA ABSC ANAEROBE+AEROBE CULT: NO GROWTH
BUN SERPL-MCNC: 39 MG/DL (ref 8–28)
CALCIUM SERPL-MCNC: 9.1 MG/DL (ref 8.5–10.5)
CHLORIDE BLD-SCNC: 102 MMOL/L (ref 98–107)
CO2 SERPL-SCNC: 28 MMOL/L (ref 22–31)
CREAT SERPL-MCNC: 1.07 MG/DL (ref 0.7–1.3)
GFR SERPL CREATININE-BSD FRML MDRD: 72 ML/MIN/1.73M2
GLUCOSE BLD-MCNC: 147 MG/DL (ref 70–125)
GLUCOSE BLDC GLUCOMTR-MCNC: 120 MG/DL (ref 70–99)
GLUCOSE BLDC GLUCOMTR-MCNC: 127 MG/DL (ref 70–99)
GLUCOSE BLDC GLUCOMTR-MCNC: 159 MG/DL (ref 70–99)
GLUCOSE BLDC GLUCOMTR-MCNC: 161 MG/DL (ref 70–99)
GLUCOSE BLDC GLUCOMTR-MCNC: 186 MG/DL (ref 70–99)
GLUCOSE BLDC GLUCOMTR-MCNC: 311 MG/DL (ref 70–99)
GRAM STAIN RESULT: NORMAL
GRAM STAIN RESULT: NORMAL
POTASSIUM BLD-SCNC: 4 MMOL/L (ref 3.5–5)
SODIUM SERPL-SCNC: 137 MMOL/L (ref 136–145)

## 2022-06-28 PROCEDURE — 250N000011 HC RX IP 250 OP 636: Performed by: HOSPITALIST

## 2022-06-28 PROCEDURE — 120N000001 HC R&B MED SURG/OB

## 2022-06-28 PROCEDURE — 97116 GAIT TRAINING THERAPY: CPT | Mod: GP

## 2022-06-28 PROCEDURE — 250N000013 HC RX MED GY IP 250 OP 250 PS 637: Performed by: HOSPITALIST

## 2022-06-28 PROCEDURE — 250N000013 HC RX MED GY IP 250 OP 250 PS 637: Performed by: INTERNAL MEDICINE

## 2022-06-28 PROCEDURE — 250N000012 HC RX MED GY IP 250 OP 636 PS 637: Performed by: INTERNAL MEDICINE

## 2022-06-28 PROCEDURE — 999N000157 HC STATISTIC RCP TIME EA 10 MIN

## 2022-06-28 PROCEDURE — 250N000013 HC RX MED GY IP 250 OP 250 PS 637: Performed by: NURSE PRACTITIONER

## 2022-06-28 PROCEDURE — 80048 BASIC METABOLIC PNL TOTAL CA: CPT | Performed by: INTERNAL MEDICINE

## 2022-06-28 PROCEDURE — 250N000009 HC RX 250: Performed by: FAMILY MEDICINE

## 2022-06-28 PROCEDURE — 97535 SELF CARE MNGMENT TRAINING: CPT | Mod: GO

## 2022-06-28 PROCEDURE — 250N000013 HC RX MED GY IP 250 OP 250 PS 637: Performed by: SURGERY

## 2022-06-28 PROCEDURE — 99233 SBSQ HOSP IP/OBS HIGH 50: CPT | Performed by: INTERNAL MEDICINE

## 2022-06-28 PROCEDURE — 97110 THERAPEUTIC EXERCISES: CPT | Mod: GP

## 2022-06-28 PROCEDURE — 250N000013 HC RX MED GY IP 250 OP 250 PS 637: Performed by: FAMILY MEDICINE

## 2022-06-28 PROCEDURE — 36415 COLL VENOUS BLD VENIPUNCTURE: CPT | Performed by: INTERNAL MEDICINE

## 2022-06-28 RX ADMIN — OXYCODONE HYDROCHLORIDE 5 MG: 5 TABLET ORAL at 05:24

## 2022-06-28 RX ADMIN — ACETAMINOPHEN 975 MG: 325 TABLET, FILM COATED ORAL at 08:52

## 2022-06-28 RX ADMIN — LIDOCAINE: 50 OINTMENT TOPICAL at 20:27

## 2022-06-28 RX ADMIN — METFORMIN HYDROCHLORIDE 1000 MG: 500 TABLET, FILM COATED ORAL at 16:26

## 2022-06-28 RX ADMIN — AMOXICILLIN AND CLAVULANATE POTASSIUM 1 TABLET: 875; 125 TABLET, FILM COATED ORAL at 08:53

## 2022-06-28 RX ADMIN — DICLOFENAC SODIUM 2 G: 10 GEL TOPICAL at 16:34

## 2022-06-28 RX ADMIN — TAMSULOSIN HYDROCHLORIDE 0.4 MG: 0.4 CAPSULE ORAL at 08:52

## 2022-06-28 RX ADMIN — LIDOCAINE 2 PATCH: 246 PATCH TOPICAL at 08:50

## 2022-06-28 RX ADMIN — LISINOPRIL 10 MG: 5 TABLET ORAL at 08:52

## 2022-06-28 RX ADMIN — ACETAMINOPHEN 650 MG: 325 TABLET, FILM COATED ORAL at 16:27

## 2022-06-28 RX ADMIN — OXYCODONE HYDROCHLORIDE 2.5 MG: 5 TABLET ORAL at 13:57

## 2022-06-28 RX ADMIN — Medication 100 MCG: at 08:52

## 2022-06-28 RX ADMIN — AMOXICILLIN AND CLAVULANATE POTASSIUM 1 TABLET: 875; 125 TABLET, FILM COATED ORAL at 20:26

## 2022-06-28 RX ADMIN — GABAPENTIN 600 MG: 300 CAPSULE ORAL at 20:26

## 2022-06-28 RX ADMIN — INSULIN GLARGINE 15 UNITS: 100 INJECTION, SOLUTION SUBCUTANEOUS at 22:20

## 2022-06-28 RX ADMIN — PANTOPRAZOLE SODIUM 40 MG: 40 TABLET, DELAYED RELEASE ORAL at 16:26

## 2022-06-28 RX ADMIN — ACETAMINOPHEN 975 MG: 325 TABLET, FILM COATED ORAL at 20:26

## 2022-06-28 RX ADMIN — DOXYCYCLINE 100 MG: 100 CAPSULE ORAL at 08:52

## 2022-06-28 RX ADMIN — Medication 500 MG: at 13:57

## 2022-06-28 RX ADMIN — ROSUVASTATIN CALCIUM 10 MG: 10 TABLET, FILM COATED ORAL at 20:26

## 2022-06-28 RX ADMIN — LEVOTHYROXINE SODIUM 25 MCG: 0.03 TABLET ORAL at 05:24

## 2022-06-28 RX ADMIN — METFORMIN HYDROCHLORIDE 1000 MG: 500 TABLET, FILM COATED ORAL at 08:53

## 2022-06-28 RX ADMIN — THERA TABS 1 TABLET: TAB at 08:53

## 2022-06-28 RX ADMIN — GABAPENTIN 600 MG: 300 CAPSULE ORAL at 08:53

## 2022-06-28 RX ADMIN — FERROUS SULFATE TAB 325 MG (65 MG ELEMENTAL FE) 325 MG: 325 (65 FE) TAB at 16:26

## 2022-06-28 RX ADMIN — DOXYCYCLINE 100 MG: 100 CAPSULE ORAL at 20:35

## 2022-06-28 RX ADMIN — IPRATROPIUM BROMIDE 2 SPRAY: 21 SPRAY, METERED NASAL at 20:27

## 2022-06-28 RX ADMIN — LIDOCAINE: 50 OINTMENT TOPICAL at 12:20

## 2022-06-28 RX ADMIN — Medication 500 MG: at 20:26

## 2022-06-28 RX ADMIN — FUROSEMIDE 40 MG: 10 INJECTION, SOLUTION INTRAMUSCULAR; INTRAVENOUS at 08:53

## 2022-06-28 RX ADMIN — HYDRALAZINE HYDROCHLORIDE 25 MG: 25 TABLET, FILM COATED ORAL at 07:29

## 2022-06-28 RX ADMIN — FERROUS SULFATE TAB 325 MG (65 MG ELEMENTAL FE) 325 MG: 325 (65 FE) TAB at 08:53

## 2022-06-28 RX ADMIN — Medication 500 MG: at 08:52

## 2022-06-28 RX ADMIN — FERROUS SULFATE TAB 325 MG (65 MG ELEMENTAL FE) 325 MG: 325 (65 FE) TAB at 12:18

## 2022-06-28 RX ADMIN — DICLOFENAC SODIUM 2 G: 10 GEL TOPICAL at 08:57

## 2022-06-28 RX ADMIN — PANTOPRAZOLE SODIUM 40 MG: 40 TABLET, DELAYED RELEASE ORAL at 05:24

## 2022-06-28 RX ADMIN — ACETAMINOPHEN 975 MG: 325 TABLET, FILM COATED ORAL at 13:56

## 2022-06-28 ASSESSMENT — ACTIVITIES OF DAILY LIVING (ADL)
ADLS_ACUITY_SCORE: 30

## 2022-06-28 NOTE — PROGRESS NOTES
"Neurosurgery Progress Note  06/27/22      A/P: Thomas Steve is a 77 year old male POD #11 DECOMPRESSIVE LUMBAR LAMINECTOMY LUMBAR 4-LUMBAR 5 AND LUMBAR 5-SACRAL 1 BILATERAL-LEFT SIDE FIRST-PLUS BILATERAL FORAMINOTOMIES PLUS REMOVAL OF HERNIATED DISC LUMBAR 5-SACRAL 1 LEFT with Dr Samuel. Arachnoid bleb intraop, covered with durgen, duraseal. No CSF leak.     Feels that he is doing well today.continued improvement in fatigue denies radicular lower extremity pain presently, notes continued numbness unchanged since prior to surgery. Voiding without difficulty          PLAN:   1. Daily dressing changes  2. Tizanidine made PRN, scheduled tylenol, resume gabapentin   3. Appreciate ID involvement , plans for oral abx noted   4. Increase activity as tolerated  5. Continue to hold xarelto  6. Dexamethasone x 3 days total  7. Will plan repeat MRI Thursday morning or sooner if symptoms progress  8. TCU potentially Friday pending repeat MRI       HPI: Presented with back pain, right leg pain. Weakness bilateral LE. Worse with walking. No bowel or bladder issues. Diabetes, CABG, AFIB, Xarelto. MRI with listhesis L4-5, spinal stenosis. Stenosis also at L5-S1 with large disc herniation L5-S1 on the left.     Subjective: states that he is doing well appears to be comfortable sitting upright in chair states has not laid down much denies radicular lower extremity pain     Physical Exam  BP (!) 186/88 (BP Location: Right arm)   Pulse 60   Temp 97.8  F (36.6  C) (Oral)   Resp 17   Ht 1.778 m (5' 10\")   Wt 107.2 kg (236 lb 4.8 oz)   SpO2 100%   BMI 33.91 kg/m      General: oriented. KIM. Speech clear. Sitting upright in beside chair     Motor: normal bulk and tone     Strength: Full strength LE bilaterally in bed, sensation intact to touch. Baseline lower extremity neuropathy.     Sensation: intact to light touch; baseline neuropathy bilaterally     Incision: well approximated with staples no surrounding erythema, induration, or " drainage     Labs: reviewed. NGTD on aspiration cultures    Imaging: MRI reviewed personally reviewed   noted fluid collection infection vs postoperative   Combination of postoperative changes, as well as additional findings suggestive of possible superimposed infection with a rim-enhancing ventral epidural collection extending from the L2 level cranially to the L5-S1 level caudally (series 9, image 15), contributing to multilevel high-grade spinal canal stenosis.     Joan French PA-C  [facility] Neurosurgery  O: [phone]

## 2022-06-28 NOTE — PLAN OF CARE
Problem: Bowel Motility Impaired (Spinal Surgery)  Goal: Effective Bowel Elimination  Outcome: Ongoing, Progressing     Problem: Functional Ability Impaired (Spinal Surgery)  Goal: Optimal Functional Ability  Outcome: Ongoing, Progressing  Intervention: Optimize Functional Status  Recent Flowsheet Documentation  Taken 6/28/2022 0100 by Radha Fortune, RN  Activity Management: up in chair     Problem: Pain (Spinal Surgery)  Goal: Acceptable Pain Control  Outcome: Ongoing, Progressing  Intervention: Prevent or Manage Pain  Recent Flowsheet Documentation  Taken 6/28/2022 0100 by Radha Fortune, RN  Pain Management Interventions: rest   Goal Outcome Evaluation:      Pt slept in chair with cpap on. Oxycodone x 1 during the shift. Up to brm with walker and sba. Void 200 ml. Pvr 154. Neuro check q4hrs. Pt denies any change in numbness/tingling. Denies any numbness in inner thighs, demetrius area, or buttocks. No incontinence.

## 2022-06-28 NOTE — PROGRESS NOTES
"CLINICAL NUTRITION SERVICES - ASSESSMENT NOTE     Nutrition Prescription    RECOMMENDATIONS FOR MDs/PROVIDERS TO ORDER:  Decrease stool softeners - 6 BM yesterday    Malnutrition Status:    Does not qualify    Recommendations already ordered by Registered Dietitian (RD):  Nutrition Interventions: supplemental drinks provided - decrease glucerna to once daily + continue gel sgf daily = 310 kcal, 30 g protein total    Future/Additional Recommendations:  Adjust supplements pending intake, weight, acceptance, tolerance        CURRENT NUTRITION ORDERS  Nutrition  Diet/Nutrition Received: diabetic diet, moderate consistent CHO  Diet/Feeding Tolerance: good (Pt reports appetite is variable and down from baseline. He is taking the gel sgf and 1 of 3 Glucerna/day. Eating 100% of 3 meals/day with gel sgf and 1 glucerna estimate intake 3979-3456 kcal,  g protein, meeting > 75% of estimated needs)  Intake (%): 100%  Nutrition Interventions: supplemental drinks provided (did not drink now)      LABS  Lab Results Reviewed: reviewed, pertinent  Lab Results Comments: BUN 39 (H), increased. -318 mgl/dl past 24 hours. In poor control-improved today. Eelvated on steroid    MEDICATIONS  Pertinent Medications Reviewed: reviewed, pertinent  Pertinent Medications Comments: iv lasix bid, ss, metoformin, novolog 1u: 16 g cho(increased), lantus (increased), mvi, protonix, miralax, pericolace, daily dulcolax suppository, vit C 500mg, vit D    ANTHROPOMETRICS  Height: 177.8 cm (5' 10\")  Most Recent Weight: 107.2 kg (236 lb 4.8 oz)  - up 6 lb in fluid weight    kg  Body Mass Index (BMI)  BMI (kg/m2): 30.13      Weight for Calculation (kg): 104.5 kg (230 lb 6.1 oz)     ASSESSED NUTRITION NEEDS  Estimated Calorie Needs  Estimated Calorie Need Method: Karie Isbell  Estimated Calorie Requirement (kcal/day): 2460-9356     Estimated/Assessed Needs  Additional Documentation: Asim Isbell Equation (Group)     Fluid " Requirements  Estimated/Assessed Fluid Req (mL/day): 5629-1987  Intake Assessment  Energy/Calorie Requirement Assessment: meeting needs  Protein Requirement Assessment: meeting needs  Fluid Requirement Assessment: meeting needs  Average Calorie Intake (days): 3  Average Protein Intake (days): 3  Tolerance: tolerating    PHYSICAL FINDINGS  See malnutrition section below.  Physical Findings  Overall Physical Appearance: pt c/o diarrhea. He has been constipated and on mulitple scheduled stool softeners including daily suppository since 6/24    MALNUTRITION  Final Malnutrition Summary  Malnutrition Criteria Met?: no    NUTRITION DIAGNOSIS  Nutrition Diagnosis: Inadequate protein-energy intake realted to s/p laminectomy as evidenced by eating < 75% energy needs for 7 days  and average if 53% estiamted protein needs       INTERVENTIONS  Implementation  Nutrition Interventions: supplemental drinks provided - decrease glucerna to once daily + continue gel sgf daily = 310 kcal, 30 g protein total    Goals  Patient Goals:Nutrition Focus  Nutrition Goals:: PO, Glucose, Lab  Glucose Goal:: Blood Glucose <180  Glucose goal status:: Ongoing, Not Progressing  PO Goal:: PO >75%, Meet estimated needs  PO goal status:: Ongoing, Progressing  Lab Goal:: Electrolytes WNL  Lab goal status:: Met     Monitoring/Evaluation  Monitoring/Evaluation  Monitoring: Monitor patient per protocol, Enteral and parenteral nutrition intake, Nutritient needs, Anthropometric measurements, Biochemical Data/labs

## 2022-06-28 NOTE — PLAN OF CARE
Problem: Infection (Spinal Surgery)  Goal: Absence of Infection Signs and Symptoms  Outcome: Ongoing, Not Progressing     Problem: Bowel Motility Impaired (Spinal Surgery)  Goal: Effective Bowel Elimination  Outcome: Ongoing, Progressing     Problem: Functional Ability Impaired (Spinal Surgery)  Goal: Optimal Functional Ability  Outcome: Ongoing, Progressing  Intervention: Optimize Functional Status  Recent Flowsheet Documentation  Taken 6/28/2022 0852 by Shama Marcano, RN  Activity Management:   activity adjusted per tolerance   ambulated in room   up in chair  Positioning/Transfer Devices:   pillows   in use   Goal Outcome Evaluation:                Pt ambulation improving.  Pt has increased fluid, so discharge was canceled yesterday and f/unit(s) MRI 6/30.  Pt continent of B&B and had another medium soft/loose BM today

## 2022-06-28 NOTE — PLAN OF CARE
Problem: Oral Intake Inadequate  Goal: Improved Oral Intake  Outcome: Ongoing, Progressing   Goal Outcome Evaluation:  Intake is fair to good and taking some supplements. Pt is meeting >75% of estimated needs. Increased stooling-recommend reducing scheduled bowel meds.

## 2022-06-28 NOTE — PROGRESS NOTES
Hospitalist Progress Note    Brief history   77M with -year-old male with CAD, HFrEF, DM2, HTN, atrial fibrillation, hypothyroidism presented for planned neurosurgical intervention to treat herniated lumbar disc. 06/16 underwent decompressive lumbar laminectomy.  06/19 with post-op fever and started on vanco and zosyn.    06/22 MRI with fluid collection with possible rim enhancement which was aspirated.  Cultures negative thus far.  ID recommends Augmentin x doxycycline for 1 week.   Clinically has been improving however repeat MRI 6/27 shows increased fluid collection in the laminectomy bed causing increased compression on cauda equina; as well as slightly increased subdural collection at L2-L4 level.      Also with YOLANDA this admission and volume overload requiring IV diuresis     Assessment and plan:     #Post-op fever.  Post-op fluid collection - culture no growth while on abx.  Plan for augmentin and doxy x 1 week.  MRI 6/27 shows increased fluid collection in the laminectomy bed causing increased compression on cauda equina; as well as slightly increased subdural collection at L2-L4 level.  Discharge 6/27 held, monitoring for cauda equina symptoms.  Per Neurosurgery repeat MRI Thursday morning or sooner if symptoms progress       #Acute on chronic HFrEF; CAD s/p CABG - volume up with R sided findings.  ECHO with EF 45-50%, severe TR, mild MR, mild pulm HTN.  - responded well to IV diuresis.  Switch to oral Bumex tomorrow   - lymphedema wraps   -cards f/u as outpatient     #YOLANDA on CKD3 - baseline 1.1-1.2s.  Post-op injury likely hemodynamic demetrius-operatively.  Resolved.         #Lumbar spinal stenosis with lumbar disc herniation  -Patient underwent scheduled decompressive lumbar laminectomy of L4-L5, L5-S1 with bilateral foraminotomies and removal of herniated disc in the L1-S1 region with neurosurgery on 6/16/2022.  -Scheduled Tylenol 3 times daily  -Gabapentin 600 mg p.o. twice daily  -Post op management per  neurosurgery  -decadron burst per Nsx - completed      #post op urinary retention : started flomax.  Has segura.  voiding trial after diuresed vs. outpatient urology f/u     #constipation - resolved, continue bowel regimen.       #Chronic atrial fibrillation, sick sinus syndrome s/p PPM  -was on lovenox 90mgBID - now on hold given increased subdural fluid collection on the MRI 6/27.   - Resume xarelto when ok from sx standpoint.      #DM2 with recurrent AM hypoglycemia - reports he is eating healthier here in hospital with much reduced insulin needs.  Steroid induced hyperglycemia with sugars 300s-400s - last dexamethasone dose 6/27 AM  -stop glimiperide and would not resume  -decrease Lantus to 15 units at HS, mealtime CHO coverage 1:16, low ISS      -Nasal congestion, postnasal drip  -Exacerbated in the setting of needing to lie flat for 24 hours  -Start ipratropium nasal spray twice daily     -Chronic left shoulder pain  -Start lidocaine and Voltaren trading on and off 4 times daily     -GERD  -Pantoprazole 40 mg p.o. daily     #CORAZON -CPAP     #Hypothyroidism  -TSH 2.05  -Levothyroxine 25 mcg p.o. daily     #Normocytic anemia  -Hemoglobin 11.4 with MCV of 90    COVID 19 prevention   Received vaccination x 2 and booster in April 2022.  Will be due for the 2nd booster in August       Barriers to Discharge:  Monitoring for cauda equina, needs repeat MRI     Anticipated discharge date/Disposition: TCU possibly Friday     Subjective  Patient reports minimal back pain. No new tingling or numbness.  Appetite is getting better.  Has regular BM now.      Objective    Vital signs in last 24 hours  Temp:  [97.8  F (36.6  C)-98  F (36.7  C)] 98  F (36.7  C)  Pulse:  [60-66] 60  Resp:  [16-18] 17  BP: (135-186)/(62-88) 135/62  SpO2:  [95 %-100 %] 95 % @LASTSAO2(12)@ O2 Device: None (Room air)    Weight:   Wt Readings from Last 3 Encounters:   06/25/22 107.2 kg (236 lb 4.8 oz)   06/03/22 95.2 kg (209 lb 12.8 oz)   01/05/22 93.4  kg (206 lb)      Weight change:     Intake/Output last 3 shifts  I/O last 3 completed shifts:  In: 940 [P.O.:940]  Out: 2500 [Urine:2500]  Body mass index is 33.91 kg/m .    Physical Exam    General Appearance:    Obese male, sleepy, sitting in a chair    Lungs:     Scattered basilar crackles    Cardiovascular:    Regular rate ands rhythm.  Nornal S1, S2.  No murmur, rub or gallop.  2 + LE edema with ace wraps on    Abdomen:     Distended,  non-tender, bowel sounds active all four quadrants   Neurologic:   Answers appropriately Grossly nonfocal      Pertinent Labs   Lab Results: personally reviewed.   Recent Labs   Lab 06/28/22  0548 06/26/22  0513 06/25/22  0434 06/24/22  0654 06/23/22  0506    136 138   < > 138  138   CO2 28 24 23   < > 22  22   BUN 39* 28 23   < > 41*  41*   ALBUMIN  --   --   --   --  2.5*    < > = values in this interval not displayed.     Recent Labs   Lab 06/25/22  0434 06/23/22  1434 06/23/22  0506   WBC 7.6 8.2 8.1   HGB 8.9* 8.7* 7.8*   HCT 29.6* 27.9* 24.8*    230 217     No results for input(s): CKTOTAL, TROPONINI in the last 168 hours.    Invalid input(s): TROPONINT, CKMBINDEX  Invalid input(s): POCGLUFGR    Medications  Current Facility-Administered Medications   Medication     acetaminophen (TYLENOL) tablet 650 mg     acetaminophen (TYLENOL) tablet 975 mg     amoxicillin-clavulanate (AUGMENTIN) 875-125 MG per tablet 1 tablet     benzocaine-menthol (CEPACOL) 15-3.6 MG lozenge 1 lozenge     bisacodyl (DULCOLAX) Suppository 10 mg     bisacodyl (DULCOLAX) Suppository 10 mg     [Held by provider] bumetanide (BUMEX) tablet 3 mg     glucose gel 15-30 g    Or     dextrose 50 % injection 25-50 mL    Or     glucagon injection 1 mg     diclofenac (VOLTAREN) 1 % topical gel 2 g     doxycycline monohydrate (MONODOX) capsule 100 mg     [Held by provider] enoxaparin ANTICOAGULANT (LOVENOX) injection 90 mg     ferrous sulfate (FEROSUL) tablet 325 mg     flumazenil (ROMAZICON)  injection 0.2 mg     furosemide (LASIX) injection 40 mg     gabapentin (NEURONTIN) capsule 600 mg     HOLD MEDICATION (one time)     hydrALAZINE (APRESOLINE) tablet 25 mg     HYDROmorphone (DILAUDID) injection 0.2 mg    Or     HYDROmorphone (DILAUDID) injection 0.4 mg     hydrOXYzine (ATARAX) tablet 25 mg    Or     hydrOXYzine (ATARAX) tablet 50 mg     insulin aspart (NovoLOG) injection (RAPID ACTING)     insulin aspart (NovoLOG) injection (RAPID ACTING)     insulin aspart (NovoLOG) injection (RAPID ACTING)     insulin glargine (LANTUS PEN) injection 15 Units     ipratropium (ATROVENT) 0.03 % spray 2 spray     levothyroxine (SYNTHROID/LEVOTHROID) tablet 25 mcg     Lidocaine (LIDOCARE) 4 % Patch 2 patch     lidocaine (XYLOCAINE) 5 % ointment     lidocaine patch in PLACE     lisinopril (ZESTRIL) tablet 10 mg     loratadine (CLARITIN) tablet 10 mg     magnesium hydroxide (MILK OF MAGNESIA) suspension 30 mL     metFORMIN (GLUCOPHAGE) tablet 1,000 mg     multivitamin, therapeutic (THERA-VIT) tablet 1 tablet     naloxone (NARCAN) injection 0.2 mg    Or     naloxone (NARCAN) injection 0.4 mg    Or     naloxone (NARCAN) injection 0.2 mg    Or     naloxone (NARCAN) injection 0.4 mg     ondansetron (ZOFRAN ODT) ODT tab 4 mg    Or     ondansetron (ZOFRAN) injection 4 mg     oxyCODONE IR (ROXICODONE) half-tab 2.5-5 mg     pantoprazole (PROTONIX) EC tablet 40 mg     polyethylene glycol (MIRALAX) Packet 17 g     prochlorperazine (COMPAZINE) injection 5 mg    Or     prochlorperazine (COMPAZINE) tablet 5 mg     rosuvastatin (CRESTOR) tablet 10 mg     senna-docusate (SENOKOT-S/PERICOLACE) 8.6-50 MG per tablet 2 tablet     tamsulosin (FLOMAX) capsule 0.4 mg     tiZANidine (ZANAFLEX) tablet 2 mg     vitamin C (ASCORBIC ACID) tablet 500 mg     Vitamin D3 (CHOLECALCIFEROL) tablet 100 mcg       Pertinent Radiology   Radiology Results: Personally reviewed   Results for orders placed or performed during the hospital encounter of 06/16/22    Lateral Lumbar Spine for Level Confirmation [XR LUMBAR SPINE PORT 1  VIEW]    Impression    IMPRESSION: There are 3 needles overlying the posterior soft tissues. The most superior needle is at the level of the L3-L4 disc space. The middle needle is at the level of the L4-L5 disc space. The most inferior needle is at the level of the superior   aspect of the S1 vertebral body.   XR Lumbar Spine Port 1 View    Impression    IMPRESSION: Single portable crosstable lateral radiograph the lumbar spine was obtained. Surgical retraction devices over the posterior soft tissues at the L4 level. There is a surgical probe with the distal tip at the level of the L4 pedicles. Grade 1   anterolisthesis of L4 on L5.   XR Chest Port 1 View   Result Value Ref Range    Radiologist flags (Urgent)      Left side lung nodule needing follow-up PA and lateral chest x-ray or chest CT.    Impression    IMPRESSION: Poststernotomy changes with multi lead left subclavian venous pacer. Leads are intact. Heart is slightly enlarged but unchanged.    There is a 9 x 5 mm ovoid, relatively dense nodule projecting over the anterior left third rib, not seen before. It's unclear if this is in the rib or the lung.     When patient is able, consider a follow-up formal PA and lateral chest x-ray or a noncontrast chest CT for reevaluation.    Lungs are otherwise clear without signs of pneumonia or failure.      [Access Center: Left side lung nodule needing follow-up PA and lateral chest x-ray or chest CT.       This report will be copied to the [facility] to ensure a provider acknowledges the finding. Access Center is available Monday through Friday 8am-3:30 pm.      US Renal Complete    Impression    IMPRESSION:  1.  Normal kidney ultrasound.   CT Chest w/o Contrast    Impression    IMPRESSION:     1.  Lung parenchymal evaluation is limited by motion-related blurring. No findings to suggest an active lung or airway inflammatory process.  2.   Small left and trace right pleural effusions.  3.  Four-chamber cardiac enlargement with CRT-P in place.  4.  Mild wall thickening of the distal esophagus which is often related to gastroesophageal reflux.     MR Lumbar Spine w/o & w Contrast    Impression    IMPRESSION:    Combination of postoperative changes, as well as additional findings suggestive of possible superimposed infection with a rim-enhancing ventral epidural collection extending from the L2 level cranially to the L5-S1 level caudally (series 9, image 15),   contributing to multilevel high-grade spinal canal stenosis. Additional multicompartmental collections as detailed above.     Extensive background lumbar spondylosis.       XR Chest 2 Views    Impression    IMPRESSION: Poststernotomy changes. Triple lead left subclavian venous pacer again noted.     Lungs are otherwise clear. Heart is slightly enlarged but unchanged. No signs of failure. Small bilateral pleural effusions are again seen noted on the lateral view. Changes of DISH in the thoracic spine.   XR Abdomen Port 1 View    Impression    IMPRESSION: No distended air-filled loops of small bowel. There is a small amount of throughout the colon. No definite intraperitoneal free air identified. Pacemaker leads are partially included in the field-of-view. Prior median sternotomy noted. There   are surgical staples over the abdomen and pelvis.     IR Fine Needle Aspiration w Imaging    Impression    CONCLUSION:     1. Technically successful fluoroscopically-guided aspiration of a posterior paraspinal lumbar fluid collection, with retrieval of 0.5 cc of yellowish, reddish fluid from the L5 level which was sent for further analysis.  _____________________________________________    CPT codes included for physician reference only: 65183/51202   Echocardiogram Complete   Result Value Ref Range    LVEF  45-50% (mildly reduced)          Advanced Care Planning:  Discharge Planning discussed with patient,  RUDI Cruz MD  Internal Medicine Hospitalist  6/27/2022

## 2022-06-29 ENCOUNTER — APPOINTMENT (OUTPATIENT)
Dept: ULTRASOUND IMAGING | Facility: HOSPITAL | Age: 77
DRG: 518 | End: 2022-06-29
Attending: PHYSICIAN ASSISTANT
Payer: MEDICARE

## 2022-06-29 ENCOUNTER — TELEPHONE (OUTPATIENT)
Dept: CARDIOLOGY | Facility: CLINIC | Age: 77
End: 2022-06-29

## 2022-06-29 ENCOUNTER — APPOINTMENT (OUTPATIENT)
Dept: RADIOLOGY | Facility: HOSPITAL | Age: 77
DRG: 518 | End: 2022-06-29
Attending: PHYSICIAN ASSISTANT
Payer: MEDICARE

## 2022-06-29 PROBLEM — I82.411 ACUTE DEEP VEIN THROMBOSIS (DVT) OF FEMORAL VEIN OF RIGHT LOWER EXTREMITY (H): Status: ACTIVE | Noted: 2022-06-29

## 2022-06-29 LAB
ALBUMIN UR-MCNC: NEGATIVE MG/DL
ANION GAP SERPL CALCULATED.3IONS-SCNC: 7 MMOL/L (ref 5–18)
APPEARANCE UR: CLEAR
BACTERIA #/AREA URNS HPF: NORMAL /HPF
BILIRUB UR QL STRIP: NEGATIVE
BUN SERPL-MCNC: 33 MG/DL (ref 8–28)
C REACTIVE PROTEIN LHE: 0.6 MG/DL (ref 0–?)
CALCIUM SERPL-MCNC: 8.8 MG/DL (ref 8.5–10.5)
CHLORIDE BLD-SCNC: 105 MMOL/L (ref 98–107)
CO2 SERPL-SCNC: 27 MMOL/L (ref 22–31)
COLOR UR AUTO: NORMAL
CREAT SERPL-MCNC: 0.92 MG/DL (ref 0.7–1.3)
ERYTHROCYTE [DISTWIDTH] IN BLOOD BY AUTOMATED COUNT: 16.5 % (ref 10–15)
ERYTHROCYTE [DISTWIDTH] IN BLOOD BY AUTOMATED COUNT: 16.9 % (ref 10–15)
ERYTHROCYTE [SEDIMENTATION RATE] IN BLOOD BY WESTERGREN METHOD: 37 MM/HR (ref 0–15)
GFR SERPL CREATININE-BSD FRML MDRD: 86 ML/MIN/1.73M2
GLUCOSE BLD-MCNC: 126 MG/DL (ref 70–125)
GLUCOSE BLDC GLUCOMTR-MCNC: 117 MG/DL (ref 70–99)
GLUCOSE BLDC GLUCOMTR-MCNC: 130 MG/DL (ref 70–99)
GLUCOSE BLDC GLUCOMTR-MCNC: 156 MG/DL (ref 70–99)
GLUCOSE BLDC GLUCOMTR-MCNC: 159 MG/DL (ref 70–99)
GLUCOSE BLDC GLUCOMTR-MCNC: 208 MG/DL (ref 70–99)
GLUCOSE BLDC GLUCOMTR-MCNC: 249 MG/DL (ref 70–99)
GLUCOSE UR STRIP-MCNC: NEGATIVE MG/DL
HCT VFR BLD AUTO: 29.3 % (ref 40–53)
HCT VFR BLD AUTO: 31.8 % (ref 40–53)
HGB BLD-MCNC: 9 G/DL (ref 13.3–17.7)
HGB BLD-MCNC: 9.9 G/DL (ref 13.3–17.7)
HGB UR QL STRIP: NEGATIVE
HOLD SPECIMEN: NORMAL
KETONES UR STRIP-MCNC: NEGATIVE MG/DL
LEUKOCYTE ESTERASE UR QL STRIP: NEGATIVE
MCH RBC QN AUTO: 28.5 PG (ref 26.5–33)
MCH RBC QN AUTO: 28.6 PG (ref 26.5–33)
MCHC RBC AUTO-ENTMCNC: 30.7 G/DL (ref 31.5–36.5)
MCHC RBC AUTO-ENTMCNC: 31.1 G/DL (ref 31.5–36.5)
MCV RBC AUTO: 92 FL (ref 78–100)
MCV RBC AUTO: 93 FL (ref 78–100)
NITRATE UR QL: NEGATIVE
PH UR STRIP: 5 [PH] (ref 5–7)
PLATELET # BLD AUTO: 312 10E3/UL (ref 150–450)
PLATELET # BLD AUTO: 324 10E3/UL (ref 150–450)
POTASSIUM BLD-SCNC: 4 MMOL/L (ref 3.5–5)
RADIOLOGIST FLAGS: ABNORMAL
RBC # BLD AUTO: 3.15 10E6/UL (ref 4.4–5.9)
RBC # BLD AUTO: 3.47 10E6/UL (ref 4.4–5.9)
SODIUM SERPL-SCNC: 139 MMOL/L (ref 136–145)
SP GR UR STRIP: 1.02 (ref 1–1.03)
UFH PPP CHRO-ACNC: 0.85 IU/ML
UROBILINOGEN UR STRIP-MCNC: <2 MG/DL
WBC # BLD AUTO: 11.2 10E3/UL (ref 4–11)
WBC # BLD AUTO: 11.2 10E3/UL (ref 4–11)

## 2022-06-29 PROCEDURE — 250N000013 HC RX MED GY IP 250 OP 250 PS 637: Performed by: INTERNAL MEDICINE

## 2022-06-29 PROCEDURE — 36415 COLL VENOUS BLD VENIPUNCTURE: CPT | Performed by: PHYSICIAN ASSISTANT

## 2022-06-29 PROCEDURE — 250N000013 HC RX MED GY IP 250 OP 250 PS 637: Performed by: SURGERY

## 2022-06-29 PROCEDURE — 85520 HEPARIN ASSAY: CPT | Performed by: INTERNAL MEDICINE

## 2022-06-29 PROCEDURE — 87040 BLOOD CULTURE FOR BACTERIA: CPT | Performed by: PHYSICIAN ASSISTANT

## 2022-06-29 PROCEDURE — 85652 RBC SED RATE AUTOMATED: CPT | Performed by: PHYSICIAN ASSISTANT

## 2022-06-29 PROCEDURE — 85027 COMPLETE CBC AUTOMATED: CPT | Performed by: PHYSICIAN ASSISTANT

## 2022-06-29 PROCEDURE — 250N000013 HC RX MED GY IP 250 OP 250 PS 637: Performed by: FAMILY MEDICINE

## 2022-06-29 PROCEDURE — 85027 COMPLETE CBC AUTOMATED: CPT | Performed by: INTERNAL MEDICINE

## 2022-06-29 PROCEDURE — 99233 SBSQ HOSP IP/OBS HIGH 50: CPT | Performed by: INTERNAL MEDICINE

## 2022-06-29 PROCEDURE — 86140 C-REACTIVE PROTEIN: CPT | Performed by: PHYSICIAN ASSISTANT

## 2022-06-29 PROCEDURE — 93970 EXTREMITY STUDY: CPT

## 2022-06-29 PROCEDURE — 999N000157 HC STATISTIC RCP TIME EA 10 MIN

## 2022-06-29 PROCEDURE — 81003 URINALYSIS AUTO W/O SCOPE: CPT | Performed by: PHYSICIAN ASSISTANT

## 2022-06-29 PROCEDURE — 250N000013 HC RX MED GY IP 250 OP 250 PS 637: Performed by: HOSPITALIST

## 2022-06-29 PROCEDURE — 120N000001 HC R&B MED SURG/OB

## 2022-06-29 PROCEDURE — 250N000013 HC RX MED GY IP 250 OP 250 PS 637: Performed by: NURSE PRACTITIONER

## 2022-06-29 PROCEDURE — 71046 X-RAY EXAM CHEST 2 VIEWS: CPT

## 2022-06-29 PROCEDURE — 250N000011 HC RX IP 250 OP 636: Performed by: INTERNAL MEDICINE

## 2022-06-29 PROCEDURE — 80048 BASIC METABOLIC PNL TOTAL CA: CPT | Performed by: INTERNAL MEDICINE

## 2022-06-29 PROCEDURE — 36415 COLL VENOUS BLD VENIPUNCTURE: CPT | Performed by: INTERNAL MEDICINE

## 2022-06-29 RX ORDER — OXYCODONE HYDROCHLORIDE 5 MG/1
5 TABLET ORAL EVERY 4 HOURS PRN
Status: DISCONTINUED | OUTPATIENT
Start: 2022-06-29 | End: 2022-07-06 | Stop reason: HOSPADM

## 2022-06-29 RX ORDER — LISINOPRIL 20 MG/1
20 TABLET ORAL DAILY
Status: DISCONTINUED | OUTPATIENT
Start: 2022-06-29 | End: 2022-07-06 | Stop reason: HOSPADM

## 2022-06-29 RX ORDER — HEPARIN SODIUM 10000 [USP'U]/100ML
0-5000 INJECTION, SOLUTION INTRAVENOUS CONTINUOUS
Status: DISCONTINUED | OUTPATIENT
Start: 2022-06-29 | End: 2022-07-06

## 2022-06-29 RX ORDER — AMOXICILLIN 250 MG
2 CAPSULE ORAL 2 TIMES DAILY PRN
Status: DISCONTINUED | OUTPATIENT
Start: 2022-06-29 | End: 2022-07-06 | Stop reason: HOSPADM

## 2022-06-29 RX ORDER — POLYETHYLENE GLYCOL 3350 17 G/17G
17 POWDER, FOR SOLUTION ORAL DAILY PRN
Status: DISCONTINUED | OUTPATIENT
Start: 2022-06-29 | End: 2022-07-06 | Stop reason: HOSPADM

## 2022-06-29 RX ADMIN — LEVOTHYROXINE SODIUM 25 MCG: 0.03 TABLET ORAL at 06:30

## 2022-06-29 RX ADMIN — DICLOFENAC SODIUM 2 G: 10 GEL TOPICAL at 09:17

## 2022-06-29 RX ADMIN — METFORMIN HYDROCHLORIDE 1000 MG: 500 TABLET, FILM COATED ORAL at 17:03

## 2022-06-29 RX ADMIN — DOXYCYCLINE 100 MG: 100 CAPSULE ORAL at 19:37

## 2022-06-29 RX ADMIN — Medication 500 MG: at 13:19

## 2022-06-29 RX ADMIN — DICLOFENAC SODIUM 2 G: 10 GEL TOPICAL at 17:05

## 2022-06-29 RX ADMIN — LIDOCAINE 2 PATCH: 246 PATCH TOPICAL at 09:15

## 2022-06-29 RX ADMIN — PANTOPRAZOLE SODIUM 40 MG: 40 TABLET, DELAYED RELEASE ORAL at 17:03

## 2022-06-29 RX ADMIN — TAMSULOSIN HYDROCHLORIDE 0.4 MG: 0.4 CAPSULE ORAL at 09:15

## 2022-06-29 RX ADMIN — OXYCODONE HYDROCHLORIDE 2.5 MG: 5 TABLET ORAL at 09:15

## 2022-06-29 RX ADMIN — Medication 500 MG: at 09:13

## 2022-06-29 RX ADMIN — AMOXICILLIN AND CLAVULANATE POTASSIUM 1 TABLET: 875; 125 TABLET, FILM COATED ORAL at 19:37

## 2022-06-29 RX ADMIN — TRAZODONE HYDROCHLORIDE 25 MG: 50 TABLET ORAL at 23:01

## 2022-06-29 RX ADMIN — HYDROXYZINE HYDROCHLORIDE 50 MG: 25 TABLET, FILM COATED ORAL at 19:40

## 2022-06-29 RX ADMIN — OXYCODONE HYDROCHLORIDE 5 MG: 5 TABLET ORAL at 02:56

## 2022-06-29 RX ADMIN — PANTOPRAZOLE SODIUM 40 MG: 40 TABLET, DELAYED RELEASE ORAL at 06:30

## 2022-06-29 RX ADMIN — FERROUS SULFATE TAB 325 MG (65 MG ELEMENTAL FE) 325 MG: 325 (65 FE) TAB at 17:03

## 2022-06-29 RX ADMIN — LISINOPRIL 20 MG: 20 TABLET ORAL at 09:16

## 2022-06-29 RX ADMIN — INSULIN GLARGINE 15 UNITS: 100 INJECTION, SOLUTION SUBCUTANEOUS at 21:13

## 2022-06-29 RX ADMIN — ACETAMINOPHEN 975 MG: 325 TABLET, FILM COATED ORAL at 09:14

## 2022-06-29 RX ADMIN — HEPARIN SODIUM 1800 UNITS/HR: 10000 INJECTION, SOLUTION INTRAVENOUS at 14:18

## 2022-06-29 RX ADMIN — THERA TABS 1 TABLET: TAB at 09:15

## 2022-06-29 RX ADMIN — ACETAMINOPHEN 650 MG: 325 TABLET, FILM COATED ORAL at 00:28

## 2022-06-29 RX ADMIN — Medication 500 MG: at 20:59

## 2022-06-29 RX ADMIN — BUMETANIDE 3 MG: 1 TABLET ORAL at 09:14

## 2022-06-29 RX ADMIN — GABAPENTIN 600 MG: 300 CAPSULE ORAL at 09:13

## 2022-06-29 RX ADMIN — AMOXICILLIN AND CLAVULANATE POTASSIUM 1 TABLET: 875; 125 TABLET, FILM COATED ORAL at 09:13

## 2022-06-29 RX ADMIN — ACETAMINOPHEN 975 MG: 325 TABLET, FILM COATED ORAL at 13:19

## 2022-06-29 RX ADMIN — GABAPENTIN 600 MG: 300 CAPSULE ORAL at 20:59

## 2022-06-29 RX ADMIN — ACETAMINOPHEN 975 MG: 325 TABLET, FILM COATED ORAL at 20:59

## 2022-06-29 RX ADMIN — LIDOCAINE: 50 OINTMENT TOPICAL at 12:47

## 2022-06-29 RX ADMIN — DOXYCYCLINE 100 MG: 100 CAPSULE ORAL at 09:15

## 2022-06-29 RX ADMIN — FERROUS SULFATE TAB 325 MG (65 MG ELEMENTAL FE) 325 MG: 325 (65 FE) TAB at 12:47

## 2022-06-29 RX ADMIN — FERROUS SULFATE TAB 325 MG (65 MG ELEMENTAL FE) 325 MG: 325 (65 FE) TAB at 09:14

## 2022-06-29 RX ADMIN — ROSUVASTATIN CALCIUM 10 MG: 10 TABLET, FILM COATED ORAL at 20:59

## 2022-06-29 RX ADMIN — OXYCODONE HYDROCHLORIDE 5 MG: 5 TABLET ORAL at 22:04

## 2022-06-29 RX ADMIN — Medication 100 MCG: at 09:15

## 2022-06-29 RX ADMIN — OXYCODONE HYDROCHLORIDE 5 MG: 5 TABLET ORAL at 17:03

## 2022-06-29 RX ADMIN — METFORMIN HYDROCHLORIDE 1000 MG: 500 TABLET, FILM COATED ORAL at 09:15

## 2022-06-29 ASSESSMENT — ACTIVITIES OF DAILY LIVING (ADL)
ADLS_ACUITY_SCORE: 30

## 2022-06-29 NOTE — PLAN OF CARE
Problem: Functional Ability Impaired (Spinal Surgery)  Goal: Optimal Functional Ability  Outcome: Ongoing, Progressing  Intervention: Optimize Functional Status  Recent Flowsheet Documentation  Taken 6/28/2022 1906 by Amna Lujan RN  Positioning/Transfer Devices:   pillows   in use     Problem: Neurologic Impairment (Spinal Surgery)  Goal: Optimal Neurologic Function  Intervention: Optimize Neurologic Function  Recent Flowsheet Documentation  Taken 6/28/2022 1906 by Amna Lujan RN  Body Position: position changed independently     Problem: Plan of Care - These are the overarching goals to be used throughout the patient stay.    Goal: Optimal Comfort and Wellbeing  Intervention: Monitor Pain and Promote Comfort  Recent Flowsheet Documentation  Taken 6/28/2022 1906 by Amna Lujan RN  Pain Management Interventions: rest  Taken 6/28/2022 1736 by Amna Lujan RN  Pain Management Interventions: rest  Taken 6/28/2022 1622 by Amna Lujan RN  Pain Management Interventions: medication (see MAR)   Goal Outcome Evaluation:      Patient AAO. Up in chair with legs elevated, lymphedema wraps in place. Ambulated to bathroom with SBA and walker. Patient reports chronic numbness and tingling in upper and lower extremities. C/O aching pain in lower back this shift (6/10), relieved to a tolerable level with Tylenol. Blood sugars 159 and 120 this shift. PIV in left arm SL. Call light in reach. Will continue with plan of care.

## 2022-06-29 NOTE — PROGRESS NOTES
Hospitalist Progress Note    Brief history   77M with -year-old male with CAD, HFrEF, DM2, HTN, atrial fibrillation, hypothyroidism presented for planned neurosurgical intervention to treat herniated lumbar disc. 06/16 underwent decompressive lumbar laminectomy.  06/19 with post-op fever and started on vanco and zosyn.    06/22 MRI with fluid collection with possible rim enhancement which was aspirated.  Cultures negative thus far.  ID recommends Augmentin x doxycycline for 1 week.   Clinically has been improving however repeat MRI 6/27 showed increased fluid collection in the laminectomy bed causing increased compression on cauda equina, as well as slightly increased subdural collection at L2-L4 level.  Neurosurgery recommended continuous monitoring for cauda equina and repeat MRI in 2 to 3 days.  Anticoagulation was held due to subdural fluid collection.    6/29 found to have right lower extremity DVT.  IV heparin started after discussion with neurosurgery    Also with YOLANDA this admission and volume overload requiring IV diuresis     Assessment and plan:     #Post-op fever.  Post-op fluid collection - culture no growth while on abx.  Plan for augmentin and doxy x 1 week.  MRI 6/27 shows increased fluid collection in the laminectomy bed causing increased compression on cauda equina; as well as slightly increased subdural collection at L2-L4 level.  Discharge 6/27 held, monitoring for cauda equina symptoms.  Per Neurosurgery repeat MRI Thursday morning.    6/29 new onset chills but no fever.  WBC mildly elevated 11.2 but CRP down 2.6 from 4.8 and ESR 37 from 89.  Agree with checking blood cultures, UA, chest x-ray.  Plan for MRI lumbar spine tomorrow    # Acute DVT of the right profundofemoral vein in the upper thigh - started IV heparin.  Was on Xarelto prior to admission    #Acute on chronic HFrEF; CAD s/p CABG - volume up with R sided findings.  ECHO with EF 45-50%, severe TR, mild MR, mild pulm HTN.  - responded  well to IV diuresis.  Switched to oral Bumex today  -cards f/u as outpatient     #YOLANDA on CKD3 - baseline 1.1-1.2s.  Post-op injury likely hemodynamic demetrius-operatively.  Resolved.      #Lumbar spinal stenosis with lumbar disc herniation  -Patient underwent scheduled decompressive lumbar laminectomy of L4-L5, L5-S1 with bilateral foraminotomies and removal of herniated disc in the L1-S1 region with neurosurgery on 6/16/2022.  -Scheduled Tylenol 3 times daily  -Gabapentin 600 mg p.o. twice daily  -Post op management per neurosurgery  -decadron burst per Nsx - completed      #post op urinary retention : started flomax.  Had Carpenter which is now removed.  .      #constipation - resolved     #Chronic atrial fibrillation, sick sinus syndrome s/p PPM  -was on lovenox 90mgBID - now on hold given increased subdural fluid collection on the MRI 6/27.   - Resume xarelto when ok from sx standpoint.      #DM2 with recurrent AM hypoglycemia - reports he is eating healthier here in hospital with much reduced insulin needs.  Steroid induced hyperglycemia with sugars 300s-400s - last dexamethasone dose 6/27 AM - improved significantly   -stop glimiperide and would not resume  -continue Lantus to 15 units at HS, mealtime CHO coverage 1:16, low ISS      -Nasal congestion, postnasal drip  -Exacerbated in the setting of needing to lie flat for 24 hours  -Start ipratropium nasal spray twice daily     -Chronic left shoulder pain  -Start lidocaine and Voltaren trading on and off 4 times daily     -GERD  -Pantoprazole 40 mg p.o. daily     #CORAZON -CPAP     #Hypothyroidism  -TSH 2.05  -Levothyroxine 25 mcg p.o. daily     #Normocytic anemia  -Hemoglobin 11.4 with MCV of 90    COVID 19 prevention   Received vaccination x 2 and booster in April 2022.  Will be due for the 2nd booster in August       Barriers to Discharge:  Monitoring for cauda equina and infection, needs repeat MRI Thursday,  IV heparin    Anticipated discharge date/Disposition: TCU  possibly Friday     Subjective  Patient reports not feeling well today.  Reports having chills but no fevers.  Has headache.  Endorses increased swelling and bilateral lower extremities.  No change in lower back pain, no new tingling or numbness.  No dysuria or new cough.      Objective    Vital signs in last 24 hours  Temp:  [98.3  F (36.8  C)-98.4  F (36.9  C)] 98.4  F (36.9  C)  Pulse:  [60-64] 61  Resp:  [17-18] 17  BP: (158-174)/(68-79) 158/68  SpO2:  [97 %-99 %] 97 % @LASTSAO2(12)@ O2 Device: None (Room air)    Weight:   Wt Readings from Last 3 Encounters:   06/25/22 107.2 kg (236 lb 4.8 oz)   06/03/22 95.2 kg (209 lb 12.8 oz)   01/05/22 93.4 kg (206 lb)      Weight change:     Intake/Output last 3 shifts  I/O last 3 completed shifts:  In: 1420 [P.O.:1420]  Out: 2650 [Urine:2650]  Body mass index is 33.91 kg/m .    Physical Exam    General Appearance:    Obese male, sitting in a chair    Lungs:    Clear bilaterally   Cardiovascular:    Regular rate ands rhythm.  Nornal S1, S2.  No murmur, rub or gallop.  2 + LE edema bilaterally   Abdomen:     Distended,  non-tender, bowel sounds active all four quadrants   Neurologic:   Answers appropriately Grossly nonfocal      Pertinent Labs   Lab Results: personally reviewed.   Recent Labs   Lab 06/29/22  0529 06/28/22  0548 06/26/22  0513 06/24/22  0654 06/23/22  0506    137 136   < > 138  138   CO2 27 28 24   < > 22  22   BUN 33* 39* 28   < > 41*  41*   ALBUMIN  --   --   --   --  2.5*    < > = values in this interval not displayed.     Recent Labs   Lab 06/29/22  1424 06/29/22  0529 06/25/22  0434   WBC 11.2* 11.2* 7.6   HGB 9.9* 9.0* 8.9*   HCT 31.8* 29.3* 29.6*    324 266     No results for input(s): CKTOTAL, TROPONINI in the last 168 hours.    Invalid input(s): TROPONINT, CKMBINDEX  Invalid input(s): POCGLUFGR    Medications  Current Facility-Administered Medications   Medication     acetaminophen (TYLENOL) tablet 650 mg     acetaminophen (TYLENOL)  tablet 975 mg     amoxicillin-clavulanate (AUGMENTIN) 875-125 MG per tablet 1 tablet     benzocaine-menthol (CEPACOL) 15-3.6 MG lozenge 1 lozenge     bisacodyl (DULCOLAX) Suppository 10 mg     bisacodyl (DULCOLAX) Suppository 10 mg     bumetanide (BUMEX) tablet 3 mg     glucose gel 15-30 g    Or     dextrose 50 % injection 25-50 mL    Or     glucagon injection 1 mg     diclofenac (VOLTAREN) 1 % topical gel 2 g     doxycycline monohydrate (MONODOX) capsule 100 mg     ferrous sulfate (FEROSUL) tablet 325 mg     flumazenil (ROMAZICON) injection 0.2 mg     gabapentin (NEURONTIN) capsule 600 mg     heparin infusion 25,000 units in D5W 250 mL ANTICOAGULANT     HOLD MEDICATION (one time)     hydrALAZINE (APRESOLINE) tablet 25 mg     HYDROmorphone (DILAUDID) injection 0.2 mg    Or     HYDROmorphone (DILAUDID) injection 0.4 mg     hydrOXYzine (ATARAX) tablet 25 mg    Or     hydrOXYzine (ATARAX) tablet 50 mg     insulin aspart (NovoLOG) injection (RAPID ACTING)     insulin aspart (NovoLOG) injection (RAPID ACTING)     insulin aspart (NovoLOG) injection (RAPID ACTING)     insulin glargine (LANTUS PEN) injection 15 Units     ipratropium (ATROVENT) 0.03 % spray 2 spray     levothyroxine (SYNTHROID/LEVOTHROID) tablet 25 mcg     Lidocaine (LIDOCARE) 4 % Patch 2 patch     lidocaine (XYLOCAINE) 5 % ointment     lidocaine patch in PLACE     lisinopril (ZESTRIL) tablet 20 mg     loratadine (CLARITIN) tablet 10 mg     magnesium hydroxide (MILK OF MAGNESIA) suspension 30 mL     metFORMIN (GLUCOPHAGE) tablet 1,000 mg     multivitamin, therapeutic (THERA-VIT) tablet 1 tablet     naloxone (NARCAN) injection 0.2 mg    Or     naloxone (NARCAN) injection 0.4 mg    Or     naloxone (NARCAN) injection 0.2 mg    Or     naloxone (NARCAN) injection 0.4 mg     ondansetron (ZOFRAN ODT) ODT tab 4 mg    Or     ondansetron (ZOFRAN) injection 4 mg     oxyCODONE IR (ROXICODONE) half-tab 2.5-5 mg     pantoprazole (PROTONIX) EC tablet 40 mg     polyethylene  glycol (MIRALAX) Packet 17 g     prochlorperazine (COMPAZINE) injection 5 mg    Or     prochlorperazine (COMPAZINE) tablet 5 mg     rosuvastatin (CRESTOR) tablet 10 mg     senna-docusate (SENOKOT-S/PERICOLACE) 8.6-50 MG per tablet 2 tablet     tamsulosin (FLOMAX) capsule 0.4 mg     tiZANidine (ZANAFLEX) tablet 2 mg     vitamin C (ASCORBIC ACID) tablet 500 mg     Vitamin D3 (CHOLECALCIFEROL) tablet 100 mcg       Pertinent Radiology   Radiology Results: Personally reviewed   Results for orders placed or performed during the hospital encounter of 06/16/22   Lateral Lumbar Spine for Level Confirmation [XR LUMBAR SPINE PORT 1  VIEW]    Impression    IMPRESSION: There are 3 needles overlying the posterior soft tissues. The most superior needle is at the level of the L3-L4 disc space. The middle needle is at the level of the L4-L5 disc space. The most inferior needle is at the level of the superior   aspect of the S1 vertebral body.   XR Lumbar Spine Port 1 View    Impression    IMPRESSION: Single portable crosstable lateral radiograph the lumbar spine was obtained. Surgical retraction devices over the posterior soft tissues at the L4 level. There is a surgical probe with the distal tip at the level of the L4 pedicles. Grade 1   anterolisthesis of L4 on L5.   XR Chest Port 1 View   Result Value Ref Range    Radiologist flags (Urgent)      Left side lung nodule needing follow-up PA and lateral chest x-ray or chest CT.    Impression    IMPRESSION: Poststernotomy changes with multi lead left subclavian venous pacer. Leads are intact. Heart is slightly enlarged but unchanged.    There is a 9 x 5 mm ovoid, relatively dense nodule projecting over the anterior left third rib, not seen before. It's unclear if this is in the rib or the lung.     When patient is able, consider a follow-up formal PA and lateral chest x-ray or a noncontrast chest CT for reevaluation.    Lungs are otherwise clear without signs of pneumonia or  failure.      [Access Center: Left side lung nodule needing follow-up PA and lateral chest x-ray or chest CT.       This report will be copied to the [facility] to ensure a provider acknowledges the finding. Access Center is available Monday through Friday 8am-3:30 pm.      US Renal Complete    Impression    IMPRESSION:  1.  Normal kidney ultrasound.   CT Chest w/o Contrast    Impression    IMPRESSION:     1.  Lung parenchymal evaluation is limited by motion-related blurring. No findings to suggest an active lung or airway inflammatory process.  2.  Small left and trace right pleural effusions.  3.  Four-chamber cardiac enlargement with CRT-P in place.  4.  Mild wall thickening of the distal esophagus which is often related to gastroesophageal reflux.     MR Lumbar Spine w/o & w Contrast    Impression    IMPRESSION:    Combination of postoperative changes, as well as additional findings suggestive of possible superimposed infection with a rim-enhancing ventral epidural collection extending from the L2 level cranially to the L5-S1 level caudally (series 9, image 15),   contributing to multilevel high-grade spinal canal stenosis. Additional multicompartmental collections as detailed above.     Extensive background lumbar spondylosis.       XR Chest 2 Views    Impression    IMPRESSION: Poststernotomy changes. Triple lead left subclavian venous pacer again noted.     Lungs are otherwise clear. Heart is slightly enlarged but unchanged. No signs of failure. Small bilateral pleural effusions are again seen noted on the lateral view. Changes of DISH in the thoracic spine.   XR Abdomen Port 1 View    Impression    IMPRESSION: No distended air-filled loops of small bowel. There is a small amount of throughout the colon. No definite intraperitoneal free air identified. Pacemaker leads are partially included in the field-of-view. Prior median sternotomy noted. There   are surgical staples over the abdomen and  pelvis.     IR Fine Needle Aspiration w Imaging    Impression    CONCLUSION:     1. Technically successful fluoroscopically-guided aspiration of a posterior paraspinal lumbar fluid collection, with retrieval of 0.5 cc of yellowish, reddish fluid from the L5 level which was sent for further analysis.  _____________________________________________    CPT codes included for physician reference only: 39057/44068   Echocardiogram Complete   Result Value Ref Range    LVEF  45-50% (mildly reduced)          Advanced Care Planning:  Discharge Planning discussed with patient, neurosurgery, RN      Ewa Cruz MD  Internal Medicine Hospitalist  6/27/2022

## 2022-06-29 NOTE — PROGRESS NOTES
Neurosurgery Progress Note  06/29/22      A/P: Thomas Steve is a 77 year old male POD #12 DECOMPRESSIVE LUMBAR LAMINECTOMY LUMBAR 4-LUMBAR 5 AND LUMBAR 5-SACRAL 1 BILATERAL-LEFT SIDE FIRST-PLUS BILATERAL FORAMINOTOMIES PLUS REMOVAL OF HERNIATED DISC LUMBAR 5-SACRAL 1 LEFT with Dr Samuel. Arachnoid bleb intraop, covered with durgen, duraseal. No CSF leak.     Feels overall worst this morning feels chilled and get cant warm. Temp 98.3. states incisional pain controlled with prn medications denies radicular leg pain and unchanged numbness similar to prior to surgery. Denies lower extremity weakness but feels bilateral legs with worsened swelling and calf tenderness today. Has lymphedema wraps in place. Denies urinary incontinence. Voiding without difficulty.        PLAN:   1. Bilateral LE doppler to r/o DVT  2. CBC, CRP, sed rate to be completed   3. Increase activity as tolerated with PT/OT  4. Daily dressing changes  5. Continue prn pain medications   6. Appreciate ID involvement , plans for oral abx noted   7. Continue to hold xarelto  8. Dexamethasone x 3 days total  9. Will plan repeat MRI Thursday morning or sooner if symptoms progress  10. TCU potentially Friday pending repeat MRI     Addendum:   LE ultrasound results with acute deep venous thrombus in the right profundofemoral vein in the upper   CRP normal at 0.6 previously 4.8 (6/25/2022)  Sed rate trending down currently 37 previously 89 (6/25/2022)  Elevated WBC will obtain UA, BC, and chest xray to rule out other possible sources could just be related to recent decadron   Okay for Heparin drip would prefer vs full dose Lovenox as more easily to reversed if need for surgical intervention pending repeat MRI to be completed tomorrow       HPI: Presented with back pain, right leg pain. Weakness bilateral LE. Worse with walking. No bowel or bladder issues. Diabetes, CABG, AFIB, Xarelto. MRI with listhesis L4-5, spinal stenosis. Stenosis also at L5-S1 with  "large disc herniation L5-S1 on the left.     Subjective: states that does not feel well today has chills this morning and tightness in lower extremities in calf feels more swollen, denies worsening radicular pain or numbness in lower extremities. Denies urinary incontinence.     Physical Exam  BP (!) 169/79 (BP Location: Right arm)   Pulse 60   Temp 98.3  F (36.8  C) (Oral)   Resp 18   Ht 1.778 m (5' 10\")   Wt 107.2 kg (236 lb 4.8 oz)   SpO2 98%   BMI 33.91 kg/m        General: oriented. KIM. Speech clear. Sitting upright in beside chair     Motor: normal bulk and tone     Strength: Full strength LE bilaterally in bed, sensation intact to touch. Baseline lower extremity neuropathy.   Bilateral lower extremities taught and swollen no redness, lymphedema wraps in place     Sensation: intact to light touch; baseline neuropathy bilaterally     Incision: well approximated with staples no surrounding erythema, induration, or drainage     Labs: reviewed. NGTD on aspiration cultures    Imaging: MRI reviewed personally reviewed   noted fluid collection infection vs postoperative   Combination of postoperative changes, as well as additional findings suggestive of possible superimposed infection with a rim-enhancing ventral epidural collection extending from the L2 level cranially to the L5-S1 level caudally (series 9, image 15), contributing to multilevel high-grade spinal canal stenosis.     Joan French PA-C  [facility]  O: [phone]      "

## 2022-06-29 NOTE — TELEPHONE ENCOUNTER
[facility] Call Center    Phone Message    May a detailed message be left on voicemail: yes     Reason for Call: Other: Sirisha called looking to speak with  regarding the patients health. Patient has been in the hospital since the 16th of June, and had spinal surgery done. Sirisha stated the provider just discovered a blood clot in his leg. His legs have filled up fluid. Patient has no energy, and his diabetes has worsened. Sirisha believes he is not receiving good care. Please call Sirisha back to discuss further, thank you.     Action Taken: Message routed to:  Other: Cardiology    Travel Screening: Not Applicable

## 2022-06-29 NOTE — PROGRESS NOTES
Care Management Follow Up    Length of Stay (days): 13    Expected Discharge Date: 07/01/2022     Concerns to be Addressed: discharge planning     Patient plan of care discussed at interdisciplinary rounds: Yes    Anticipated Discharge Disposition: Transitional Care     Anticipated Discharge Services: Other (see comment) (therapy services)  Anticipated Discharge DME: None    Patient/family educated on Medicare website which has current facility and service quality ratings: yes  Education Provided on the Discharge Plan:  yes  Patient/Family in Agreement with the Plan: yes    Referrals Placed by CM/SW: Post Acute Facilities  Private pay costs discussed: Not applicable    Additional Information:  ELBA NOVAK received update from MD.  Pt has DVT and will have MRI tomorrow (6/30/22).  At this time it is unlikely Pt will be able to discharge to [facility] TCU on 6/30/22 as anticipated.      SW to update TCU once more information known as well as update Pt's son.     ELBA following for progression and discharge planning.        ALEXIS BartlettSW

## 2022-06-29 NOTE — PLAN OF CARE
Problem: Pain (Spinal Surgery)  Goal: Acceptable Pain Control  Outcome: Ongoing, Progressing     Problem: Postoperative Urinary Retention (Spinal Surgery)  Goal: Effective Urinary Elimination  Outcome: Ongoing, Progressing     Problem: Functional Ability Impaired (Spinal Surgery)  Goal: Optimal Functional Ability  Outcome: Ongoing, Progressing   Goal Outcome Evaluation:    Pt up with assist of 1 using walker and gait belt. Lower back pain managed with prn tylenol and oxycodone. Voiding okay, PVR was 96.

## 2022-06-29 NOTE — PLAN OF CARE
Problem: Bowel Motility Impaired (Spinal Surgery)  Goal: Effective Bowel Elimination  Outcome: Ongoing, Progressing     Problem: Fluid and Electrolyte Imbalance (Spinal Surgery)  Goal: Fluid and Electrolyte Balance  Outcome: Ongoing, Progressing     Problem: Pain (Spinal Surgery)  Goal: Acceptable Pain Control  Outcome: Ongoing, Progressing     Problem: Postoperative Urinary Retention (Spinal Surgery)  Goal: Effective Urinary Elimination  Outcome: Ongoing, Progressing   Goal Outcome Evaluation:        Pt voiding well and has been continent of B&B.  Had loose stool this morning.  Pt now has DVT right leg and started on Heparin drip at 1800units/hr.  Eating and drinking well.  BG today 117 and 159.

## 2022-06-30 ENCOUNTER — APPOINTMENT (OUTPATIENT)
Dept: MRI IMAGING | Facility: HOSPITAL | Age: 77
DRG: 518 | End: 2022-06-30
Attending: PHYSICIAN ASSISTANT
Payer: MEDICARE

## 2022-06-30 ENCOUNTER — APPOINTMENT (OUTPATIENT)
Dept: RADIOLOGY | Facility: HOSPITAL | Age: 77
DRG: 518 | End: 2022-06-30
Attending: PHYSICIAN ASSISTANT
Payer: MEDICARE

## 2022-06-30 LAB
ANION GAP SERPL CALCULATED.3IONS-SCNC: 8 MMOL/L (ref 5–18)
BACTERIA ABSC ANAEROBE+AEROBE CULT: NORMAL
BUN SERPL-MCNC: 25 MG/DL (ref 8–28)
CALCIUM SERPL-MCNC: 8.8 MG/DL (ref 8.5–10.5)
CHLORIDE BLD-SCNC: 103 MMOL/L (ref 98–107)
CO2 SERPL-SCNC: 29 MMOL/L (ref 22–31)
CREAT SERPL-MCNC: 0.83 MG/DL (ref 0.7–1.3)
GFR SERPL CREATININE-BSD FRML MDRD: >90 ML/MIN/1.73M2
GLUCOSE BLD-MCNC: 128 MG/DL (ref 70–125)
GLUCOSE BLDC GLUCOMTR-MCNC: 116 MG/DL (ref 70–99)
GLUCOSE BLDC GLUCOMTR-MCNC: 128 MG/DL (ref 70–99)
GLUCOSE BLDC GLUCOMTR-MCNC: 147 MG/DL (ref 70–99)
GLUCOSE BLDC GLUCOMTR-MCNC: 189 MG/DL (ref 70–99)
POTASSIUM BLD-SCNC: 3.9 MMOL/L (ref 3.5–5)
SODIUM SERPL-SCNC: 140 MMOL/L (ref 136–145)
UFH PPP CHRO-ACNC: 0.65 IU/ML
UFH PPP CHRO-ACNC: 0.69 IU/ML

## 2022-06-30 PROCEDURE — 250N000013 HC RX MED GY IP 250 OP 250 PS 637: Performed by: SURGERY

## 2022-06-30 PROCEDURE — 250N000011 HC RX IP 250 OP 636: Performed by: NURSE PRACTITIONER

## 2022-06-30 PROCEDURE — 250N000013 HC RX MED GY IP 250 OP 250 PS 637: Performed by: INTERNAL MEDICINE

## 2022-06-30 PROCEDURE — 99233 SBSQ HOSP IP/OBS HIGH 50: CPT | Performed by: INTERNAL MEDICINE

## 2022-06-30 PROCEDURE — 72158 MRI LUMBAR SPINE W/O & W/DYE: CPT | Mod: MF

## 2022-06-30 PROCEDURE — G1010 CDSM STANSON: HCPCS

## 2022-06-30 PROCEDURE — 82310 ASSAY OF CALCIUM: CPT | Performed by: INTERNAL MEDICINE

## 2022-06-30 PROCEDURE — 250N000013 HC RX MED GY IP 250 OP 250 PS 637: Performed by: FAMILY MEDICINE

## 2022-06-30 PROCEDURE — 250N000013 HC RX MED GY IP 250 OP 250 PS 637: Performed by: NURSE PRACTITIONER

## 2022-06-30 PROCEDURE — 36415 COLL VENOUS BLD VENIPUNCTURE: CPT | Performed by: INTERNAL MEDICINE

## 2022-06-30 PROCEDURE — 85520 HEPARIN ASSAY: CPT | Performed by: INTERNAL MEDICINE

## 2022-06-30 PROCEDURE — A9585 GADOBUTROL INJECTION: HCPCS | Performed by: INTERNAL MEDICINE

## 2022-06-30 PROCEDURE — 255N000002 HC RX 255 OP 636: Performed by: INTERNAL MEDICINE

## 2022-06-30 PROCEDURE — 250N000011 HC RX IP 250 OP 636: Performed by: INTERNAL MEDICINE

## 2022-06-30 PROCEDURE — 250N000013 HC RX MED GY IP 250 OP 250 PS 637: Performed by: HOSPITALIST

## 2022-06-30 PROCEDURE — 120N000001 HC R&B MED SURG/OB

## 2022-06-30 PROCEDURE — 71046 X-RAY EXAM CHEST 2 VIEWS: CPT

## 2022-06-30 PROCEDURE — 999N000157 HC STATISTIC RCP TIME EA 10 MIN

## 2022-06-30 RX ORDER — GADOBUTROL 604.72 MG/ML
10 INJECTION INTRAVENOUS ONCE
Status: COMPLETED | OUTPATIENT
Start: 2022-06-30 | End: 2022-06-30

## 2022-06-30 RX ADMIN — ACETAMINOPHEN 975 MG: 325 TABLET, FILM COATED ORAL at 08:42

## 2022-06-30 RX ADMIN — GABAPENTIN 600 MG: 300 CAPSULE ORAL at 08:42

## 2022-06-30 RX ADMIN — THERA TABS 1 TABLET: TAB at 08:42

## 2022-06-30 RX ADMIN — GABAPENTIN 600 MG: 300 CAPSULE ORAL at 20:49

## 2022-06-30 RX ADMIN — DOXYCYCLINE 100 MG: 100 CAPSULE ORAL at 20:53

## 2022-06-30 RX ADMIN — METFORMIN HYDROCHLORIDE 1000 MG: 500 TABLET, FILM COATED ORAL at 17:27

## 2022-06-30 RX ADMIN — ACETAMINOPHEN 975 MG: 325 TABLET, FILM COATED ORAL at 20:49

## 2022-06-30 RX ADMIN — OXYCODONE HYDROCHLORIDE 5 MG: 5 TABLET ORAL at 11:20

## 2022-06-30 RX ADMIN — LIDOCAINE: 50 OINTMENT TOPICAL at 20:49

## 2022-06-30 RX ADMIN — LIDOCAINE: 50 OINTMENT TOPICAL at 12:37

## 2022-06-30 RX ADMIN — Medication 100 MCG: at 08:43

## 2022-06-30 RX ADMIN — Medication 500 MG: at 20:48

## 2022-06-30 RX ADMIN — GADOBUTROL 10 ML: 604.72 INJECTION INTRAVENOUS at 15:06

## 2022-06-30 RX ADMIN — FERROUS SULFATE TAB 325 MG (65 MG ELEMENTAL FE) 325 MG: 325 (65 FE) TAB at 08:47

## 2022-06-30 RX ADMIN — AMOXICILLIN AND CLAVULANATE POTASSIUM 1 TABLET: 875; 125 TABLET, FILM COATED ORAL at 08:43

## 2022-06-30 RX ADMIN — OXYCODONE HYDROCHLORIDE 5 MG: 5 TABLET ORAL at 05:57

## 2022-06-30 RX ADMIN — TAMSULOSIN HYDROCHLORIDE 0.4 MG: 0.4 CAPSULE ORAL at 08:43

## 2022-06-30 RX ADMIN — LIDOCAINE 2 PATCH: 246 PATCH TOPICAL at 08:39

## 2022-06-30 RX ADMIN — LEVOTHYROXINE SODIUM 25 MCG: 0.03 TABLET ORAL at 05:51

## 2022-06-30 RX ADMIN — HEPARIN SODIUM 1700 UNITS/HR: 10000 INJECTION, SOLUTION INTRAVENOUS at 05:49

## 2022-06-30 RX ADMIN — LISINOPRIL 20 MG: 20 TABLET ORAL at 08:48

## 2022-06-30 RX ADMIN — TIZANIDINE 2 MG: 2 TABLET ORAL at 00:13

## 2022-06-30 RX ADMIN — HYDROMORPHONE HYDROCHLORIDE 0.2 MG: 0.2 INJECTION, SOLUTION INTRAMUSCULAR; INTRAVENOUS; SUBCUTANEOUS at 17:27

## 2022-06-30 RX ADMIN — METFORMIN HYDROCHLORIDE 1000 MG: 500 TABLET, FILM COATED ORAL at 08:42

## 2022-06-30 RX ADMIN — AMOXICILLIN AND CLAVULANATE POTASSIUM 1 TABLET: 875; 125 TABLET, FILM COATED ORAL at 20:49

## 2022-06-30 RX ADMIN — BUMETANIDE 3 MG: 1 TABLET ORAL at 08:47

## 2022-06-30 RX ADMIN — DICLOFENAC SODIUM 2 G: 10 GEL TOPICAL at 17:27

## 2022-06-30 RX ADMIN — HEPARIN SODIUM 1700 UNITS/HR: 10000 INJECTION, SOLUTION INTRAVENOUS at 23:00

## 2022-06-30 RX ADMIN — PANTOPRAZOLE SODIUM 40 MG: 40 TABLET, DELAYED RELEASE ORAL at 06:32

## 2022-06-30 RX ADMIN — FERROUS SULFATE TAB 325 MG (65 MG ELEMENTAL FE) 325 MG: 325 (65 FE) TAB at 12:37

## 2022-06-30 RX ADMIN — Medication 500 MG: at 08:42

## 2022-06-30 RX ADMIN — FERROUS SULFATE TAB 325 MG (65 MG ELEMENTAL FE) 325 MG: 325 (65 FE) TAB at 17:27

## 2022-06-30 RX ADMIN — ROSUVASTATIN CALCIUM 10 MG: 10 TABLET, FILM COATED ORAL at 20:49

## 2022-06-30 RX ADMIN — Medication 500 MG: at 13:37

## 2022-06-30 RX ADMIN — INSULIN GLARGINE 15 UNITS: 100 INJECTION, SOLUTION SUBCUTANEOUS at 20:59

## 2022-06-30 RX ADMIN — TRAZODONE HYDROCHLORIDE 25 MG: 50 TABLET ORAL at 23:08

## 2022-06-30 RX ADMIN — ACETAMINOPHEN 975 MG: 325 TABLET, FILM COATED ORAL at 13:37

## 2022-06-30 RX ADMIN — DOXYCYCLINE 100 MG: 100 CAPSULE ORAL at 08:48

## 2022-06-30 RX ADMIN — PANTOPRAZOLE SODIUM 40 MG: 40 TABLET, DELAYED RELEASE ORAL at 16:21

## 2022-06-30 RX ADMIN — DICLOFENAC SODIUM 2 G: 10 GEL TOPICAL at 08:48

## 2022-06-30 ASSESSMENT — ACTIVITIES OF DAILY LIVING (ADL)
ADLS_ACUITY_SCORE: 30

## 2022-06-30 NOTE — TELEPHONE ENCOUNTER
"Called back Sirisha to address her concerns regarding Thomas, whom had back surgery on 6/16 at [facility] and remains hospitalized after some post-operative complications. Sirisha, his wife, is currently in TCU herself.    She is concerned about the care Thomas is receiving at [facility] and expresses frustration regarding continuity of care. She says that his blood sugars are all out of control and she worries about him becoming \"filled with fluid\". She believes that staff turn over and they \"cannot get any straight answers\". She calls the cardiology office to have her questions answered.     Writer sympathized with patient and tried to review chart and explained that he has been receiving IV diuretics and was transitioned to oral. Tried to explain that steroids that he has been receiving are likely the cause of his elevated blood sugar. Writer repeated multiple times that cardiology is not consulted as of right now for his stay post-orthopedic surgery.     We discussed how we have limited control over his inpatient care from outpatient clinic and from a service that is not being consulted. Again, tried to reassure as best as able but strongly advised that she call the unit and ask to speak with the  or a patient advocate. This instruction was repeated multiple times. She stated that my information was \"disheartening and unfortunate that writer cannot do anything about his care\".     Again, reiterated that cardiology is not apart of his care team at this time and the outpatient clinic can do very little in this matter. She needs to call the nursing unit to speak to the staff actively caring for her . Patient relations number + unit number offered, of which she declined. She says that a hospitalist is supposed to call her but they \"don't know anything about her \" anyway. Politely ended the conversation. -[facility]   "

## 2022-06-30 NOTE — PROGRESS NOTES
Neurosurgery Progress Note  06/30/22      A/P: Thomas Steve is a 77 year old male POD #13 DECOMPRESSIVE LUMBAR LAMINECTOMY LUMBAR 4-LUMBAR 5 AND LUMBAR 5-SACRAL 1 BILATERAL-LEFT SIDE FIRST-PLUS BILATERAL FORAMINOTOMIES PLUS REMOVAL OF HERNIATED DISC LUMBAR 5-SACRAL 1 LEFT with Dr Samuel. Arachnoid bleb intraop, covered with durgen, duraseal. No CSF leak.     States feeling better today when compared today yesterday able to move his legs better and no longer complaining of chills. Denies radicular leg pain or changes in bladder habits.     Noted acute right LE DVT currently on heparin drip pending MRI may change to Lovenox or restart Xarelto   UA and chest xray negative     Just informed by MRI that earliest able to complete would be 2:00 based on their schedule       PLAN:   1. MRI pending   2. Continue heparin drip    3. Will hold PT today until MRI completed and further plans for DVT   4. Daily dressing changes  5. Continue prn pain medications   6. Appreciate ID involvement , plans for oral abx noted   7. Continue to hold xarelto    Addendum:   MRI reviewed by self and Dr. Samuel noted similar appearance to epidural postoperative fluid collection possibly slight increase when compared to previous MRI with effacement of thecal sac with associated spinal stenosis   Will plan for IR aspiration and fluid drainage - fluid to be sent for Gram stain and culture anaerobic and aerobic KOH prep fungus AFB smear TB culture  (previous aspiration not sent to prior collection container for anaerobic culture)   Patient to be NPO at midnight if sedation is needed for procedure  Heparin drip needs to be held 2 hrs prior to procedure IR to call Friday morning with time of procedure     Discussed with Thomas possibility of having IVC filter placed and proceeding with open surgical intervention to include incision and drainage of epidural fluid collection. Thomas states that he would prefer to avoid any surgical intervention if  "possible and wishes to proceed with the aspiration first.       HPI: Presented with back pain, right leg pain. Weakness bilateral LE. Worse with walking. No bowel or bladder issues. Diabetes, CABG, AFIB, Xarelto. MRI with listhesis L4-5, spinal stenosis. Stenosis also at L5-S1 with large disc herniation L5-S1 on the left.     Subjective: states that he feels better today. Able to moves legs more easily and less swelling noted, denies chills denies radicular leg pain continued radicular numbness     Physical Exam  /59 (BP Location: Right arm)   Pulse 60   Temp 98.2  F (36.8  C) (Oral)   Resp 16   Ht 1.778 m (5' 10\")   Wt 107.2 kg (236 lb 4.8 oz)   SpO2 95%   BMI 33.91 kg/m        General: oriented. KIM. Speech clear. Sitting upright in beside chair     Motor: normal bulk and tone     Strength: Full strength LE bilaterally in bed, sensation intact to touch. Baseline lower extremity neuropathy.   Bilateral lower extremities taught and swollen no redness, lymphedema wraps in place     Sensation: intact to light touch; baseline neuropathy bilaterally     Incision: well approximated with staples no surrounding erythema, induration, or drainage     Labs: reviewed. NGTD on aspiration cultures  CRP normal at 0.6 previously 4.8 (6/25/2022)  Sed rate trending down currently 37 previously 89 (6/25/2022)    Imaging:   Chest xray normal   LE ultrasound results with acute deep venous thrombus in the right profundofemoral vein in the upper         Joan French PA-C  [facility]  O: [phone]      "

## 2022-06-30 NOTE — PROGRESS NOTES
Pt home cpap set up at bedside, pt does not use H2O with unit.  Pt on room air.  RN will assist with placement.

## 2022-06-30 NOTE — PROGRESS NOTES
Daily Progress Note        CODE STATUS:  Full Code    06/30/22  Assessment/Plan:  77M with -year-old male with CAD, HFrEF, DM2, HTN, atrial fibrillation, hypothyroidism presented for planned neurosurgical intervention to treat herniated lumbar disc. 06/16 underwent decompressive lumbar laminectomy.  06/19 with post-op fever and started on vanco and zosyn.    06/22 MRI with fluid collection with possible rim enhancement which was aspirated.  Cultures negative thus far.  ID recommends Augmentin x doxycycline for 1 week.   Clinically has been improving however repeat MRI 6/27 showed increased fluid collection in the laminectomy bed causing increased compression on cauda equina, as well as slightly increased subdural collection at L2-L4 level.  Neurosurgery recommended continuous monitoring for cauda equina and repeat MRI in 2 to 3 days.  Anticoagulation was held due to subdural fluid collection.     6/29 found to have right lower extremity DVT.  IV heparin started after discussion with neurosurgery     Also with YOLANDA this admission and volume overload requiring IV diuresis        #Post-op fever.  Post-op fluid collection - culture no growth while on abx.  Plan for augmentin and doxy x 1 week.  MRI 6/27 shows increased fluid collection in the laminectomy bed causing increased compression on cauda equina; as well as slightly increased subdural collection at L2-L4 level.  Discharge 6/27 held, monitoring for cauda equina symptoms.    6/29 new onset chills but no fever.  WBC mildly elevated 11.2 but CRP down 2.6 from 4.8 and ESR 37 from 89.  Agree with checking blood cultures, UA, chest x-ray. MRI lumbar spine 6/30- official report pending. Per NS, the fluid collection is possibly slightly increased. IR aspiration in am. Keep NPO after midnight. Hold heparin gtt 2 hours prior to the procedure.      # Acute DVT of the right profundofemoral vein in the upper thigh - started IV heparin.  Was on Xarelto prior to  admission     #Acute on chronic HFrEF; CAD s/p CABG - volume up with R sided findings.  ECHO with EF 45-50%, severe TR, mild MR, mild pulm HTN.  -Responded well to IV diuresis.  Switched to oral Bumex 6/29  -Cards f/u as outpatient     #YOLANDA on CKD3 - baseline 1.1-1.2s.  Post-op injury likely hemodynamic demetrius-operatively.  Resolved.      #Lumbar spinal stenosis with lumbar disc herniation  -Patient underwent scheduled decompressive lumbar laminectomy of L4-L5, L5-S1 with bilateral foraminotomies and removal of herniated disc in the L1-S1 region with neurosurgery on 6/16/2022.  -Scheduled Tylenol 3 times daily  -Gabapentin 600 mg p.o. twice daily  -Post op management per neurosurgery  -Decadron burst per Nsx - completed      #Post op urinary retention : started flomax.  Had Carpenter which is now removed.       #Constipation - resolved     #Chronic atrial fibrillation, sick sinus syndrome s/p PPM  -Was on lovenox 90mgBID - now on hold given increased subdural fluid collection on the MRI 6/27.   - Resume xarelto when ok from sx standpoint.      #DM2 with recurrent AM hypoglycemia - reports he is eating healthier here in hospital with much reduced insulin needs.  Steroid induced hyperglycemia with sugars 300s-400s - last dexamethasone dose 6/27 AM - improved significantly   -stop glimiperide and would not resume  -continue Lantus to 15 units at HS, mealtime CHO coverage 1:16, low ISS      -Nasal congestion, postnasal drip  -Exacerbated in the setting of needing to lie flat for 24 hours  -Start ipratropium nasal spray twice daily     -Chronic left shoulder pain  -Start lidocaine and Voltaren trading on and off 4 times daily     -GERD  -Pantoprazole 40 mg p.o. daily     #CORAZON -CPAP     #Hypothyroidism  -TSH 2.05  -Levothyroxine 25 mcg p.o. daily     #Normocytic anemia  -Hemoglobin 11.4 with MCV of 90       Barriers to Discharge:  IR procedure tomorrow  Disposition; TCU in couple of days if ok with  consultants    Subjective:  Interval History: Patient seen and examined this morning.  Notes, labs, imaging reports personally reviewed. Patient is new to me. Patient denies any new weakness in his legs. No loss of sensation in the saddle area. No incontinence.     Review of Systems:   As mentioned in subjective.    Patient Active Problem List   Diagnosis     Type 2 diabetes mellitus, with long-term current use of insulin (H)     Benign essential hypertension     CAD (coronary artery disease)     Ischemic cardiomyopathy     Right Bundle Branch Block     Mixed hyperlipidemia     Chronic atrial fibrillation (H)     CORAZON (obstructive sleep apnea)     Congestive Heart Failure     Typical atrial flutter (H)     Leg pain, bilateral     Leg swelling     Venous insufficiency of both lower extremities     Acquired lymphedema of leg     Claudication of both lower extremities (H)     Diabetic peripheral neuropathy associated with type 2 diabetes mellitus (H)     SSS (sick sinus syndrome) (H)     Acute systolic heart failure (H)     Biventricular cardiac pacemaker in situ     Lumbar stenosis with neurogenic claudication     Gastroesophageal reflux disease without esophagitis     Hypothyroidism     Normocytic anemia     Chronic left shoulder pain     Nasal congestion     Lumbar spinal stenosis     Acute deep vein thrombosis (DVT) of femoral vein of right lower extremity (H)       Scheduled Meds:    acetaminophen  975 mg Oral TID     amoxicillin-clavulanate  1 tablet Oral Q12H [facility] (08/20)     bisacodyl  10 mg Rectal Daily     bumetanide  3 mg Oral Daily     diclofenac  2 g Topical BID     doxycycline monohydrate  100 mg Oral Q12H [facility] (08/20)     ferrous sulfate  325 mg Oral TID w/meals     gabapentin  600 mg Oral BID     insulin aspart   Subcutaneous TID w/meals     insulin aspart  1-3 Units Subcutaneous TID AC     insulin aspart  1-3 Units Subcutaneous At Bedtime     insulin glargine  15 Units Subcutaneous At Bedtime      ipratropium  2 spray Both Nostrils TID     levothyroxine  25 mcg Oral Daily     lidocaine  2 patch Transdermal Q24H     lidocaine   Topical BID     lidocaine   Transdermal Q8H DAX     lisinopril  20 mg Oral Daily     metFORMIN  1,000 mg Oral BID w/meals     multivitamin, therapeutic  1 tablet Oral Daily     pantoprazole  40 mg Oral BID AC     rosuvastatin  10 mg Oral At Bedtime     tamsulosin  0.4 mg Oral Daily     vitamin C  500 mg Oral TID     cholecalciferol  100 mcg Oral Daily     Continuous Infusions:    heparin 1,700 Units/hr (06/30/22 1704)     PRN Meds:.acetaminophen, sore throat lozenge, bisacodyl, glucose **OR** dextrose **OR** glucagon, flumazenil, HOLD MEDICATION (one time), hydrALAZINE, HYDROmorphone **OR** HYDROmorphone, hydrOXYzine **OR** hydrOXYzine, loratadine, magnesium hydroxide, melatonin, naloxone **OR** naloxone **OR** naloxone **OR** naloxone, ondansetron **OR** ondansetron, oxyCODONE **OR** oxyCODONE, polyethylene glycol, prochlorperazine **OR** prochlorperazine, senna-docusate, tiZANidine, traZODone    Objective:  Vital signs in last 24 hours:  Temp:  [98.1  F (36.7  C)-98.6  F (37  C)] 98.6  F (37  C)  Pulse:  [60] 60  Resp:  [16-18] 18  BP: (125-155)/(59-70) 130/61  SpO2:  [95 %-97 %] 97 %        Intake/Output Summary (Last 24 hours) at 6/30/2022 1715  Last data filed at 6/30/2022 0900  Gross per 24 hour   Intake 480 ml   Output --   Net 480 ml       Physical Exam:    General: Not in obvious distress.  HEENT: NC, AT   Chest: Clear to auscultation bilaterally  Heart: S1S2 normal, regular. No M/R/G  Abdomen: Soft. NT, ND. Bowel sounds- active.  Extremities: 2+ legs swelling  Neuro: Alert and awake, grossly non-focal      Lab Results:(I have personally reviewed the results)    Recent Results (from the past 24 hour(s))   Glucose by meter    Collection Time: 06/29/22  8:27 PM   Result Value Ref Range    GLUCOSE BY METER POCT 249 (H) 70 - 99 mg/dL   Glucose by meter    Collection Time: 06/29/22  10:04 PM   Result Value Ref Range    GLUCOSE BY METER POCT 208 (H) 70 - 99 mg/dL   Heparin Unfractionated Anti Xa Level    Collection Time: 06/29/22 10:22 PM   Result Value Ref Range    Anti Xa Unfractionated Heparin 0.85 For Reference Range, See Comment IU/mL   UA reflex to Microscopic and Culture    Collection Time: 06/29/22 11:03 PM    Specimen: Urine, Clean Catch   Result Value Ref Range    Color Urine Light Yellow Colorless, Straw, Light Yellow, Yellow    Appearance Urine Clear Clear    Glucose Urine Negative Negative mg/dL    Bilirubin Urine Negative Negative    Ketones Urine Negative Negative mg/dL    Specific Gravity Urine 1.020 1.001 - 1.030    Blood Urine Negative Negative    pH Urine 5.0 5.0 - 7.0    Protein Albumin Urine Negative Negative mg/dL    Urobilinogen Urine <2.0 <2.0 mg/dL    Nitrite Urine Negative Negative    Leukocyte Esterase Urine Negative Negative    Bacteria Urine None Seen None Seen /HPF   Basic metabolic panel    Collection Time: 06/30/22  6:29 AM   Result Value Ref Range    Sodium 140 136 - 145 mmol/L    Potassium 3.9 3.5 - 5.0 mmol/L    Chloride 103 98 - 107 mmol/L    Carbon Dioxide (CO2) 29 22 - 31 mmol/L    Anion Gap 8 5 - 18 mmol/L    Urea Nitrogen 25 8 - 28 mg/dL    Creatinine 0.83 0.70 - 1.30 mg/dL    Calcium 8.8 8.5 - 10.5 mg/dL    Glucose 128 (H) 70 - 125 mg/dL    GFR Estimate >90 >60 mL/min/1.73m2   Heparin Unfractionated Anti Xa Level    Collection Time: 06/30/22  6:29 AM   Result Value Ref Range    Anti Xa Unfractionated Heparin 0.69 For Reference Range, See Comment IU/mL   Glucose by meter    Collection Time: 06/30/22  7:52 AM   Result Value Ref Range    GLUCOSE BY METER POCT 128 (H) 70 - 99 mg/dL   Glucose by meter    Collection Time: 06/30/22 12:26 PM   Result Value Ref Range    GLUCOSE BY METER POCT 189 (H) 70 - 99 mg/dL   Glucose by meter    Collection Time: 06/30/22  4:38 PM   Result Value Ref Range    GLUCOSE BY METER POCT 116 (H) 70 - 99 mg/dL       All laboratory and  imaging data in the past 24 hours reviewed  Serum Glucose range:   Recent Labs   Lab 06/30/22  1638 06/30/22  1226 06/30/22  0752 06/30/22  0629   * 189* 128* 128*     ABG: No lab results found in last 7 days.  CBC:   Recent Labs   Lab 06/29/22  1424 06/29/22  0529 06/25/22  0434   WBC 11.2* 11.2* 7.6   HGB 9.9* 9.0* 8.9*   HCT 31.8* 29.3* 29.6*   MCV 92 93 95    324 266   NEUTROPHIL  --   --  55   LYMPH  --   --  25   MONOCYTE  --   --  16   EOSINOPHIL  --   --  3     Chemistry:   Recent Labs   Lab 06/30/22  0629 06/29/22  0529 06/28/22  0548    139 137   POTASSIUM 3.9 4.0 4.0   CHLORIDE 103 105 102   CO2 29 27 28   BUN 25 33* 39*   CR 0.83 0.92 1.07   GFRESTIMATED >90 86 72   BARBARA 8.8 8.8 9.1     Coags:  No results for input(s): INR, PROTIME, PTT in the last 168 hours.    Invalid input(s): APTT  Cardiac Markers:  No results for input(s): CKTOTAL, TROPONINI in the last 168 hours.       XR Chest 2 Views    Result Date: 6/30/2022  EXAM: XR CHEST 2 VW LOCATION: [facility] DATE/TIME: 6/30/2022 2:11 PM INDICATION: pre mri COMPARISON: Chest radiographs from 06/29/2022.     IMPRESSION: Stable enlargement of the cardiac silhouette with tortuous thoracic aorta. Status post median sternotomy with left-sided cardiac pacer and leads overlying the right atrium, right ventricle, and coronary sinus. Evidence of prior CABG. No other  findings to suggest metallic foreign bodies. Left basilar atelectasis. There is blunting of the left costophrenic angle consistent with a small pleural effusion.    XR Chest 2 Views    Result Date: 6/29/2022  EXAM: XR CHEST 2 VW LOCATION: [facility] DATE/TIME: 6/29/2022 4:15 PM INDICATION: postoperative COMPARISON: 6/27/2022     IMPRESSION: Multiple median sternotomy wires are seen. A left subclavian pacing device is present. Low lung volumes are noted. There is no focal airspace consolidation. No large pleural effusion. No  pneumothorax. The heart size is stable.    XR Chest 2 Views    Result Date: 2022  EXAM: XR CHEST 2 VW LOCATION: [facility] DATE/TIME: 2022 11:08 AM INDICATION: PRE cardiac device check COMPARISON: 2022 and older studies     IMPRESSION: Poststernotomy changes. Triple lead left subclavian venous pacer again noted. Leads are intact. Cardiomegaly and trace effusions again noted. No signs of failure or pneumonia.    XR Chest 2 Views    Result Date: 2022  EXAM: XR CHEST 2 VW LOCATION: [facility] DATE/TIME: 2022 10:51 AM INDICATION: Pre MRI Cardiac device check COMPARISON: Chest CT 2022 and older studies.     IMPRESSION: Poststernotomy changes. Triple lead left subclavian venous pacer again noted. Lungs are otherwise clear. Heart is slightly enlarged but unchanged. No signs of failure. Small bilateral pleural effusions are again seen noted on the lateral view. Changes of DISH in the thoracic spine.    Echocardiogram Complete    Result Date: 2022  [id number]^NICOLA^[place] [address]  Name: TERRELL BRIGGS MRN: [id number] : 1945 Study Date: 2022 01:35 PM Age: 77 yrs Gender: Male Patient Location: [facility] Reason For Study: SOB Ordering Physician: EBENEZER PETERSEN Performed By: JULIETA  BSA: 2.1 m2 Height: 70 in Weight: 209 lb ______________________________________________________________________________ Procedure Complete Portable Echo Adult. Definity (NDC #12988-410) given intravenously. ______________________________________________________________________________ Interpretation Summary  The left ventricle is normal in size. Left ventricular function is decreased. The ejection fraction is 45-50% (mildly reduced). There is a pacemaker lead in the right ventricle. The left atrium is mildly dilated. There is mild (1+) mitral regurgitation. There is  moderately severe (3+) tricuspid regurgitation. Right ventricular systolic pressure is elevated, consistent with mild pulmonary hypertension. Mild valvular aortic stenosis. ______________________________________________________________________________ Left Ventricle The left ventricle is normal in size. Left ventricular function is decreased. The ejection fraction is 45-50% (mildly reduced). There is normal left ventricular wall thickness. Septal wall motion abnormality may reflect pacemaker activation.  Right Ventricle The right ventricle is not well visualized. There is a pacemaker lead in the right ventricle.  Atria The left atrium is mildly dilated. Right atrium not well visualized. There is no color Doppler evidence of an atrial shunt.  Mitral Valve Mitral valve leaflets appear normal. There is mild (1+) mitral regurgitation.  Tricuspid Valve The tricuspid valve is not well visualized, but is grossly normal. There is moderately severe (3+) tricuspid regurgitation. Right ventricular systolic pressure is elevated, consistent with mild pulmonary hypertension.  Aortic Valve The aortic valve is not well visualized. No aortic regurgitation is present. Mild valvular aortic stenosis.  Pulmonic Valve The pulmonic valve is not well seen, but is grossly normal. This degree of valvular regurgitation is within normal limits.  Vessels The aorta root is normal. Normal size ascending aorta.  Pericardium There is no pericardial effusion. ______________________________________________________________________________ MMode/2D Measurements & Calculations IVSd: 0.98 cm  LVIDd: 5.1 cm LVIDs: 3.5 cm LVPWd: 1.0 cm FS: 31.6 % LV mass(C)d: 187.4 grams LV mass(C)dI: 88.1 grams/m2 Ao root diam: 2.7 cm LA dimension: 4.1 cm LA/Ao: 1.5 LVOT diam: 2.0 cm LVOT area: 3.2 cm2 LA Volume Indexed (AL/bp): 36.5 ml/m2 RWT: 0.39  Time Measurements MM HR: 64.0 BPM  Doppler Measurements & Calculations MV E max adelaida: 139.0 cm/sec MV max P.0 mmHg MV  mean P.9 mmHg MV V2 VTI: 44.7 cm MVA(VTI): 1.1 cm2 MV dec time: 0.20 sec Ao V2 max: 215.8 cm/sec Ao max P.0 mmHg Ao V2 mean: 153.7 cm/sec Ao mean P.1 mmHg Ao V2 VTI: 37.9 cm ELISABETH(I,D): 1.3 cm2 ELISABETH(V,D): 1.2 cm2 LV V1 max P.9 mmHg LV V1 max: 84.5 cm/sec LV V1 VTI: 15.1 cm SV(LVOT): 47.6 ml SI(LVOT): 22.4 ml/m2 PA acc time: 0.07 sec TR max toan: 328.5 cm/sec TR max P.2 mmHg AV Toan Ratio (DI): 0.39 ELISABETH Index (cm2/m2): 0.59 E/E' av.6 Lateral E/e': 11.5 Medial E/e': 15.8  ______________________________________________________________________________ Report approved by: Catarino Rudd 2022 03:23 PM       US Lower Extremity Venous Duplex Bilateral    Result Date: 2022  EXAM: US LOWER EXTREMITY VENOUS DUPLEX BILATERAL LOCATION: [facility] DATE/TIME: 2022 11:30 AM INDICATION: calf tenderness COMPARISON: None. TECHNIQUE: Venous Duplex ultrasound of bilateral lower extremities with and without compression, augmentation and duplex. Color flow and spectral Doppler with waveform analysis performed. FINDINGS: Exam includes the common femoral, femoral, popliteal veins as well as segmentally visualized deep calf veins and greater saphenous vein. Limited exam due to body habitus and marked edema in both calves. RIGHT: There is thrombus in the right profundofemoral vein measuring at least 2 cm in length. This is approximately 1.5 cm from the confluence with the femoral vein in the upper thigh to make the common femoral vein.  No superficial thrombophlebitis. There is an 4.8 x 1 x 3 cm popliteal fossa cyst. Extensive subcutaneous edema below the knee. LEFT: No deep vein thrombosis. No superficial thrombophlebitis. There is a 4.4 x 2 x 1 cm left popliteal fossa cyst. Subcutaneous edema below the knee.     IMPRESSION: 1.  There is an acute deep venous thrombus in the right profundofemoral vein in the upper thigh. This is approximately 1.5 cm inferior to the  confluence forming the right common femoral vein. 2.  There are bilateral popliteal fossa cyst. 3.  Marked subcutaneous edema below the knee in both legs. [Critical Result: Deep venous thrombus in the right  profundofemoral vein in the right upper thigh as noted above.] Finding was identified on 6/29/2022 12:25 PM. Shama, his nurse was contacted by me on 6/29/2022 12:28 PM and verbalized understanding of the critical result.     US Renal Complete    Result Date: 6/20/2022  EXAM: US RENAL COMPLETE LOCATION: [facility] DATE/TIME: 6/20/2022 4:12 PM INDICATION: YOLANDA COMPARISON: Abdominal ultrasound dated 10/11/2013. TECHNIQUE: Routine Bilateral Renal and Bladder Ultrasound. FINDINGS: RIGHT KIDNEY: 10.9 x 6.5 x 5.7 cm. Renal cortical thickness is 1.5 cm Normal without hydronephrosis or masses. LEFT KIDNEY: 11.6 x 6.5 x 5.1 cm. Renal cortex thickness is 1.8 cm. Normal without hydronephrosis or masses. BLADDER: Nondistended     IMPRESSION: 1.  Normal kidney ultrasound.    XR Chest Port 1 View    Result Date: 6/20/2022  EXAM: XR CHEST PORT 1 VIEW LOCATION: [facility] DATE/TIME: 6/20/2022 10:05 AM INDICATION: fever; post spine surgery COMPARISON: 06/12/2019, 08/31/2012     IMPRESSION: Poststernotomy changes with multi lead left subclavian venous pacer. Leads are intact. Heart is slightly enlarged but unchanged. There is a 9 x 5 mm ovoid, relatively dense nodule projecting over the anterior left third rib, not seen before. It's unclear if this is in the rib or the lung. When patient is able, consider a follow-up formal PA and lateral chest x-ray or a noncontrast chest CT for reevaluation. Lungs are otherwise clear without signs of pneumonia or failure. [Access Center: Left side lung nodule needing follow-up PA and lateral chest x-ray or chest CT. This report will be copied to the [facility] to ensure a provider acknowledges the finding. Access Center is available  Monday through Friday 8am-3:30 pm.     XR Abdomen Port 1 View    Result Date: 6/22/2022  EXAM: XR ABDOMEN PORT 1 VIEWS LOCATION: [facility] DATE/TIME: 6/22/2022 2:35 PM INDICATION: abdominal distension COMPARISON: None.     IMPRESSION: No distended air-filled loops of small bowel. There is a small amount of throughout the colon. No definite intraperitoneal free air identified. Pacemaker leads are partially included in the field-of-view. Prior median sternotomy noted. There are surgical staples over the abdomen and pelvis.      MR Lumbar Spine w/o & w Contrast    Result Date: 6/27/2022  EXAM: MR LUMBAR SPINE W/O and W CONTRAST LOCATION: [facility] DATE/TIME: 6/27/2022 11:42 AM INDICATION: Follow-up fluid collections, has been on oral antibiotics. No growth from fluid aspirate. COMPARISON: MRI 06/21/2022. CONTRAST: 10ml Gadavist TECHNIQUE: Routine Lumbar Spine MRI without and with IV contrast. FINDINGS: Nomenclature is based on 5 lumbar-type vertebral bodies. Vertebral body heights and lateral lumbar alignment stable. Again seen are prior L4-L5 and L5-S1 laminectomy changes. The previously noted fluid collection within the L4 and L5 laminectomy bed has increased considerably in size compared to 06/21/2022 now measuring approximately 5.8 cm craniocaudad by 3.3 cm AP by  3.2 cm transverse. This fluid collection has considerable mass effect on the posterior aspect of the thecal sac behind the L4 and L5 vertebral bodies. On the prior study, the thecal sac at the L5 level was fairly compressed. The thecal sac compression has definitely progressed significantly behind the L4 vertebral body and slightly progressed behind the L5 vertebral body in the interval. Again the nature of this fluid collection is nonspecific. Previously noted thin ventral epidural fluid collection from L2 through L5 has decreased in size in the interval. There is a thin subdural fluid collection  posteriorly extending from L2 through the top of L4 slightly increased from prior. Normal distal spinal cord and cauda equina with conus medullaris at L1. Postoperative changes in the paraspinal tissues as noted above. Unremarkable visualized upper bony pelvis. T12-L1: Normal disc height and signal. No herniation. Normal facets. No spinal canal or neural foraminal stenosis. L1-L2: Normal disc height with mild loss of disc signal. No herniation. Normal facets. No spinal canal or neural foraminal stenosis. L2-L3: Normal disc height with mild loss of disc signal. No herniation. Normal facets. No spinal canal or neural foraminal stenosis. L3-L4: Normal disc height with mild loss of disc signal again seen. No herniation. Mild facet arthropathy. Mild central canal narrowing. Mild bilateral foraminal narrowing. L4-L5: See discussion above. Normal disc height with mild loss of disc signal again seen. Prominent facet arthropathy with low-grade stable anterolisthesis L4 on L5. Laminectomy and medial facetectomy changes. Thecal sac compression as noted above. Mild bilateral foraminal narrowing. L5-S1: See discussion above. Laminectomy changes. Stable loss of disc height with stable mild increased T2 signal in the disc. Minimal endplate edema on the left unchanged. Stable central disc protrusion. Moderate facet arthropathy. Thecal sac compression as described above. Moderate bilateral foraminal narrowing.     IMPRESSION: 1.  Again seen are previous L4-L5 and L5-S1 laminectomy changes. The previously noted fluid collection within the L4 and L5 laminectomy bed has increased considerably in size compared to MRI 06/21/2022. It does cause considerable mass effect on the posterior aspect of the thecal sac behind the L4 and L5 vertebral bodies progressed from prior. Correlate for any cauda equina symptoms. This fluid is nonspecific. 2.  Previously noted ventral epidural thin fluid collection from L2 through L5 has decreased in the  interval. 3.  There is a thin subdural fluid collection posteriorly from L2 through L4 slightly increased from prior. 4.  At the L3-L4 level, there is mild central canal narrowing and mild bilateral foraminal narrowing. 5.  Vertebral body heights and lateral alignment maintained. 6.  Findings discussed with nurse practitioner Melisa aLra from neurosurgery at 1:15 PM on 06/27/2022.    MR Lumbar Spine w/o & w Contrast    Addendum Date: 6/21/2022    Findings conveyed to the Neurosurgery team (Joan) at 1427 hours on 6/21/2022.    Result Date: 6/21/2022  EXAM: MR LUMBAR SPINE W/O and W CONTRAST LOCATION: [facility] DATE/TIME: 6/21/2022 11:52 AM INDICATION: History of back pain. COMPARISON: MRI lumbar spine 12/28/2021 CONTRAST: Gadolinium 10 mL IV TECHNIQUE: Routine Lumbar Spine MRI without and with IV contrast. FINDINGS: Nomenclature is based on 5 lumbar type vertebral bodies. Minimal grade 1 anterolisthesis of L4 on L5. Vertebral body heights and alignment otherwise maintained. Mildly heterogeneous bone marrow signal. No focal destructive osseous lesion. Normal distal spinal cord and cauda equina with conus medullaris at L1-L2. Laminectomies L4-L5 and L5-S1. Patchy enhancement and edema throughout the operative bed. Small 6 mm maximum axial diameter fluid collection along the right L5-S1 facet joint (series 10, image 15). Midline paraspinal collection largest at the L3-L4 level, measuring 5 x 2 cm in axial dimension (series 10, image 22). Ventral rim-enhancing epidural collection extending from the L2 level cranially to the L5-S1 level caudally (series 9, image 15), contributing to spinal canal stenosis detailed below. Small dorsal subdural collection/effusion (series 5, image 31). 1.8 x 0.9 cm axial dimension fluid collection within the L5-S1 laminectomy bed that appears to efface the adjacent  caudal thecal sac in combination with the ventral epidural collection and L5-S1 disc extrusion.  T12-L1: Normal disc height and signal. No herniation. Normal facets. No spinal canal or neural foraminal stenosis. L1-L2: Disc desiccation with relative preservation of height. No herniation. Normal facets. No spinal canal or neural foraminal stenosis. L2-L3: Disc desiccation with relative preservation of height. No herniation. Disc bulge. Normal facets. No spinal canal or neural foraminal stenosis. L3-L4: Disc desiccation with relative preservation of height. No herniation. Severe concentric narrowing of the spinal canal secondary to a disc bulge, facet arthropathy and ligamentum flavum thickening. Moderate right and mild left neural foraminal stenosis. L4-L5: Disc desiccation with relative preservation of height. No herniation. Disc bulge. Facet arthropathy. Severe spinal canal stenosis. Moderate neural foraminal stenosis bilaterally. L5-S1: Fluid signal within the disc space, likely on a degenerative or reactive basis with Modic 1 degenerative endplate changes and adjacent inflammatory changes present. Central disc extrusion. Facet arthropathy. Moderate to severe neural foraminal stenosis bilaterally.     IMPRESSION: Combination of postoperative changes, as well as additional findings suggestive of possible superimposed infection with a rim-enhancing ventral epidural collection extending from the L2 level cranially to the L5-S1 level caudally (series 9, image 15), contributing to multilevel high-grade spinal canal stenosis. Additional multicompartmental collections as detailed above. Extensive background lumbar spondylosis.     CT Chest w/o Contrast    Result Date: 6/20/2022  EXAM: CT CHEST W/O CONTRAST LOCATION: [facility] DATE/TIME: 6/20/2022 4:35 PM INDICATION: Fever COMPARISON: Portable AP view the chest 06/20/2022 TECHNIQUE: CT chest without IV contrast. Multiplanar reformats were obtained. Dose reduction techniques were used. CONTRAST: None. FINDINGS: LUNGS AND PLEURA:  Motion-related blurring slightly limits evaluation of the lung parenchyma. Small left and trace right pleural effusions layer into the posterior costophrenic sulci. There are no findings to suggest interstitial lung edema elsewhere. No lung consolidation. Trachea and central airways are patent and normal caliber. Peripheral airways are not well assessed. MEDIASTINUM: Four-chamber cardiac enlargement. Left subclavian approach CRT-P has right atrial appendage, right ventricle, and coronary sinus leads. No pericardial effusion. Enlarged main pulmonary artery measures 3.2 cm in diameter. No thoracic aortic aneurysm. Moderate patchy arch and descending aorta atheromatous calcifications. The distal esophagus has mild wall thickening suggesting reflux esophagitis. No enlarged mediastinal or hilar lymph nodes. Imaged thyroid gland is normal. CORONARY ARTERY CALCIFICATION: Previous intervention (stents or CABG). UPPER ABDOMEN: Gallbladder is predominantly decompressed. There are no actionable findings in the imaged upper abdomen. MUSCULOSKELETAL: Large flowing degenerative osteophytes from T3 through the thoracolumbar junction. Solid osseous union of the median sternotomy.     IMPRESSION: 1.  Lung parenchymal evaluation is limited by motion-related blurring. No findings to suggest an active lung or airway inflammatory process. 2.  Small left and trace right pleural effusions. 3.  Four-chamber cardiac enlargement with CRT-P in place. 4.  Mild wall thickening of the distal esophagus which is often related to gastroesophageal reflux.     Lateral Lumbar Spine for Level Confirmation [XR LUMBAR SPINE PORT 1  VIEW]    Result Date: 6/16/2022  EXAM: XR CROSSTABLE LATERAL LUMBAR SPINE PORTABLE LOCATION: [facility] DATE/TIME: 6/16/2022 8:30 AM INDICATION: In OR for confirmation of spine level by C arm or hard plate. COMPARISON: None. TECHNIQUE: CR Lumbar Spine.     IMPRESSION: There are 3 needles overlying the  posterior soft tissues. The most superior needle is at the level of the L3-L4 disc space. The middle needle is at the level of the L4-L5 disc space. The most inferior needle is at the level of the superior aspect of the S1 vertebral body.    XR Lumbar Spine Port 1 View    Result Date: 6/16/2022  EXAM: XR CROSSTABLE LATERAL LUMBAR SPINE PORTABLE LOCATION: [facility] DATE/TIME: 6/16/2022 9:10 AM INDICATION: Lumbar Surgery COMPARISON: CT lumbar spine without contrast 4/26/2022. TECHNIQUE: CR Lumbar Spine.     IMPRESSION: Single portable crosstable lateral radiograph the lumbar spine was obtained. Surgical retraction devices over the posterior soft tissues at the L4 level. There is a surgical probe with the distal tip at the level of the L4 pedicles. Grade 1 anterolisthesis of L4 on L5.    IR Fine Needle Aspiration w Imaging    Result Date: 6/25/2022  [facility] INTERVENTIONAL NEURORADIOLOGY 6/23/2022 9:33 AM FLUOROSCOPICALLY GUIDED PERCUTANEOUS PARASPINAL FLUID COLLECTION ASPIRATION INDICATION: History of L4-L5 and L5-S1 laminectomies, now with low back and right leg pain, MRI showing posterior paraspinal fluid collection in the laminectomy bed. Request for disc aspiration for further diagnosis and management. CONSENT: The procedure and its indications, major risks, benefits, and alternatives were discussed. Risks including, but not limited to, pain, hemorrhage, infection, nerve damage, spinal cord damage, cardiac and pulmonary dysfunction related to sedation.  The possibility that the procedure may be nondiagnostic was discussed. Understanding was acknowledged, and a signed informed consent was obtained. MODERATE SEDATION: Versed 1 mg. Fentanyl 50 mcg. The procedure was performed with the administration of intravenous conscious sedation with appropriate preoperative, intraoperative, and postoperative evaluation. 10 minutes of supervised face to face conscious sedation  time was provided by a radiology nurse under my direct supervision. During the time-out, immediately prior to administration of medications, the patient was re-assessed for adequacy to receive conscious sedation. MEDICATIONS: Versed 1 mg IV Fentanyl 50 mcg IV FLUOROSCOPIC TIME: 0.4 minutes DOSE: Air Kerma: 15 mGy ESTIMATED BLOOD LOSS: Minimal PERFORMING PHYSICIAN(S): Chano Rubio M.D. [facility]  PROCEDURE: The patient was placed in prone position upon the fluoroscopic table. The skin of the back was prepped and draped in sterile fashion. Comparing with the 06/21/2022 lumbar spine MRI scan, the L5 level was fluoroscopically localized. The skin over the L5 level was infiltrated with 1% lidocaine. A 20 G needle was then advanced into the disc space under fluoroscopic guidance. A total of 0.5 cc of reddish, yellowish fluid was aspirated and sent for further analysis. The needle was then removed. A dressing was applied. The procedure appeared well-tolerated. There was no apparent complication.     CONCLUSION: 1. Technically successful fluoroscopically-guided aspiration of a posterior paraspinal lumbar fluid collection, with retrieval of 0.5 cc of yellowish, reddish fluid from the L5 level which was sent for further analysis. _____________________________________________ CPT codes included for physician reference only: [id number]      Latest radiology report personally reviewed.    Note created using dragon voice recognition software so sounds alike errors may have escaped editing.      06/30/2022   Marcellus Duenas MD  Hospitalist, [facility]  Pager: [phone]

## 2022-06-30 NOTE — PLAN OF CARE
Problem: Hyperglycemia  Goal: Blood Glucose Level Within Targeted Range  Outcome: Ongoing, Progressing     Problem: Pain (Spinal Surgery)  Goal: Acceptable Pain Control  Intervention: Prevent or Manage Pain  Recent Flowsheet Documentation  Taken 6/29/2022 1657 by Kierra Chris RN  Pain Management Interventions: medication (see MAR)     Problem: Infection (Spinal Surgery)  Goal: Absence of Infection Signs and Symptoms  Outcome: Ongoing, Progressing     Goal Outcome Evaluation:      Pt is POD #13 following an L4-S1 decompresion laminectomy. Pt has had a complicated hospital course with postop fever and increased fluid collection noted on MRI of the lumbar spine. Blood cultures remain negative. Currently receiving oral abx in the form of Augmentin and Doxycycline. Repeat MRI set for Thursday. Dressing to lower back was changed today with serous drainage noted. Pt has 3+ edema to bilateral lower extremities and 2+ edema to LUE. Ultrasound performed today revealed an acute DVT in the RLE. Pt placed on heparin drip at 18 ml/hr. Anti-XA pending. Chest x-ray performed this afternoon, which was unremarkable. Pt transitioned to oral Bumex today for diuresis. Pt is A&O x4. Affect is flat. He is expressing frustration in his prolonged hospital course with multiple medical issues. Pt received scheduled Tylenol, Voltaren gel and Lidocaine patch along with prn Oxycodone 5 mg at 1705 and 2205 and prn Atarax 50 mg at 1940 for complaints of pain, primarily to his buttocks. Pt reports minimal effectiveness. Pt does spend all his time sitting up in recliner, which he also sleeps in. Has dx of CORAZON. Wears CPAP at night. He has his home machine, which he self-manages. Appetite is adequate. He is tolerating a diabetic diet. Blood sugar was 156 before meal and 208 at hs. Receives s/s, carb coverage and scheduled Lantus insulin coverage. Continent of bowel and bladder. Urine sample obtained this shift for UA was unremarkable. Bowel  medications changed to prn 2/2 diarrhea. Pt is complaining about insomnia. MD notified and received order for prn Trazodone and prn Melatonin. Trazodone administered at 2300.

## 2022-07-01 ENCOUNTER — APPOINTMENT (OUTPATIENT)
Dept: PHYSICAL THERAPY | Facility: HOSPITAL | Age: 77
DRG: 518 | End: 2022-07-01
Attending: SURGERY
Payer: MEDICARE

## 2022-07-01 ENCOUNTER — APPOINTMENT (OUTPATIENT)
Dept: INTERVENTIONAL RADIOLOGY/VASCULAR | Facility: HOSPITAL | Age: 77
DRG: 518 | End: 2022-07-01
Attending: PHYSICIAN ASSISTANT
Payer: MEDICARE

## 2022-07-01 LAB
ANION GAP SERPL CALCULATED.3IONS-SCNC: 7 MMOL/L (ref 5–18)
BUN SERPL-MCNC: 23 MG/DL (ref 8–28)
CALCIUM SERPL-MCNC: 9 MG/DL (ref 8.5–10.5)
CHLORIDE BLD-SCNC: 103 MMOL/L (ref 98–107)
CO2 SERPL-SCNC: 30 MMOL/L (ref 22–31)
CREAT SERPL-MCNC: 0.87 MG/DL (ref 0.7–1.3)
GFR SERPL CREATININE-BSD FRML MDRD: 89 ML/MIN/1.73M2
GLUCOSE BLD-MCNC: 107 MG/DL (ref 70–125)
GLUCOSE BLDC GLUCOMTR-MCNC: 112 MG/DL (ref 70–99)
GLUCOSE BLDC GLUCOMTR-MCNC: 124 MG/DL (ref 70–99)
GLUCOSE BLDC GLUCOMTR-MCNC: 190 MG/DL (ref 70–99)
HOLD SPECIMEN: NORMAL
INR PPP: 1.14 (ref 0.85–1.15)
POTASSIUM BLD-SCNC: 4.1 MMOL/L (ref 3.5–5)
SODIUM SERPL-SCNC: 140 MMOL/L (ref 136–145)
UFH PPP CHRO-ACNC: 0.51 IU/ML
UFH PPP CHRO-ACNC: 0.82 IU/ML

## 2022-07-01 PROCEDURE — 250N000013 HC RX MED GY IP 250 OP 250 PS 637: Performed by: SURGERY

## 2022-07-01 PROCEDURE — 97116 GAIT TRAINING THERAPY: CPT | Mod: GP

## 2022-07-01 PROCEDURE — 250N000013 HC RX MED GY IP 250 OP 250 PS 637: Performed by: HOSPITALIST

## 2022-07-01 PROCEDURE — 97110 THERAPEUTIC EXERCISES: CPT | Mod: GP

## 2022-07-01 PROCEDURE — 250N000013 HC RX MED GY IP 250 OP 250 PS 637: Performed by: INTERNAL MEDICINE

## 2022-07-01 PROCEDURE — 250N000011 HC RX IP 250 OP 636: Performed by: RADIOLOGY

## 2022-07-01 PROCEDURE — 250N000013 HC RX MED GY IP 250 OP 250 PS 637: Performed by: NURSE PRACTITIONER

## 2022-07-01 PROCEDURE — 009U3ZZ DRAINAGE OF SPINAL CANAL, PERCUTANEOUS APPROACH: ICD-10-PCS | Performed by: RADIOLOGY

## 2022-07-01 PROCEDURE — 85520 HEPARIN ASSAY: CPT | Performed by: INTERNAL MEDICINE

## 2022-07-01 PROCEDURE — 250N000011 HC RX IP 250 OP 636: Performed by: INTERNAL MEDICINE

## 2022-07-01 PROCEDURE — 87075 CULTR BACTERIA EXCEPT BLOOD: CPT | Performed by: INTERNAL MEDICINE

## 2022-07-01 PROCEDURE — 85610 PROTHROMBIN TIME: CPT | Performed by: NURSE PRACTITIONER

## 2022-07-01 PROCEDURE — 99232 SBSQ HOSP IP/OBS MODERATE 35: CPT | Performed by: INTERNAL MEDICINE

## 2022-07-01 PROCEDURE — 86334 IMMUNOFIX E-PHORESIS SERUM: CPT | Performed by: NURSE PRACTITIONER

## 2022-07-01 PROCEDURE — 87205 SMEAR GRAM STAIN: CPT | Performed by: INTERNAL MEDICINE

## 2022-07-01 PROCEDURE — 99152 MOD SED SAME PHYS/QHP 5/>YRS: CPT

## 2022-07-01 PROCEDURE — 36415 COLL VENOUS BLD VENIPUNCTURE: CPT | Performed by: INTERNAL MEDICINE

## 2022-07-01 PROCEDURE — 80048 BASIC METABOLIC PNL TOTAL CA: CPT | Performed by: INTERNAL MEDICINE

## 2022-07-01 PROCEDURE — 250N000013 HC RX MED GY IP 250 OP 250 PS 637: Performed by: FAMILY MEDICINE

## 2022-07-01 PROCEDURE — 120N000001 HC R&B MED SURG/OB

## 2022-07-01 PROCEDURE — 62267 INTERDISCAL PERQ ASPIR DX: CPT

## 2022-07-01 PROCEDURE — 250N000011 HC RX IP 250 OP 636: Performed by: NURSE PRACTITIONER

## 2022-07-01 RX ORDER — FENTANYL CITRATE 50 UG/ML
INJECTION, SOLUTION INTRAMUSCULAR; INTRAVENOUS PRN
Status: COMPLETED | OUTPATIENT
Start: 2022-07-01 | End: 2022-07-01

## 2022-07-01 RX ADMIN — Medication 500 MG: at 13:17

## 2022-07-01 RX ADMIN — FERROUS SULFATE TAB 325 MG (65 MG ELEMENTAL FE) 325 MG: 325 (65 FE) TAB at 13:16

## 2022-07-01 RX ADMIN — Medication 100 MCG: at 13:18

## 2022-07-01 RX ADMIN — DICLOFENAC SODIUM 2 G: 10 GEL TOPICAL at 17:48

## 2022-07-01 RX ADMIN — AMOXICILLIN AND CLAVULANATE POTASSIUM 1 TABLET: 875; 125 TABLET, FILM COATED ORAL at 09:22

## 2022-07-01 RX ADMIN — TIZANIDINE 2 MG: 2 TABLET ORAL at 20:22

## 2022-07-01 RX ADMIN — OXYCODONE HYDROCHLORIDE 5 MG: 5 TABLET ORAL at 17:44

## 2022-07-01 RX ADMIN — ACETAMINOPHEN 975 MG: 325 TABLET, FILM COATED ORAL at 09:21

## 2022-07-01 RX ADMIN — MIDAZOLAM 0.5 MG: 1 INJECTION INTRAMUSCULAR; INTRAVENOUS at 10:57

## 2022-07-01 RX ADMIN — GABAPENTIN 600 MG: 300 CAPSULE ORAL at 20:22

## 2022-07-01 RX ADMIN — LIDOCAINE 2 PATCH: 246 PATCH TOPICAL at 09:24

## 2022-07-01 RX ADMIN — LISINOPRIL 20 MG: 20 TABLET ORAL at 09:23

## 2022-07-01 RX ADMIN — PANTOPRAZOLE SODIUM 40 MG: 40 TABLET, DELAYED RELEASE ORAL at 17:26

## 2022-07-01 RX ADMIN — LIDOCAINE: 50 OINTMENT TOPICAL at 13:29

## 2022-07-01 RX ADMIN — HEPARIN SODIUM 1600 UNITS/HR: 10000 INJECTION, SOLUTION INTRAVENOUS at 19:33

## 2022-07-01 RX ADMIN — Medication 500 MG: at 20:22

## 2022-07-01 RX ADMIN — TAMSULOSIN HYDROCHLORIDE 0.4 MG: 0.4 CAPSULE ORAL at 13:18

## 2022-07-01 RX ADMIN — ACETAMINOPHEN 975 MG: 325 TABLET, FILM COATED ORAL at 13:16

## 2022-07-01 RX ADMIN — FENTANYL CITRATE 25 MCG: 50 INJECTION, SOLUTION INTRAMUSCULAR; INTRAVENOUS at 11:00

## 2022-07-01 RX ADMIN — OXYCODONE HYDROCHLORIDE 5 MG: 5 TABLET ORAL at 13:20

## 2022-07-01 RX ADMIN — HYDROMORPHONE HYDROCHLORIDE 0.4 MG: 0.2 INJECTION, SOLUTION INTRAMUSCULAR; INTRAVENOUS; SUBCUTANEOUS at 08:36

## 2022-07-01 RX ADMIN — ACETAMINOPHEN 975 MG: 325 TABLET, FILM COATED ORAL at 20:22

## 2022-07-01 RX ADMIN — THERA TABS 1 TABLET: TAB at 14:38

## 2022-07-01 RX ADMIN — DICLOFENAC SODIUM 2 G: 10 GEL TOPICAL at 09:24

## 2022-07-01 RX ADMIN — MIDAZOLAM 1 MG: 1 INJECTION INTRAMUSCULAR; INTRAVENOUS at 10:50

## 2022-07-01 RX ADMIN — GABAPENTIN 600 MG: 300 CAPSULE ORAL at 09:22

## 2022-07-01 RX ADMIN — HEPARIN SODIUM 1600 UNITS/HR: 10000 INJECTION, SOLUTION INTRAVENOUS at 13:26

## 2022-07-01 RX ADMIN — INSULIN GLARGINE 15 UNITS: 100 INJECTION, SOLUTION SUBCUTANEOUS at 20:46

## 2022-07-01 RX ADMIN — LIDOCAINE: 50 OINTMENT TOPICAL at 20:22

## 2022-07-01 RX ADMIN — ROSUVASTATIN CALCIUM 10 MG: 10 TABLET, FILM COATED ORAL at 20:22

## 2022-07-01 RX ADMIN — FENTANYL CITRATE 50 MCG: 50 INJECTION, SOLUTION INTRAMUSCULAR; INTRAVENOUS at 10:52

## 2022-07-01 RX ADMIN — FERROUS SULFATE TAB 325 MG (65 MG ELEMENTAL FE) 325 MG: 325 (65 FE) TAB at 17:43

## 2022-07-01 RX ADMIN — HYDROMORPHONE HYDROCHLORIDE 0.2 MG: 0.2 INJECTION, SOLUTION INTRAMUSCULAR; INTRAVENOUS; SUBCUTANEOUS at 06:30

## 2022-07-01 RX ADMIN — BUMETANIDE 3 MG: 1 TABLET ORAL at 09:23

## 2022-07-01 RX ADMIN — METFORMIN HYDROCHLORIDE 1000 MG: 500 TABLET, FILM COATED ORAL at 17:43

## 2022-07-01 RX ADMIN — DOXYCYCLINE 100 MG: 100 CAPSULE ORAL at 09:22

## 2022-07-01 ASSESSMENT — ACTIVITIES OF DAILY LIVING (ADL)
ADLS_ACUITY_SCORE: 30

## 2022-07-01 NOTE — PROGRESS NOTES
Patient has planned epidural fluid collection today; no time has been determined for this. Heparin drip on hold starting at 0645, due to be restarted at 0745 (per RN managed Heparin protocol). However, Heparin drip must be held 2 hours prior to this procedure. This writer spoke with RUDI Morgan in Interventional Radiology. No physician is present in the department as of this note (they reportedly begin at 0800), but Cathy recommended holding restarting Heparin drip until we can determine time for procedure today. Will delay restarting Heparin drip at this time pending further instruction.    Wen Fox RN

## 2022-07-01 NOTE — PLAN OF CARE
Problem: Bleeding (Spinal Surgery)  Goal: Absence of Bleeding  Outcome: Ongoing, Progressing     Problem: Infection (Spinal Surgery)  Goal: Absence of Infection Signs and Symptoms  Outcome: Ongoing, Progressing     Problem: Pain (Spinal Surgery)  Goal: Acceptable Pain Control  Outcome: Ongoing, Progressing   Goal Outcome Evaluation:      Pt A&O x 4. Made his needs known. C/o back pain. Prn dilaudid given and had a good effect. Pt on heparin drip. On oral abx. NPO at mid night. Next anti-xa scheduled tomorrow am.

## 2022-07-01 NOTE — PROGRESS NOTES
Care Management Follow Up    Length of Stay (days): 15    Expected Discharge Date: 07/02/2022     Concerns to be Addressed: discharge planning       Patient plan of care discussed at interdisciplinary rounds: Yes    Anticipated Discharge Disposition: Transitional Care     Anticipated Discharge Services: Other (see comment) (therapy services)    Anticipated Discharge DME: None    Patient/family educated on Medicare website which has current facility and service quality ratings: yes    Education Provided on the Discharge Plan: yes    Patient/Family in Agreement with the Plan: yes    Referrals Placed by CM/ELBA: Post Acute Facilities    Private pay costs discussed: Not applicable    Additional Information: ELBA spoke with Kimberlee at [facility].  They can continue to hold bed.  Wife is in double room at their facility.  Pt can admit to same room as long as has Covid booster before discharge.      ELBA left message for pt's son Abelardo letting him know pt would not be discharging today due to needing another MRI and plan once resulted.      ELBA received voicemail message from pt's wife Sirisha asking for return call regarding pt's discharge plan.  ELBA left message back for Sirisha but noted that SW did not receive return call today.      ELBA spoke with pt's son Abelardo.  He did receive ELBA's message from earlier today.  He is not aware of what his mom (Sirisha) wanted to talk with ELBA about.  ELBA will keep Abelardo updated about when pt discharging and planning for Abelardo to transport.      ELBA spoke with Dr Duenas regarding pt and anticipated discharge date.  He stated that it depended on what the MRI shows from today.  If there is more fluid, will likely do IR procedure (aspiration?) for possible abscess.  Neurosurgery is lead so is running the show.      ELBA reviewed chart later this evening and noted that is appeared pt will have IR procedure tomorrow.  Discharge date a question at this time.       TONY Thompson, SHABBIR 07/01/22  7:30 AM

## 2022-07-01 NOTE — TELEPHONE ENCOUNTER
Addendum: updated care management and social work team. The team is aware of Sirisha's concerns and has attempted contact with her and then been speaking with the patient and his son with all the updates in his plan of care. Encounter closed. -[facility] [place]

## 2022-07-01 NOTE — PROGRESS NOTES
Daily Progress Note        CODE STATUS:  Full Code    06/30/22  Assessment/Plan:  77M with -year-old male with CAD, HFrEF, DM2, HTN, atrial fibrillation, hypothyroidism presented for planned neurosurgical intervention to treat herniated lumbar disc. 06/16 underwent decompressive lumbar laminectomy.  06/19 with post-op fever and started on vanco and zosyn.    06/22 MRI with fluid collection with possible rim enhancement which was aspirated.  Cultures negative thus far.  ID recommends Augmentin x doxycycline for 1 week.   Clinically has been improving however repeat MRI 6/27 showed increased fluid collection in the laminectomy bed causing increased compression on cauda equina, as well as slightly increased subdural collection at L2-L4 level.  Neurosurgery recommended continuous monitoring for cauda equina and repeat MRI in 2 to 3 days.  Anticoagulation was held due to subdural fluid collection.     6/29 found to have right lower extremity DVT.  IV heparin started after discussion with neurosurgery     Also with YOLANDA this admission and volume overload requiring IV diuresis        #Post-op fever.  Post-op fluid collection - culture no growth while on abx.  Plan for augmentin and doxy x 1 week.  MRI 6/27 shows increased fluid collection in the laminectomy bed causing increased compression on cauda equina; as well as slightly increased subdural collection at L2-L4 level. On 6/29 new onset chills but no fever.  WBC mildly elevated 11.2 but CRP down 2.6 from 4.8 and ESR 37 from 89.  Agree with checking blood cultures, UA, chest x-ray. MRI lumbar spine 6/30- similar or minimally larger multilobular fluid collection at the laminectomy site. S/P IR aspiration today.       # Acute DVT of the right profundofemoral vein in the upper thigh - heparin gtt on hold for the IR aspiration.  Was on Xarelto prior to admission. Resume heparin gtt 2 hours after the procedure.     #Acute on chronic HFrEF; CAD s/p CABG - volume up with R sided  findings.  ECHO with EF 45-50%, severe TR, mild MR, mild pulm HTN.  -Responded well to IV diuresis.  Switched to oral Bumex 6/29  -Cards f/u as outpatient     #YOLANDA on CKD3 - baseline 1.1-1.2s.  Post-op injury likely hemodynamic demetrius-operatively.  Resolved.      #Lumbar spinal stenosis with lumbar disc herniation  -Patient underwent scheduled decompressive lumbar laminectomy of L4-L5, L5-S1 with bilateral foraminotomies and removal of herniated disc in the L1-S1 region with neurosurgery on 6/16/2022.  -Scheduled Tylenol 3 times daily  -Gabapentin 600 mg p.o. twice daily  -Post op management per neurosurgery  -Decadron burst per Nsx - completed      #Post op urinary retention : started flomax.  Had Carpenter which is now removed.       #Constipation - resolved     #Chronic atrial fibrillation, sick sinus syndrome s/p PPM  -Heparin gtt  -Resume xarelto when ok from sx standpoint.      #DM2 with recurrent AM hypoglycemia - reports he is eating healthier here in hospital with much reduced insulin needs.  Steroid induced hyperglycemia with sugars 300s-400s - last dexamethasone dose 6/27 AM - improved significantly   -Stopped glimiperide and would not resume  -Continue Lantus to 15 units at HS, mealtime CHO coverage 1:16, low ISS      -Nasal congestion, postnasal drip  -Exacerbated in the setting of needing to lie flat for 24 hours  -Start ipratropium nasal spray twice daily     -Chronic left shoulder pain  -Start lidocaine and Voltaren trading on and off 4 times daily     -GERD  -Pantoprazole 40 mg p.o. daily     #CORAZON -CPAP     #Hypothyroidism  -TSH 2.05  -Levothyroxine 25 mcg p.o. daily     #Normocytic anemia  -Stable       Barriers to Discharge:  IR procedure today  Disposition; TCU in couple of days if ok with consultants    Subjective:  Interval History: Patient seen and examined this morning.  No new issues reported except that he didn't get a good sleep. Doing about the same. No new leg weakness. Waiting for the  procedure. No fevers or chills.      Review of Systems:   As mentioned in subjective.    Patient Active Problem List   Diagnosis     Type 2 diabetes mellitus, with long-term current use of insulin (H)     Benign essential hypertension     CAD (coronary artery disease)     Ischemic cardiomyopathy     Right Bundle Branch Block     Mixed hyperlipidemia     Chronic atrial fibrillation (H)     CORAZON (obstructive sleep apnea)     Congestive Heart Failure     Typical atrial flutter (H)     Leg pain, bilateral     Leg swelling     Venous insufficiency of both lower extremities     Acquired lymphedema of leg     Claudication of both lower extremities (H)     Diabetic peripheral neuropathy associated with type 2 diabetes mellitus (H)     SSS (sick sinus syndrome) (H)     Acute systolic heart failure (H)     Biventricular cardiac pacemaker in situ     Lumbar stenosis with neurogenic claudication     Gastroesophageal reflux disease without esophagitis     Hypothyroidism     Normocytic anemia     Chronic left shoulder pain     Nasal congestion     Lumbar spinal stenosis     Acute deep vein thrombosis (DVT) of femoral vein of right lower extremity (H)       Scheduled Meds:    acetaminophen  975 mg Oral TID     bisacodyl  10 mg Rectal Daily     bumetanide  3 mg Oral Daily     diclofenac  2 g Topical BID     ferrous sulfate  325 mg Oral TID w/meals     gabapentin  600 mg Oral BID     insulin aspart   Subcutaneous TID w/meals     insulin aspart  1-3 Units Subcutaneous TID AC     insulin aspart  1-3 Units Subcutaneous At Bedtime     insulin glargine  15 Units Subcutaneous At Bedtime     ipratropium  2 spray Both Nostrils TID     levothyroxine  25 mcg Oral Daily     lidocaine  2 patch Transdermal Q24H     lidocaine   Topical BID     lidocaine   Transdermal Q8H DAX     lisinopril  20 mg Oral Daily     metFORMIN  1,000 mg Oral BID w/meals     multivitamin, therapeutic  1 tablet Oral Daily     pantoprazole  40 mg Oral BID AC      rosuvastatin  10 mg Oral At Bedtime     tamsulosin  0.4 mg Oral Daily     vitamin C  500 mg Oral TID     cholecalciferol  100 mcg Oral Daily     Continuous Infusions:    heparin 1,600 Units/hr (07/01/22 1326)     PRN Meds:.acetaminophen, sore throat lozenge, bisacodyl, glucose **OR** dextrose **OR** glucagon, flumazenil, HOLD MEDICATION (one time), hydrALAZINE, HYDROmorphone **OR** HYDROmorphone, hydrOXYzine **OR** hydrOXYzine, loratadine, magnesium hydroxide, melatonin, naloxone **OR** naloxone **OR** naloxone **OR** naloxone, ondansetron **OR** ondansetron, oxyCODONE **OR** oxyCODONE, polyethylene glycol, prochlorperazine **OR** prochlorperazine, senna-docusate, tiZANidine, traZODone    Objective:  Vital signs in last 24 hours:  Temp:  [97.5  F (36.4  C)-98.8  F (37.1  C)] 97.9  F (36.6  C)  Pulse:  [60-62] 61  Resp:  [12-53] 12  BP: (125-168)/(60-82) 136/65  SpO2:  [90 %-100 %] 98 %        Intake/Output Summary (Last 24 hours) at 6/30/2022 1715  Last data filed at 6/30/2022 0900  Gross per 24 hour   Intake 480 ml   Output --   Net 480 ml       Physical Exam:    General: Not in obvious distress.  HEENT: NC, AT   Chest: Clear to auscultation bilaterally  Heart: S1S2 normal, regular. No M/R/G  Abdomen: Soft. NT, ND. Bowel sounds- active.  Extremities: 2+ legs swelling  Neuro: Alert and awake, grossly non-focal      Lab Results:(I have personally reviewed the results)    Recent Results (from the past 24 hour(s))   Glucose by meter    Collection Time: 06/30/22  4:38 PM   Result Value Ref Range    GLUCOSE BY METER POCT 116 (H) 70 - 99 mg/dL   Glucose by meter    Collection Time: 06/30/22  8:49 PM   Result Value Ref Range    GLUCOSE BY METER POCT 147 (H) 70 - 99 mg/dL   Heparin Unfractionated Anti Xa Level    Collection Time: 06/30/22  9:47 PM   Result Value Ref Range    Anti Xa Unfractionated Heparin 0.65 For Reference Range, See Comment IU/mL   Extra Red Top Tube    Collection Time: 06/30/22 10:04 PM   Result Value  Ref Range    Hold Specimen [facility]    Basic metabolic panel    Collection Time: 07/01/22  5:24 AM   Result Value Ref Range    Sodium 140 136 - 145 mmol/L    Potassium 4.1 3.5 - 5.0 mmol/L    Chloride 103 98 - 107 mmol/L    Carbon Dioxide (CO2) 30 22 - 31 mmol/L    Anion Gap 7 5 - 18 mmol/L    Urea Nitrogen 23 8 - 28 mg/dL    Creatinine 0.87 0.70 - 1.30 mg/dL    Calcium 9.0 8.5 - 10.5 mg/dL    Glucose 107 70 - 125 mg/dL    GFR Estimate 89 >60 mL/min/1.73m2   Heparin Unfractionated Anti Xa Level    Collection Time: 07/01/22  5:24 AM   Result Value Ref Range    Anti Xa Unfractionated Heparin 0.82 For Reference Range, See Comment IU/mL   INR    Collection Time: 07/01/22  5:24 AM   Result Value Ref Range    INR 1.14 0.85 - 1.15   Glucose by meter    Collection Time: 07/01/22  8:36 AM   Result Value Ref Range    GLUCOSE BY METER POCT 112 (H) 70 - 99 mg/dL   Glucose by meter    Collection Time: 07/01/22 12:31 PM   Result Value Ref Range    GLUCOSE BY METER POCT 124 (H) 70 - 99 mg/dL       All laboratory and imaging data in the past 24 hours reviewed  Serum Glucose range:   Recent Labs   Lab 07/01/22  1231 07/01/22  0836 07/01/22  0524 06/30/22  2049   * 112* 107 147*     ABG: No lab results found in last 7 days.  CBC:   Recent Labs   Lab 06/29/22  1424 06/29/22  0529 06/25/22  0434   WBC 11.2* 11.2* 7.6   HGB 9.9* 9.0* 8.9*   HCT 31.8* 29.3* 29.6*   MCV 92 93 95    324 266   NEUTROPHIL  --   --  55   LYMPH  --   --  25   MONOCYTE  --   --  16   EOSINOPHIL  --   --  3     Chemistry:   Recent Labs   Lab 07/01/22  0524 06/30/22  0629 06/29/22  0529    140 139   POTASSIUM 4.1 3.9 4.0   CHLORIDE 103 103 105   CO2 30 29 27   BUN 23 25 33*   CR 0.87 0.83 0.92   GFRESTIMATED 89 >90 86   BARBARA 9.0 8.8 8.8     Coags:  Recent Labs   Lab 07/01/22  0524   INR 1.14     Cardiac Markers:  No results for input(s): CKTOTAL, TROPONINI in the last 168 hours.       XR Chest 2 Views    Result Date: 6/30/2022  EXAM: XR CHEST 2 VW  LOCATION: [facility] DATE/TIME: 6/30/2022 2:11 PM INDICATION: pre mri COMPARISON: Chest radiographs from 06/29/2022.     IMPRESSION: Stable enlargement of the cardiac silhouette with tortuous thoracic aorta. Status post median sternotomy with left-sided cardiac pacer and leads overlying the right atrium, right ventricle, and coronary sinus. Evidence of prior CABG. No other  findings to suggest metallic foreign bodies. Left basilar atelectasis. There is blunting of the left costophrenic angle consistent with a small pleural effusion.    XR Chest 2 Views    Result Date: 6/29/2022  EXAM: XR CHEST 2 VW LOCATION: [facility] DATE/TIME: 6/29/2022 4:15 PM INDICATION: postoperative COMPARISON: 6/27/2022     IMPRESSION: Multiple median sternotomy wires are seen. A left subclavian pacing device is present. Low lung volumes are noted. There is no focal airspace consolidation. No large pleural effusion. No pneumothorax. The heart size is stable.    XR Chest 2 Views    Result Date: 6/27/2022  EXAM: XR CHEST 2 VW LOCATION: [facility] DATE/TIME: 6/27/2022 11:08 AM INDICATION: PRE cardiac device check COMPARISON: 06/21/2022 and older studies     IMPRESSION: Poststernotomy changes. Triple lead left subclavian venous pacer again noted. Leads are intact. Cardiomegaly and trace effusions again noted. No signs of failure or pneumonia.    XR Chest 2 Views    Result Date: 6/21/2022  EXAM: XR CHEST 2 VW LOCATION: [facility] DATE/TIME: 6/21/2022 10:51 AM INDICATION: Pre MRI Cardiac device check COMPARISON: Chest CT 06/20/2022 06/20/2022 and older studies.     IMPRESSION: Poststernotomy changes. Triple lead left subclavian venous pacer again noted. Lungs are otherwise clear. Heart is slightly enlarged but unchanged. No signs of failure. Small bilateral pleural effusions are again seen noted on the lateral view. Changes of DISH in the thoracic  spine.    Echocardiogram Complete    Result Date: 2022  [id number]^NICOLA^[place] [address]  Name: TERRELL BRIGGS MRN: [id number] : 1945 Study Date: 2022 01:35 PM Age: 77 yrs Gender: Male Patient Location: [facility] Reason For Study: SOB Ordering Physician: EBENEZER PETERSEN Performed By: JULIETA  BSA: 2.1 m2 Height: 70 in Weight: 209 lb ______________________________________________________________________________ Procedure Complete Portable Echo Adult. Definity (NDC #94788-591) given intravenously. ______________________________________________________________________________ Interpretation Summary  The left ventricle is normal in size. Left ventricular function is decreased. The ejection fraction is 45-50% (mildly reduced). There is a pacemaker lead in the right ventricle. The left atrium is mildly dilated. There is mild (1+) mitral regurgitation. There is moderately severe (3+) tricuspid regurgitation. Right ventricular systolic pressure is elevated, consistent with mild pulmonary hypertension. Mild valvular aortic stenosis. ______________________________________________________________________________ Left Ventricle The left ventricle is normal in size. Left ventricular function is decreased. The ejection fraction is 45-50% (mildly reduced). There is normal left ventricular wall thickness. Septal wall motion abnormality may reflect pacemaker activation.  Right Ventricle The right ventricle is not well visualized. There is a pacemaker lead in the right ventricle.  Atria The left atrium is mildly dilated. Right atrium not well visualized. There is no color Doppler evidence of an atrial shunt.  Mitral Valve Mitral valve leaflets appear normal. There is mild (1+) mitral regurgitation.  Tricuspid Valve The tricuspid valve is not well visualized, but is grossly normal. There is moderately severe (3+) tricuspid regurgitation. Right  ventricular systolic pressure is elevated, consistent with mild pulmonary hypertension.  Aortic Valve The aortic valve is not well visualized. No aortic regurgitation is present. Mild valvular aortic stenosis.  Pulmonic Valve The pulmonic valve is not well seen, but is grossly normal. This degree of valvular regurgitation is within normal limits.  Vessels The aorta root is normal. Normal size ascending aorta.  Pericardium There is no pericardial effusion. ______________________________________________________________________________ MMode/2D Measurements & Calculations IVSd: 0.98 cm  LVIDd: 5.1 cm LVIDs: 3.5 cm LVPWd: 1.0 cm FS: 31.6 % LV mass(C)d: 187.4 grams LV mass(C)dI: 88.1 grams/m2 Ao root diam: 2.7 cm LA dimension: 4.1 cm LA/Ao: 1.5 LVOT diam: 2.0 cm LVOT area: 3.2 cm2 LA Volume Indexed (AL/bp): 36.5 ml/m2 RWT: 0.39  Time Measurements MM HR: 64.0 BPM  Doppler Measurements & Calculations MV E max toan: 139.0 cm/sec MV max P.0 mmHg MV mean P.9 mmHg MV V2 VTI: 44.7 cm MVA(VTI): 1.1 cm2 MV dec time: 0.20 sec Ao V2 max: 215.8 cm/sec Ao max P.0 mmHg Ao V2 mean: 153.7 cm/sec Ao mean P.1 mmHg Ao V2 VTI: 37.9 cm ELISABETH(I,D): 1.3 cm2 ELISABETH(V,D): 1.2 cm2 LV V1 max P.9 mmHg LV V1 max: 84.5 cm/sec LV V1 VTI: 15.1 cm SV(LVOT): 47.6 ml SI(LVOT): 22.4 ml/m2 PA acc time: 0.07 sec TR max toan: 328.5 cm/sec TR max P.2 mmHg AV Toan Ratio (DI): 0.39 ELISABETH Index (cm2/m2): 0.59 E/E' av.6 Lateral E/e': 11.5 Medial E/e': 15.8  ______________________________________________________________________________ Report approved by: Catarino Rudd 2022 03:23 PM       US Lower Extremity Venous Duplex Bilateral    Result Date: 2022  EXAM: US LOWER EXTREMITY VENOUS DUPLEX BILATERAL LOCATION: [facility] DATE/TIME: 2022 11:30 AM INDICATION: calf tenderness COMPARISON: None. TECHNIQUE: Venous Duplex ultrasound of bilateral lower extremities with and without compression,  augmentation and duplex. Color flow and spectral Doppler with waveform analysis performed. FINDINGS: Exam includes the common femoral, femoral, popliteal veins as well as segmentally visualized deep calf veins and greater saphenous vein. Limited exam due to body habitus and marked edema in both calves. RIGHT: There is thrombus in the right profundofemoral vein measuring at least 2 cm in length. This is approximately 1.5 cm from the confluence with the femoral vein in the upper thigh to make the common femoral vein.  No superficial thrombophlebitis. There is an 4.8 x 1 x 3 cm popliteal fossa cyst. Extensive subcutaneous edema below the knee. LEFT: No deep vein thrombosis. No superficial thrombophlebitis. There is a 4.4 x 2 x 1 cm left popliteal fossa cyst. Subcutaneous edema below the knee.     IMPRESSION: 1.  There is an acute deep venous thrombus in the right profundofemoral vein in the upper thigh. This is approximately 1.5 cm inferior to the confluence forming the right common femoral vein. 2.  There are bilateral popliteal fossa cyst. 3.  Marked subcutaneous edema below the knee in both legs. [Critical Result: Deep venous thrombus in the right  profundofemoral vein in the right upper thigh as noted above.] Finding was identified on 6/29/2022 12:25 PM. Shama, his nurse was contacted by me on 6/29/2022 12:28 PM and verbalized understanding of the critical result.     US Renal Complete    Result Date: 6/20/2022  EXAM: US RENAL COMPLETE LOCATION: [facility] DATE/TIME: 6/20/2022 4:12 PM INDICATION: YOLANDA COMPARISON: Abdominal ultrasound dated 10/11/2013. TECHNIQUE: Routine Bilateral Renal and Bladder Ultrasound. FINDINGS: RIGHT KIDNEY: 10.9 x 6.5 x 5.7 cm. Renal cortical thickness is 1.5 cm Normal without hydronephrosis or masses. LEFT KIDNEY: 11.6 x 6.5 x 5.1 cm. Renal cortex thickness is 1.8 cm. Normal without hydronephrosis or masses. BLADDER: Nondistended     IMPRESSION: 1.  Normal kidney  ultrasound.    XR Chest Port 1 View    Result Date: 6/20/2022  EXAM: XR CHEST PORT 1 VIEW LOCATION: [facility] DATE/TIME: 6/20/2022 10:05 AM INDICATION: fever; post spine surgery COMPARISON: 06/12/2019, 08/31/2012     IMPRESSION: Poststernotomy changes with multi lead left subclavian venous pacer. Leads are intact. Heart is slightly enlarged but unchanged. There is a 9 x 5 mm ovoid, relatively dense nodule projecting over the anterior left third rib, not seen before. It's unclear if this is in the rib or the lung. When patient is able, consider a follow-up formal PA and lateral chest x-ray or a noncontrast chest CT for reevaluation. Lungs are otherwise clear without signs of pneumonia or failure. [Access Center: Left side lung nodule needing follow-up PA and lateral chest x-ray or chest CT. This report will be copied to the [facility] to ensure a provider acknowledges the finding. Access Center is available Monday through Friday 8am-3:30 pm.     XR Abdomen Port 1 View    Result Date: 6/22/2022  EXAM: XR ABDOMEN PORT 1 VIEWS LOCATION: [facility] DATE/TIME: 6/22/2022 2:35 PM INDICATION: abdominal distension COMPARISON: None.     IMPRESSION: No distended air-filled loops of small bowel. There is a small amount of throughout the colon. No definite intraperitoneal free air identified. Pacemaker leads are partially included in the field-of-view. Prior median sternotomy noted. There are surgical staples over the abdomen and pelvis.      MR Lumbar Spine w/o & w Contrast    Result Date: 6/27/2022  EXAM: MR LUMBAR SPINE W/O and W CONTRAST LOCATION: [facility] DATE/TIME: 6/27/2022 11:42 AM INDICATION: Follow-up fluid collections, has been on oral antibiotics. No growth from fluid aspirate. COMPARISON: MRI 06/21/2022. CONTRAST: 10ml Gadavist TECHNIQUE: Routine Lumbar Spine MRI without and with IV contrast. FINDINGS: Nomenclature is based on 5  lumbar-type vertebral bodies. Vertebral body heights and lateral lumbar alignment stable. Again seen are prior L4-L5 and L5-S1 laminectomy changes. The previously noted fluid collection within the L4 and L5 laminectomy bed has increased considerably in size compared to 06/21/2022 now measuring approximately 5.8 cm craniocaudad by 3.3 cm AP by  3.2 cm transverse. This fluid collection has considerable mass effect on the posterior aspect of the thecal sac behind the L4 and L5 vertebral bodies. On the prior study, the thecal sac at the L5 level was fairly compressed. The thecal sac compression has definitely progressed significantly behind the L4 vertebral body and slightly progressed behind the L5 vertebral body in the interval. Again the nature of this fluid collection is nonspecific. Previously noted thin ventral epidural fluid collection from L2 through L5 has decreased in size in the interval. There is a thin subdural fluid collection posteriorly extending from L2 through the top of L4 slightly increased from prior. Normal distal spinal cord and cauda equina with conus medullaris at L1. Postoperative changes in the paraspinal tissues as noted above. Unremarkable visualized upper bony pelvis. T12-L1: Normal disc height and signal. No herniation. Normal facets. No spinal canal or neural foraminal stenosis. L1-L2: Normal disc height with mild loss of disc signal. No herniation. Normal facets. No spinal canal or neural foraminal stenosis. L2-L3: Normal disc height with mild loss of disc signal. No herniation. Normal facets. No spinal canal or neural foraminal stenosis. L3-L4: Normal disc height with mild loss of disc signal again seen. No herniation. Mild facet arthropathy. Mild central canal narrowing. Mild bilateral foraminal narrowing. L4-L5: See discussion above. Normal disc height with mild loss of disc signal again seen. Prominent facet arthropathy with low-grade stable anterolisthesis L4 on L5. Laminectomy and  medial facetectomy changes. Thecal sac compression as noted above. Mild bilateral foraminal narrowing. L5-S1: See discussion above. Laminectomy changes. Stable loss of disc height with stable mild increased T2 signal in the disc. Minimal endplate edema on the left unchanged. Stable central disc protrusion. Moderate facet arthropathy. Thecal sac compression as described above. Moderate bilateral foraminal narrowing.     IMPRESSION: 1.  Again seen are previous L4-L5 and L5-S1 laminectomy changes. The previously noted fluid collection within the L4 and L5 laminectomy bed has increased considerably in size compared to MRI 06/21/2022. It does cause considerable mass effect on the posterior aspect of the thecal sac behind the L4 and L5 vertebral bodies progressed from prior. Correlate for any cauda equina symptoms. This fluid is nonspecific. 2.  Previously noted ventral epidural thin fluid collection from L2 through L5 has decreased in the interval. 3.  There is a thin subdural fluid collection posteriorly from L2 through L4 slightly increased from prior. 4.  At the L3-L4 level, there is mild central canal narrowing and mild bilateral foraminal narrowing. 5.  Vertebral body heights and lateral alignment maintained. 6.  Findings discussed with nurse practitioner Melisa Lara from neurosurgery at 1:15 PM on 06/27/2022.    MR Lumbar Spine w/o & w Contrast    Addendum Date: 6/21/2022    Findings conveyed to the Neurosurgery team (Joan) at 1427 hours on 6/21/2022.    Result Date: 6/21/2022  EXAM: MR LUMBAR SPINE W/O and W CONTRAST LOCATION: [facility] DATE/TIME: 6/21/2022 11:52 AM INDICATION: History of back pain. COMPARISON: MRI lumbar spine 12/28/2021 CONTRAST: Gadolinium 10 mL IV TECHNIQUE: Routine Lumbar Spine MRI without and with IV contrast. FINDINGS: Nomenclature is based on 5 lumbar type vertebral bodies. Minimal grade 1 anterolisthesis of L4 on L5. Vertebral body heights and alignment  otherwise maintained. Mildly heterogeneous bone marrow signal. No focal destructive osseous lesion. Normal distal spinal cord and cauda equina with conus medullaris at L1-L2. Laminectomies L4-L5 and L5-S1. Patchy enhancement and edema throughout the operative bed. Small 6 mm maximum axial diameter fluid collection along the right L5-S1 facet joint (series 10, image 15). Midline paraspinal collection largest at the L3-L4 level, measuring 5 x 2 cm in axial dimension (series 10, image 22). Ventral rim-enhancing epidural collection extending from the L2 level cranially to the L5-S1 level caudally (series 9, image 15), contributing to spinal canal stenosis detailed below. Small dorsal subdural collection/effusion (series 5, image 31). 1.8 x 0.9 cm axial dimension fluid collection within the L5-S1 laminectomy bed that appears to efface the adjacent  caudal thecal sac in combination with the ventral epidural collection and L5-S1 disc extrusion. T12-L1: Normal disc height and signal. No herniation. Normal facets. No spinal canal or neural foraminal stenosis. L1-L2: Disc desiccation with relative preservation of height. No herniation. Normal facets. No spinal canal or neural foraminal stenosis. L2-L3: Disc desiccation with relative preservation of height. No herniation. Disc bulge. Normal facets. No spinal canal or neural foraminal stenosis. L3-L4: Disc desiccation with relative preservation of height. No herniation. Severe concentric narrowing of the spinal canal secondary to a disc bulge, facet arthropathy and ligamentum flavum thickening. Moderate right and mild left neural foraminal stenosis. L4-L5: Disc desiccation with relative preservation of height. No herniation. Disc bulge. Facet arthropathy. Severe spinal canal stenosis. Moderate neural foraminal stenosis bilaterally. L5-S1: Fluid signal within the disc space, likely on a degenerative or reactive basis with Modic 1 degenerative endplate changes and adjacent  inflammatory changes present. Central disc extrusion. Facet arthropathy. Moderate to severe neural foraminal stenosis bilaterally.     IMPRESSION: Combination of postoperative changes, as well as additional findings suggestive of possible superimposed infection with a rim-enhancing ventral epidural collection extending from the L2 level cranially to the L5-S1 level caudally (series 9, image 15), contributing to multilevel high-grade spinal canal stenosis. Additional multicompartmental collections as detailed above. Extensive background lumbar spondylosis.     CT Chest w/o Contrast    Result Date: 6/20/2022  EXAM: CT CHEST W/O CONTRAST LOCATION: [facility] DATE/TIME: 6/20/2022 4:35 PM INDICATION: Fever COMPARISON: Portable AP view the chest 06/20/2022 TECHNIQUE: CT chest without IV contrast. Multiplanar reformats were obtained. Dose reduction techniques were used. CONTRAST: None. FINDINGS: LUNGS AND PLEURA: Motion-related blurring slightly limits evaluation of the lung parenchyma. Small left and trace right pleural effusions layer into the posterior costophrenic sulci. There are no findings to suggest interstitial lung edema elsewhere. No lung consolidation. Trachea and central airways are patent and normal caliber. Peripheral airways are not well assessed. MEDIASTINUM: Four-chamber cardiac enlargement. Left subclavian approach CRT-P has right atrial appendage, right ventricle, and coronary sinus leads. No pericardial effusion. Enlarged main pulmonary artery measures 3.2 cm in diameter. No thoracic aortic aneurysm. Moderate patchy arch and descending aorta atheromatous calcifications. The distal esophagus has mild wall thickening suggesting reflux esophagitis. No enlarged mediastinal or hilar lymph nodes. Imaged thyroid gland is normal. CORONARY ARTERY CALCIFICATION: Previous intervention (stents or CABG). UPPER ABDOMEN: Gallbladder is predominantly decompressed. There are no actionable  findings in the imaged upper abdomen. MUSCULOSKELETAL: Large flowing degenerative osteophytes from T3 through the thoracolumbar junction. Solid osseous union of the median sternotomy.     IMPRESSION: 1.  Lung parenchymal evaluation is limited by motion-related blurring. No findings to suggest an active lung or airway inflammatory process. 2.  Small left and trace right pleural effusions. 3.  Four-chamber cardiac enlargement with CRT-P in place. 4.  Mild wall thickening of the distal esophagus which is often related to gastroesophageal reflux.     Lateral Lumbar Spine for Level Confirmation [XR LUMBAR SPINE PORT 1  VIEW]    Result Date: 6/16/2022  EXAM: XR CROSSTABLE LATERAL LUMBAR SPINE PORTABLE LOCATION: [facility] DATE/TIME: 6/16/2022 8:30 AM INDICATION: In OR for confirmation of spine level by C arm or hard plate. COMPARISON: None. TECHNIQUE: CR Lumbar Spine.     IMPRESSION: There are 3 needles overlying the posterior soft tissues. The most superior needle is at the level of the L3-L4 disc space. The middle needle is at the level of the L4-L5 disc space. The most inferior needle is at the level of the superior aspect of the S1 vertebral body.    XR Lumbar Spine Port 1 View    Result Date: 6/16/2022  EXAM: XR CROSSTABLE LATERAL LUMBAR SPINE PORTABLE LOCATION: [facility] DATE/TIME: 6/16/2022 9:10 AM INDICATION: Lumbar Surgery COMPARISON: CT lumbar spine without contrast 4/26/2022. TECHNIQUE: CR Lumbar Spine.     IMPRESSION: Single portable crosstable lateral radiograph the lumbar spine was obtained. Surgical retraction devices over the posterior soft tissues at the L4 level. There is a surgical probe with the distal tip at the level of the L4 pedicles. Grade 1 anterolisthesis of L4 on L5.    IR Fine Needle Aspiration w Imaging    Result Date: 6/25/2022  [facility] INTERVENTIONAL NEURORADIOLOGY 6/23/2022 9:33 AM FLUOROSCOPICALLY GUIDED  PERCUTANEOUS PARASPINAL FLUID COLLECTION ASPIRATION INDICATION: History of L4-L5 and L5-S1 laminectomies, now with low back and right leg pain, MRI showing posterior paraspinal fluid collection in the laminectomy bed. Request for disc aspiration for further diagnosis and management. CONSENT: The procedure and its indications, major risks, benefits, and alternatives were discussed. Risks including, but not limited to, pain, hemorrhage, infection, nerve damage, spinal cord damage, cardiac and pulmonary dysfunction related to sedation.  The possibility that the procedure may be nondiagnostic was discussed. Understanding was acknowledged, and a signed informed consent was obtained. MODERATE SEDATION: Versed 1 mg. Fentanyl 50 mcg. The procedure was performed with the administration of intravenous conscious sedation with appropriate preoperative, intraoperative, and postoperative evaluation. 10 minutes of supervised face to face conscious sedation time was provided by a radiology nurse under my direct supervision. During the time-out, immediately prior to administration of medications, the patient was re-assessed for adequacy to receive conscious sedation. MEDICATIONS: Versed 1 mg IV Fentanyl 50 mcg IV FLUOROSCOPIC TIME: 0.4 minutes DOSE: Air Kerma: 15 mGy ESTIMATED BLOOD LOSS: Minimal PERFORMING PHYSICIAN(S): Chano Rubio M.D. [facility]  PROCEDURE: The patient was placed in prone position upon the fluoroscopic table. The skin of the back was prepped and draped in sterile fashion. Comparing with the 06/21/2022 lumbar spine MRI scan, the L5 level was fluoroscopically localized. The skin over the L5 level was infiltrated with 1% lidocaine. A 20 G needle was then advanced into the disc space under fluoroscopic guidance. A total of 0.5 cc of reddish, yellowish fluid was aspirated and sent for further analysis. The needle was then removed. A dressing was applied. The procedure appeared well-tolerated. There was  no apparent complication.     CONCLUSION: 1. Technically successful fluoroscopically-guided aspiration of a posterior paraspinal lumbar fluid collection, with retrieval of 0.5 cc of yellowish, reddish fluid from the L5 level which was sent for further analysis. _____________________________________________ CPT codes included for physician reference only: 91990/30180      Latest radiology report personally reviewed.    Note created using dragon voice recognition software so sounds alike errors may have escaped editing.      07/01/2022   Marcellus Duenas MD  Hospitalist, [facility]  Pager: [phone]

## 2022-07-01 NOTE — PROCEDURES
Neurointerventional Surgery:  Post-procedure note ~    Patient Name: Thomas Steve  MRN: [id number]  Date of Procedure: July 1, 2022    Procedure: L4-5 posterior paraspinal fluid collection aspiration  Radiologist: Tyler Saini MD    Fluoro Time: 0.7 minutes  Air Kerma: 33 mGy    Medications:   Versed 1.5 mg IV  Fentanyl 75 mcg IV    Sedation Time: 15 minutes    EBL: minimal    Complications: none    Patient reevaluated immediately prior to sedation and prior to procedure.    Preliminary Report:   (See dictation for full detail)  Technically successful L4-5 posterior paraspinal fluid collection aspiration.   ~ 17 ml of yellow blood tinged translucent fluid collected and sent for routine cultures.    Assess/Plan:  Routine post sedation monitoring.  Bedrest x 2 hours post procedure.  Okay to resume Heparin infusion 2 hours post procedure ( same rate / no bolus )    Tyler Saini MD MD  Emergency pager: [phone]  Office: [phone]

## 2022-07-01 NOTE — IR NOTE
Pt transported back to room 4 post L4-L5 fluid aspiration.  Pt awake and alert and denies pain at present.  Report given to primary RN without questions or concerns.

## 2022-07-01 NOTE — PROGRESS NOTES
"  Neurointerventional Surgery:  Pre Sedation Assessment ~    We are asked to perform a lumbar fluid collection aspiration on this 77 year old male for repeat cultures and drainage.    Imaging: MRI yesterday ~  1.  Redemonstrated postoperative changes related to laminectomy decompression at L4 and L5 with multilobular fluid collection at the laminectomy site that is similar or minimally larger in craniocaudal dimension compared to 06/27/2022. There is   persistent effacement of the thecal sac at L4 and L5 and associated impingement upon the distal cauda equina nerve roots. This may reflect postoperative seroma or potentially pseudomeningocele. The sterility of this collection is difficult to determine   by imaging.  2.  A thin subdural fluid collection seen previously at L2-L3 has likely redistributed, now identified in the midline at L3-L4.  3.  At L3-L4, moderate spinal canal stenosis with displacement of cauda equina nerve roots due to subdural fluid. This has slightly increased since the prior exam. Mild bilateral neural foraminal stenosis.  4.  No other significant interval changes.       Allergies   Allergen Reactions     Aspirin Other (See Comments)     Contraindicated due to xarelto use     Codeine Nausea and Vomiting     Pollen Extracts [Pollen Extract] Unknown     Scratchy throat     Vicodin [Hydrocodone-Acetaminophen] Nausea and Vomiting       Labs: Hemoglobin 9.9, Platelet count 312, INR pending    Sedation history: Previous problems with sedation. No   History of sleep apnea No    Exam:   /65 (BP Location: Left arm)   Pulse 60   Temp 98.2  F (36.8  C) (Oral)   Resp 18   Ht 1.778 m (5' 10\")   Wt 107.2 kg (236 lb 4.8 oz)   SpO2 100%   BMI 33.91 kg/m    Neuro: Alert and oriented x 3. Speech is clear.  Cardiac: Heart tones normal  Lungs: clear  Mallampati:  III - Only soft palate is visible. Intubation is predicted to be difficult  ASA: 3 - Severe systemic disease, but not incapacitating  NPO " since midnight    Impression/plan: This is a 77 year old male with a history of a decompressive laminectomy on 6/16 with persistent post operative fluid collection.  We have been asked to meet the patient again today for fluid aspiration and drainage and to send fluid for repeat cultures. Dr. JASON Saini has reviewed the MRI and agrees to a L4-5 posterior paraspinal fluid collection aspiration.  Heparin infusion has been off since 0645. Thomas is medically stable and appropriately NPO for procedure as planned this morning.    The procedure its risks, benefits, and alternatives were discussed with the patient.  He expresses an understanding of the procedure and provides permission for us to proceed. Informed consent obtained and signed.      VALERIE Bell [facility]  Office: [phone]

## 2022-07-01 NOTE — PLAN OF CARE
Goal Outcome Evaluation:             Problem: Fluid and Electrolyte Imbalance (Spinal Surgery)  Goal: Fluid and Electrolyte Balance  Outcome: Ongoing, Progressing     Problem: Pain (Spinal Surgery)  Goal: Acceptable Pain Control  Outcome: Ongoing, Progressing   Patient has been NPO for procedure this morning. Makes his needs known. Medicated with Dilaudid IV x1 with good effect. Anti XA 0.82, Heparin off at 0645. Heparin to be off for 2 hours before procedure.

## 2022-07-01 NOTE — PROGRESS NOTES
Neurosurgery Progress Note  7/1/22      A/P: Thomas Steve is a 77 year old male POD #14 DECOMPRESSIVE LUMBAR LAMINECTOMY LUMBAR 4-LUMBAR 5 AND LUMBAR 5-SACRAL 1 BILATERAL-LEFT SIDE FIRST-PLUS BILATERAL FORAMINOTOMIES PLUS REMOVAL OF HERNIATED DISC LUMBAR 5-SACRAL 1 LEFT with Dr Samuel on 6/19/22. Arachnoid bleb intraop, covered with durgen, duraseal. No CSF leak. Was kept flat for 24hr    Thomas c/o stable back pain today. Feels okay. Denies radicular leg pain, weakness, headache, neck or arm symptoms. Stable chronic tingling in hands and feet d/t neuropathy. Neuro intact on exam today. He will be going for IR aspiration today. Heparin gtt for acute right LE DVT has been stopped, verified with RN. Resume heparin drip post procedure per IR. Plan heparin drip through weekend and clinically follow this patient. If clinically improves, we hope to avoid any surgery. Wait to start long term anticoagulation agent until we clear him early next week.  Discussed with Dr Samuel.         PLAN:   1. IR aspiration of fluid collection today  2. Holding heparin drip pre-procedure  - when to resume anticoagulation will be per IR team. Resume and continue heparin drip through weekend and continue to monitor patient clinically Per Dr Samuel  3. Daily dressing changes  4. Continue prn pain medications   5. Appreciate ID following. Plan for oral abx noted, but with new aspiration appreciate reinvolvement  6. We will continue to follow patient clinically and hope to avoid surgery. Hope to discharge next week         Addendum:   MRI reviewed by self and Dr. Samuel noted similar appearance to epidural postoperative fluid collection possibly slight increase when compared to previous MRI with effacement of thecal sac with associated spinal stenosis   Will plan for IR aspiration and fluid drainage - fluid to be sent for Gram stain and culture anaerobic and aerobic KOH prep fungus AFB smear TB culture  (previous aspiration not sent to prior  "collection container for anaerobic culture)   Patient to be NPO at midnight if sedation is needed for procedure  Heparin drip needs to be held 2 hrs prior to procedure IR to call Friday morning with time of procedure     Discussed with Thomas possibility of having IVC filter placed and proceeding with open surgical intervention to include incision and drainage of epidural fluid collection. Thomas states that he would prefer to avoid any surgical intervention if possible and wishes to proceed with the aspiration first.       HPI: Presented with back pain, right leg pain. Weakness bilateral LE. Worse with walking. No bowel or bladder issues. Diabetes, CABG, AFIB, Xarelto. MRI with listhesis L4-5, spinal stenosis. Stenosis also at L5-S1 with large disc herniation L5-S1 on the left.     Subjective: states that he feels better today. Able to moves legs more easily and less swelling noted, denies chills denies radicular leg pain continued radicular numbness     Physical Exam  /65 (BP Location: Left arm)   Pulse 60   Temp 98.2  F (36.8  C) (Oral)   Resp 18   Ht 5' 10\" (1.778 m)   Wt 236 lb 4.8 oz (107.2 kg)   SpO2 100%   BMI 33.91 kg/m        General: oriented. KIM. Speech clear. Sitting upright in beside chair appears comfortable    Motor: normal bulk and tone     Strength: Full strength LE bilaterally throughout both legs     Segundo lower extremity sensation intact to touch. Baseline lower extremity neuropathy.     Bilateral lower extremities mildly taught, no redness, no wraps on currently    Incision: well approximated.  no surrounding erythema, induration, or drainage     Labs:  No new infectious marker labs today nor yesterday  No organisms on aspiration cultures from 6/23    Noreen JIMÉNEZC  [facility]  O. [phone]                Joan French PA-C  [facility]  O: [phone]      "

## 2022-07-01 NOTE — PRE-PROCEDURE
GENERAL PRE-PROCEDURE:   Procedure:  L4-5 posterior paraspinal fluid collection aspiration  Date/Time:  7/1/2022 10:54 AM    Verbal consent obtained?: Yes    Written consent obtained?: Yes    Risks and benefits: Risks, benefits and alternatives were discussed    Consent given by:  Patient  Patient states understanding of procedure being performed: Yes    Patient's understanding of procedure matches consent: Yes    Procedure consent matches procedure scheduled: Yes    Expected level of sedation:  Moderate  Appropriately NPO:  Yes  ASA Class:  3  Mallampati  :  Grade 3- soft palate visible, posterior pharyngeal wall not visible  Lungs:  Lungs clear with good breath sounds bilaterally  Heart:  Normal heart sounds and rate  History & Physical reviewed:  History and physical reviewed and no updates needed  Statement of review:  I have reviewed the lab findings, diagnostic data, medications, and the plan for sedation

## 2022-07-02 ENCOUNTER — APPOINTMENT (OUTPATIENT)
Dept: ULTRASOUND IMAGING | Facility: HOSPITAL | Age: 77
DRG: 518 | End: 2022-07-02
Attending: HOSPITALIST
Payer: MEDICARE

## 2022-07-02 ENCOUNTER — APPOINTMENT (OUTPATIENT)
Dept: OCCUPATIONAL THERAPY | Facility: HOSPITAL | Age: 77
DRG: 518 | End: 2022-07-02
Attending: SURGERY
Payer: MEDICARE

## 2022-07-02 ENCOUNTER — APPOINTMENT (OUTPATIENT)
Dept: PHYSICAL THERAPY | Facility: HOSPITAL | Age: 77
DRG: 518 | End: 2022-07-02
Attending: SURGERY
Payer: MEDICARE

## 2022-07-02 LAB
ALBUMIN SERPL-MCNC: 3.1 G/DL (ref 3.5–5)
ALP SERPL-CCNC: 158 U/L (ref 45–120)
ALT SERPL W P-5'-P-CCNC: 42 U/L (ref 0–45)
ANION GAP SERPL CALCULATED.3IONS-SCNC: 9 MMOL/L (ref 5–18)
AST SERPL W P-5'-P-CCNC: 22 U/L (ref 0–40)
BILIRUB DIRECT SERPL-MCNC: 0.2 MG/DL
BILIRUB SERPL-MCNC: 0.5 MG/DL (ref 0–1)
BUN SERPL-MCNC: 20 MG/DL (ref 8–28)
CALCIUM SERPL-MCNC: 9.1 MG/DL (ref 8.5–10.5)
CHLORIDE BLD-SCNC: 101 MMOL/L (ref 98–107)
CO2 SERPL-SCNC: 28 MMOL/L (ref 22–31)
CREAT SERPL-MCNC: 0.9 MG/DL (ref 0.7–1.3)
ERYTHROCYTE [DISTWIDTH] IN BLOOD BY AUTOMATED COUNT: 16.1 % (ref 10–15)
GFR SERPL CREATININE-BSD FRML MDRD: 89 ML/MIN/1.73M2
GLUCOSE BLD-MCNC: 84 MG/DL (ref 70–125)
GLUCOSE BLDC GLUCOMTR-MCNC: 153 MG/DL (ref 70–99)
GLUCOSE BLDC GLUCOMTR-MCNC: 158 MG/DL (ref 70–99)
GLUCOSE BLDC GLUCOMTR-MCNC: 166 MG/DL (ref 70–99)
GLUCOSE BLDC GLUCOMTR-MCNC: 175 MG/DL (ref 70–99)
HCT VFR BLD AUTO: 29.5 % (ref 40–53)
HGB BLD-MCNC: 9.1 G/DL (ref 13.3–17.7)
MCH RBC QN AUTO: 28.2 PG (ref 26.5–33)
MCHC RBC AUTO-ENTMCNC: 30.8 G/DL (ref 31.5–36.5)
MCV RBC AUTO: 91 FL (ref 78–100)
PLATELET # BLD AUTO: 253 10E3/UL (ref 150–450)
POTASSIUM BLD-SCNC: 3.9 MMOL/L (ref 3.5–5)
PROT SERPL-MCNC: 5.9 G/DL (ref 6–8)
RBC # BLD AUTO: 3.23 10E6/UL (ref 4.4–5.9)
SODIUM SERPL-SCNC: 138 MMOL/L (ref 136–145)
UFH PPP CHRO-ACNC: 0.14 IU/ML
UFH PPP CHRO-ACNC: 0.72 IU/ML
UFH PPP CHRO-ACNC: 1.08 IU/ML
WBC # BLD AUTO: 10 10E3/UL (ref 4–11)

## 2022-07-02 PROCEDURE — 36415 COLL VENOUS BLD VENIPUNCTURE: CPT | Performed by: HOSPITALIST

## 2022-07-02 PROCEDURE — 250N000013 HC RX MED GY IP 250 OP 250 PS 637: Performed by: INTERNAL MEDICINE

## 2022-07-02 PROCEDURE — 250N000011 HC RX IP 250 OP 636: Performed by: INTERNAL MEDICINE

## 2022-07-02 PROCEDURE — 250N000013 HC RX MED GY IP 250 OP 250 PS 637: Performed by: SURGERY

## 2022-07-02 PROCEDURE — 82248 BILIRUBIN DIRECT: CPT | Performed by: HOSPITALIST

## 2022-07-02 PROCEDURE — 97116 GAIT TRAINING THERAPY: CPT | Mod: GP

## 2022-07-02 PROCEDURE — 85520 HEPARIN ASSAY: CPT | Performed by: INTERNAL MEDICINE

## 2022-07-02 PROCEDURE — 36415 COLL VENOUS BLD VENIPUNCTURE: CPT | Performed by: INTERNAL MEDICINE

## 2022-07-02 PROCEDURE — 250N000013 HC RX MED GY IP 250 OP 250 PS 637: Performed by: HOSPITALIST

## 2022-07-02 PROCEDURE — 999N000157 HC STATISTIC RCP TIME EA 10 MIN

## 2022-07-02 PROCEDURE — 250N000013 HC RX MED GY IP 250 OP 250 PS 637: Performed by: NURSE PRACTITIONER

## 2022-07-02 PROCEDURE — 99233 SBSQ HOSP IP/OBS HIGH 50: CPT | Performed by: HOSPITALIST

## 2022-07-02 PROCEDURE — 85027 COMPLETE CBC AUTOMATED: CPT | Performed by: INTERNAL MEDICINE

## 2022-07-02 PROCEDURE — 250N000013 HC RX MED GY IP 250 OP 250 PS 637: Performed by: FAMILY MEDICINE

## 2022-07-02 PROCEDURE — 97110 THERAPEUTIC EXERCISES: CPT | Mod: GP

## 2022-07-02 PROCEDURE — 85520 HEPARIN ASSAY: CPT | Performed by: HOSPITALIST

## 2022-07-02 PROCEDURE — 80053 COMPREHEN METABOLIC PANEL: CPT | Performed by: INTERNAL MEDICINE

## 2022-07-02 PROCEDURE — 120N000001 HC R&B MED SURG/OB

## 2022-07-02 PROCEDURE — 76705 ECHO EXAM OF ABDOMEN: CPT

## 2022-07-02 PROCEDURE — 97535 SELF CARE MNGMENT TRAINING: CPT | Mod: GO

## 2022-07-02 PROCEDURE — 250N000011 HC RX IP 250 OP 636: Performed by: HOSPITALIST

## 2022-07-02 PROCEDURE — 250N000009 HC RX 250: Performed by: NURSE PRACTITIONER

## 2022-07-02 RX ORDER — BACITRACIN ZINC 500 [USP'U]/G
OINTMENT TOPICAL 2 TIMES DAILY
Status: DISCONTINUED | OUTPATIENT
Start: 2022-07-02 | End: 2022-07-06 | Stop reason: HOSPADM

## 2022-07-02 RX ORDER — FUROSEMIDE 10 MG/ML
40 INJECTION INTRAMUSCULAR; INTRAVENOUS EVERY 8 HOURS
Status: DISCONTINUED | OUTPATIENT
Start: 2022-07-02 | End: 2022-07-03

## 2022-07-02 RX ADMIN — METFORMIN HYDROCHLORIDE 1000 MG: 500 TABLET, FILM COATED ORAL at 08:23

## 2022-07-02 RX ADMIN — Medication 500 MG: at 20:42

## 2022-07-02 RX ADMIN — ACETAMINOPHEN 975 MG: 325 TABLET, FILM COATED ORAL at 20:43

## 2022-07-02 RX ADMIN — GABAPENTIN 600 MG: 300 CAPSULE ORAL at 20:41

## 2022-07-02 RX ADMIN — ACETAMINOPHEN 975 MG: 325 TABLET, FILM COATED ORAL at 13:10

## 2022-07-02 RX ADMIN — FERROUS SULFATE TAB 325 MG (65 MG ELEMENTAL FE) 325 MG: 325 (65 FE) TAB at 13:10

## 2022-07-02 RX ADMIN — PANTOPRAZOLE SODIUM 40 MG: 40 TABLET, DELAYED RELEASE ORAL at 17:11

## 2022-07-02 RX ADMIN — Medication 100 MCG: at 08:24

## 2022-07-02 RX ADMIN — FERROUS SULFATE TAB 325 MG (65 MG ELEMENTAL FE) 325 MG: 325 (65 FE) TAB at 08:24

## 2022-07-02 RX ADMIN — GABAPENTIN 600 MG: 300 CAPSULE ORAL at 08:23

## 2022-07-02 RX ADMIN — TAMSULOSIN HYDROCHLORIDE 0.4 MG: 0.4 CAPSULE ORAL at 09:19

## 2022-07-02 RX ADMIN — PANTOPRAZOLE SODIUM 40 MG: 40 TABLET, DELAYED RELEASE ORAL at 06:50

## 2022-07-02 RX ADMIN — Medication 1 MG: at 20:43

## 2022-07-02 RX ADMIN — ROSUVASTATIN CALCIUM 10 MG: 10 TABLET, FILM COATED ORAL at 20:42

## 2022-07-02 RX ADMIN — Medication 500 MG: at 08:23

## 2022-07-02 RX ADMIN — METFORMIN HYDROCHLORIDE 1000 MG: 500 TABLET, FILM COATED ORAL at 18:34

## 2022-07-02 RX ADMIN — FUROSEMIDE 40 MG: 10 INJECTION, SOLUTION INTRAMUSCULAR; INTRAVENOUS at 19:32

## 2022-07-02 RX ADMIN — FUROSEMIDE 40 MG: 10 INJECTION, SOLUTION INTRAMUSCULAR; INTRAVENOUS at 13:11

## 2022-07-02 RX ADMIN — TRAZODONE HYDROCHLORIDE 25 MG: 50 TABLET ORAL at 20:42

## 2022-07-02 RX ADMIN — OXYCODONE HYDROCHLORIDE 5 MG: 5 TABLET ORAL at 08:24

## 2022-07-02 RX ADMIN — LIDOCAINE: 50 OINTMENT TOPICAL at 13:12

## 2022-07-02 RX ADMIN — OXYCODONE HYDROCHLORIDE 5 MG: 5 TABLET ORAL at 03:10

## 2022-07-02 RX ADMIN — LISINOPRIL 20 MG: 20 TABLET ORAL at 08:23

## 2022-07-02 RX ADMIN — BACITRACIN ZINC: 500 OINTMENT TOPICAL at 16:09

## 2022-07-02 RX ADMIN — THERA TABS 1 TABLET: TAB at 08:24

## 2022-07-02 RX ADMIN — INSULIN GLARGINE 15 UNITS: 100 INJECTION, SOLUTION SUBCUTANEOUS at 21:00

## 2022-07-02 RX ADMIN — OXYCODONE HYDROCHLORIDE 5 MG: 5 TABLET ORAL at 20:58

## 2022-07-02 RX ADMIN — BUMETANIDE 3 MG: 1 TABLET ORAL at 08:24

## 2022-07-02 RX ADMIN — Medication 500 MG: at 13:10

## 2022-07-02 RX ADMIN — DICLOFENAC SODIUM 2 G: 10 GEL TOPICAL at 08:33

## 2022-07-02 RX ADMIN — TIZANIDINE 2 MG: 2 TABLET ORAL at 09:23

## 2022-07-02 RX ADMIN — HEPARIN SODIUM 1800 UNITS/HR: 10000 INJECTION, SOLUTION INTRAVENOUS at 11:32

## 2022-07-02 RX ADMIN — DICLOFENAC SODIUM 2 G: 10 GEL TOPICAL at 17:52

## 2022-07-02 RX ADMIN — ACETAMINOPHEN 975 MG: 325 TABLET, FILM COATED ORAL at 08:23

## 2022-07-02 RX ADMIN — LEVOTHYROXINE SODIUM 25 MCG: 0.03 TABLET ORAL at 06:50

## 2022-07-02 RX ADMIN — OXYCODONE HYDROCHLORIDE 5 MG: 5 TABLET ORAL at 13:16

## 2022-07-02 RX ADMIN — FERROUS SULFATE TAB 325 MG (65 MG ELEMENTAL FE) 325 MG: 325 (65 FE) TAB at 18:34

## 2022-07-02 ASSESSMENT — ACTIVITIES OF DAILY LIVING (ADL)
ADLS_ACUITY_SCORE: 30

## 2022-07-02 NOTE — PLAN OF CARE
Problem: Neurologic Impairment (Spinal Surgery)  Goal: Optimal Neurologic Function  7/1/2022 1947 by Wen Fox, RN  Outcome: Ongoing, Progressing  Problem: Infection (Spinal Surgery)  Goal: Absence of Infection Signs and Symptoms  7/1/2022 1946 by Wen Fox, RN  Outcome: Ongoing, Progressing     Neurologically no change from baseline.  Patient underwent fluid aspiration procedure today, returning to unit at 11:15am in stable condition.  Band-aide intact to puncture site.  Heparin drip was resumed at 13:30 at 1600 units/hr; Next anti-xa due at 1930.  Vital signs remain stable.     Patient did return from IR with no dressing covering surgical incision. Tape burns present. Discussed with KENIA Sorto with Neurosurgery - ABD dressing placed with minimal tape to protect incision.    Wen Fox, RN

## 2022-07-02 NOTE — PROGRESS NOTES
Per Dr Duenas, pt getting aspiration done today and will restart Heparin drip.    ELBA spoke with Noreen Lynn in neurosurgery regarding discharge planning.  She stated that Dr Samuel (surgeon) wants to watch pt through weekend.  Hopefully his symptoms won't worsen and there will be no further fluid accumulation.  Anticipate discharge to TCU on Tuesday if all goes well.      ELBA updated charge nurse.       TONY Thompson, SHABBIR 07/02/22 4:49 PM

## 2022-07-02 NOTE — PROGRESS NOTES
[facility] Hospitalist Progress Note  [facility]  Admission date: 6/16/2022  LOS: 16 days    Summary:    77M with -year-old male with CAD, HFrEF, DM2, HTN, atrial fibrillation, hypothyroidism presented for planned neurosurgical intervention to treat herniated lumbar disc. 06/16 underwent decompressive lumbar laminectomy.  06/19 with post-op fever and started on vanco and zosyn.    06/22 MRI with fluid collection with possible rim enhancement which was aspirated.  Cultures were negative.   ID had recommended Augmentin x doxycycline for 1 week.  Clinically has been improving but repeat MRI 6/27 showed increased fluid collection in the laminectomy bed causing increased compression on cauda equina, as well as slightly increased subdural collection at L2-L4 level.  Neurosurgery recommended continued monitoring for cauda equina and repeat MRI in 2 to 3 days.  Anticoagulation was held due to subdural fluid collection.       6/29 found to have right lower extremity DVT.  IV heparin started after discussion with neurosurgery. Repeat MRI 06/30 essetially stable with shiftting of fluid.  07/01 IR aspiration of L4/5 fluid collection.     Also with YOLANDA this admission and volume overload requiring IV diuresis    Assessment/Plan    #Post-op fever  #Post-op fluid collection -   -S/P IR aspiration 07/01. negative gram stain and no growth so far.   -?seroma vs. Pseudomeningocele.    -Completed 1 week of Augmentin+doxy.  Not much to suggest need for abx again with seemingly sterile fluid collection.  Appreciate ID input.  -Await [id number], await culture results     #Acute DVT of the right profundofemoral vein in the upper thigh -   -heparin gtt.  -Was on Xarelto prior to admission.  Will need to transition to oral DOAC at DVT dosing.     #Acute on chronic HFrEF  #CAD s/p CABG -   -ECHO with EF 45-50%, moderately severe TR, mild MR,mild aortic stenosis mild pulm HTN.  -diuresed earlier in stay and switched to oral  Bumex 6/29  -still volume overloaded and I suspect the abdominal distension Mr. Huston calls stool is actually edema.    -Resume IV diuresis.  -Cards f/u.  May eventually need intervention on tricuspid valve.     #YOLANDA on CKD3 - baseline 1.1-1.2s.  Post-op injury likely hemodynamic demetrius-operatively.  Resolved.      #Lumbar spinal stenosis with lumbar disc herniation  -Patient underwent scheduled decompressive lumbar laminectomy of L4-L5, L5-S1 with bilateral foraminotomies and removal of herniated disc in the L1-S1 region with neurosurgery on 6/16/2022.  -Scheduled Tylenol 3 times daily  -Gabapentin 600 mg p.o. twice daily  -Post op management per neurosurgery  -Decadron burst per Nsx - completed      #Post op urinary retention : started flomax.  Had Carpenter which is now removed.       #Constipation - resolved     #Chronic atrial fibrillation, sick sinus syndrome s/p PPM  -Heparin gtt  -Resume xarelto when ok from sx standpoint.      #DM2 with recurrent AM hypoglycemia - reports he is eating healthier here in hospital with much reduced insulin needs.  Steroid induced hyperglycemia with sugars 300s-400s - last dexamethasone dose 6/27 AM - improved significantly   -Stopped glimiperide and would not resume  -Continue Lantus to 15 units at HS, mealtime CHO coverage 1:16, low ISS      #Chronic left shoulder pain  -Started lidocaine and Voltaren trading on and off 4 times daily     #GERD -Pantoprazole 40 mg p.o. daily     #CORAZON -CPAP     #Hypothyroidism-Levothyroxine 25 mcg p.o. daily     #Normocytic anemia -Stable    Checklist:  Code Status: Full Code    Diet: Snacks/Supplements Adult: Gelatein sugar-free; Between Meals  Diet  Snacks/Supplements Adult: Glucerna; With Meals  Moderate Consistent Carb (60 g CHO per Meal) Diet    Carpenter Catheter: Not present  Central Lines: None          Auto-populated based on system request - if relevant they will be addressed above:  Clinically Significant Risk Factors Present on Admission                       Auto-populated from discharge tab:     Expected Discharge Date: 07/05/2022    Discharge Delays: Placement - TCU  Destination: other (comment) (TCU)  Discharge Comments: need to confirm that Pt's bed will be held at TCU. repeat MRI, hep drip         Overnight Events/Subjective/Notable results:  No major events.  Pain controlled.  Still feels like legs are edematous.  No CP or SOB.    4 point ROS otherwise negative    Objective    Vital signs in last 24 hours  Temp:  [97.5  F (36.4  C)-98.8  F (37.1  C)] 98  F (36.7  C)  Pulse:  [60-62] 61  Resp:  [12-53] 16  BP: (105-168)/(54-82) 113/58  SpO2:  [90 %-98 %] 98 % O2 Device: None (Room air)    Weight:   236 lbs 4.8 oz    Intake/Output last 3 shifts  I/O last 3 completed shifts:  In: 815 [P.O.:720; I.V.:95]  Out: 880 [Urine:880]  Body mass index is 33.91 kg/m .    Physical Exam  General:  Alert, cooperative, no distress    Neurologic:  oriented, facial symmetry preserved, fluent speech.   Psych: calm, mood and affect appropriate to situation  HEENT:  Anicteric, MMM  CV: RRR no MRG, normal S1 and S2, ++LE edema  Lungs: CTAB.  Easyrespirations  Abd: softly distended, NT, normoactive BS  Skin: no rashes or jaundice noted on exposed skin.      I have reviewed all labs, medications, imaging studies in the last 24 hours.  Pertinent findings&changes discussed above.    Data       Humble Smith MD  Internal Medicine Hospitalist

## 2022-07-02 NOTE — PROGRESS NOTES
Neurosurgery Progress Note  7/2/22      A/P:   Thomas Steve is a 77 year old male POD #15 DECOMPRESSIVE LUMBAR LAMINECTOMY LUMBAR 4-LUMBAR 5 AND LUMBAR 5-SACRAL 1 BILATERAL-LEFT SIDE FIRST-PLUS BILATERAL FORAMINOTOMIES PLUS REMOVAL OF HERNIATED DISC LUMBAR 5-SACRAL 1 LEFT with Dr Samuel on 6/19/22. Arachnoid bleb intraop, covered with durgen, duraseal. No CSF leak. Was kept flat for 24hr. IR aspiration done 6/23 for fluid collection and repeated 7/1 dt enlarging collection.    Thomas c/o stable back pain 5/10 today. No new or worsening symptoms s/p IR aspiration yesterday. He is neuro intact on exam. Heparin drip was resumed yesterday. Plan to continue heparin drip through the weekend and continue to clinically monitor patient. Follow up on aspiration results - beta 2 in process, anaerobic culture in process, aerobic bacteria prelim results negative.  Some superficial skin tears noted post procedure yesterday, looks like maybe from tape from sterile window? Recommend bacitracin and covering until healed      PLAN:   1. Pain control  2. Continue heparin drip through weekend  3. Daily dressing changes to incision. Bacitracin to superficial skin tears from IR procedure. Paper tape only  4. Continue prn pain medications   5. Appreciate ID following. Plan for oral abx noted, but with new aspiration appreciate reinvolvement.   6. We will continue to follow patient clinically and hope to avoid surgery. Hope to discharge next week         HPI: Presented with back pain, right leg pain. Weakness bilateral LE. Worse with walking. No bowel or bladder issues. Diabetes, CABG, AFIB, Xarelto. MRI with listhesis L4-5, spinal stenosis. Stenosis also at L5-S1 with large disc herniation L5-S1 on the left.         Subjective:  Thomas reports stable back pain. 5/10. Denies leg pain, numbness, weakness, or change in bowel or bladder control.    Physical Exam  /58 (BP Location: Right arm)   Pulse 61   Temp 98  F (36.7  C) (Oral)    "Resp 16   Ht 5' 10\" (1.778 m)   Wt 236 lb 4.8 oz (107.2 kg)   SpO2 98%   BMI 33.91 kg/m        General: oriented. KIM. Speech clear. Sitting upright in beside chair appears comfortable    Motor: normal bulk and tone     Strength: Full strength LE bilaterally throughout both legs     Segundo lower extremity sensation intact to touch. Baseline lower extremity neuropathy.     Bilateral lower extremities mildly taught, no redness, no wraps on currently    Incision: well approximated with staples. Some scattered superficial small skin tears in a large window shape around incision. Tiny bit of bleeding from skin tears. no surrounding erythema, induration, or drainage     Labs:  Beta 2 transferrin in process from 7/1  Aerobic bacterial culture prelim negative from 7/1  Anaerobic bacterial culture in process from 7/1  Wbc 10.0 today      Noreen Lynn FNP-C  [facility] Neurosurgery  O. [phone]      "

## 2022-07-03 ENCOUNTER — APPOINTMENT (OUTPATIENT)
Dept: OCCUPATIONAL THERAPY | Facility: HOSPITAL | Age: 77
DRG: 518 | End: 2022-07-03
Attending: SURGERY
Payer: MEDICARE

## 2022-07-03 ENCOUNTER — APPOINTMENT (OUTPATIENT)
Dept: PHYSICAL THERAPY | Facility: HOSPITAL | Age: 77
DRG: 518 | End: 2022-07-03
Attending: SURGERY
Payer: MEDICARE

## 2022-07-03 LAB
ANION GAP SERPL CALCULATED.3IONS-SCNC: 8 MMOL/L (ref 5–18)
BUN SERPL-MCNC: 18 MG/DL (ref 8–28)
CALCIUM SERPL-MCNC: 8.9 MG/DL (ref 8.5–10.5)
CHLORIDE BLD-SCNC: 100 MMOL/L (ref 98–107)
CO2 SERPL-SCNC: 30 MMOL/L (ref 22–31)
CREAT SERPL-MCNC: 0.95 MG/DL (ref 0.7–1.3)
GFR SERPL CREATININE-BSD FRML MDRD: 83 ML/MIN/1.73M2
GLUCOSE BLD-MCNC: 102 MG/DL (ref 70–125)
GLUCOSE BLDC GLUCOMTR-MCNC: 122 MG/DL (ref 70–99)
GLUCOSE BLDC GLUCOMTR-MCNC: 126 MG/DL (ref 70–99)
GLUCOSE BLDC GLUCOMTR-MCNC: 169 MG/DL (ref 70–99)
GLUCOSE BLDC GLUCOMTR-MCNC: 193 MG/DL (ref 70–99)
GLUCOSE BLDC GLUCOMTR-MCNC: 96 MG/DL (ref 70–99)
POTASSIUM BLD-SCNC: 3.9 MMOL/L (ref 3.5–5)
SODIUM SERPL-SCNC: 138 MMOL/L (ref 136–145)
UFH PPP CHRO-ACNC: 0.69 IU/ML
UFH PPP CHRO-ACNC: 0.79 IU/ML
UFH PPP CHRO-ACNC: 0.84 IU/ML

## 2022-07-03 PROCEDURE — 99232 SBSQ HOSP IP/OBS MODERATE 35: CPT | Performed by: INTERNAL MEDICINE

## 2022-07-03 PROCEDURE — 99233 SBSQ HOSP IP/OBS HIGH 50: CPT | Performed by: HOSPITALIST

## 2022-07-03 PROCEDURE — 250N000011 HC RX IP 250 OP 636: Performed by: HOSPITALIST

## 2022-07-03 PROCEDURE — 250N000011 HC RX IP 250 OP 636: Performed by: INTERNAL MEDICINE

## 2022-07-03 PROCEDURE — 85520 HEPARIN ASSAY: CPT | Performed by: HOSPITALIST

## 2022-07-03 PROCEDURE — 97110 THERAPEUTIC EXERCISES: CPT | Mod: GP

## 2022-07-03 PROCEDURE — 250N000013 HC RX MED GY IP 250 OP 250 PS 637: Performed by: INTERNAL MEDICINE

## 2022-07-03 PROCEDURE — 99233 SBSQ HOSP IP/OBS HIGH 50: CPT | Performed by: INTERNAL MEDICINE

## 2022-07-03 PROCEDURE — 120N000001 HC R&B MED SURG/OB

## 2022-07-03 PROCEDURE — 36415 COLL VENOUS BLD VENIPUNCTURE: CPT | Performed by: HOSPITALIST

## 2022-07-03 PROCEDURE — 250N000013 HC RX MED GY IP 250 OP 250 PS 637: Performed by: HOSPITALIST

## 2022-07-03 PROCEDURE — 97535 SELF CARE MNGMENT TRAINING: CPT | Mod: GO

## 2022-07-03 PROCEDURE — 999N000127 HC STATISTIC PERIPHERAL IV START W US GUIDANCE

## 2022-07-03 PROCEDURE — 250N000013 HC RX MED GY IP 250 OP 250 PS 637: Performed by: SURGERY

## 2022-07-03 PROCEDURE — 250N000013 HC RX MED GY IP 250 OP 250 PS 637: Performed by: NURSE PRACTITIONER

## 2022-07-03 PROCEDURE — 258N000003 HC RX IP 258 OP 636: Performed by: INTERNAL MEDICINE

## 2022-07-03 PROCEDURE — 250N000013 HC RX MED GY IP 250 OP 250 PS 637: Performed by: FAMILY MEDICINE

## 2022-07-03 PROCEDURE — 97116 GAIT TRAINING THERAPY: CPT | Mod: GP

## 2022-07-03 PROCEDURE — 82435 ASSAY OF BLOOD CHLORIDE: CPT | Performed by: INTERNAL MEDICINE

## 2022-07-03 RX ORDER — FUROSEMIDE 10 MG/ML
60 INJECTION INTRAMUSCULAR; INTRAVENOUS EVERY 8 HOURS
Status: DISCONTINUED | OUTPATIENT
Start: 2022-07-03 | End: 2022-07-03

## 2022-07-03 RX ORDER — FUROSEMIDE 10 MG/ML
80 INJECTION INTRAMUSCULAR; INTRAVENOUS ONCE
Status: COMPLETED | OUTPATIENT
Start: 2022-07-03 | End: 2022-07-03

## 2022-07-03 RX ADMIN — ROSUVASTATIN CALCIUM 10 MG: 10 TABLET, FILM COATED ORAL at 21:40

## 2022-07-03 RX ADMIN — GABAPENTIN 600 MG: 300 CAPSULE ORAL at 21:39

## 2022-07-03 RX ADMIN — LISINOPRIL 20 MG: 20 TABLET ORAL at 08:03

## 2022-07-03 RX ADMIN — Medication 500 MG: at 21:40

## 2022-07-03 RX ADMIN — THERA TABS 1 TABLET: TAB at 08:02

## 2022-07-03 RX ADMIN — ACETAMINOPHEN 975 MG: 325 TABLET, FILM COATED ORAL at 14:22

## 2022-07-03 RX ADMIN — OXYCODONE HYDROCHLORIDE 5 MG: 5 TABLET ORAL at 16:05

## 2022-07-03 RX ADMIN — Medication 500 MG: at 08:00

## 2022-07-03 RX ADMIN — FERROUS SULFATE TAB 325 MG (65 MG ELEMENTAL FE) 325 MG: 325 (65 FE) TAB at 14:22

## 2022-07-03 RX ADMIN — DICLOFENAC SODIUM 2 G: 10 GEL TOPICAL at 17:43

## 2022-07-03 RX ADMIN — PANTOPRAZOLE SODIUM 40 MG: 40 TABLET, DELAYED RELEASE ORAL at 17:31

## 2022-07-03 RX ADMIN — METFORMIN HYDROCHLORIDE 1000 MG: 500 TABLET, FILM COATED ORAL at 07:55

## 2022-07-03 RX ADMIN — FERROUS SULFATE TAB 325 MG (65 MG ELEMENTAL FE) 325 MG: 325 (65 FE) TAB at 18:16

## 2022-07-03 RX ADMIN — ACETAMINOPHEN 975 MG: 325 TABLET, FILM COATED ORAL at 21:39

## 2022-07-03 RX ADMIN — LEVOTHYROXINE SODIUM 25 MCG: 0.03 TABLET ORAL at 06:13

## 2022-07-03 RX ADMIN — DICLOFENAC SODIUM 2 G: 10 GEL TOPICAL at 08:06

## 2022-07-03 RX ADMIN — TAMSULOSIN HYDROCHLORIDE 0.4 MG: 0.4 CAPSULE ORAL at 10:19

## 2022-07-03 RX ADMIN — HEPARIN SODIUM 1400 UNITS/HR: 10000 INJECTION, SOLUTION INTRAVENOUS at 21:38

## 2022-07-03 RX ADMIN — HEPARIN SODIUM 1600 UNITS/HR: 10000 INJECTION, SOLUTION INTRAVENOUS at 02:03

## 2022-07-03 RX ADMIN — FUROSEMIDE 40 MG: 10 INJECTION, SOLUTION INTRAMUSCULAR; INTRAVENOUS at 04:49

## 2022-07-03 RX ADMIN — BACITRACIN ZINC: 500 OINTMENT TOPICAL at 13:43

## 2022-07-03 RX ADMIN — OXYCODONE HYDROCHLORIDE 5 MG: 5 TABLET ORAL at 10:27

## 2022-07-03 RX ADMIN — GABAPENTIN 600 MG: 300 CAPSULE ORAL at 08:02

## 2022-07-03 RX ADMIN — OXYCODONE HYDROCHLORIDE 5 MG: 5 TABLET ORAL at 06:12

## 2022-07-03 RX ADMIN — FUROSEMIDE 15 MG/HR: 10 INJECTION, SOLUTION INTRAMUSCULAR; INTRAVENOUS at 12:00

## 2022-07-03 RX ADMIN — INSULIN GLARGINE 15 UNITS: 100 INJECTION, SOLUTION SUBCUTANEOUS at 21:40

## 2022-07-03 RX ADMIN — FERROUS SULFATE TAB 325 MG (65 MG ELEMENTAL FE) 325 MG: 325 (65 FE) TAB at 07:55

## 2022-07-03 RX ADMIN — TIZANIDINE 2 MG: 2 TABLET ORAL at 18:01

## 2022-07-03 RX ADMIN — PANTOPRAZOLE SODIUM 40 MG: 40 TABLET, DELAYED RELEASE ORAL at 06:13

## 2022-07-03 RX ADMIN — FUROSEMIDE 15 MG/HR: 10 INJECTION, SOLUTION INTRAMUSCULAR; INTRAVENOUS at 19:12

## 2022-07-03 RX ADMIN — Medication 500 MG: at 14:22

## 2022-07-03 RX ADMIN — ACETAMINOPHEN 975 MG: 325 TABLET, FILM COATED ORAL at 08:01

## 2022-07-03 RX ADMIN — METFORMIN HYDROCHLORIDE 1000 MG: 500 TABLET, FILM COATED ORAL at 18:16

## 2022-07-03 RX ADMIN — LIDOCAINE: 50 OINTMENT TOPICAL at 14:23

## 2022-07-03 RX ADMIN — FUROSEMIDE 80 MG: 10 INJECTION, SOLUTION INTRAMUSCULAR; INTRAVENOUS at 11:43

## 2022-07-03 RX ADMIN — Medication 100 MCG: at 08:03

## 2022-07-03 ASSESSMENT — ACTIVITIES OF DAILY LIVING (ADL)
ADLS_ACUITY_SCORE: 30

## 2022-07-03 NOTE — CONSULTS
HEART CARE NOTE        Thank you, Dr. Smith, for asking the [facility] Heart Care team to see Thomas Steve to evaluate ADHF.      Assessment/Recommendations     1. HFmrEF c/b ADHF  Assessment / Plan    Patient is hypervolemic on physical exam with inadequate diuresis on current regimen - will start furosemide gtt after bolus and continue to monitor    GDMT as detailed below; mainstay of treatment for HFmrEF includes diuretics (class I) and SGLT2-I (class 2a); additional medical therapy demonstrated with less robust evidence for indication but should be considered per guideline recommendations (2b)    Current Pharmacotherapy AHA Guideline-Directed Medical Therapy   Lisinopril  10 mg daily Lisinopril 20 mg twice daily   Carvedilol - BBlocker naive; will start when near euvolemia Carvedilol 25 mg twice daily   Spironolactone not started Spironolactone 25 mg once daily   Hydralazine  NA Hydralazine 100 mg three times daily   Isosorbide dinitrate NA Isosorbide dinitrate 40 mg three times daily   SGLT2 inhibitor: not started Dapagliflozin or Empagliflozin 10 mg daily     2. CAD  Assessment / Plan    Hx of CABG    Denies chest pain or anginal equivalents    3. DM2  Assessment / Plan    Management per primary team    4. Atrial fibrillation  Assessment / Plan    Currently on heparin gtt - timing of rivaroxaban deferred to surgical team    5. Valvular heart disease  Assessment / Plan    Severe TR noted on echo and likely a large contributing factor to heart failure decompensations    Diuresis and oral afterload reduction as above    Will discuss referral to structural/valve clinic with pateint    6. YOLANDA on CKD - resolved  Assessment / Plan    Continue to monitor renal function closely on diuresis       Clinically Significant Risk Factors Present on Admission               Fluid & Electrolyte Disorders: Fluid overload, unspecified, Other fluid overload and Other disorders of electrolyte and fluid balance, not elsewhere  "classified           History of Present Illness/Subjective    Mr. Thomas Steve is a 77 year old male with a PMHx significant for (per Dr. Smith's note) CAD, HFrEF, DM2, HTN, atrial fibrillation, hypothyroidism presented for planned neurosurgical intervention to treat herniated lumbar disc. 06/16 underwent decompressive lumbar laminectomy. Post-op course c/b fever and collection at laminectomy site. Cardiology consulted regarding ADHF    Today, Mr. Steve endorsed symptoms of ADHF including LE edema; Management plan as detailed above    ECG: Personally reviewed. Paced rhythm    ECHO (personnaly Reviewed):  The left ventricle is normal in size.  Left ventricular function is decreased. The ejection fraction is 45-50%  (mildly reduced).  There is a pacemaker lead in the right ventricle.  The left atrium is mildly dilated.  There is mild (1+) mitral regurgitation.  There is moderately severe (3+) tricuspid regurgitation.  Right ventricular systolic pressure is elevated, consistent with mild  pulmonary hypertension.  Mild valvular aortic stenosis.        Physical Examination Review of Systems   /59 (BP Location: Right arm)   Pulse 60   Temp 98.7  F (37.1  C) (Oral)   Resp 16   Ht 1.778 m (5' 10\")   Wt 92.4 kg (203 lb 9.6 oz)   SpO2 98%   BMI 29.21 kg/m    Body mass index is 29.21 kg/m .  Wt Readings from Last 3 Encounters:   07/03/22 92.4 kg (203 lb 9.6 oz)   06/03/22 95.2 kg (209 lb 12.8 oz)   01/05/22 93.4 kg (206 lb)     General Appearance:   no distress, normal body habitus   ENT/Mouth: membranes moist, no oral lesions or bleeding gums.      EYES:  no scleral icterus, normal conjunctivae   Neck: no carotid bruits or thyromegaly   Chest/Lungs:   lungs are clear to auscultation, no rales or wheezing, equal chest wall expansion    Cardiovascular:   Regular. Normal first and second heart sounds with no murmurs, rubs, or gallops; the carotid, radial and posterior tibial pulses are intact, + JVD and LE edema " bilaterally    Abdomen:  no organomegaly, masses, bruits, or tenderness; bowel sounds are present   Extremities: no cyanosis or clubbing   Skin: no xanthelasma, warm.    Neurologic: alert and oriented x3, calm     Psychiatric: alert and oriented x3, calm     A complete 10 systems ROS was reviewed  And is negative except what is listed in the HPI.          Medical History  Surgical History Family History Social History   Past Medical History:   Diagnosis Date     Acute sinusitis      Atrial fibrillation (H)     Resolved after medication and CPAP     Back pain      CHF (congestive heart failure) (H)      Coronary artery disease      Diabetes mellitus (H)      Gastroesophageal reflux disease with esophagitis      HTN (hypertension)      Hyperlipemia      Joint pain      Lipoma of neck      Nocturia      Sciatic leg pain      Shoulder impingement syndrome     BILATERAL     Sleep apnea     uses CPAP     Typical atrial flutter (H) 07/07/2016    Demonstrated on 12-lead EKG July 7, 2016    Past Surgical History:   Procedure Laterality Date     EP BIV PACEMAKER INSERT N/A 6/11/2019    Procedure: EP Biventricular Pacemaker Insertion;  Surgeon: Durga Rajput MD;  Location: [facility] Cath Lab;  Service: Cardiology     IR FINE NEEDLE ASPIRATION W ULTRASOUND  6/23/2022     LAMINECTOMY LUMBAR TWO LEVELS Bilateral 6/16/2022    Procedure: DECOMPRESSIVE LUMBAR LAMINECTOMY LUMBAR 4-LUMBAR 5 AND LUMBAR 5-SACRAL 1 BILATERAL-LEFT SIDE FIRST-PLUS BILATERAL FORAMINOTOMIES PLUS REMOVAL OF HERNIATED DISC LUMBAR 5-SACRAL 1 LEFT;  Surgeon: Israel Samuel MD;  Location: [facility] OR     SD CARDIOVERSION ELECTIVE ARRHYTHMIA EXTERNAL  06/03/2014    Description: Elective Cardioversion External;  Recorded: 06/03/2014;     [facility] CABG, VEIN, SINGLE  2011    Description: CABG (CABG);  Proc Date: 09/26/2011;  Comments: LIMA^LAD, SVG^OM, SVG^distal RCA    no family history of premature coronary artery disease Social History     Socioeconomic  History     Marital status:      Spouse name: Not on file     Number of children: Not on file     Years of education: Not on file     Highest education level: Not on file   Occupational History     Not on file   Tobacco Use     Smoking status: Former Smoker     Packs/day: 1.50     Years: 23.00     Pack years: 34.50     Quit date: 1986     Years since quittin.5     Smokeless tobacco: Never Used   Substance and Sexual Activity     Alcohol use: Yes     Alcohol/week: 1.0 standard drink     Comment: one beer per day     Drug use: No     Sexual activity: Never   Other Topics Concern     Not on file   Social History Narrative    .  3 children.  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     Social Determinants of Health     Financial Resource Strain: Not on file   Food Insecurity: Not on file   Transportation Needs: Not on file   Physical Activity: Not on file   Stress: Not on file   Social Connections: Not on file   Intimate Partner Violence: Not on file   Housing Stability: Not on file           Lab Results    Chemistry/lipid CBC Cardiac Enzymes/BNP/TSH/INR   Lab Results   Component Value Date    CHOL 118 2022    HDL 36 (L) 2022    TRIG 202 (H) 2022    BUN 18 2022     2022    CO2 30 2022    Lab Results   Component Value Date    WBC 10.0 2022    HGB 9.1 (L) 2022    HCT 29.5 (L) 2022    MCV 91 2022     2022    Lab Results   Component Value Date     (H) 2022    TSH 2.05 2022    INR 1.14 2022     No results found for: CKTOTAL, CKMB, TROPONINI       Weight:    Wt Readings from Last 3 Encounters:   22 92.4 kg (203 lb 9.6 oz)   22 95.2 kg (209 lb 12.8 oz)   22 93.4 kg (206 lb)       Allergies  Allergies   Allergen Reactions     Aspirin Other (See Comments)     Contraindicated due to xarelto use     Codeine Nausea and Vomiting     Pollen Extracts [Pollen Extract] Unknown      Scratchy throat     Vicodin [Hydrocodone-Acetaminophen] Nausea and Vomiting         Surgical History  Past Surgical History:   Procedure Laterality Date     EP BIV PACEMAKER INSERT N/A 6/11/2019    Procedure: EP Biventricular Pacemaker Insertion;  Surgeon: Durga Rajput MD;  Location: [facility] Cath Lab;  Service: Cardiology     IR FINE NEEDLE ASPIRATION W ULTRASOUND  6/23/2022     LAMINECTOMY LUMBAR TWO LEVELS Bilateral 6/16/2022    Procedure: DECOMPRESSIVE LUMBAR LAMINECTOMY LUMBAR 4-LUMBAR 5 AND LUMBAR 5-SACRAL 1 BILATERAL-LEFT SIDE FIRST-PLUS BILATERAL FORAMINOTOMIES PLUS REMOVAL OF HERNIATED DISC LUMBAR 5-SACRAL 1 LEFT;  Surgeon: Israel Samuel MD;  Location: [facility] Main OR     MS CARDIOVERSION ELECTIVE ARRHYTHMIA EXTERNAL  06/03/2014    Description: Elective Cardioversion External;  Recorded: 06/03/2014;     [facility] CABG, VEIN, SINGLE  2011    Description: CABG (CABG);  Proc Date: 09/26/2011;  Comments: LIMA^LAD, SVG^OM, SVG^distal RCA       Social History  Tobacco:   History   Smoking Status     Former Smoker     Packs/day: 1.50     Years: 23.00     Quit date: 1/1/1986   Smokeless Tobacco     Never Used    Alcohol:   Social History    Substance and Sexual Activity      Alcohol use: Yes        Alcohol/week: 1.0 standard drink        Comment: one beer per day   Illicit Drugs:   History   Drug Use No       Family History  Family History   Problem Relation Age of Onset     Heart Disease Mother      Snoring Father      Heart Disease Father      Hypertension Father      Alzheimer Disease Father      No Known Problems Daughter      No Known Problems Daughter      No Known Problems Daughter      Chronic Obstructive Pulmonary Disease Sister      Hodgkin's lymphoma Brother      No Known Problems Son      Diabetes Sister      Substance Abuse Sister      Chronic Obstructive Pulmonary Disease Brother      Heart Disease Brother      Diabetes Brother      Substance Abuse Brother           Glenn Hoskins MD on  7/3/2022      cc: Moni Mcclain,

## 2022-07-03 NOTE — PROGRESS NOTES
"Neurosurgery Progress Note  7/3/22    A/P:   Thomas Steve is a 77 year old male POD #16 DECOMPRESSIVE LUMBAR LAMINECTOMY LUMBAR 4-LUMBAR 5 AND LUMBAR 5-SACRAL 1 BILATERAL-LEFT SIDE FIRST-PLUS BILATERAL FORAMINOTOMIES PLUS REMOVAL OF HERNIATED DISC LUMBAR 5-SACRAL 1 LEFT with Dr Samuel on 6/19/22. Arachnoid bleb intraop, covered with durgen, duraseal. No CSF leak. Was kept flat for 24hr. IR aspiration done 6/23 for fluid collection and repeated 7/1 dt enlarging collection.    Thomas c/o stable back pain 5/10 today. No new or worsening symptoms. He is neuro intact on exam. Continue heparin drip through the weekend and continue to clinically monitor patient. Follow up on aspiration results - prelim cultures are negative. Beta 2 still in process. Let's plan for repeat lumbar MRI w wo contrast 7/4 pm. Discharge planning yet no specific date to plan on yet.    Dr Vargas weekend coverage for Dr Samuel       PLAN:   1. Pain control  2. Continue heparin drip through weekend  3. Daily dressing changes to incision. Bacitracin to superficial skin tears from IR procedure. Paper tape only  4. Continue prn pain medications   5. Appreciate ID following.  6. Repeat lumbar MRI w wo contrast on 7/4  7. Discharge date is pending. Cm notes suggest 7/5 but this cannot be confirmed.    8. Staple removal POD#21        HPI: Presented with back pain, right leg pain. Weakness bilateral LE. Worse with walking. No bowel or bladder issues. Diabetes, CABG, AFIB, Xarelto. MRI with listhesis L4-5, spinal stenosis. Stenosis also at L5-S1 with large disc herniation L5-S1 on the left.         Subjective:  Thomas reports stable back pain. 4-5/10. He feel no different than yesterday. Denies leg pain, numbness, weakness, or change in bowel or bladder control.    Physical Exam  /59 (BP Location: Right arm)   Pulse 60   Temp 98.7  F (37.1  C) (Oral)   Resp 16   Ht 5' 10\" (1.778 m)   Wt 203 lb 9.6 oz (92.4 kg)   SpO2 98%   BMI 29.21 kg/m  "       General: oriented. KIM. Speech clear. Sitting upright in beside chair appears comfortable    Motor: normal bulk and tone     Strength: Full strength LE bilaterally throughout both legs     Segundo lower extremity sensation intact to touch. Baseline lower extremity neuropathy.     Bilateral lower extremities appear wnl today    Incision: well approximated with staples. Some scattered superficial small skin tears in a large window shape around incision. Appearance is improving. No bleeding today. no surrounding erythema, induration, or drainage     Labs:  Beta 2 transferrin in process from 7/1  Aerobic bacterial culture prelim negative from 7/1  Anaerobic bacterial culture prelim negative from 7/1      Noreen Lynn FNP-C  [facility] Neurosurgery  O. [phone]

## 2022-07-03 NOTE — PROGRESS NOTES
[facility] Hospitalist Progress Note  [facility]  Admission date: 6/16/2022  LOS: 17 days    Summary:    77M with -year-old male with CAD, HFrEF, DM2, HTN, atrial fibrillation, hypothyroidism presented for planned neurosurgical intervention to treat herniated lumbar disc. 06/16 underwent decompressive lumbar laminectomy.  06/19 with post-op fever and started on vanco and zosyn.    06/22 MRI with fluid collection with possible rim enhancement which was aspirated.  Cultures were negative.   ID had recommended Augmentin x doxycycline for 1 week.  Clinically has been improving but repeat MRI 6/27 showed increased fluid collection in the laminectomy bed causing increased compression on cauda equina, as well as slightly increased subdural collection at L2-L4 level.  Neurosurgery recommended continued monitoring for cauda equina and repeat MRI in 2 to 3 days.  Anticoagulation was held due to subdural fluid collection.       6/29 found to have right lower extremity DVT.  IV heparin started after discussion with neurosurgery. Repeat MRI 06/30 essetially stable with shiftting of fluid.  07/01 IR aspiration of L4/5 fluid collection.     Also with YOLANDA this admission and volume overload requiring IV diuresis    Assessment/Plan    #Post-op fever  #Post-op fluid collection -   -S/P IR aspiration 07/01. negative gram stain and no growth so far.   -?seroma vs. Pseudomeningocele.    -Completed 1 week of Augmentin+doxy.  Not much to suggest need for abx again with seemingly sterile fluid collection.  Appreciate ID input.  -Await [id number], await culture results     #Acute DVT of the right profundofemoral vein in the upper thigh -   -heparin gtt.  -Was on Xarelto prior to admission.  Will need to transition to oral DOAC at DVT dosing.     #Acute on chronic HFrEF  #CAD s/p CABG -   -ECHO with EF 45-50%, moderately severe TR, mild MR,mild aortic stenosis mild pulm HTN.  -diuresed earlier in stay and switched to oral  Bumex 6/29  -still volume overloaded and I suspect the abdominal distension Mr. Huston calls stool is some edema too.  No ascites on US.  -Increase IV diuresis.  -Cards f/u.  May eventually need intervention on tricuspid valve.     #YOLANDA on CKD3 - baseline 1.1-1.2s.  Post-op injury likely hemodynamic demetrius-operatively.  Resolved.      #Lumbar spinal stenosis with lumbar disc herniation  -Patient underwent scheduled decompressive lumbar laminectomy of L4-L5, L5-S1 with bilateral foraminotomies and removal of herniated disc in the L1-S1 region with neurosurgery on 6/16/2022.  -Scheduled Tylenol 3 times daily  -Gabapentin 600 mg p.o. twice daily  -Post op management per neurosurgery  -Decadron burst per Nsx - completed      #Post op urinary retention : started flomax.  Had Carpenter which is now removed.       #Constipation - resolved     #Chronic atrial fibrillation, sick sinus syndrome s/p PPM  -Heparin gtt  -Resume xarelto when ok from sx standpoint.      #DM2 with recurrent AM hypoglycemia - reports he is eating healthier here in hospital with much reduced insulin needs.  Steroid induced hyperglycemia with sugars 300s-400s - last dexamethasone dose 6/27 AM - improved significantly   -Stopped glimiperide and would not resume  -Continue Lantus to 15 units at HS, mealtime CHO coverage 1:16, low ISS      #Chronic left shoulder pain  -Started lidocaine and Voltaren trading on and off 4 times daily     #GERD -Pantoprazole 40 mg p.o. daily     #CORAZON -CPAP     #Hypothyroidism-Levothyroxine 25 mcg p.o. daily     #Normocytic anemia -Stable    Checklist:  Code Status: Full Code    Diet: Snacks/Supplements Adult: Gelatein sugar-free; Between Meals  Diet  Snacks/Supplements Adult: Glucerna; With Meals  Moderate Consistent Carb (60 g CHO per Meal) Diet    Carpenter Catheter: Not present  Central Lines: None          Auto-populated based on system request - if relevant they will be addressed above:  Clinically Significant Risk Factors  Present on Admission                      Auto-populated from discharge tab:     Expected Discharge Date: 07/05/2022    Discharge Delays: Placement - TCU  Destination: other (comment) (TCU)  Discharge Comments: [facility] - maybe early next week.  barriers include onoing diuresis, transition to oral anticoagulation, plan for lumbar fluid collection =/- abx         Overnight Events/Subjective/Notable results:  No major events.  Pain controlled.  Still feels like legs are edematous.  No CP or SOB.  Weights down and urinating more than normal, but not much more than usual per patient.    4 point ROS otherwise negative    Objective    Vital signs in last 24 hours  Temp:  [97.6  F (36.4  C)] 97.6  F (36.4  C)  Pulse:  [60] 60  Resp:  [16-20] 20  BP: (100-106)/(52-54) 106/54  SpO2:  [97 %-98 %] 97 % O2 Device: None (Room air)    Weight:   203 lbs 9.6 oz    Intake/Output last 3 shifts  I/O last 3 completed shifts:  In: 2421.8 [P.O.:2080; I.V.:341.8]  Out: 1250 [Urine:1250]  Body mass index is 29.21 kg/m .    Physical Exam  General:  Alert, cooperative, no distress    Neurologic:  oriented, facial symmetry preserved, fluent speech.   Psych: calm, mood and affect appropriate to situation  HEENT:  Anicteric, MMM  CV: RRR no MRG, normal S1 and S2, ++piting LE edema (improving, no longer tense edema)  Lungs: CTAB.  Easyrespirations  Abd: softly distended, NT, normoactive BS  Skin: no rashes or jaundice noted on exposed skin.      I have reviewed all labs, medications, imaging studies in the last 24 hours.  Pertinent findings&changes discussed above.    Data       Humble Smith MD  Internal Medicine Hospitalist

## 2022-07-03 NOTE — PLAN OF CARE
Problem: Fluid and Electrolyte Imbalance (Spinal Surgery)  Goal: Fluid and Electrolyte Balance  7/2/2022 1927 by Wen Fox, RN  Outcome: Ongoing, Progressing  Bilateral lower extremity edema present.  IV Lasix ordered & given (scheduled q8h).  Encouraged elevation on pillows.    Problem: Neurologic Impairment (Spinal Surgery)  Goal: Optimal Neurologic Function  7/2/2022 1928 by Wen Fox, RN  Outcome: Ongoing, Progressing  No changes neurologically - strength intact bilaterally.    Problem: Pain (Spinal Surgery)  Goal: Acceptable Pain Control  7/2/2022 1928 by Wen Fox, RN  Outcome: Ongoing, Progressing  Pain overall controlled with current regimen.    Problem: Infection  Goal: Absence of Infection Signs and Symptoms  7/2/2022 1927 by Wen Fox, RN  Outcome: Ongoing, Progressing  Vital signs stable.  ID will see tomorrow 7/3.    Problem: VTE (Venous Thromboembolism)  Goal: VTE (Venous Thromboembolism) Symptom Resolution  7/2/2022 1928 by Wen Fox, RN  Outcome: Ongoing, Progressing  Heparin drip infusing @ 1800 units/hr. Antixa .14 @ 1030 (likely due to loss of IV for a period of time and subsequent holding of heparin drip).. Recheck due at 1730.    New dressing applied to back incision; no difficulties with tape removal.  Bacitracin applied to skin tears surrounding incision.    Wen Fox, RUDI

## 2022-07-03 NOTE — PROGRESS NOTES
"INFECTIOUS DISEASE FOLLOW UP NOTE    Date: 07/03/2022   CHIEF COMPLAINT: No chief complaint on file.       ASSESSMENT:  1. Fever: post op day 16 (surgery 6/19). Workup with MRI suggestive of possible superimposed infection with a rim-enhancing ventral epidural collection extending from the L2 level cranially to the L5-S1 level caudally s/p aspiration 6/23 with red/yellow fluid removed-culture negative, but on antibiotics at the time. CRP and SED elevated, CRP normal and esr decreased. WBC normalized. BC NGTD. Repeat MRI on 6/30 showing persistent fluid collection. Aspiration on 7/1 with no organisms on gram stain and no WBC. Culture negative to date.   2. YOLANDA: nephrology following  3. CAD, pacer in place  4. Comorbid conditions affecting immune system: DM2    PLAN:  - continue to monitor off antibiotics, completed on 7/1.  - ID will f/up on repeat MRI planned for tomorrow    Jeffrey Green MD  [facility]  Direct messaging: [employer]  On-Call ID provider: [phone], option: 9    ______________________________________________________________________    SUBJECTIVE / INTERVAL HISTORY:   Last seen by ID on 6/24. First visit by me. ID asked to re-evaluate case. No acute issues. Patient feeling well.    Since last visit patient completed a 7-day course of amoxicillin/clauvulanic acid and doxycycline. He also had a repeat MRI showing fluid collection. This was aspirated and sent for cultures and B2-transferrin. Plan is for repeat MRI tomorrow.      ROS: All other systems negative except as listed above.    SH/FH/Habits/PMH reviewed and unchanged.    OBJECTIVE:  BP 92/55 (BP Location: Right arm)   Pulse 60   Temp 98  F (36.7  C) (Oral)   Resp 16   Ht 1.778 m (5' 10\")   Wt 92.4 kg (203 lb 9.6 oz)   SpO2 97%   BMI 29.21 kg/m       Resp: 16    GEN: No acute distress.    RESPIRATORY:  Normal breathing pattern.   CARDIOVASCULAR:  Regular   ABDOMEN:  Soft, normal bowel sounds, " non-tender,   EXTREMITIES: No edema.  SKIN/HAIR/NAILS:  No rashes  IV: peripheral IV    Antibiotics:  None     Pertinent labs:  CRP   Date Value Ref Range Status   06/29/2022 0.6 0.0 - <0.8 mg/dL Final      CBC RESULTS:   Recent Labs   Lab Test 06/23/22  0506   WBC 8.1   RBC 2.74*   HGB 7.8*   HCT 24.8*   MCV 91   MCH 28.5   MCHC 31.5   RDW 14.8         Last Comprehensive Metabolic Panel:  Sodium   Date Value Ref Range Status   07/03/2022 138 136 - 145 mmol/L Final     Potassium   Date Value Ref Range Status   07/03/2022 3.9 3.5 - 5.0 mmol/L Final     Chloride   Date Value Ref Range Status   07/03/2022 100 98 - 107 mmol/L Final     Carbon Dioxide (CO2)   Date Value Ref Range Status   07/03/2022 30 22 - 31 mmol/L Final     Anion Gap   Date Value Ref Range Status   07/03/2022 8 5 - 18 mmol/L Final     Glucose   Date Value Ref Range Status   07/03/2022 102 70 - 125 mg/dL Final     GLUCOSE BY METER POCT   Date Value Ref Range Status   07/03/2022 169 (H) 70 - 99 mg/dL Final     Urea Nitrogen   Date Value Ref Range Status   07/03/2022 18 8 - 28 mg/dL Final     Creatinine   Date Value Ref Range Status   07/03/2022 0.95 0.70 - 1.30 mg/dL Final   02/28/2011 0.75 0.66 - 1.25 mg/dL Final     Comment:     New IDMS-traceable calibration  beginning 5/1/08     GFR Estimate   Date Value Ref Range Status   07/03/2022 83 >60 mL/min/1.73m2 Final     Comment:     Effective December 21, 2021 eGFRcr in adults is calculated using the 2021 CKD-EPI creatinine equation which includes age and gender (Cynthia cho al., NEJM, DOI: 10.1056/CLYVvt6372570)   09/23/2020 59 (L) >60 mL/min/1.73m2 Final   02/28/2011 >90 >60 mL/min/1.7m2 Final     Calcium   Date Value Ref Range Status   07/03/2022 8.9 8.5 - 10.5 mg/dL Final        MICROBIOLOGY DATA:  Personally reviewed.  6/19 BC NGTD  6/23 aspiration GS no organisms    RADIOLOGY:  Personally Reviewed.  Recent Results (from the past 24 hour(s))   XR Abdomen Port 1 View    Narrative    EXAM: XR  ABDOMEN PORT 1 VIEWS  LOCATION: [facility]  DATE/TIME: 6/22/2022 2:35 PM    INDICATION: abdominal distension  COMPARISON: None.      Impression    IMPRESSION: No distended air-filled loops of small bowel. There is a small amount of throughout the colon. No definite intraperitoneal free air identified. Pacemaker leads are partially included in the field-of-view. Prior median sternotomy noted. There   are surgical staples over the abdomen and pelvis.         Principal Problem:    Lumbar stenosis with neurogenic claudication  Active Problems:    Type 2 diabetes mellitus, with long-term current use of insulin (H)    Benign essential hypertension    CAD (coronary artery disease)    Chronic atrial fibrillation (H)    CORAZON (obstructive sleep apnea)    Gastroesophageal reflux disease without esophagitis    Hypothyroidism    Normocytic anemia    Chronic left shoulder pain    Nasal congestion    Lumbar spinal stenosis    Acute deep vein thrombosis (DVT) of femoral vein of right lower extremity (H)

## 2022-07-03 NOTE — PLAN OF CARE
Problem: Fluid and Electrolyte Imbalance (Spinal Surgery)  Goal: Fluid and Electrolyte Balance  Outcome: Ongoing, Progressing  Persistent lower extremity edema.  Cardiology consulted - IV lasix infusion initiated after bolus given.  Strict I&O, daily weights as well as fluid restriction initiated.    Problem: Neurologic Impairment (Spinal Surgery)  Goal: Optimal Neurologic Function  Outcome: Ongoing, Progressing  No changes neurologically.  MRI planned for tomorrow 7/4, checklist completed in preparation.    Problem: VTE (Venous Thromboembolism)  Goal: VTE (Venous Thromboembolism) Symptom Resolution  Outcome: Ongoing, Progressing  Heparin drip infusing at 1500 units/hr, next antixa due 1730.    Dressing changed - incision approximated, bacitracin applied to surrounding skin tears.    Wen Fox RN

## 2022-07-03 NOTE — PLAN OF CARE
Problem: Pain (Spinal Surgery)  Goal: Acceptable Pain Control  Outcome: Ongoing, Progressing     Problem: Plan of Care - These are the overarching goals to be used throughout the patient stay.    Goal: Optimal Comfort and Wellbeing  Outcome: Ongoing, Progressing     Problem: Infection  Goal: Absence of Infection Signs and Symptoms  Outcome: Ongoing, Progressing   Goal Outcome Evaluation:      Anti XA 0.84 at 02. Turned heparin off 0330 for 60 minutes. Heparin gtt at 1500u/hr and next XA at 1030.  Lasix IV administered, patient voiding large amounts, Medicated with Oxycodone x1.

## 2022-07-03 NOTE — PROGRESS NOTES
ELBA spoke with Noreen Lynn in neurosurgery regarding pt.  She stated that pt was stable today.  Pt to continue on blood thinners.  Plan for Tuesday discharge as long as pt is medically stable.    ELBA spoke with Radha at [facility] and updated her regarding anticipated discharge for pt on Tuesday, July 5.        TONY Thompson, SHABBIR 07/02/22 7:04 PM

## 2022-07-04 ENCOUNTER — APPOINTMENT (OUTPATIENT)
Dept: PHYSICAL THERAPY | Facility: HOSPITAL | Age: 77
DRG: 518 | End: 2022-07-04
Attending: SURGERY
Payer: MEDICARE

## 2022-07-04 LAB
ANION GAP SERPL CALCULATED.3IONS-SCNC: 8 MMOL/L (ref 5–18)
BACTERIA BLD CULT: NO GROWTH
BACTERIA BLD CULT: NO GROWTH
BUN SERPL-MCNC: 22 MG/DL (ref 8–28)
CALCIUM SERPL-MCNC: 9.3 MG/DL (ref 8.5–10.5)
CHLORIDE BLD-SCNC: 97 MMOL/L (ref 98–107)
CO2 SERPL-SCNC: 34 MMOL/L (ref 22–31)
CREAT SERPL-MCNC: 1 MG/DL (ref 0.7–1.3)
GFR SERPL CREATININE-BSD FRML MDRD: 78 ML/MIN/1.73M2
GLUCOSE BLD-MCNC: 135 MG/DL (ref 70–125)
GLUCOSE BLDC GLUCOMTR-MCNC: 126 MG/DL (ref 70–99)
GLUCOSE BLDC GLUCOMTR-MCNC: 142 MG/DL (ref 70–99)
GLUCOSE BLDC GLUCOMTR-MCNC: 161 MG/DL (ref 70–99)
GLUCOSE BLDC GLUCOMTR-MCNC: 215 MG/DL (ref 70–99)
POTASSIUM BLD-SCNC: 4 MMOL/L (ref 3.5–5)
SODIUM SERPL-SCNC: 139 MMOL/L (ref 136–145)
UFH PPP CHRO-ACNC: 0.52 IU/ML
UFH PPP CHRO-ACNC: 0.56 IU/ML
UFH PPP CHRO-ACNC: 0.56 IU/ML
UFH PPP CHRO-ACNC: 0.76 IU/ML

## 2022-07-04 PROCEDURE — 97116 GAIT TRAINING THERAPY: CPT | Mod: GP

## 2022-07-04 PROCEDURE — 82310 ASSAY OF CALCIUM: CPT | Performed by: INTERNAL MEDICINE

## 2022-07-04 PROCEDURE — 250N000013 HC RX MED GY IP 250 OP 250 PS 637: Performed by: INTERNAL MEDICINE

## 2022-07-04 PROCEDURE — 85520 HEPARIN ASSAY: CPT | Performed by: HOSPITALIST

## 2022-07-04 PROCEDURE — 250N000013 HC RX MED GY IP 250 OP 250 PS 637: Performed by: HOSPITALIST

## 2022-07-04 PROCEDURE — 250N000013 HC RX MED GY IP 250 OP 250 PS 637: Performed by: NURSE PRACTITIONER

## 2022-07-04 PROCEDURE — 258N000003 HC RX IP 258 OP 636: Performed by: INTERNAL MEDICINE

## 2022-07-04 PROCEDURE — 36415 COLL VENOUS BLD VENIPUNCTURE: CPT | Performed by: HOSPITALIST

## 2022-07-04 PROCEDURE — 250N000011 HC RX IP 250 OP 636: Performed by: INTERNAL MEDICINE

## 2022-07-04 PROCEDURE — 250N000009 HC RX 250: Performed by: NURSE PRACTITIONER

## 2022-07-04 PROCEDURE — 36415 COLL VENOUS BLD VENIPUNCTURE: CPT | Performed by: INTERNAL MEDICINE

## 2022-07-04 PROCEDURE — 99232 SBSQ HOSP IP/OBS MODERATE 35: CPT | Performed by: INTERNAL MEDICINE

## 2022-07-04 PROCEDURE — 85520 HEPARIN ASSAY: CPT | Performed by: INTERNAL MEDICINE

## 2022-07-04 PROCEDURE — 97110 THERAPEUTIC EXERCISES: CPT | Mod: GP

## 2022-07-04 PROCEDURE — 250N000013 HC RX MED GY IP 250 OP 250 PS 637: Performed by: SURGERY

## 2022-07-04 PROCEDURE — 99232 SBSQ HOSP IP/OBS MODERATE 35: CPT | Performed by: STUDENT IN AN ORGANIZED HEALTH CARE EDUCATION/TRAINING PROGRAM

## 2022-07-04 PROCEDURE — 120N000001 HC R&B MED SURG/OB

## 2022-07-04 PROCEDURE — 250N000013 HC RX MED GY IP 250 OP 250 PS 637: Performed by: FAMILY MEDICINE

## 2022-07-04 RX ORDER — DIPHENHYDRAMINE HYDROCHLORIDE 50 MG/ML
50 INJECTION INTRAMUSCULAR; INTRAVENOUS
Status: DISCONTINUED | OUTPATIENT
Start: 2022-07-04 | End: 2022-07-06 | Stop reason: HOSPADM

## 2022-07-04 RX ORDER — DIPHENHYDRAMINE HCL 50 MG
50 CAPSULE ORAL
Status: DISCONTINUED | OUTPATIENT
Start: 2022-07-04 | End: 2022-07-06 | Stop reason: HOSPADM

## 2022-07-04 RX ADMIN — Medication 500 MG: at 08:14

## 2022-07-04 RX ADMIN — FUROSEMIDE 15 MG/HR: 10 INJECTION, SOLUTION INTRAMUSCULAR; INTRAVENOUS at 02:08

## 2022-07-04 RX ADMIN — METFORMIN HYDROCHLORIDE 1000 MG: 500 TABLET, FILM COATED ORAL at 16:50

## 2022-07-04 RX ADMIN — THERA TABS 1 TABLET: TAB at 08:01

## 2022-07-04 RX ADMIN — LISINOPRIL 20 MG: 20 TABLET ORAL at 08:02

## 2022-07-04 RX ADMIN — FERROUS SULFATE TAB 325 MG (65 MG ELEMENTAL FE) 325 MG: 325 (65 FE) TAB at 16:50

## 2022-07-04 RX ADMIN — HEPARIN SODIUM 1300 UNITS/HR: 10000 INJECTION, SOLUTION INTRAVENOUS at 14:31

## 2022-07-04 RX ADMIN — LIDOCAINE: 50 OINTMENT TOPICAL at 20:16

## 2022-07-04 RX ADMIN — OXYCODONE HYDROCHLORIDE 5 MG: 5 TABLET ORAL at 12:25

## 2022-07-04 RX ADMIN — METFORMIN HYDROCHLORIDE 1000 MG: 500 TABLET, FILM COATED ORAL at 08:14

## 2022-07-04 RX ADMIN — GABAPENTIN 600 MG: 300 CAPSULE ORAL at 08:23

## 2022-07-04 RX ADMIN — Medication 1 MG: at 20:49

## 2022-07-04 RX ADMIN — PANTOPRAZOLE SODIUM 40 MG: 40 TABLET, DELAYED RELEASE ORAL at 07:08

## 2022-07-04 RX ADMIN — ACETAMINOPHEN 975 MG: 325 TABLET, FILM COATED ORAL at 20:07

## 2022-07-04 RX ADMIN — Medication 500 MG: at 20:06

## 2022-07-04 RX ADMIN — LIDOCAINE 2 PATCH: 246 PATCH TOPICAL at 08:07

## 2022-07-04 RX ADMIN — DICLOFENAC SODIUM 2 G: 10 GEL TOPICAL at 08:09

## 2022-07-04 RX ADMIN — BACITRACIN ZINC: 500 OINTMENT TOPICAL at 20:16

## 2022-07-04 RX ADMIN — LEVOTHYROXINE SODIUM 25 MCG: 0.03 TABLET ORAL at 07:08

## 2022-07-04 RX ADMIN — FUROSEMIDE 15 MG/HR: 10 INJECTION, SOLUTION INTRAMUSCULAR; INTRAVENOUS at 17:13

## 2022-07-04 RX ADMIN — PANTOPRAZOLE SODIUM 40 MG: 40 TABLET, DELAYED RELEASE ORAL at 16:50

## 2022-07-04 RX ADMIN — TAMSULOSIN HYDROCHLORIDE 0.4 MG: 0.4 CAPSULE ORAL at 08:02

## 2022-07-04 RX ADMIN — FERROUS SULFATE TAB 325 MG (65 MG ELEMENTAL FE) 325 MG: 325 (65 FE) TAB at 13:08

## 2022-07-04 RX ADMIN — OXYCODONE HYDROCHLORIDE 5 MG: 5 TABLET ORAL at 07:08

## 2022-07-04 RX ADMIN — BACITRACIN ZINC: 500 OINTMENT TOPICAL at 08:10

## 2022-07-04 RX ADMIN — LIDOCAINE: 50 OINTMENT TOPICAL at 13:07

## 2022-07-04 RX ADMIN — ACETAMINOPHEN 975 MG: 325 TABLET, FILM COATED ORAL at 08:01

## 2022-07-04 RX ADMIN — HEPARIN SODIUM 1300 UNITS/HR: 10000 INJECTION, SOLUTION INTRAVENOUS at 10:27

## 2022-07-04 RX ADMIN — TRAZODONE HYDROCHLORIDE 25 MG: 50 TABLET ORAL at 20:49

## 2022-07-04 RX ADMIN — INSULIN GLARGINE 15 UNITS: 100 INJECTION, SOLUTION SUBCUTANEOUS at 20:43

## 2022-07-04 RX ADMIN — ACETAMINOPHEN 975 MG: 325 TABLET, FILM COATED ORAL at 13:08

## 2022-07-04 RX ADMIN — Medication 500 MG: at 13:08

## 2022-07-04 RX ADMIN — Medication 100 MCG: at 08:01

## 2022-07-04 RX ADMIN — FUROSEMIDE 15 MG/HR: 10 INJECTION, SOLUTION INTRAMUSCULAR; INTRAVENOUS at 09:11

## 2022-07-04 RX ADMIN — ROSUVASTATIN CALCIUM 10 MG: 10 TABLET, FILM COATED ORAL at 20:07

## 2022-07-04 RX ADMIN — FERROUS SULFATE TAB 325 MG (65 MG ELEMENTAL FE) 325 MG: 325 (65 FE) TAB at 08:01

## 2022-07-04 RX ADMIN — GABAPENTIN 600 MG: 300 CAPSULE ORAL at 20:07

## 2022-07-04 ASSESSMENT — ACTIVITIES OF DAILY LIVING (ADL)
ADLS_ACUITY_SCORE: 30

## 2022-07-04 NOTE — PLAN OF CARE
On heparin drip- no sx of abnormal bleeding.  On Lasix drip- strict I&o in place.  Compliant with fluid restriction. States his swelling in his legs is back to his normal.  Eating and drinking well. Oral pain medication for back pain. Falls and pressure ulcer prevention plans in place. Joann Henry RN     Problem: Plan of Care - These are the overarching goals to be used throughout the patient stay.    Goal: Absence of Hospital-Acquired Illness or Injury  Intervention: Identify and Manage Fall Risk  Recent Flowsheet Documentation  Taken 7/4/2022 0900 by Joann Henry, RN  Safety Promotion/Fall Prevention:   nonskid shoes/slippers when out of bed   room door open   fall prevention program maintained     Problem: Oral Intake Inadequate  Goal: Improved Oral Intake  Outcome: Ongoing, Progressing     Problem: Bleeding (Thrombolytic Therapy)  Goal: Absence of Bleeding  Outcome: Ongoing, Progressing

## 2022-07-04 NOTE — PROGRESS NOTES
[facility] Progress Note - Hospitalist Service    Date of Admission:  6/16/2022     Assessment & Plan   SUMMARY:  77 year old male with history of CAD, HFrEF, DM2, HTN, atrial fibrillation, hypothyroidism presented for planned neurosurgical intervention to treat herniated lumbar disc. 06/16 underwent decompressive lumbar laminectomy.  06/19 with post-op fever and started on vanco and zosyn.    06/22 MRI with fluid collection with possible rim enhancement which was aspirated.  Cultures were negative.   ID had recommended Augmentin x doxycycline for 1 week.  Clinically has been improving but repeat MRI 6/27 showed increased fluid collection in the laminectomy bed causing increased compression on cauda equina, as well as slightly increased subdural collection at L2-L4 level. Neurosurgery recommended continued monitoring for cauda equina and repeat MRI in 2 to 3 days.  Anticoagulation was held due to subdural fluid collection.       6/29 found to have right lower extremity DVT.  IV heparin started after discussion with neurosurgery. Repeat MRI 06/30 essetially stable with shiftting of fluid.  07/01 IR aspiration of L4/5 fluid collection.     Also with YOLANDA this admission and volume overload requiring IV diuresis     ACTIVE PROBLEM LIST  #Post-op fever  #Post-op fluid collection -   -S/P IR aspiration 07/01. negative gram stain and no growth so far.   -?seroma vs. Pseudomeningocele.    -Completed 1 week of Augmentin+doxy.  Not much to suggest need for abx again with seemingly sterile fluid collection.  Appreciate ID input.  -Await [id number], await culture results  -plan repeat MRI 7/5 - if stable, consider for discharge     #Acute DVT of the right profundofemoral vein in the upper thigh -   -heparin gtt.  -Was on Xarelto prior to admission.  Will need to transition to oral DOAC at DVT dosing.     #Acute on chronic HFrEF  #CAD s/p CABG -   -ECHO with EF 45-50%, moderately severe TR,  mild MR,mild aortic stenosis mild pulm HTN.  -diuresed earlier in stay and switched to oral Bumex 6/29  -still volume overloaded and I suspect the abdominal distension Mr. Huston calls stool is some edema too.  No ascites on US.  -Increase IV diuresis.  -Cards f/u.  May eventually need intervention on tricuspid valve.     #YOLANDA on CKD3 - baseline 1.1-1.2s.  Post-op injury likely hemodynamic demetrius-operatively.  Resolved.      #Lumbar spinal stenosis with lumbar disc herniation  -Patient underwent scheduled decompressive lumbar laminectomy of L4-L5, L5-S1 with bilateral foraminotomies and removal of herniated disc in the L1-S1 region with neurosurgery on 6/16/2022.  -Scheduled Tylenol 3 times daily  -Gabapentin 600 mg p.o. twice daily  -Post op management per neurosurgery  -Decadron burst per Nsx - completed      #Post op urinary retention : started flomax.  Had Carpenter which is now removed.       #Constipation - resolved     #Chronic atrial fibrillation, sick sinus syndrome s/p PPM  -Heparin gtt  -Resume xarelto when ok from sx standpoint.      #DM2 with recurrent AM hypoglycemia - reports he is eating healthier here in hospital with much reduced insulin needs.  Steroid induced hyperglycemia with sugars 300s-400s - last dexamethasone dose 6/27 AM - improved significantly   -Stopped glimiperide and would not resume  -Continue Lantus to 15 units at HS, mealtime CHO coverage 1:16, low ISS      #Chronic left shoulder pain  -Started lidocaine and Voltaren trading on and off 4 times daily     #GERD -Pantoprazole 40 mg p.o. daily     #CORAZON -CPAP     #Hypothyroidism-Levothyroxine 25 mcg p.o. daily     #Normocytic anemia -Stable     Checklist:  Code Status: Full Code    Diet: Snacks/Supplements Adult: Gelatein sugar-free; Between Meals  Diet  Snacks/Supplements Adult: Glucerna; With Meals  Moderate Consistent Carb (60 g CHO per Meal) Diet    Carpenter Catheter: Not present  Central Lines: None    Principal Problem:    Lumbar stenosis  "with neurogenic claudication  Active Problems:    Type 2 diabetes mellitus, with long-term current use of insulin (H)    Benign essential hypertension    CAD (coronary artery disease)    Chronic atrial fibrillation (H)    CORAZON (obstructive sleep apnea)    Gastroesophageal reflux disease without esophagitis    Hypothyroidism    Normocytic anemia    Chronic left shoulder pain    Nasal congestion    Lumbar spinal stenosis    Acute deep vein thrombosis (DVT) of femoral vein of right lower extremity (H)    Expected Discharge Date: 07/05/2022    Discharge Delays: Placement - TCU  Destination: other (comment) (TCU)  Discharge Comments: barriers include onoing diuresis, transition to oral anticoagulation.    Diet: Orders Placed This Encounter      Diet      2 Gram Sodium Diet     --------------------------------------------    The patient's care was discussed with the Patient and Bedside Nurse.    Nelson Cruz MD  Hospitalist Service  [facility]  Securely message with the Vocera Web Console (learn more here)  Text page via [employer] Paging/Directory    Clinically Significant Risk Factors Present on Admission             ______________________________________________________________________    Interval History   Patient feeling better, hoping to leave soon     ROS: Denies chest pain, denies shortness of breath, Moving bowels well, passing urine well and good appetite    Data personally reviewed from the last 24 hours:   Reviewed Laboratory results, Consultant recommendations and medications     Physical Exam   /59 (BP Location: Right arm)   Pulse 60   Temp 97.8  F (36.6  C) (Oral)   Resp 18   Ht 1.778 m (5' 10\")   Wt 92.4 kg (203 lb 9.6 oz)   SpO2 96%   BMI 29.21 kg/m      Physical Exam    General Appearance:    HEENT:  Awake, Alert, Cooperative, in no distress and appears stated age   Normocephalic, atraumatic, conjunctiva clear without icterus and ears without discharge   Lungs:   Clear " to auscultation bilaterally, no wheezing, good air exchange, normal work of breathing   Cardiovascular: normal apical impulse, normal S1 and S2, 2+ lower extremity edema bilaterally   Abdomen: Soft, non-tender and Non-distended, active bowel sounds   Skin:  Skin color, texture normal and bruising or bleeding. No rashes or lesions over face, neck, arms and legs, turgor normal.   Neurologic:    Neuropsychiatric: Alert & Oriented X 3, Facial symmetry preserved and upper & lower extremities moving well with symmetry  Calm, normal eye contact, Affect normal     Data   Recent Labs   Lab 07/04/22  1118 07/04/22  0839 07/04/22  0755 07/03/22  0749 07/03/22  0208 07/02/22  0805 07/02/22  0210 07/01/22  0836 07/01/22  0524 06/29/22  1637 06/29/22  1424 06/29/22  0724 06/29/22  0529   WBC  --   --   --   --   --   --  10.0  --   --   --  11.2*  --  11.2*   HGB  --   --   --   --   --   --  9.1*  --   --   --  9.9*  --  9.0*   MCV  --   --   --   --   --   --  91  --   --   --  92  --  93   PLT  --   --   --   --   --   --  253  --   --   --  312  --  324   INR  --   --   --   --   --   --   --   --  1.14  --   --   --   --    NA  --  139  --   --  138  --  138  --  140   < >  --   --  139   POTASSIUM  --  4.0  --   --  3.9  --  3.9  --  4.1   < >  --   --  4.0   CHLORIDE  --  97*  --   --  100  --  101  --  103   < >  --   --  105   CO2  --  34*  --   --  30  --  28  --  30   < >  --   --  27   BUN  --  22  --   --  18  --  20  --  23   < >  --   --  33*   CR  --  1.00  --   --  0.95  --  0.90  --  0.87   < >  --   --  0.92   ANIONGAP  --  8  --   --  8  --  9  --  7   < >  --   --  7   BARBARA  --  9.3  --   --  8.9  --  9.1  --  9.0   < >  --   --  8.8   * 135* 126*   < > 102   < > 84   < > 107   < >  --    < > 126*   ALBUMIN  --   --   --   --   --   --  3.1*  --   --   --   --   --   --    PROTTOTAL  --   --   --   --   --   --  5.9*  --   --   --   --   --   --    BILITOTAL  --   --   --   --   --   --  0.5  --   --    --   --   --   --    ALKPHOS  --   --   --   --   --   --  158*  --   --   --   --   --   --    ALT  --   --   --   --   --   --  42  --   --   --   --   --   --    AST  --   --   --   --   --   --  22  --   --   --   --   --   --     < > = values in this interval not displayed.     No results found for this or any previous visit (from the past 24 hour(s)).

## 2022-07-04 NOTE — PROGRESS NOTES
"INFECTIOUS DISEASE FOLLOW UP NOTE    Date: 07/04/2022   CHIEF COMPLAINT: No chief complaint on file.       ASSESSMENT:  1. Fever: post op day 16 (surgery 6/19). Workup with MRI suggestive of possible superimposed infection with a rim-enhancing ventral epidural collection extending from the L2 level cranially to the L5-S1 level caudally s/p aspiration 6/23 with red/yellow fluid removed-culture negative, but on antibiotics at the time. CRP and SED elevated, CRP normal and esr decreased. WBC normalized. BC NGTD. Repeat MRI on 6/30 showing persistent fluid collection. Aspiration on 7/1 with no organisms on gram stain and no WBC. Culture negative to date.   2. YOLANDA: nephrology following  3. CAD, pacer in place  4. Comorbid conditions affecting immune system: DM2    PLAN:  - continue to monitor off antibiotics, completed on 7/1.  - ID will f/up on repeat MRI planned for tomorrow    Aleksey Goss MD  [facility]  Direct messaging: [employer] Paging  On-Call ID provider: [phone], option: 9    ______________________________________________________________________    SUBJECTIVE / INTERVAL HISTORY:   Doing okay today.  No fever.  MRI not done today.  To be done tomorrow.    Previous  Since last visit patient completed a 7-day course of amoxicillin/clauvulanic acid and doxycycline. He also had a repeat MRI showing fluid collection. This was aspirated and sent for cultures and B2-transferrin. Plan is for repeat MRI tomorrow.      ROS: All other systems negative except as listed above.    SH/FH/Habits/PMH reviewed and unchanged.    OBJECTIVE:  /59 (BP Location: Right arm)   Pulse 60   Temp 97.8  F (36.6  C) (Oral)   Resp 18   Ht 1.778 m (5' 10\")   Wt 92.4 kg (203 lb 9.6 oz)   SpO2 96%   BMI 29.21 kg/m       Resp: 18    GEN: No acute distress.    RESPIRATORY:  Normal breathing pattern.   CARDIOVASCULAR:  Regular   ABDOMEN:  Soft, normal bowel sounds, non-tender,   EXTREMITIES: No " edema.  SKIN/HAIR/NAILS:  No rashes  IV: peripheral IV    Antibiotics:  None     Pertinent labs:  CRP   Date Value Ref Range Status   06/29/2022 0.6 0.0 - <0.8 mg/dL Final      CBC RESULTS:   Recent Labs   Lab Test 06/23/22  0506   WBC 8.1   RBC 2.74*   HGB 7.8*   HCT 24.8*   MCV 91   MCH 28.5   MCHC 31.5   RDW 14.8         Last Comprehensive Metabolic Panel:  Sodium   Date Value Ref Range Status   07/04/2022 139 136 - 145 mmol/L Final     Potassium   Date Value Ref Range Status   07/04/2022 4.0 3.5 - 5.0 mmol/L Final     Chloride   Date Value Ref Range Status   07/04/2022 97 (L) 98 - 107 mmol/L Final     Carbon Dioxide (CO2)   Date Value Ref Range Status   07/04/2022 34 (H) 22 - 31 mmol/L Final     Anion Gap   Date Value Ref Range Status   07/04/2022 8 5 - 18 mmol/L Final     Glucose   Date Value Ref Range Status   07/04/2022 135 (H) 70 - 125 mg/dL Final     GLUCOSE BY METER POCT   Date Value Ref Range Status   07/04/2022 161 (H) 70 - 99 mg/dL Final     Urea Nitrogen   Date Value Ref Range Status   07/04/2022 22 8 - 28 mg/dL Final     Creatinine   Date Value Ref Range Status   07/04/2022 1.00 0.70 - 1.30 mg/dL Final   02/28/2011 0.75 0.66 - 1.25 mg/dL Final     Comment:     New IDMS-traceable calibration  beginning 5/1/08     GFR Estimate   Date Value Ref Range Status   07/04/2022 78 >60 mL/min/1.73m2 Final     Comment:     Effective December 21, 2021 eGFRcr in adults is calculated using the 2021 CKD-EPI creatinine equation which includes age and gender (Cynthia cho al., NEJM, DOI: 10.1056/MWHNwg6259390)   09/23/2020 59 (L) >60 mL/min/1.73m2 Final   02/28/2011 >90 >60 mL/min/1.7m2 Final     Calcium   Date Value Ref Range Status   07/04/2022 9.3 8.5 - 10.5 mg/dL Final        MICROBIOLOGY DATA:  Personally reviewed.  6/19 BC NGTD  6/23 aspiration GS no organisms    RADIOLOGY:  Personally Reviewed.  Recent Results (from the past 24 hour(s))   XR Abdomen Port 1 View    Narrative    EXAM: XR ABDOMEN PORT 1  VIEWS  LOCATION: [facility]  DATE/TIME: 6/22/2022 2:35 PM    INDICATION: abdominal distension  COMPARISON: None.      Impression    IMPRESSION: No distended air-filled loops of small bowel. There is a small amount of throughout the colon. No definite intraperitoneal free air identified. Pacemaker leads are partially included in the field-of-view. Prior median sternotomy noted. There   are surgical staples over the abdomen and pelvis.         Principal Problem:    Lumbar stenosis with neurogenic claudication  Active Problems:    Type 2 diabetes mellitus, with long-term current use of insulin (H)    Benign essential hypertension    CAD (coronary artery disease)    Chronic atrial fibrillation (H)    CORAZON (obstructive sleep apnea)    Gastroesophageal reflux disease without esophagitis    Hypothyroidism    Normocytic anemia    Chronic left shoulder pain    Nasal congestion    Lumbar spinal stenosis    Acute deep vein thrombosis (DVT) of femoral vein of right lower extremity (H)

## 2022-07-04 NOTE — PROGRESS NOTES
HEART CARE NOTE          Assessment/Recommendations     1. HFmrEF c/b ADHF  Assessment / Plan    hypervolemic on physical exam, but diuresing well on current regimen - no changes at this time    GDMT as detailed below; mainstay of treatment for HFmrEF includes diuretics (class I) and SGLT2-I (class 2a); additional medical therapy demonstrated with less robust evidence for indication but should be considered per guideline recommendations (2b)     Current Pharmacotherapy AHA Guideline-Directed Medical Therapy   Lisinopril  10 mg daily Lisinopril 20 mg twice daily   Carvedilol - BBlocker naive; will start when near euvolemia Carvedilol 25 mg twice daily   Spironolactone not started Spironolactone 25 mg once daily   Hydralazine  NA Hydralazine 100 mg three times daily   Isosorbide dinitrate NA Isosorbide dinitrate 40 mg three times daily   SGLT2 inhibitor: not started Dapagliflozin or Empagliflozin 10 mg daily      2. CAD  Assessment / Plan    Hx of CABG    Denies chest pain or anginal equivalents     3. DM2  Assessment / Plan    Management per primary team     4. Atrial fibrillation  Assessment / Plan    Currently on heparin gtt - timing of rivaroxaban deferred to surgical team     5. Valvular heart disease  Assessment / Plan    Severe TR noted on echo and likely a large contributing factor to heart failure decompensations    Diuresis and oral afterload reduction as above    Will discuss referral to structural/valve clinic with pateint     6. YOLANDA on CKD - resolved  Assessment / Plan    Continue to monitor renal function closely on diuresis       History of Present Illness/Subjective      Mr. Thomas Steve is a 77 year old male with a PMHx significant for (per Dr. Smith's note) CAD, HFrEF, DM2, HTN, atrial fibrillation, hypothyroidism presented for planned neurosurgical intervention to treat herniated lumbar disc. 06/16 underwent decompressive lumbar laminectomy. Post-op course c/b fever and collection at  "laminectomy site. Cardiology consulted regarding ADHF     Today, Mr. Steve denies any acute cardiac events or complaints; Management plan as detailed above     ECG: Personally reviewed. Paced rhythm     ECHO (personnaly Reviewed):  The left ventricle is normal in size.  Left ventricular function is decreased. The ejection fraction is 45-50%  (mildly reduced).  There is a pacemaker lead in the right ventricle.  The left atrium is mildly dilated.  There is mild (1+) mitral regurgitation.  There is moderately severe (3+) tricuspid regurgitation.  Right ventricular systolic pressure is elevated, consistent with mild  pulmonary hypertension.  Mild valvular aortic stenosis.          Physical Examination Review of Systems   /59 (BP Location: Right arm)   Pulse 60   Temp 97.8  F (36.6  C) (Oral)   Resp 18   Ht 1.778 m (5' 10\")   Wt 92.4 kg (203 lb 9.6 oz)   SpO2 96%   BMI 29.21 kg/m    Body mass index is 29.21 kg/m .  Wt Readings from Last 3 Encounters:   07/03/22 92.4 kg (203 lb 9.6 oz)   06/03/22 95.2 kg (209 lb 12.8 oz)   01/05/22 93.4 kg (206 lb)     General Appearance:   no distress, normal body habitus   ENT/Mouth: membranes moist, no oral lesions or bleeding gums.      EYES:  no scleral icterus, normal conjunctivae   Neck: no carotid bruits or thyromegaly   Chest/Lungs:   lungs are clear to auscultation, no rales or wheezing, equal chest wall expansion    Cardiovascular:   Regular. Normal first and second heart sounds withno murmurs, rubs, or gallops; the carotid, radial and posterior tibial pulses are intact, + JVD and LE edema bilaterally    Abdomen:  no organomegaly, masses, bruits, or tenderness; bowel sounds are present   Extremities: no cyanosis or clubbing   Skin: no xanthelasma, warm.    Neurologic: alert and oriented x3, calm     Psychiatric: alert and oriented x3, calm     A complete 10 systems ROS was reviewed  And is negative except what is listed in the HPI.          Medical History  " Surgical History Family History Social History   Past Medical History:   Diagnosis Date     Acute sinusitis      Atrial fibrillation (H)     Resolved after medication and CPAP     Back pain      CHF (congestive heart failure) (H)      Coronary artery disease      Diabetes mellitus (H)      Gastroesophageal reflux disease with esophagitis      HTN (hypertension)      Hyperlipemia      Joint pain      Lipoma of neck      Nocturia      Sciatic leg pain      Shoulder impingement syndrome     BILATERAL     Sleep apnea     uses CPAP     Typical atrial flutter (H) 07/07/2016    Demonstrated on 12-lead EKG July 7, 2016    Past Surgical History:   Procedure Laterality Date     EP BIV PACEMAKER INSERT N/A 6/11/2019    Procedure: EP Biventricular Pacemaker Insertion;  Surgeon: Durga Rajput MD;  Location: [facility] Cath Lab;  Service: Cardiology     IR FINE NEEDLE ASPIRATION W ULTRASOUND  6/23/2022     LAMINECTOMY LUMBAR TWO LEVELS Bilateral 6/16/2022    Procedure: DECOMPRESSIVE LUMBAR LAMINECTOMY LUMBAR 4-LUMBAR 5 AND LUMBAR 5-SACRAL 1 BILATERAL-LEFT SIDE FIRST-PLUS BILATERAL FORAMINOTOMIES PLUS REMOVAL OF HERNIATED DISC LUMBAR 5-SACRAL 1 LEFT;  Surgeon: Israel Samuel MD;  Location: [facility] Main OR     [place] CARDIOVERSION ELECTIVE ARRHYTHMIA EXTERNAL  06/03/2014    Description: Elective Cardioversion External;  Recorded: 06/03/2014;     ZC CABG, VEIN, SINGLE  2011    Description: CABG (CABG);  Proc Date: 09/26/2011;  Comments: LIMA^LAD, SVG^OM, SVG^distal RCA    no family history of premature coronary artery disease Social History     Socioeconomic History     Marital status:      Spouse name: Not on file     Number of children: Not on file     Years of education: Not on file     Highest education level: Not on file   Occupational History     Not on file   Tobacco Use     Smoking status: Former Smoker     Packs/day: 1.50     Years: 23.00     Pack years: 34.50     Quit date: 1/1/1986     Years since quitting:  36.5     Smokeless tobacco: Never Used   Substance and Sexual Activity     Alcohol use: Yes     Alcohol/week: 1.0 standard drink     Comment: one beer per day     Drug use: No     Sexual activity: Never   Other Topics Concern     Not on file   Social History Narrative    .  3 children.  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     Social Determinants of Health     Financial Resource Strain: Not on file   Food Insecurity: Not on file   Transportation Needs: Not on file   Physical Activity: Not on file   Stress: Not on file   Social Connections: Not on file   Intimate Partner Violence: Not on file   Housing Stability: Not on file           Lab Results    Chemistry/lipid CBC Cardiac Enzymes/BNP/TSH/INR   Lab Results   Component Value Date    CHOL 118 06/08/2022    HDL 36 (L) 06/08/2022    TRIG 202 (H) 06/08/2022    BUN 18 07/03/2022     07/03/2022    CO2 30 07/03/2022    Lab Results   Component Value Date    WBC 10.0 07/02/2022    HGB 9.1 (L) 07/02/2022    HCT 29.5 (L) 07/02/2022    MCV 91 07/02/2022     07/02/2022    Lab Results   Component Value Date     (H) 06/20/2022    TSH 2.05 06/08/2022    INR 1.14 07/01/2022     No results found for: CKTOTAL, CKMB, TROPONINI       Weight:    Wt Readings from Last 3 Encounters:   07/03/22 92.4 kg (203 lb 9.6 oz)   06/03/22 95.2 kg (209 lb 12.8 oz)   01/05/22 93.4 kg (206 lb)       Allergies  Allergies   Allergen Reactions     Aspirin Other (See Comments)     Contraindicated due to xarelto use     Codeine Nausea and Vomiting     Pollen Extracts [Pollen Extract] Unknown     Scratchy throat     Vicodin [Hydrocodone-Acetaminophen] Nausea and Vomiting         Surgical History  Past Surgical History:   Procedure Laterality Date     EP BIV PACEMAKER INSERT N/A 6/11/2019    Procedure: EP Biventricular Pacemaker Insertion;  Surgeon: Durga Rajput MD;  Location: [facility] Cath Lab;  Service: Cardiology     IR FINE NEEDLE ASPIRATION W ULTRASOUND   6/23/2022     LAMINECTOMY LUMBAR TWO LEVELS Bilateral 6/16/2022    Procedure: DECOMPRESSIVE LUMBAR LAMINECTOMY LUMBAR 4-LUMBAR 5 AND LUMBAR 5-SACRAL 1 BILATERAL-LEFT SIDE FIRST-PLUS BILATERAL FORAMINOTOMIES PLUS REMOVAL OF HERNIATED DISC LUMBAR 5-SACRAL 1 LEFT;  Surgeon: Israel Samuel MD;  Location: [facility] Main OR     [place] CARDIOVERSION ELECTIVE ARRHYTHMIA EXTERNAL  06/03/2014    Description: Elective Cardioversion External;  Recorded: 06/03/2014;     ZZC CABG, VEIN, SINGLE  2011    Description: CABG (CABG);  Proc Date: 09/26/2011;  Comments: LIMA^LAD, SVG^OM, SVG^distal RCA       Social History  Tobacco:   History   Smoking Status     Former Smoker     Packs/day: 1.50     Years: 23.00     Quit date: 1/1/1986   Smokeless Tobacco     Never Used    Alcohol:   Social History    Substance and Sexual Activity      Alcohol use: Yes        Alcohol/week: 1.0 standard drink        Comment: one beer per day   Illicit Drugs:   History   Drug Use No       Family History  Family History   Problem Relation Age of Onset     Heart Disease Mother      Snoring Father      Heart Disease Father      Hypertension Father      Alzheimer Disease Father      No Known Problems Daughter      No Known Problems Daughter      No Known Problems Daughter      Chronic Obstructive Pulmonary Disease Sister      Hodgkin's lymphoma Brother      No Known Problems Son      Diabetes Sister      Substance Abuse Sister      Chronic Obstructive Pulmonary Disease Brother      Heart Disease Brother      Diabetes Brother      Substance Abuse Brother           Glenn Hoskins MD on 7/4/2022      cc: Moni Mcclain

## 2022-07-04 NOTE — PROGRESS NOTES
Neurosurgery Progress Note:  7/4/22    A/P:   Thomas Steve is a 77 year old male POD #17 DECOMPRESSIVE LUMBAR LAMINECTOMY LUMBAR 4-LUMBAR 5 AND LUMBAR 5-SACRAL 1 BILATERAL-LEFT SIDE FIRST-PLUS BILATERAL FORAMINOTOMIES PLUS REMOVAL OF HERNIATED DISC LUMBAR 5-SACRAL 1 LEFT with Dr Samuel on 6/19/22. Arachnoid bleb intraop, covered with durgen, duraseal. No CSF leak. Was kept flat for 24hr. IR aspiration done 6/23 for fluid collection and repeated 7/1 dt enlarging collection. Acute RLE DVT found 6/29, heparin gtt started.    Thomas rates his back pain at 3-4/10, improvement from yesterday at 5/10. He remains neuro intact on exam. Plan to repeat lumbar MRI early on 7/5 (no pacer tech available 7/4). Continue heparin drip for now since this is easily held if surgery needed. Cardiology saw patient yesterday and recommended continued diuresis and outpatient f/u valve clinic. As patient is clinically neurologically improving, hope to avoid surgical intervention. If MRI and infectious marker labs tomorrow show improvement, there is a possibility patient could discharge. Will discuss MRI results with Dr Samuel. Will need longterm anticoagulation plan for discharge. Awaiting aspiration results - prelim cultures are negative. D/w Dr Goss from ID today who recommends continuing to clincally monitor patient off of po antibiotics. Beta 2 still in process.    Dr Vargas weekend coverage for Dr Samuel       PLAN:   1. Repeat lumbar MRI w wo contrast on 7/5 am (no pacer tech 7/4). Repeat infectious marker labs 7/5 am.  2. Continue heparin drip. Holding pt's home xarelto for afib. Medicine please clarify agent choice for discharge given new dvt and hx afib/sss: home xarelto vs oral doac at dvt dosing?  3. Daily dressing changes to incision. C/w bacitracin to superficial skin tears from the tape during IR procedure on 7/1. Paper tape only.  4. ID recommends continuing to hold abx and clinically monitor. Last dose PO augmentin and  "doxycycline was on 7/1.  5. Cardiology consult 7/3, recommending continued diuresis and follow-up valve clinic  6. Discharge date is pending. Cm notes suggest 7/5 but this cannot be confirmed by our team.  7. Staple removal POD#21 as outpatient        HPI: Presented with back pain, right leg pain. Weakness bilateral LE. Worse with walking. No bowel or bladder issues. Diabetes, CABG, AFIB, Xarelto. MRI with listhesis L4-5, spinal stenosis. Stenosis also at L5-S1 with large disc herniation L5-S1 on the left.         Subjective:  Thomas reports back pain at a 3-4/10. Stable segundo leg 'tightness' due to peripheral edema. Denies radicular leg pain, numbness, weakness, or change in bowel or bladder control. Was able to walk in the [place] multiple times yesterday. Stable chronic tingling in feet dt peripheral neuropathy.      Physical Exam  /59 (BP Location: Right arm)   Pulse 60   Temp 97.8  F (36.6  C) (Oral)   Resp 18   Ht 5' 10\" (1.778 m)   Wt 203 lb 9.6 oz (92.4 kg)   SpO2 96%   BMI 29.21 kg/m        General: oriented. KIM. Speech clear. Sitting upright in beside chair, appears comfortable    Motor: normal bulk and tone     Strength: Full strength LE bilaterally throughout both legs     Segundo lower extremity sensation intact to touch. Baseline lower extremity neuropathy.     Bilateral lower extremities appear wnl today    Incision: well approximated with staples. Mild edema of superior aspect of incision - nontender, no redness or discharge, nonfluctuant. Appears same as yesterday. Some scattered superficial small skin tears in a large window shape around incision. Appearance is improving. no surrounding erythema, induration, or drainage     Labs:  Beta 2 transferrin in process from 7/1  Aerobic bacterial culture prelim negative from 7/1  Anaerobic bacterial culture prelim negative from 7/1      Noreen CALDWELLP-C  [facility] Neurosurgery  O. [phone]      "

## 2022-07-04 NOTE — PLAN OF CARE
Problem: Risk for Delirium  Goal: Optimal Coping  Outcome: Ongoing, Progressing     Problem: Bowel Motility Impaired (Spinal Surgery)  Goal: Effective Bowel Elimination  Outcome: Ongoing, Progressing     Problem: Fluid and Electrolyte Imbalance (Spinal Surgery)  Goal: Fluid and Electrolyte Balance  Outcome: Ongoing, Progressing   Goal Outcome Evaluation:      No change with Heparin gtt infusion overnight. AntiXA at 0130 was 0.56. Heparin gtt at 1400 units/hr. Next check at 0845. Lasix gtt at 15mg/hr. Patient has been up to the bathroom and voids in urinal  for accurate output measurement. Medicated for pain x1. Makes his needs known.

## 2022-07-05 ENCOUNTER — APPOINTMENT (OUTPATIENT)
Dept: RADIOLOGY | Facility: HOSPITAL | Age: 77
DRG: 518 | End: 2022-07-05
Attending: NURSE PRACTITIONER
Payer: MEDICARE

## 2022-07-05 ENCOUNTER — DOCUMENTATION ONLY (OUTPATIENT)
Dept: CARDIOLOGY | Facility: CLINIC | Age: 77
End: 2022-07-05

## 2022-07-05 ENCOUNTER — APPOINTMENT (OUTPATIENT)
Dept: MRI IMAGING | Facility: HOSPITAL | Age: 77
DRG: 518 | End: 2022-07-05
Attending: NURSE PRACTITIONER
Payer: MEDICARE

## 2022-07-05 LAB
ANION GAP SERPL CALCULATED.3IONS-SCNC: 10 MMOL/L (ref 5–18)
B2 TRANSFERRIN FLD QL: POSITIVE
B2 TRANSFERRIN INTERPRETATION: ABNORMAL
BUN SERPL-MCNC: 28 MG/DL (ref 8–28)
C REACTIVE PROTEIN LHE: 0.6 MG/DL (ref 0–?)
CALCIUM SERPL-MCNC: 9.2 MG/DL (ref 8.5–10.5)
CHLORIDE BLD-SCNC: 97 MMOL/L (ref 98–107)
CO2 SERPL-SCNC: 31 MMOL/L (ref 22–31)
CREAT SERPL-MCNC: 1.15 MG/DL (ref 0.7–1.3)
ERYTHROCYTE [DISTWIDTH] IN BLOOD BY AUTOMATED COUNT: 16.2 % (ref 10–15)
ERYTHROCYTE [SEDIMENTATION RATE] IN BLOOD BY WESTERGREN METHOD: 43 MM/HR (ref 0–15)
GFR SERPL CREATININE-BSD FRML MDRD: 66 ML/MIN/1.73M2
GLUCOSE BLD-MCNC: 100 MG/DL (ref 70–125)
GLUCOSE BLDC GLUCOMTR-MCNC: 106 MG/DL (ref 70–99)
GLUCOSE BLDC GLUCOMTR-MCNC: 116 MG/DL (ref 70–99)
GLUCOSE BLDC GLUCOMTR-MCNC: 127 MG/DL (ref 70–99)
GLUCOSE BLDC GLUCOMTR-MCNC: 162 MG/DL (ref 70–99)
HCT VFR BLD AUTO: 29.9 % (ref 40–53)
HGB BLD-MCNC: 9.1 G/DL (ref 13.3–17.7)
MCH RBC QN AUTO: 28 PG (ref 26.5–33)
MCHC RBC AUTO-ENTMCNC: 30.4 G/DL (ref 31.5–36.5)
MCV RBC AUTO: 92 FL (ref 78–100)
PLATELET # BLD AUTO: 204 10E3/UL (ref 150–450)
POTASSIUM BLD-SCNC: 4 MMOL/L (ref 3.5–5)
PROCALCITONIN SERPL-MCNC: 0.09 NG/ML (ref 0–0.49)
RBC # BLD AUTO: 3.25 10E6/UL (ref 4.4–5.9)
SODIUM SERPL-SCNC: 138 MMOL/L (ref 136–145)
WBC # BLD AUTO: 6.6 10E3/UL (ref 4–11)

## 2022-07-05 PROCEDURE — 250N000013 HC RX MED GY IP 250 OP 250 PS 637: Performed by: NURSE PRACTITIONER

## 2022-07-05 PROCEDURE — 250N000011 HC RX IP 250 OP 636: Performed by: INTERNAL MEDICINE

## 2022-07-05 PROCEDURE — 250N000013 HC RX MED GY IP 250 OP 250 PS 637: Performed by: INTERNAL MEDICINE

## 2022-07-05 PROCEDURE — 86140 C-REACTIVE PROTEIN: CPT | Performed by: NURSE PRACTITIONER

## 2022-07-05 PROCEDURE — G1010 CDSM STANSON: HCPCS

## 2022-07-05 PROCEDURE — 85027 COMPLETE CBC AUTOMATED: CPT | Performed by: INTERNAL MEDICINE

## 2022-07-05 PROCEDURE — A9585 GADOBUTROL INJECTION: HCPCS | Performed by: INTERNAL MEDICINE

## 2022-07-05 PROCEDURE — 71046 X-RAY EXAM CHEST 2 VIEWS: CPT

## 2022-07-05 PROCEDURE — 91301 HC RX IP 250 OP 636: CPT | Performed by: INTERNAL MEDICINE

## 2022-07-05 PROCEDURE — 0013A HC ADMIN COVID VAC MODERNA, 3RD DOSE IMM COMP PT: CPT | Performed by: INTERNAL MEDICINE

## 2022-07-05 PROCEDURE — 250N000013 HC RX MED GY IP 250 OP 250 PS 637: Performed by: SURGERY

## 2022-07-05 PROCEDURE — 99232 SBSQ HOSP IP/OBS MODERATE 35: CPT | Performed by: INTERNAL MEDICINE

## 2022-07-05 PROCEDURE — 255N000002 HC RX 255 OP 636: Performed by: INTERNAL MEDICINE

## 2022-07-05 PROCEDURE — 250N000013 HC RX MED GY IP 250 OP 250 PS 637: Performed by: HOSPITALIST

## 2022-07-05 PROCEDURE — 36415 COLL VENOUS BLD VENIPUNCTURE: CPT | Performed by: INTERNAL MEDICINE

## 2022-07-05 PROCEDURE — 99233 SBSQ HOSP IP/OBS HIGH 50: CPT | Performed by: INTERNAL MEDICINE

## 2022-07-05 PROCEDURE — 250N000013 HC RX MED GY IP 250 OP 250 PS 637: Performed by: FAMILY MEDICINE

## 2022-07-05 PROCEDURE — 85652 RBC SED RATE AUTOMATED: CPT | Performed by: NURSE PRACTITIONER

## 2022-07-05 PROCEDURE — 80048 BASIC METABOLIC PNL TOTAL CA: CPT | Performed by: INTERNAL MEDICINE

## 2022-07-05 PROCEDURE — 999N000157 HC STATISTIC RCP TIME EA 10 MIN

## 2022-07-05 PROCEDURE — 120N000001 HC R&B MED SURG/OB

## 2022-07-05 PROCEDURE — 72158 MRI LUMBAR SPINE W/O & W/DYE: CPT | Mod: MG

## 2022-07-05 PROCEDURE — 86334 IMMUNOFIX E-PHORESIS SERUM: CPT | Mod: 26 | Performed by: PATHOLOGY

## 2022-07-05 PROCEDURE — 84145 PROCALCITONIN (PCT): CPT | Performed by: NURSE PRACTITIONER

## 2022-07-05 RX ORDER — BUMETANIDE 0.25 MG/ML
3 INJECTION INTRAMUSCULAR; INTRAVENOUS ONCE
Status: COMPLETED | OUTPATIENT
Start: 2022-07-05 | End: 2022-07-05

## 2022-07-05 RX ORDER — GADOBUTROL 604.72 MG/ML
9 INJECTION INTRAVENOUS ONCE
Status: COMPLETED | OUTPATIENT
Start: 2022-07-05 | End: 2022-07-05

## 2022-07-05 RX ADMIN — BUMETANIDE 3 MG: 0.25 INJECTION, SOLUTION INTRAMUSCULAR; INTRAVENOUS at 11:52

## 2022-07-05 RX ADMIN — TAMSULOSIN HYDROCHLORIDE 0.4 MG: 0.4 CAPSULE ORAL at 08:57

## 2022-07-05 RX ADMIN — ROSUVASTATIN CALCIUM 10 MG: 10 TABLET, FILM COATED ORAL at 21:49

## 2022-07-05 RX ADMIN — Medication 100 MCG: at 08:59

## 2022-07-05 RX ADMIN — METFORMIN HYDROCHLORIDE 1000 MG: 500 TABLET, FILM COATED ORAL at 16:10

## 2022-07-05 RX ADMIN — GADOBUTROL 9 ML: 604.72 INJECTION INTRAVENOUS at 13:29

## 2022-07-05 RX ADMIN — FERROUS SULFATE TAB 325 MG (65 MG ELEMENTAL FE) 325 MG: 325 (65 FE) TAB at 11:52

## 2022-07-05 RX ADMIN — THERA TABS 1 TABLET: TAB at 08:58

## 2022-07-05 RX ADMIN — ACETAMINOPHEN 975 MG: 325 TABLET, FILM COATED ORAL at 08:58

## 2022-07-05 RX ADMIN — GABAPENTIN 600 MG: 300 CAPSULE ORAL at 21:49

## 2022-07-05 RX ADMIN — INSULIN GLARGINE 15 UNITS: 100 INJECTION, SOLUTION SUBCUTANEOUS at 22:18

## 2022-07-05 RX ADMIN — Medication 500 MG: at 14:13

## 2022-07-05 RX ADMIN — GABAPENTIN 600 MG: 300 CAPSULE ORAL at 08:57

## 2022-07-05 RX ADMIN — FERROUS SULFATE TAB 325 MG (65 MG ELEMENTAL FE) 325 MG: 325 (65 FE) TAB at 08:58

## 2022-07-05 RX ADMIN — Medication 500 MG: at 08:58

## 2022-07-05 RX ADMIN — METFORMIN HYDROCHLORIDE 1000 MG: 500 TABLET, FILM COATED ORAL at 08:58

## 2022-07-05 RX ADMIN — ACETAMINOPHEN 975 MG: 325 TABLET, FILM COATED ORAL at 21:49

## 2022-07-05 RX ADMIN — FERROUS SULFATE TAB 325 MG (65 MG ELEMENTAL FE) 325 MG: 325 (65 FE) TAB at 16:10

## 2022-07-05 RX ADMIN — OXYCODONE HYDROCHLORIDE 5 MG: 5 TABLET ORAL at 09:12

## 2022-07-05 RX ADMIN — ACETAMINOPHEN 650 MG: 325 TABLET, FILM COATED ORAL at 06:32

## 2022-07-05 RX ADMIN — CX-024414 0.25 ML: 0.2 INJECTION, SUSPENSION INTRAMUSCULAR at 10:13

## 2022-07-05 RX ADMIN — HEPARIN SODIUM 1300 UNITS/HR: 10000 INJECTION, SOLUTION INTRAVENOUS at 09:44

## 2022-07-05 RX ADMIN — BACITRACIN ZINC: 500 OINTMENT TOPICAL at 21:54

## 2022-07-05 RX ADMIN — BUMETANIDE 3 MG: 1 TABLET ORAL at 08:57

## 2022-07-05 RX ADMIN — PANTOPRAZOLE SODIUM 40 MG: 40 TABLET, DELAYED RELEASE ORAL at 16:10

## 2022-07-05 RX ADMIN — Medication 500 MG: at 21:49

## 2022-07-05 RX ADMIN — ACETAMINOPHEN 975 MG: 325 TABLET, FILM COATED ORAL at 14:13

## 2022-07-05 RX ADMIN — TRAZODONE HYDROCHLORIDE 25 MG: 50 TABLET ORAL at 22:18

## 2022-07-05 RX ADMIN — Medication 1 MG: at 22:18

## 2022-07-05 RX ADMIN — LISINOPRIL 20 MG: 20 TABLET ORAL at 08:57

## 2022-07-05 RX ADMIN — PANTOPRAZOLE SODIUM 40 MG: 40 TABLET, DELAYED RELEASE ORAL at 08:59

## 2022-07-05 RX ADMIN — LEVOTHYROXINE SODIUM 25 MCG: 0.03 TABLET ORAL at 06:25

## 2022-07-05 ASSESSMENT — ACTIVITIES OF DAILY LIVING (ADL)
ADLS_ACUITY_SCORE: 26
ADLS_ACUITY_SCORE: 30
ADLS_ACUITY_SCORE: 26
ADLS_ACUITY_SCORE: 30
ADLS_ACUITY_SCORE: 26
ADLS_ACUITY_SCORE: 26

## 2022-07-05 NOTE — PLAN OF CARE
Problem: Risk for Delirium  Goal: Optimal Coping  Outcome: Ongoing, Progressing   Goal Outcome Evaluation:                  Pt having MRI done, awaiting results. Pt voiding, bumex dose given. Charge nurse to give booster

## 2022-07-05 NOTE — PROGRESS NOTES
Neurosurgery Progress Note:  7/5/22    A/P:   Thomas Steve is a 77 year old male S/P DECOMPRESSIVE LUMBAR LAMINECTOMY LUMBAR 4-LUMBAR 5 AND LUMBAR 5-SACRAL 1 BILATERAL-LEFT SIDE FIRST-PLUS BILATERAL FORAMINOTOMIES PLUS REMOVAL OF HERNIATED DISC LUMBAR 5-SACRAL 1 LEFT with Dr Samuel on 6/19/22. Arachnoid bleb intraop, covered with durgen, duraseal. No CSF leak. Was kept flat for 24hr. IR aspiration done 6/23 for fluid collection and repeated 7/1 dt enlarging collection. Acute RLE DVT found 6/29, heparin gtt started. BETA2 7/1- positive resulted today. 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     Thomas denies any significant back pain. No leg pain. No new numbness or tingling in his legs. Baseline neuropathy. No headaches. No nausea. No reported bowel or bladder dysfunction. Ambulating in the hallway. Reviewed MRI and positive Beta2 with Thomas and Dr Samuel. Based on lack of clinical symptoms, we will plan to monitor Thomas without plans to return to the OR in the immediate future. Would prefer oral anticoagulant such as Coumadin in the case he needs the OR so we can reverse it. Will discuss with cardiology, hospital team to finalize those plans.     Plan for discharge when medical and cardiology teams feel appropriate. Resume oral anticoagulation.     PLAN:   1. Continue heparin drip. Holding pt's home xarelto for afib. Need oral resumed coumadin adequate coverage??  2. Daily dressing changes to incision. Can discontinue staples tomorrow.  C/w bacitracin to superficial skin tears from the tape during IR procedure on 7/1. Paper tape only.  3. ID recommends continuing to hold abx and clinically monitor. Last dose PO augmentin and doxycycline was on 7/1.  4. Cardiology consult 7/3, recommending continued diuresis and follow-up valve clinic  5. Discharge date is pending       HPI: Presented with back pain, right leg pain. Weakness bilateral LE.  "Worse with walking. No bowel or bladder issues. Diabetes, CABG, AFIB, Xarelto. MRI with listhesis L4-5, spinal stenosis. Stenosis also at L5-S1 with large disc herniation L5-S1 on the left.     Subjective: Thomas reports very minimal back pain. No leg pain. No headache, nausea. Baseline neuropathy. No reported bowel or bladder dysfunction.     Physical Exam  /59 (BP Location: Right arm)   Pulse 80   Temp 97.8  F (36.6  C) (Oral)   Resp 18   Ht 1.778 m (5' 10\")   Wt 92.4 kg (203 lb 9.6 oz)   SpO2 97%   BMI 29.21 kg/m      General: oriented. KIM. Speech clear.     Motor: normal bulk and tone     Strength: Full strength LE bilaterally throughout both legs     Sensation: Segundo lower extremity sensation intact to touch. Baseline lower extremity neuropathy.     Incision:   well approximated with staples. Mild edema of superior aspect of incision - nontender, no redness or discharge, nonfluctuant.  Some scattered superficial small skin tears in a large window shape around incision.     Labs:  Beta 2 transferrin in process from 7/1  Aerobic bacterial culture prelim negative from 7/1  Anaerobic bacterial culture prelim negative from 7/1    Sed rate 7/5 43,changed from prior 37, 89  CRP normal today and 6 days ago   WBC normal today     MRI: reviewed today and compared to prior. Today shows fluid collection slightly larger than prior MRI. 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 Discussed with patient and Dr Samuel.     Melani Huber, RAYNA-CNP  [facility] Neurosurgery  O: [phone]      "

## 2022-07-05 NOTE — PROGRESS NOTES
[facility]    Medicine Progress Note - Hospitalist Service    Date of Admission:  6/16/2022     Assessment & Plan   SUMMARY:  77 year old male with history of CAD, HFrEF, DM2, HTN, atrial fibrillation, hypothyroidism presented for planned neurosurgical intervention to treat herniated lumbar disc. 06/16 underwent decompressive lumbar laminectomy.  06/19 with post-op fever and started on vanco and zosyn.    06/22 MRI with fluid collection with possible rim enhancement which was aspirated.  Cultures were negative.   ID had recommended Augmentin x doxycycline for 1 week.  Clinically has been improving but repeat MRI 6/27 showed increased fluid collection in the laminectomy bed causing increased compression on cauda equina, as well as slightly increased subdural collection at L2-L4 level. Neurosurgery recommended continued monitoring for cauda equina and repeat MRI in 2 to 3 days.  Anticoagulation was held due to subdural fluid collection.       6/29 found to have right lower extremity DVT.  IV heparin started after discussion with neurosurgery. Repeat MRI 06/30 essetially stable with shiftting of fluid.  07/01 IR aspiration of L4/5 fluid collection.     Also with YOLANDA this admission and volume overload requiring IV diuresis.     ACTIVE PROBLEM LIST  #Post-op fever  #Post-op fluid collection -   -S/P IR aspiration 07/01. negative gram stain and no growth so far.   -?seroma vs. Pseudomeningocele.    -Completed 1 week of Augmentin+doxy.  Not much to suggest need for abx again with seemingly sterile fluid collection.  Appreciate ID input.  -Await [id number], await culture results  -plan repeat MRI 7/5 - if stable, consider for discharge  -ID assessing MRI result     #Acute DVT of the right profundofemoral vein in the upper thigh -   -heparin gtt.  -Was on Xarelto prior to admission.  Will need to transition to oral DOAC at DVT dosing:  xarelto 15 mg po bid x 15 days then 20 mg po qd     #Acute on  chronic HFrEF  #CAD s/p CABG -   -ECHO with EF 45-50%, moderately severe TR, mild MR,mild aortic stenosis mild pulm HTN.  -diuresed and switched to oral Bumex  -Cards - may eventually need intervention on tricuspid valve.     #YOLANDA on CKD3 - baseline 1.1-1.2s.  Post-op injury likely hemodynamic demetrius-operatively.  Resolved.      #Lumbar spinal stenosis with lumbar disc herniation  -Patient underwent scheduled decompressive lumbar laminectomy of L4-L5, L5-S1 with bilateral foraminotomies and removal of herniated disc in the L1-S1 region with neurosurgery on 6/16/2022.  -Scheduled Tylenol 3 times daily  -Gabapentin 600 mg p.o. twice daily  -Post op management per neurosurgery  -Decadron burst per Nsx - completed      #Post op urinary retention : started flomax.  Had Carpenter which is now removed.       #Constipation - resolved     #Chronic atrial fibrillation, sick sinus syndrome s/p PPM  -Heparin gtt  -Resume xarelto when ok from sx standpoint.      #DM2 with recurrent AM hypoglycemia - reports he is eating healthier here in hospital with much reduced insulin needs.  Steroid induced hyperglycemia with sugars 300s-400s - last dexamethasone dose 6/27 AM - improved significantly   -Stopped glimiperide and would not resume  -Continue Lantus to 15 units at HS, mealtime CHO coverage 1:16, low ISS      #Chronic left shoulder pain  -Started lidocaine and Voltaren trading on and off 4 times daily     #GERD -Pantoprazole 40 mg p.o. daily     #CORAZON -CPAP     #Hypothyroidism-Levothyroxine 25 mcg p.o. daily     #Normocytic anemia -Stable     Checklist:  Code Status: Full Code    Carpenter Catheter: Not present  Central Lines: None    Principal Problem:    Lumbar stenosis with neurogenic claudication  Active Problems:    Type 2 diabetes mellitus, with long-term current use of insulin (H)    Benign essential hypertension    CAD (coronary artery disease)    Chronic atrial fibrillation (H)    CORAZON (obstructive sleep apnea)    Gastroesophageal  "reflux disease without esophagitis    Hypothyroidism    Normocytic anemia    Chronic left shoulder pain    Nasal congestion    Lumbar spinal stenosis    Acute deep vein thrombosis (DVT) of femoral vein of right lower extremity (H)    Disposition Plan: Expected discharge:    Expected Discharge Date: 07/06/2022    Discharge Delays: Placement - TCU  Destination: other (comment) (TCU)  Discharge Comments: barriers include ongoing assessment of fluid collection in spinal wound, transition to oral anticoagulation.    Diet: Orders Placed This Encounter      Diet      2 Gram Sodium Diet     --------------------------------------------    The patient's care was discussed with the Patient.    Nelson Cruz MD  Hospitalist Service  [facility]  Securely message with the Vocera Web Console (learn more here)  Text page via [employer] Paging/Directory    Clinically Significant Risk Factors Present on Admission             ______________________________________________________________________    Interval History   Patient feeling at baseline, hoping to leave hospital soon     ROS: Denies chest pain, denies shortness of breath, Moving bowels well, passing urine well and good appetite    Data personally reviewed from the last 24 hours:   Reviewed Laboratory results, Consultant recommendations and medications     Physical Exam   BP 96/55 (BP Location: Right arm)   Pulse 63   Temp 98.2  F (36.8  C) (Oral)   Resp 18   Ht 1.778 m (5' 10\")   Wt 92.4 kg (203 lb 9.6 oz)   SpO2 99%   BMI 29.21 kg/m      Physical Exam    General Appearance:    HEENT:  Awake, Alert, Cooperative, in no distress and appears stated age   Normocephalic, atraumatic, conjunctiva clear without icterus and ears without discharge   Lungs:   Clear to auscultation bilaterally, no wheezing, good air exchange, normal work of breathing   Cardiovascular:  Regular Rate and Rythm, normal apical impulse, normal S1 and S2, no lower extremity edema " bilaterally   Abdomen: Soft, non-tender and Non-distended, active bowel sounds   Skin:  Skin color, texture normal and bruising or bleeding. No rashes or lesions over face, neck, arms and legs, turgor normal.   Neurologic:    Neuropsychiatric: Alert & Oriented X 3, Facial symmetry preserved and upper & lower extremities moving well with symmetry  Calm, normal eye contact, Affect normal     Data   Recent Labs   Lab 07/05/22  1412 07/05/22  0815 07/05/22  0520 07/04/22  1118 07/04/22  0839 07/03/22  0749 07/03/22  0208 07/02/22  0805 07/02/22  0210 07/01/22  0836 07/01/22  0524 06/29/22  1637 06/29/22  1424   WBC  --   --  6.6  --   --   --   --   --  10.0  --   --   --  11.2*   HGB  --   --  9.1*  --   --   --   --   --  9.1*  --   --   --  9.9*   MCV  --   --  92  --   --   --   --   --  91  --   --   --  92   PLT  --   --  204  --   --   --   --   --  253  --   --   --  312   INR  --   --   --   --   --   --   --   --   --   --  1.14  --   --    NA  --   --  138  --  139  --  138  --  138  --  140   < >  --    POTASSIUM  --   --  4.0  --  4.0  --  3.9  --  3.9  --  4.1   < >  --    CHLORIDE  --   --  97*  --  97*  --  100  --  101  --  103   < >  --    CO2  --   --  31  --  34*  --  30  --  28  --  30   < >  --    BUN  --   --  28  --  22  --  18  --  20  --  23   < >  --    CR  --   --  1.15  --  1.00  --  0.95  --  0.90  --  0.87   < >  --    ANIONGAP  --   --  10  --  8  --  8  --  9  --  7   < >  --    BARBARA  --   --  9.2  --  9.3  --  8.9  --  9.1  --  9.0   < >  --    * 116* 100   < > 135*   < > 102   < > 84   < > 107   < >  --    ALBUMIN  --   --   --   --   --   --   --   --  3.1*  --   --   --   --    PROTTOTAL  --   --   --   --   --   --   --   --  5.9*  --   --   --   --    BILITOTAL  --   --   --   --   --   --   --   --  0.5  --   --   --   --    ALKPHOS  --   --   --   --   --   --   --   --  158*  --   --   --   --    ALT  --   --   --   --   --   --   --   --  42  --   --   --   --    AST  --    --   --   --   --   --   --   --  22  --   --   --   --     < > = values in this interval not displayed.     Recent Results (from the past 24 hour(s))   XR Chest 2 Views    Narrative    EXAM: XR CHEST 2 VW  LOCATION: [facility]  DATE/TIME: 7/5/2022 12:49 PM    INDICATION: Pre MRI cardiac device check   COMPARISON: 06/30/2022 and older studies.      Impression    IMPRESSION: Right costophrenic angle is clipped on the lateral view in the left the AP view.     Poststernotomy changes. Triple lead left subclavian venous pacer again noted. Cardiomegaly is unchanged. No change in the very small left effusion. Heart is slightly enlarged but unchanged. No signs of failure. Lungs are clear.   MR Lumbar Spine w/o & w Contrast    Narrative    EXAM: MR LUMBAR SPINE W/O and W CONTRAST  LOCATION: [facility]  DATE/TIME: 7/5/2022 1:06 PM    INDICATION: Follow-up lumbar collection post surgery and aspiration x2  COMPARISON: MRI lumbar spine 06/30/2022  CONTRAST: 9ml gadavist   TECHNIQUE: Routine Lumbar Spine MRI without and with IV contrast.    FINDINGS:   Nomenclature is based on 5 lumbar type vertebral bodies. Unchanged lumbar alignment including 5 mm anterolisthesis at L4-L5. Unchanged vertebral body heights. Mild persistent Modic type one endplate edema and associated reactive enhancement at L5-S1.   Laminectomy changes at L4 and L5 with a persistent irregular fluid collection in the laminectomy site with extension into the dorsal epidural space and posterior paraspinal soft tissues. This collection is minimally increased in size compared to   06/30/2022, currently measuring 3.4 x 3.6 x 5.8 cm in transverse, AP, and craniocaudal dimensions respectively compared to 3.4 x 3.4 x 5.6 cm and measured in a similar fashion previously. Mild persistent enhancement at the periphery of this collection   without a well-defined thick enhancing capsule. There is persistent effacement of the  adjacent thecal sac and impingement upon the distal cauda equina nerve roots at these levels. Otherwise unremarkable distal spinal cord and cauda equina with conus   medullaris at mid L1. Expected postoperative changes within the paraspinal soft tissues as seen previously. Ankylosis of the bilateral sacral iliac joints.    T12-L1: Disc desiccation with preserved disc height. No herniation. Mild facet arthropathy. No spinal canal or neural foraminal stenosis.     L1-L2: Minor loss of disc height and signal. No herniation. Minor facet arthropathy. No spinal canal or neural foraminal stenosis.    L2-L3: Disc desiccation with preserved disc height. Slight annular bulge without focal disc herniation. No facet arthropathy. No spinal canal or neural foraminal stenosis.     L3-L4: Disc desiccation with preserved disc height. Circumferential disc bulge. Mild to moderate facet arthropathy and ligamentum flavum thickening. The subdural fluid collection seen previously in the midline dorsally at this level is no longer   identified. Slightly improved patency of the central spinal canal with mild persistent trefoil spinal canal stenosis. Mild right neural foraminal stenosis. Minimal left neural foraminal stenosis.    L4-L5: Disc desiccation with preserved disc height. Grade 1 anterolisthesis with shallow posterior disc bulge and mild endplate spurring. Mild to moderate facet arthropathy. Laminectomy decompression. Lobular fluid collection within the laminectomy   defect and dorsal epidural space as seen previously. Effacement of the thecal sac and mass effect upon cauda equina nerve roots at this level. Persistent moderate bilateral neural foraminal stenosis.    L5-S1: Mild loss of disc height and mixed disc signal. Left subarticular discectomy changes. Persistent broad-based central disc protrusion. Mild facet arthropathy. Laminectomy decompression with persistent lobular fluid collection effacing the thecal   sac and impinging  upon distal cauda equina nerve roots moderate right neural foraminal stenosis. Mild to moderate left neural foraminal stenosis..      Impression    IMPRESSION:  1.  Multilobular fluid collection at the L4 and L5 laminectomy site is minimally increased in size compared to 06/30/2022. Persistent effacement of the adjacent thecal sac and mass effect upon distal cauda equina nerve roots. 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 A persistent/enlarging fluid collection is somewhat atypical for postoperative seroma. The sterility of this fluid remains uncertain, but there are no obvious findings to confirm abscess.  2.  Thin subdural collection seen previously at the L3-L4 level has resolved.  3.  Unchanged multilevel lumbar spondylosis as above.

## 2022-07-05 NOTE — PROGRESS NOTES
"INFECTIOUS DISEASE FOLLOW UP NOTE    Date: 07/05/2022   CHIEF COMPLAINT: No chief complaint on file.       ASSESSMENT:  1. Fever: post op day 16 (surgery 6/19). Workup with MRI suggestive of possible superimposed infection with a rim-enhancing ventral epidural collection extending from the L2 level cranially to the L5-S1 level caudally s/p aspiration 6/23 with red/yellow fluid removed-culture negative, but on antibiotics at the time. CRP and SED elevated, CRP normal and esr decreased. WBC normalized. BC NGTD. Repeat MRI on 6/30 showing persistent fluid collection. Aspiration on 7/1 with no organisms on gram stain and no WBC. Culture negative to date.   2. YOLANDA: nephrology following  3. CAD, pacer in place  4. Comorbid conditions affecting immune system: DM2    PLAN:  - continue to monitor off antibiotics, completed on 7/1.  - f/up on MRI, planned for this afternoon    Jeffrey Green MD  [facility]  Direct messaging: [employer] Paging  On-Call ID provider: [phone], option: 9    ______________________________________________________________________    SUBJECTIVE / INTERVAL HISTORY:   No events. Sitting up, feeling well. Getting MRI this afternoon. No new pain.    ROS: All other systems negative except as listed above.    SH/FH/Habits/PMH reviewed and unchanged.    OBJECTIVE:  /59 (BP Location: Right arm)   Pulse 60   Temp 97.8  F (36.6  C) (Oral)   Resp 18   Ht 1.778 m (5' 10\")   Wt 92.4 kg (203 lb 9.6 oz)   SpO2 98%   BMI 29.21 kg/m       Resp: 18    GEN: No acute distress.    RESPIRATORY:  Normal breathing pattern.   CARDIOVASCULAR:  Regular   ABDOMEN:  Soft, normal bowel sounds, non-tender,   EXTREMITIES: No edema.  SKIN/HAIR/NAILS:  No rashes. Incision site appears intact   IV: peripheral IV    Antibiotics:  None     Pertinent labs:  CRP   Date Value Ref Range Status   07/05/2022 0.6 0.0 - <0.8 mg/dL Final      CBC RESULTS:   Recent Labs   Lab Test 06/23/22  0506   WBC " 8.1   RBC 2.74*   HGB 7.8*   HCT 24.8*   MCV 91   MCH 28.5   MCHC 31.5   RDW 14.8         Last Comprehensive Metabolic Panel:  Sodium   Date Value Ref Range Status   07/05/2022 138 136 - 145 mmol/L Final     Potassium   Date Value Ref Range Status   07/05/2022 4.0 3.5 - 5.0 mmol/L Final     Chloride   Date Value Ref Range Status   07/05/2022 97 (L) 98 - 107 mmol/L Final     Carbon Dioxide (CO2)   Date Value Ref Range Status   07/05/2022 31 22 - 31 mmol/L Final     Anion Gap   Date Value Ref Range Status   07/05/2022 10 5 - 18 mmol/L Final     Glucose   Date Value Ref Range Status   07/05/2022 100 70 - 125 mg/dL Final     GLUCOSE BY METER POCT   Date Value Ref Range Status   07/05/2022 116 (H) 70 - 99 mg/dL Final     Urea Nitrogen   Date Value Ref Range Status   07/05/2022 28 8 - 28 mg/dL Final     Creatinine   Date Value Ref Range Status   07/05/2022 1.15 0.70 - 1.30 mg/dL Final   02/28/2011 0.75 0.66 - 1.25 mg/dL Final     Comment:     New IDMS-traceable calibration  beginning 5/1/08     GFR Estimate   Date Value Ref Range Status   07/05/2022 66 >60 mL/min/1.73m2 Final     Comment:     Effective December 21, 2021 eGFRcr in adults is calculated using the 2021 CKD-EPI creatinine equation which includes age and gender (Cynthia cho al., NEJ, DOI: 10.1056/NLVLqz0688525)   09/23/2020 59 (L) >60 mL/min/1.73m2 Final   02/28/2011 >90 >60 mL/min/1.7m2 Final     Calcium   Date Value Ref Range Status   07/05/2022 9.2 8.5 - 10.5 mg/dL Final        MICROBIOLOGY DATA:  Personally reviewed.  6/19 BC NGTD  6/23 aspiration GS no organisms    RADIOLOGY:  Personally Reviewed.  Recent Results (from the past 24 hour(s))   XR Abdomen Port 1 View    Narrative    EXAM: XR ABDOMEN PORT 1 VIEWS  LOCATION: [facility]  DATE/TIME: 6/22/2022 2:35 PM    INDICATION: abdominal distension  COMPARISON: None.      Impression    IMPRESSION: No distended air-filled loops of small bowel. There is a small amount of throughout  the colon. No definite intraperitoneal free air identified. Pacemaker leads are partially included in the field-of-view. Prior median sternotomy noted. There   are surgical staples over the abdomen and pelvis.         Principal Problem:    Lumbar stenosis with neurogenic claudication  Active Problems:    Type 2 diabetes mellitus, with long-term current use of insulin (H)    Benign essential hypertension    CAD (coronary artery disease)    Chronic atrial fibrillation (H)    CORAZON (obstructive sleep apnea)    Gastroesophageal reflux disease without esophagitis    Hypothyroidism    Normocytic anemia    Chronic left shoulder pain    Nasal congestion    Lumbar spinal stenosis    Acute deep vein thrombosis (DVT) of femoral vein of right lower extremity (H)

## 2022-07-05 NOTE — PROGRESS NOTES
Patient received COVID-19 Moderna vaccine this morning into left deltoid. Tolerated it well. Consent signed. Vaccine card placed with his belongings   Michelle Toure RN

## 2022-07-05 NOTE — PROGRESS NOTES
HEART CARE NOTE          Assessment/Recommendations     1. HFmrEF c/b ADHF  Assessment / Plan    Near euvolemia on physical exam - will transition to oral diuretic regimen and continue to monitor     GDMT as detailed below; mainstay of treatment for HFmrEF includes diuretics (class I) and SGLT2-I (class 2a); additional medical therapy demonstrated with less robust evidence for indication but should be considered per guideline recommendations (2b)     Current Pharmacotherapy AHA Guideline-Directed Medical Therapy   Lisinopril  10 mg daily Lisinopril 20 mg twice daily   Carvedilol - BBlocker naive; will start when near euvolemia Carvedilol 25 mg twice daily   Spironolactone not started Spironolactone 25 mg once daily   Hydralazine  NA Hydralazine 100 mg three times daily   Isosorbide dinitrate NA Isosorbide dinitrate 40 mg three times daily   SGLT2 inhibitor: not started Dapagliflozin or Empagliflozin 10 mg daily      2. CAD  Assessment / Plan    Hx of CABG    Denies chest pain or anginal equivalents     3. DM2  Assessment / Plan    Management per primary team     4. Atrial fibrillation  Assessment / Plan    Currently on heparin gtt - timing of rivaroxaban deferred to surgical team     5. Valvular heart disease  Assessment / Plan    Severe TR noted on echo and likely a large contributing factor to heart failure decompensations    Diuresis and oral afterload reduction as above    Will discuss referral to structural/valve clinic with pateint     6. YOLANDA on CKD - resolved  Assessment / Plan    Continue to monitor renal function closely on diuresis     History of Present Illness/Subjective      Mr. Thomas Steve is a 77 year old male with a PMHx significant for (per Dr. Smith's note) CAD, HFrEF, DM2, HTN, atrial fibrillation, hypothyroidism presented for planned neurosurgical intervention to treat herniated lumbar disc. 06/16 underwent decompressive lumbar laminectomy. Post-op course c/b fever and collection at  "laminectomy site. Cardiology consulted regarding ADHF     Today, Mr. Steve denies any acute cardiac events or complaints; Management plan as detailed above     ECG: Personally reviewed. Paced rhythm     ECHO (personnaly Reviewed):  The left ventricle is normal in size.  Left ventricular function is decreased. The ejection fraction is 45-50%  (mildly reduced).  There is a pacemaker lead in the right ventricle.  The left atrium is mildly dilated.  There is mild (1+) mitral regurgitation.  There is moderately severe (3+) tricuspid regurgitation.  Right ventricular systolic pressure is elevated, consistent with mild  pulmonary hypertension.  Mild valvular aortic stenosis.          Physical Examination Review of Systems   BP 98/53 (BP Location: Right arm)   Pulse 60   Temp 98.1  F (36.7  C) (Oral)   Resp 16   Ht 1.778 m (5' 10\")   Wt 92.4 kg (203 lb 9.6 oz)   SpO2 92%   BMI 29.21 kg/m    Body mass index is 29.21 kg/m .  Wt Readings from Last 3 Encounters:   07/03/22 92.4 kg (203 lb 9.6 oz)   06/03/22 95.2 kg (209 lb 12.8 oz)   01/05/22 93.4 kg (206 lb)     General Appearance:   no distress, normal body habitus   ENT/Mouth: membranes moist, no oral lesions or bleeding gums.      EYES:  no scleral icterus, normal conjunctivae   Neck: no carotid bruits or thyromegaly   Chest/Lungs:   lungs are clear to auscultation, no rales or wheezing, equal chest wall expansion    Cardiovascular:   Regular. Normal first and second heart sounds withno murmurs, rubs, or gallops; the carotid, radial and posterior tibial pulses are intact, no JVD and mild LE edema bilaterally    Abdomen:  no organomegaly, masses, bruits, or tenderness; bowel sounds are present   Extremities: no cyanosis or clubbing   Skin: no xanthelasma, warm.    Neurologic: alert and oriented x3, calm     Psychiatric: alert and oriented x3, calm     A complete 10 systems ROS was reviewed  And is negative except what is listed in the HPI.          Medical History  " Surgical History Family History Social History   Past Medical History:   Diagnosis Date     Acute sinusitis      Atrial fibrillation (H)     Resolved after medication and CPAP     Back pain      CHF (congestive heart failure) (H)      Coronary artery disease      Diabetes mellitus (H)      Gastroesophageal reflux disease with esophagitis      HTN (hypertension)      Hyperlipemia      Joint pain      Lipoma of neck      Nocturia      Sciatic leg pain      Shoulder impingement syndrome     BILATERAL     Sleep apnea     uses CPAP     Typical atrial flutter (H) 07/07/2016    Demonstrated on 12-lead EKG July 7, 2016    Past Surgical History:   Procedure Laterality Date     EP BIV PACEMAKER INSERT N/A 6/11/2019    Procedure: EP Biventricular Pacemaker Insertion;  Surgeon: Durga Rajput MD;  Location: [facility] Cath Lab;  Service: Cardiology     IR FINE NEEDLE ASPIRATION W ULTRASOUND  6/23/2022     LAMINECTOMY LUMBAR TWO LEVELS Bilateral 6/16/2022    Procedure: DECOMPRESSIVE LUMBAR LAMINECTOMY LUMBAR 4-LUMBAR 5 AND LUMBAR 5-SACRAL 1 BILATERAL-LEFT SIDE FIRST-PLUS BILATERAL FORAMINOTOMIES PLUS REMOVAL OF HERNIATED DISC LUMBAR 5-SACRAL 1 LEFT;  Surgeon: Israel Samuel MD;  Location: [facility] Main OR     [place] CARDIOVERSION ELECTIVE ARRHYTHMIA EXTERNAL  06/03/2014    Description: Elective Cardioversion External;  Recorded: 06/03/2014;     ZC CABG, VEIN, SINGLE  2011    Description: CABG (CABG);  Proc Date: 09/26/2011;  Comments: LIMA^LAD, SVG^OM, SVG^distal RCA    no family history of premature coronary artery disease Social History     Socioeconomic History     Marital status:      Spouse name: Not on file     Number of children: Not on file     Years of education: Not on file     Highest education level: Not on file   Occupational History     Not on file   Tobacco Use     Smoking status: Former Smoker     Packs/day: 1.50     Years: 23.00     Pack years: 34.50     Quit date: 1/1/1986     Years since quitting:  36.5     Smokeless tobacco: Never Used   Substance and Sexual Activity     Alcohol use: Yes     Alcohol/week: 1.0 standard drink     Comment: one beer per day     Drug use: No     Sexual activity: Never   Other Topics Concern     Not on file   Social History Narrative    .  3 children.  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     Social Determinants of Health     Financial Resource Strain: Not on file   Food Insecurity: Not on file   Transportation Needs: Not on file   Physical Activity: Not on file   Stress: Not on file   Social Connections: Not on file   Intimate Partner Violence: Not on file   Housing Stability: Not on file           Lab Results    Chemistry/lipid CBC Cardiac Enzymes/BNP/TSH/INR   Lab Results   Component Value Date    CHOL 118 06/08/2022    HDL 36 (L) 06/08/2022    TRIG 202 (H) 06/08/2022    BUN 22 07/04/2022     07/04/2022    CO2 34 (H) 07/04/2022    Lab Results   Component Value Date    WBC 10.0 07/02/2022    HGB 9.1 (L) 07/02/2022    HCT 29.5 (L) 07/02/2022    MCV 91 07/02/2022     07/02/2022    Lab Results   Component Value Date     (H) 06/20/2022    TSH 2.05 06/08/2022    INR 1.14 07/01/2022     No results found for: CKTOTAL, CKMB, TROPONINI       Weight:    Wt Readings from Last 3 Encounters:   07/03/22 92.4 kg (203 lb 9.6 oz)   06/03/22 95.2 kg (209 lb 12.8 oz)   01/05/22 93.4 kg (206 lb)       Allergies  Allergies   Allergen Reactions     Aspirin Other (See Comments)     Contraindicated due to xarelto use     Codeine Nausea and Vomiting     Pollen Extracts [Pollen Extract] Unknown     Scratchy throat     Vicodin [Hydrocodone-Acetaminophen] Nausea and Vomiting         Surgical History  Past Surgical History:   Procedure Laterality Date     EP BIV PACEMAKER INSERT N/A 6/11/2019    Procedure: EP Biventricular Pacemaker Insertion;  Surgeon: Durga Rajput MD;  Location: [facility] Cath Lab;  Service: Cardiology     IR FINE NEEDLE ASPIRATION W ULTRASOUND   6/23/2022     LAMINECTOMY LUMBAR TWO LEVELS Bilateral 6/16/2022    Procedure: DECOMPRESSIVE LUMBAR LAMINECTOMY LUMBAR 4-LUMBAR 5 AND LUMBAR 5-SACRAL 1 BILATERAL-LEFT SIDE FIRST-PLUS BILATERAL FORAMINOTOMIES PLUS REMOVAL OF HERNIATED DISC LUMBAR 5-SACRAL 1 LEFT;  Surgeon: Israel Samuel MD;  Location: [facility] Main OR     [place] CARDIOVERSION ELECTIVE ARRHYTHMIA EXTERNAL  06/03/2014    Description: Elective Cardioversion External;  Recorded: 06/03/2014;     ZZC CABG, VEIN, SINGLE  2011    Description: CABG (CABG);  Proc Date: 09/26/2011;  Comments: LIMA^LAD, SVG^OM, SVG^distal RCA       Social History  Tobacco:   History   Smoking Status     Former Smoker     Packs/day: 1.50     Years: 23.00     Quit date: 1/1/1986   Smokeless Tobacco     Never Used    Alcohol:   Social History    Substance and Sexual Activity      Alcohol use: Yes        Alcohol/week: 1.0 standard drink        Comment: one beer per day   Illicit Drugs:   History   Drug Use No       Family History  Family History   Problem Relation Age of Onset     Heart Disease Mother      Snoring Father      Heart Disease Father      Hypertension Father      Alzheimer Disease Father      No Known Problems Daughter      No Known Problems Daughter      No Known Problems Daughter      Chronic Obstructive Pulmonary Disease Sister      Hodgkin's lymphoma Brother      No Known Problems Son      Diabetes Sister      Substance Abuse Sister      Chronic Obstructive Pulmonary Disease Brother      Heart Disease Brother      Diabetes Brother      Substance Abuse Brother           Glenn Hoskins MD on 7/5/2022      cc: Moni Mcclain

## 2022-07-05 NOTE — PROGRESS NOTES
Monitored pt in MRI for Pacemaker. Pt tolerated scan. No arrhythmias seen. Placed back into pt's regular setting remotely by [facility].

## 2022-07-05 NOTE — PLAN OF CARE
Problem: Plan of Care - These are the overarching goals to be used throughout the patient stay.    Goal: Absence of Hospital-Acquired Illness or Injury  Outcome: Ongoing, Progressing  Intervention: Identify and Manage Fall Risk  Recent Flowsheet Documentation  Taken 7/4/2022 1613 by Telly Luke RN  Safety Promotion/Fall Prevention:   activity supervised   clutter free environment maintained   lighting adjusted   mobility aid in reach   nonskid shoes/slippers when out of bed   patient and family education   room door open   safety round/check completed   supervised activity     Problem: Plan of Care - These are the overarching goals to be used throughout the patient stay.    Goal: Optimal Comfort and Wellbeing  Outcome: Ongoing, Progressing     Problem: Gas Exchange Impaired  Goal: Optimal Gas Exchange  Outcome: Ongoing, Progressing     Problem: Hyperglycemia  Goal: Blood Glucose Level Within Targeted Range  Outcome: Ongoing, Progressing     Problem: Bleeding (Thrombolytic Therapy)  Goal: Absence of Bleeding  Outcome: Ongoing, Progressing     Problem: VTE (Venous Thromboembolism)  Goal: VTE (Venous Thromboembolism) Symptom Resolution  Outcome: Ongoing, Progressing     Problem: Infection  Goal: Absence of Infection Signs and Symptoms  Outcome: Ongoing, Progressing     Problem: Pain (Spinal Surgery)  Goal: Acceptable Pain Control  Outcome: Ongoing, Progressing     Pt alert and oriented. Denies pain thus far. Pt received scheduled acetaminophen, gabapentin and lidocaine ointment this shift. Pt is stand by assist for transfers. Surgical dressing changed. Incision was WDL; staples intact. No drainage observed on incision site. Redness/abrasion observed on demetrius-wound, about 4 cm away from incision. Bacitracin ointment placed on abrasion sites. Lidocaine ointment placed above and below surgical dressing. Edema +2 observed on pt's bilateral ankles and feet. Pt on IV furosemide continuous at 15 mL/hr. Pt also on IV heparin  at 13 mL/hr. Anti-Xa of 0.56 at 1638; recheck at 2300 (7/4/2022). Pre-prandial BG of 142. HS BG of 215. Pt received insulin per sliding scale and carb count. Pt to receive COVID booster and sign consent form tomorrow. Call light is within reach.

## 2022-07-05 NOTE — PLAN OF CARE
Problem: Risk for Delirium  Goal: Optimal Coping  Outcome: Ongoing, Progressing  Goal: Improved Behavioral Control  Outcome: Ongoing, Progressing  Goal: Improved Attention and Thought Clarity  Outcome: Ongoing, Progressing  Goal: Improved Sleep  Outcome: Ongoing, Progressing     Problem: Bleeding (Spinal Surgery)  Goal: Absence of Bleeding  Outcome: Ongoing, Progressing     Problem: Bowel Motility Impaired (Spinal Surgery)  Goal: Effective Bowel Elimination  Outcome: Ongoing, Progressing     Problem: Fluid and Electrolyte Imbalance (Spinal Surgery)  Goal: Fluid and Electrolyte Balance  Outcome: Ongoing, Progressing     Problem: Functional Ability Impaired (Spinal Surgery)  Goal: Optimal Functional Ability  Outcome: Ongoing, Progressing  Intervention: Optimize Functional Status  Recent Flowsheet Documentation  Taken 7/4/2022 2318 by Skylar White RN  Activity Management: bedrest  Positioning/Transfer Devices:   pillows   in use     Problem: Infection (Spinal Surgery)  Goal: Absence of Infection Signs and Symptoms  Outcome: Ongoing, Progressing     Problem: Neurologic Impairment (Spinal Surgery)  Goal: Optimal Neurologic Function  Outcome: Ongoing, Progressing  Intervention: Optimize Neurologic Function  Recent Flowsheet Documentation  Taken 7/4/2022 2318 by Skylar White RN  Body Position: supine, head elevated     Problem: Ongoing Anesthesia Effects (Spinal Surgery)  Goal: Anesthesia/Sedation Recovery  Outcome: Ongoing, Progressing  Intervention: Optimize Anesthesia Recovery  Recent Flowsheet Documentation  Taken 7/4/2022 2318 by Skylar White, RN  Safety Promotion/Fall Prevention:   bed alarm on   patient and family education   room organization consistent     Problem: Pain (Spinal Surgery)  Goal: Acceptable Pain Control  Outcome: Ongoing, Progressing     Problem: Postoperative Nausea and Vomiting (Spinal Surgery)  Goal: Nausea and Vomiting Relief  Outcome: Ongoing, Progressing     Problem: Postoperative  "Urinary Retention (Spinal Surgery)  Goal: Effective Urinary Elimination  Outcome: Ongoing, Progressing     Problem: Respiratory Compromise (Spinal Surgery)  Goal: Effective Oxygenation and Ventilation  Outcome: Ongoing, Progressing  Intervention: Optimize Oxygenation and Ventilation  Recent Flowsheet Documentation  Taken 7/4/2022 2318 by Skylar White RN  Head of Bed (HOB) Positioning: HOB at 20-30 degrees     Problem: Plan of Care - These are the overarching goals to be used throughout the patient stay.    Goal: Plan of Care Review/Shift Note  Description: The Plan of Care Review/Shift note should be completed every shift.  The Outcome Evaluation is a brief statement about your assessment that the patient is improving, declining, or no change.  This information will be displayed automatically on your shift note.  Outcome: Ongoing, Progressing  Flowsheets (Taken 7/5/2022 0602)  Plan of Care Reviewed With: patient  Overall Patient Progress: improving  Goal: Patient-Specific Goal (Individualized)  Description: You can add care plan individualizations to a care plan. Examples of Individualization might be:  \"Parent requests to be called daily at 9am for status\", \"I have a hard time hearing out of my right ear\", or \"Do not touch me to wake me up as it startles me\".  Outcome: Ongoing, Progressing  Goal: Absence of Hospital-Acquired Illness or Injury  Outcome: Ongoing, Progressing  Intervention: Identify and Manage Fall Risk  Recent Flowsheet Documentation  Taken 7/4/2022 2318 by Skylar White RN  Safety Promotion/Fall Prevention:   bed alarm on   patient and family education   room organization consistent  Intervention: Prevent Skin Injury  Recent Flowsheet Documentation  Taken 7/4/2022 2318 by Skylar White RN  Body Position: supine, head elevated  Intervention: Prevent and Manage VTE (Venous Thromboembolism) Risk  Recent Flowsheet Documentation  Taken 7/4/2022 2318 by Skylar White RN  Activity Management: " bedrest  Goal: Optimal Comfort and Wellbeing  Outcome: Ongoing, Progressing  Goal: Readiness for Transition of Care  Outcome: Ongoing, Progressing     Problem: Diabetes Comorbidity  Goal: Blood Glucose Level Within Targeted Range  Outcome: Ongoing, Progressing     Problem: Oral Intake Inadequate  Goal: Improved Oral Intake  Outcome: Ongoing, Progressing     Problem: Gas Exchange Impaired  Goal: Optimal Gas Exchange  Outcome: Ongoing, Progressing  Intervention: Optimize Oxygenation and Ventilation  Recent Flowsheet Documentation  Taken 7/4/2022 2318 by Skylar White RN  Head of Bed (HOB) Positioning: HOB at 20-30 degrees     Problem: Hyperglycemia  Goal: Blood Glucose Level Within Targeted Range  Outcome: Ongoing, Progressing     Problem: Bleeding (Thrombolytic Therapy)  Goal: Absence of Bleeding  Outcome: Ongoing, Progressing     Problem: VTE (Venous Thromboembolism)  Goal: VTE (Venous Thromboembolism) Symptom Resolution  Outcome: Ongoing, Progressing     Problem: Infection  Goal: Absence of Infection Signs and Symptoms  Outcome: Ongoing, Progressing   Goal Outcome Evaluation:    Plan of Care Reviewed With: patient     Overall Patient Progress: improving     Patient alert and orientated laying in bed with CPAP. Patient denies pain or shortness of breath. Slept between cares.Provided a supportive presence. Skylar White RN on 7/5/2022 at 6:07 AM

## 2022-07-05 NOTE — PROGRESS NOTES
Is the implanted device safe for MRI Exam?  Yes  Is this device 3T compatible? Yes  Device Type: Pacemaker      Device Information:  Make: Flexion   Model: [id number]    Cardiology Orders for Device Programming     -- Yes -- The patient has a MRI conditional pulse generator and leads from the same     -- Yes -- The pulse generator and leads have been implanted for at least 6 weeks    -- Yes-- The device is implanted in the right or left pectoral region    -- Yes -- There are not any additional active cardiac devices, abandoned leads, lead extenders or adapters    -- Yes -- The device lead impedance measurements are within the normal range. (Manufacture recommendations: Medtronic Advisa and Revo 200-1,500 ohms; Medtronic ICD and CRT's 200-3000 ohms and defibrillation lead impedance   ohms)    -- N/A -- If the patient is pacemaker dependent the thresholds are less than or equal to 2.0V @ 0.4ms.     Date of last in-office Device check: 10/11/2021  Results of last in-office Device check:  1.   Right atrium impedance:   2.   Right ventricle impedance: 494 ohms  3.   Left ventricle impedance: 931 ohms     4.   Right atrium threshold: NA  5.   Right ventricle threshold: 0.5V@0.4ms  6.   Left ventricle threshold: 0.75V@0.4ms     Device programming during the scan guidelines   Pacing Mode (check one): VOO  Pacing Rate: 80  bpm or 20 bpm above intrinsic rate

## 2022-07-06 ENCOUNTER — APPOINTMENT (OUTPATIENT)
Dept: PHYSICAL THERAPY | Facility: HOSPITAL | Age: 77
DRG: 518 | End: 2022-07-06
Attending: SURGERY
Payer: MEDICARE

## 2022-07-06 ENCOUNTER — TELEPHONE (OUTPATIENT)
Dept: NEUROSURGERY | Facility: CLINIC | Age: 77
End: 2022-07-06

## 2022-07-06 ENCOUNTER — APPOINTMENT (OUTPATIENT)
Dept: OCCUPATIONAL THERAPY | Facility: HOSPITAL | Age: 77
DRG: 518 | End: 2022-07-06
Attending: SURGERY
Payer: MEDICARE

## 2022-07-06 VITALS
TEMPERATURE: 98 F | OXYGEN SATURATION: 97 % | SYSTOLIC BLOOD PRESSURE: 123 MMHG | BODY MASS INDEX: 29.15 KG/M2 | RESPIRATION RATE: 18 BRPM | WEIGHT: 203.6 LBS | HEIGHT: 70 IN | HEART RATE: 62 BPM | DIASTOLIC BLOOD PRESSURE: 61 MMHG

## 2022-07-06 LAB
ANION GAP SERPL CALCULATED.3IONS-SCNC: 8 MMOL/L (ref 5–18)
BACTERIA ASPIRATE CULT: NO GROWTH
BUN SERPL-MCNC: 33 MG/DL (ref 8–28)
CALCIUM SERPL-MCNC: 9 MG/DL (ref 8.5–10.5)
CHLORIDE BLD-SCNC: 99 MMOL/L (ref 98–107)
CO2 SERPL-SCNC: 30 MMOL/L (ref 22–31)
CREAT SERPL-MCNC: 1.32 MG/DL (ref 0.7–1.3)
GFR SERPL CREATININE-BSD FRML MDRD: 56 ML/MIN/1.73M2
GLUCOSE BLD-MCNC: 88 MG/DL (ref 70–125)
GLUCOSE BLDC GLUCOMTR-MCNC: 156 MG/DL (ref 70–99)
GLUCOSE BLDC GLUCOMTR-MCNC: 96 MG/DL (ref 70–99)
GRAM STAIN RESULT: NORMAL
GRAM STAIN RESULT: NORMAL
INR PPP: 1.14 (ref 0.85–1.15)
POTASSIUM BLD-SCNC: 3.9 MMOL/L (ref 3.5–5)
SODIUM SERPL-SCNC: 137 MMOL/L (ref 136–145)
UFH PPP CHRO-ACNC: 0.51 IU/ML

## 2022-07-06 PROCEDURE — 99207 PR NO CHARGE LOS: CPT | Performed by: INTERNAL MEDICINE

## 2022-07-06 PROCEDURE — 250N000013 HC RX MED GY IP 250 OP 250 PS 637: Performed by: NURSE PRACTITIONER

## 2022-07-06 PROCEDURE — 97535 SELF CARE MNGMENT TRAINING: CPT | Mod: GO

## 2022-07-06 PROCEDURE — 250N000011 HC RX IP 250 OP 636: Performed by: INTERNAL MEDICINE

## 2022-07-06 PROCEDURE — 80048 BASIC METABOLIC PNL TOTAL CA: CPT | Performed by: INTERNAL MEDICINE

## 2022-07-06 PROCEDURE — 250N000013 HC RX MED GY IP 250 OP 250 PS 637: Performed by: INTERNAL MEDICINE

## 2022-07-06 PROCEDURE — 85610 PROTHROMBIN TIME: CPT | Performed by: INTERNAL MEDICINE

## 2022-07-06 PROCEDURE — 99239 HOSP IP/OBS DSCHRG MGMT >30: CPT | Performed by: INTERNAL MEDICINE

## 2022-07-06 PROCEDURE — 250N000013 HC RX MED GY IP 250 OP 250 PS 637: Performed by: SURGERY

## 2022-07-06 PROCEDURE — 85520 HEPARIN ASSAY: CPT | Performed by: INTERNAL MEDICINE

## 2022-07-06 PROCEDURE — 97110 THERAPEUTIC EXERCISES: CPT | Mod: GP

## 2022-07-06 PROCEDURE — 97116 GAIT TRAINING THERAPY: CPT | Mod: GP

## 2022-07-06 PROCEDURE — 99232 SBSQ HOSP IP/OBS MODERATE 35: CPT | Performed by: INTERNAL MEDICINE

## 2022-07-06 PROCEDURE — P9047 ALBUMIN (HUMAN), 25%, 50ML: HCPCS | Performed by: INTERNAL MEDICINE

## 2022-07-06 PROCEDURE — 250N000013 HC RX MED GY IP 250 OP 250 PS 637: Performed by: FAMILY MEDICINE

## 2022-07-06 PROCEDURE — 250N000013 HC RX MED GY IP 250 OP 250 PS 637: Performed by: HOSPITALIST

## 2022-07-06 PROCEDURE — 36415 COLL VENOUS BLD VENIPUNCTURE: CPT | Performed by: INTERNAL MEDICINE

## 2022-07-06 RX ORDER — POLYETHYLENE GLYCOL 3350 17 G/17G
17 POWDER, FOR SOLUTION ORAL DAILY PRN
Start: 2022-07-06 | End: 2022-07-13

## 2022-07-06 RX ORDER — ALBUMIN (HUMAN) 12.5 G/50ML
50 SOLUTION INTRAVENOUS ONCE
Status: COMPLETED | OUTPATIENT
Start: 2022-07-06 | End: 2022-07-06

## 2022-07-06 RX ORDER — BACITRACIN ZINC 500 [USP'U]/G
OINTMENT TOPICAL 2 TIMES DAILY
Start: 2022-07-06 | End: 2022-07-11

## 2022-07-06 RX ORDER — LISINOPRIL 20 MG/1
20 TABLET ORAL DAILY
Qty: 30 TABLET | Refills: 0 | Status: ON HOLD
Start: 2022-07-07 | End: 2022-07-19

## 2022-07-06 RX ORDER — ENOXAPARIN SODIUM 100 MG/ML
1 INJECTION SUBCUTANEOUS EVERY 12 HOURS
Status: DISCONTINUED | OUTPATIENT
Start: 2022-07-06 | End: 2022-07-06 | Stop reason: HOSPADM

## 2022-07-06 RX ORDER — BUMETANIDE 1 MG/1
3 TABLET ORAL DAILY
Status: ON HOLD
Start: 2022-07-08 | End: 2022-07-19

## 2022-07-06 RX ORDER — WARFARIN SODIUM 5 MG/1
5 TABLET ORAL
Status: DISCONTINUED | OUTPATIENT
Start: 2022-07-06 | End: 2022-07-06 | Stop reason: HOSPADM

## 2022-07-06 RX ORDER — ENOXAPARIN SODIUM 100 MG/ML
1 INJECTION SUBCUTANEOUS EVERY 12 HOURS
Start: 2022-07-06 | End: 2022-07-10

## 2022-07-06 RX ORDER — WARFARIN SODIUM 5 MG/1
5 TABLET ORAL AT BEDTIME
Start: 2022-07-06 | End: 2022-07-11

## 2022-07-06 RX ORDER — OXYCODONE HYDROCHLORIDE 5 MG/1
2.5-5 TABLET ORAL EVERY 4 HOURS PRN
Qty: 20 TABLET | Refills: 0 | Status: SHIPPED | OUTPATIENT
Start: 2022-07-06 | End: 2022-09-21

## 2022-07-06 RX ADMIN — TAMSULOSIN HYDROCHLORIDE 0.4 MG: 0.4 CAPSULE ORAL at 08:26

## 2022-07-06 RX ADMIN — Medication 500 MG: at 08:26

## 2022-07-06 RX ADMIN — ALBUMIN HUMAN 50 G: 0.25 SOLUTION INTRAVENOUS at 08:25

## 2022-07-06 RX ADMIN — BACITRACIN ZINC: 500 OINTMENT TOPICAL at 08:27

## 2022-07-06 RX ADMIN — DICLOFENAC SODIUM 2 G: 10 GEL TOPICAL at 08:25

## 2022-07-06 RX ADMIN — PANTOPRAZOLE SODIUM 40 MG: 40 TABLET, DELAYED RELEASE ORAL at 08:27

## 2022-07-06 RX ADMIN — LEVOTHYROXINE SODIUM 25 MCG: 0.03 TABLET ORAL at 06:43

## 2022-07-06 RX ADMIN — METFORMIN HYDROCHLORIDE 1000 MG: 500 TABLET, FILM COATED ORAL at 08:26

## 2022-07-06 RX ADMIN — FERROUS SULFATE TAB 325 MG (65 MG ELEMENTAL FE) 325 MG: 325 (65 FE) TAB at 08:26

## 2022-07-06 RX ADMIN — FERROUS SULFATE TAB 325 MG (65 MG ELEMENTAL FE) 325 MG: 325 (65 FE) TAB at 11:28

## 2022-07-06 RX ADMIN — ENOXAPARIN SODIUM 90 MG: 100 INJECTION SUBCUTANEOUS at 11:04

## 2022-07-06 RX ADMIN — Medication 100 MCG: at 08:25

## 2022-07-06 RX ADMIN — OXYCODONE HYDROCHLORIDE 5 MG: 5 TABLET ORAL at 08:39

## 2022-07-06 RX ADMIN — LISINOPRIL 20 MG: 20 TABLET ORAL at 08:26

## 2022-07-06 RX ADMIN — GABAPENTIN 600 MG: 300 CAPSULE ORAL at 08:26

## 2022-07-06 RX ADMIN — THERA TABS 1 TABLET: TAB at 08:26

## 2022-07-06 RX ADMIN — ACETAMINOPHEN 975 MG: 325 TABLET, FILM COATED ORAL at 08:26

## 2022-07-06 RX ADMIN — HEPARIN SODIUM 1300 UNITS/HR: 10000 INJECTION, SOLUTION INTRAVENOUS at 08:09

## 2022-07-06 ASSESSMENT — ACTIVITIES OF DAILY LIVING (ADL)
ADLS_ACUITY_SCORE: 26

## 2022-07-06 NOTE — PROGRESS NOTES
Brief ID Follow-up Note    Chart reviewed. MRI reviewed and d/w Dr. Cruz. 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 No obvious signs of abscess. Also, beta-2 transferrin from previous aspiration returned positive.    CRP and wbc normal off antibiotics.   Low suspicion for infection  No antibiotics indicated at this time  Okay to discharge from ID perspective.     Jeffrey Green MD  [facility]  Direct messaging: [facility] Paging  On-Call ID provider: [phone], option: 9

## 2022-07-06 NOTE — PROGRESS NOTES
Neurosurgery Progress Note:  7/6/22    A/P:   Thomas Steve is a 77 year old male S/P DECOMPRESSIVE LUMBAR LAMINECTOMY LUMBAR 4-LUMBAR 5 AND LUMBAR 5-SACRAL 1 BILATERAL-LEFT SIDE FIRST-PLUS BILATERAL FORAMINOTOMIES PLUS REMOVAL OF HERNIATED DISC LUMBAR 5-SACRAL 1 LEFT with Dr Samuel on 6/19/22. Arachnoid bleb intraop, covered with durgen, duraseal. No CSF leak. Was kept flat for 24hr. IR aspiration done 6/23 for fluid collection and repeated 7/1 dt enlarging collection. Acute RLE DVT found 6/29, heparin gtt started. BETA2 7/1- positive result 7/5/2022.  Repeat MRI 7/5 slightly larger fluid collection with concern for pseudomeningocele without clinical signs on patient exam.     Thomas denies any significant back pain. Utilizing oxycodone once daily and no muscle relaxer's. Declined lidocaine patch benefit. Tylenol continues. No leg pain. No new numbness or tingling in his legs. Baseline neuropathy. No headaches. No nausea. No reported bowel or bladder dysfunction. Ambulating in the hallway.     Discussed most recent MRI with patient and Dr Samuel yesterday. No plans for intervention at this time based on negative clinical exam. Will monitor patient progress closely. Will have him return to clinic next week. Discussed preference for coumadin with Dr Cruz with chance that we would have to return to the OR if patient becomes symptomatic in relation to MRI finding's. Patient to discharge to TCU today. Will remove staples prior to discharge.     Coumadin for 2-4 weeks. If determined no need to return to OR patient can return to his home medication Xarelto.     Thomas declines need for me to update any family today.     PLAN:   1. Continue heparin drip. Holding pt's home xarelto for afib. Need oral resumed coumadin adequate coverage??  2. Daily dressing changes to incision. Can discontinue staples tomorrow.  C/w bacitracin to superficial skin tears from the tape during IR procedure on 7/1. Paper tape only.  3. ID  "recommends continuing to hold abx and clinically monitor. Last dose PO augmentin and doxycycline was on 7/1.  4. Cardiology consult 7/3, recommending continued diuresis and follow-up valve clinic  5. Discharge date is pending       HPI: Presented with back pain, right leg pain. Weakness bilateral LE. Worse with walking. No bowel or bladder issues. Diabetes, CABG, AFIB, Xarelto. MRI with listhesis L4-5, spinal stenosis. Stenosis also at L5-S1 with large disc herniation L5-S1 on the left.     Subjective: Peter reports minimal back pain. No leg pain. No headache, nausea. Baseline neuropathy. No reported bowel or bladder dysfunction.     Physical Exam  /61 (BP Location: Right arm)   Pulse 62   Temp 98  F (36.7  C) (Oral)   Resp 18   Ht 1.778 m (5' 10\")   Wt 92.4 kg (203 lb 9.6 oz)   SpO2 97%   BMI 29.21 kg/m      General: oriented. KIM. Speech clear.     Motor: normal bulk and tone     Strength: Full strength LE bilaterally throughout both legs     Sensation: Segundo lower extremity sensation intact to touch. Baseline lower extremity neuropathy.     Incision:   Staples removed after cleaning skin with betadine. Skin well approximated. Skin surrounding staple line slightly edemetous. Non tender.     Labs:  Beta 2 transferrin in process from 7/1  Aerobic bacterial culture prelim negative from 7/1  Anaerobic bacterial culture prelim negative from 7/1    Sed rate 7/5 43,changed from prior 37, 89  CRP normal today and 6 days ago   WBC normal today     MRI: reviewed and compared to prior. Shows fluid collection slightly larger than prior MRI. 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 Discussed with patient and Dr Samuel.     Melani Huber, RAYNA-CNP  [facility] Neurosurgery  O: [phone]      "

## 2022-07-06 NOTE — PLAN OF CARE
Problem: Risk for Delirium  Goal: Improved Attention and Thought Clarity  Outcome: Ongoing, Progressing     Problem: Risk for Delirium  Goal: Improved Sleep  Outcome: Ongoing, Progressing     Problem: Fluid and Electrolyte Imbalance (Spinal Surgery)  Goal: Fluid and Electrolyte Balance  Outcome: Ongoing, Progressing   Goal Outcome Evaluation:    Plan of Care Reviewed With: patient     Overall Patient Progress: no change         Patient alert and orientated. Denies shortness of breath and pain while in bed. CPAP in use all night. Slept between cares. No concerns at this time. Skylar White RN on 7/6/2022 at 5:04 AM

## 2022-07-06 NOTE — PLAN OF CARE
Problem: Oral Intake Inadequate  Goal: Improved Oral Intake  Outcome: Ongoing, Progressing     Problem: Hyperglycemia  Goal: Blood Glucose Level Within Targeted Range  Outcome: Ongoing, Progressing   Goal Outcome Evaluation:      Patient states that he tries to push himself to eat as much as he can. He has not been eating the gelatein or glucerna that he is being sent. There is a large pile of these supplements in his room. He does not like the taste of these supplements. He agreed to try orange Magicup instead. He eats cereal with milk for breakfast and tries to have protein rich foods for other meals. BG over last 2 days has been <180.

## 2022-07-06 NOTE — PHARMACY-ANTICOAGULATION SERVICE
Clinical Pharmacy - Warfarin Dosing Consult     Pharmacy has been consulted to manage this patient s warfarin therapy.  Indication: DVT/ PE Treatment  Therapy Goal: INR 2-3  Provider/Team: Anthony/SHALINI  Warfarin Prior to Admission: No  Warfarin PTA Regimen: New start  Significant drug interactions: APAP, levothyroxine, pantoprazole, rosuvastatin  Recent documented change in oral intake/nutrition: No    INR   Date Value Ref Range Status   07/06/2022 1.14 0.85 - 1.15 Final   07/01/2022 1.14 0.85 - 1.15 Final       Recommend warfarin 5 mg today.  Pharmacy will monitor Thomas Steve daily and order warfarin doses to achieve specified goal.      Please contact pharmacy as soon as possible if the warfarin needs to be held for a procedure or if the warfarin goals change.

## 2022-07-06 NOTE — PROGRESS NOTES
HEART CARE NOTE          Assessment/Recommendations     1. HFmrEF c/b ADHF  Assessment / Plan    Near euvolemia tolerating oral diuretic regimen; YOLANDA noted on am labs - will hold today's dose of diuretic in an effort to allow for intravascular refill with plans to resume tomorrow      GDMT as detailed below; mainstay of treatment for HFmrEF includes diuretics (class I) and SGLT2-I (class 2a); additional medical therapy demonstrated with less robust evidence for indication but should be considered per guideline recommendations (2b)     Current Pharmacotherapy AHA Guideline-Directed Medical Therapy   Lisinopril  10 mg daily Lisinopril 20 mg twice daily   BBlocker on hold given borderline HR Carvedilol 25 mg twice daily   Spironolactone not started Spironolactone 25 mg once daily   Hydralazine  NA Hydralazine 100 mg three times daily   Isosorbide dinitrate NA Isosorbide dinitrate 40 mg three times daily   SGLT2 inhibitor: not started Dapagliflozin or Empagliflozin 10 mg daily      2. CAD  Assessment / Plan    Hx of CABG    Denies chest pain or anginal equivalents     3. DM2  Assessment / Plan    Management per primary team     4. Atrial fibrillation  Assessment / Plan    Currently on heparin gtt - timing of rivaroxaban deferred to surgical team     5. Valvular heart disease  Assessment / Plan    Severe TR (previously mold-mod) noted on echo and likely a large contributing factor to heart failure decompensations    Diuresis and oral afterload reduction as above    Discuss possible referral to structural/valve clinic with patient - he would like to defer decision until he is able to discuss further with his primary cardiologist     6. YOLANDA on CKD - resolved  Assessment / Plan    Continue to monitor renal function closely on diuresis          History of Present Illness/Subjective      Mr. Thomas Steve is a 77 year old male with a PMHx significant for (per Dr. Smith's note) CAD, HFrEF, DM2, HTN, atrial fibrillation,  "hypothyroidism presented for planned neurosurgical intervention to treat herniated lumbar disc. 06/16 underwent decompressive lumbar laminectomy. Post-op course c/b fever and collection at laminectomy site. Cardiology consulted regarding ADHF     Today, Mr. Steve denies any acute cardiac events or complaints; Management plan as detailed above     ECG: Personally reviewed. Paced rhythm     ECHO (personnaly Reviewed):  The left ventricle is normal in size.  Left ventricular function is decreased. The ejection fraction is 45-50%  (mildly reduced).  There is a pacemaker lead in the right ventricle.  The left atrium is mildly dilated.  There is mild (1+) mitral regurgitation.  There is moderately severe (3+) tricuspid regurgitation.  Right ventricular systolic pressure is elevated, consistent with mild  pulmonary hypertension.  Mild valvular aortic stenosis.             Physical Examination Review of Systems   /60 (BP Location: Left arm)   Pulse 60   Temp 97.7  F (36.5  C) (Oral)   Resp 16   Ht 1.778 m (5' 10\")   Wt 92.4 kg (203 lb 9.6 oz)   SpO2 96%   BMI 29.21 kg/m    Body mass index is 29.21 kg/m .  Wt Readings from Last 3 Encounters:   07/03/22 92.4 kg (203 lb 9.6 oz)   06/03/22 95.2 kg (209 lb 12.8 oz)   01/05/22 93.4 kg (206 lb)     General Appearance:   no distress, normal body habitus   ENT/Mouth: membranes moist, no oral lesions or bleeding gums.      EYES:  no scleral icterus, normal conjunctivae   Neck: no carotid bruits or thyromegaly   Chest/Lungs:   lungs are clear to auscultation, no rales or wheezing, equal chest wall expansion    Cardiovascular:   Regular. Normal first and second heart sounds with no murmurs, rubs, or gallops; the carotid, radial and posterior tibial pulses are intact, no JVD and trace - mild LE edema bilaterally    Abdomen:  no organomegaly, masses, bruits, or tenderness; bowel sounds are present   Extremities: no cyanosis or clubbing   Skin: no xanthelasma, warm.  "   Neurologic: normal gait, normal  bilateral, no tremors     Psychiatric: alert and oriented x3, calm     A complete 10 systems ROS was reviewed  And is negative except what is listed in the HPI.          Medical History  Surgical History Family History Social History   Past Medical History:   Diagnosis Date     Acute sinusitis      Atrial fibrillation (H)     Resolved after medication and CPAP     Back pain      CHF (congestive heart failure) (H)      Coronary artery disease      Diabetes mellitus (H)      Gastroesophageal reflux disease with esophagitis      HTN (hypertension)      Hyperlipemia      Joint pain      Lipoma of neck      Nocturia      Sciatic leg pain      Shoulder impingement syndrome     BILATERAL     Sleep apnea     uses CPAP     Typical atrial flutter (H) 07/07/2016    Demonstrated on 12-lead EKG July 7, 2016    Past Surgical History:   Procedure Laterality Date     EP BIV PACEMAKER INSERT N/A 6/11/2019    Procedure: EP Biventricular Pacemaker Insertion;  Surgeon: Durga Rajput MD;  Location: [facility] Cath Lab;  Service: Cardiology     IR FINE NEEDLE ASPIRATION W ULTRASOUND  6/23/2022     LAMINECTOMY LUMBAR TWO LEVELS Bilateral 6/16/2022    Procedure: DECOMPRESSIVE LUMBAR LAMINECTOMY LUMBAR 4-LUMBAR 5 AND LUMBAR 5-SACRAL 1 BILATERAL-LEFT SIDE FIRST-PLUS BILATERAL FORAMINOTOMIES PLUS REMOVAL OF HERNIATED DISC LUMBAR 5-SACRAL 1 LEFT;  Surgeon: Israel Samuel MD;  [place] [facility] [place]     [place] CARDIOVERSION ELECTIVE ARRHYTHMIA EXTERNAL  06/03/2014    Description: Elective Cardioversion External;  Recorded: 06/03/2014;     [facility] CABG, VEIN, SINGLE  2011    Description: CABG (CABG);  Proc Date: 09/26/2011;  Comments: LIMA^LAD, SVG^OM, SVG^distal RCA    no family history of premature coronary artery disease Social History     Socioeconomic History     Marital status:      Spouse name: Not on file     Number of children: Not on file     Years of education: Not on file     Highest  education level: Not on file   Occupational History     Not on file   Tobacco Use     Smoking status: Former Smoker     Packs/day: 1.50     Years: 23.00     Pack years: 34.50     Quit date: 1986     Years since quittin.5     Smokeless tobacco: Never Used   Substance and Sexual Activity     Alcohol use: Yes     Alcohol/week: 1.0 standard drink     Comment: one beer per day     Drug use: No     Sexual activity: Never   Other Topics Concern     Not on file   Social History Narrative    .  3 children.  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     Social Determinants of Health     Financial Resource Strain: Not on file   Food Insecurity: Not on file   Transportation Needs: Not on file   Physical Activity: Not on file   Stress: Not on file   Social Connections: Not on file   Intimate Partner Violence: Not on file   Housing Stability: Not on file           Lab Results    Chemistry/lipid CBC Cardiac Enzymes/BNP/TSH/INR   Lab Results   Component Value Date    CHOL 118 2022    HDL 36 (L) 2022    TRIG 202 (H) 2022    BUN 28 2022     2022    CO2 31 2022    Lab Results   Component Value Date    WBC 6.6 2022    HGB 9.1 (L) 2022    HCT 29.9 (L) 2022    MCV 92 2022     2022    Lab Results   Component Value Date     (H) 2022    TSH 2.05 2022    INR 1.14 2022     No results found for: CKTOTAL, CKMB, TROPONINI       Weight:    Wt Readings from Last 3 Encounters:   22 92.4 kg (203 lb 9.6 oz)   22 95.2 kg (209 lb 12.8 oz)   22 93.4 kg (206 lb)       Allergies  Allergies   Allergen Reactions     Aspirin Other (See Comments)     Contraindicated due to xarelto use     Codeine Nausea and Vomiting     Pollen Extracts [Pollen Extract] Unknown     Scratchy throat     Vicodin [Hydrocodone-Acetaminophen] Nausea and Vomiting         Surgical History  Past Surgical History:   Procedure Laterality Date      EP BIV PACEMAKER INSERT N/A 6/11/2019    Procedure: EP Biventricular Pacemaker Insertion;  Surgeon: Durga Rajput MD;  Location: [facility] Cath Lab;  Service: Cardiology     IR FINE NEEDLE ASPIRATION W ULTRASOUND  6/23/2022     LAMINECTOMY LUMBAR TWO LEVELS Bilateral 6/16/2022    Procedure: DECOMPRESSIVE LUMBAR LAMINECTOMY LUMBAR 4-LUMBAR 5 AND LUMBAR 5-SACRAL 1 BILATERAL-LEFT SIDE FIRST-PLUS BILATERAL FORAMINOTOMIES PLUS REMOVAL OF HERNIATED DISC LUMBAR 5-SACRAL 1 LEFT;  Surgeon: Israel Samuel MD;  Location: [facility] Main OR     IA CARDIOVERSION ELECTIVE ARRHYTHMIA EXTERNAL  06/03/2014    Description: Elective Cardioversion External;  Recorded: 06/03/2014;     [facility]C CABG, VEIN, SINGLE  2011    Description: CABG (CABG);  Proc Date: 09/26/2011;  Comments: LIMA^LAD, SVG^OM, SVG^distal RCA       Social History  Tobacco:   History   Smoking Status     Former Smoker     Packs/day: 1.50     Years: 23.00     Quit date: 1/1/1986   Smokeless Tobacco     Never Used    Alcohol:   Social History    Substance and Sexual Activity      Alcohol use: Yes        Alcohol/week: 1.0 standard drink        Comment: one beer per day   Illicit Drugs:   History   Drug Use No       Family History  Family History   Problem Relation Age of Onset     Heart Disease Mother      Snoring Father      Heart Disease Father      Hypertension Father      Alzheimer Disease Father      No Known Problems Daughter      No Known Problems Daughter      No Known Problems Daughter      Chronic Obstructive Pulmonary Disease Sister      Hodgkin's lymphoma Brother      No Known Problems Son      Diabetes Sister      Substance Abuse Sister      Chronic Obstructive Pulmonary Disease Brother      Heart Disease Brother      Diabetes Brother      Substance Abuse Brother           Glenn Hoskins MD on 7/6/2022      cc: Moni Mcclain

## 2022-07-06 NOTE — PLAN OF CARE
Occupational Therapy Discharge Summary    Reason for therapy discharge:    Discharging to TCU.    Progress towards therapy goal(s). See goals on Care Plan in [facility] electronic health record for goal details.  Progressing towards all goals.    Therapy recommendation(s):    Recommend continued OT @ TCU for strengthening & continued ADL training & progressing towards independence.

## 2022-07-06 NOTE — PLAN OF CARE
Physical Therapy Discharge Summary    Reason for therapy discharge:    Discharged to transitional care facility.    Progress towards therapy goal(s). See goals on Care Plan in [facility] electronic health record for goal details.  Goals not met.  Barriers to achieving goals:   Pt is making progress towards the goals.   .    Therapy recommendation(s):    Continued therapy is recommended.  Rationale/Recommendations:  to improve mobilitiy and strength. .

## 2022-07-06 NOTE — DISCHARGE SUMMARY
[facility]  Hospitalist Discharge Summary      Date of Admission:  6/16/2022  Date of Discharge:  7/6/2022  Discharging Provider: Nelson Cruz MD  Discharge Service: Hospitalist Service    SUMMARY:  77 year old male with history of CAD, HFrEF, DM2, HTN, atrial fibrillation, hypothyroidism presented for planned neurosurgical intervention to treat herniated lumbar disc. 06/16 underwent decompressive lumbar laminectomy.  06/19 with post-op fever and started on vanco and zosyn.    06/22 MRI with fluid collection with possible rim enhancement which was aspirated.  Cultures were negative.   ID had recommended Augmentin x doxycycline for 1 week.  Clinically has been improving but repeat MRI 6/27 showed increased fluid collection in the laminectomy bed causing increased compression on cauda equina, as well as slightly increased subdural collection at L2-L4 level. Neurosurgery recommended continued monitoring for cauda equina and repeat MRI in 2 to 3 days.  Anticoagulation was held due to subdural fluid collection.       6/29 found to have right lower extremity DVT.  IV heparin started after discussion with neurosurgery. Repeat MRI 06/30 essetially stable with shifting of fluid.  07/01 IR aspiration of L4/5 fluid collection - NGTD.     Also with YOLANDA this admission and volume overload requiring IV diuresis.    Discharge Diagnoses   #Post-op fever  #Post-op fluid collection -   -S/P IR aspiration 07/01. negative gram stain and no growth so far.   -?seroma vs. Pseudomeningocele.    -Completed 1 week of Augmentin+doxy.  Not much to suggest need for abx again with seemingly sterile fluid collection.  Appreciate ID input.  -[id number] - positive  -repeat MRI 7/5 - low likelihood of infection     #Acute DVT of the right profundofemoral vein in the upper thigh -   -heparin gtt.  -Was on Xarelto prior to admission.    Will need to transition to warfarin if repeat surgery required  Lovenox bridging with  warfarin     #Acute on chronic HFrEF  #CAD s/p CABG -   -ECHO with EF 45-50%, moderately severe TR, mild MR,mild aortic stenosis mild pulm HTN.  -diuresed and switched to oral Bumex  -Cards - may eventually need intervention on tricuspid valve.     #YOLANDA on CKD3 - baseline 1.1-1.2s.  Post-op injury likely hemodynamic demetrius-operatively.  Resolved.      #Lumbar spinal stenosis with lumbar disc herniation  -Patient underwent scheduled decompressive lumbar laminectomy of L4-L5, L5-S1 with bilateral foraminotomies and removal of herniated disc in the L1-S1 region with neurosurgery on 6/16/2022.  -Scheduled Tylenol 3 times daily  -Gabapentin 600 mg p.o. twice daily  -Post op management per neurosurgery  -Decadron burst per Nsx - completed      #Post op urinary retention : started flomax.  Had Carpenter which is now removed.       #Constipation - resolved     #Chronic atrial fibrillation, sick sinus syndrome s/p PPM  -Lovenox bridging with warfarin loading     #DM2 with recurrent AM hypoglycemia - reports he is eating healthier here in hospital with much reduced insulin needs.  Steroid induced hyperglycemia with sugars 300s-400s - last dexamethasone dose 6/27 AM - improved significantly   -Stopped glimiperide and would not resume  -Continue Lantus to 15 units at HS, mealtime CHO coverage 1:16, low ISS      #Chronic left shoulder pain  -Started lidocaine and Voltaren trading on and off 4 times daily     #GERD -Pantoprazole 40 mg p.o. daily     #CORAZON -CPAP     #Hypothyroidism-Levothyroxine 25 mcg p.o. daily     #Normocytic anemia -Stable    Follow-ups Needed After Discharge   Follow-up Appointments     Follow Up and recommended labs and tests      Follow up with neurosurgery in 2-3 weeks with a repeat lumbar MRI with   and without contrast.   Repeat inflammatory markers and infections labs in the next 1-2 weeks and   fax to neurosurgery             Unresulted Labs Ordered in the Past 30 Days of this Admission     Date and Time  Order Name Status Description    6/23/2022  9:41 AM Anaerobic Bacterial Culture Routine Preliminary       These results will be followed up by neurosurgery    Discharge Disposition   Discharged to rehabilitation facility  Condition at discharge: Stable    Consultations This Hospital Stay   PHYSICAL THERAPY ADULT IP CONSULT  OCCUPATIONAL THERAPY ADULT IP CONSULT  HOSPITALIST IP CONSULT  SOCIAL WORK IP CONSULT  PHARMACY TO DOSE VANCO  NEPHROLOGY IP CONSULT  INFECTIOUS DISEASES IP CONSULT  PHYSICAL THERAPY ADULT IP CONSULT  OCCUPATIONAL THERAPY ADULT IP CONSULT  PHARMACY IP CONSULT  PHARMACY IP CONSULT  CARDIOLOGY IP CONSULT  PHARMACY IP CONSULT  PHARMACY TO DOSE WARFARIN    Code Status   Full Code    Time Spent on this Encounter   I, Nelson Cruz MD, personally saw the patient today and spent greater than 30 minutes discharging this patient.       Nelson Cruz MD  [address] [address]  Phone: [phone]  Fax: [phone]  ______________________________________________________________________    Physical Exam   Vital Signs: Temp: 98  F (36.7  C) Temp src: Oral BP: 123/61 Pulse: 62   Resp: 18 SpO2: 97 % O2 Device: None (Room air)    Weight: 203 lbs 9.6 oz  Constitutional: awake, alert, cooperative, no apparent distress, and appears stated age  ENT: normocepalic, without obvious abnormality, atramatic  Respiratory: No increased work of breathing, good air exchange, clear to auscultation bilaterally, no crackles or wheezing  Cardiovascular: normal apical pulses  and normal S1 and S2, 2/6 systolic murmur noted  GI: normal bowel sounds, soft and non-distended  Neurologic: Mental Status Exam:  Level of Alertness:   awake  Orientation:   person, place, time  Memory:   normal  Motor Exam:  moves all extremities well and symmetrically  Sensory:  Sensory intact       Primary Care Physician   Moni Mcclain    Discharge Orders      Adult Cardiology Eval   Referral      General info for SNF    Length of Stay Estimate: Short Term Care: Estimated # of Days <30  Condition at Discharge: Improving  Level of care:skilled   Rehabilitation Potential: Good  Admission H&P remains valid and up-to-date: Yes  Recent Chemotherapy: N/A  Use Nursing Home Standing Orders: Yes     Mantoux instructions    Give two-step Mantoux (PPD) Per Facility Policy Yes     Follow Up and recommended labs and tests    Follow up with neurosurgery in 2-3 weeks with a repeat lumbar MRI with and without contrast.   Repeat inflammatory markers and infections labs in the next 1-2 weeks and fax to neurosurgery     Reason for your hospital stay    Lumbar spine surgery     Glucose monitor nursing POCT    Before meals and at bedtime     Daily weights    Call Provider for weight gain of more than 2 pounds per day or 5 pounds per week.     Activity - Up ad bairon    *No lifting, pushing or pulling greater than 5-10 pound (this is about a gallon of milk).  *No repetitive bending, twisting, or jarring activities  *No overhead work  *No aerobic or strenuous activity  *No activities with increased risk of falls  *You may move about your home as tolerated  *You may walk up and down stairs as tolerated  *You may increase your activity slowly over the next 4-6 weeks    WALKING PROGRAM: As you can tolerate, walk daily-start with 5-10 minutes of continuous walking. This is in addition to the walking that you do as part of your daily activities. Increase the time that you walk by 5 minutes every couple of days. Do not exceed 30-45 minutes of continuous walking until seen in follow-up. Walking is the best exercise after surgery.  **Listen to your body, if you find that you are more painful or fatigued, you may need to proceed more slowly.    **Do not smoke or expose yourself to second hand smoke. Cigarette smoke can delay healing and cause complications.     DRIVING:  We recommend that you do not drive while taking  medications for pain or muscle spasms. Always read and follow the advice on your prescription bottle. If you have questions, speak with your pharmacist.  We recommend that you do not drive while wearing a brace, as it could limit your range of motion.    WORK: If you plan to return to work before you 4-6 weeks appointment, call and discuss with one of the nurses in the neurosurgery office.     USE OF ICE OR  HEAT:  *Icing can decrease post op swelling, thereby helping with post op pain control. Always protect your skin to prevent frostbite or burn.    *For the first 48 hours we recommend ice to the surgical area for 15-20 minutes at a time several times a day.      *Any longer than 15-20 minutes can cause damage to the tissues, including frostbite.     *Allow area to warm for at least 45 minutes or an hour between treatments.    *Ice as frequently as you wish, so long as the area is warm to touch and has normal sensation before repeating.    *After 48 hours, you may use heat or ice, which ever feels best to you.  Always remember to protect your skin, and to use no greater than 15-20 minutes at a time.     *You can make your own ice bags at home by mixing 3 parts water with 1 part rubbing alcohol in a Ziplock bag and placing in the freezer.  It will not freeze solid.      BOWEL MOVEMENT:  If you did not have a bowel movement (pooped) before you were discharged from the hospital, and do not have a bowel movement within 2 days of discharge. Please call our office and request to speak to a nurse.    Your insurance may not cover medications to treat or prevent  constipation.      There are many  over the counter medications for constipation including: Colace, Senakot, Dulcolax, and Miralax. In addition to medications eat a high fiber diet and drink plenty of water.    If you are taking narcotics, you should being taking medication daily for constipation.      If you develop loose or runny stools, stop taking the  medications for constipation.     Additional Discharge Instructions    PT/OT consult for lymphedema management     Additional Discharge Instructions    Call [phone] with the following symptoms:    *Temperature 101(fever) or greater or chills  *Redness, swelling or increased drainage from the wound  *Worsening pain not relieved by the pain prescription given  *Worsening or new onset of weakness, or numbness and tingling  *Loss or change in your ability to control bowel or bladder function  *Change in your ability to walk, talk, see or think  *Nausea, vomiting, headache  *If you did not have a bowel movement before leaving the hospital and your bowels have not moved within 48 hours of your discharge    !!!! IF YOU HAVE A SERIOUS OR THREATENING EMERGENCY, CALL 911 OR COME TO THE EMERGENCY ROOM.  IF YOUR CONDITIONS ALLOWS COME TO THE HOSPITAL WHERE YOUR SURGERY WAS PERFORMED.    QUESTIONS OR CONCERNS:   OUR OFFICE HOURS ARE FROM 9:00 A.M -4:30 P.M. MONDAY-FRIDAY.  AFTER OFFICE HOURS, CALLS ARE ANSWERED BY THE ON-CALL PROVIDER. PLEASE CALL WITH ROUTINE QUESTIONS DURING OFFICE HOURS. THE ON-CALL PROVIDER WILL NOT REFILL PRESCRIPTIONS.     Wound care (specify)    Lumbar spine incision - staples removed 7/6/2022 prior to discharge. Monitor wound for approximation and or drainage and notify surgery team. No need to leave it covered after 7/7/2022.     WOUND CARE WITH STAPLES:   * * Change the dressing at least daily, more frequently if necessary to keep the wound clean and dry.     Good handwashing can decrease the risk of infection.  Wash your hands before changing the dressing or touching the wound. If someone is helping you change the dressing, ensure that the person washes his/her hands. If you are unable to see the wound, have someone check the wound daily for redness, swelling or drainage.  A small amount of drainage is normal.         No tub baths or hot tubs until the wound is completely healed.    If you  develop redness, swelling, drainage, or temp 101 or greater, please call our office at      Physical Therapy Adult Consult    Evaluate and treat as clinically indicated.    Reason:  lumbar spine surgery, continued strength and gait training for lower extremity weakness  Acewraps or lymphedema wraps to lower extremities     Occupational Therapy Adult Consult    Evaluate and treat as clinically indicated.    Reason:  post-op lumbar spine surgery     Diet    Follow this diet upon discharge: Orders Placed This Encounter      Snacks/Supplements Adult: Glucerna; With Meals      Snacks/Supplements Adult: Gelatein sugar-free; Between Meals      Moderate Consistent Carb (60 g CHO per Meal) Diet       Significant Results and Procedures   Most Recent 3 CBC's:Recent Labs   Lab Test 07/05/22  0520 07/02/22 0210 06/29/22  1424   WBC 6.6 10.0 11.2*   HGB 9.1* 9.1* 9.9*   MCV 92 91 92    253 312     Most Recent 3 BMP's:Recent Labs   Lab Test 07/06/22  1123 07/06/22  0743 07/06/22  0530 07/05/22  0815 07/05/22  0520 07/04/22  1118 07/04/22  0839   NA  --   --  137  --  138  --  139   POTASSIUM  --   --  3.9  --  4.0  --  4.0   CHLORIDE  --   --  99  --  97*  --  97*   CO2  --   --  30  --  31  --  34*   BUN  --   --  33*  --  28  --  22   CR  --   --  1.32*  --  1.15  --  1.00   ANIONGAP  --   --  8  --  10  --  8   BARBARA  --   --  9.0  --  9.2  --  9.3   * 96 88   < > 100   < > 135*    < > = values in this interval not displayed.     Most Recent 2 LFT's:Recent Labs   Lab Test 07/02/22 0210 06/08/22  1608   AST 22 25   ALT 42 22   ALKPHOS 158* 136*   BILITOTAL 0.5 0.4   ,   Results for orders placed or performed during the hospital encounter of 06/16/22   Lateral Lumbar Spine for Level Confirmation [XR LUMBAR SPINE PORT 1  VIEW]    Narrative    EXAM: XR CROSSTABLE LATERAL LUMBAR SPINE PORTABLE  LOCATION: [facility]  DATE/TIME: 6/16/2022 8:30 AM    INDICATION: In OR for confirmation  of spine level by C arm or hard plate.  COMPARISON: None.  TECHNIQUE: CR Lumbar Spine.      Impression    IMPRESSION: There are 3 needles overlying the posterior soft tissues. The most superior needle is at the level of the L3-L4 disc space. The middle needle is at the level of the L4-L5 disc space. The most inferior needle is at the level of the superior   aspect of the S1 vertebral body.   XR Lumbar Spine Port 1 View    Narrative    EXAM: XR CROSSTABLE LATERAL LUMBAR SPINE PORTABLE  LOCATION: [facility]  DATE/TIME: 6/16/2022 9:10 AM    INDICATION: Lumbar Surgery  COMPARISON: CT lumbar spine without contrast 4/26/2022.  TECHNIQUE: CR Lumbar Spine.      Impression    IMPRESSION: Single portable crosstable lateral radiograph the lumbar spine was obtained. Surgical retraction devices over the posterior soft tissues at the L4 level. There is a surgical probe with the distal tip at the level of the L4 pedicles. Grade 1   anterolisthesis of L4 on L5.   XR Chest Port 1 View     Value    Radiologist flags (Urgent)     Left side lung nodule needing follow-up PA and lateral chest x-ray or chest CT.    Narrative    EXAM: XR CHEST PORT 1 VIEW  LOCATION: [facility]  DATE/TIME: 6/20/2022 10:05 AM    INDICATION: fever; post spine surgery  COMPARISON: 06/12/2019, 08/31/2012       Impression    IMPRESSION: Poststernotomy changes with multi lead left subclavian venous pacer. Leads are intact. Heart is slightly enlarged but unchanged.    There is a 9 x 5 mm ovoid, relatively dense nodule projecting over the anterior left third rib, not seen before. It's unclear if this is in the rib or the lung.     When patient is able, consider a follow-up formal PA and lateral chest x-ray or a noncontrast chest CT for reevaluation.    Lungs are otherwise clear without signs of pneumonia or failure.      [Access Center: Left side lung nodule needing follow-up PA and lateral chest x-ray or chest CT.        This report will be copied to the [facility] to ensure a provider acknowledges the finding. Access Center is available Monday through Friday 8am-3:30 pm.      US Renal Complete    Narrative    EXAM: US RENAL COMPLETE  LOCATION: [facility]  DATE/TIME: 6/20/2022 4:12 PM    INDICATION: YOLANDA  COMPARISON: Abdominal ultrasound dated 10/11/2013.  TECHNIQUE: Routine Bilateral Renal and Bladder Ultrasound.    FINDINGS:    RIGHT KIDNEY: 10.9 x 6.5 x 5.7 cm. Renal cortical thickness is 1.5 cm Normal without hydronephrosis or masses.     LEFT KIDNEY: 11.6 x 6.5 x 5.1 cm. Renal cortex thickness is 1.8 cm. Normal without hydronephrosis or masses.     BLADDER: Nondistended      Impression    IMPRESSION:  1.  Normal kidney ultrasound.   CT Chest w/o Contrast    Narrative    EXAM: CT CHEST W/O CONTRAST  LOCATION: [facility]  DATE/TIME: 6/20/2022 4:35 PM    INDICATION: Fever  COMPARISON: Portable AP view the chest 06/20/2022  TECHNIQUE: CT chest without IV contrast. Multiplanar reformats were obtained. Dose reduction techniques were used.  CONTRAST: None.    FINDINGS:   LUNGS AND PLEURA: Motion-related blurring slightly limits evaluation of the lung parenchyma. Small left and trace right pleural effusions layer into the posterior costophrenic sulci. There are no findings to suggest interstitial lung edema elsewhere. No   lung consolidation. Trachea and central airways are patent and normal caliber. Peripheral airways are not well assessed.    MEDIASTINUM: Four-chamber cardiac enlargement. Left subclavian approach CRT-P has right atrial appendage, right ventricle, and coronary sinus leads. No pericardial effusion. Enlarged main pulmonary artery measures 3.2 cm in diameter. No thoracic aortic   aneurysm. Moderate patchy arch and descending aorta atheromatous calcifications. The distal esophagus has mild wall thickening suggesting reflux esophagitis. No enlarged  mediastinal or hilar lymph nodes. Imaged thyroid gland is normal.    CORONARY ARTERY CALCIFICATION: Previous intervention (stents or CABG).    UPPER ABDOMEN: Gallbladder is predominantly decompressed. There are no actionable findings in the imaged upper abdomen.    MUSCULOSKELETAL: Large flowing degenerative osteophytes from T3 through the thoracolumbar junction. Solid osseous union of the median sternotomy.      Impression    IMPRESSION:     1.  Lung parenchymal evaluation is limited by motion-related blurring. No findings to suggest an active lung or airway inflammatory process.  2.  Small left and trace right pleural effusions.  3.  Four-chamber cardiac enlargement with CRT-P in place.  4.  Mild wall thickening of the distal esophagus which is often related to gastroesophageal reflux.     MR Lumbar Spine w/o & w Contrast    Addendum: 6/21/2022    Findings conveyed to the Neurosurgery team (Joan) at 1427 hours on 6/21/2022.      Narrative    EXAM: MR LUMBAR SPINE W/O and W CONTRAST  LOCATION: [facility]  DATE/TIME: 6/21/2022 11:52 AM    INDICATION: History of back pain.  COMPARISON: MRI lumbar spine 12/28/2021  CONTRAST: Gadolinium 10 mL IV  TECHNIQUE: Routine Lumbar Spine MRI without and with IV contrast.    FINDINGS: Nomenclature is based on 5 lumbar type vertebral bodies. Minimal grade 1 anterolisthesis of L4 on L5. Vertebral body heights and alignment otherwise maintained. Mildly heterogeneous bone marrow signal. No focal destructive osseous lesion.   Normal distal spinal cord and cauda equina with conus medullaris at L1-L2. Laminectomies L4-L5 and L5-S1. Patchy enhancement and edema throughout the operative bed. Small 6 mm maximum axial diameter fluid collection along the right L5-S1 facet joint   (series 10, image 15). Midline paraspinal collection largest at the L3-L4 level, measuring 5 x 2 cm in axial dimension (series 10, image 22). Ventral rim-enhancing epidural collection  extending from the L2 level cranially to the L5-S1 level caudally   (series 9, image 15), contributing to spinal canal stenosis detailed below. Small dorsal subdural collection/effusion (series 5, image 31). 1.8 x 0.9 cm axial dimension fluid collection within the L5-S1 laminectomy bed that appears to efface the adjacent   caudal thecal sac in combination with the ventral epidural collection and L5-S1 disc extrusion.    T12-L1: Normal disc height and signal. No herniation. Normal facets. No spinal canal or neural foraminal stenosis.     L1-L2: Disc desiccation with relative preservation of height. No herniation. Normal facets. No spinal canal or neural foraminal stenosis.    L2-L3: Disc desiccation with relative preservation of height. No herniation. Disc bulge. Normal facets. No spinal canal or neural foraminal stenosis.     L3-L4: Disc desiccation with relative preservation of height. No herniation. Severe concentric narrowing of the spinal canal secondary to a disc bulge, facet arthropathy and ligamentum flavum thickening. Moderate right and mild left neural foraminal   stenosis.    L4-L5: Disc desiccation with relative preservation of height. No herniation. Disc bulge. Facet arthropathy. Severe spinal canal stenosis. Moderate neural foraminal stenosis bilaterally.    L5-S1: Fluid signal within the disc space, likely on a degenerative or reactive basis with Modic 1 degenerative endplate changes and adjacent inflammatory changes present. Central disc extrusion. Facet arthropathy. Moderate to severe neural foraminal   stenosis bilaterally.      Impression    IMPRESSION:    Combination of postoperative changes, as well as additional findings suggestive of possible superimposed infection with a rim-enhancing ventral epidural collection extending from the L2 level cranially to the L5-S1 level caudally (series 9, image 15),   contributing to multilevel high-grade spinal canal stenosis. Additional multicompartmental  collections as detailed above.     Extensive background lumbar spondylosis.       XR Chest 2 Views    Narrative    EXAM: XR CHEST 2 VW  LOCATION: [facility]  DATE/TIME: 6/21/2022 10:51 AM    INDICATION: Pre MRI Cardiac device check   COMPARISON: Chest CT 06/20/2022 06/20/2022 and older studies.      Impression    IMPRESSION: Poststernotomy changes. Triple lead left subclavian venous pacer again noted.     Lungs are otherwise clear. Heart is slightly enlarged but unchanged. No signs of failure. Small bilateral pleural effusions are again seen noted on the lateral view. Changes of DISH in the thoracic spine.   XR Abdomen Port 1 View    Narrative    EXAM: XR ABDOMEN PORT 1 VIEWS  LOCATION: [facility]  DATE/TIME: 6/22/2022 2:35 PM    INDICATION: abdominal distension  COMPARISON: None.      Impression    IMPRESSION: No distended air-filled loops of small bowel. There is a small amount of throughout the colon. No definite intraperitoneal free air identified. Pacemaker leads are partially included in the field-of-view. Prior median sternotomy noted. There   are surgical staples over the abdomen and pelvis.     IR Fine Needle Aspiration w Imaging    Narrative    [facility]  INTERVENTIONAL NEURORADIOLOGY  6/23/2022 9:33 AM    FLUOROSCOPICALLY GUIDED PERCUTANEOUS PARASPINAL FLUID COLLECTION ASPIRATION     INDICATION: History of L4-L5 and L5-S1 laminectomies, now with low back and right leg pain, MRI showing posterior paraspinal fluid collection in the laminectomy bed. Request for disc aspiration for further diagnosis and management.    CONSENT: The procedure and its indications, major risks, benefits, and alternatives were discussed. Risks including, but not limited to, pain, hemorrhage, infection, nerve damage, spinal cord damage, cardiac and pulmonary dysfunction related to sedation.   The possibility that the procedure may be nondiagnostic was  discussed. Understanding was acknowledged, and a signed informed consent was obtained.    MODERATE SEDATION: Versed 1 mg. Fentanyl 50 mcg. The procedure was performed with the administration of intravenous conscious sedation with appropriate preoperative, intraoperative, and postoperative evaluation. 10 minutes of supervised face to face   conscious sedation time was provided by a radiology nurse under my direct supervision. During the time-out, immediately prior to administration of medications, the patient was re-assessed for adequacy to receive conscious sedation.    MEDICATIONS:  Versed 1 mg IV  Fentanyl 50 mcg IV    FLUOROSCOPIC TIME: 0.4 minutes     DOSE: Air Kerma: 15 mGy    ESTIMATED BLOOD LOSS: Minimal     PERFORMING PHYSICIAN(S):  Chano Rubio M.D.  [facility]     PROCEDURE: The patient was placed in prone position upon the fluoroscopic table. The skin of the back was prepped and draped in sterile fashion. Comparing with the 06/21/2022 lumbar spine MRI scan, the L5 level was fluoroscopically localized. The skin   over the L5 level was infiltrated with 1% lidocaine. A 20 G needle was then advanced into the disc space under fluoroscopic guidance. A total of 0.5 cc of reddish, yellowish fluid was aspirated and sent for further analysis. The needle was then removed.   A dressing was applied. The procedure appeared well-tolerated. There was no apparent complication.      Impression    CONCLUSION:     1. Technically successful fluoroscopically-guided aspiration of a posterior paraspinal lumbar fluid collection, with retrieval of 0.5 cc of yellowish, reddish fluid from the L5 level which was sent for further analysis.  _____________________________________________    CPT codes included for physician reference only: 16039/04603   MR Lumbar Spine w/o & w Contrast    Narrative    EXAM: MR LUMBAR SPINE W/O and W CONTRAST  LOCATION: [facility]  DATE/TIME: 6/27/2022 11:42  AM    INDICATION: Follow-up fluid collections, has been on oral antibiotics. No growth from fluid aspirate.  COMPARISON: MRI 06/21/2022.  CONTRAST: 10ml Gadavist  TECHNIQUE: Routine Lumbar Spine MRI without and with IV contrast.    FINDINGS:   Nomenclature is based on 5 lumbar-type vertebral bodies. Vertebral body heights and lateral lumbar alignment stable.    Again seen are prior L4-L5 and L5-S1 laminectomy changes. The previously noted fluid collection within the L4 and L5 laminectomy bed has increased considerably in size compared to 06/21/2022 now measuring approximately 5.8 cm craniocaudad by 3.3 cm AP by   3.2 cm transverse. This fluid collection has considerable mass effect on the posterior aspect of the thecal sac behind the L4 and L5 vertebral bodies. On the prior study, the thecal sac at the L5 level was fairly compressed. The thecal sac compression   has definitely progressed significantly behind the L4 vertebral body and slightly progressed behind the L5 vertebral body in the interval. Again the nature of this fluid collection is nonspecific.    Previously noted thin ventral epidural fluid collection from L2 through L5 has decreased in size in the interval. There is a thin subdural fluid collection posteriorly extending from L2 through the top of L4 slightly increased from prior.    Normal distal spinal cord and cauda equina with conus medullaris at L1. Postoperative changes in the paraspinal tissues as noted above. Unremarkable visualized upper bony pelvis.    T12-L1: Normal disc height and signal. No herniation. Normal facets. No spinal canal or neural foraminal stenosis.     L1-L2: Normal disc height with mild loss of disc signal. No herniation. Normal facets. No spinal canal or neural foraminal stenosis.    L2-L3: Normal disc height with mild loss of disc signal. No herniation. Normal facets. No spinal canal or neural foraminal stenosis.     L3-L4: Normal disc height with mild loss of disc signal  again seen. No herniation. Mild facet arthropathy. Mild central canal narrowing. Mild bilateral foraminal narrowing.    L4-L5: See discussion above. Normal disc height with mild loss of disc signal again seen. Prominent facet arthropathy with low-grade stable anterolisthesis L4 on L5. Laminectomy and medial facetectomy changes. Thecal sac compression as noted above. Mild   bilateral foraminal narrowing.    L5-S1: See discussion above. Laminectomy changes. Stable loss of disc height with stable mild increased T2 signal in the disc. Minimal endplate edema on the left unchanged. Stable central disc protrusion. Moderate facet arthropathy. Thecal sac   compression as described above. Moderate bilateral foraminal narrowing.      Impression    IMPRESSION:  1.  Again seen are previous L4-L5 and L5-S1 laminectomy changes. The previously noted fluid collection within the L4 and L5 laminectomy bed has increased considerably in size compared to MRI 06/21/2022. It does cause considerable mass effect on the   posterior aspect of the thecal sac behind the L4 and L5 vertebral bodies progressed from prior. Correlate for any cauda equina symptoms. This fluid is nonspecific.  2.  Previously noted ventral epidural thin fluid collection from L2 through L5 has decreased in the interval.  3.  There is a thin subdural fluid collection posteriorly from L2 through L4 slightly increased from prior.  4.  At the L3-L4 level, there is mild central canal narrowing and mild bilateral foraminal narrowing.  5.  Vertebral body heights and lateral alignment maintained.  6.  Findings discussed with nurse practitioner Melisa Lara from neurosurgery at 1:15 PM on 06/27/2022.   XR Chest 2 Views    Narrative    EXAM: XR CHEST 2 VW  LOCATION: [facility]  DATE/TIME: 6/27/2022 11:08 AM    INDICATION: PRE cardiac device check  COMPARISON: 06/21/2022 and older studies      Impression    IMPRESSION: Poststernotomy changes. Triple lead  left subclavian venous pacer again noted. Leads are intact. Cardiomegaly and trace effusions again noted. No signs of failure or pneumonia.   US Lower Extremity Venous Duplex Bilateral     Value    Radiologist flags (AA)     Deep venous thrombus in the right  profundofemoral vein in the right upper thigh as noted above.    Narrative    EXAM: US LOWER EXTREMITY VENOUS DUPLEX BILATERAL  LOCATION: [facility]  DATE/TIME: 6/29/2022 11:30 AM    INDICATION: calf tenderness  COMPARISON: None.  TECHNIQUE: Venous Duplex ultrasound of bilateral lower extremities with and without compression, augmentation and duplex. Color flow and spectral Doppler with waveform analysis performed.    FINDINGS: Exam includes the common femoral, femoral, popliteal veins as well as segmentally visualized deep calf veins and greater saphenous vein.    Limited exam due to body habitus and marked edema in both calves.     RIGHT: There is thrombus in the right profundofemoral vein measuring at least 2 cm in length. This is approximately 1.5 cm from the confluence with the femoral vein in the upper thigh to make the common femoral vein.      No superficial thrombophlebitis. There is an 4.8 x 1 x 3 cm popliteal fossa cyst. Extensive subcutaneous edema below the knee.    LEFT: No deep vein thrombosis. No superficial thrombophlebitis. There is a 4.4 x 2 x 1 cm left popliteal fossa cyst. Subcutaneous edema below the knee.      Impression    IMPRESSION:  1.  There is an acute deep venous thrombus in the right profundofemoral vein in the upper thigh. This is approximately 1.5 cm inferior to the confluence forming the right common femoral vein.  2.  There are bilateral popliteal fossa cyst.  3.  Marked subcutaneous edema below the knee in both legs.      [Critical Result: Deep venous thrombus in the right  profundofemoral vein in the right upper thigh as noted above.]    Finding was identified on 6/29/2022 12:25 PM.     his [place]  nurse was contacted by me on 6/29/2022 12:28 PM and verbalized understanding of the critical result.    XR Chest 2 Views    Narrative    EXAM: XR CHEST 2 VW  LOCATION: [facility]  DATE/TIME: 6/29/2022 4:15 PM    INDICATION: postoperative  COMPARISON: 6/27/2022      Impression    IMPRESSION: Multiple median sternotomy wires are seen. A left subclavian pacing device is present. Low lung volumes are noted. There is no focal airspace consolidation. No large pleural effusion. No pneumothorax. The heart size is stable.   MR Lumbar Spine w/o & w Contrast    Narrative    EXAM: MR LUMBAR SPINE W/O and W CONTRAST  LOCATION: [facility]  DATE/TIME: 6/30/2022 2:39 PM    INDICATION: Follow-up enlarging fluid collection at laminectomy site  COMPARISON: MRI lumbar spine 6/27/2022 and 6/21/2022  CONTRAST: 10ml Gadavist  TECHNIQUE: Routine Lumbar Spine MRI without and with IV contrast.    FINDINGS:   Nomenclature is based on 5 lumbar type vertebral bodies. Unchanged alignment including 5 mm anterolisthesis at L4-L5. Unchanged vertebral body heights. Persistent presumed Modic type one endplate edema and associated reactive enhancement at L5-S1.   Redemonstration of laminectomy changes at L4 and L5 with an irregular fluid collection at the laminectomy site and extending into the adjacent posterior paraspinal soft tissues that demonstrates minimal peripheral enhancement. This collection is similar   or minimally increased in size compared to the previous exam from 06/27/2022, currently measuring 3.4 x 3.4 x 5.6 cm in greatest transverse, AP, and craniocaudal dimensions respectively on axial and sagittal T2-weighted images. When measured in a similar   fashion on the previous exams collection measured 3.4 x 3.4 x 5.5 cm. As previously described there has been clear enlargement compared to the older exam from 06/21/2022. Suggestion of additional small subdural fluid extending cranially  from this region   up to the mid L3 level. The subdural collection seen previously centered at L2-L3 has resolved or redistributed. This fluid collection within the laminectomy defect continues to distort/efface the thecal sac and displace cauda equina nerve roots.   Relatively unchanged postoperative findings within the posterior paraspinal soft tissues. Mild paraspinal muscular atrophy. Unremarkable visualized bony pelvis.    T12-L1: Disc desiccation with preserved disc height. No herniation. Mild facet arthropathy. No spinal canal or neural foraminal stenosis.     L1-L2: Minor loss of disc height and signal. No herniation. Minor facet arthropathy. No spinal canal or neural foraminal stenosis.    L2-L3: Disc desiccation with preserved disc height. Slight annular bulge without focal disc herniation. Minor facet arthropathy. No spinal canal or neural foraminal stenosis.     L3-L4: Disc desiccation with preserved disc height. Circumferential disc bulge. Mild to moderate facet arthropathy and ligamentum flavum thickening. Small subdural fluid collection in the midline dorsally. Moderate associated spinal canal stenosis. Mild   right neural foraminal stenosis. Minimal left neural foraminal stenosis.    L4-L5: Disc desiccation with preserved disc height. Grade 1 anterolisthesis with shallow posterior disc bulge. Moderate facet arthropathy. Laminectomy decompression with lobular fluid collection at the laminectomy site that effaces the thecal sac and   impinges upon the cauda equina nerve roots. Unchanged moderate bilateral neural foraminal stenosis.    L5-S1: Mild loss of disc height with mixed disc signal. Presumed discectomy changes on the left. Broad-based right central disc protrusion. Mild facet arthropathy. Laminectomy changes with fluid collection in the laminectomy site effacing the thecal sac   and is placing cauda equina nerve roots as seen previously. Moderate right neural foraminal stenosis. Mild to moderate  left neural foraminal stenosis.      Impression    IMPRESSION:  1.  Redemonstrated postoperative changes related to laminectomy decompression at L4 and L5 with multilobular fluid collection at the laminectomy site that is similar or minimally larger in craniocaudal dimension compared to 06/27/2022. There is   persistent effacement of the thecal sac at L4 and L5 and associated impingement upon the distal cauda equina nerve roots. This may reflect postoperative seroma or potentially pseudomeningocele. The sterility of this collection is difficult to determine   by imaging.  2.  A thin subdural fluid collection seen previously at L2-L3 has likely redistributed, now identified in the midline at L3-L4.  3.  At L3-L4, moderate spinal canal stenosis with displacement of cauda equina nerve roots due to subdural fluid. This has slightly increased since the prior exam. Mild bilateral neural foraminal stenosis.  4.  No other significant interval changes.   XR Chest 2 Views    Narrative    EXAM: XR CHEST 2 VW  LOCATION: [facility]  DATE/TIME: 6/30/2022 2:11 PM    INDICATION: pre mri  COMPARISON: Chest radiographs from 06/29/2022.      Impression    IMPRESSION: Stable enlargement of the cardiac silhouette with tortuous thoracic aorta. Status post median sternotomy with left-sided cardiac pacer and leads overlying the right atrium, right ventricle, and coronary sinus. Evidence of prior CABG. No other   findings to suggest metallic foreign bodies. Left basilar atelectasis. There is blunting of the left costophrenic angle consistent with a small pleural effusion.   IR Fine Needle Aspiration w Fluoro    Narrative    [facility]  INTERVENTIONAL NEURORADIOLOGY  07/01/2022     FLUOROSCOPICALLY GUIDED PERCUTANEOUS PARASPINAL FLUID COLLECTION ASPIRATION     INDICATION: History of L4-L5 and L5-S1 laminectomies, now with low back and right leg pain, MRI showing enlarging posterior paraspinal  fluid collection in the laminectomy bed. Request for aspiration of this posterior paraspinal fluid collection for   further diagnosis and management.    CONSENT: The procedure and its indications, major risks, benefits, and alternatives were discussed. Risks including, but not limited to, pain, hemorrhage, infection, nerve damage, spinal cord damage, cardiac and pulmonary dysfunction related to sedation.   The possibility that the procedure may be nondiagnostic was discussed. Understanding was acknowledged, and a signed informed consent was obtained.    MODERATE SEDATION: Versed 1.5 mg. Fentanyl 75 mcg. The procedure was performed with the administration of intravenous conscious sedation with appropriate preoperative, intraoperative, and postoperative evaluation. 10 minutes of supervised face to face   conscious sedation time was provided by a radiology nurse under my direct supervision. During the time-out, immediately prior to administration of medications, the patient was re-assessed for adequacy to receive conscious sedation.    MEDICATIONS:  Versed 1.5 mg IV  Fentanyl 75 mcg IV    FLUOROSCOPIC TIME: 0.7 minutes     DOSE: Air Kerma: 33 mGy    ESTIMATED BLOOD LOSS: Minimal     PERFORMING PHYSICIAN(S):  Tyler Saini M.D.  [facility]     PROCEDURE: The patient was placed in prone position upon the fluoroscopic table. The skin of the back was prepped and draped in sterile fashion. Comparing with the 06/30/2022 lumbar spine MRI scan, the L5 level was fluoroscopically localized. The skin   over the L5 level was infiltrated with 1% lidocaine. A 20 G needle was then advanced into the posterior paraspinal fluid collection under fluoroscopic guidance. A total of 17 ml of blood tinged yellow translucent fluid was aspirated and sent for further   analysis. The needle was then removed. A dressing was applied. The procedure appeared well-tolerated. There was no apparent complication.      Impression    CONCLUSION:    Technically successful fluoroscopically-guided needle aspiration of an L5 level posterior paraspinal fluid collection. Aspiration yields 17 mL of blood-tinged yellow translucent fluid.   US Abdomen Limited    Addendum: 7/5/2022    Addendum to abdominal ultrasound 07/02/2022.    Under INDICATION, the following is missing.      INDICATION: Abdominal distention.    The addendum ends here.      Narrative    EXAM: US ABDOMEN LIMITED  LOCATION: [facility]  DATE/TIME: 7/2/2022 5:58 PM    INDICATION: evaluate for ascites.  COMPARISON: None.  TECHNIQUE: Limited abdominal ultrasound.    FINDINGS:    No ascites.     MR Lumbar Spine w/o & w Contrast    Narrative    EXAM: MR LUMBAR SPINE W/O and W CONTRAST  LOCATION: [facility]  DATE/TIME: 7/5/2022 1:06 PM    INDICATION: Follow-up lumbar collection post surgery and aspiration x2  COMPARISON: MRI lumbar spine 06/30/2022  CONTRAST: 9ml gadavist   TECHNIQUE: Routine Lumbar Spine MRI without and with IV contrast.    FINDINGS:   Nomenclature is based on 5 lumbar type vertebral bodies. Unchanged lumbar alignment including 5 mm anterolisthesis at L4-L5. Unchanged vertebral body heights. Mild persistent Modic type one endplate edema and associated reactive enhancement at L5-S1.   Laminectomy changes at L4 and L5 with a persistent irregular fluid collection in the laminectomy site with extension into the dorsal epidural space and posterior paraspinal soft tissues. This collection is minimally increased in size compared to   06/30/2022, currently measuring 3.4 x 3.6 x 5.8 cm in transverse, AP, and craniocaudal dimensions respectively compared to 3.4 x 3.4 x 5.6 cm and measured in a similar fashion previously. Mild persistent enhancement at the periphery of this collection   without a well-defined thick enhancing capsule. There is persistent effacement of the adjacent thecal sac and impingement upon the distal cauda equina nerve roots at  these levels. Otherwise unremarkable distal spinal cord and cauda equina with conus   medullaris at mid L1. Expected postoperative changes within the paraspinal soft tissues as seen previously. Ankylosis of the bilateral sacral iliac joints.    T12-L1: Disc desiccation with preserved disc height. No herniation. Mild facet arthropathy. No spinal canal or neural foraminal stenosis.     L1-L2: Minor loss of disc height and signal. No herniation. Minor facet arthropathy. No spinal canal or neural foraminal stenosis.    L2-L3: Disc desiccation with preserved disc height. Slight annular bulge without focal disc herniation. No facet arthropathy. No spinal canal or neural foraminal stenosis.     L3-L4: Disc desiccation with preserved disc height. Circumferential disc bulge. Mild to moderate facet arthropathy and ligamentum flavum thickening. The subdural fluid collection seen previously in the midline dorsally at this level is no longer   identified. Slightly improved patency of the central spinal canal with mild persistent trefoil spinal canal stenosis. Mild right neural foraminal stenosis. Minimal left neural foraminal stenosis.    L4-L5: Disc desiccation with preserved disc height. Grade 1 anterolisthesis with shallow posterior disc bulge and mild endplate spurring. Mild to moderate facet arthropathy. Laminectomy decompression. Lobular fluid collection within the laminectomy   defect and dorsal epidural space as seen previously. Effacement of the thecal sac and mass effect upon cauda equina nerve roots at this level. Persistent moderate bilateral neural foraminal stenosis.    L5-S1: Mild loss of disc height and mixed disc signal. Left subarticular discectomy changes. Persistent broad-based central disc protrusion. Mild facet arthropathy. Laminectomy decompression with persistent lobular fluid collection effacing the thecal   sac and impinging upon distal cauda equina nerve roots moderate right neural foraminal stenosis.  Mild to moderate left neural foraminal stenosis..      Impression    IMPRESSION:  1.  Multilobular fluid collection at the L4 and L5 laminectomy site is minimally increased in size compared to 2022. Persistent effacement of the adjacent thecal sac and mass effect upon distal cauda equina nerve roots. 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 A persistent/enlarging fluid collection is somewhat atypical for postoperative seroma. The sterility of this fluid remains uncertain, but there are no obvious findings to confirm abscess.  2.  Thin subdural collection seen previously at the L3-L4 level has resolved.  3.  Unchanged multilevel lumbar spondylosis as above.   XR Chest 2 Views    Narrative    EXAM: XR CHEST 2 VW  LOCATION: [facility]  DATE/TIME: 2022 12:49 PM    INDICATION: Pre MRI cardiac device check   COMPARISON: 2022 and older studies.      Impression    IMPRESSION: Right costophrenic angle is clipped on the lateral view in the left the AP view.     Poststernotomy changes. Triple lead left subclavian venous pacer again noted. Cardiomegaly is unchanged. No change in the very small left effusion. Heart is slightly enlarged but unchanged. No signs of failure. Lungs are clear.   Echocardiogram Complete     Value    LVEF  45-50% (mildly reduced)    Narrative    [id number]  [id number]  [id number]  [id number]^NICOLA^[place] [address]     Name: TERRELL BRIGGS  MRN: [id number]  : 1945  Study Date: 2022 01:35 PM  Age: 77 yrs  Gender: Male  Patient Location: [facility]  Reason For Study: SOB  Ordering Physician: EBENEZER PETERSEN  Performed By: JULIETA     BSA: 2.1 m2  Height: 70 in  Weight: 209 lb  ______________________________________________________________________________  Procedure  Complete Portable Echo Adult. Definity (NDC #44841-924) given  intravenously.  ______________________________________________________________________________  Interpretation Summary     The left ventricle is normal in size.  Left ventricular function is decreased. The ejection fraction is 45-50%  (mildly reduced).  There is a pacemaker lead in the right ventricle.  The left atrium is mildly dilated.  There is mild (1+) mitral regurgitation.  There is moderately severe (3+) tricuspid regurgitation.  Right ventricular systolic pressure is elevated, consistent with mild  pulmonary hypertension.  Mild valvular aortic stenosis.  ______________________________________________________________________________  Left Ventricle  The left ventricle is normal in size. Left ventricular function is decreased.  The ejection fraction is 45-50% (mildly reduced). There is normal left  ventricular wall thickness. Septal wall motion abnormality may reflect  pacemaker activation.     Right Ventricle  The right ventricle is not well visualized. There is a pacemaker lead in the  right ventricle.     Atria  The left atrium is mildly dilated. Right atrium not well visualized. There is  no color Doppler evidence of an atrial shunt.     Mitral Valve  Mitral valve leaflets appear normal. There is mild (1+) mitral regurgitation.     Tricuspid Valve  The tricuspid valve is not well visualized, but is grossly normal. There is  moderately severe (3+) tricuspid regurgitation. Right ventricular systolic  pressure is elevated, consistent with mild pulmonary hypertension.     Aortic Valve  The aortic valve is not well visualized. No aortic regurgitation is present.  Mild valvular aortic stenosis.     Pulmonic Valve  The pulmonic valve is not well seen, but is grossly normal. This degree of  valvular regurgitation is within normal limits.     Vessels  The aorta root is normal. Normal size ascending aorta.     Pericardium  There is no pericardial  effusion.  ______________________________________________________________________________  MMode/2D Measurements & Calculations  IVSd: 0.98 cm     LVIDd: 5.1 cm  LVIDs: 3.5 cm  LVPWd: 1.0 cm  FS: 31.6 %  LV mass(C)d: 187.4 grams  LV mass(C)dI: 88.1 grams/m2  Ao root diam: 2.7 cm  LA dimension: 4.1 cm  LA/Ao: 1.5  LVOT diam: 2.0 cm  LVOT area: 3.2 cm2  LA Volume Indexed (AL/bp): 36.5 ml/m2  RWT: 0.39     Time Measurements  MM HR: 64.0 BPM     Doppler Measurements & Calculations  MV E max toan: 139.0 cm/sec  MV max P.0 mmHg  MV mean P.9 mmHg  MV V2 VTI: 44.7 cm  MVA(VTI): 1.1 cm2  MV dec time: 0.20 sec  Ao V2 max: 215.8 cm/sec  Ao max P.0 mmHg  Ao V2 mean: 153.7 cm/sec  Ao mean P.1 mmHg  Ao V2 VTI: 37.9 cm  ELISABETH(I,D): 1.3 cm2  ELISABETH(V,D): 1.2 cm2  LV V1 max P.9 mmHg  LV V1 max: 84.5 cm/sec  LV V1 VTI: 15.1 cm  SV(LVOT): 47.6 ml  SI(LVOT): 22.4 ml/m2  PA acc time: 0.07 sec  TR max toan: 328.5 cm/sec  TR max P.2 mmHg  AV Toan Ratio (DI): 0.39  ELISABETH Index (cm2/m2): 0.59  E/E' av.6  Lateral E/e': 11.5  Medial E/e': 15.8     ______________________________________________________________________________  Report approved by: Caatrino Rudd 2022 03:23 PM               Discharge Medications   Current Discharge Medication List      START taking these medications    Details   bacitracin 500 UNIT/GM external ointment Apply topically 2 times daily for 5 days    Associated Diagnoses: Lumbar stenosis with neurogenic claudication      diclofenac (VOLTAREN) 1 % topical gel Apply 2 g topically 2 times daily    Associated Diagnoses: Spinal stenosis of lumbar region with neurogenic claudication      enoxaparin ANTICOAGULANT (LOVENOX) 100 MG/ML syringe Inject 0.9 mLs (90 mg) Subcutaneous every 12 hours for 4 days    Associated Diagnoses: Acute deep vein thrombosis (DVT) of femoral vein of right lower extremity (H)      gabapentin (NEURONTIN) 300 MG capsule Take 2 capsules (600 mg) by mouth 2 times  daily    Associated Diagnoses: Spinal stenosis of lumbar region with neurogenic claudication      insulin lispro (HUMALOG KWIKPEN) 100 UNIT/ML (1 unit dial) KWIKPEN Do Not give Correction Insulin if Pre-Meal BG less than 140.   For Pre-Meal  - 164 give 1 unit.   For Pre-Meal  - 189 give 2 units.   For Pre-Meal  - 214 give 3 units.   For Pre-Meal  - 239 give 4 units.   For Pre-Meal  - 264 give 5 units.   For Pre-Meal  - 289 give 6 units.   For Pre-Meal  - 314 give 7 units.   For Pre-Meal  - 339 give 8 units.   For Pre-Meal  - 364 give 9 units.   For Pre-Meal BG greater than or equal to 365 give 10 units  To be given with prandial insulin, and based on pre-meal blood glucose.   Notify provider if glucose greater than or equal to 350 mg/dL after administration of correction dose.  Qty: 15 mL, Refills: 0    Associated Diagnoses: Type 2 diabetes mellitus with hyperglycemia, with long-term current use of insulin (H)      lidocaine (XYLOCAINE) 5 % external ointment Apply topically 2 times daily    Associated Diagnoses: Spinal stenosis of lumbar region with neurogenic claudication      oxyCODONE (ROXICODONE) 5 MG tablet Take 0.5-1 tablets (2.5-5 mg) by mouth every 4 hours as needed for moderate pain or moderate to severe pain  Qty: 20 tablet, Refills: 0    Associated Diagnoses: Spinal stenosis of lumbar region, unspecified whether neurogenic claudication present      !! polyethylene glycol (MIRALAX) 17 g packet Take 17 g by mouth daily as needed for constipation    Associated Diagnoses: Lumbar stenosis with neurogenic claudication      !! polyethylene glycol (MIRALAX) 17 g packet Take 17 g by mouth 2 times daily Until having regular bowel movements    Associated Diagnoses: Spinal stenosis of lumbar region with neurogenic claudication      senna-docusate (SENOKOT-S/PERICOLACE) 8.6-50 MG tablet Take 2 tablets by mouth 2 times daily Hold for loose stools    Associated  Diagnoses: Spinal stenosis of lumbar region with neurogenic claudication      tamsulosin (FLOMAX) 0.4 MG capsule Take 1 capsule (0.4 mg) by mouth daily For post-op urinary retention    Associated Diagnoses: Spinal stenosis of lumbar region with neurogenic claudication       !! - Potential duplicate medications found. Please discuss with provider.      CONTINUE these medications which have CHANGED    Details   insulin glargine (LANTUS PEN) 100 UNIT/ML pen Inject 15 Units Subcutaneous At Bedtime  Qty: 15 mL    Comments: If Lantus is not covered by insurance, may substitute Basaglar or Semglee or other insulin glargine product per insurance preference at same dose and frequency.    Associated Diagnoses: Type 2 diabetes mellitus with hyperglycemia, with long-term current use of insulin (H)      lisinopril (ZESTRIL) 20 MG tablet Take 1 tablet (20 mg) by mouth daily  Qty: 30 tablet, Refills: 0    Associated Diagnoses: Acute on chronic diastolic congestive heart failure (H)      zinc gluconate 50 MG tablet Take 1 tablet (50 mg) by mouth daily    Associated Diagnoses: Vitamin deficiency         CONTINUE these medications which have NOT CHANGED    Details   acetaminophen (TYLENOL) 500 MG tablet Take 1,000-1,500 mg by mouth every 6 hours as needed for mild pain      bumetanide (BUMEX) 1 MG tablet Take 3 mg by mouth daily      cholecalciferol 50 MCG (2000 UT) CAPS Take 2 capsules by mouth daily      coenzyme Q-10 200 MG CAPS capsule Take 200 mg by mouth daily      diphenhydrAMINE-acetaminophen (TYLENOL PM)  MG tablet Take 3 tablets by mouth At Bedtime      ferrous sulfate (FEROSUL) 325 (65 Fe) MG tablet Take 1 tablet by mouth 3 times daily      levothyroxine (SYNTHROID/LEVOTHROID) 25 MCG tablet Take 25 mcg by mouth daily      metFORMIN (GLUCOPHAGE) 1000 MG tablet Take 1,000 mg by mouth 2 times daily (with meals)      Omega-3 Fatty Acids (FISH OIL) 1200 MG capsule Take 1,200 mg by mouth daily      rosuvastatin (CRESTOR)  10 MG tablet Take 10 mg by mouth At Bedtime      vitamin C (ASCORBIC ACID) 500 MG tablet Take 500 mg by mouth 3 times daily Take with iron supplement         STOP taking these medications       gabapentin (NEURONTIN) 600 MG tablet Comments:   Reason for Stopping:         glimepiride (AMARYL) 4 MG tablet Comments:   Reason for Stopping:         omeprazole (PRILOSEC) 20 MG DR capsule Comments:   Reason for Stopping:         XARELTO ANTICOAGULANT 20 MG TABS tablet Comments:   Reason for Stopping:             Allergies   Allergies   Allergen Reactions     Aspirin Other (See Comments)     Contraindicated due to xarelto use     Codeine Nausea and Vomiting     Pollen Extracts [Pollen Extract] Unknown     Scratchy throat     Vicodin [Hydrocodone-Acetaminophen] Nausea and Vomiting

## 2022-07-06 NOTE — TELEPHONE ENCOUNTER
EMIL visit 7/13/2022 with Dr Samuel in clinic with additional appt to follow depending on clinical exam     S/P lumbar decompression 6/16.   Staples have been removed prior to discharge.     Discharged to TCU today     SAAD Manrique  [facility]  O: [phone]

## 2022-07-06 NOTE — PROGRESS NOTES
Care Management Follow Up    Length of Stay (days): 20    Expected Discharge Date: 07/06/2022     Concerns to be Addressed: discharge planning       Patient plan of care discussed at interdisciplinary rounds: Yes    Anticipated Discharge Disposition: Transitional Care     Anticipated Discharge Services: Other (see comment) (therapy services)    Anticipated Discharge DME: None    Patient/family educated on Medicare website which has current facility and service quality ratings: yes    Education Provided on the Discharge Plan:  Yes    Patient/Family in Agreement with the Plan: yes    Referrals Placed by CM/SW: Post Acute Facilities    Private pay costs discussed: transportation costs    Additional Information: ELBA informed by hospitalist that pt would likely be medically ready to discharge this evening or tomorrow morning at the latest.  ELBA spoke with Leyla at [facility] and updated her regarding pt anticipated to be medically ready this evening or early tomorrow.  Leyla would prefer that pt admits tomorrow morning.  Discussed ride time of 11 tomorrow for pt, which would get pt to facility by around 1 PM.      ELBA attempted to reach pt's son Abelardo and called several times but kept getting voicemail.  ELBA spoke with pt's daughter-in-law Vania regarding pt potentially discharging tomorrow around 11 AM tomorrow.  Vania informed ELBA that her mother was just put on comfort cares and is in the ICU so she and Abelardo would likely not be able to transport pt tomorrow.  Discussed transportation costs for [facility] ride and Vania agreed to cost and stated that she and Abelardo planned to cover the cost for pt.  ELBA set up ride for pt via [facility] at 11 AM on 7/6/22.      ELBA met with pt (and pt's son Abelardo who is in pt's room visiting).  Discussed that pt may be able to discharge tomorrow and that ELBA set up a ride for pt tomorrow at 11 AM.  ELBA noted that pt still on Heparin drip.  Pt and son are aware pt would need to be off of this  in order to discharge.  Son stated that Vania's mom is in ICU here at [facility] and that he would be going downstairs shortly to pay his respects.      SW spoke with hospitalist again briefly regarding if pt still discharging tomorrow given he is still on Heparin drip.  Hospitalist stated that ID was needing to review MRI results and he will see if ID has done so yet and then could update note.        TONY Thompson, SHABBIR 07/06/22 8:25 AM

## 2022-07-06 NOTE — PLAN OF CARE
Problem: Bleeding (Spinal Surgery)  Goal: Absence of Bleeding  Outcome: Ongoing, Progressing     Problem: Infection (Spinal Surgery)  Goal: Absence of Infection Signs and Symptoms  Outcome: Ongoing, Progressing     Problem: Neurologic Impairment (Spinal Surgery)  Goal: Optimal Neurologic Function  Outcome: Ongoing, Progressing     Problem: Pain (Spinal Surgery)  Goal: Acceptable Pain Control  Outcome: Ongoing, Progressing  Intervention: Prevent or Manage Pain  Recent Flowsheet Documentation  Taken 7/5/2022 1610 by Telly Luke RN  Pain Management Interventions: medication offered but refused     Problem: Postoperative Nausea and Vomiting (Spinal Surgery)  Goal: Nausea and Vomiting Relief  Outcome: Ongoing, Progressing     Problem: Postoperative Urinary Retention (Spinal Surgery)  Goal: Effective Urinary Elimination  Outcome: Ongoing, Progressing     Problem: Respiratory Compromise (Spinal Surgery)  Goal: Effective Oxygenation and Ventilation  Outcome: Ongoing, Progressing     Pt alert and oriented. Pt rated lower back pain at 2-3/10. Pt refused PRN pain medication when offered. Pt did received scheduled acetaminophen and gabapentin for HS. Pre-prandial BG of 162. Pt received insulin per sliding scale and carb count. HS BG of 127. Lower back dressing was clean, dry, and intact prior to changing. Staples intact. Incision site was slightly pink/red. No drainage at incision site. Abrasion still intact to demetrius-wound. Bacitracin placed to affected site. Per Neurosurgery, plan is to observe pt at this time; no immediate treatment needed and pt can discharge per cardiologist and hospitalist. IV heparin infusing at 13 mL/hr. Anti-Xa re-check in AM (7/6/2022). Call light is within reach.

## 2022-07-07 ENCOUNTER — TELEPHONE (OUTPATIENT)
Dept: CARDIOLOGY | Facility: CLINIC | Age: 77
End: 2022-07-07

## 2022-07-07 DIAGNOSIS — I25.5 ISCHEMIC CARDIOMYOPATHY: Primary | ICD-10-CM

## 2022-07-07 PROBLEM — N17.9 ACUTE KIDNEY FAILURE, UNSPECIFIED (H): Status: ACTIVE | Noted: 2022-07-07

## 2022-07-07 NOTE — TELEPHONE ENCOUNTER
----- Message from Almita Davies sent at 7/7/2022  9:50 AM CDT -----  Regarding: RE: post hospital f/u appt is requested    SECOND REQUEST pt now discharged  There is no order on this patient, please place an order.  Thank you       ----- Message -----  From: Ngozi Dowd RN  Sent: 7/7/2022   7:22 AM CDT  To: [facility] Scheduling Registration Pool - [facility]  Subject: post hospital f/u appt is requested    SECON#    This pt has d/c'd to home now. Please call ASAP to arrange follow up appt with [facility] EMIL   Thank you  Second request!    Ngozi Dowd RN BSN, CHFN

## 2022-07-08 ENCOUNTER — TELEPHONE (OUTPATIENT)
Dept: NEUROSURGERY | Facility: CLINIC | Age: 77
End: 2022-07-08

## 2022-07-08 LAB — BACTERIA ASPIRATE CULT: NORMAL

## 2022-07-10 ENCOUNTER — TELEPHONE (OUTPATIENT)
Dept: NEUROSURGERY | Facility: CLINIC | Age: 77
End: 2022-07-10

## 2022-07-10 NOTE — TELEPHONE ENCOUNTER
Thomas calling from TCU in [place] to report significant Right hip and buttocks pain today. Denies leg pain, numbness or tingling. States pain present prior to PT but now after PT very severe. Nothing they have availble at the TCU is helpful. He believes he is getting his gabapentin and oxycodone. Denies back pain.     Sciatic nerve issues before - feels similar but is excruciating and constant    [place], [place] TCU   Thomas does not know the phone number or the name of the TCU. He will ask the nurses to call me or will call me back with name/number. Will give a few doses of steroids. He has appointment  7/13/2022 with us.     1400 ADDENDUM: called dexamethasone 3 mg tid x 3 days to TCU. They report he is getting 2.5-5 mg oxycodone every 4 hours as needed and tylenol 1000 mg. Phone number     SAAD Manrique  [facility]  O: [phone]

## 2022-07-13 ENCOUNTER — TRANSFERRED RECORDS (OUTPATIENT)
Dept: MEDSURG UNIT | Facility: HOSPITAL | Age: 77
End: 2022-07-13

## 2022-07-13 ENCOUNTER — HOSPITAL ENCOUNTER (INPATIENT)
Facility: HOSPITAL | Age: 77
LOS: 4 days | Discharge: NURSING, REHAB OR LONG-TERM CARE | DRG: 604 | End: 2022-07-19
Attending: EMERGENCY MEDICINE | Admitting: INTERNAL MEDICINE
Payer: MEDICARE

## 2022-07-13 ENCOUNTER — HOSPITAL ENCOUNTER (OUTPATIENT)
Dept: ULTRASOUND IMAGING | Facility: HOSPITAL | Age: 77
Discharge: HOME | DRG: 604 | End: 2022-07-13
Attending: NURSE PRACTITIONER | Admitting: NURSE PRACTITIONER
Payer: MEDICARE

## 2022-07-13 ENCOUNTER — MEDICAL CORRESPONDENCE (OUTPATIENT)
Dept: MEDSURG UNIT | Facility: HOSPITAL | Age: 77
End: 2022-07-13

## 2022-07-13 ENCOUNTER — OFFICE VISIT (OUTPATIENT)
Dept: NEUROSURGERY | Facility: CLINIC | Age: 77
End: 2022-07-13
Payer: MEDICARE

## 2022-07-13 ENCOUNTER — TELEPHONE (OUTPATIENT)
Dept: NEUROSURGERY | Facility: CLINIC | Age: 77
End: 2022-07-13

## 2022-07-13 VITALS
HEART RATE: 81 BPM | DIASTOLIC BLOOD PRESSURE: 46 MMHG | HEIGHT: 70 IN | OXYGEN SATURATION: 98 % | BODY MASS INDEX: 29.06 KG/M2 | SYSTOLIC BLOOD PRESSURE: 98 MMHG | WEIGHT: 203 LBS

## 2022-07-13 DIAGNOSIS — I10 ESSENTIAL HYPERTENSION: ICD-10-CM

## 2022-07-13 DIAGNOSIS — I48.21 PERMANENT ATRIAL FIBRILLATION (H): Primary | ICD-10-CM

## 2022-07-13 DIAGNOSIS — S30.0XXA TRAUMATIC HEMATOMA OF BUTTOCK, INITIAL ENCOUNTER: ICD-10-CM

## 2022-07-13 DIAGNOSIS — M51.26 LUMBAR HERNIATED DISC: ICD-10-CM

## 2022-07-13 DIAGNOSIS — I82.411 ACUTE DEEP VEIN THROMBOSIS (DVT) OF FEMORAL VEIN OF RIGHT LOWER EXTREMITY (H): ICD-10-CM

## 2022-07-13 DIAGNOSIS — M48.061 SPINAL STENOSIS, LUMBAR REGION, WITHOUT NEUROGENIC CLAUDICATION: ICD-10-CM

## 2022-07-13 DIAGNOSIS — E11.65 TYPE 2 DIABETES MELLITUS WITH HYPERGLYCEMIA, WITH LONG-TERM CURRENT USE OF INSULIN (H): ICD-10-CM

## 2022-07-13 DIAGNOSIS — M43.10 ACQUIRED SPONDYLOLISTHESIS: ICD-10-CM

## 2022-07-13 DIAGNOSIS — I82.401 DEEP VEIN THROMBOSIS (DVT) OF RIGHT LOWER EXTREMITY, UNSPECIFIED CHRONICITY, UNSPECIFIED VEIN (H): Primary | ICD-10-CM

## 2022-07-13 DIAGNOSIS — D64.9 ANEMIA, UNSPECIFIED TYPE: ICD-10-CM

## 2022-07-13 DIAGNOSIS — I50.9 CONGESTIVE HEART FAILURE, UNSPECIFIED HF CHRONICITY, UNSPECIFIED HEART FAILURE TYPE (H): ICD-10-CM

## 2022-07-13 DIAGNOSIS — I82.401 DEEP VEIN THROMBOSIS (DVT) OF RIGHT LOWER EXTREMITY, UNSPECIFIED CHRONICITY, UNSPECIFIED VEIN (H): ICD-10-CM

## 2022-07-13 DIAGNOSIS — Z79.4 TYPE 2 DIABETES MELLITUS WITH HYPERGLYCEMIA, WITH LONG-TERM CURRENT USE OF INSULIN (H): ICD-10-CM

## 2022-07-13 PROCEDURE — 36430 TRANSFUSION BLD/BLD COMPNT: CPT

## 2022-07-13 PROCEDURE — 96361 HYDRATE IV INFUSION ADD-ON: CPT

## 2022-07-13 PROCEDURE — 96360 HYDRATION IV INFUSION INIT: CPT

## 2022-07-13 PROCEDURE — 93971 EXTREMITY STUDY: CPT | Mod: RT

## 2022-07-13 PROCEDURE — 99285 EMERGENCY DEPT VISIT HI MDM: CPT | Mod: 25

## 2022-07-13 PROCEDURE — 99213 OFFICE O/P EST LOW 20 MIN: CPT | Mod: 24 | Performed by: NURSE PRACTITIONER

## 2022-07-13 PROCEDURE — 51798 US URINE CAPACITY MEASURE: CPT

## 2022-07-13 PROCEDURE — C9803 HOPD COVID-19 SPEC COLLECT: HCPCS

## 2022-07-13 NOTE — PROGRESS NOTES
Neurosurgery progress note    A/P  Thomas is a 76yo M S/P DECOMPRESSIVE LUMBAR LAMINECTOMY LUMBAR 4-LUMBAR 5 AND LUMBAR 5-SACRAL 1 BILATERAL-LEFT SIDE FIRST-PLUS BILATERAL FORAMINOTOMIES PLUS REMOVAL OF HERNIATED DISC LUMBAR 5-SACRAL 1 LEFT with Dr Samuel on 6/19/22. Arachnoid bleb intraop, covered with durgen, duraseal. No CSF leak. Was kept flat for 24hr. IR aspiration done 6/23 for fluid collection and repeated 7/1 dt enlarging collection. Acute RLE DVT found 6/29, heparin gtt started. BETA2 7/1- positive. 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     He is now on warfarin for RLE DVT (last inr per facility notes from 7/11 was 1.8). He has ongoing R lower back pain into R hip, with a feeling of weakness/fatigue in both legs after he works 'too hard' with PT. Feels like he can't walk very well. This eventually resolves. Stable chronic distal juju LE tingling related to neuropathy. He is neurologically intact but has RLE bruising from the lower leg to the thigh and what looks like golf ball sized goose egg on the side of his lower leg. He does have a DVT in this extremity. We will order stat RLE US today for further evaluation. If worsening clot, might need to go to ER.  We will repeat a new lumbar MRI on a routine basis within the next 1-2 wks for this patient to recheck pseudomeningocele. Dr Samuel will need to review new images and decide whether surgery is indicated    Patient was seen by Dr Samuel at time of appt    Plan  1. RLE ultrasound TODAY to recheck R Leg (4:55pm today at the clinic at [address] [place] [address] [address])  2.  If worsening/profuse clot- may need to be admitted through ER for IV anticoagulation.   3. Repeat lumbar MRI w wo contrast within next 1-2wks to recheck pseudomeningocele. Will need to be reviewed by Dr Samuel and plan made ?surgery. Can be done inpatient if needs to be admitted.    Exam:    General: pt seated  in wheelchair appears comfortable in NAD, no SOB     RLE bruising from the lower leg to the thigh and what looks like golf ball sized goose egg on the side of his lower leg. Leg appears yellow    Full strength juju lower extremities throughout.    Sensation decreased distally juju per baseline. Sensation prox legs intact.  Incision is healing nicely small distal scab, very mild superior aspect swelling. No redness, discharge.      Imaging:  EXAM: MR LUMBAR SPINE W/O and W CONTRAST  LOCATION: [facility]  DATE/TIME: 7/5/2022 1:06 PM     INDICATION: Follow-up lumbar collection post surgery and aspiration x2  COMPARISON: MRI lumbar spine 06/30/2022  CONTRAST: 9ml gadavist   TECHNIQUE: Routine Lumbar Spine MRI without and with IV contrast.     FINDINGS:   Nomenclature is based on 5 lumbar type vertebral bodies. Unchanged lumbar alignment including 5 mm anterolisthesis at L4-L5. Unchanged vertebral body heights. Mild persistent Modic type one endplate edema and associated reactive enhancement at L5-S1.   Laminectomy changes at L4 and L5 with a persistent irregular fluid collection in the laminectomy site with extension into the dorsal epidural space and posterior paraspinal soft tissues. This collection is minimally increased in size compared to   06/30/2022, currently measuring 3.4 x 3.6 x 5.8 cm in transverse, AP, and craniocaudal dimensions respectively compared to 3.4 x 3.4 x 5.6 cm and measured in a similar fashion previously. Mild persistent enhancement at the periphery of this collection   without a well-defined thick enhancing capsule. There is persistent effacement of the adjacent thecal sac and impingement upon the distal cauda equina nerve roots at these levels. Otherwise unremarkable distal spinal cord and cauda equina with conus   medullaris at mid L1. Expected postoperative changes within the paraspinal soft tissues as seen previously. Ankylosis of the bilateral sacral iliac  joints.     T12-L1: Disc desiccation with preserved disc height. No herniation. Mild facet arthropathy. No spinal canal or neural foraminal stenosis.      L1-L2: Minor loss of disc height and signal. No herniation. Minor facet arthropathy. No spinal canal or neural foraminal stenosis.     L2-L3: Disc desiccation with preserved disc height. Slight annular bulge without focal disc herniation. No facet arthropathy. No spinal canal or neural foraminal stenosis.      L3-L4: Disc desiccation with preserved disc height. Circumferential disc bulge. Mild to moderate facet arthropathy and ligamentum flavum thickening. The subdural fluid collection seen previously in the midline dorsally at this level is no longer   identified. Slightly improved patency of the central spinal canal with mild persistent trefoil spinal canal stenosis. Mild right neural foraminal stenosis. Minimal left neural foraminal stenosis.     L4-L5: Disc desiccation with preserved disc height. Grade 1 anterolisthesis with shallow posterior disc bulge and mild endplate spurring. Mild to moderate facet arthropathy. Laminectomy decompression. Lobular fluid collection within the laminectomy   defect and dorsal epidural space as seen previously. Effacement of the thecal sac and mass effect upon cauda equina nerve roots at this level. Persistent moderate bilateral neural foraminal stenosis.     L5-S1: Mild loss of disc height and mixed disc signal. Left subarticular discectomy changes. Persistent broad-based central disc protrusion. Mild facet arthropathy. Laminectomy decompression with persistent lobular fluid collection effacing the thecal   sac and impinging upon distal cauda equina nerve roots moderate right neural foraminal stenosis. Mild to moderate left neural foraminal stenosis..                                                                      IMPRESSION:  1.  Multilobular fluid collection at the L4 and L5 laminectomy site is minimally increased in  size compared to 06/30/2022. Persistent effacement of the adjacent thecal sac and mass effect upon distal cauda equina nerve roots. 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 A persistent/enlarging fluid collection is somewhat atypical for postoperative seroma. The sterility of this fluid remains uncertain, but there are no obvious findings to confirm abscess.  2.  Thin subdural collection seen previously at the L3-L4 level has resolved.  3.  Unchanged multilevel lumbar spondylosis as above.  Noreen Lynn FNP-C  [facility]  O. [phone]

## 2022-07-13 NOTE — TELEPHONE ENCOUNTER
Rec'd a phone call from [facility] regarding critical lab results taken this morning. Care staff reporting pt's hemoglobin is 6.1 and hematocrit is 19.5. They are requesting that Neurosurgery order a blood transfusion for the pt.     Informed care staff that Neurosurgery providers do not order blood transfusions and that their supervising nurse practitioner or physician is welcome to order a transfusion if this can be performed at their facility after further evaluation of the pt.     Alternatively, the pt should present to the emergency room for further work up as he is currently having an ultrasound done of his RLE to rule out a blood clot.     Message sent to on call EMIL.     Barbara Robbins RN

## 2022-07-13 NOTE — LETTER
7/13/2022         RE: Thomas Steve  [id number] [facility] [address]        Dear Colleague,    Thank you for referring your patient, Thomas Steve, to the [facility]. Please see a copy of my visit note below.      Neurosurgery progress note    A/P  Thomas is a 76yo M S/P DECOMPRESSIVE LUMBAR LAMINECTOMY LUMBAR 4-LUMBAR 5 AND LUMBAR 5-SACRAL 1 BILATERAL-LEFT SIDE FIRST-PLUS BILATERAL FORAMINOTOMIES PLUS REMOVAL OF HERNIATED DISC LUMBAR 5-SACRAL 1 LEFT with Dr Samuel on 6/19/22. Arachnoid bleb intraop, covered with durgen, duraseal. No CSF leak. Was kept flat for 24hr. IR aspiration done 6/23 for fluid collection and repeated 7/1 dt enlarging collection. Acute RLE DVT found 6/29, heparin gtt started. BETA2 7/1- positive. 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     He is now on warfarin for RLE DVT (last inr per facility notes from 7/11 was 1.8). He has ongoing R lower back pain into R hip, with a feeling of weakness/fatigue in both legs after he works 'too hard' with PT. Feels like he can't walk very well. This eventually resolves. Stable chronic distal juju LE tingling related to neuropathy. He is neurologically intact but has RLE bruising from the lower leg to the thigh and what looks like golf ball sized goose egg on the side of his lower leg. He does have a DVT in this extremity. We will order stat RLE US today for further evaluation. If worsening clot, might need to go to ER.  We will repeat a new lumbar MRI on a routine basis within the next 1-2 wks for this patient to recheck pseudomeningocele. Dr Samuel will need to review new images and decide whether surgery is indicated    Patient was seen by Dr Samuel at time of appt    Plan  1. RLE ultrasound TODAY to recheck R Leg (4:55pm today at the clinic at [address] [place] [address])  2.  If worsening/profuse clot- may need to be admitted  through ER for IV anticoagulation.   3. Repeat lumbar MRI w wo contrast within next 1-2wks to recheck pseudomeningocele. Will need to be reviewed by Dr Samuel and plan made ?surgery. Can be done inpatient if needs to be admitted.    Exam:    General: pt seated in wheelchair appears comfortable in NAD, no SOB     RLE bruising from the lower leg to the thigh and what looks like golf ball sized goose egg on the side of his lower leg. Leg appears yellow    Full strength juju lower extremities throughout.    Sensation decreased distally juju per baseline. Sensation prox legs intact.  Incision is healing nicely small distal scab, very mild superior aspect swelling. No redness, discharge.      Imaging:  EXAM: MR LUMBAR SPINE W/O and W CONTRAST  LOCATION: [facility]  DATE/TIME: 7/5/2022 1:06 PM     INDICATION: Follow-up lumbar collection post surgery and aspiration x2  COMPARISON: MRI lumbar spine 06/30/2022  CONTRAST: 9ml gadavist   TECHNIQUE: Routine Lumbar Spine MRI without and with IV contrast.     FINDINGS:   Nomenclature is based on 5 lumbar type vertebral bodies. Unchanged lumbar alignment including 5 mm anterolisthesis at L4-L5. Unchanged vertebral body heights. Mild persistent Modic type one endplate edema and associated reactive enhancement at L5-S1.   Laminectomy changes at L4 and L5 with a persistent irregular fluid collection in the laminectomy site with extension into the dorsal epidural space and posterior paraspinal soft tissues. This collection is minimally increased in size compared to   06/30/2022, currently measuring 3.4 x 3.6 x 5.8 cm in transverse, AP, and craniocaudal dimensions respectively compared to 3.4 x 3.4 x 5.6 cm and measured in a similar fashion previously. Mild persistent enhancement at the periphery of this collection   without a well-defined thick enhancing capsule. There is persistent effacement of the adjacent thecal sac and impingement upon the distal cauda  equina nerve roots at these levels. Otherwise unremarkable distal spinal cord and cauda equina with conus   medullaris at mid L1. Expected postoperative changes within the paraspinal soft tissues as seen previously. Ankylosis of the bilateral sacral iliac joints.     T12-L1: Disc desiccation with preserved disc height. No herniation. Mild facet arthropathy. No spinal canal or neural foraminal stenosis.      L1-L2: Minor loss of disc height and signal. No herniation. Minor facet arthropathy. No spinal canal or neural foraminal stenosis.     L2-L3: Disc desiccation with preserved disc height. Slight annular bulge without focal disc herniation. No facet arthropathy. No spinal canal or neural foraminal stenosis.      L3-L4: Disc desiccation with preserved disc height. Circumferential disc bulge. Mild to moderate facet arthropathy and ligamentum flavum thickening. The subdural fluid collection seen previously in the midline dorsally at this level is no longer   identified. Slightly improved patency of the central spinal canal with mild persistent trefoil spinal canal stenosis. Mild right neural foraminal stenosis. Minimal left neural foraminal stenosis.     L4-L5: Disc desiccation with preserved disc height. Grade 1 anterolisthesis with shallow posterior disc bulge and mild endplate spurring. Mild to moderate facet arthropathy. Laminectomy decompression. Lobular fluid collection within the laminectomy   defect and dorsal epidural space as seen previously. Effacement of the thecal sac and mass effect upon cauda equina nerve roots at this level. Persistent moderate bilateral neural foraminal stenosis.     L5-S1: Mild loss of disc height and mixed disc signal. Left subarticular discectomy changes. Persistent broad-based central disc protrusion. Mild facet arthropathy. Laminectomy decompression with persistent lobular fluid collection effacing the thecal   sac and impinging upon distal cauda equina nerve roots moderate right  neural foraminal stenosis. Mild to moderate left neural foraminal stenosis..                                                                      IMPRESSION:  1.  Multilobular fluid collection at the L4 and L5 laminectomy site is minimally increased in size compared to 06/30/2022. Persistent effacement of the adjacent thecal sac and mass effect upon distal cauda equina nerve roots. 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 A persistent/enlarging fluid collection is somewhat atypical for postoperative seroma. The sterility of this fluid remains uncertain, but there are no obvious findings to confirm abscess.  2.  Thin subdural collection seen previously at the L3-L4 level has resolved.  3.  Unchanged multilevel lumbar spondylosis as above.  Norene Lynn P-C  [facility] Neurosurgery  O. [phone]      Again, thank you for allowing me to participate in the care of your patient.        Sincerely,        VALERIE Dawson CNP

## 2022-07-13 NOTE — PATIENT INSTRUCTIONS
Thomas has ongoing R lower back pain into R hip, with a feeling of weakness/fatigue in both legs after he works with PT, which resolves.  He is neurolgoically intact but has RLE bruising and what looks like hematoma. He does have a DVT in this extremity.    Patient was seen by Dr Samuel at time of appt    Plan  RLE ultrasound TODAY to recheck R Leg (4:55pm today at the clinic at [address] [place] [address])   If worsening/profuse clot- may need to be admitted through ER for IV anticoagulation.   Repeat lumbar MRI w wo contrast within next 1-2wks to recheck pseudomeningocele. Will need to be reviewed by Dr Samuel and plan made ?surgery. Can be done inpatient if needs to be admitted.    Noreen VITALE  [facility] Neurosurgery  O. [phone]

## 2022-07-14 PROBLEM — I48.91 ATRIAL FIBRILLATION (H): Status: ACTIVE | Noted: 2022-07-11

## 2022-07-14 PROBLEM — D64.9 ANEMIA: Status: ACTIVE | Noted: 2022-07-14

## 2022-07-14 PROBLEM — D62 ANEMIA DUE TO BLOOD LOSS, ACUTE: Status: ACTIVE | Noted: 2022-07-14

## 2022-07-14 LAB
ABO/RH(D): NORMAL
ALBUMIN SERPL-MCNC: 3.1 G/DL (ref 3.5–5)
ALBUMIN SERPL-MCNC: 3.4 G/DL (ref 3.5–5)
ALP SERPL-CCNC: 85 U/L (ref 45–120)
ALP SERPL-CCNC: 86 U/L (ref 45–120)
ALT SERPL W P-5'-P-CCNC: 17 U/L (ref 0–45)
ALT SERPL W P-5'-P-CCNC: 19 U/L (ref 0–45)
ANION GAP SERPL CALCULATED.3IONS-SCNC: 12 MMOL/L (ref 5–18)
ANION GAP SERPL CALCULATED.3IONS-SCNC: 15 MMOL/L (ref 5–18)
ANTIBODY SCREEN: NEGATIVE
AST SERPL W P-5'-P-CCNC: 14 U/L (ref 0–40)
AST SERPL W P-5'-P-CCNC: 18 U/L (ref 0–40)
BASOPHILS # BLD AUTO: 0 10E3/UL (ref 0–0.2)
BASOPHILS NFR BLD AUTO: 0 %
BILIRUB SERPL-MCNC: 0.9 MG/DL (ref 0–1)
BILIRUB SERPL-MCNC: 0.9 MG/DL (ref 0–1)
BLD PROD TYP BPU: NORMAL
BLOOD COMPONENT TYPE: NORMAL
BNP SERPL-MCNC: 197 PG/ML (ref 0–78)
BUN SERPL-MCNC: 102 MG/DL (ref 8–28)
BUN SERPL-MCNC: 112 MG/DL (ref 8–28)
CALCIUM SERPL-MCNC: 8.6 MG/DL (ref 8.5–10.5)
CALCIUM SERPL-MCNC: 9 MG/DL (ref 8.5–10.5)
CHLORIDE BLD-SCNC: 100 MMOL/L (ref 98–107)
CHLORIDE BLD-SCNC: 102 MMOL/L (ref 98–107)
CO2 SERPL-SCNC: 20 MMOL/L (ref 22–31)
CO2 SERPL-SCNC: 20 MMOL/L (ref 22–31)
CODING SYSTEM: NORMAL
CREAT SERPL-MCNC: 4.31 MG/DL (ref 0.7–1.3)
CREAT SERPL-MCNC: 4.52 MG/DL (ref 0.7–1.3)
CROSSMATCH: NORMAL
EOSINOPHIL # BLD AUTO: 0 10E3/UL (ref 0–0.7)
EOSINOPHIL NFR BLD AUTO: 0 %
ERYTHROCYTE [DISTWIDTH] IN BLOOD BY AUTOMATED COUNT: 15.9 % (ref 10–15)
ERYTHROCYTE [DISTWIDTH] IN BLOOD BY AUTOMATED COUNT: 16.3 % (ref 10–15)
GFR SERPL CREATININE-BSD FRML MDRD: 13 ML/MIN/1.73M2
GFR SERPL CREATININE-BSD FRML MDRD: 14 ML/MIN/1.73M2
GLUCOSE BLD-MCNC: 120 MG/DL (ref 70–125)
GLUCOSE BLD-MCNC: 166 MG/DL (ref 70–125)
GLUCOSE BLDC GLUCOMTR-MCNC: 100 MG/DL (ref 70–99)
GLUCOSE BLDC GLUCOMTR-MCNC: 100 MG/DL (ref 70–99)
GLUCOSE BLDC GLUCOMTR-MCNC: 117 MG/DL (ref 70–99)
HCT VFR BLD AUTO: 22.6 % (ref 40–53)
HCT VFR BLD AUTO: 23.3 % (ref 40–53)
HGB BLD-MCNC: 7.1 G/DL (ref 13.3–17.7)
HGB BLD-MCNC: 7.4 G/DL (ref 13.3–17.7)
HOLD SPECIMEN: NORMAL
IMM GRANULOCYTES # BLD: 0.1 10E3/UL
IMM GRANULOCYTES NFR BLD: 1 %
INR PPP: 2.22 (ref 0.85–1.15)
INR PPP: 2.27 (ref 0.85–1.15)
ISSUE DATE AND TIME: NORMAL
LYMPHOCYTES # BLD AUTO: 1.3 10E3/UL (ref 0.8–5.3)
LYMPHOCYTES NFR BLD AUTO: 13 %
MCH RBC QN AUTO: 28.3 PG (ref 26.5–33)
MCH RBC QN AUTO: 28.7 PG (ref 26.5–33)
MCHC RBC AUTO-ENTMCNC: 31.4 G/DL (ref 31.5–36.5)
MCHC RBC AUTO-ENTMCNC: 31.8 G/DL (ref 31.5–36.5)
MCV RBC AUTO: 90 FL (ref 78–100)
MCV RBC AUTO: 90 FL (ref 78–100)
MONOCYTES # BLD AUTO: 1.2 10E3/UL (ref 0–1.3)
MONOCYTES NFR BLD AUTO: 12 %
NEUTROPHILS # BLD AUTO: 7.5 10E3/UL (ref 1.6–8.3)
NEUTROPHILS NFR BLD AUTO: 74 %
NRBC # BLD AUTO: 0 10E3/UL
NRBC BLD AUTO-RTO: 0 /100
PLATELET # BLD AUTO: 230 10E3/UL (ref 150–450)
PLATELET # BLD AUTO: 243 10E3/UL (ref 150–450)
POTASSIUM BLD-SCNC: 5 MMOL/L (ref 3.5–5)
POTASSIUM BLD-SCNC: 5.3 MMOL/L (ref 3.5–5)
PROT SERPL-MCNC: 6 G/DL (ref 6–8)
PROT SERPL-MCNC: 6.2 G/DL (ref 6–8)
RBC # BLD AUTO: 2.51 10E6/UL (ref 4.4–5.9)
RBC # BLD AUTO: 2.58 10E6/UL (ref 4.4–5.9)
SODIUM SERPL-SCNC: 134 MMOL/L (ref 136–145)
SODIUM SERPL-SCNC: 135 MMOL/L (ref 136–145)
SPECIMEN EXPIRATION DATE: NORMAL
TROPONIN I SERPL-MCNC: 0.04 NG/ML (ref 0–0.29)
UNIT ABO/RH: NORMAL
UNIT NUMBER: NORMAL
UNIT STATUS: NORMAL
UNIT TYPE ISBT: 7300
WBC # BLD AUTO: 10.1 10E3/UL (ref 4–11)
WBC # BLD AUTO: 8.6 10E3/UL (ref 4–11)

## 2022-07-14 PROCEDURE — 258N000003 HC RX IP 258 OP 636: Performed by: STUDENT IN AN ORGANIZED HEALTH CARE EDUCATION/TRAINING PROGRAM

## 2022-07-14 PROCEDURE — 83880 ASSAY OF NATRIURETIC PEPTIDE: CPT | Performed by: EMERGENCY MEDICINE

## 2022-07-14 PROCEDURE — 85025 COMPLETE CBC W/AUTO DIFF WBC: CPT | Performed by: EMERGENCY MEDICINE

## 2022-07-14 PROCEDURE — 85027 COMPLETE CBC AUTOMATED: CPT | Performed by: INTERNAL MEDICINE

## 2022-07-14 PROCEDURE — 82962 GLUCOSE BLOOD TEST: CPT

## 2022-07-14 PROCEDURE — 80053 COMPREHEN METABOLIC PANEL: CPT | Performed by: EMERGENCY MEDICINE

## 2022-07-14 PROCEDURE — 84484 ASSAY OF TROPONIN QUANT: CPT | Performed by: EMERGENCY MEDICINE

## 2022-07-14 PROCEDURE — 85610 PROTHROMBIN TIME: CPT | Performed by: INTERNAL MEDICINE

## 2022-07-14 PROCEDURE — 96372 THER/PROPH/DIAG INJ SC/IM: CPT | Performed by: INTERNAL MEDICINE

## 2022-07-14 PROCEDURE — 250N000012 HC RX MED GY IP 250 OP 636 PS 637: Performed by: INTERNAL MEDICINE

## 2022-07-14 PROCEDURE — 258N000003 HC RX IP 258 OP 636: Performed by: INTERNAL MEDICINE

## 2022-07-14 PROCEDURE — P9016 RBC LEUKOCYTES REDUCED: HCPCS | Performed by: INTERNAL MEDICINE

## 2022-07-14 PROCEDURE — 85610 PROTHROMBIN TIME: CPT | Performed by: EMERGENCY MEDICINE

## 2022-07-14 PROCEDURE — 96361 HYDRATE IV INFUSION ADD-ON: CPT

## 2022-07-14 PROCEDURE — 36415 COLL VENOUS BLD VENIPUNCTURE: CPT | Performed by: EMERGENCY MEDICINE

## 2022-07-14 PROCEDURE — 96360 HYDRATION IV INFUSION INIT: CPT | Mod: 59

## 2022-07-14 PROCEDURE — 86901 BLOOD TYPING SEROLOGIC RH(D): CPT | Performed by: EMERGENCY MEDICINE

## 2022-07-14 PROCEDURE — 84450 TRANSFERASE (AST) (SGOT): CPT | Performed by: INTERNAL MEDICINE

## 2022-07-14 PROCEDURE — 36415 COLL VENOUS BLD VENIPUNCTURE: CPT | Performed by: INTERNAL MEDICINE

## 2022-07-14 PROCEDURE — G0378 HOSPITAL OBSERVATION PER HR: HCPCS

## 2022-07-14 PROCEDURE — 250N000013 HC RX MED GY IP 250 OP 250 PS 637: Performed by: INTERNAL MEDICINE

## 2022-07-14 PROCEDURE — 99220 PR INITIAL OBSERVATION CARE,LEVEL III: CPT | Performed by: INTERNAL MEDICINE

## 2022-07-14 PROCEDURE — 82310 ASSAY OF CALCIUM: CPT | Performed by: INTERNAL MEDICINE

## 2022-07-14 PROCEDURE — 86923 COMPATIBILITY TEST ELECTRIC: CPT | Performed by: INTERNAL MEDICINE

## 2022-07-14 PROCEDURE — 250N000013 HC RX MED GY IP 250 OP 250 PS 637: Performed by: EMERGENCY MEDICINE

## 2022-07-14 RX ORDER — SODIUM CHLORIDE, SODIUM LACTATE, POTASSIUM CHLORIDE, CALCIUM CHLORIDE 600; 310; 30; 20 MG/100ML; MG/100ML; MG/100ML; MG/100ML
INJECTION, SOLUTION INTRAVENOUS CONTINUOUS
Status: DISCONTINUED | OUTPATIENT
Start: 2022-07-14 | End: 2022-07-15

## 2022-07-14 RX ORDER — OXYCODONE HYDROCHLORIDE 5 MG/1
5 TABLET ORAL ONCE
Status: COMPLETED | OUTPATIENT
Start: 2022-07-14 | End: 2022-07-14

## 2022-07-14 RX ORDER — NICOTINE POLACRILEX 4 MG
15-30 LOZENGE BUCCAL
Status: DISCONTINUED | OUTPATIENT
Start: 2022-07-14 | End: 2022-07-19 | Stop reason: HOSPADM

## 2022-07-14 RX ORDER — POLYETHYLENE GLYCOL 3350 17 G/17G
1 POWDER, FOR SOLUTION ORAL 2 TIMES DAILY
COMMUNITY
End: 2022-09-21

## 2022-07-14 RX ORDER — SODIUM CHLORIDE 9 MG/ML
INJECTION, SOLUTION INTRAVENOUS CONTINUOUS
Status: DISCONTINUED | OUTPATIENT
Start: 2022-07-14 | End: 2022-07-14

## 2022-07-14 RX ORDER — LIDOCAINE 40 MG/G
CREAM TOPICAL
Status: DISCONTINUED | OUTPATIENT
Start: 2022-07-14 | End: 2022-07-19 | Stop reason: HOSPADM

## 2022-07-14 RX ORDER — LISINOPRIL 20 MG/1
20 TABLET ORAL DAILY
Status: DISCONTINUED | OUTPATIENT
Start: 2022-07-14 | End: 2022-07-19 | Stop reason: HOSPADM

## 2022-07-14 RX ORDER — POLYETHYLENE GLYCOL 3350 17 G/17G
1 POWDER, FOR SOLUTION ORAL DAILY PRN
COMMUNITY
End: 2022-09-21

## 2022-07-14 RX ORDER — BISACODYL 10 MG
10 SUPPOSITORY, RECTAL RECTAL DAILY PRN
COMMUNITY
End: 2022-09-21

## 2022-07-14 RX ORDER — ROSUVASTATIN CALCIUM 10 MG/1
10 TABLET, COATED ORAL AT BEDTIME
Status: DISCONTINUED | OUTPATIENT
Start: 2022-07-14 | End: 2022-07-19 | Stop reason: HOSPADM

## 2022-07-14 RX ORDER — ACETAMINOPHEN 500 MG
1000 TABLET ORAL EVERY 6 HOURS PRN
Status: DISCONTINUED | OUTPATIENT
Start: 2022-07-14 | End: 2022-07-19 | Stop reason: HOSPADM

## 2022-07-14 RX ORDER — DEXTROSE MONOHYDRATE 25 G/50ML
25-50 INJECTION, SOLUTION INTRAVENOUS
Status: DISCONTINUED | OUTPATIENT
Start: 2022-07-14 | End: 2022-07-19 | Stop reason: HOSPADM

## 2022-07-14 RX ORDER — GABAPENTIN 300 MG/1
300 CAPSULE ORAL 2 TIMES DAILY
Status: DISCONTINUED | OUTPATIENT
Start: 2022-07-14 | End: 2022-07-19 | Stop reason: HOSPADM

## 2022-07-14 RX ORDER — POLYETHYLENE GLYCOL 3350 17 G/17G
17 POWDER, FOR SOLUTION ORAL 2 TIMES DAILY
Status: DISCONTINUED | OUTPATIENT
Start: 2022-07-14 | End: 2022-07-19 | Stop reason: HOSPADM

## 2022-07-14 RX ORDER — OXYCODONE HYDROCHLORIDE 5 MG/1
5 TABLET ORAL EVERY 4 HOURS PRN
Status: DISCONTINUED | OUTPATIENT
Start: 2022-07-14 | End: 2022-07-19 | Stop reason: HOSPADM

## 2022-07-14 RX ORDER — TAMSULOSIN HYDROCHLORIDE 0.4 MG/1
0.4 CAPSULE ORAL AT BEDTIME
Status: DISCONTINUED | OUTPATIENT
Start: 2022-07-14 | End: 2022-07-19 | Stop reason: HOSPADM

## 2022-07-14 RX ORDER — LEVOTHYROXINE SODIUM 25 UG/1
25 TABLET ORAL DAILY
Status: DISCONTINUED | OUTPATIENT
Start: 2022-07-14 | End: 2022-07-19 | Stop reason: HOSPADM

## 2022-07-14 RX ORDER — PHENOL 1.4 %
10 AEROSOL, SPRAY (ML) MUCOUS MEMBRANE AT BEDTIME
COMMUNITY

## 2022-07-14 RX ORDER — AMOXICILLIN 250 MG
1 CAPSULE ORAL 2 TIMES DAILY
COMMUNITY

## 2022-07-14 RX ORDER — WARFARIN SODIUM 5 MG/1
5 TABLET ORAL DAILY
Status: ON HOLD | COMMUNITY
End: 2022-07-19

## 2022-07-14 RX ORDER — AMOXICILLIN 250 MG
2 CAPSULE ORAL 2 TIMES DAILY
Status: DISCONTINUED | OUTPATIENT
Start: 2022-07-14 | End: 2022-07-19 | Stop reason: HOSPADM

## 2022-07-14 RX ADMIN — LEVOTHYROXINE SODIUM 25 MCG: 0.03 TABLET ORAL at 14:56

## 2022-07-14 RX ADMIN — INSULIN GLARGINE 15 UNITS: 100 INJECTION, SOLUTION SUBCUTANEOUS at 21:30

## 2022-07-14 RX ADMIN — DICLOFENAC SODIUM 2 G: 10 GEL TOPICAL at 14:56

## 2022-07-14 RX ADMIN — GABAPENTIN 300 MG: 300 CAPSULE ORAL at 14:56

## 2022-07-14 RX ADMIN — TAMSULOSIN HYDROCHLORIDE 0.4 MG: 0.4 CAPSULE ORAL at 21:29

## 2022-07-14 RX ADMIN — GABAPENTIN 300 MG: 300 CAPSULE ORAL at 21:29

## 2022-07-14 RX ADMIN — SODIUM CHLORIDE: 9 INJECTION, SOLUTION INTRAVENOUS at 06:51

## 2022-07-14 RX ADMIN — ROSUVASTATIN CALCIUM 10 MG: 10 TABLET, FILM COATED ORAL at 21:29

## 2022-07-14 RX ADMIN — LISINOPRIL 20 MG: 20 TABLET ORAL at 14:56

## 2022-07-14 RX ADMIN — DICLOFENAC SODIUM 2 G: 10 GEL TOPICAL at 21:31

## 2022-07-14 RX ADMIN — SODIUM CHLORIDE, POTASSIUM CHLORIDE, SODIUM LACTATE AND CALCIUM CHLORIDE 100 ML/HR: 600; 310; 30; 20 INJECTION, SOLUTION INTRAVENOUS at 12:25

## 2022-07-14 RX ADMIN — OXYCODONE HYDROCHLORIDE 5 MG: 5 TABLET ORAL at 03:20

## 2022-07-14 ASSESSMENT — ACTIVITIES OF DAILY LIVING (ADL): DEPENDENT_IADLS:: CLEANING;COOKING;LAUNDRY;SHOPPING;MEAL PREPARATION;MEDICATION MANAGEMENT;TRANSPORTATION

## 2022-07-14 NOTE — PROGRESS NOTES
PT states he wears BIPAP at night, but would prefer not to use ours tonight, as we don't know his settings. I told him to see if anyone could bring in his machine from home. I told him we would check back tomorrow, if he's still admitted to the hospital.    Carlton Fenton, RT

## 2022-07-14 NOTE — ED NOTES
Bladder scan 603, voided 400 into urinal and also had voided into toilet. Post void bladder scan 33

## 2022-07-14 NOTE — H&P
"[facility]    History and Physical - Hospitalist Service       Date of Admission:  7/13/2022    Assessment & Plan      Thomas Steve is a 77 year old male admitted on 7/13/2022.     Left buttock hematoma, non traumatic, in pt with therapeutic INR on coumadin ( for a fib, Rt leg DVT since 6/29).  -hold coumadin  -serial HgB, transfused today for HgB 7.1  -IR IVC filter    ABLA, HgB 7.4 last night (after 1 unit(s) prbc), and 7.1 today.  CT abd negative for active extravasation.  -1 unit(s) prbc today    YOLANDA/CKD 3.  Creat 1.15 on 5/7 and 4.31 yesterday, with pre renal indices.  CTat OSH ER abd negative for retroperitoneal hematoma.  -UA, IVF, am labs, hold bumex    Hyperkalemia 5.3 on adm; with IVF down to 5.0.  -c/w IVF, monitor.    IDDM.  -C/w Lantus; SSIN  -hold glucophage due to YOLANDA.    CAD, s/p cabg in 2011, no angina, C/w home meds.  HLD-on crestor.       Diet:  NPO for IR procedure; then diabetic diet  DVT Prophylaxis: Pneumatic Compression Devices to left leg only    Carpenter Catheter: Not present  Central Lines: None  Cardiac Monitoring: None  Code Status:  Full    Clinically Significant Risk Factors Present on Admission              # Acute Kidney Injury, unspecified: based on a >150% or 0.3 mg/dL increase in creatinine on admission compared to past 90 day average, will monitor renal function  # Coagulation Defect: home medication list includes an anticoagulant medication   # Hypertension: home medication list includes antihypertensive(s)    # DMII: A1C = N/A within past 3 months  # Overweight: Estimated body mass index is 29.13 kg/m  as calculated from the following:    Height as of this encounter: 1.778 m (5' 10\").    Weight as of this encounter: 92.1 kg (203 lb).             The patient's care was discussed with the Bedside Nurse, Care Coordinator/ and Patient.    Kate Sewell MD  Hospitalist Service  [facility]  Securely message with the " [employer] Web Console (learn more here)  Text page via [facility] Paging/Directory   ____________________________________________________________________    Chief Complaint   Right buttock pain and hematoma.    History is obtained from the patient, electronic health record and emergency department physician    History of Present Illness   Thomas Steve is a 77 year old male with PMH of IDDM, , hypertension, CAD, ischemic cardiomyopathy, RBBB, chronic atrial fibrillation, congestive heart failure, SSS, hyperlipidemia, cardiac pacemaker in situ, lumbar spinal stenosis, acute kidney failure, DVT, s/p lumbar laminectomy 6/16/22, and s/p CABG 2011, who transferred from [facility] ER with right buttock hematoma, anemia HgB 6.1, started blood transfusion at OSH ER. Feels week. Nonfocal. No falls.   CTA abdomen pelvis performed  at [facility] showed large hematoma in patient's right buttock. No retroperitoneal hematoma or other process in the abdomen.  He denies fever, chest pain, abd pain, nausea, melena.    Review of Systems    The 10 point Review of Systems is negative other than noted in the HPI or here.    Past Medical History    I have reviewed this patient's medical history and updated it with pertinent information if needed.   Past Medical History:   Diagnosis Date     Acute sinusitis      Atrial fibrillation (H)     Resolved after medication and CPAP     Back pain      CHF (congestive heart failure) (H)      Coronary artery disease      Diabetes mellitus (H)      Gastroesophageal reflux disease with esophagitis      HTN (hypertension)      Hyperlipemia      Joint pain      Lipoma of neck      Nocturia      Sciatic leg pain      Shoulder impingement syndrome     BILATERAL     Sleep apnea     uses CPAP     Typical atrial flutter (H) 07/07/2016    Demonstrated on 12-lead EKG July 7, 2016       Past Surgical History   I have reviewed this patient's surgical history and updated it with pertinent  information if needed.  Past Surgical History:   Procedure Laterality Date     EP BIV PACEMAKER INSERT N/A 2019    Procedure: EP Biventricular Pacemaker Insertion;  Surgeon: Durga Rajput MD;  Location: [facility] Cath Lab;  Service: Cardiology     IR FINE NEEDLE ASPIRATION W ULTRASOUND  2022     LAMINECTOMY LUMBAR TWO LEVELS Bilateral 2022    Procedure: DECOMPRESSIVE LUMBAR LAMINECTOMY LUMBAR 4-LUMBAR 5 AND LUMBAR 5-SACRAL 1 BILATERAL-LEFT SIDE FIRST-PLUS BILATERAL FORAMINOTOMIES PLUS REMOVAL OF HERNIATED DISC LUMBAR 5-SACRAL 1 LEFT;  Surgeon: Israel Samuel MD;  Location: [facility] [place] OR     FL CARDIOVERSION ELECTIVE ARRHYTHMIA EXTERNAL  2014    Description: Elective Cardioversion External;  Recorded: 2014;     ZZC CABG, VEIN, SINGLE      Description: CABG (CABG);  Proc Date: 2011;  Comments: LIMA^LAD, SVG^OM, SVG^distal RCA       Social History   I have reviewed this patient's social history and updated it with pertinent information if needed.  Social History     Tobacco Use     Smoking status: Former Smoker     Packs/day: 1.50     Years: 23.00     Pack years: 34.50     Quit date: 1986     Years since quittin.5     Smokeless tobacco: Never Used   Substance Use Topics     Alcohol use: Yes     Alcohol/week: 1.0 standard drink     Comment: one beer per day     Drug use: No       Family History   I have reviewed this patient's family history and updated it with pertinent information if needed.  Family History   Problem Relation Age of Onset     Heart Disease Mother      Snoring Father      Heart Disease Father      Hypertension Father      Alzheimer Disease Father      No Known Problems Daughter      No Known Problems Daughter      No Known Problems Daughter      Chronic Obstructive Pulmonary Disease Sister      Hodgkin's lymphoma Brother      No Known Problems Son      Diabetes Sister      Substance Abuse Sister      Chronic Obstructive Pulmonary Disease  Brother      Heart Disease Brother      Diabetes Brother      Substance Abuse Brother        Prior to Admission Medications   Prior to Admission Medications   Prescriptions Last Dose Informant Patient Reported? Taking?   Omega-3 Fatty Acids (FISH OIL) 1200 MG capsule 7/13/2022 at Unknown time  Yes Yes   Sig: Take 1,200 mg by mouth daily   acetaminophen (TYLENOL) 500 MG tablet Unknown at Unknown time  Yes Yes   Sig: Take 1,000-1,500 mg by mouth every 6 hours as needed for mild pain   bisacodyl (DULCOLAX) 10 MG suppository Unknown at Unknown time  Yes Yes   Sig: Place 10 mg rectally daily as needed for constipation If no BM after 4 days   bumetanide (BUMEX) 1 MG tablet 7/13/2022 at Unknown time  No Yes   Sig: Take 3 tablets (3 mg) by mouth daily   cholecalciferol 50 MCG (2000 UT) CAPS 7/13/2022 at Unknown time  Yes Yes   Sig: Take 2 capsules by mouth daily   coenzyme Q-10 200 MG CAPS capsule 7/13/2022 at Unknown time  Yes Yes   Sig: Take 200 mg by mouth daily   diclofenac (VOLTAREN) 1 % topical gel 7/13/2022 at Unknown time  No Yes   Sig: Apply 2 g topically 2 times daily   ferrous sulfate (FEROSUL) 325 (65 Fe) MG tablet 7/13/2022 at Unknown time  Yes Yes   Sig: Take 1 tablet by mouth 3 times daily (with meals)   gabapentin (NEURONTIN) 300 MG capsule 7/13/2022 at Unknown time  No Yes   Sig: Take 2 capsules (600 mg) by mouth 2 times daily   insulin glargine (LANTUS PEN) 100 UNIT/ML pen 7/12/2022  No Yes   Sig: Inject 15 Units Subcutaneous At Bedtime   insulin lispro (HUMALOG KWIKPEN) 100 UNIT/ML (1 unit dial) KWIKPEN 7/13/2022 at Unknown time  No Yes   Sig: Do Not give Correction Insulin if Pre-Meal BG less than 140.   For Pre-Meal  - 164 give 1 unit.   For Pre-Meal  - 189 give 2 units.   For Pre-Meal  - 214 give 3 units.   For Pre-Meal  - 239 give 4 units.   For Pre-Meal  - 264 give 5 units.   For Pre-Meal  - 289 give 6 units.   For Pre-Meal  - 314 give 7 units.   For Pre-Meal   - 339 give 8 units.   For Pre-Meal  - 364 give 9 units.   For Pre-Meal BG greater than or equal to 365 give 10 units  To be given with prandial insulin, and based on pre-meal blood glucose.   Notify provider if glucose greater than or equal to 350 mg/dL after administration of correction dose.   levothyroxine (SYNTHROID/LEVOTHROID) 25 MCG tablet 7/13/2022 at Unknown time  Yes Yes   Sig: Take 25 mcg by mouth daily   lidocaine (XYLOCAINE) 5 % external ointment 7/13/2022 at Unknown time  No Yes   Sig: Apply topically 2 times daily   lisinopril (ZESTRIL) 20 MG tablet 7/13/2022 at Unknown time  No Yes   Sig: Take 1 tablet (20 mg) by mouth daily   magnesium hydroxide (MILK OF MAGNESIA) 400 MG/5ML suspension Unknown at Unknown time  Yes Yes   Sig: Take 30 mLs by mouth daily as needed for constipation or heartburn If no BM after 3 days   melatonin 3 MG tablet 7/12/2022 at Unknown time  Yes Yes   Sig: Take 3 mg by mouth At Bedtime   metFORMIN (GLUCOPHAGE) 1000 MG tablet 7/13/2022 at Unknown time  Yes Yes   Sig: Take 1,000 mg by mouth 2 times daily (with meals)   oxyCODONE (ROXICODONE) 5 MG tablet Unknown at Unknown time  No Yes   Sig: Take 0.5-1 tablets (2.5-5 mg) by mouth every 4 hours as needed for moderate pain or moderate to severe pain   polyethylene glycol (MIRALAX) 17 g packet 7/13/2022 at Unknown time  Yes Yes   Sig: Take 1 packet by mouth 2 times daily   polyethylene glycol (MIRALAX) 17 g packet Unknown at Unknown time  Yes Yes   Sig: Take 1 packet by mouth daily as needed for constipation Until having regular BMs   rosuvastatin (CRESTOR) 10 MG tablet 7/13/2022 at Unknown time  Yes Yes   Sig: Take 10 mg by mouth At Bedtime   senna-docusate (SENOKOT-S/PERICOLACE) 8.6-50 MG tablet 7/13/2022 at Unknown time  Yes Yes   Sig: Take 2 tablets by mouth 2 times daily   sodium phosphate (FLEET ENEMA) 7-19 GM/118ML rectal enema Unknown at Unknown time  Yes Yes   Sig: Place 1 enema rectally daily as needed for  constipation If no BM after 5 days   tamsulosin (FLOMAX) 0.4 MG capsule 7/13/2022 at PM  No Yes   Sig: Take 1 capsule (0.4 mg) by mouth daily For post-op urinary retention   vitamin C (ASCORBIC ACID) 500 MG tablet 7/13/2022 at Unknown time  Yes Yes   Sig: Take 500 mg by mouth 3 times daily (with meals) Take with iron supplement   warfarin ANTICOAGULANT (COUMADIN) 5 MG tablet 7/12/2022 at Unknown time  Yes Yes   Sig: Take 5 mg by mouth daily   zinc gluconate 50 MG tablet 7/13/2022 at Unknown time  No Yes   Sig: Take 1 tablet (50 mg) by mouth daily      Facility-Administered Medications: None     Allergies   Allergies   Allergen Reactions     Aspirin Other (See Comments)     Contraindicated due to xarelto use     Codeine Nausea and Vomiting     Pollen Extracts [Pollen Extract] Unknown     Scratchy throat     Vicodin [Hydrocodone-Acetaminophen] Nausea and Vomiting       Physical Exam   Vital Signs: Temp: 97.9  F (36.6  C) Temp src: Oral BP: 135/64 Pulse: 60   Resp: 14 SpO2: 98 % O2 Device: None (Room air)    Weight: 203 lbs 0 oz    General: Alert and oriented x 3. Not in obvious distress.  HEENT: NC, AT. Neck- supple, No JVP elevation, lymphadenopathy or thyromegaly. Trachea-central.  Chest: Clear to auscultation bilaterally.  Heart: S1S2 irreg irregular. No M/R/G.  Abdomen: Soft. NT, ND. No organomegaly. Bowel sounds- active.  Back: No spine tenderness. No CVA tenderness.right buttock tenderness, edema  Extremities: No leg swelling. Peripheral pulses 2+ bilaterally.  Neuro: Cranial nerves 1-12 grossly normal. No focal neurological deficit    Data   Data reviewed today: I reviewed all medications, new labs and imaging results over the last 24 hours. I personally reviewed.    Recent Labs   Lab 07/14/22  2118 07/14/22  1742 07/14/22  1202 07/14/22  1154 07/14/22  0110   WBC  --   --   --  8.6 10.1   HGB  --   --   --  7.1* 7.4*   MCV  --   --   --  90 90   PLT  --   --   --  243 230   INR  --   --   --  2.22* 2.27*   NA   --   --   --  134* 135*   POTASSIUM  --   --   --  5.0 5.3*   CHLORIDE  --   --   --  102 100   CO2  --   --   --  20* 20*   BUN  --   --   --  112* 102*   CR  --   --   --  4.52* 4.31*   ANIONGAP  --   --   --  12 15   BARBARA  --   --   --  8.6 9.0   * 100* 117* 120 166*   ALBUMIN  --   --   --  3.1* 3.4*   PROTTOTAL  --   --   --  6.0 6.2   BILITOTAL  --   --   --  0.9 0.9   ALKPHOS  --   --   --  85 86   ALT  --   --   --  17 19   AST  --   --   --  14 18     8.6    \    7.1 (L)    /    243   N 74    L N/A    134 (L)    102    112 (H) /   ------------------------------------ 100 (H)   ALT 17   AST 14   AP 85   ALB 3.1 (L)   Ca 8.6  5.0    20 (L)    4.52 (H) \    % RETIC N/A    LDH N/A  Troponin N/A     (H)    CK N/A  INR 2.22 (H)   PTT N/A    D-dimer N/A    Fibrinogen N/A    Antithrombin N/A  Ferritin N/A  CRP N/A    IL-6 N/A  No results found for this or any previous visit (from the past 24 hour(s)).

## 2022-07-14 NOTE — PHARMACY-ADMISSION MEDICATION HISTORY
Pharmacy Note - Admission Medication History    Pertinent Provider Information:   Warfarin did not show up on patient's medication list from facility. However, nurse from [facility] said his most recent dose was on 7/12. Suspect, patient was due for an INR or waiting for clarification regarding plan from clinic visit yesterday.   ______________________________________________________________________    Prior To Admission (PTA) med list completed and updated in EMR.       PTA Med List   Medication Sig Note Last Dose     acetaminophen (TYLENOL) 500 MG tablet Take 1,000-1,500 mg by mouth every 6 hours as needed for mild pain  Unknown at Unknown time     bisacodyl (DULCOLAX) 10 MG suppository Place 10 mg rectally daily as needed for constipation If no BM after 4 days  Unknown at Unknown time     bumetanide (BUMEX) 1 MG tablet Take 3 tablets (3 mg) by mouth daily  7/13/2022 at Unknown time     cholecalciferol 50 MCG (2000 UT) CAPS Take 2 capsules by mouth daily  7/13/2022 at Unknown time     coenzyme Q-10 200 MG CAPS capsule Take 200 mg by mouth daily  7/13/2022 at Unknown time     diclofenac (VOLTAREN) 1 % topical gel Apply 2 g topically 2 times daily 7/14/2022: Back 7/13/2022 at Unknown time     ferrous sulfate (FEROSUL) 325 (65 Fe) MG tablet Take 1 tablet by mouth 3 times daily (with meals)  7/13/2022 at Unknown time     gabapentin (NEURONTIN) 300 MG capsule Take 2 capsules (600 mg) by mouth 2 times daily  7/13/2022 at Unknown time     insulin glargine (LANTUS PEN) 100 UNIT/ML pen Inject 15 Units Subcutaneous At Bedtime  7/12/2022     insulin lispro (HUMALOG KWIKPEN) 100 UNIT/ML (1 unit dial) KWIKPEN Do Not give Correction Insulin if Pre-Meal BG less than 140.   For Pre-Meal  - 164 give 1 unit.   For Pre-Meal  - 189 give 2 units.   For Pre-Meal  - 214 give 3 units.   For Pre-Meal  - 239 give 4 units.   For Pre-Meal  - 264 give 5 units.   For Pre-Meal  - 289 give 6 units.   For Pre-Meal BG  290 - 314 give 7 units.   For Pre-Meal  - 339 give 8 units.   For Pre-Meal  - 364 give 9 units.   For Pre-Meal BG greater than or equal to 365 give 10 units  To be given with prandial insulin, and based on pre-meal blood glucose.   Notify provider if glucose greater than or equal to 350 mg/dL after administration of correction dose.  7/13/2022 at Unknown time     levothyroxine (SYNTHROID/LEVOTHROID) 25 MCG tablet Take 25 mcg by mouth daily  7/13/2022 at Unknown time     lidocaine (XYLOCAINE) 5 % external ointment Apply topically 2 times daily  7/13/2022 at Unknown time     lisinopril (ZESTRIL) 20 MG tablet Take 1 tablet (20 mg) by mouth daily  7/13/2022 at Unknown time     magnesium hydroxide (MILK OF MAGNESIA) 400 MG/5ML suspension Take 30 mLs by mouth daily as needed for constipation or heartburn If no BM after 3 days  Unknown at Unknown time     melatonin 3 MG tablet Take 3 mg by mouth At Bedtime  7/12/2022 at Unknown time     metFORMIN (GLUCOPHAGE) 1000 MG tablet Take 1,000 mg by mouth 2 times daily (with meals)  7/13/2022 at Unknown time     Omega-3 Fatty Acids (FISH OIL) 1200 MG capsule Take 1,200 mg by mouth daily  7/13/2022 at Unknown time     oxyCODONE (ROXICODONE) 5 MG tablet Take 0.5-1 tablets (2.5-5 mg) by mouth every 4 hours as needed for moderate pain or moderate to severe pain  Unknown at Unknown time     polyethylene glycol (MIRALAX) 17 g packet Take 1 packet by mouth 2 times daily  7/13/2022 at Unknown time     polyethylene glycol (MIRALAX) 17 g packet Take 1 packet by mouth daily as needed for constipation Until having regular BMs  Unknown at Unknown time     rosuvastatin (CRESTOR) 10 MG tablet Take 10 mg by mouth At Bedtime  7/13/2022 at Unknown time     senna-docusate (SENOKOT-S/PERICOLACE) 8.6-50 MG tablet Take 2 tablets by mouth 2 times daily  7/13/2022 at Unknown time     sodium phosphate (FLEET ENEMA) 7-19 GM/118ML rectal enema Place 1 enema rectally daily as needed for  constipation If no BM after 5 days  Unknown at Unknown time     tamsulosin (FLOMAX) 0.4 MG capsule Take 1 capsule (0.4 mg) by mouth daily For post-op urinary retention  7/13/2022 at PM     vitamin C (ASCORBIC ACID) 500 MG tablet Take 500 mg by mouth 3 times daily (with meals) Take with iron supplement  7/13/2022 at Unknown time     warfarin ANTICOAGULANT (COUMADIN) 5 MG tablet Take 5 mg by mouth daily  7/12/2022 at Unknown time     zinc gluconate 50 MG tablet Take 1 tablet (50 mg) by mouth daily  7/13/2022 at Unknown time       Information source(s): Facility ([facility] medication list/MAR  Method of interview communication: N/A    Summary of Changes to PTA Med List  New: warfarin, Miralax, Senna/docusate, melatonin, bisacodyl, MOM, Fleets  Discontinued: Tylenol PM    Patient was asked about OTC/herbal products specifically.  PTA med list reflects this.    Allergies were reviewed, assessed, and updated with the patient.      Patient does not use any multi-dose medications prior to admission.    The information provided in this note is only as accurate as the sources available at the time of the update(s).    Thank you for the opportunity to participate in the care of this patient.    Radha Abbott [facility]  7/14/2022 10:19 AM

## 2022-07-14 NOTE — PROGRESS NOTES
IR Progress Note    Physician: Adria Frey MD      77 year old male with evidence of right buttock hematoma on CT imaging. Currently there is no active bleeding into the hematoma with CTA negative for active bleed.  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  Because patient currently cannot be anticoagulated IR was consulted for IVC filter placement.  Fortunately patient doesn't need or fit criteria for a filter.  Given how small the right profunda femoral vein clot size is and showing evidence that its chronic in nature and has improved he is at a very low risk of pulmonary embolism or clot propagation.  No filter is needed.

## 2022-07-14 NOTE — ED NOTES
Expected Patient Referral to ED  9:38 PM    Referring Clinic/Provider:  [facility]    Reason for referral/Clinical facts:  Pt is s/p recent posterior laminectomy of L spine with neurosurgery at [facility].  Pt found to be anemic to 6.9 (down 2 grams) today and transfused 1 unit of blood at ED.  Found to have new right heart failure and right buttock hematoma on CTA imaging.  No acute bleeding found or known source of bleeding.  Pt is already on coumadin for new DVT.  Concerned about reversing him at this point, so no reversal started.  Vitals stable, hemodynamically well with no hypoxia.  Physician I spoke with stated they do not have appropriate resources including IR, GI and wanted him somewhere that he could get available treatments if needed.  May need IVC filter.    Recommendations provided:  Send to ED for further evaluation    Caller was informed that this institution does possess the capabilities and/or resources to provide for patient and should be transferred to our facility.    Discussed that if direct admit is sought and any hurdles are encountered, this ED would be happy to see the patient and evaluate.    Informed caller that recommendations provided are recommendations based only on the facts provided and that they responsible to accept or reject the advice, or to seek a formal in person consultation as needed and that this ED will see/treat patient should they arrive.      Telly Spears MD  [facility] EMERGENCY DEPARTMENT  [address] [place] [id number]  [phone]       Telly Spears MD  07/13/22 9564

## 2022-07-14 NOTE — ED PROVIDER NOTES
EMERGENCY DEPARTMENT NOTE     Name: Thomas Steve    Age/Sex: 77 year old male   MRN: [id number]   Evaluation Date & Time:  7/13/2022 11:52 PM    PCP:    Moni Mcclain   ED Provider: Nelson Wilkins D.O.       CHIEF COMPLAINT    Generalized Weakness and Abnormal Labs       DIAGNOSIS & DISPOSITION     1. Anemia, unspecified type    2. Non traumatic hematoma of buttock, initial encounter      DISPOSITION: Admit to [facility]    At the conclusion of the encounter I discussed the results of all of the tests and the disposition. The questions were answered. The patient or family acknowledged understanding and was agreeable with the care plan.    TOTAL CRITICAL CARE TIME (EXCLUDING PROCEDURES): Not applicable    PROCEDURES:   None    EMERGENCY DEPARTMENT COURSE/MEDICAL DECISION MAKING   12:06 AM I met with the patient to gather history and to perform my initial exam.  We discussed treatment options and the plan for care while in the Emergency Department.  12:16 AM Talked to [facility]. CTA abdomen pelvis performed there showed large hematoma in patient's right buttock. No retroperitoneal hematoma or other process in the abdomen.     Thomas Steve is a 77 year old male with relevant past history oftype II diabetes mellitus, hypertension, CAD, ischemic cardiomyopathy, RBBB, chronic atrial fibrillation, congestive heart failure, SSS, hyperlipidemia, cardiac pacemaker in situ, lumbar spinal stenosis, acute kidney failure, DVT, s/p lumbar laminectomy 6/16/22, and s/p CABG 2011 who presents to the emergency department for evaluation of generalized weakness.     Triage note reviewed:Arrives via [facility] EMS from [facility] ED for transfer. Was at follow up with spine clinic, did U/S to rule out blood clot and had labs done. Labs showed Hgb 6.1. Currently eceiving 1 unit packed red blood cells. Had generalized weakness. Has large hematoma on right hip with unknown cause (prior to  "arrival).     Vital signs:/83   Pulse 67   Temp 97.9  F (36.6  C) (Oral)   Resp 16   Ht 1.778 m (5' 10\")   Wt 92.1 kg (203 lb)   SpO2 100%   BMI 29.13 kg/m    Pertinent physical exam findings:  General: Alert, no distress  Cardiac: Regular rate and rhythm S1-S2 without murmur rub  Pulmonary: Lungs are clear to ascultation bilaterally with good breath sounds  Abdomen: Soft nontender, positive bowel sounds.  No organomegaly or mass  Musculoskeletal: Hematoma of the right buttock.  No tenderness to the lumbar spine  Neuro: 5 out of 5 motor strength of the bilateral lower extremities.  Patient has peripheral neuropathy as well as chronic numbness in both legs.  Diagnostic studies:  Imaging:  CTA of abdomen and pelvis from out side facility report reviewed right buttock hematoma without active extravasation.  No fracture of the pelvis or hip.  No retroperitoneal hematoma.  Small left pleural effusion at the lung bases      Lab:  Labs Ordered and Resulted from Time of ED Arrival to Time of ED Departure - No data to display   Interventions:None  Medical decision makin-year-old male transferred from [facility] ED for low hemoglobin source appears to be spontaneous right buttock hematoma without evidence of trauma.  CTA showed no active extravasation.  INR at previous facility was 2.4.  Patient has been transfused 1 unit of packed red blood cells and is hemodynamically stable.  Patient had recent admission for lumbar laminectomy.  He was on Xarelto for chronic atrial fibrillation status post pacemaker.  During hospitalization developed lumbar hematoma without infection or epidural abscess and was transitioned warfarin with Lovenox bridge after he developed right lower extremity DVT during hospitalization.  Patient reports no back pain or acute neurologic symptoms and has normal neuro exam with normal motor strength with chronic paresthesias in the lower extremities secondary to peripheral " neuropathy unchanged.  There was concern for possible CHF with small left pleural effusion on CT with the patient denies any acute shortness of breath and no chest pain.  Other review of systems were negative including fever.  I discussed the case with the hospitalist service will admit for observation with serial hemoglobins, hold warfarin in the short-term.      ED INTERVENTIONS   Medications - No data to display    DISCHARGE MEDICATIONS        Review of your medicines      UNREVIEWED medicines. Ask your doctor about these medicines      Dose / Directions   acetaminophen 500 MG tablet  Commonly known as: TYLENOL      Dose: 1,000-1,500 mg  Take 1,000-1,500 mg by mouth every 6 hours as needed for mild pain  Refills: 0     bumetanide 1 MG tablet  Commonly known as: BUMEX  Used for: Acute on chronic diastolic congestive heart failure (H)      Dose: 3 mg  Take 3 tablets (3 mg) by mouth daily  Refills: 0     cholecalciferol 50 MCG (2000 UT) Caps      Dose: 2 capsule  Take 2 capsules by mouth daily  Refills: 0     diclofenac 1 % topical gel  Commonly known as: VOLTAREN  Used for: Spinal stenosis of lumbar region with neurogenic claudication      Dose: 2 g  Apply 2 g topically 2 times daily  Refills: 0     diphenhydrAMINE-acetaminophen  MG tablet  Commonly known as: TYLENOL PM      Dose: 3 tablet  Take 3 tablets by mouth At Bedtime  Refills: 0     ferrous sulfate 325 (65 Fe) MG tablet  Commonly known as: FEROSUL      Dose: 1 tablet  Take 1 tablet by mouth 3 times daily  Refills: 0     gabapentin 300 MG capsule  Commonly known as: NEURONTIN  Used for: Spinal stenosis of lumbar region with neurogenic claudication      Dose: 600 mg  Take 2 capsules (600 mg) by mouth 2 times daily  Refills: 0     insulin glargine 100 UNIT/ML pen  Commonly known as: LANTUS PEN  Used for: Type 2 diabetes mellitus with hyperglycemia, with long-term current use of insulin (H)      Dose: 15 Units  Inject 15 Units Subcutaneous At  Bedtime  Quantity: 15 mL  Refills: 0     insulin lispro 100 UNIT/ML (1 unit dial) KWIKPEN  Commonly known as: HumaLOG KWIKPEN  Used for: Type 2 diabetes mellitus with hyperglycemia, with long-term current use of insulin (H)      Do Not give Correction Insulin if Pre-Meal BG less than 140.   For Pre-Meal  - 164 give 1 unit.   For Pre-Meal  - 189 give 2 units.   For Pre-Meal  - 214 give 3 units.   For Pre-Meal  - 239 give 4 units.   For Pre-Meal  - 264 give 5 units.   For Pre-Meal  - 289 give 6 units.   For Pre-Meal  - 314 give 7 units.   For Pre-Meal  - 339 give 8 units.   For Pre-Meal  - 364 give 9 units.   For Pre-Meal BG greater than or equal to 365 give 10 units  To be given with prandial insulin, and based on pre-meal blood glucose.   Notify provider if glucose greater than or equal to 350 mg/dL after administration of correction dose.  Quantity: 15 mL  Refills: 0     levothyroxine 25 MCG tablet  Commonly known as: SYNTHROID/LEVOTHROID      Dose: 25 mcg  Take 25 mcg by mouth daily  Refills: 0     lidocaine 5 % external ointment  Commonly known as: XYLOCAINE  Used for: Spinal stenosis of lumbar region with neurogenic claudication      Apply topically 2 times daily  Refills: 0     lisinopril 20 MG tablet  Commonly known as: ZESTRIL  Used for: Acute on chronic diastolic congestive heart failure (H)      Dose: 20 mg  Take 1 tablet (20 mg) by mouth daily  Quantity: 30 tablet  Refills: 0     metFORMIN 1000 MG tablet  Commonly known as: GLUCOPHAGE      Dose: 1,000 mg  Take 1,000 mg by mouth 2 times daily (with meals)  Refills: 0     oxyCODONE 5 MG tablet  Commonly known as: ROXICODONE  Used for: Spinal stenosis of lumbar region, unspecified whether neurogenic claudication present      Dose: 2.5-5 mg  Take 0.5-1 tablets (2.5-5 mg) by mouth every 4 hours as needed for moderate pain or moderate to severe pain  Quantity: 20 tablet  Refills: 0     rosuvastatin 10 MG  "tablet  Commonly known as: CRESTOR      Dose: 10 mg  Take 10 mg by mouth At Bedtime  Refills: 0     tamsulosin 0.4 MG capsule  Commonly known as: FLOMAX  Used for: Spinal stenosis of lumbar region with neurogenic claudication      Dose: 0.4 mg  Take 1 capsule (0.4 mg) by mouth daily For post-op urinary retention  Refills: 0     vitamin C 500 MG tablet  Commonly known as: ASCORBIC ACID      Dose: 500 mg  Take 500 mg by mouth 3 times daily Take with iron supplement  Refills: 0     zinc gluconate 50 MG tablet  Used for: Vitamin deficiency      Dose: 50 mg  Take 1 tablet (50 mg) by mouth daily  Refills: 0        CONTINUE these medicines which have NOT CHANGED      Dose / Directions   coenzyme Q-10 200 MG Caps capsule      Dose: 200 mg  Take 200 mg by mouth daily  Refills: 0     fish Oil 1200 MG capsule      Dose: 1,200 mg  Take 1,200 mg by mouth daily  Refills: 0              INFORMATION SOURCE AND LIMITATIONS    History/Exam limitations: None  Patient information was obtained from: the patient  Use of : N/A    HISTORY OF PRESENT ILLNESS   Thomas Steve is a 77 year old year old male with a relevant past history of type II diabetes mellitus, hypertension, CAD, ischemic cardiomyopathy, RBBB, chronic atrial fibrillation, congestive heart failure, SSS, hyperlipidemia, cardiac pacemaker in situ, lumbar spinal stenosis, acute kidney failure, DVT, s/p lumbar laminectomy 6/16/22, and s/p CABG 2011, who presents to this ED by EMS for evaluation of generalized weakness and low hemoglobin.    Per triage, yesterday (7/13), patient was at a follow up appointment at his spine clinic and had labs done. Labs showed hemoglobin of 6.1. He had generalized weakness and CT showed a large hematoma on his right buttock.    Patient denies a recent fall. Patient states that \"everything feels weak.\" He denies leg numbness, but notes that he has diabetic neuropathy. He also denies shortness of breath, chest pain, fever, back pain, " urinary retention, or any other complaints at this time.     Patient last ate ~8:30 am yesterday (7/13).     REVIEW OF SYSTEMS:   Constitutional: Positive for generalized weakness. Negative for fever.   HENT: Negative for URI symptoms or sore throat.    Cardiac: Negative for  chest pain,palpitations, near syncope or syncope  Respiratory: Negative for cough and shortness of breath.    Gastrointestinal: Negative for abdominal pain, nausea, vomiting, constipation, diarrhea, rectal bleeding or melena.  Genitourinary: Negative for dysuria, flank pain and hematuria.   Musculoskeletal: Negative for back pain.   Skin: Negative for  Rash Positive for hematoma  Neurological: Negative for dizziness, headache, syncope, speech difficulty, or imbalance with walking.   Hematological: Negative for adenopathy. Does not bruise/bleed easily.   Psychiatric/Behavioral: Negative for confusion.     PATIENT HISTORY     Past Medical History:   Diagnosis Date     Acute sinusitis      Atrial fibrillation (H)      Back pain      CHF (congestive heart failure) (H)      Coronary artery disease      Diabetes mellitus (H)      Gastroesophageal reflux disease with esophagitis      HTN (hypertension)      Hyperlipemia      Joint pain      Lipoma of neck      Nocturia      Sciatic leg pain      Shoulder impingement syndrome      Sleep apnea      Typical atrial flutter (H) 07/07/2016     Patient Active Problem List   Diagnosis     Type 2 diabetes mellitus (H)     Essential hypertension     Coronary atherosclerosis     Ischemic cardiomyopathy     Right bundle branch block     Mixed hyperlipidemia     Atrial fibrillation (H)     Obstructive sleep apnea     Congestive Heart Failure     Typical atrial flutter (H)     Leg pain, bilateral     Leg swelling     Venous insufficiency of both lower extremities     Acquired lymphedema of leg     Claudication of both lower extremities (H)     Diabetic peripheral neuropathy associated with type 2 diabetes mellitus  "(H)     SSS (sick sinus syndrome) (H)     Acute systolic heart failure (H)     Biventricular cardiac pacemaker in situ     Lumbar stenosis with neurogenic claudication     Gastroesophageal reflux disease without esophagitis     Hypothyroidism     Normocytic anemia     Chronic left shoulder pain     Nasal congestion     Lumbar spinal stenosis     Acute deep vein thrombosis (DVT) of right femoral vein (H)     Acute kidney failure, unspecified (H)     Anemia     Past Surgical History:   Procedure Laterality Date     EP BIV PACEMAKER INSERT N/A 6/11/2019    Procedure: EP Biventricular Pacemaker Insertion;  Surgeon: Durga Rajput MD;  Location: [facility] Cath Lab;  Service: Cardiology     IR FINE NEEDLE ASPIRATION W ULTRASOUND  6/23/2022     LAMINECTOMY LUMBAR TWO LEVELS Bilateral 6/16/2022    Procedure: DECOMPRESSIVE LUMBAR LAMINECTOMY LUMBAR 4-LUMBAR 5 AND LUMBAR 5-SACRAL 1 BILATERAL-LEFT SIDE FIRST-PLUS BILATERAL FORAMINOTOMIES PLUS REMOVAL OF HERNIATED DISC LUMBAR 5-SACRAL 1 LEFT;  Surgeon: Israel Samuel MD;  Location: [facility] OR     IN CARDIOVERSION ELECTIVE ARRHYTHMIA EXTERNAL  06/03/2014    Description: Elective Cardioversion External;  Recorded: 06/03/2014;     [facility] CABG, VEIN, SINGLE  2011    Description: CABG (CABG);  Proc Date: 09/26/2011;  Comments: LIMA^LAD, SVG^OM, SVG^distal RCA     Social Histrory  Smoking:None  Alcohol Use:None  Allergies   Allergen Reactions     Aspirin Other (See Comments)     Contraindicated due to xarelto use     Codeine Nausea and Vomiting     Pollen Extracts [Pollen Extract] Unknown     Scratchy throat     Vicodin [Hydrocodone-Acetaminophen] Nausea and Vomiting         OUTPATIENT MEDICATIONS     New Prescriptions    No medications on file      Vitals:    07/13/22 2355 07/14/22 0051   BP: (!) 142/68 135/83   Pulse: 70 67   Resp: 14 16   Temp: 97.9  F (36.6  C)    TempSrc: Oral    SpO2: 99% 100%   Weight: 92.1 kg (203 lb)    Height: 1.778 m (5' 10\")        Physical " Exam   Constitutional: Oriented to person, place, and time. Appears well-developed and well-nourished.   HEENT:    Head: Atraumatic.   Neck: Normal range of motion. Neck supple.   Cardiovascular: Normal rate, regular rhythm and normal heart sounds.    Pulmonary/Chest: Normal effort  and breath sounds normal.   Abdominal: Soft. Bowel sounds are normal.   Musculoskeletal: Normal range of motion. No tenderness over lumbar spine. Well healing surgical scar on back. Large hematoma on right buttock without cellulitis.   Neurological: Alert and oriented to person, place, and time. Normal strength.CHronis sensory deficit of lower extremities. No cranial nerve deficit .   Skin: Skin is warm and dry.   Psychiatric: Normal mood and affect. Behavior is normal. Thought content normal.     DIAGNOSTICS    LABORATORY FINDINGS (REVIEWED AND INTERPRETED):  Labs Ordered and Resulted from Time of ED Arrival to Time of ED Departure - No data to display      IMAGING (REVIEWED AND INTERPRETED):  No orders to display           ECG (REVIEWED AND INTERPRETED):   ECG:   None      I, Rika Figueroa, am serving as a scribe to document services personally performed by Nelson Wilkins D.O., based on my observation and the provider s statements to me.    INelson D.O., attest that Rika Figueroa is acting in a scribe capacity, has observed my performance of the services and has documented them in accordance with my direction.    Nelson Wilkins D.O.  EMERGENCY MEDICINE   07/13/22  [facility] EMERGENCY DEPARTMENT  [address] [place] [id number]  [phone]  Dept: [phone]       Nelson Wilkins DO  07/14/22 0107

## 2022-07-14 NOTE — ED TRIAGE NOTES
Arrives via [facility] from [facility] ED for transfer. Was at follow up with spine clinic, did U/S to rule out blood clot and had labs done. Labs showed Hgb 6.1. Currently receiving 1 unit packed red blood cells. Had generalized weakness. Has large hematoma on right hip with unknown cause (prior to arrival).        7106

## 2022-07-14 NOTE — PROGRESS NOTES
"Thomas seen in the ED at [facility]. He was seen in clinic yesterday by our team for post op follow up. Extensive bruising noted with \"goose egg\" on right hip led to US, hospital visit. Stable nonocclusive thrombus in profunda femoral vein. Too small to meet criteria for filter per IR. Per outside hospital imaging he has right buttock hematoma on CTA. Thomas denies any recent falls or bumping his body on anything. He also has bruising on right lower outer calf. Patient anemic on arrival to hospital with Hgb 6.9. Transfused. New heart failure. Acute kidney failure. 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 We had wanted coumadin for 2-4 weeks when he discharged from [facility] vs his home Xarelto for Afib, clot in case he needed to return to the OR. He does not appear symptomatic from pseudomeningiocele. Continues to state back pain is minimal, no headaches, nausea. Thomas has good LE strength. We will follow while he is here at the hospital. We will get updated MRI without contrast due to kidney function. Dr Samuel in agreement. Thomas requests staff update his wife who is at the TCU he was at in [place] - both recovering from hospital stays. Thomas does not have his cell phone.     Melani Huber, RAYNA-CNP  [facility] Neurosurgery  O: [phone]    "

## 2022-07-15 ENCOUNTER — APPOINTMENT (OUTPATIENT)
Dept: RADIOLOGY | Facility: HOSPITAL | Age: 77
DRG: 604 | End: 2022-07-15
Attending: NURSE PRACTITIONER
Payer: MEDICARE

## 2022-07-15 ENCOUNTER — APPOINTMENT (OUTPATIENT)
Dept: ULTRASOUND IMAGING | Facility: HOSPITAL | Age: 77
DRG: 604 | End: 2022-07-15
Attending: INTERNAL MEDICINE
Payer: MEDICARE

## 2022-07-15 ENCOUNTER — APPOINTMENT (OUTPATIENT)
Dept: MRI IMAGING | Facility: HOSPITAL | Age: 77
DRG: 604 | End: 2022-07-15
Attending: NURSE PRACTITIONER
Payer: MEDICARE

## 2022-07-15 ENCOUNTER — DOCUMENTATION ONLY (OUTPATIENT)
Dept: CARDIOLOGY | Facility: CLINIC | Age: 77
End: 2022-07-15

## 2022-07-15 PROBLEM — M51.26 LUMBAR HERNIATED DISC: Status: ACTIVE | Noted: 2022-07-15

## 2022-07-15 PROBLEM — M48.061 SPINAL STENOSIS, LUMBAR REGION, WITHOUT NEUROGENIC CLAUDICATION: Status: ACTIVE | Noted: 2022-07-15

## 2022-07-15 PROBLEM — S30.0XXA TRAUMATIC HEMATOMA OF BUTTOCK, INITIAL ENCOUNTER: Status: ACTIVE | Noted: 2022-07-15

## 2022-07-15 PROBLEM — D64.9 ANEMIA, UNSPECIFIED TYPE: Status: ACTIVE | Noted: 2022-07-15

## 2022-07-15 LAB
ALBUMIN MFR UR ELPH: 22.2 MG/DL
ALBUMIN UR-MCNC: 20 MG/DL
ANION GAP SERPL CALCULATED.3IONS-SCNC: 11 MMOL/L (ref 5–18)
APPEARANCE UR: CLEAR
BACTERIA #/AREA URNS HPF: ABNORMAL /HPF
BILIRUB UR QL STRIP: NEGATIVE
BUN SERPL-MCNC: 109 MG/DL (ref 8–28)
CALCIUM SERPL-MCNC: 8.6 MG/DL (ref 8.5–10.5)
CHLORIDE BLD-SCNC: 104 MMOL/L (ref 98–107)
CO2 SERPL-SCNC: 21 MMOL/L (ref 22–31)
COLOR UR AUTO: ABNORMAL
CREAT SERPL-MCNC: 4.67 MG/DL (ref 0.7–1.3)
CREAT UR-MCNC: 48 MG/DL
ERYTHROCYTE [DISTWIDTH] IN BLOOD BY AUTOMATED COUNT: 16.4 % (ref 10–15)
GFR SERPL CREATININE-BSD FRML MDRD: 12 ML/MIN/1.73M2
GLUCOSE BLD-MCNC: 77 MG/DL (ref 70–125)
GLUCOSE BLDC GLUCOMTR-MCNC: 205 MG/DL (ref 70–99)
GLUCOSE BLDC GLUCOMTR-MCNC: 217 MG/DL (ref 70–99)
GLUCOSE BLDC GLUCOMTR-MCNC: 245 MG/DL (ref 70–99)
GLUCOSE BLDC GLUCOMTR-MCNC: 76 MG/DL (ref 70–99)
GLUCOSE BLDC GLUCOMTR-MCNC: 77 MG/DL (ref 70–99)
GLUCOSE BLDC GLUCOMTR-MCNC: 87 MG/DL (ref 70–99)
GLUCOSE UR STRIP-MCNC: NEGATIVE MG/DL
HCT VFR BLD AUTO: 28.3 % (ref 40–53)
HGB BLD-MCNC: 8.9 G/DL (ref 13.3–17.7)
HGB UR QL STRIP: NEGATIVE
KETONES UR STRIP-MCNC: NEGATIVE MG/DL
LEUKOCYTE ESTERASE UR QL STRIP: NEGATIVE
MCH RBC QN AUTO: 28.3 PG (ref 26.5–33)
MCHC RBC AUTO-ENTMCNC: 31.4 G/DL (ref 31.5–36.5)
MCV RBC AUTO: 90 FL (ref 78–100)
MUCOUS THREADS #/AREA URNS LPF: PRESENT /LPF
NITRATE UR QL: NEGATIVE
PH UR STRIP: 5 [PH] (ref 5–7)
PLATELET # BLD AUTO: 256 10E3/UL (ref 150–450)
POTASSIUM BLD-SCNC: 4.8 MMOL/L (ref 3.5–5)
PROT/CREAT 24H UR: 0.46 MG/MG CR
RBC # BLD AUTO: 3.14 10E6/UL (ref 4.4–5.9)
RBC URINE: 1 /HPF
SARS-COV-2 RNA RESP QL NAA+PROBE: NEGATIVE
SODIUM SERPL-SCNC: 136 MMOL/L (ref 136–145)
SP GR UR STRIP: 1.02 (ref 1–1.03)
SQUAMOUS EPITHELIAL: <1 /HPF
UROBILINOGEN UR STRIP-MCNC: <2 MG/DL
WBC # BLD AUTO: 8.3 10E3/UL (ref 4–11)
WBC URINE: 2 /HPF

## 2022-07-15 PROCEDURE — 250N000013 HC RX MED GY IP 250 OP 250 PS 637: Performed by: INTERNAL MEDICINE

## 2022-07-15 PROCEDURE — A9585 GADOBUTROL INJECTION: HCPCS | Performed by: INTERNAL MEDICINE

## 2022-07-15 PROCEDURE — 255N000002 HC RX 255 OP 636: Performed by: INTERNAL MEDICINE

## 2022-07-15 PROCEDURE — 120N000004 HC R&B MS OVERFLOW

## 2022-07-15 PROCEDURE — 72158 MRI LUMBAR SPINE W/O & W/DYE: CPT | Mod: MF

## 2022-07-15 PROCEDURE — 80048 BASIC METABOLIC PNL TOTAL CA: CPT | Performed by: INTERNAL MEDICINE

## 2022-07-15 PROCEDURE — 87635 SARS-COV-2 COVID-19 AMP PRB: CPT | Performed by: INTERNAL MEDICINE

## 2022-07-15 PROCEDURE — 84156 ASSAY OF PROTEIN URINE: CPT | Performed by: INTERNAL MEDICINE

## 2022-07-15 PROCEDURE — 85027 COMPLETE CBC AUTOMATED: CPT | Performed by: INTERNAL MEDICINE

## 2022-07-15 PROCEDURE — 36415 COLL VENOUS BLD VENIPUNCTURE: CPT | Performed by: INTERNAL MEDICINE

## 2022-07-15 PROCEDURE — 81001 URINALYSIS AUTO W/SCOPE: CPT | Performed by: INTERNAL MEDICINE

## 2022-07-15 PROCEDURE — G0378 HOSPITAL OBSERVATION PER HR: HCPCS

## 2022-07-15 PROCEDURE — 258N000003 HC RX IP 258 OP 636: Performed by: INTERNAL MEDICINE

## 2022-07-15 PROCEDURE — 82962 GLUCOSE BLOOD TEST: CPT

## 2022-07-15 PROCEDURE — 71046 X-RAY EXAM CHEST 2 VIEWS: CPT

## 2022-07-15 PROCEDURE — 96361 HYDRATE IV INFUSION ADD-ON: CPT

## 2022-07-15 PROCEDURE — 250N000012 HC RX MED GY IP 250 OP 636 PS 637: Performed by: INTERNAL MEDICINE

## 2022-07-15 PROCEDURE — 99233 SBSQ HOSP IP/OBS HIGH 50: CPT | Performed by: INTERNAL MEDICINE

## 2022-07-15 PROCEDURE — 76770 US EXAM ABDO BACK WALL COMP: CPT

## 2022-07-15 RX ORDER — GADOBUTROL 604.72 MG/ML
9 INJECTION INTRAVENOUS ONCE
Status: COMPLETED | OUTPATIENT
Start: 2022-07-15 | End: 2022-07-15

## 2022-07-15 RX ORDER — LACTULOSE 10 G/15ML
20 SOLUTION ORAL 4 TIMES DAILY
Status: DISCONTINUED | OUTPATIENT
Start: 2022-07-15 | End: 2022-07-15

## 2022-07-15 RX ADMIN — POLYETHYLENE GLYCOL 3350 17 G: 17 POWDER, FOR SOLUTION ORAL at 09:19

## 2022-07-15 RX ADMIN — OXYCODONE HYDROCHLORIDE 5 MG: 5 TABLET ORAL at 16:18

## 2022-07-15 RX ADMIN — DICLOFENAC SODIUM 2 G: 10 GEL TOPICAL at 21:27

## 2022-07-15 RX ADMIN — ACETAMINOPHEN 1000 MG: 500 TABLET ORAL at 16:18

## 2022-07-15 RX ADMIN — SODIUM CHLORIDE, POTASSIUM CHLORIDE, SODIUM LACTATE AND CALCIUM CHLORIDE: 600; 310; 30; 20 INJECTION, SOLUTION INTRAVENOUS at 00:13

## 2022-07-15 RX ADMIN — GABAPENTIN 300 MG: 300 CAPSULE ORAL at 09:19

## 2022-07-15 RX ADMIN — OXYCODONE HYDROCHLORIDE 5 MG: 5 TABLET ORAL at 21:26

## 2022-07-15 RX ADMIN — SODIUM CHLORIDE, POTASSIUM CHLORIDE, SODIUM LACTATE AND CALCIUM CHLORIDE: 600; 310; 30; 20 INJECTION, SOLUTION INTRAVENOUS at 10:11

## 2022-07-15 RX ADMIN — INSULIN ASPART 1 UNITS: 100 INJECTION, SOLUTION INTRAVENOUS; SUBCUTANEOUS at 14:12

## 2022-07-15 RX ADMIN — GABAPENTIN 300 MG: 300 CAPSULE ORAL at 21:26

## 2022-07-15 RX ADMIN — OXYCODONE HYDROCHLORIDE 5 MG: 5 TABLET ORAL at 00:13

## 2022-07-15 RX ADMIN — INSULIN ASPART 2 UNITS: 100 INJECTION, SOLUTION INTRAVENOUS; SUBCUTANEOUS at 21:33

## 2022-07-15 RX ADMIN — INSULIN ASPART 1 UNITS: 100 INJECTION, SOLUTION INTRAVENOUS; SUBCUTANEOUS at 16:26

## 2022-07-15 RX ADMIN — LISINOPRIL 20 MG: 20 TABLET ORAL at 09:19

## 2022-07-15 RX ADMIN — DICLOFENAC SODIUM 2 G: 10 GEL TOPICAL at 09:21

## 2022-07-15 RX ADMIN — SENNOSIDES AND DOCUSATE SODIUM 2 TABLET: 50; 8.6 TABLET ORAL at 09:18

## 2022-07-15 RX ADMIN — ROSUVASTATIN CALCIUM 10 MG: 10 TABLET, FILM COATED ORAL at 21:30

## 2022-07-15 RX ADMIN — ACETAMINOPHEN 1000 MG: 500 TABLET ORAL at 05:44

## 2022-07-15 RX ADMIN — OXYCODONE HYDROCHLORIDE 5 MG: 5 TABLET ORAL at 05:45

## 2022-07-15 RX ADMIN — LEVOTHYROXINE SODIUM 25 MCG: 0.03 TABLET ORAL at 05:44

## 2022-07-15 RX ADMIN — GADOBUTROL 9 ML: 604.72 INJECTION INTRAVENOUS at 13:03

## 2022-07-15 RX ADMIN — INSULIN GLARGINE 15 UNITS: 100 INJECTION, SOLUTION SUBCUTANEOUS at 21:33

## 2022-07-15 RX ADMIN — TAMSULOSIN HYDROCHLORIDE 0.4 MG: 0.4 CAPSULE ORAL at 21:27

## 2022-07-15 ASSESSMENT — ACTIVITIES OF DAILY LIVING (ADL)
TOILETING_ISSUES: NO
DRESS: 1-->ASSISTANCE (EQUIPMENT/PERSON) NEEDED (NOT DEVELOPMENTALLY APPROPRIATE)
WEAR_GLASSES_OR_BLIND: YES
DRESS: 1-->ASSISTANCE (EQUIPMENT/PERSON) NEEDED
FALL_HISTORY_WITHIN_LAST_SIX_MONTHS: NO
VISION_MANAGEMENT: GLASSES
ADLS_ACUITY_SCORE: 38
BATHING: 1-->ASSISTANCE NEEDED
DOING_ERRANDS_INDEPENDENTLY_DIFFICULTY: NO
ADLS_ACUITY_SCORE: 37
CHANGE_IN_FUNCTIONAL_STATUS_SINCE_ONSET_OF_CURRENT_ILLNESS/INJURY: YES
ADLS_ACUITY_SCORE: 37
TRANSFERRING: 1-->ASSISTANCE (EQUIPMENT/PERSON) NEEDED (NOT DEVELOPMENTALLY APPROPRIATE)
TRANSFERRING: 1-->ASSISTANCE (EQUIPMENT/PERSON) NEEDED
WALKING_OR_CLIMBING_STAIRS: AMBULATION DIFFICULTY, REQUIRES EQUIPMENT
DRESSING/BATHING_DIFFICULTY: YES
DIFFICULTY_EATING/SWALLOWING: NO
CONCENTRATING,_REMEMBERING_OR_MAKING_DECISIONS_DIFFICULTY: NO
ADLS_ACUITY_SCORE: 37
WALKING_OR_CLIMBING_STAIRS_DIFFICULTY: YES
ADLS_ACUITY_SCORE: 37
ADLS_ACUITY_SCORE: 38
DRESSING/BATHING: BATHING DIFFICULTY, REQUIRES EQUIPMENT

## 2022-07-15 NOTE — ED NOTES
"[facility] ED Handoff Report    ED Chief Complaint: Generalized Weakness, Abnormal labs    ED Diagnosis:  (D64.9) Anemia, unspecified type    (S30.0XXA) Traumatic hematoma of buttock, initial encounter    (M51.26) Lumbar herniated disc  Plan: XR Chest 2 Views      (M48.061) Spinal stenosis, lumbar region, without neurogenic claudication  Plan: XR Chest 2 Views       PMH:    Past Medical History:   Diagnosis Date     Acute sinusitis      Atrial fibrillation (H)     Resolved after medication and CPAP     Back pain      CHF (congestive heart failure) (H)      Coronary artery disease      Diabetes mellitus (H)      Gastroesophageal reflux disease with esophagitis      HTN (hypertension)      Hyperlipemia      Joint pain      Lipoma of neck      Nocturia      Sciatic leg pain      Shoulder impingement syndrome     BILATERAL     Sleep apnea     uses CPAP     Typical atrial flutter (H) 07/07/2016    Demonstrated on 12-lead EKG July 7, 2016        Code Status:  Full Code     Falls Risk: Yes Band: Applied    Current Living Situation/Residence: lives with a significant other     Elimination Status: Continent: Yes     Activity Level: SBA w/ walker    Patients Preferred Language:  English     Needed: No    Vital Signs:  BP (!) 143/69   Pulse 63   Temp 98.1  F (36.7  C) (Oral)   Resp 17   Ht 1.778 m (5' 10\")   Wt 92.1 kg (203 lb)   SpO2 100%   BMI 29.13 kg/m       Cardiac Rhythm: Pacemaker    Pain Score: 0/10    Is the Patient Confused:  No    Last Food or Drink: 07/15/22 at 1430    Focused Assessment:  Pt is s/p recent posterior laminectomy of L spine with neurosurgery at [facility].  Pt found to be anemic to 6.9 (down 2 grams) today and transfused 1 unit of blood at ED.  Found to have new right heart failure and right buttock hematoma on CTA imaging.  No acute bleeding found or known source of bleeding.  Pt is already on coumadin for new DVT.  Concerned about reversing him at this point, so no " reversal started.  Vitals stable, hemodynamically well with no hypoxia.  Physician I spoke with stated they do not have appropriate resources including IR, GI and wanted him somewhere that he could get available treatments if needed.    Tests Performed: Done: Labs and Imaging    Treatments Provided:  MRI, Nephrology, US, CT, 1 unit of blood    Family Dynamics/Concerns: No    Family Updated On Visitor Policy: Yes    Plan of Care Communicated to Family: Yes    Who Was Updated about Plan of Care: wife    Belongings Checklist Done and Signed by Patient: Yes    Medications sent with patient: insulin pen    Covid: asymptomatic , negative    Additional Information:  Fluid collection decreased in size per neuro surgeon.    RN: Felecia England RN   7/15/2022 2:47 PM

## 2022-07-15 NOTE — H&P
[facility]    History and Physical - Hospitalist Service       Date of Admission:  7/13/2022    Assessment & Plan      Thomas Steve is a 77 year old male admitted on 7/13/2022.     Left buttock hematoma, non traumatic, in pt with therapeutic INR on coumadin ( for a fib, Rt leg DVT since 6/29).  -holding coumadin  -Hemoglobin yesterday 7.1, received 1 unit per BCs, posttransfusion hemoglobin 8.9.  -IR CONSULTED FOR IVC filter placement, concluded his femoral DVT is too small or IV placement.    ABLA, due to left buttock hematoma  S/p 1 unit PRBCs at OSH ER for hemoglobin of 6.9.  Posttransfusion hemoglobin 7.1, received 1 unit per BCs on 7/14 and today hemoglobin is 8.9.  Right buttock pain subsided.  CT abd at OSH ED negative for active extravasation.  - monitor hemoglobin    YOLANDA/CKD 3.  Creat 1.15 on 5/7 and 4.31 yesterday, with pre renal indices.  CTat OSH ER abd negative for retroperitoneal hematoma.  Received IV contrast on 7/13.  UA not infected.  Minimal improvement in creatinine with IVF.  Renal ultrasound, nephrology consult requested  Holding Bumex.    Hyperkalemia 5.3 on adm; with IVF down to 5.0.  -c/w IVF, monitor.    IDDM.  -C/w Lantus; SSIN  -hold glucophage due to YOLANDA.    Hyponatremia, improved with IVF.  HTN.  BP acceptable.  Nephrology recommended right BP little bit higher with YOLANDA.  CAD, s/p cabg in 2011, no angina, C/w home meds.  HLD-on crestor.  Pseudomeningocele: neurosurgery following. MRI today. S/p laminectomy on 6/16       Diet: Moderate Consistent Carb (60 g CHO per Meal) Diet   DVT Prophylaxis: Pneumatic Compression Devices to left leg only    Carpenter Catheter: Not present  Central Lines: None  Cardiac Monitoring: None  Code Status: Full CodeFull    Clinically Significant Risk Factors Present on Admission             # Hypoalbuminemia: Albumin = 3.1 g/dL (Ref range: 3.5 - 5.0 g/dL) on admission, will monitor as appropriate  # Acute Kidney Injury, unspecified:  "based on a >150% or 0.3 mg/dL increase in creatinine on admission compared to past 90 day average, will monitor renal function  # Coagulation Defect: home medication list includes an anticoagulant medication   # Hypertension: home medication list includes antihypertensive(s)    # DMII: A1C = N/A within past 3 months  # Overweight: Estimated body mass index is 29.13 kg/m  as calculated from the following:    Height as of this encounter: 1.778 m (5' 10\").    Weight as of this encounter: 92.1 kg (203 lb).          Expected Discharge Date: 07/16/2022              The patient's care was discussed with the Bedside Nurse, Care Coordinator/ and Patient.    Kate Sewell MD  Hospitalist Service  [facility]  Securely message with the Vocera Web Console (learn more here)  Text page via [employer] Paging/Directory   ____________________________________________________________________    Chief Complaint   Right buttock pain and hematoma.    History is obtained from the patient, electronic health record and emergency department physician    History of Present Illness   Thomas Steve is a 77 year old male with PMH of IDDM, , hypertension, CAD, ischemic cardiomyopathy, RBBB, chronic atrial fibrillation, congestive heart failure, SSS, hyperlipidemia, cardiac pacemaker in situ, lumbar spinal stenosis, acute kidney failure, DVT, s/p lumbar laminectomy 6/16/22, and s/p CABG 2011, who transferred from [facility] ER with right buttock hematoma, anemia HgB 6.1, started blood transfusion at OSH ER. Feels week. Nonfocal. No falls.   CTA abdomen pelvis performed  at [facility] showed large hematoma in patient's right buttock. No retroperitoneal hematoma or other process in the abdomen.  He denies fever, chest pain, abd pain, nausea, melena.  Today admitted to inpatient, from observation, under which he remained in the hospital for the previous 24 hours.    Review of Systems    The 10 " point Review of Systems is negative other than noted in the HPI or here.    Past Medical History    I have reviewed this patient's medical history and updated it with pertinent information if needed.   Past Medical History:   Diagnosis Date     Acute sinusitis      Atrial fibrillation (H)     Resolved after medication and CPAP     Back pain      CHF (congestive heart failure) (H)      Coronary artery disease      Diabetes mellitus (H)      Gastroesophageal reflux disease with esophagitis      HTN (hypertension)      Hyperlipemia      Joint pain      Lipoma of neck      Nocturia      Sciatic leg pain      Shoulder impingement syndrome     BILATERAL     Sleep apnea     uses CPAP     Typical atrial flutter (H) 07/07/2016    Demonstrated on 12-lead EKG July 7, 2016       Past Surgical History   I have reviewed this patient's surgical history and updated it with pertinent information if needed.  Past Surgical History:   Procedure Laterality Date     EP BIV PACEMAKER INSERT N/A 6/11/2019    Procedure: EP Biventricular Pacemaker Insertion;  Surgeon: Durga Rajput MD;  Location: [facility] Cath Lab;  Service: Cardiology     IR FINE NEEDLE ASPIRATION W ULTRASOUND  6/23/2022     LAMINECTOMY LUMBAR TWO LEVELS Bilateral 6/16/2022    Procedure: DECOMPRESSIVE LUMBAR LAMINECTOMY LUMBAR 4-LUMBAR 5 AND LUMBAR 5-SACRAL 1 BILATERAL-LEFT SIDE FIRST-PLUS BILATERAL FORAMINOTOMIES PLUS REMOVAL OF HERNIATED DISC LUMBAR 5-SACRAL 1 LEFT;  Surgeon: Israel Samuel MD;  [facility]     CO CARDIOVERSION ELECTIVE ARRHYTHMIA EXTERNAL  06/03/2014    Description: Elective Cardioversion External;  Recorded: 06/03/2014;     [facility] CABG, VEIN, SINGLE  2011    Description: CABG (CABG);  Proc Date: 09/26/2011;  Comments: LIMA^LAD, SVG^OM, SVG^distal RCA       Social History   I have reviewed this patient's social history and updated it with pertinent information if needed.  Social History     Tobacco Use     Smoking status: Former  Smoker     Packs/day: 1.50     Years: 23.00     Pack years: 34.50     Quit date: 1986     Years since quittin.5     Smokeless tobacco: Never Used   Substance Use Topics     Alcohol use: Yes     Alcohol/week: 1.0 standard drink     Comment: one beer per day     Drug use: No       Family History   I have reviewed this patient's family history and updated it with pertinent information if needed.  Family History   Problem Relation Age of Onset     Heart Disease Mother      Snoring Father      Heart Disease Father      Hypertension Father      Alzheimer Disease Father      No Known Problems Daughter      No Known Problems Daughter      No Known Problems Daughter      Chronic Obstructive Pulmonary Disease Sister      Hodgkin's lymphoma Brother      No Known Problems Son      Diabetes Sister      Substance Abuse Sister      Chronic Obstructive Pulmonary Disease Brother      Heart Disease Brother      Diabetes Brother      Substance Abuse Brother        Prior to Admission Medications   Prior to Admission Medications   Prescriptions Last Dose Informant Patient Reported? Taking?   Omega-3 Fatty Acids (FISH OIL) 1200 MG capsule 2022 at Unknown time  Yes Yes   Sig: Take 1,200 mg by mouth daily   acetaminophen (TYLENOL) 500 MG tablet Unknown at Unknown time  Yes Yes   Sig: Take 1,000-1,500 mg by mouth every 6 hours as needed for mild pain   bisacodyl (DULCOLAX) 10 MG suppository Unknown at Unknown time  Yes Yes   Sig: Place 10 mg rectally daily as needed for constipation If no BM after 4 days   bumetanide (BUMEX) 1 MG tablet 2022 at Unknown time  No Yes   Sig: Take 3 tablets (3 mg) by mouth daily   cholecalciferol 50 MCG (2000 UT) CAPS 2022 at Unknown time  Yes Yes   Sig: Take 2 capsules by mouth daily   coenzyme Q-10 200 MG CAPS capsule 2022 at Unknown time  Yes Yes   Sig: Take 200 mg by mouth daily   diclofenac (VOLTAREN) 1 % topical gel 2022 at Unknown time  No Yes   Sig: Apply 2 g topically  2 times daily   ferrous sulfate (FEROSUL) 325 (65 Fe) MG tablet 7/13/2022 at Unknown time  Yes Yes   Sig: Take 1 tablet by mouth 3 times daily (with meals)   gabapentin (NEURONTIN) 300 MG capsule 7/13/2022 at Unknown time  No Yes   Sig: Take 2 capsules (600 mg) by mouth 2 times daily   insulin glargine (LANTUS PEN) 100 UNIT/ML pen 7/12/2022  No Yes   Sig: Inject 15 Units Subcutaneous At Bedtime   insulin lispro (HUMALOG KWIKPEN) 100 UNIT/ML (1 unit dial) KWIKPEN 7/13/2022 at Unknown time  No Yes   Sig: Do Not give Correction Insulin if Pre-Meal BG less than 140.   For Pre-Meal  - 164 give 1 unit.   For Pre-Meal  - 189 give 2 units.   For Pre-Meal  - 214 give 3 units.   For Pre-Meal  - 239 give 4 units.   For Pre-Meal  - 264 give 5 units.   For Pre-Meal  - 289 give 6 units.   For Pre-Meal  - 314 give 7 units.   For Pre-Meal  - 339 give 8 units.   For Pre-Meal  - 364 give 9 units.   For Pre-Meal BG greater than or equal to 365 give 10 units  To be given with prandial insulin, and based on pre-meal blood glucose.   Notify provider if glucose greater than or equal to 350 mg/dL after administration of correction dose.   levothyroxine (SYNTHROID/LEVOTHROID) 25 MCG tablet 7/13/2022 at Unknown time  Yes Yes   Sig: Take 25 mcg by mouth daily   lidocaine (XYLOCAINE) 5 % external ointment 7/13/2022 at Unknown time  No Yes   Sig: Apply topically 2 times daily   lisinopril (ZESTRIL) 20 MG tablet 7/13/2022 at Unknown time  No Yes   Sig: Take 1 tablet (20 mg) by mouth daily   magnesium hydroxide (MILK OF MAGNESIA) 400 MG/5ML suspension Unknown at Unknown time  Yes Yes   Sig: Take 30 mLs by mouth daily as needed for constipation or heartburn If no BM after 3 days   melatonin 3 MG tablet 7/12/2022 at Unknown time  Yes Yes   Sig: Take 3 mg by mouth At Bedtime   metFORMIN (GLUCOPHAGE) 1000 MG tablet 7/13/2022 at Unknown time  Yes Yes   Sig: Take 1,000 mg by mouth 2 times daily  (with meals)   oxyCODONE (ROXICODONE) 5 MG tablet Unknown at Unknown time  No Yes   Sig: Take 0.5-1 tablets (2.5-5 mg) by mouth every 4 hours as needed for moderate pain or moderate to severe pain   polyethylene glycol (MIRALAX) 17 g packet 7/13/2022 at Unknown time  Yes Yes   Sig: Take 1 packet by mouth 2 times daily   polyethylene glycol (MIRALAX) 17 g packet Unknown at Unknown time  Yes Yes   Sig: Take 1 packet by mouth daily as needed for constipation Until having regular BMs   rosuvastatin (CRESTOR) 10 MG tablet 7/13/2022 at Unknown time  Yes Yes   Sig: Take 10 mg by mouth At Bedtime   senna-docusate (SENOKOT-S/PERICOLACE) 8.6-50 MG tablet 7/13/2022 at Unknown time  Yes Yes   Sig: Take 2 tablets by mouth 2 times daily   sodium phosphate (FLEET ENEMA) 7-19 GM/118ML rectal enema Unknown at Unknown time  Yes Yes   Sig: Place 1 enema rectally daily as needed for constipation If no BM after 5 days   tamsulosin (FLOMAX) 0.4 MG capsule 7/13/2022 at PM  No Yes   Sig: Take 1 capsule (0.4 mg) by mouth daily For post-op urinary retention   vitamin C (ASCORBIC ACID) 500 MG tablet 7/13/2022 at Unknown time  Yes Yes   Sig: Take 500 mg by mouth 3 times daily (with meals) Take with iron supplement   warfarin ANTICOAGULANT (COUMADIN) 5 MG tablet 7/12/2022 at Unknown time  Yes Yes   Sig: Take 5 mg by mouth daily   zinc gluconate 50 MG tablet 7/13/2022 at Unknown time  No Yes   Sig: Take 1 tablet (50 mg) by mouth daily      Facility-Administered Medications: None     Allergies   Allergies   Allergen Reactions     Aspirin Other (See Comments)     Contraindicated due to xarelto use     Codeine Nausea and Vomiting     Pollen Extracts [Pollen Extract] Unknown     Scratchy throat     Vicodin [Hydrocodone-Acetaminophen] Nausea and Vomiting       Physical Exam   Vital Signs: Temp: 98.1  F (36.7  C) Temp src: Oral BP: (!) 143/69 Pulse: 63   Resp: 17 SpO2: 100 % O2 Device: None (Room air)    Weight: 203 lbs 0 oz    General: Alert and  oriented x 3. Not in obvious distress.  HEENT: NC, AT. Neck- supple, No JVP elevation, lymphadenopathy or thyromegaly. Trachea-central.  Chest: Clear to auscultation bilaterally.  Heart: S1S2 irreg irregular. No M/R/G.  Abdomen: Soft. NT, ND. No organomegaly. Bowel sounds- active.  Back: No spine tenderness. No CVA tenderness.right buttock tenderness, edema  Extremities: No leg swelling. Peripheral pulses 2+ bilaterally.  Neuro: Cranial nerves 1-12 grossly normal. No focal neurological deficit    Data   Data reviewed today: I reviewed all medications, new labs and imaging results over the last 24 hours. I personally reviewed.    Recent Labs   Lab 07/15/22  0914 07/15/22  0903 07/15/22  0518 07/14/22  1202 07/14/22  1154 07/14/22  0110   WBC  --  8.3  --   --  8.6 10.1   HGB  --  8.9*  --   --  7.1* 7.4*   MCV  --  90  --   --  90 90   PLT  --  256  --   --  243 230   INR  --   --   --   --  2.22* 2.27*   NA  --  136  --   --  134* 135*   POTASSIUM  --  4.8  --   --  5.0 5.3*   CHLORIDE  --  104  --   --  102 100   CO2  --  21*  --   --  20* 20*   BUN  --  109*  --   --  112* 102*   CR  --  4.67*  --   --  4.52* 4.31*   ANIONGAP  --  11  --   --  12 15   BARBARA  --  8.6  --   --  8.6 9.0   GLC 76 77 77   < > 120 166*   ALBUMIN  --   --   --   --  3.1* 3.4*   PROTTOTAL  --   --   --   --  6.0 6.2   BILITOTAL  --   --   --   --  0.9 0.9   ALKPHOS  --   --   --   --  85 86   ALT  --   --   --   --  17 19   AST  --   --   --   --  14 18    < > = values in this interval not displayed.     8.6    \    7.1 (L)    /    243   N 74    L N/A    134 (L)    102    112 (H) /   ------------------------------------ 100 (H)   ALT 17   AST 14   AP 85   ALB 3.1 (L)   Ca 8.6  5.0    20 (L)    4.52 (H) \    % RETIC N/A    LDH N/A  Troponin N/A     (H)    CK N/A  INR 2.22 (H)   PTT N/A    D-dimer N/A    Fibrinogen N/A    Antithrombin N/A  Ferritin N/A  CRP N/A    IL-6 N/A  Recent Results (from the past 24 hour(s))   XR Chest 2  Views    Narrative    EXAM: XR CHEST 2 VW  LOCATION: [facility]  DATE/TIME: 7/15/2022 11:56 AM    INDICATION: Decompressive lumbar laminectomy one month ago. Anemia.  COMPARISON: 07/05/2022      Impression    IMPRESSION: Stable position AICD. Prior sternotomy. Enlarged cardiac silhouette. Small bilateral posterior pleural effusions. No pulmonary edema.    Mild left retrocardiac opacity which could represent atelectasis or pneumonia.

## 2022-07-15 NOTE — CONSULTS
"Care Management Initial Consult    General Information  Assessment completed with: Thomas Cardoza  Type of CM/SW Visit: Initial Assessment    Primary Care Provider verified and updated as needed: Yes   Readmission within the last 30 days: current reason for admission unrelated to previous admission (6/16/22 - 7/6/22)   Return Category: New Diagnosis  Reason for Consult: discharge planning  Advance Care Planning: Advance Care Planning Reviewed: other (see comments) (\"I'm working on it, but not finished and now in TCU\")          Communication Assessment  Patient's communication style: spoken language (English or Bilingual)                      Living Environment:   People in home: facility resident     Current living Arrangements: extended care facility  Name of Facility: [facility]   Able to return to prior arrangements: yes (\"I will need to go back to the TCU\")       Family/Social Support:  Care provided by: other (see comments) (TCU staff)  Provides care for: spouse (\"I normally care for my wife; but she was injured and she is now also in the TCU with me\")  Marital Status:   Facility resident(s)/Staff, Wife, Children  Sirisha       Description of Support System: Supportive, Involved    Support Assessment: Adequate family and caregiver support, Adequate social supports, Patient communicates needs well met    Current Resources:   Patient receiving home care services: No     Community Resources: Skilled Nursing Facility ([facility])  Equipment currently used at home: raised toilet seat, grab bar, toilet, grab bar, tub/shower, shower chair, walker, rolling, glucometer (\"4WW with a seat\")  Supplies currently used at home: Oxygen Tubing/Supplies, Diabetic Supplies, Other (\"bipap at night, glasses\")    Employment/Financial:  Employment Status: retired, , previous service     Employment/ Comments: \"I use my VA benefits as my supplemental insurance. " "I don't go to the VA hospital or to a VA doctor\".  Financial Concerns:     Referral to Financial Worker: No       Lifestyle & Psychosocial Needs:  Social Determinants of Health     Tobacco Use: Medium Risk     Smoking Tobacco Use: Former Smoker     Smokeless Tobacco Use: Never Used   Alcohol Use: Not on file   Financial Resource Strain: Not on file   Food Insecurity: Not on file   Transportation Needs: Not on file   Physical Activity: Not on file   Stress: Not on file   Social Connections: Not on file   Intimate Partner Violence: Not on file   Depression: Not on file   Housing Stability: Not on file       Functional Status:  Prior to admission patient needed assistance:   Dependent ADLs:: Ambulation-walker, Bathing, Dressing, Grooming, Transfers  Dependent IADLs:: Cleaning, Cooking, Laundry, Shopping, Meal Preparation, Medication Management, Transportation  Assesssment of Functional Status: Not at baseline with ADL Functioning, Not at baseline with mobility, Not at  functional baseline    Mental Health Status:          Chemical Dependency Status:                Values/Beliefs:  Spiritual, Cultural Beliefs, Orthodox Practices, Values that affect care:                 Additional Information:  Thomas is in TCU at [facility] in [place], [place] with his wife also in the same TCU. He and his wife normally live in [place], [place].    He will need to return to TCU at discharge.    Pt states, \"that he has  as his supplemental insurance from the VA and he is also agent orange disabled.\" He does not have a VA MD or get admitted at the VA hospital.    [facility] transport if son is unable to transport at discharge.    Chyna Waters RN      "

## 2022-07-15 NOTE — PROGRESS NOTES
Neurosurgery Note:    MRI with decrease in size of fluid collection and decreased mass effect on the thecal sac. Discussed with Dr. Samuel - can use anticoagulation of choice from neurosurgery perspective. With decrease in size of fluid collection, no future surgery planned. Please call with questions.     Melisa Lara CNP  [facility] Neurosurgery  O: [phone]  P: [phone]

## 2022-07-15 NOTE — CONSULTS
RENAL CONSULTATION:     Date of Consultation:  7/15/2022    Requesting Physician: Dr Sewell  Reason for Consult:  YOLANDA    Assessment/ Recommendations:  1. YOLANDA: Creatinine of 4.31 on 7/14. Recent YOLANDA during last hospitalization with peak creatinine of 2 and trending back down to 0.88 on 6/24. Creatinine of 1.32 on discharge and noted 1.02 o n7/8 and 3.86 on 7/13. Noted hgb trend down outpatient to 6. Form 9.1 on discharge. Underwent CT with contrast on 7/13. Creatinine stable today. UA with 20 protein, few bacteria. Patient with underlying vascular disease and CTA showed stenosis and origin of bilater renal arteries. With hematoma, ACE-I, bumex patient is going to be very prone to ischemic insults with volume/hemonynamic insult in setting of contrast exposure. Not all related to contrast as had YOLANDA prior to contrast. Most consistent with ATN at this time. Not consistent with acute GN, AIN. Need to rule out obstruction.Renal US pending. Urine output is increasing today.   -Holding diuretics  -No further IVF today  -Daily BMP  -Renal US, urine protein to creatinine.   -No acute indication for dialysis.   -holding ACE-I(last dose on 7/15 am)    2. Hematoma, left buttock: per IM. On coumadin for afib and right leg DVT.  Transfusion per primary.   3. CHF: CXR clear, lungs clear, holding ACE-I and bumex. Monitor.   4. Type 2 DM: per IM.   5. Pseudomeningocele: neurosurgery following. MRI today. S/p laminectomy on 6/16.  6. HTN: holding meds. Acceptable. Ok with running a bit higher with YOLANDA.   7. Hyponatremia: improved with IVF, hypovolemic.      Elias Marshall, DO  [facility]  [phone] (off)       History of present illness:  Mr Steve is a 78 y/o male with PMH of afib, CHF, YOLANDA, MD, CAD, HTN. Patient presents with worsening anemia and YOLANDA. Patient with recent hospitalization for laminectomy on 6/16 complicated by YOLANDA, CHF exacerbation. Patient found to have Hgb of 6.1 as outpatient  and underwent eval at [facility] ER and underwent CTA abdomen and pelvis. Large hematoma in right buttock. No retroperitoneal hematoma. Patient noted to have creatinine trending up to 3.86 on 7/13 and trending up further to 4.67 today. Patient on ACE-I and bumex as outpatient. Patient current reports no sob. Minimal pain currently. Some right leg swelling. NO left leg swelling. Reports had some diarrhea at TCU and was secondary to miralax. No current diarrhea. Denies any NSAIDs. Denies any issues with urination. No abdominal pain. Denies any family hx of CKD. Patient denies any new herbals or supplements. No current lightheadedness. Reports making more urine today.     Past Medical History:   Diagnosis Date     Acute sinusitis      Atrial fibrillation (H)     Resolved after medication and CPAP     Back pain      CHF (congestive heart failure) (H)      Coronary artery disease      Diabetes mellitus (H)      Gastroesophageal reflux disease with esophagitis      HTN (hypertension)      Hyperlipemia      Joint pain      Lipoma of neck      Nocturia      Sciatic leg pain      Shoulder impingement syndrome     BILATERAL     Sleep apnea     uses CPAP     Typical atrial flutter (H) 07/07/2016    Demonstrated on 12-lead EKG July 7, 2016       Drug and lactation database from the [place] [employer] of Medicine:  http://toxnet.nlm.nih.gov/cgi-bin/sis/htmlgen?LACT      Allergies   Allergen Reactions     Aspirin Other (See Comments)     Contraindicated due to xarelto use     Codeine Nausea and Vomiting     Pollen Extracts [Pollen Extract] Unknown     Scratchy throat     Vicodin [Hydrocodone-Acetaminophen] Nausea and Vomiting       Social History     Socioeconomic History     Marital status:      Spouse name: None     Number of children: None     Years of education: None     Highest education level: None   Tobacco Use     Smoking status: Former Smoker     Packs/day: 1.50     Years: 23.00     Pack years: 34.50     " Quit date: 1986     Years since quittin.5     Smokeless tobacco: Never Used   Substance and Sexual Activity     Alcohol use: Yes     Alcohol/week: 1.0 standard drink     Comment: one beer per day     Drug use: No     Sexual activity: Never   Social History Narrative    .  3 children.  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       Family History   Problem Relation Age of Onset     Heart Disease Mother      Snoring Father      Heart Disease Father      Hypertension Father      Alzheimer Disease Father      No Known Problems Daughter      No Known Problems Daughter      No Known Problems Daughter      Chronic Obstructive Pulmonary Disease Sister      Hodgkin's lymphoma Brother      No Known Problems Son      Diabetes Sister      Substance Abuse Sister      Chronic Obstructive Pulmonary Disease Brother      Heart Disease Brother      Diabetes Brother      Substance Abuse Brother        Review of Systems: . The remainder of 10 point review of systems is negative except as noted in HPI above.     BP (!) 143/69   Pulse 63   Temp 98.1  F (36.7  C) (Oral)   Resp 17   Ht 1.778 m (5' 10\")   Wt 92.1 kg (203 lb)   SpO2 100%   BMI 29.13 kg/m      Intake/Output Summary (Last 24 hours) at 7/15/2022 1238  Last data filed at 7/15/2022 1048  Gross per 24 hour   Intake 353 ml   Output 800 ml   Net -447 ml     Physical Exam:   GENERAL: calm and comfortable,  EYES: No scleral icterus,   ENT: Hearing normal, O  RESP: Clear to auscultation bilaterally with no respiratory distress, normal effort.  CV: RRR, no murmurs.+  R leg edema    GI: Active BS, Soft, NT/ND,   Musculoskeletal: Normal muscle bulk/ tone; No gross joint abnormalities  SKIN: No rash, warm/ dry  PSYCH:  Appropriate mood and affect  Lymph: No cervical/ inguinal adenopathy    LABS:  Most Recent 3 CBC's:Recent Labs   Lab Test 07/15/22  0903 22  1154 22  0110   WBC 8.3 8.6 10.1   HGB 8.9* 7.1* 7.4*   MCV 90 90 90    243 230 "     Most Recent 3 BMP's:Recent Labs   Lab Test 07/15/22  1357 07/15/22  0914 07/15/22  0903 07/14/22  1202 07/14/22  1154 07/14/22  0110   NA  --   --  136  --  134* 135*   POTASSIUM  --   --  4.8  --  5.0 5.3*   CHLORIDE  --   --  104  --  102 100   CO2  --   --  21*  --  20* 20*   BUN  --   --  109*  --  112* 102*   CR  --   --  4.67*  --  4.52* 4.31*   ANIONGAP  --   --  11  --  12 15   BARBARA  --   --  8.6  --  8.6 9.0   * 76 77   < > 120 166*    < > = values in this interval not displayed.     Most Recent 2 LFT's:Recent Labs   Lab Test 07/14/22  1154 07/14/22  0110   AST 14 18   ALT 17 19   ALKPHOS 85 86   BILITOTAL 0.9 0.9     Most Recent 3 INR's:Recent Labs   Lab Test 07/14/22  1154 07/14/22  0110 07/06/22  0530   INR 2.22* 2.27* 1.14     Most Recent 3 Creatinines:Recent Labs   Lab Test 07/15/22  0903 07/14/22  1154 07/14/22  0110   CR 4.67* 4.52* 4.31*     Most Recent 3 Hemoglobins:Recent Labs   Lab Test 07/15/22  0903 07/14/22  1154 07/14/22  0110   HGB 8.9* 7.1* 7.4*     Most Recent 3 Troponin's:No lab results found.  Most Recent 3 BNP's:No lab results found.  Most Recent D-dimer:No lab results found.  Most Recent Cholesterol Panel:Recent Labs   Lab Test 06/08/22  1608   CHOL 118   LDL 42   HDL 36*   TRIG 202*         All lab data was reviewed at 12:38 PM

## 2022-07-15 NOTE — PLAN OF CARE
Problem: Plan of Care - These are the overarching goals to be used throughout the patient stay.    Goal: Optimal Comfort and Wellbeing  Outcome: Ongoing, Progressing   Goal Outcome Evaluation:        Pt signed consent for blood transfusion. DVT calf pain managed with scheduled Voltaren gel. Pt's BG was 100 mg/dL. O2 saturation drops occasionally with exertion. Pt currently on room air. Pt alert and oriented x4.   Randell Esposito RN  7/14/2022

## 2022-07-15 NOTE — PROGRESS NOTES
Is the implanted device safe for MRI Exam?  Yes  Is this device 3T compatible? Yes  Device Type: Pacemaker        Device Information:  Make: Medtronic   Model: [id number] Percepta Quad CRT-P     Cardiology Orders for Device Programming      -- Yes -- The patient has a MRI conditional pulse generator and leads from the same      -- Yes -- The pulse generator and leads have been implanted for at least 6 weeks     -- Yes-- The device is implanted in the right or left pectoral region     -- Yes -- There are not any additional active cardiac devices, abandoned leads, lead extenders or adapters     -- Yes -- The device lead impedance measurements are within the normal range. (Manufacture recommendations: Medtronic Advisa and Revo 200-1,500 ohms; Medtronic ICD and CRT's 200-3000 ohms and defibrillation lead impedance   ohms)     -- N/A -- If the patient is pacemaker dependent the thresholds are less than or equal to 2.0V @ 0.4ms.     Date of last in-office/remote Device check: 6/21/22 (Rep check used, Auto-cap on)   Results of last in-office Device check:  1.   Right atrium impedance: 399 Ohms   2.   Right ventricle impedance: 437 Ohms   3.   Left ventricle impedance: 874 Ohms      4.   Right atrium threshold: N/A -AF  5.   Right ventricle threshold: 0.5V @ 0.4 ms   6.   Left ventricle threshold: 0.75V @ 0.4 ms      Device programming during the scan guidelines   Pacing Mode (check one): VOO  Pacing Rate: 80  bpm or > intrinsic rates     Remedios Madden RN          LM for patient's granddaughter Jordan to return call to clinic

## 2022-07-15 NOTE — SUMMARY OF CARE
Pain controlled with tylenol and oxy. NPO after midnight, pt requesting to eat and drink. Set up assistance to bathroom. UA sent. Q4 BS checks.

## 2022-07-15 NOTE — PLAN OF CARE
Problem: Plan of Care - These are the overarching goals to be used throughout the patient stay.    Goal: Optimal Comfort and Wellbeing  7/14/2022 2106 by Randell Esposito, RN  Outcome: Ongoing, Not Progressing  7/14/2022 2005 by Randell Esposito, RN  Outcome: Ongoing, Progressing     Problem: Plan of Care - These are the overarching goals to be used throughout the patient stay.    Goal: Absence of Hospital-Acquired Illness or Injury  7/14/2022 2106 by Randell Esposito RN  Outcome: Ongoing, Not Progressing  7/14/2022 2005 by Randell Esposito, RN  Outcome: Ongoing, Progressing  Intervention: Identify and Manage Fall Risk  Recent Flowsheet Documentation  Taken 7/14/2022 1500 by Randell Esposito, RN  Safety Promotion/Fall Prevention: activity supervised   Goal Outcome Evaluation:      Pt's (R) lower leg pain managed with PRN acetaminophen. Pt tolerated IV Vancomycin, IV Iron and Normal Saline continuous infusion well. Edema and weakness noted on DVT afflicted right lower extremity. Pt is alert and oriented x4. Pt to transfer to  room 430.   Randell Esposito RN  7/14/2022  9:08 PM

## 2022-07-16 LAB
ANION GAP SERPL CALCULATED.3IONS-SCNC: 11 MMOL/L (ref 5–18)
BUN SERPL-MCNC: 95 MG/DL (ref 8–28)
CALCIUM SERPL-MCNC: 8.9 MG/DL (ref 8.5–10.5)
CHLORIDE BLD-SCNC: 105 MMOL/L (ref 98–107)
CO2 SERPL-SCNC: 23 MMOL/L (ref 22–31)
CREAT SERPL-MCNC: 3.98 MG/DL (ref 0.7–1.3)
GFR SERPL CREATININE-BSD FRML MDRD: 15 ML/MIN/1.73M2
GLUCOSE BLD-MCNC: 198 MG/DL (ref 70–125)
GLUCOSE BLDC GLUCOMTR-MCNC: 162 MG/DL (ref 70–99)
GLUCOSE BLDC GLUCOMTR-MCNC: 229 MG/DL (ref 70–99)
GLUCOSE BLDC GLUCOMTR-MCNC: 240 MG/DL (ref 70–99)
GLUCOSE BLDC GLUCOMTR-MCNC: 289 MG/DL (ref 70–99)
POTASSIUM BLD-SCNC: 4.7 MMOL/L (ref 3.5–5)
SODIUM SERPL-SCNC: 139 MMOL/L (ref 136–145)

## 2022-07-16 PROCEDURE — 82310 ASSAY OF CALCIUM: CPT | Performed by: INTERNAL MEDICINE

## 2022-07-16 PROCEDURE — 120N000004 HC R&B MS OVERFLOW

## 2022-07-16 PROCEDURE — 99232 SBSQ HOSP IP/OBS MODERATE 35: CPT | Performed by: INTERNAL MEDICINE

## 2022-07-16 PROCEDURE — 36415 COLL VENOUS BLD VENIPUNCTURE: CPT | Performed by: INTERNAL MEDICINE

## 2022-07-16 PROCEDURE — 250N000013 HC RX MED GY IP 250 OP 250 PS 637: Performed by: INTERNAL MEDICINE

## 2022-07-16 RX ADMIN — ACETAMINOPHEN 1000 MG: 500 TABLET ORAL at 19:05

## 2022-07-16 RX ADMIN — TAMSULOSIN HYDROCHLORIDE 0.4 MG: 0.4 CAPSULE ORAL at 21:06

## 2022-07-16 RX ADMIN — GABAPENTIN 300 MG: 300 CAPSULE ORAL at 19:06

## 2022-07-16 RX ADMIN — OXYCODONE HYDROCHLORIDE 5 MG: 5 TABLET ORAL at 19:05

## 2022-07-16 RX ADMIN — LEVOTHYROXINE SODIUM 25 MCG: 0.03 TABLET ORAL at 06:06

## 2022-07-16 RX ADMIN — OXYCODONE HYDROCHLORIDE 5 MG: 5 TABLET ORAL at 06:06

## 2022-07-16 RX ADMIN — GABAPENTIN 300 MG: 300 CAPSULE ORAL at 09:09

## 2022-07-16 RX ADMIN — INSULIN ASPART 2 UNITS: 100 INJECTION, SOLUTION INTRAVENOUS; SUBCUTANEOUS at 12:14

## 2022-07-16 RX ADMIN — ACETAMINOPHEN 1000 MG: 500 TABLET ORAL at 06:06

## 2022-07-16 RX ADMIN — ROSUVASTATIN CALCIUM 10 MG: 10 TABLET, FILM COATED ORAL at 21:06

## 2022-07-16 RX ADMIN — INSULIN ASPART 1 UNITS: 100 INJECTION, SOLUTION INTRAVENOUS; SUBCUTANEOUS at 09:10

## 2022-07-16 ASSESSMENT — ACTIVITIES OF DAILY LIVING (ADL)
ADLS_ACUITY_SCORE: 36
ADLS_ACUITY_SCORE: 37
ADLS_ACUITY_SCORE: 36
ADLS_ACUITY_SCORE: 37
ADLS_ACUITY_SCORE: 37
ADLS_ACUITY_SCORE: 36
ADLS_ACUITY_SCORE: 36

## 2022-07-16 NOTE — PROGRESS NOTES
[facility]    PROGRESS NOTE - Hospitalist Service    Assessment and Plan    Thomas Steve is a 77 year old male with PMH of IDDM, , hypertension, CAD, ischemic cardiomyopathy, RBBB, chronic atrial fibrillation, congestive heart failure, SSS, hyperlipidemia, cardiac pacemaker in situ, lumbar spinal stenosis, acute kidney failure, DVT, s/p lumbar laminectomy 6/16/22, and s/p CABG 2011, who transferred from [facility] ER with right buttock hematoma, anemia HgB 6.1, started blood transfusion at OSH ER     Left buttock hematoma, non traumatic,  - in pt with therapeutic INR on coumadin ( for a fib, Rt leg DVT since 6/29).  - holding coumadin  - Hemoglobin yesterday 7.1, received 1 unit per BCs, posttransfusion hemoglobin 8.9.  -  IR CONSULTED FOR IVC filter placement, concluded his femoral DVT is too small or IV placement.  - Plan to start anticoagulation tomorrow if hemoglobin remains stable     Acute blood loss anemia  - due to left buttock hematoma  - S/p 1 unit PRBCs at OSH ER for hemoglobin of 6.9.  Posttransfusion hemoglobin 7.1, received 1 unit per BCs on 7/14 and today hemoglobin is 8.9.  Right buttock pain subsided.  CT abd at OSH ED negative for active extravasation.  - monitor hemoglobin     Acute kidney injury  - CKD 3.  - Creat 1.15 on 5/7 and 4.31 yesterday, with pre renal indices.  - CTat OSH ER abd negative for retroperitoneal hematoma.  - Received IV contrast on 7/13.  - UA not infected.  Minimal improvement in creatinine with IVF.  - Renal ultrasound, nephrology consult requested  - Continue to hold Bumex.     Hyperkalemia   - 5.3 on adm; with IVF down to 5.0.  - c/w IVF, monitor.     IDDM.  -C/w Lantus; increase dose to 20 units as BG is up   - SSIN  -hold glucophage due to YOLANDA.     Hyponatremia,   - improved with IVF.  - IV fluid is currently off    HTN.    - BP acceptable.    - Nephrology recommended right BP little bit higher with YOLANDA.  - Continue to monitor blood  pressure    Coronary artery disease  - s/p CABG in 2011  -  no angina, C/w home meds.    Hyperlipidemia  - on crestor.        Principal Problem:    Acute deep vein thrombosis (DVT) of femoral vein of right lower extremity (H)  Active Problems:    Type 2 diabetes mellitus (H)    Ischemic cardiomyopathy    Atrial fibrillation (H)    Congestive Heart Failure    Biventricular cardiac pacemaker in situ    ARF (acute renal failure) (H)    Anemia    Anemia due to blood loss, acute    Spinal stenosis, lumbar region, without neurogenic claudication    Lumbar herniated disc    Traumatic hematoma of buttock, initial encounter    Anemia, unspecified type      VTE prophylaxis:  Pneumatic Compression Devices  DIET: Orders Placed This Encounter      Moderate Consistent Carb (60 g CHO per Meal) Diet      Disposition/Barriers to discharge: Monitor renal function  Code Status: Full Code    Subjective:  Thomas is feeling better, denies chest pain or shortness of breath. No nausea or vomiting     PHYSICAL EXAM  Vitals:    07/13/22 2355 07/16/22 0611   Weight: 92.1 kg (203 lb) 88.4 kg (194 lb 14.4 oz)     B/P:101/51 T:98.7 P:61 R:20     Intake/Output Summary (Last 24 hours) at 7/16/2022 1444  Last data filed at 7/16/2022 1232  Gross per 24 hour   Intake 720 ml   Output 1100 ml   Net -380 ml      Body mass index is 27.97 kg/m .    Constitutional: awake, alert, cooperative, no apparent distress, and appears stated age  Eyes: Lids and lashes normal, pupils equal, round and reactive to light, extra ocular muscles intact, sclera clear, conjunctiva normal  ENT: Normocephalic, without obvious abnormality, atraumatic, sinuses nontender on palpation, external ears without lesions, oral pharynx with moist mucous membranes, tonsils without erythema or exudates, gums normal and good dentition.  Respiratory: No increased work of breathing, good air exchange, clear to auscultation bilaterally, no crackles or wheezing  Cardiovascular: Normal apical  impulse, regular rate and rhythm, normal S1 and S2, no S3 or S4, and no murmur noted  GI: No scars, normal bowel sounds, soft, non-distended, non-tender, no masses palpated, no hepatosplenomegally  Skin: no bruising or bleeding and normal skin color, texture, turgor  Musculoskeletal: There is no redness, warmth, or swelling of the joints.  Full range of motion noted.  lower extremity pitting edema on right leg   Neurologic: Awake, alert, oriented to name, place and time.  Cranial nerves II-XII are grossly intact.  Motor is 5 out of 5 bilaterally.   Sensory is intact.    Neuropsychiatric: Appropriate with examiner      PERTINENT LABS/IMAGING:  Recent Labs   Lab 07/16/22  1132 07/16/22  0733 07/16/22  0412 07/15/22  0914 07/15/22  0903 07/14/22  1202 07/14/22  1154 07/14/22  0110   WBC  --   --   --   --  8.3  --  8.6 10.1   HGB  --   --   --   --  8.9*  --  7.1* 7.4*   MCV  --   --   --   --  90  --  90 90   PLT  --   --   --   --  256  --  243 230   INR  --   --   --   --   --   --  2.22* 2.27*   NA  --   --  139  --  136  --  134* 135*   POTASSIUM  --   --  4.7  --  4.8  --  5.0 5.3*   CHLORIDE  --   --  105  --  104  --  102 100   CO2  --   --  23  --  21*  --  20* 20*   BUN  --   --  95*  --  109*  --  112* 102*   CR  --   --  3.98*  --  4.67*  --  4.52* 4.31*   ANIONGAP  --   --  11  --  11  --  12 15   BARBARA  --   --  8.9  --  8.6  --  8.6 9.0   * 162* 198*   < > 77   < > 120 166*   ALBUMIN  --   --   --   --   --   --  3.1* 3.4*   PROTTOTAL  --   --   --   --   --   --  6.0 6.2   BILITOTAL  --   --   --   --   --   --  0.9 0.9   ALKPHOS  --   --   --   --   --   --  85 86   ALT  --   --   --   --   --   --  17 19   AST  --   --   --   --   --   --  14 18    < > = values in this interval not displayed.     Recent Results (from the past 24 hour(s))   US Renal Complete    Narrative    EXAM: US RENAL COMPLETE  LOCATION: [facility]  DATE/TIME: 7/15/2022 5:29 PM    INDICATION:  YOLANDA  COMPARISON: None.  TECHNIQUE: Routine Bilateral Renal and Bladder Ultrasound.    FINDINGS:    RIGHT KIDNEY: 10.3 cm. Normal without hydronephrosis or masses.     LEFT KIDNEY: 12.2 cm. Normal without hydronephrosis or masses.     BLADDER: Normal.      Impression    IMPRESSION:  1.  Normal kidney ultrasound.       Discussed with patient, family, nephrology, nursing staff and discharge planner    Tamir Hall MD  [facility]  [phone]

## 2022-07-16 NOTE — PROGRESS NOTES
"Care Management Follow Up    Length of Stay (days): 1    Expected Discharge Date: 07/18/2022     Concerns to be Addressed:     Medical Progression-Monitor labs. Nephrology following.   Patient plan of care discussed at interdisciplinary rounds: Yes    Anticipated Discharge Disposition:  Return to TCU     Anticipated Discharge Services:  Requested PT/OT consult  Anticipated Discharge DME:  To be determined     Patient/family educated on Medicare website which has current facility and service quality ratings:  Not at this time-returning to prior facility   Education Provided on the Discharge Plan:  Yes, per team  Patient/Family in Agreement with the Plan:  Yes    Referrals Placed by CM/SW:  [facility]  Private pay costs discussed: Not applicable    Additional Information:  Chart Reviewed. [facility] [place], [place]; spouse is also currently at this same TCU. Spoke with RN at the TCU and confirmed patient has a bed hold. Updated that we are anticipating patient will be ready for discharge Monday 7/18. Requested updated clinical information; was faxed.     Per previous note: Pt states, \"that he has  as his [facility] and he is also agent orange disabled.\" He does not have a [facility] MD or get admitted at the [facility].    Son vs [facility] transport at discharge. Care manager to follow.       Radha Hubbard RN       "

## 2022-07-16 NOTE — PROGRESS NOTES
RENAL PROGRESS NOTE  CC: YOLANDA    S: Since last seen, patient creatinine is down.  No chest pain, dizziness, weakness, abd pain, n/v, fever and chills.  Seen in bed resting, pleasant, alert and interactive.           Assessment/ Recommendations:  1. YOLANDA: Creatinine of 4.31 on 7/14. Recent YOLANDA during last hospitalization with peak creatinine of 2 and trending back down to 0.88 on 6/24. Creatinine of 1.32 on discharge and noted 1.02 on 7/8 and 3.86 on 7/13. Noted hgb trend down outpatient to 6 from 9.1 on discharge. Underwent CT with contrast on 7/13.  UA with 20 protein, few bacteria. Patient with underlying vascular disease and CTA showed stenosis and origin of bilateral renal arteries. With hematoma, ACE-I, bumex patient is going to be very prone to ischemic insults with volume/hemonynamic insult in setting of contrast exposure. Not all related to contrast as had YOLANDA prior to contrast. Most consistent with ATN at this time. Not consistent with acute GN, AIN. Need to rule out obstruction.  Renal US with normal appearing kidney, no hydronephrosis or masses, bladder is normal. Urine output is okay  -Holding diuretics, continue to hold for now  -No further IVF today  -Daily BMP  - urine protein to creatinine.   -No acute indication for dialysis.   -holding ACE-I(last dose on 7/15 am)  -monitor closely and trend lab.     2. Hematoma, left buttock: per IM. On coumadin for afib and right leg DVT.  Transfusion per primary.   3. CHF: CXR clear, lungs clear, holding ACE-I and bumex. Monitor.   4. Type 2 DM: per IM.   5. Pseudomeningocele: neurosurgery following. MRI today. S/p laminectomy on 6/16.  6. HTN: holding meds. Acceptable. Ok with running a bit higher with YOLANDA.   7. Hyponatremia: possibly due to hypovolemia, resolved with IV fluid.    VALERIE [facility]  [facility]  Pager: [phone]      Physical Exam  /56 (BP Location: Left arm)   Pulse 60   Temp 97.7  F (36.5  C) (Oral)   Resp  "20   Ht 1.778 m (5' 10\")   Wt 88.4 kg (194 lb 14.4 oz)   SpO2 94%   BMI 27.97 kg/m     Patient Vitals for the past 96 hrs:   Weight   07/16/22 0611 88.4 kg (194 lb 14.4 oz)   07/13/22 2355 92.1 kg (203 lb)       Intake/Output Summary (Last 24 hours) at 7/16/2022 0930  Last data filed at 7/16/2022 0913  Gross per 24 hour   Intake 720 ml   Output 1300 ml   Net -580 ml       Physical Exam:   /56 (BP Location: Left arm)   Pulse 60   Temp 97.7  F (36.5  C) (Oral)   Resp 20   Ht 1.778 m (5' 10\")   Wt 88.4 kg (194 lb 14.4 oz)   SpO2 94%   BMI 27.97 kg/m    In general this is a 77 year old male in no acute distress.   General: Calm & comfortable   Eyes: Pupils equal, sclerae not icteric   ENT: Mucus membranes moist, no lesions noted   Resp: CTA bilaterally, no distress, normal effort   CV: RRR without murmur, no hip/leg edema, left buttock hematoma   GI: Soft NT NG   Psych: A&Ox3, not depressed   Neuro: Moves all extremities.     Skin: No rash noted       Recent Labs   Lab Test 07/16/22  0412 07/15/22  0903 07/14/22  1154   POTASSIUM 4.7 4.8 5.0   CHLORIDE 105 104 102   ANIONGAP 11 11 12     Recent Labs   Lab Test 07/15/22  0903 07/14/22  1154 07/14/22  0110   WBC 8.3 8.6 10.1   RBC 3.14* 2.51* 2.58*   MCV 90 90 90   MCH 28.3 28.3 28.7   RDW 16.4* 16.3* 15.9*       I personally reviewed these labs today    "

## 2022-07-16 NOTE — PLAN OF CARE
Goal Outcome Evaluation:    Plan of Care Reviewed With: patient     Overall Patient Progress: improving    Outcome Evaluation: Pt reports moderate back, hip and right leg pain. PRN oxycodone helpful in reducing pain. VSS on room air. SBA up to the bathroom with walker. Cr improved this AM.

## 2022-07-16 NOTE — PROGRESS NOTES
"Neurosurgery Progress Note  07/16/22    A/P: Thomas Steve is a 77 year old male who is s/p L4-S1 decompressive laminectomy with Dr. Samuel on 6/16 - post op course complicated by fevers, lumbar psuedomeningocele (now resolving- was also previously aspirated and cultured without growth), lower extremity DVT, CHF exacerbation. Discharged 7/6 to TCU (with coumadin instead of his home xarelto due to concern for possible surgical intervention) but returned to clinic 7/13/2022 with extensive hematoma of the right leg as well as hemoglobin <7. He was referred to the ED and was admitted for right buttock hematoma, DVT, blood transfusion, YOLANDA now hospital day #3. A repeat MRI of the lumbar spine was obtained and showed improvement in size and mass effect of the lumbar fluid collection - he has been off antibiotics, did receive gadolinium.     From neurosurgery perspective would be okay to resume anticoagulation of choice, no plans for surgical intervention  We will repeat an MRI 2-4 weeks to ensure continued resolution  We will continue to follow peripherally - please call for questions/concerns.      Neurosurgery Attending:  Dr. Samuel     S: Doing okay, right leg and buttocks hurt from the hematoma. Happy to hear about the lumbar MRI, kidney function also improving.     PMH: No interval change  PSH: No interval change    ROS:  A full 14 point review of systems was otherwise completed and is negative aside from that mentioned above in the HPI    O:  /56 (BP Location: Left arm)   Pulse 60   Temp 97.7  F (36.5  C) (Oral)   Resp 20   Ht 1.778 m (5' 10\")   Wt 88.4 kg (194 lb 14.4 oz)   SpO2 94%   BMI 27.97 kg/m    General: Awake, alert, NAD  HEENT: AT/NC, EOMI, face symmetric, oral mucosa moist and pink  Heart: RRR: Nonlabored respirations without accessory muscle use.  Abd: S, NT, ND  Neuro: Awake, alert, speech is clear and content is appropriate. MAEW x 4 with full strength in b/l LE. Sensation is intact " totemperature and LT.   Coordination: deferred  Musculoskeletal: Negative straight leg raise bl  Psych: Appropriatemood and affect, pleasant, cooperative, alert and oriented x 3  Skin: extensive LE bruising      Labs: personally reviewed   Lab Results   Component Value Date     07/16/2022     07/15/2022    CO2 23 07/16/2022    CO2 21 07/15/2022    BUN 95 07/16/2022     07/15/2022     Recent Labs   Lab Test 07/15/22  0903 07/14/22  1154 07/14/22  0110 07/05/22  0520 07/02/22  0210 06/29/22  1424 06/29/22  0529 06/25/22  0434 06/23/22  1434 06/23/22  0506 06/22/22  0659 06/21/22  0535   WBC 8.3 8.6 10.1 6.6 10.0 11.2* 11.2* 7.6 8.2 8.1 8.3 8.9   HGB 8.9* 7.1* 7.4* 9.1* 9.1* 9.9* 9.0* 8.9* 8.7* 7.8* 8.1* 8.1*   HCT 28.3* 22.6* 23.3* 29.9* 29.5* 31.8* 29.3* 29.6* 27.9* 24.8* 25.6* 26.7*   MCV 90 90 90 92 91 92 93 95 92 91 90 95    243 230 204 253 312 324 266 230 217 188 149*     Recent Labs   Lab Test 07/14/22  1154 07/14/22  0110 07/06/22  0530 07/01/22  0524 06/23/22  0506 06/15/22  1527   INR 2.22* 2.27* 1.14 1.14 1.35* 1.04   PTT  --   --   --   --   --  30         I personally visualized all imaging. Lumbar MRI reviewed and reviewed with Dr. Samuel   7/15/2022  Compared to the most recent prior lumbar spine MRI 07/05/2022, interval slight decrease in the loculated fluid collection within the laminectomy bed extending into the dorsal aspect of the spinal canal. Interval slight decrease in the high-grade   compression of the thecal sac at those levels. As previously suggested, this could be a pseudomeningocele. The sterility of this fluid collection cannot be determined by this imaging.  2.  This fluid collection communicates with a smaller fluid collection in the paraspinal and subcutaneous soft tissues of the lower lumbar region which is also moderately decreased in size.   3.  Persistent enhancement within the laminectomy beds and surrounding the fluid collection within the laminectomy  beds.   4.  Redemonstration of mild to moderate multilevel degenerative changes of the rest of the lumbar spine as detailed above.    Melisa Lara CNP  [facility] Neurosurgery  O: [phone]

## 2022-07-16 NOTE — PLAN OF CARE
Problem: Plan of Care - These are the overarching goals to be used throughout the patient stay.    Goal: Optimal Comfort and Wellbeing  Outcome: Ongoing, Progressing     Goal Outcome Evaluation:  Pt. Admitted to the unit at 1530. Pt. Reported pain that was well controlled with PRN pain medication.  Pt. Ambulating in room with personal front wheel walker.  Pt. Went down for renal ultrasound.

## 2022-07-16 NOTE — PLAN OF CARE
Problem: Plan of Care - These are the overarching goals to be used throughout the patient stay.    Goal: Absence of Hospital-Acquired Illness or Injury  Intervention: Identify and Manage Fall Risk  Recent Flowsheet Documentation  Taken 7/16/2022 0913 by Flor Luque RN  Safety Promotion/Fall Prevention:    bed alarm on    clutter free environment maintained    nonskid shoes/slippers when out of bed    safety round/check completed  Intervention: Prevent Skin Injury  Recent Flowsheet Documentation  Taken 7/16/2022 0913 by Flor Luque RN  Body Position: position changed independently  Intervention: Prevent and Manage VTE (Venous Thromboembolism) Risk  Recent Flowsheet Documentation  Taken 7/16/2022 1232 by Flor Luque, RN  Activity Management: up in chair  Taken 7/16/2022 1222 by Flor Luque, RN  Activity Management: ambulated to bathroom   Goal Outcome Evaluation:        Patient denied having pain. SBA to bathroom with walker. Patient going back to TCU at discharge. Patient A & O x4. Creatinine and hemoglobin recheck in AM.

## 2022-07-17 ENCOUNTER — APPOINTMENT (OUTPATIENT)
Dept: PHYSICAL THERAPY | Facility: HOSPITAL | Age: 77
DRG: 604 | End: 2022-07-17
Attending: INTERNAL MEDICINE
Payer: MEDICARE

## 2022-07-17 ENCOUNTER — APPOINTMENT (OUTPATIENT)
Dept: OCCUPATIONAL THERAPY | Facility: HOSPITAL | Age: 77
DRG: 604 | End: 2022-07-17
Attending: INTERNAL MEDICINE
Payer: MEDICARE

## 2022-07-17 LAB
ANION GAP SERPL CALCULATED.3IONS-SCNC: 9 MMOL/L (ref 5–18)
BUN SERPL-MCNC: 64 MG/DL (ref 8–28)
CALCIUM SERPL-MCNC: 9 MG/DL (ref 8.5–10.5)
CHLORIDE BLD-SCNC: 108 MMOL/L (ref 98–107)
CO2 SERPL-SCNC: 24 MMOL/L (ref 22–31)
CREAT SERPL-MCNC: 2.57 MG/DL (ref 0.7–1.3)
ERYTHROCYTE [DISTWIDTH] IN BLOOD BY AUTOMATED COUNT: 16 % (ref 10–15)
GFR SERPL CREATININE-BSD FRML MDRD: 25 ML/MIN/1.73M2
GLUCOSE BLD-MCNC: 164 MG/DL (ref 70–125)
GLUCOSE BLDC GLUCOMTR-MCNC: 158 MG/DL (ref 70–99)
GLUCOSE BLDC GLUCOMTR-MCNC: 202 MG/DL (ref 70–99)
GLUCOSE BLDC GLUCOMTR-MCNC: 251 MG/DL (ref 70–99)
GLUCOSE BLDC GLUCOMTR-MCNC: 256 MG/DL (ref 70–99)
HCT VFR BLD AUTO: 27.4 % (ref 40–53)
HGB BLD-MCNC: 8.8 G/DL (ref 13.3–17.7)
MCH RBC QN AUTO: 28.8 PG (ref 26.5–33)
MCHC RBC AUTO-ENTMCNC: 32.1 G/DL (ref 31.5–36.5)
MCV RBC AUTO: 90 FL (ref 78–100)
PLATELET # BLD AUTO: 309 10E3/UL (ref 150–450)
POTASSIUM BLD-SCNC: 4.3 MMOL/L (ref 3.5–5)
RBC # BLD AUTO: 3.06 10E6/UL (ref 4.4–5.9)
SODIUM SERPL-SCNC: 141 MMOL/L (ref 136–145)
WBC # BLD AUTO: 7.9 10E3/UL (ref 4–11)

## 2022-07-17 PROCEDURE — 97165 OT EVAL LOW COMPLEX 30 MIN: CPT | Mod: GO

## 2022-07-17 PROCEDURE — 97530 THERAPEUTIC ACTIVITIES: CPT | Mod: GP

## 2022-07-17 PROCEDURE — 80048 BASIC METABOLIC PNL TOTAL CA: CPT | Performed by: INTERNAL MEDICINE

## 2022-07-17 PROCEDURE — 97161 PT EVAL LOW COMPLEX 20 MIN: CPT | Mod: GP

## 2022-07-17 PROCEDURE — 97535 SELF CARE MNGMENT TRAINING: CPT | Mod: GO

## 2022-07-17 PROCEDURE — 120N000004 HC R&B MS OVERFLOW

## 2022-07-17 PROCEDURE — 99232 SBSQ HOSP IP/OBS MODERATE 35: CPT | Performed by: INTERNAL MEDICINE

## 2022-07-17 PROCEDURE — 97530 THERAPEUTIC ACTIVITIES: CPT | Mod: GO

## 2022-07-17 PROCEDURE — 250N000013 HC RX MED GY IP 250 OP 250 PS 637: Performed by: INTERNAL MEDICINE

## 2022-07-17 PROCEDURE — 97110 THERAPEUTIC EXERCISES: CPT | Mod: GP

## 2022-07-17 PROCEDURE — 36415 COLL VENOUS BLD VENIPUNCTURE: CPT | Performed by: INTERNAL MEDICINE

## 2022-07-17 PROCEDURE — 85027 COMPLETE CBC AUTOMATED: CPT | Performed by: INTERNAL MEDICINE

## 2022-07-17 RX ADMIN — LEVOTHYROXINE SODIUM 25 MCG: 0.03 TABLET ORAL at 06:30

## 2022-07-17 RX ADMIN — OXYCODONE HYDROCHLORIDE 5 MG: 5 TABLET ORAL at 13:55

## 2022-07-17 RX ADMIN — TAMSULOSIN HYDROCHLORIDE 0.4 MG: 0.4 CAPSULE ORAL at 21:01

## 2022-07-17 RX ADMIN — OXYCODONE HYDROCHLORIDE 5 MG: 5 TABLET ORAL at 23:44

## 2022-07-17 RX ADMIN — DICLOFENAC SODIUM 2 G: 10 GEL TOPICAL at 08:16

## 2022-07-17 RX ADMIN — ROSUVASTATIN CALCIUM 10 MG: 10 TABLET, FILM COATED ORAL at 21:01

## 2022-07-17 RX ADMIN — ACETAMINOPHEN 1000 MG: 500 TABLET ORAL at 04:19

## 2022-07-17 RX ADMIN — GABAPENTIN 300 MG: 300 CAPSULE ORAL at 20:53

## 2022-07-17 RX ADMIN — GABAPENTIN 300 MG: 300 CAPSULE ORAL at 08:16

## 2022-07-17 RX ADMIN — OXYCODONE HYDROCHLORIDE 5 MG: 5 TABLET ORAL at 04:19

## 2022-07-17 ASSESSMENT — ACTIVITIES OF DAILY LIVING (ADL)
ADLS_ACUITY_SCORE: 36

## 2022-07-17 NOTE — PROGRESS NOTES
Neurosurgery Note:  07/17/22      Patient seen and briefly examined. Incision well healed. Ambulating okay apart from continued pain related to hematoma. Thomas tells me they are planning discharge possibly tomorrow. Neurosurgery will arrange follow up, please call with questions.     Melisa Lara CNP  [facility] Neurosurgery  O: [phone]  P: [phone]

## 2022-07-17 NOTE — PROGRESS NOTES
07/17/22 0900   Quick Adds   Type of Visit Initial PT Evaluation   Living Environment   People in Home spouse   Current Living Arrangements house   Home Accessibility no concerns   Living Environment Comments Pt lives in a house with wife. Reports he is IND at baseline, provides care for wife. No ARMEN or stairs inside.   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   General Information   Onset of Illness/Injury or Date of Surgery 07/13/22   Referring Physician Dr. Tamir Hall.   Patient/Family Therapy Goals Statement (PT) To return to TCU.   Pertinent History of Current Problem (include personal factors and/or comorbidities that impact the POC) From chart: Thomas Steve is a 77 year old male with PMH of IDDM, , hypertension, CAD, ischemic cardiomyopathy, RBBB, chronic atrial fibrillation, congestive heart failure, SSS, hyperlipidemia, cardiac pacemaker in situ, lumbar spinal stenosis, acute kidney failure, DVT, s/p lumbar laminectomy 6/16/22, and s/p CABG 2011, who transferred from [facility] ER with right buttock hematoma, anemia HgB 6.1, started blood transfusion at OSH ER.   Existing Precautions/Restrictions spinal   Weight-Bearing Status - LLE full weight-bearing   Weight-Bearing Status - RLE full weight-bearing   Heart Disease Risk Factors Medical history;Gender;Age   General Observations Male supine in bed.   Cognition   Affect/Mental Status (Cognition) WNL   Orientation Status (Cognition) oriented x 4   Follows Commands (Cognition) WNL   Cognitive Status Comments Pleasant, cooperative.   Pain  Assessment   Patient Currently in Pain Yes, see Vital Sign flowsheet  (Some R leg pain.)   Integumentary/Edema   Integumentary/Edema Comments Large R posterior thigh hematoma.   Posture    Posture Forward head position   Range of Motion (ROM)   ROM Comment Grossly WFL.   Strength (Manual Muscle Testing)   Strength Comments May have some slight deficits in gross strength and endurance.   Bed Mobility   Comment, (Bed Mobility) Supine-sit with log roll, SBA. Somewhat effortful.   Transfers   Comment, (Transfers) Sit-stand with 4WW, SBA.   Gait/Stairs (Locomotion)   Comment, (Gait/Stairs) Able to ambulate to and from bathroom with 4WW, SBA.   Balance   Balance Comments Adequate for ambulation with AD.   Sensory Examination   Sensory Perception Comments Reports baseline numbness/tingling in both hands and feet.   Coordination   Coordination no deficits were identified   Muscle Tone   Muscle Tone no deficits were identified   Clinical Impression   Criteria for Skilled Therapeutic Intervention Yes, treatment indicated   PT Diagnosis (PT) Impaired functional mobility and activity tolerance.   Influenced by the following impairments Medical.   Functional limitations due to impairments Ambulation, endurance, strength.   Clinical Presentation (PT Evaluation Complexity) Stable/Uncomplicated   Clinical Presentation Rationale Clinician judgment.   Clinical Decision Making (Complexity) low complexity   Planned Therapy Interventions (PT) bed mobility training;gait training;home exercise program;patient/family education;strengthening;transfer training   Risk & Benefits of therapy have been explained patient   PT Discharge Planning   PT Discharge Recommendation (DC Rec) Transitional Care Facility   PT Rationale for DC Rec Pt came from TCU for management of acute medical needs, would benefit from returning there and continuing his rehab course.   PT Brief overview of current status PT: Generally SBA for mobility, limited to in room  ambulation by activity order. Fatigues somewhat easily.   Plan of Care Review   Plan of Care Reviewed With patient   Total Evaluation Time   Total Evaluation Time (Minutes) 8   Physical Therapy Goals   PT Frequency Daily   PT Predicted Duration/Target Date for Goal Attainment 07/20/22   PT Goals Bed Mobility;Transfers;Gait   PT: Bed Mobility Independent;Supine to/from sit;Within precautions   PT: Transfers Modified independent;Sit to/from stand;Within precautions   PT: Gait Modified independent;Assistive device;Within precautions;Greater than 200 feet

## 2022-07-17 NOTE — PROGRESS NOTES
Pt states he uses BiPAP every night, he stated he can't bring in home BiPAP and no one in nursing home will bring to him. Put him on a setting of S/T 11/6  Rate 16 on RA per information given on Sleep lab. Pt tolerated well with no complaint of setting. Pt fits size medium mask. Rt will assist with BiPAP @ NOC.

## 2022-07-17 NOTE — DISCHARGE INSTRUCTIONS
Continue previous neurosurgery orders upon return to TCU  We will call you to coordinate follow up, if you have questions please call [phone]

## 2022-07-17 NOTE — PROGRESS NOTES
RENAL PROGRESS NOTE  CC: YOLANDA    S: Since last seen by our team, creatinine still trending down.  Good urine output, no chest pain, dizziness, weakness, abd pain, fever and chills.  Seen sitting in chair, working on getting stronger.  On RA and denies sob.  Trace LE edema present.          Assessment/ Recommendations:  1. YOLANDA: Creatinine of 4.31 on 7/14. Recent YOLANDA during last hospitalization with peak creatinine of 2 and trending back down to 0.88 on 6/24. Creatinine of 1.32 on discharge and noted 1.02 on 7/8 and 3.86 on 7/13. Noted hgb trend down outpatient to 6 from 9.1 on discharge. Underwent CT with contrast on 7/13.  UA with 20 protein, few bacteria. Patient with underlying vascular disease and CTA showed stenosis and origin of bilateral renal arteries. With hematoma, ACE-I, bumex patient is going to be very prone to ischemic insults with volume/hemonynamic insult in setting of contrast exposure. Not all related to contrast as had YOLANDA prior to contrast. Most consistent with ATN at this time. Not consistent with acute GN, AIN. Need to rule out obstruction.  Renal US with normal appearing kidney, no hydronephrosis or masses, bladder is normal. Urine output is okay  -Holding diuretics, continue to hold for now, will need to restart at some point.    -No further IVF today  -Daily BMP  - urine protein to creatinine.   -No acute indication for dialysis.   -holding ACE-I(last dose on 7/15 am)  -monitor closely and trend lab.     2. Hematoma, left buttock: per IM. On coumadin for afib and right leg DVT.  Transfusion per primary.   3. CHF: CXR clear, lungs clear, holding ACE-I and bumex. Monitor.   4. Type 2 DM: per IM.   5. Pseudomeningocele: neurosurgery following. MRI today. S/p laminectomy on 6/16.  6. HTN: holding meds. Acceptable. Ok with running a bit higher with YOLANDA.   7. Hyponatremia: RESOLVED.  possibly due to hypovolemia.    VALERIE Arnold CNP  [facility]  Pager:  "[phone]      Physical Exam  /56 (BP Location: Left arm)   Pulse 60   Temp 98.5  F (36.9  C) (Oral)   Resp 20   Ht 1.778 m (5' 10\")   Wt 87.7 kg (193 lb 6.4 oz)   SpO2 95%   BMI 27.75 kg/m     Patient Vitals for the past 96 hrs:   Weight   07/17/22 0419 87.7 kg (193 lb 6.4 oz)   07/16/22 0611 88.4 kg (194 lb 14.4 oz)   07/13/22 2355 92.1 kg (203 lb)       Intake/Output Summary (Last 24 hours) at 7/16/2022 0930  Last data filed at 7/16/2022 0913  Gross per 24 hour   Intake 720 ml   Output 1300 ml   Net -580 ml       Physical Exam:   /56 (BP Location: Left arm)   Pulse 60   Temp 98.5  F (36.9  C) (Oral)   Resp 20   Ht 1.778 m (5' 10\")   Wt 87.7 kg (193 lb 6.4 oz)   SpO2 95%   BMI 27.75 kg/m    In general this is a 77 year old male in no acute distress.   General: Calm & comfortable   Eyes: Pupils equal, sclerae not icteric   ENT: Mucus membranes moist, no lesions noted   Resp: CTA bilaterally, no distress, normal effort   CV: RRR without murmur, no hip/leg edema, left buttock hematoma   GI: Soft NT NG   Psych: A&Ox3, not depressed   Neuro: Moves all extremities.     Skin: No rash noted       Recent Labs   Lab Test 07/16/22  0412 07/15/22  0903 07/14/22  1154   POTASSIUM 4.7 4.8 5.0   CHLORIDE 105 104 102   ANIONGAP 11 11 12     Recent Labs   Lab Test 07/15/22  0903 07/14/22  1154 07/14/22  0110   WBC 8.3 8.6 10.1   RBC 3.14* 2.51* 2.58*   MCV 90 90 90   MCH 28.3 28.3 28.7   RDW 16.4* 16.3* 15.9*       I personally reviewed these labs today    "

## 2022-07-17 NOTE — PROGRESS NOTES
[facility]    PROGRESS NOTE - Hospitalist Service    Assessment and Plan  77 year old male with PMH of IDDM, , hypertension, CAD, ischemic cardiomyopathy, RBBB, chronic atrial fibrillation, congestive heart failure, SSS, hyperlipidemia, cardiac pacemaker in situ, lumbar spinal stenosis, acute kidney failure, DVT, s/p lumbar laminectomy 6/16/22, and s/p CABG 2011, who transferred from [facility] ER with right buttock hematoma, anemia HgB 6.1, started blood transfusion at OSH ER     Left buttock hematoma, non traumatic,  - in pt with therapeutic INR on coumadin ( for a fib, Rt leg DVT since 6/29).  - holding coumadin  - Hemoglobin yesterday 7.1, received 1 unit per BCs, posttransfusion hemoglobin 8.9.  -  IR CONSULTED FOR IVC filter placement, concluded his femoral DVT is too small or IV placement.  - Plan to start anticoagulation tomorrow if hemoglobin remains stable     Acute blood loss anemia  - due to left buttock hematoma  - S/p 1 unit PRBCs at OSH ER for hemoglobin of 6.9.  Posttransfusion hemoglobin 7.1, received 1 unit per BCs on 7/14 and today hemoglobin is 8.9.  Right buttock pain subsided.  CT abd at OSH ED negative for active extravasation.  - monitor hemoglobin     Acute kidney injury  - CKD 3.  - Creat 1.15 on 5/7 and 4.31 yesterday, with pre renal indices.  - CTat OSH ER abd negative for retroperitoneal hematoma.  - Received IV contrast on 7/13.  - Nephrology consult, appreciate input  - UA not infected.    - Improving creatinine with IVF.  - Renal ultrasound is unremarkable for obstructive nephropathy  - Continue to hold Bumex.     Hyperkalemia   - 5.3 on adm; with IVF down to 5.0.  -Discontinue IV fluid as recommended by nephrology   -Continue to monitor.     IDDM.  -C/w Lantus; increase dose to 20 units as BG is up   - SSIN  -hold glucophage due to YOLANDA.     Hyponatremia,   - improved with IVF.  - IV fluid is currently off     HTN.    - BP acceptable.    - Nephrology  recommended right BP little bit higher with YOLANDA.  - Continue to monitor blood pressure     Coronary artery disease  - s/p CABG in 2011  -  no angina, C/w home meds.     Hyperlipidemia  - on crestor.    Weakness and deconditioning  -Secondary to the above  -PT/OT evaluation    Principal Problem:    Acute deep vein thrombosis (DVT) of femoral vein of right lower extremity (H)  Active Problems:    Type 2 diabetes mellitus (H)    Ischemic cardiomyopathy    Atrial fibrillation (H)    Congestive Heart Failure    Biventricular cardiac pacemaker in situ    ARF (acute renal failure) (H)    Anemia    Anemia due to blood loss, acute    Spinal stenosis, lumbar region, without neurogenic claudication    Lumbar herniated disc    Traumatic hematoma of buttock, initial encounter    Anemia, unspecified type      VTE prophylaxis:  Pneumatic Compression Devices  DIET: Orders Placed This Encounter      Moderate Consistent Carb (60 g CHO per Meal) Diet      Disposition/Barriers to discharge: Monitor renal function and hemoglobin3  Code Status: Full Code    Subjective:  Thomas is feeling much better today, still has pain in his buttocks.  Denies any chest pain or shortness of breath.    PHYSICAL EXAM  Vitals:    07/13/22 2355 07/16/22 0611 07/17/22 0419   Weight: 92.1 kg (203 lb) 88.4 kg (194 lb 14.4 oz) 87.7 kg (193 lb 6.4 oz)     B/P:108/55 T:98.4 P:59 R:20     Intake/Output Summary (Last 24 hours) at 7/17/2022 1428  Last data filed at 7/17/2022 1350  Gross per 24 hour   Intake 2240 ml   Output 2900 ml   Net -660 ml      Body mass index is 27.75 kg/m .    Constitutional: awake, alert, cooperative, no apparent distress, and appears stated age  Eyes: Lids and lashes normal, pupils equal, round and reactive to light, extra ocular muscles intact, sclera clear, conjunctiva normal  ENT: Normocephalic, without obvious abnormality, atraumatic, sinuses nontender on palpation, external ears without lesions, oral pharynx with moist mucous  membranes, tonsils without erythema or exudates, gums normal and good dentition.  Respiratory: No increased work of breathing, good air exchange, clear to auscultation bilaterally, no crackles or wheezing  Cardiovascular: Normal apical impulse, regular rate and rhythm, normal S1 and S2, no S3 or S4, and no murmur noted  GI: No scars, normal bowel sounds, soft, non-distended, non-tender, no masses palpated, no hepatosplenomegally  Skin: no bruising or bleeding and normal skin color, texture, turgor  Musculoskeletal: There is no redness, warmth, or swelling of the joints.  Full range of motion noted.  no lower extremity pitting edema present  Neurologic: Awake, alert, oriented to name, place and time.  Cranial nerves II-XII are grossly intact.  Motor is 5 out of 5 bilaterally.   Sensory is intact.    Neuropsychiatric: Appropriate with examiner      PERTINENT LABS/IMAGING:  Recent Labs   Lab 07/17/22  1133 07/17/22  0725 07/17/22  0431 07/16/22  0733 07/16/22  0412 07/15/22  0914 07/15/22  0903 07/14/22  1202 07/14/22  1154 07/14/22  0110   WBC  --   --  7.9  --   --   --  8.3  --  8.6 10.1   HGB  --   --  8.8*  --   --   --  8.9*  --  7.1* 7.4*   MCV  --   --  90  --   --   --  90  --  90 90   PLT  --   --  309  --   --   --  256  --  243 230   INR  --   --   --   --   --   --   --   --  2.22* 2.27*   NA  --   --  141  --  139  --  136  --  134* 135*   POTASSIUM  --   --  4.3  --  4.7  --  4.8  --  5.0 5.3*   CHLORIDE  --   --  108*  --  105  --  104  --  102 100   CO2  --   --  24  --  23  --  21*  --  20* 20*   BUN  --   --  64*  --  95*  --  109*  --  112* 102*   CR  --   --  2.57*  --  3.98*  --  4.67*  --  4.52* 4.31*   ANIONGAP  --   --  9  --  11  --  11  --  12 15   BARBARA  --   --  9.0  --  8.9  --  8.6  --  8.6 9.0   * 158* 164*   < > 198*   < > 77   < > 120 166*   ALBUMIN  --   --   --   --   --   --   --   --  3.1* 3.4*   PROTTOTAL  --   --   --   --   --   --   --   --  6.0 6.2   BILITOTAL  --   --    --   --   --   --   --   --  0.9 0.9   ALKPHOS  --   --   --   --   --   --   --   --  85 86   ALT  --   --   --   --   --   --   --   --  17 19   AST  --   --   --   --   --   --   --   --  14 18    < > = values in this interval not displayed.     No results found for this or any previous visit (from the past 24 hour(s)).    Discussed with patient, family, nursing staff and discharge planner    Tamir Hall MD  [facility]  [phone]

## 2022-07-17 NOTE — PLAN OF CARE
Problem: Anemia  Goal: Anemia Symptom Improvement  7/17/2022 1850 by Maureen Sotomayor RN  Outcome: Ongoing, Progressing    Problem: Pain Acute  Goal: Acceptable Pain Control and Functional Ability  Outcome: Ongoing, Progressing    A&O x4. Up with SBA and walker. Sat on the side of the bed for dinner. Good appetite. Has pain but per patient is tolerable and mostly with movement. VSS.

## 2022-07-17 NOTE — PROGRESS NOTES
Occupational Therapy     07/17/22 1430   Quick Adds   Type of Visit Initial Occupational Therapy Evaluation   Living Environment   People in Home spouse   Current Living Arrangements house   Self-Care   Usual Activity Tolerance good   Current Activity Tolerance moderate   Activity/Exercise Type walking   Activity/Exercise/Self-Care Comment pt came from TCU-rehab after spinal surgery: 6/16 lumbar decompression & laminectomies.   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.)   General Information   Onset of Illness/Injury or Date of Surgery 07/13/22   Referring Physician Tamir Hall   Patient/Family Therapy Goal Statement (OT) none stated   Existing Precautions/Restrictions fall;spinal  (spinal surgery 6/16)   General Observations and Info 77 year old male with PMH of IDDM, , hypertension, CAD, ischemic cardiomyopathy, RBBB, chronic atrial fibrillation, congestive heart failure, SSS, hyperlipidemia, cardiac pacemaker in situ, lumbar spinal stenosis, acute kidney failure, DVT, s/p lumbar laminectomy 6/16/22, and s/p CABG 2011, who transferred from [facility] with right buttock hematoma, anemia HgB 6.1   Cognitive Status Examination   Orientation Status orientation to person, place and time   Follows Commands WNL  (appeared tired; needing repetition @ times.)   Pain Assessment   Patient Currently in Pain Yes, see Vital Sign flowsheet  (R buttock, LE to ankle)   Posture   Posture not impaired   Strength Comprehensive (MMT)   Comment, General Manual Muscle Testing (MMT) Assessment demo functional UE strength for basic ADLs/trfs.   Bed Mobility   Comment (Bed Mobility) SBA/CGA with bedrail & logroll tech.   Transfers   Transfer Comments SBA bed & toilet   Balance   Balance Comments SBA/CGA amb w/4WW in room-to BR; slow pace w/o unsteadiness.   Lower Body Dressing Assessment/Training   Comment, (Lower Body Dressing) has been using   AE for dressing @ TCU; unable to perform w/o AE.   Grooming Assessment/Training   Comment, (Grooming) SBA washing hands @ sink.   Toileting   Comment, (Toileting) SBA-no overt unsteadiness while managing clothing during task.   Clinical Impression   Criteria for Skilled Therapeutic Interventions Met (OT) Yes, treatment indicated   OT Diagnosis decreased ADLs   OT Problem List-Impairments impacting ADL activity tolerance impaired;mobility;strength;pain   Assessment of Occupational Performance 1-3 Performance Deficits   Identified Performance Deficits trfs, grooming, functional mob   Planned Therapy Interventions (OT) ADL retraining;strengthening;progressive activity/exercise   Clinical Decision Making Complexity (OT) low complexity   Risk & Benefits of therapy have been explained evaluation/treatment results reviewed;care plan/treatment goals reviewed;risks/benefits reviewed;patient   OT Discharge Planning   OT Discharge Recommendation (DC Rec) Transitional Care Facility   OT Rationale for DC Rec pt is planning DC back to TCU; 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

## 2022-07-17 NOTE — PLAN OF CARE
Problem: Anemia  Goal: Anemia Symptom Improvement  Outcome: Ongoing, Progressing    Problem: VTE (Venous Thromboembolism)  Goal: VTE (Venous Thromboembolism) Symptom Resolution  Outcome: Ongoing, Progressing    Pt c/o right-sided pain in the buttock and leg- worse with movement- ok when at rest. Oxy given x1 with tylenol - no c/o pain or discomfort since. VSS.  Up with SBA and walker.

## 2022-07-17 NOTE — PLAN OF CARE
Goal Outcome Evaluation:    Plan of Care Reviewed With: patient     Overall Patient Progress: improving    Outcome Evaluation: Pt reports moderate back hip and right leg pain. PRN oxycodone and tylenol effective in reducing pain. VSS on room air. SBA up to the bathroom. pending discharge back to care facility.

## 2022-07-17 NOTE — PLAN OF CARE
"  Problem: Plan of Care - These are the overarching goals to be used throughout the patient stay.    Goal: Plan of Care Review/Shift Note  Description: The Plan of Care Review/Shift note should be completed every shift.  The Outcome Evaluation is a brief statement about your assessment that the patient is improving, declining, or no change.  This information will be displayed automatically on your shift note.  Outcome: Ongoing, Progressing  Goal: Patient-Specific Goal (Individualized)  Description: You can add care plan individualizations to a care plan. Examples of Individualization might be:  \"Parent requests to be called daily at 9am for status\", \"I have a hard time hearing out of my right ear\", or \"Do not touch me to wake me up as it startles me\".  Outcome: Ongoing, Progressing  Goal: Absence of Hospital-Acquired Illness or Injury  Outcome: Ongoing, Progressing  Intervention: Prevent Skin Injury  Recent Flowsheet Documentation  Taken 7/17/2022 0800 by Chetna Shetty RN  Body Position: position changed independently  Intervention: Prevent and Manage VTE (Venous Thromboembolism) Risk  Recent Flowsheet Documentation  Taken 7/17/2022 0800 by Chetna Shetty RN  Activity Management: activity adjusted per tolerance  Goal: Optimal Comfort and Wellbeing  Outcome: Ongoing, Progressing  Goal: Readiness for Transition of Care  Outcome: Ongoing, Progressing     Problem: Fall Injury Risk  Goal: Absence of Fall and Fall-Related Injury  Outcome: Ongoing, Progressing  Intervention: Identify and Manage Contributors  Recent Flowsheet Documentation  Taken 7/17/2022 0800 by Chetna Shetty RN  Medication Review/Management: medications reviewed     Problem: Risk for Delirium  Goal: Optimal Coping  Outcome: Ongoing, Progressing  Goal: Improved Behavioral Control  Outcome: Ongoing, Progressing  Goal: Improved Attention and Thought Clarity  Outcome: Ongoing, Progressing  Goal: Improved Sleep  Outcome: Ongoing, Progressing   "   Problem: Anemia  Goal: Anemia Symptom Improvement  Outcome: Ongoing, Progressing     Problem: VTE (Venous Thromboembolism)  Goal: VTE (Venous Thromboembolism) Symptom Resolution  Outcome: Ongoing, Progressing   Goal Outcome Evaluation:           Pt is alert and oriented x4. Pt is med complaint. Pt denies pain. Pt is up on the chair this morning. Pt's v/s is stable. No complain. Continue to monitor pt.

## 2022-07-18 ENCOUNTER — APPOINTMENT (OUTPATIENT)
Dept: OCCUPATIONAL THERAPY | Facility: HOSPITAL | Age: 77
DRG: 604 | End: 2022-07-18
Payer: MEDICARE

## 2022-07-18 ENCOUNTER — APPOINTMENT (OUTPATIENT)
Dept: PHYSICAL THERAPY | Facility: HOSPITAL | Age: 77
DRG: 604 | End: 2022-07-18
Payer: MEDICARE

## 2022-07-18 LAB
ANION GAP SERPL CALCULATED.3IONS-SCNC: 6 MMOL/L (ref 5–18)
BUN SERPL-MCNC: 44 MG/DL (ref 8–28)
CALCIUM SERPL-MCNC: 8.7 MG/DL (ref 8.5–10.5)
CHLORIDE BLD-SCNC: 108 MMOL/L (ref 98–107)
CO2 SERPL-SCNC: 26 MMOL/L (ref 22–31)
CREAT SERPL-MCNC: 1.9 MG/DL (ref 0.7–1.3)
ERYTHROCYTE [DISTWIDTH] IN BLOOD BY AUTOMATED COUNT: 15.5 % (ref 10–15)
GFR SERPL CREATININE-BSD FRML MDRD: 36 ML/MIN/1.73M2
GLUCOSE BLD-MCNC: 111 MG/DL (ref 70–125)
GLUCOSE BLDC GLUCOMTR-MCNC: 101 MG/DL (ref 70–99)
GLUCOSE BLDC GLUCOMTR-MCNC: 189 MG/DL (ref 70–99)
GLUCOSE BLDC GLUCOMTR-MCNC: 196 MG/DL (ref 70–99)
GLUCOSE BLDC GLUCOMTR-MCNC: 203 MG/DL (ref 70–99)
HCT VFR BLD AUTO: 26.1 % (ref 40–53)
HGB BLD-MCNC: 8.2 G/DL (ref 13.3–17.7)
MCH RBC QN AUTO: 28.5 PG (ref 26.5–33)
MCHC RBC AUTO-ENTMCNC: 31.4 G/DL (ref 31.5–36.5)
MCV RBC AUTO: 91 FL (ref 78–100)
PLATELET # BLD AUTO: 314 10E3/UL (ref 150–450)
POTASSIUM BLD-SCNC: 4.3 MMOL/L (ref 3.5–5)
RBC # BLD AUTO: 2.88 10E6/UL (ref 4.4–5.9)
SODIUM SERPL-SCNC: 140 MMOL/L (ref 136–145)
WBC # BLD AUTO: 8.4 10E3/UL (ref 4–11)

## 2022-07-18 PROCEDURE — 999N000157 HC STATISTIC RCP TIME EA 10 MIN

## 2022-07-18 PROCEDURE — 97535 SELF CARE MNGMENT TRAINING: CPT | Mod: GO

## 2022-07-18 PROCEDURE — 99232 SBSQ HOSP IP/OBS MODERATE 35: CPT | Performed by: INTERNAL MEDICINE

## 2022-07-18 PROCEDURE — 36415 COLL VENOUS BLD VENIPUNCTURE: CPT | Performed by: INTERNAL MEDICINE

## 2022-07-18 PROCEDURE — 97110 THERAPEUTIC EXERCISES: CPT | Mod: GO

## 2022-07-18 PROCEDURE — 94660 CPAP INITIATION&MGMT: CPT

## 2022-07-18 PROCEDURE — 85027 COMPLETE CBC AUTOMATED: CPT | Performed by: INTERNAL MEDICINE

## 2022-07-18 PROCEDURE — 250N000013 HC RX MED GY IP 250 OP 250 PS 637: Performed by: INTERNAL MEDICINE

## 2022-07-18 PROCEDURE — 97116 GAIT TRAINING THERAPY: CPT | Mod: GP

## 2022-07-18 PROCEDURE — 97110 THERAPEUTIC EXERCISES: CPT | Mod: GP

## 2022-07-18 PROCEDURE — 82310 ASSAY OF CALCIUM: CPT | Performed by: INTERNAL MEDICINE

## 2022-07-18 PROCEDURE — 120N000004 HC R&B MS OVERFLOW

## 2022-07-18 RX ADMIN — GABAPENTIN 300 MG: 300 CAPSULE ORAL at 09:07

## 2022-07-18 RX ADMIN — ROSUVASTATIN CALCIUM 10 MG: 10 TABLET, FILM COATED ORAL at 21:08

## 2022-07-18 RX ADMIN — ACETAMINOPHEN 1000 MG: 500 TABLET ORAL at 21:08

## 2022-07-18 RX ADMIN — ACETAMINOPHEN 1000 MG: 500 TABLET ORAL at 02:21

## 2022-07-18 RX ADMIN — OXYCODONE HYDROCHLORIDE 5 MG: 5 TABLET ORAL at 09:11

## 2022-07-18 RX ADMIN — GABAPENTIN 300 MG: 300 CAPSULE ORAL at 19:58

## 2022-07-18 RX ADMIN — OXYCODONE HYDROCHLORIDE 5 MG: 5 TABLET ORAL at 14:05

## 2022-07-18 RX ADMIN — TAMSULOSIN HYDROCHLORIDE 0.4 MG: 0.4 CAPSULE ORAL at 21:08

## 2022-07-18 RX ADMIN — ACETAMINOPHEN 1000 MG: 500 TABLET ORAL at 14:13

## 2022-07-18 RX ADMIN — LEVOTHYROXINE SODIUM 25 MCG: 0.03 TABLET ORAL at 06:22

## 2022-07-18 RX ADMIN — OXYCODONE HYDROCHLORIDE 5 MG: 5 TABLET ORAL at 21:08

## 2022-07-18 RX ADMIN — OXYCODONE HYDROCHLORIDE 5 MG: 5 TABLET ORAL at 03:46

## 2022-07-18 ASSESSMENT — ACTIVITIES OF DAILY LIVING (ADL)
ADLS_ACUITY_SCORE: 36

## 2022-07-18 NOTE — PLAN OF CARE
Problem: Anemia  Goal: Anemia Symptom Improvement  Outcome: Ongoing, Progressing  Intervention: Monitor and Manage Anemia  Recent Flowsheet Documentation  Taken 7/17/2022 2344 by Ramon Samaniego RN  Safety Promotion/Fall Prevention:   activity supervised   bed alarm on   clutter free environment maintained   fall prevention program maintained   lighting adjusted   mobility aid in reach   nonskid shoes/slippers when out of bed   patient and family education   room organization consistent   safety round/check completed     Problem: Pain Acute  Goal: Acceptable Pain Control and Functional Ability  Outcome: Ongoing, Progressing  Intervention: Develop Pain Management Plan  Recent Flowsheet Documentation  Taken 7/18/2022 0346 by Ramon Samaniego, RUDI  Pain Management Interventions:   medication (see MAR)   emotional support   repositioned   rest  Taken 7/18/2022 0221 by Ramon Samaniego RN  Pain Management Interventions:   medication (see MAR)   emotional support   repositioned   rest  Taken 7/17/2022 2344 by Ramon Samaniego RN  Pain Management Interventions:   medication (see MAR)   emotional support   repositioned   rest  Intervention: Prevent or Manage Pain  Recent Flowsheet Documentation  Taken 7/17/2022 2344 by Ramon Samaniego RN  Medication Review/Management: medications reviewed   Goal Outcome Evaluation:  Patient reported ongoing right leg pain, PRN Tylenol and Oxycodone given overnight with some relief. No signs of bleeding noted.

## 2022-07-18 NOTE — PROGRESS NOTES
Care Management Follow Up    Length of Stay (days): 3    Expected Discharge Date: 07/19/2022     Concerns to be Addressed:     Medical MGMT  Patient plan of care discussed at interdisciplinary rounds: Yes    Anticipated Discharge Disposition: Transitional Care (Return to [facility] with bed hold. Facility is about 90 minutes away, TCU staff requests 10 AM discharge if possible on day of discharge)     Anticipated Discharge Services:    Anticipated Discharge DME:      Referrals Placed by CM/SW:    Private pay costs discussed: Not applicable    Additional Information:    Medical WC ride set for 7/19 at 11:30 AM.       Per MD, pt will likely discharge back to TCU tomorrow. BAKARI spoke with TCU intake staff - preference for early AM discharge. Medical vs family transport. CM following.     SWCM spoke with patient over-the-phone to discuss discharge plan. Pt agreeable to medical transport because family is working tomorrow.     ALEXIS CamejoSW

## 2022-07-18 NOTE — PROGRESS NOTES
[facility]    PROGRESS NOTE - Hospitalist Service    Assessment and Plan  77 year old male with PMH of IDDM, , hypertension, CAD, ischemic cardiomyopathy, RBBB, chronic atrial fibrillation, congestive heart failure, SSS, hyperlipidemia, cardiac pacemaker in situ, lumbar spinal stenosis, acute kidney failure, DVT, s/p lumbar laminectomy 6/16/22, and s/p CABG 2011, who transferred from [facility] with right buttock hematoma, anemia HgB 6.1, started blood transfusion at OSH ER     Left buttock hematoma, non traumatic,  - in pt with therapeutic INR on coumadin ( for a fib, Rt leg DVT since 6/29).  - Discontinue coumadin, plan to start Eliquis on discharge if hemoglobin is stable  - Hemoglobin 7.1 on admission, received 1 unit per BCs, posttransfusion hemoglobin 8.9.  -  IR CONSULTED FOR IVC filter placement, concluded his femoral DVT is too small or IV placement.  - Plan to start anticoagulation tomorrow with Eliquis if hemoglobin remains stable     Acute blood loss anemia  - due to left buttock hematoma  - S/p 1 unit PRBCs at OSH ER for hemoglobin of 6.9.  Posttransfusion hemoglobin 7.1, received 1 unit per BCs on 7/14 and today hemoglobin is 8.2.  - Right buttock pain subsided.  - CT abd at OSH ED negative for active extravasation.  - Continue to monitor hemoglobin  - Plan to restart Eliquis tomorrow if hemoglobin remains stable     Acute kidney injury  - CKD 3.  - Creat 1.15 on 5/7 and 4.31 yesterday, with pre renal indices.  - CTat OSH ER abd negative for retroperitoneal hematoma.  - Received IV contrast on 7/13.  - Nephrology consult, appreciate input  - UA not infected.    - Improving creatinine with IVF.  - Renal ultrasound is unremarkable for obstructive nephropathy  - Discontinue IV fluid as recommended by nephrology  - Continue to hold Bumex.  - Continue to monitor renal function     Hyperkalemia   - 5.3 on adm; with IVF down to 5.0.  - Discontinue IV fluid as recommended by  nephrology   - Continue to monitor.    S/P L4-S1 decompressive laminectomy with Dr. Samuel on 6/16   - post op course complicated by fevers, lumbar psuedomeningocele  -Neurosurgery consult completed input  - now resolving  - -was also previously aspirated and culture was negative  - Discharged 7/6 to TCU (with coumadin instead of his home xarelto due to concern for possible surgical intervention)   - No plan for future surgical intervention  - Okay to start Eliquis if hemoglobin is stable as per neurosurgery     Diabetes mellitus, insulin-dependent  - C/w Lantus; increase dose to 20 units as BG is up   - SSIN  - hold glucophage due to YOLANDA.  - Continue to monitor blood glucose     Hyponatremia,   - improved with IVF.  - IV fluid is currently off     HTN.    - BP acceptable.    - Nephrology recommended right BP little bit higher with YOLANDA.  - Continue to monitor blood pressure     Coronary artery disease  - s/p CABG in 2011  -  no angina, C/w home meds.     Hyperlipidemia  - on crestor.     Weakness and deconditioning  -Secondary to the above  - PT/OT evaluation  - Plan to return to TCU in [place]    Principal Problem:    Acute deep vein thrombosis (DVT) of femoral vein of right lower extremity (H)  Active Problems:    Type 2 diabetes mellitus (H)    Ischemic cardiomyopathy    Atrial fibrillation (H)    Congestive Heart Failure    Biventricular cardiac pacemaker in situ    ARF (acute renal failure) (H)    Anemia    Anemia due to blood loss, acute    Spinal stenosis, lumbar region, without neurogenic claudication    Lumbar herniated disc    Traumatic hematoma of buttock, initial encounter    Anemia, unspecified type      VTE prophylaxis:  Pneumatic Compression Devices  DIET: Orders Placed This Encounter      Moderate Consistent Carb (60 g CHO per Meal) Diet      Disposition/Barriers to discharge: Monitor hemoglobin and renal function  Code Status: Full Code    Subjective:  Thomas continues to improve, denies any chest  pain or shortness of breath.  Buttock pain is improving    PHYSICAL EXAM  Vitals:    07/13/22 2355 07/16/22 0611 07/17/22 0419   Weight: 92.1 kg (203 lb) 88.4 kg (194 lb 14.4 oz) 87.7 kg (193 lb 6.4 oz)     B/P:125/61 T:98.1 P:60 R:18     Intake/Output Summary (Last 24 hours) at 7/18/2022 1527  Last data filed at 7/18/2022 0836  Gross per 24 hour   Intake 1440 ml   Output 2000 ml   Net -560 ml      Body mass index is 27.75 kg/m .    Constitutional: awake, alert, cooperative, no apparent distress, and appears stated age  Respiratory: No increased work of breathing, good air exchange, clear to auscultation bilaterally, no crackles or wheezing  Cardiovascular: Normal apical impulse, regular rate and rhythm, normal S1 and S2, no S3 or S4, and no murmur noted  GI: No scars, normal bowel sounds, soft, non-distended, non-tender, no masses palpated, no hepatosplenomegally  Skin: no bruising or bleeding and normal skin color, texture, turgor  Musculoskeletal: There is no redness, warmth, or swelling of the joints.  Full range of motion noted.  no lower extremity pitting edema present  Neurologic: Awake, alert, oriented to name, place and time.  Cranial nerves II-XII are grossly intact.  Motor is 5 out of 5 bilaterally.   Sensory is intact.    Neuropsychiatric: Appropriate with examiner      PERTINENT LABS/IMAGING:  Recent Labs   Lab 07/18/22  1231 07/18/22  0754 07/18/22  0437 07/17/22  0725 07/17/22  0431 07/16/22  0733 07/16/22  0412 07/15/22  0914 07/15/22  0903 07/14/22  1202 07/14/22  1154 07/14/22  0110   WBC  --   --  8.4  --  7.9  --   --   --  8.3  --  8.6 10.1   HGB  --   --  8.2*  --  8.8*  --   --   --  8.9*  --  7.1* 7.4*   MCV  --   --  91  --  90  --   --   --  90  --  90 90   PLT  --   --  314  --  309  --   --   --  256  --  243 230   INR  --   --   --   --   --   --   --   --   --   --  2.22* 2.27*   NA  --   --  140  --  141  --  139  --  136  --  134* 135*   POTASSIUM  --   --  4.3  --  4.3  --  4.7  --   4.8  --  5.0 5.3*   CHLORIDE  --   --  108*  --  108*  --  105  --  104  --  102 100   CO2  --   --  26  --  24  --  23  --  21*  --  20* 20*   BUN  --   --  44*  --  64*  --  95*  --  109*  --  112* 102*   CR  --   --  1.90*  --  2.57*  --  3.98*  --  4.67*  --  4.52* 4.31*   ANIONGAP  --   --  6  --  9  --  11  --  11  --  12 15   BARBARA  --   --  8.7  --  9.0  --  8.9  --  8.6  --  8.6 9.0   * 101* 111   < > 164*   < > 198*   < > 77   < > 120 166*   ALBUMIN  --   --   --   --   --   --   --   --   --   --  3.1* 3.4*   PROTTOTAL  --   --   --   --   --   --   --   --   --   --  6.0 6.2   BILITOTAL  --   --   --   --   --   --   --   --   --   --  0.9 0.9   ALKPHOS  --   --   --   --   --   --   --   --   --   --  85 86   ALT  --   --   --   --   --   --   --   --   --   --  17 19   AST  --   --   --   --   --   --   --   --   --   --  14 18    < > = values in this interval not displayed.     No results found for this or any previous visit (from the past 24 hour(s)).    Discussed with patient, family, nephrology, nursing staff and discharge planner    Tamir Hall MD  [facility] Medicine Service  [phone]

## 2022-07-18 NOTE — PROGRESS NOTES
"   RENAL PROGRESS NOTE  CC: YOLANDA    S: no complaints other than ongoing hip pain. Denies sob or nausea, tolerating po.     Assessment/ Recommendations:  1. YOLANDA: Creatinine of 4.31 on 7/14. Recent YOLANDA during last hospitalization with peak creatinine of 2 and trending back down to 0.88 on 6/24. Creatinine of 1.32 on discharge and noted 1.02 on 7/8 and 3.86 on 7/13. Noted hgb trend down outpatient to 6 from 9.1 on discharge. Underwent CT with contrast on 7/13.  UA with 20 protein, few bacteria. Patient with underlying vascular disease and CTA showed stenosis of bilateral renal arteries. With hematoma, ACE-I, bumex patient is going to be very prone to ischemic insults with volume/hemonynamic insult in setting of contrast exposure.   Renal US with normal appearing kidney, no hydronephrosis or masses, bladder is normal.   -continues to improve after IVF, holding diuretics  -Daily BMP  -cont to hold ACE       2. Hematoma, left buttock: per IM. On coumadin for afib and right leg DVT.  Transfusion per primary.   3. CHF: CXR clear, lungs clear, holding ACE-I and bumex. Monitor.   4. Type 2 DM: per IM.   5. Pseudomeningocele: neurosurgery following.  S/p laminectomy on 6/16.  6. HTN: some lower bp, no med changes today.     Discussed with Dr Isabel Shelton MD  [facility]  [phone]    Physical Exam  /61 (BP Location: Left arm)   Pulse 60   Temp 98.2  F (36.8  C) (Oral)   Resp 18   Ht 1.778 m (5' 10\")   Wt 87.7 kg (193 lb 6.4 oz)   SpO2 95%   BMI 27.75 kg/m     Patient Vitals for the past 96 hrs:   Weight   07/17/22 0419 87.7 kg (193 lb 6.4 oz)   07/16/22 0611 88.4 kg (194 lb 14.4 oz)       Intake/Output Summary (Last 24 hours) at 7/16/2022 0930  Last data filed at 7/16/2022 0913  Gross per 24 hour   Intake 720 ml   Output 1300 ml   Net -580 ml       Physical Exam:   /61 (BP Location: Left arm)   Pulse 60   Temp 98.2  F (36.8  C) (Oral)   Resp 18   Ht 1.778 m (5' 10\")   Wt " 87.7 kg (193 lb 6.4 oz)   SpO2 95%   BMI 27.75 kg/m      General: Calm & comfortable   Eyes: Pupils equal, sclerae not icteric   ENT: Mucus membranes moist, no lesions noted   Resp: CTA bilaterally, no distress, normal effort   CV: RRR without murmur, trace leg edema  GI: Soft NT ND   Psych: A&Ox3, not depressed   Neuro: Moves all extremities.     Skin: No rash noted       Recent Labs   Lab Test 07/16/22  0412 07/15/22  0903 07/14/22  1154   POTASSIUM 4.7 4.8 5.0   CHLORIDE 105 104 102   ANIONGAP 11 11 12     Recent Labs   Lab Test 07/15/22  0903 07/14/22  1154 07/14/22  0110   WBC 8.3 8.6 10.1   RBC 3.14* 2.51* 2.58*   MCV 90 90 90   MCH 28.3 28.3 28.7   RDW 16.4* 16.3* 15.9*       I personally reviewed these labs today

## 2022-07-19 ENCOUNTER — APPOINTMENT (OUTPATIENT)
Dept: PHYSICAL THERAPY | Facility: HOSPITAL | Age: 77
DRG: 604 | End: 2022-07-19
Payer: MEDICARE

## 2022-07-19 VITALS
WEIGHT: 190.7 LBS | HEIGHT: 70 IN | SYSTOLIC BLOOD PRESSURE: 135 MMHG | RESPIRATION RATE: 20 BRPM | OXYGEN SATURATION: 94 % | DIASTOLIC BLOOD PRESSURE: 65 MMHG | TEMPERATURE: 99 F | HEART RATE: 64 BPM | BODY MASS INDEX: 27.3 KG/M2

## 2022-07-19 LAB
ANION GAP SERPL CALCULATED.3IONS-SCNC: 8 MMOL/L (ref 5–18)
BUN SERPL-MCNC: 32 MG/DL (ref 8–28)
CALCIUM SERPL-MCNC: 8.9 MG/DL (ref 8.5–10.5)
CHLORIDE BLD-SCNC: 108 MMOL/L (ref 98–107)
CO2 SERPL-SCNC: 24 MMOL/L (ref 22–31)
CREAT SERPL-MCNC: 1.43 MG/DL (ref 0.7–1.3)
ERYTHROCYTE [DISTWIDTH] IN BLOOD BY AUTOMATED COUNT: 15.4 % (ref 10–15)
GFR SERPL CREATININE-BSD FRML MDRD: 51 ML/MIN/1.73M2
GLUCOSE BLD-MCNC: 145 MG/DL (ref 70–125)
GLUCOSE BLDC GLUCOMTR-MCNC: 137 MG/DL (ref 70–99)
GLUCOSE BLDC GLUCOMTR-MCNC: 240 MG/DL (ref 70–99)
HCT VFR BLD AUTO: 27.6 % (ref 40–53)
HGB BLD-MCNC: 8.5 G/DL (ref 13.3–17.7)
MCH RBC QN AUTO: 28 PG (ref 26.5–33)
MCHC RBC AUTO-ENTMCNC: 30.8 G/DL (ref 31.5–36.5)
MCV RBC AUTO: 91 FL (ref 78–100)
PLATELET # BLD AUTO: 303 10E3/UL (ref 150–450)
POTASSIUM BLD-SCNC: 4.4 MMOL/L (ref 3.5–5)
RBC # BLD AUTO: 3.04 10E6/UL (ref 4.4–5.9)
SODIUM SERPL-SCNC: 140 MMOL/L (ref 136–145)
WBC # BLD AUTO: 7.7 10E3/UL (ref 4–11)

## 2022-07-19 PROCEDURE — 250N000013 HC RX MED GY IP 250 OP 250 PS 637: Performed by: INTERNAL MEDICINE

## 2022-07-19 PROCEDURE — 99239 HOSP IP/OBS DSCHRG MGMT >30: CPT | Performed by: INTERNAL MEDICINE

## 2022-07-19 PROCEDURE — 85027 COMPLETE CBC AUTOMATED: CPT | Performed by: INTERNAL MEDICINE

## 2022-07-19 PROCEDURE — 82310 ASSAY OF CALCIUM: CPT | Performed by: INTERNAL MEDICINE

## 2022-07-19 PROCEDURE — 36415 COLL VENOUS BLD VENIPUNCTURE: CPT | Performed by: INTERNAL MEDICINE

## 2022-07-19 PROCEDURE — 97116 GAIT TRAINING THERAPY: CPT | Mod: GP

## 2022-07-19 PROCEDURE — 97110 THERAPEUTIC EXERCISES: CPT | Mod: GP

## 2022-07-19 RX ORDER — BUMETANIDE 1 MG/1
1 TABLET ORAL DAILY
Status: DISCONTINUED | OUTPATIENT
Start: 2022-07-19 | End: 2022-07-19 | Stop reason: HOSPADM

## 2022-07-19 RX ORDER — BUMETANIDE 1 MG/1
1 TABLET ORAL DAILY
DISCHARGE
Start: 2022-07-19 | End: 2023-05-26

## 2022-07-19 RX ORDER — LISINOPRIL 20 MG/1
10 TABLET ORAL DAILY
DISCHARGE
Start: 2022-07-19 | End: 2022-08-09

## 2022-07-19 RX ADMIN — OXYCODONE HYDROCHLORIDE 5 MG: 5 TABLET ORAL at 09:02

## 2022-07-19 RX ADMIN — GABAPENTIN 300 MG: 300 CAPSULE ORAL at 09:02

## 2022-07-19 RX ADMIN — BUMETANIDE 1 MG: 1 TABLET ORAL at 10:55

## 2022-07-19 RX ADMIN — APIXABAN 2.5 MG: 2.5 TABLET, FILM COATED ORAL at 09:02

## 2022-07-19 RX ADMIN — LEVOTHYROXINE SODIUM 25 MCG: 0.03 TABLET ORAL at 05:24

## 2022-07-19 RX ADMIN — OXYCODONE HYDROCHLORIDE 5 MG: 5 TABLET ORAL at 03:50

## 2022-07-19 RX ADMIN — ACETAMINOPHEN 1000 MG: 500 TABLET ORAL at 09:08

## 2022-07-19 ASSESSMENT — ACTIVITIES OF DAILY LIVING (ADL)
ADLS_ACUITY_SCORE: 36

## 2022-07-19 NOTE — PLAN OF CARE
Physical Therapy Discharge Summary    Reason for therapy discharge:    Discharged to transitional care facility.    Progress towards therapy goal(s). See goals on Care Plan in [facility] electronic health record for goal details.  Goals partially met.  Barriers to achieving goals:   discharge from facility.    Therapy recommendation(s):    Continued therapy is recommended.  Rationale/Recommendations:  at TCU.

## 2022-07-19 NOTE — PLAN OF CARE
Problem: Fall Injury Risk  Goal: Absence of Fall and Fall-Related Injury  Outcome: Ongoing, Progressing  Intervention: Identify and Manage Contributors  Recent Flowsheet Documentation  Taken 7/19/2022 0000 by Ramon Samaniego RN  Medication Review/Management: medications reviewed  Taken 7/18/2022 2000 by Ramon Samaniego RN  Medication Review/Management: medications reviewed  Intervention: Promote Injury-Free Environment  Recent Flowsheet Documentation  Taken 7/19/2022 0000 by Ramon Samaniego RN  Safety Promotion/Fall Prevention:   activity supervised   bed alarm on   clutter free environment maintained   fall prevention program maintained   mobility aid in reach   nonskid shoes/slippers when out of bed   patient and family education   room door open   room organization consistent   safety round/check completed  Taken 7/18/2022 2000 by Ramon Samaniego RN  Safety Promotion/Fall Prevention:   activity supervised   bed alarm on   clutter free environment maintained   fall prevention program maintained   mobility aid in reach   nonskid shoes/slippers when out of bed   patient and family education   room door open   room organization consistent   safety round/check completed     Problem: Risk for Delirium  Goal: Improved Sleep  Outcome: Ongoing, Progressing   Goal Outcome Evaluation:  Reported ongoing right leg pain. PRN Tylenol and oxycodone given overnight with adequate relief. Anticipate discharge to TCU today.

## 2022-07-19 NOTE — PLAN OF CARE
Occupational Therapy Discharge Summary    Reason for therapy discharge:    Discharged to transitional care facility.    Progress towards therapy goal(s). See goals on Care Plan in [facility] electronic health record for goal details.  Goals partially met.  Barriers to achieving goals:   discharge from facility.    Therapy recommendation(s):    Recommend continued OT at TCU

## 2022-07-19 NOTE — DISCHARGE SUMMARY
[facility]  Hospitalist Discharge Summary      Date of Admission:  7/13/2022  Date of Discharge:  7/19/2022  Discharging Provider: Yola Kapoor MD  Discharge Service: Hospitalist Service    Discharge Diagnoses   Hematoma    Follow-ups Needed After Discharge   Follow-up Appointments     Follow Up and recommended labs and tests      Follow up with detention physician.  The following labs/tests are   recommended: CBC, BMP.  Follow up with Neurosurgeon as instructed; consider to increase Eliquis to   5 mg po bid if no more bleeding;  Follow up with nephrologist as instructed             Unresulted Labs Ordered in the Past 30 Days of this Admission     No orders found from 6/13/2022 to 7/14/2022.          Discharge Disposition   Discharged to TCU  Condition at discharge: [facility]      Hospital Course   77 year old male with PMH of IDDM, , hypertension, CAD, ischemic cardiomyopathy, RBBB, chronic atrial fibrillation, congestive heart failure, SSS, hyperlipidemia, cardiac pacemaker in situ, lumbar spinal stenosis, acute kidney failure, DVT, s/p lumbar laminectomy 6/16/22, and s/p CABG 2011, who transferred from [facility] ER with right buttock hematoma, anemia HgB 6.1, started blood transfusion at OSH ER     Left buttock hematoma, non traumatic,  - in pt with therapeutic INR on coumadin ( for a fib, Rt leg DVT since 6/29).  - Discontinue coumadin, plan to start Eliquis on discharge since hemoglobin is stable  - Hemoglobin 7.1 on admission, received 1 unit per BCs, posttransfusion hemoglobin 8.9. On discharge day, hgb 8.5.  -  IR CONSULTED FOR IVC filter placement, concluded his femoral DVT is too small or IV placement.  - start on Eliquis on reduced dose (2.5 mg po bid) due to hematoma. Defer to neurosurgeon to increase to 5 mg po bid in the future      Acute blood loss anemia  - due to left buttock hematoma  - S/p 1 unit PRBCs at OSH ER for hemoglobin of 6.9.  Posttransfusion hemoglobin 7.1,  received 1 unit per BCs on 7/14 and today hemoglobin is 8.5.  - Right buttock pain subsided.  - CT abd at OSH ED negative for active extravasation.     Acute kidney injury  - CKD 3.  - Creat 1.15 on 5/7 and 4.31 yesterday, with pre renal indices.  - CTat OSH ER abd negative for retroperitoneal hematoma.  - Received IV contrast on 7/13.  - Nephrology consult, appreciate input  - UA not infected.    - Improving creatinine with IVF.  - Renal ultrasound is unremarkable for obstructive nephropathy  - Discontinue IV fluid as recommended by nephrology  - Nephro: ok to restart Bumex at 1 mg po daily at discharge  - Nephro: ok to resume Lisinopril (will restart at 10 mg po daily)     Hyperkalemia   - 5.3 on adm; with IVF down to 4.4  - Discontinue IV fluid as recommended by nephrology      S/P L4-S1 decompressive laminectomy with Dr. Samuel on 6/16   - post op course complicated by fevers, lumbar psuedomeningocele  -Neurosurgery consult completed input  - now resolving  - -was also previously aspirated and culture was negative  - Discharged 7/6 to TCU (with coumadin instead of his home xarelto due to concern for possible surgical intervention)   - No plan for future surgical intervention  - Okay to start Eliquis if hemoglobin is stable as per neurosurgery     Diabetes mellitus, insulin-dependent  - C/w Lantus; increase dose to 20 units as BG is up   - SSIN  - hold glucophage due to YOLANDA.  - Continue to monitor blood glucose     Hyponatremia,   - improved with IVF.  - IV fluid is currently off     HTN.    - BP acceptable.    - Nephrology recommended right BP little bit higher with YOLANDA.  - Continue to monitor blood pressure     Coronary artery disease  - s/p CABG in 2011  -  no angina, C/w home meds.     Hyperlipidemia  - on crestor.     Weakness and deconditioning  -Secondary to the above  - PT/OT evaluation  - Plan to return to TCU in [place]     Principal Problem:    Acute deep vein thrombosis (DVT) of femoral vein of  right lower extremity (H)  Active Problems:    Type 2 diabetes mellitus (H)    Ischemic cardiomyopathy    Atrial fibrillation (H)    Congestive Heart Failure    Biventricular cardiac pacemaker in situ    ARF (acute renal failure) (H)    Anemia    Anemia due to blood loss, acute    Spinal stenosis, lumbar region, without neurogenic claudication    Lumbar herniated disc    Traumatic hematoma of buttock, initial encounter    Anemia, unspecified type        DIET: Orders Placed This Encounter      Moderate Consistent Carb (60 g CHO per Meal) Diet       Consultations This Hospital Stay   CARE MANAGEMENT / SOCIAL WORK IP CONSULT  NEPHROLOGY IP CONSULT  NEPHROLOGY IP CONSULT  PHYSICAL THERAPY ADULT IP CONSULT  OCCUPATIONAL THERAPY ADULT IP CONSULT    Code Status   Full Code    Time Spent on this Encounter   I, Yola Kapoor MD, personally saw the patient today and spent greater than 30 minutes discharging this patient.       Yola Kapoor MD  [place] [id number]  Phone: [phone]  Fax: [phone]  ______________________________________________________________________    Physical Exam   Vital Signs: Temp: 99  F (37.2  C) Temp src: Oral BP: 135/65 Pulse: 64   Resp: 20 SpO2: 94 % O2 Device: None (Room air)    Weight: 190 lbs 11.2 oz  General.  Awake alert oriented not in acute distress.  HEENT.  Pupils equal round react to light, anicteric, EOM intact.  Neck supple no JVD.  CVS regular rhythm no murmur gallops.  Lungs.  Clear to auscultation bilateral no wheezing or rales.  Abdomen.  Soft nontender bowel sounds present.  Extremities.  Right buttock hematoma.  Neurological.  Awake and alert. No focal deficit.  Skin no rash. No pallor.  Psych. Normal mood.          Primary Care Physician   Moni Mcclain    Discharge Orders      Primary Care - Care Coordination Referral      General info for SNF    Length of Stay Estimate: Short Term Care:  Estimated # of Days <30  Condition at Discharge: Improving  Level of care:skilled   Rehabilitation Potential: Fair  Admission H&P remains valid and up-to-date: Yes  Recent Chemotherapy: N/A  Use Nursing Home Standing Orders: Yes     Mantoux instructions    Give two-step Mantoux (PPD) Per Facility Policy Yes     Follow Up and recommended labs and tests    Follow up with custodial physician.  The following labs/tests are recommended: CBC, BMP.  Follow up with Neurosurgeon as instructed; consider to increase Eliquis to 5 mg po bid if no more bleeding;  Follow up with nephrologist as instructed to consider to restart ACEi, diuretics     Reason for your hospital stay    Buttock hematoma     Glucose monitor nursing POCT    Before meals and at bedtime     Activity - Up with nursing assistance    As per neurosurgeon     Fall precautions     Diet    Follow this diet upon discharge: Orders Placed This Encounter      Room Service      Moderate Consistent Carb (60 g CHO per Meal) Diet       Significant Results and Procedures   Results for orders placed or performed during the hospital encounter of 07/13/22   MR Lumbar Spine w/o & w Contrast    Narrative    EXAM: MR LUMBAR SPINE W/O and W CONTRAST  LOCATION: [facility]  DATE/TIME: 7/15/2022 12:24 PM    INDICATION: Follow up on pseudomeningiocele; No contrast needed  COMPARISON: Lumbar spine MRI 07/05/2022, 06/30/2022, 06/27/2022  CONTRAST: 9ml Gadavist  TECHNIQUE: Routine Lumbar Spine MRI without and with IV contrast.    FINDINGS:   Nomenclature is based on 5 lumbar type vertebral bodies. The conus ends at distal L1. Stable 3 mm degenerative spondylolisthesis of L4 on L5. Vertebral body heights are maintained. No pars defects.    Redemonstration of L4-L5 and L5-S1 laminectomy changes. Interval slight decrease in the loculated fluid collection within the laminectomy bed (2.7 x 2.7 x 5.6 cm versus previously 3.4 x 3.6 x 8.8 cm) extending into the dorsal  aspect of the spinal canal.   It does have significant mass effect on the thecal sac which is being compressed and pushed anteriorly. This fluid collection communicates with a smaller fluid collection in the paraspinal and subcutaneous soft tissues of the lower lumbar region which is   also moderately decreased in size (1.1 x 1.6 cm versus previously 1.3 x 3 cm). Interval decrease in the edema in the posterior paraspinal soft tissues from L3 to S1.     Persistent enhancement within the laminectomy beds and surrounding the fluid collection within the laminectomy beds. Interval slight decrease in the enhancement within the posterior paraspinal soft tissues from L3 to S1. Stable small ventral enhancement   from proximal L4 to proximal S1. Redemonstration of small tissue enhancement at the level of the L5-S1 microdiscectomy site.    No abnormal enhancement of the cauda equina nerve roots. Moderate symmetric atrophy of the posterior paraspinal musculature. Normal diameter of the distal abdominal aorta.    T12-L1: Normal intervertebral disc height. Loss of the normal T2 signal within the disc. Small broad-based disc bulge. Mild bilateral facet arthropathy. No spinal canal narrowing. No neuroforaminal narrowing.     L1-L2: Normal intervertebral disc height. Loss of the normal T2 signal within the disc. Small broad-based disc bulge. Mild bilateral facet arthropathy. No spinal canal narrowing. No neuroforaminal narrowing.    L2-L3: Normal intervertebral disc height. Loss of the normal T2 signal. Shallow broad-based disc bulge. Mild bilateral facet arthropathy. No spinal canal narrowing. No neuroforaminal narrowing.     L3-L4: Normal intervertebral disc height. Loss of the normal T2 signal within the disc. Broad-based disc bulge with superimposed central disc protrusion. Mild bilateral facet arthropathy. Mild spinal canal narrowing. Mild right neuroforaminal narrowing.   No left neuroforaminal narrowing.    L4-L5: Normal  vertebral disc height. Loss of the normal T2 signal within the disc. Broad-based disc bulge with superimposed central disc protrusion. Moderate bilateral facet arthropathy. Laminectomy changes. Interval slight decrease in the fluid   collection within the laminectomy bed. Interval slight decrease in the high-grade compression of the thecal sac. Mild right neuroforaminal narrowing. Mild left neuroforaminal narrowing.    L5-S1: Mild intervertebral disc height loss. Loss of the normal T2 signal within the disc. Broad-based disc bulge. Moderate bilateral facet arthropathy. Laminectomy changes. Enhancement within the microdiscectomy changes. Interval slight decrease in the   fluid collection within the laminectomy bed. Interval slight decrease in the compression of the thecal sac. Moderate right neuroforaminal narrowing. Mild left neuroforaminal narrowing.      Impression    IMPRESSION:  1.  Compared to the most recent prior lumbar spine MRI 07/05/2022, interval slight decrease in the loculated fluid collection within the laminectomy bed extending into the dorsal aspect of the spinal canal. Interval slight decrease in the high-grade   compression of the thecal sac at those levels. As previously suggested, this could be a pseudomeningocele. The sterility of this fluid collection cannot be determined by this imaging.  2.  This fluid collection communicates with a smaller fluid collection in the paraspinal and subcutaneous soft tissues of the lower lumbar region which is also moderately decreased in size.   3.  Persistent enhancement within the laminectomy beds and surrounding the fluid collection within the laminectomy beds.   4.  Redemonstration of mild to moderate multilevel degenerative changes of the rest of the lumbar spine as detailed above.   XR Chest 2 Views    Narrative    EXAM: XR CHEST 2 VW  LOCATION: [facility]  DATE/TIME: 7/15/2022 11:56 AM    INDICATION: Decompressive lumbar  laminectomy one month ago. Anemia.  COMPARISON: 07/05/2022      Impression    IMPRESSION: Stable position AICD. Prior sternotomy. Enlarged cardiac silhouette. Small bilateral posterior pleural effusions. No pulmonary edema.    Mild left retrocardiac opacity which could represent atelectasis or pneumonia.   US Renal Complete    Narrative    EXAM: US RENAL COMPLETE  LOCATION: [facility]  DATE/TIME: 7/15/2022 5:29 PM    INDICATION: YOLANDA  COMPARISON: None.  TECHNIQUE: Routine Bilateral Renal and Bladder Ultrasound.    FINDINGS:    RIGHT KIDNEY: 10.3 cm. Normal without hydronephrosis or masses.     LEFT KIDNEY: 12.2 cm. Normal without hydronephrosis or masses.     BLADDER: Normal.      Impression    IMPRESSION:  1.  Normal kidney ultrasound.       Discharge Medications   Current Discharge Medication List      START taking these medications    Details   apixaban ANTICOAGULANT (ELIQUIS) 2.5 MG tablet Take 1 tablet (2.5 mg) by mouth 2 times daily    Associated Diagnoses: Permanent atrial fibrillation (H); Acute deep vein thrombosis (DVT) of femoral vein of right lower extremity (H)         CONTINUE these medications which have CHANGED    Details   bumetanide (BUMEX) 1 MG tablet Take 1 tablet (1 mg) by mouth daily    Associated Diagnoses: Congestive heart failure, unspecified HF chronicity, unspecified heart failure type (H)      insulin glargine (LANTUS PEN) 100 UNIT/ML pen Inject 20 Units Subcutaneous At Bedtime  Qty: 15 mL    Comments: If Lantus is not covered by insurance, may substitute Basaglar or Semglee or other insulin glargine product per insurance preference at same dose and frequency.    Associated Diagnoses: Type 2 diabetes mellitus with hyperglycemia, with long-term current use of insulin (H)      lisinopril (ZESTRIL) 20 MG tablet Take 0.5 tablets (10 mg) by mouth daily    Associated Diagnoses: Essential hypertension         CONTINUE these medications which have NOT CHANGED    Details    acetaminophen (TYLENOL) 500 MG tablet Take 1,000-1,500 mg by mouth every 6 hours as needed for mild pain      bisacodyl (DULCOLAX) 10 MG suppository Place 10 mg rectally daily as needed for constipation If no BM after 4 days      cholecalciferol 50 MCG (2000 UT) CAPS Take 2 capsules by mouth daily      coenzyme Q-10 200 MG CAPS capsule Take 200 mg by mouth daily      diclofenac (VOLTAREN) 1 % topical gel Apply 2 g topically 2 times daily    Associated Diagnoses: Spinal stenosis of lumbar region with neurogenic claudication      ferrous sulfate (FEROSUL) 325 (65 Fe) MG tablet Take 1 tablet by mouth 3 times daily (with meals)      gabapentin (NEURONTIN) 300 MG capsule Take 2 capsules (600 mg) by mouth 2 times daily    Associated Diagnoses: Spinal stenosis of lumbar region with neurogenic claudication      insulin lispro (HUMALOG KWIKPEN) 100 UNIT/ML (1 unit dial) KWIKPEN Do Not give Correction Insulin if Pre-Meal BG less than 140.   For Pre-Meal  - 164 give 1 unit.   For Pre-Meal  - 189 give 2 units.   For Pre-Meal  - 214 give 3 units.   For Pre-Meal  - 239 give 4 units.   For Pre-Meal  - 264 give 5 units.   For Pre-Meal  - 289 give 6 units.   For Pre-Meal  - 314 give 7 units.   For Pre-Meal  - 339 give 8 units.   For Pre-Meal  - 364 give 9 units.   For Pre-Meal BG greater than or equal to 365 give 10 units  To be given with prandial insulin, and based on pre-meal blood glucose.   Notify provider if glucose greater than or equal to 350 mg/dL after administration of correction dose.  Qty: 15 mL, Refills: 0    Associated Diagnoses: Type 2 diabetes mellitus with hyperglycemia, with long-term current use of insulin (H)      levothyroxine (SYNTHROID/LEVOTHROID) 25 MCG tablet Take 25 mcg by mouth daily      lidocaine (XYLOCAINE) 5 % external ointment Apply topically 2 times daily    Associated Diagnoses: Spinal stenosis of lumbar region with neurogenic claudication       magnesium hydroxide (MILK OF MAGNESIA) 400 MG/5ML suspension Take 30 mLs by mouth daily as needed for constipation or heartburn If no BM after 3 days      melatonin 3 MG tablet Take 3 mg by mouth At Bedtime      Omega-3 Fatty Acids (FISH OIL) 1200 MG capsule Take 1,200 mg by mouth daily      oxyCODONE (ROXICODONE) 5 MG tablet Take 0.5-1 tablets (2.5-5 mg) by mouth every 4 hours as needed for moderate pain or moderate to severe pain  Qty: 20 tablet, Refills: 0    Associated Diagnoses: Spinal stenosis of lumbar region, unspecified whether neurogenic claudication present      !! polyethylene glycol (MIRALAX) 17 g packet Take 1 packet by mouth 2 times daily      !! polyethylene glycol (MIRALAX) 17 g packet Take 1 packet by mouth daily as needed for constipation Until having regular BMs      rosuvastatin (CRESTOR) 10 MG tablet Take 10 mg by mouth At Bedtime      senna-docusate (SENOKOT-S/PERICOLACE) 8.6-50 MG tablet Take 2 tablets by mouth 2 times daily      sodium phosphate (FLEET ENEMA) 7-19 GM/118ML rectal enema Place 1 enema rectally daily as needed for constipation If no BM after 5 days      tamsulosin (FLOMAX) 0.4 MG capsule Take 1 capsule (0.4 mg) by mouth daily For post-op urinary retention    Associated Diagnoses: Spinal stenosis of lumbar region with neurogenic claudication      vitamin C (ASCORBIC ACID) 500 MG tablet Take 500 mg by mouth 3 times daily (with meals) Take with iron supplement      zinc gluconate 50 MG tablet Take 1 tablet (50 mg) by mouth daily    Associated Diagnoses: Vitamin deficiency       !! - Potential duplicate medications found. Please discuss with provider.      STOP taking these medications       metFORMIN (GLUCOPHAGE) 1000 MG tablet Comments:   Reason for Stopping:         warfarin ANTICOAGULANT (COUMADIN) 5 MG tablet Comments:   Reason for Stopping:             Allergies   Allergies   Allergen Reactions     Aspirin Other (See Comments)     Contraindicated due to xarelto use      Codeine Nausea and Vomiting     Pollen Extracts [Pollen Extract] Unknown     Scratchy throat     Vicodin [Hydrocodone-Acetaminophen] Nausea and Vomiting      Latest Reference Range & Units 07/19/22 04:56 07/19/22 07:13   Sodium 136 - 145 mmol/L 140    Potassium 3.5 - 5.0 mmol/L 4.4    Chloride 98 - 107 mmol/L 108 (H)    Carbon Dioxide 22 - 31 mmol/L 24    Urea Nitrogen 8 - 28 mg/dL 32 (H)    Creatinine 0.70 - 1.30 mg/dL 1.43 (H)    GFR Estimate >60 mL/min/1.73m2 51 (L)    Calcium 8.5 - 10.5 mg/dL 8.9    Anion Gap 5 - 18 mmol/L 8    Glucose 70 - 125 mg/dL 145 (H)    GLUCOSE BY METER POCT 70 - 99 mg/dL  137 (H)   WBC 4.0 - 11.0 10e3/uL 7.7    Hemoglobin 13.3 - 17.7 g/dL 8.5 (L)    Hematocrit 40.0 - 53.0 % 27.6 (L)    Platelet Count 150 - 450 10e3/uL 303    RBC Count 4.40 - 5.90 10e6/uL 3.04 (L)    MCV 78 - 100 fL 91    MCH 26.5 - 33.0 pg 28.0    MCHC 31.5 - 36.5 g/dL 30.8 (L)    RDW 10.0 - 15.0 % 15.4 (H)    (H): Data is abnormally high  (L): Data is abnormally low

## 2022-07-19 NOTE — PROGRESS NOTES
Care Management Discharge Note    Discharge Date: 07/19/2022       Discharge Disposition: Transitional Care (Return to [facility] with bed hold. Facility is about 90 minutes away)    Discharge Services:  Therapy    Discharge DME:      Discharge Transportation: agency    Private pay costs discussed: transportation costs    PAS Confirmation Code:  N/A  Patient/family educated on Medicare website which has current facility and service quality ratings:      Education Provided on the Discharge Plan:    Persons Notified of Discharge Plans: MD has placed orders, Charge RN aware, facility notified and in agreement  Patient/Family in Agreement with the Plan:      Handoff Referral Completed: Yes    Additional Information:    Pt discharging back to [facility] (Phone: [phone]; Fax: [phone]) today with 11:30 AM wheelchair transport. ELBA has faxed discharge orders to [facility] and spoken with them regarding plan.       TACO Camejo

## 2022-07-19 NOTE — PLAN OF CARE
Goal Outcome Evaluation:           Patient discharging to transitional Care, paper will be faxed to Facility and a hard copy will go with patient.

## 2022-07-19 NOTE — PLAN OF CARE
"  Problem: Plan of Care - These are the overarching goals to be used throughout the patient stay.    Goal: Patient-Specific Goal (Individualized)  Description: You can add care plan individualizations to a care plan. Examples of Individualization might be:  \"Parent requests to be called daily at 9am for status\", \"I have a hard time hearing out of my right ear\", or \"Do not touch me to wake me up as it startles me\".  Outcome: Ongoing, Progressing     Problem: Plan of Care - These are the overarching goals to be used throughout the patient stay.    Goal: Plan of Care Review/Shift Note  Description: The Plan of Care Review/Shift note should be completed every shift.  The Outcome Evaluation is a brief statement about your assessment that the patient is improving, declining, or no change.  This information will be displayed automatically on your shift note.  Outcome: Ongoing, Progressing     Problem: Anemia  Goal: Anemia Symptom Improvement  Outcome: Ongoing, Progressing  Intervention: Monitor and Manage Anemia  Recent Flowsheet Documentation  Taken 7/18/2022 1700 by Annette Logan RN  Safety Promotion/Fall Prevention:   activity supervised   bed alarm on   clutter free environment maintained   fall prevention program maintained   lighting adjusted   mobility aid in reach   nonskid shoes/slippers when out of bed   patient and family education   room organization consistent   safety round/check completed  Taken 7/18/2022 1230 by Annette Logan RN  Safety Promotion/Fall Prevention:   activity supervised   bed alarm on   clutter free environment maintained   fall prevention program maintained   lighting adjusted   mobility aid in reach   nonskid shoes/slippers when out of bed   patient and family education   room organization consistent   safety round/check completed  Taken 7/18/2022 0900 by Annette Logan RN  Safety Promotion/Fall Prevention:   activity supervised   bed alarm on   clutter free environment " maintained   fall prevention program maintained   lighting adjusted   mobility aid in reach   nonskid shoes/slippers when out of bed   patient and family education   room organization consistent   safety round/check completed   Goal Outcome Evaluation:  VSS. MEDICATED TWICE FOR right leg pain, pain improved each time. TCU tomorrow.

## 2022-07-19 NOTE — PROGRESS NOTES
"   RENAL PROGRESS NOTE  CC: YOLANDA    S: no complaints other than ongoing hip pain. Denies sob or nausea, tolerating po.  He says planning for discharge today.     Assessment/ Recommendations:  1. YOLANDA: Creatinine of 4.31 on 7/14. Recent YOLANDA during last hospitalization with peak creatinine of 2 and trending back down to 0.88 on 6/24. Creatinine of 1.32 on discharge and noted 1.02 on 7/8 and 3.86 on 7/13. Noted hgb trend down outpatient to 6 from 9.1 on discharge. Underwent CT with contrast on 7/13.  UA with 20 protein, few bacteria. Patient with underlying vascular disease and CTA showed stenosis of bilateral renal arteries. With hematoma, ACE-I, bumex patient is going to be very prone to ischemic insults with volume/hemonynamic insult in setting of contrast exposure.   Renal US with normal appearing kidney, no hydronephrosis or masses, bladder is normal.   -continues to improve after IVF, holding diuretics  -Daily BMP  -ok to resume lisinopril and bumex - given current exam, would discharge on bumex 1 mg/d as close to euvolemic now       2. Hematoma, left buttock: per IM. On coumadin for afib and right leg DVT.  Transfusion per primary.   3. CHF: CXR clear, lungs clear, have been holding ACE-I and bumex  -ok to resume lisinopril 20 mg/d with bumex 1 mg/d (was on 3 mg/d pta)  -would benefit from re-eval of volume status and repeat bmp within a week of discharge as bumex dose may need to be increased after discharge  4. Type 2 DM: per IM.   5. Pseudomeningocele: neurosurgery following.  S/p laminectomy on 6/16.  6. HTN: ok control, ok to resume lisinopril and bumex as above        Natalie Shelton MD  [facility]  [phone]    Physical Exam  /65 (BP Location: Left arm)   Pulse 64   Temp 99  F (37.2  C) (Oral)   Resp 20   Ht 1.778 m (5' 10\")   Wt 86.5 kg (190 lb 11.2 oz)   SpO2 94%   BMI 27.36 kg/m     Patient Vitals for the past 96 hrs:   Weight   07/19/22 0348 86.5 kg (190 lb 11.2 oz) " "  07/17/22 0419 87.7 kg (193 lb 6.4 oz)   07/16/22 0611 88.4 kg (194 lb 14.4 oz)       Intake/Output Summary (Last 24 hours) at 7/16/2022 0930  Last data filed at 7/16/2022 0913  Gross per 24 hour   Intake 720 ml   Output 1300 ml   Net -580 ml       Physical Exam:   /65 (BP Location: Left arm)   Pulse 64   Temp 99  F (37.2  C) (Oral)   Resp 20   Ht 1.778 m (5' 10\")   Wt 86.5 kg (190 lb 11.2 oz)   SpO2 94%   BMI 27.36 kg/m      General: Calm & comfortable   Eyes: Pupils equal, sclerae not icteric   ENT: Mucus membranes moist, no lesions noted   Resp: CTA bilaterally, no distress, normal effort   CV: RRR without murmur, trace leg edema  GI: Soft NT ND   Psych: A&Ox3, not depressed   Neuro: Moves all extremities.     Skin: No rash noted       Recent Labs   Lab Test 07/16/22  0412 07/15/22  0903 07/14/22  1154   POTASSIUM 4.7 4.8 5.0   CHLORIDE 105 104 102   ANIONGAP 11 11 12     Recent Labs   Lab Test 07/15/22  0903 07/14/22  1154 07/14/22  0110   WBC 8.3 8.6 10.1   RBC 3.14* 2.51* 2.58*   MCV 90 90 90   MCH 28.3 28.3 28.7   RDW 16.4* 16.3* 15.9*       I personally reviewed these labs today    "

## 2022-07-20 ENCOUNTER — TELEPHONE (OUTPATIENT)
Dept: NEUROSURGERY | Facility: CLINIC | Age: 77
End: 2022-07-20

## 2022-07-20 NOTE — TELEPHONE ENCOUNTER
Needs 6 week post-op appointment as well as maybe an MRI six weeks after that with and without contrast, lumbar.   Sooner if needed. Appt when Dr. Samuel is in clinic.

## 2022-07-28 ENCOUNTER — OFFICE VISIT (OUTPATIENT)
Dept: CARDIOLOGY | Facility: CLINIC | Age: 77
End: 2022-07-28
Attending: INTERNAL MEDICINE
Payer: MEDICARE

## 2022-07-28 VITALS
DIASTOLIC BLOOD PRESSURE: 52 MMHG | BODY MASS INDEX: 27.69 KG/M2 | SYSTOLIC BLOOD PRESSURE: 118 MMHG | OXYGEN SATURATION: 98 % | WEIGHT: 193 LBS | HEART RATE: 86 BPM | RESPIRATION RATE: 18 BRPM

## 2022-07-28 DIAGNOSIS — I10 ESSENTIAL HYPERTENSION: Primary | ICD-10-CM

## 2022-07-28 DIAGNOSIS — I50.20 HEART FAILURE WITH REDUCED EJECTION FRACTION (H): ICD-10-CM

## 2022-07-28 DIAGNOSIS — G47.33 OBSTRUCTIVE SLEEP APNEA: ICD-10-CM

## 2022-07-28 DIAGNOSIS — I48.21 PERMANENT ATRIAL FIBRILLATION (H): ICD-10-CM

## 2022-07-28 DIAGNOSIS — I25.5 ISCHEMIC CARDIOMYOPATHY: ICD-10-CM

## 2022-07-28 PROCEDURE — 99214 OFFICE O/P EST MOD 30 MIN: CPT | Performed by: NURSE PRACTITIONER

## 2022-07-28 NOTE — LETTER
7/28/2022    Moni Mcclain MD  [facility] [address]  [facility] [address]    RE: Thomas Steve       Dear Colleague,     I had the pleasure of seeing Thomas Steve in the [facility].        Assessment/Recommendations   Assessment:    1.  Heart failure with reduced ejection fraction, ischemic cardiomyopathy, LVEF 45 to 50%, NYHA class II: Compensated.  He has fatigue and mild dyspnea on exertion.  His weight has been stable since discharge.  2.  Hypertension: Controlled.  Blood pressure 118/52  3.  Persistent atrial fibrillation: Rate controlled.  He is on Eliquis for anticoagulation  4.  CORAZON: Wears his CPAP nightly    Plan:  1.  Continue current medications  2.  Continue wearing CPAP  3.  Continue monitoring weights and low-salt diet    Thomas Steve will follow up with Dr. Brumfield in 6 to 8 weeks.     History of Present Illness/Subjective    Mr. Thomas Steve is a 77 year old male seen at [facility] heart failure clinic today for continued follow-up.  He follows up for heart failure with reduced ejection fraction, ischemic cardiomyopathy.  He was hospitalized in June and had a lumbar laminectomy on June 16, 2022.  He was discharged to TCU and developed a hematoma on his right buttock.  He was sent to the hospital July 13.  He was found to have a hemoglobin of 6.1.  He did receive transfusion and hemoglobin was 8.5 on discharge.  His Xarelto was changed to Eliquis.  He had an echocardiogram while hospitalized in June which showed ejection fraction of 45 to 50% with moderate to severe tricuspid regurgitation.  He has a past medical history significant for hypertension, CAD with CABG in 2011, persistent atrial fibrillation, hyperlipidemia, CRT-P, CORAZON on CPAP.    Today, he states he is feeling well.  He has fatigue and mild dyspnea on exertion.  He denies lightheadedness, orthopnea, PND, palpitations, chest pain, abdominal fullness/bloating and lower  extremity edema.      He is currently at TCU receiving rehab.  His weight is stable.    ECHOCARDIOGRAM: 6/20/2022  Interpretation Summary     The left ventricle is normal in size.  Left ventricular function is decreased. The ejection fraction is 45-50%  (mildly reduced).  There is a pacemaker lead in the right ventricle.  The left atrium is mildly dilated.  There is mild (1+) mitral regurgitation.  There is moderately severe (3+) tricuspid regurgitation.  Right ventricular systolic pressure is elevated, consistent with mild  pulmonary hypertension.  Mild valvular aortic stenosis.     Physical Examination Review of Systems   /52 (BP Location: Left arm, Patient Position: Sitting, Cuff Size: Adult Regular)   Pulse 86   Resp 18   Wt 87.5 kg (193 lb)   SpO2 98%   BMI 27.69 kg/m    Body mass index is 27.69 kg/m .  Wt Readings from Last 3 Encounters:   07/28/22 87.5 kg (193 lb)   07/19/22 86.5 kg (190 lb 11.2 oz)   07/13/22 92.1 kg (203 lb)       General Appearance:   no acute distress   ENT/Mouth: Wearing mask   EYES:  no scleral icterus, normal conjunctivae   Neck: no thyromegaly   Chest/Lungs:   lungs are clear to auscultation, no rales or wheezing, equal chest wall expansion    Cardiovascular:   Regular. Normal first and second heart sounds with no murmurs, rubs, or gallops; Jugular venous pressure normal, no edema bilaterally    Abdomen:  no organomegaly, masses, bruits, or tenderness; bowel sounds are present   Extremities: no cyanosis or clubbing   Skin: no xanthelasma, warm.    Neurologic: normal  bilateral, no tremors     Psychiatric: alert and oriented x3    Enc Vitals  BP: 118/52  Pulse: 86  Resp: 18  SpO2: 98 %  Weight: 87.5 kg (193 lb)                                         Medical History  Surgical History Family History Social History   Past Medical History:   Diagnosis Date     Acute sinusitis      Atrial fibrillation (H)     Resolved after medication and CPAP     Back pain      CHF  (congestive heart failure) (H)      Coronary artery disease      Diabetes mellitus (H)      Gastroesophageal reflux disease with esophagitis      HTN (hypertension)      Hyperlipemia      Hyperlipidemia      Joint pain      Lipoma of neck      Nocturia      Sciatic leg pain      Shoulder impingement syndrome     BILATERAL     Sleep apnea     uses CPAP     Typical atrial flutter (H) 07/07/2016    Demonstrated on 12-lead EKG July 7, 2016    Past Surgical History:   Procedure Laterality Date     CORONARY ARTERY BYPASS       EP BIV PACEMAKER INSERT N/A 06/11/2019    Procedure: EP Biventricular Pacemaker Insertion;  Surgeon: Durga Rajput MD;  Location: [facility] Lab;  Service: Cardiology     IMPLANT PACEMAKER       IR FINE NEEDLE ASPIRATION W ULTRASOUND  06/23/2022     LAMINECTOMY LUMBAR TWO LEVELS Bilateral 06/16/2022    Procedure: DECOMPRESSIVE LUMBAR LAMINECTOMY LUMBAR 4-LUMBAR 5 AND LUMBAR 5-SACRAL 1 BILATERAL-LEFT SIDE FIRST-PLUS BILATERAL FORAMINOTOMIES PLUS REMOVAL OF HERNIATED DISC LUMBAR 5-SACRAL 1 LEFT;  Surgeon: Israel Samuel MD;  Location: [facility] Main OR     ME CARDIOVERSION ELECTIVE ARRHYTHMIA EXTERNAL  06/03/2014    Description: Elective Cardioversion External;  Recorded: 06/03/2014;     [facility] CABG, VEIN, SINGLE  01/01/2011    Description: CABG (CABG);  Proc Date: 09/26/2011;  Comments: LIMA^LAD, SVG^OM, SVG^distal RCA    Family History   Problem Relation Age of Onset     Heart Disease Mother      Snoring Father      Heart Disease Father      Hypertension Father      Alzheimer Disease Father      No Known Problems Daughter      No Known Problems Daughter      No Known Problems Daughter      Chronic Obstructive Pulmonary Disease Sister      Hodgkin's lymphoma Brother      No Known Problems Son      Diabetes Sister      Substance Abuse Sister      Chronic Obstructive Pulmonary Disease Brother      Heart Disease Brother      Diabetes Brother      Substance Abuse Brother     Social History      Socioeconomic History     Marital status:      Spouse name: Not on file     Number of children: Not on file     Years of education: Not on file     Highest education level: Not on file   Occupational History     Not on file   Tobacco Use     Smoking status: Former Smoker     Packs/day: 1.50     Years: 23.00     Pack years: 34.50     Quit date: 1986     Years since quittin.5     Smokeless tobacco: Never Used   Substance and Sexual Activity     Alcohol use: Yes     Alcohol/week: 1.0 standard drink     Comment: one beer per day     Drug use: No     Sexual activity: Never   Other Topics Concern     Not on file   Social History Narrative    .  3 children.  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     Social Determinants of Health     Financial Resource Strain: Not on file   Food Insecurity: Not on file   Transportation Needs: Not on file   Physical Activity: Not on file   Stress: Not on file   Social Connections: Not on file   Intimate Partner Violence: Not on file   Housing Stability: Not on file          Medications  Allergies   Current Outpatient Medications   Medication Sig Dispense Refill     acetaminophen (TYLENOL) 500 MG tablet Take 1,000-1,500 mg by mouth every 6 hours as needed for mild pain       apixaban ANTICOAGULANT (ELIQUIS) 2.5 MG tablet Take 1 tablet (2.5 mg) by mouth 2 times daily       bisacodyl (DULCOLAX) 10 MG suppository Place 10 mg rectally daily as needed for constipation If no BM after 4 days       bumetanide (BUMEX) 1 MG tablet Take 1 tablet (1 mg) by mouth daily       cholecalciferol 50 MCG (2000 UT) CAPS Take 2 capsules by mouth daily       coenzyme Q-10 200 MG CAPS capsule Take 200 mg by mouth daily       diclofenac (VOLTAREN) 1 % topical gel Apply 2 g topically 2 times daily       ferrous sulfate (FEROSUL) 325 (65 Fe) MG tablet Take 1 tablet by mouth 3 times daily (with meals)       gabapentin (NEURONTIN) 300 MG capsule Take 2 capsules (600 mg) by mouth 2  times daily       insulin glargine (LANTUS PEN) 100 UNIT/ML pen Inject 20 Units Subcutaneous At Bedtime 15 mL      insulin lispro (HUMALOG KWIKPEN) 100 UNIT/ML (1 unit dial) KWIKPEN Do Not give Correction Insulin if Pre-Meal BG less than 140.   For Pre-Meal  - 164 give 1 unit.   For Pre-Meal  - 189 give 2 units.   For Pre-Meal  - 214 give 3 units.   For Pre-Meal  - 239 give 4 units.   For Pre-Meal  - 264 give 5 units.   For Pre-Meal  - 289 give 6 units.   For Pre-Meal  - 314 give 7 units.   For Pre-Meal  - 339 give 8 units.   For Pre-Meal  - 364 give 9 units.   For Pre-Meal BG greater than or equal to 365 give 10 units  To be given with prandial insulin, and based on pre-meal blood glucose.   Notify provider if glucose greater than or equal to 350 mg/dL after administration of correction dose. 15 mL 0     levothyroxine (SYNTHROID/LEVOTHROID) 25 MCG tablet Take 25 mcg by mouth daily       lidocaine (XYLOCAINE) 5 % external ointment Apply topically 2 times daily       lisinopril (ZESTRIL) 20 MG tablet Take 0.5 tablets (10 mg) by mouth daily       magnesium hydroxide (MILK OF MAGNESIA) 400 MG/5ML suspension Take 30 mLs by mouth daily as needed for constipation or heartburn If no BM after 3 days       melatonin 3 MG tablet Take 3 mg by mouth At Bedtime       Omega-3 Fatty Acids (FISH OIL) 1200 MG capsule Take 1,200 mg by mouth daily       oxyCODONE (ROXICODONE) 5 MG tablet Take 0.5-1 tablets (2.5-5 mg) by mouth every 4 hours as needed for moderate pain or moderate to severe pain 20 tablet 0     polyethylene glycol (MIRALAX) 17 g packet Take 1 packet by mouth 2 times daily       polyethylene glycol (MIRALAX) 17 g packet Take 1 packet by mouth daily as needed for constipation Until having regular BMs       rosuvastatin (CRESTOR) 10 MG tablet Take 10 mg by mouth At Bedtime       senna-docusate (SENOKOT-S/PERICOLACE) 8.6-50 MG tablet Take 2 tablets by mouth 2 times daily        sodium phosphate (FLEET ENEMA) 7-19 GM/118ML rectal enema Place 1 enema rectally daily as needed for constipation If no BM after 5 days       tamsulosin (FLOMAX) 0.4 MG capsule Take 1 capsule (0.4 mg) by mouth daily For post-op urinary retention       vitamin C (ASCORBIC ACID) 500 MG tablet Take 500 mg by mouth 3 times daily (with meals) Take with iron supplement       zinc gluconate 50 MG tablet Take 1 tablet (50 mg) by mouth daily      Allergies   Allergen Reactions     Aspirin Other (See Comments)     Contraindicated due to xarelto use     Bee Pollen Unknown     Scratchy throat     Codeine Nausea and Vomiting     Pollen Extracts [Pollen Extract] Unknown     Scratchy throat     Vicodin [Hydrocodone-Acetaminophen] Nausea and Vomiting         Lab Results    Chemistry/lipid CBC Cardiac Enzymes/BNP/TSH/INR   Lab Results   Component Value Date    CHOL 118 06/08/2022    HDL 36 (L) 06/08/2022    TRIG 202 (H) 06/08/2022    BUN 32 (H) 07/19/2022     07/19/2022    CO2 24 07/19/2022    Lab Results   Component Value Date    WBC 7.7 07/19/2022    HGB 8.5 (L) 07/19/2022    HCT 27.6 (L) 07/19/2022    MCV 91 07/19/2022     07/19/2022    Lab Results   Component Value Date    TROPONINI 0.04 07/14/2022     (H) 07/14/2022    TSH 2.05 06/08/2022    INR 2.22 (H) 07/14/2022             This note has been dictated using voice recognition software. Any grammatical, typographical, or context distortions are unintentional and inherent to the software    30 minutes spent on the date of encounter doing chart review, review of outside records, review of test results, interpretation with above tests, patient visit and documentation.                Thank you for allowing me to participate in the care of your patient.      Sincerely,     Felicia Wild, VALERIE [facility]  cc:   Glenn Hoskins MD  [address] [place] [address]

## 2022-07-28 NOTE — PROGRESS NOTES
Assessment/Recommendations   Assessment:    1.  Heart failure with reduced ejection fraction, ischemic cardiomyopathy, LVEF 45 to 50%, NYHA class II: Compensated.  He has fatigue and mild dyspnea on exertion.  His weight has been stable since discharge.  2.  Hypertension: Controlled.  Blood pressure 118/52  3.  Persistent atrial fibrillation: Rate controlled.  He is on Eliquis for anticoagulation  4.  CORAZON: Wears his CPAP nightly    Plan:  1.  Continue current medications  2.  Continue wearing CPAP  3.  Continue monitoring weights and low-salt diet    Thomas Steve will follow up with Dr. Brumfield in 6 to 8 weeks.     History of Present Illness/Subjective    Mr. Thomas Steve is a 77 year old male seen at [facility] heart failure clinic today for continued follow-up.  He follows up for heart failure with reduced ejection fraction, ischemic cardiomyopathy.  He was hospitalized in June and had a lumbar laminectomy on June 16, 2022.  He was discharged to TCU and developed a hematoma on his right buttock.  He was sent to the hospital July 13.  He was found to have a hemoglobin of 6.1.  He did receive transfusion and hemoglobin was 8.5 on discharge.  His Xarelto was changed to Eliquis.  He had an echocardiogram while hospitalized in June which showed ejection fraction of 45 to 50% with moderate to severe tricuspid regurgitation.  He has a past medical history significant for hypertension, CAD with CABG in 2011, persistent atrial fibrillation, hyperlipidemia, CRT-P, CORAZON on CPAP.    Today, he states he is feeling well.  He has fatigue and mild dyspnea on exertion.  He denies lightheadedness, orthopnea, PND, palpitations, chest pain, abdominal fullness/bloating and lower extremity edema.      He is currently at TCU receiving rehab.  His weight is stable.    ECHOCARDIOGRAM: 6/20/2022  Interpretation Summary     The left ventricle is normal in size.  Left ventricular function is decreased. The ejection fraction  is 45-50%  (mildly reduced).  There is a pacemaker lead in the right ventricle.  The left atrium is mildly dilated.  There is mild (1+) mitral regurgitation.  There is moderately severe (3+) tricuspid regurgitation.  Right ventricular systolic pressure is elevated, consistent with mild  pulmonary hypertension.  Mild valvular aortic stenosis.     Physical Examination Review of Systems   /52 (BP Location: Left arm, Patient Position: Sitting, Cuff Size: Adult Regular)   Pulse 86   Resp 18   Wt 87.5 kg (193 lb)   SpO2 98%   BMI 27.69 kg/m    Body mass index is 27.69 kg/m .  Wt Readings from Last 3 Encounters:   07/28/22 87.5 kg (193 lb)   07/19/22 86.5 kg (190 lb 11.2 oz)   07/13/22 92.1 kg (203 lb)       General Appearance:   no acute distress   ENT/Mouth: Wearing mask   EYES:  no scleral icterus, normal conjunctivae   Neck: no thyromegaly   Chest/Lungs:   lungs are clear to auscultation, no rales or wheezing, equal chest wall expansion    Cardiovascular:   Regular. Normal first and second heart sounds with no murmurs, rubs, or gallops; Jugular venous pressure normal, no edema bilaterally    Abdomen:  no organomegaly, masses, bruits, or tenderness; bowel sounds are present   Extremities: no cyanosis or clubbing   Skin: no xanthelasma, warm.    Neurologic: normal  bilateral, no tremors     Psychiatric: alert and oriented x3    Enc Vitals  BP: 118/52  Pulse: 86  Resp: 18  SpO2: 98 %  Weight: 87.5 kg (193 lb)                                         Medical History  Surgical History Family History Social History   Past Medical History:   Diagnosis Date     Acute sinusitis      Atrial fibrillation (H)     Resolved after medication and CPAP     Back pain      CHF (congestive heart failure) (H)      Coronary artery disease      Diabetes mellitus (H)      Gastroesophageal reflux disease with esophagitis      HTN (hypertension)      Hyperlipemia      Hyperlipidemia      Joint pain      Lipoma of neck       Nocturia      Sciatic leg pain      Shoulder impingement syndrome     BILATERAL     Sleep apnea     uses CPAP     Typical atrial flutter (H) 07/07/2016    Demonstrated on 12-lead EKG July 7, 2016    Past Surgical History:   Procedure Laterality Date     CORONARY ARTERY BYPASS       EP BIV PACEMAKER INSERT N/A 06/11/2019    Procedure: EP Biventricular Pacemaker Insertion;  Surgeon: Durga Rajput MD;  Location: [facility] Cath Lab;  Service: Cardiology     IMPLANT PACEMAKER       IR FINE NEEDLE ASPIRATION W ULTRASOUND  06/23/2022     LAMINECTOMY LUMBAR TWO LEVELS Bilateral 06/16/2022    Procedure: DECOMPRESSIVE LUMBAR LAMINECTOMY LUMBAR 4-LUMBAR 5 AND LUMBAR 5-SACRAL 1 BILATERAL-LEFT SIDE FIRST-PLUS BILATERAL FORAMINOTOMIES PLUS REMOVAL OF HERNIATED DISC LUMBAR 5-SACRAL 1 LEFT;  Surgeon: Israel Samuel MD;  Location: [facility] Main OR     KS CARDIOVERSION ELECTIVE ARRHYTHMIA EXTERNAL  06/03/2014    Description: Elective Cardioversion External;  Recorded: 06/03/2014;     ZZC CABG, VEIN, SINGLE  01/01/2011    Description: CABG (CABG);  Proc Date: 09/26/2011;  Comments: LIMA^LAD, SVG^OM, SVG^distal RCA    Family History   Problem Relation Age of Onset     Heart Disease Mother      Snoring Father      Heart Disease Father      Hypertension Father      Alzheimer Disease Father      No Known Problems Daughter      No Known Problems Daughter      No Known Problems Daughter      Chronic Obstructive Pulmonary Disease Sister      Hodgkin's lymphoma Brother      No Known Problems Son      Diabetes Sister      Substance Abuse Sister      Chronic Obstructive Pulmonary Disease Brother      Heart Disease Brother      Diabetes Brother      Substance Abuse Brother     Social History     Socioeconomic History     Marital status:      Spouse name: Not on file     Number of children: Not on file     Years of education: Not on file     Highest education level: Not on file   Occupational History     Not on file   Tobacco Use      Smoking status: Former Smoker     Packs/day: 1.50     Years: 23.00     Pack years: 34.50     Quit date: 1986     Years since quittin.5     Smokeless tobacco: Never Used   Substance and Sexual Activity     Alcohol use: Yes     Alcohol/week: 1.0 standard drink     Comment: one beer per day     Drug use: No     Sexual activity: Never   Other Topics Concern     Not on file   Social History Narrative    .  3 children.  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     Social Determinants of Health     Financial Resource Strain: Not on file   Food Insecurity: Not on file   Transportation Needs: Not on file   Physical Activity: Not on file   Stress: Not on file   Social Connections: Not on file   Intimate Partner Violence: Not on file   Housing Stability: Not on file          Medications  Allergies   Current Outpatient Medications   Medication Sig Dispense Refill     acetaminophen (TYLENOL) 500 MG tablet Take 1,000-1,500 mg by mouth every 6 hours as needed for mild pain       apixaban ANTICOAGULANT (ELIQUIS) 2.5 MG tablet Take 1 tablet (2.5 mg) by mouth 2 times daily       bisacodyl (DULCOLAX) 10 MG suppository Place 10 mg rectally daily as needed for constipation If no BM after 4 days       bumetanide (BUMEX) 1 MG tablet Take 1 tablet (1 mg) by mouth daily       cholecalciferol 50 MCG (2000 UT) CAPS Take 2 capsules by mouth daily       coenzyme Q-10 200 MG CAPS capsule Take 200 mg by mouth daily       diclofenac (VOLTAREN) 1 % topical gel Apply 2 g topically 2 times daily       ferrous sulfate (FEROSUL) 325 (65 Fe) MG tablet Take 1 tablet by mouth 3 times daily (with meals)       gabapentin (NEURONTIN) 300 MG capsule Take 2 capsules (600 mg) by mouth 2 times daily       insulin glargine (LANTUS PEN) 100 UNIT/ML pen Inject 20 Units Subcutaneous At Bedtime 15 mL      insulin lispro (HUMALOG KWIKPEN) 100 UNIT/ML (1 unit dial) KWIKPEN Do Not give Correction Insulin if Pre-Meal BG less than 140.   For  Pre-Meal  - 164 give 1 unit.   For Pre-Meal  - 189 give 2 units.   For Pre-Meal  - 214 give 3 units.   For Pre-Meal  - 239 give 4 units.   For Pre-Meal  - 264 give 5 units.   For Pre-Meal  - 289 give 6 units.   For Pre-Meal  - 314 give 7 units.   For Pre-Meal  - 339 give 8 units.   For Pre-Meal  - 364 give 9 units.   For Pre-Meal BG greater than or equal to 365 give 10 units  To be given with prandial insulin, and based on pre-meal blood glucose.   Notify provider if glucose greater than or equal to 350 mg/dL after administration of correction dose. 15 mL 0     levothyroxine (SYNTHROID/LEVOTHROID) 25 MCG tablet Take 25 mcg by mouth daily       lidocaine (XYLOCAINE) 5 % external ointment Apply topically 2 times daily       lisinopril (ZESTRIL) 20 MG tablet Take 0.5 tablets (10 mg) by mouth daily       magnesium hydroxide (MILK OF MAGNESIA) 400 MG/5ML suspension Take 30 mLs by mouth daily as needed for constipation or heartburn If no BM after 3 days       melatonin 3 MG tablet Take 3 mg by mouth At Bedtime       Omega-3 Fatty Acids (FISH OIL) 1200 MG capsule Take 1,200 mg by mouth daily       oxyCODONE (ROXICODONE) 5 MG tablet Take 0.5-1 tablets (2.5-5 mg) by mouth every 4 hours as needed for moderate pain or moderate to severe pain 20 tablet 0     polyethylene glycol (MIRALAX) 17 g packet Take 1 packet by mouth 2 times daily       polyethylene glycol (MIRALAX) 17 g packet Take 1 packet by mouth daily as needed for constipation Until having regular BMs       rosuvastatin (CRESTOR) 10 MG tablet Take 10 mg by mouth At Bedtime       senna-docusate (SENOKOT-S/PERICOLACE) 8.6-50 MG tablet Take 2 tablets by mouth 2 times daily       sodium phosphate (FLEET ENEMA) 7-19 GM/118ML rectal enema Place 1 enema rectally daily as needed for constipation If no BM after 5 days       tamsulosin (FLOMAX) 0.4 MG capsule Take 1 capsule (0.4 mg) by mouth daily For post-op urinary  retention       vitamin C (ASCORBIC ACID) 500 MG tablet Take 500 mg by mouth 3 times daily (with meals) Take with iron supplement       zinc gluconate 50 MG tablet Take 1 tablet (50 mg) by mouth daily      Allergies   Allergen Reactions     Aspirin Other (See Comments)     Contraindicated due to xarelto use     Bee Pollen Unknown     Scratchy throat     Codeine Nausea and Vomiting     Pollen Extracts [Pollen Extract] Unknown     Scratchy throat     Vicodin [Hydrocodone-Acetaminophen] Nausea and Vomiting         Lab Results    Chemistry/lipid CBC Cardiac Enzymes/BNP/TSH/INR   Lab Results   Component Value Date    CHOL 118 06/08/2022    HDL 36 (L) 06/08/2022    TRIG 202 (H) 06/08/2022    BUN 32 (H) 07/19/2022     07/19/2022    CO2 24 07/19/2022    Lab Results   Component Value Date    WBC 7.7 07/19/2022    HGB 8.5 (L) 07/19/2022    HCT 27.6 (L) 07/19/2022    MCV 91 07/19/2022     07/19/2022    Lab Results   Component Value Date    TROPONINI 0.04 07/14/2022     (H) 07/14/2022    TSH 2.05 06/08/2022    INR 2.22 (H) 07/14/2022             This note has been dictated using voice recognition software. Any grammatical, typographical, or context distortions are unintentional and inherent to the software    30 minutes spent on the date of encounter doing chart review, review of outside records, review of test results, interpretation with above tests, patient visit and documentation.

## 2022-07-28 NOTE — PATIENT INSTRUCTIONS
Thomas Steve,    It was a pleasure to see you today at [facility].     My recommendations after this visit include:  - Please follow up with Dr Brumfield in 6-8 weeks   - Please follow up with Felicia Wild as needed  - Continue current medications    Felicia Wild, CNP

## 2022-08-01 ENCOUNTER — TELEPHONE (OUTPATIENT)
Dept: CARDIOLOGY | Facility: CLINIC | Age: 77
End: 2022-08-01

## 2022-08-01 NOTE — TELEPHONE ENCOUNTER
"Return call to patient who stated he will be discharged from TCU Friday 8-5-22 and questioned if he should resume Xarelto or remain on Eliquis which he is being given now - patient explained that he was recently hospitalized with low Hgb s/p back surgery and medications were changed during his stay.    Confirmed per 7-13-22 Dschg AVS \"start on Eliquis on reduced dose (2.5 mg po bid) due to hematoma. Defer to neurosurgeon to increase to 5 mg po bid in the future\" and that patient was recently seen by HF Np 7-28-22 who recommended follow-up with Dr. Brumfield in 6-8wks.    Informed patient that he should remain on Eliquis at present dose, unless changed prior to discharge from TCU - instructed patient to also consult surgeon r.e. medication questions and reassured him that question would also be forwarded to Dr. Brumfield upon his return next week - patient verbalized understanding and agreed to sched follow-up - call transf to sched.  mg  "

## 2022-08-01 NOTE — TELEPHONE ENCOUNTER
[facility] Call Center    Phone Message    May a detailed message be left on voicemail: yes     Reason for Call: Medication Question or concern regarding medication   Prescription Clarification  Name of Medication: XARELTO ANTICOAGULANT 20 MG TABS tablet   apixaban ANTICOAGULANT (ELIQUIS) 2.5 MG tablet  Prescribing Provider: Dr Brumfield & Dr Kapoor   Pharmacy:     [address]     What on the order needs clarification? The patient called because he was switched from Xarelto to Eliquis after his surgery on 6/16th and he wants to know which Blood thinner Dr Brumfield wants him to continue to take?    PT has plenty Xarelto at home and if he needs to stay on Eliquis please send to local pharmacy     PT is currently in Rehab at a Nursing facility in [facility]          Action Taken: Other: Cardiology    Travel Screening: Not Applicable

## 2022-08-01 NOTE — TELEPHONE ENCOUNTER
Please review patient update and address medication questions r.e. returning to Xarelto or continuing with Eliquis - any new orders prior to 9-21-22 follow-up?.  mg

## 2022-08-09 NOTE — TELEPHONE ENCOUNTER
Rec'd return call from patient stating Lisinopril is not on discharge med list from TCU but patient was unable to provide remainder of med list because he was no longer at home - reassured patient that Lisinopril would be removed from med list and that further update would occur at follow-up appts - understanding verbalized.  mg

## 2022-08-09 NOTE — TELEPHONE ENCOUNTER
Msg rec'd 8-9-22 @ 1313:  Steven Brumfield MD Gorshe, Maureen, RN  Should stay on reduced dose of Eliquis for now

## 2022-08-09 NOTE — TELEPHONE ENCOUNTER
Return call to patient - informed him of Dr. Brumfield's response/recommendations - patient verbalized understanding and concerns that his Lisinopril was also discontinued and questioned if Dr. Brumfield okay with that.    Confirmed patient was recently discharged from TCU after recent hospitalization and explained to patient that med changes can occur during hospitalization/TCU stay in response to his condition/health status during stay - patient agreed to call back with updated med list from TCU and reported that he has follow-up with PCP tomorrow - reassured patient that PCP can determine need to resume Lisinopril at visit and/or follow-up with cardiology sooner than 9-21-22 - patient verbalized understanding.  mg

## 2022-08-10 ENCOUNTER — LAB REQUISITION (OUTPATIENT)
Dept: LAB | Facility: CLINIC | Age: 77
End: 2022-08-10
Payer: MEDICARE

## 2022-08-10 DIAGNOSIS — D62 ACUTE POSTHEMORRHAGIC ANEMIA: ICD-10-CM

## 2022-08-10 DIAGNOSIS — E11.22 TYPE 2 DIABETES MELLITUS WITH DIABETIC CHRONIC KIDNEY DISEASE (H): ICD-10-CM

## 2022-08-10 LAB
ALBUMIN SERPL BCG-MCNC: 4.4 G/DL (ref 3.5–5.2)
ALP SERPL-CCNC: 138 U/L (ref 40–129)
ALT SERPL W P-5'-P-CCNC: 20 U/L (ref 10–50)
ANION GAP SERPL CALCULATED.3IONS-SCNC: 12 MMOL/L (ref 7–15)
AST SERPL W P-5'-P-CCNC: 24 U/L (ref 10–50)
BILIRUB SERPL-MCNC: 0.3 MG/DL
BUN SERPL-MCNC: 22.8 MG/DL (ref 8–23)
CALCIUM SERPL-MCNC: 9 MG/DL (ref 8.8–10.2)
CHLORIDE SERPL-SCNC: 99 MMOL/L (ref 98–107)
CREAT SERPL-MCNC: 1.07 MG/DL (ref 0.67–1.17)
DEPRECATED HCO3 PLAS-SCNC: 29 MMOL/L (ref 22–29)
ERYTHROCYTE [DISTWIDTH] IN BLOOD BY AUTOMATED COUNT: 16.4 % (ref 10–15)
GFR SERPL CREATININE-BSD FRML MDRD: 72 ML/MIN/1.73M2
GLUCOSE SERPL-MCNC: 231 MG/DL (ref 70–99)
HCT VFR BLD AUTO: 34.8 % (ref 40–53)
HGB BLD-MCNC: 10.8 G/DL (ref 13.3–17.7)
MCH RBC QN AUTO: 28.6 PG (ref 26.5–33)
MCHC RBC AUTO-ENTMCNC: 31 G/DL (ref 31.5–36.5)
MCV RBC AUTO: 92 FL (ref 78–100)
PLATELET # BLD AUTO: 206 10E3/UL (ref 150–450)
POTASSIUM SERPL-SCNC: 4.6 MMOL/L (ref 3.4–5.3)
PROT SERPL-MCNC: 6.6 G/DL (ref 6.4–8.3)
RBC # BLD AUTO: 3.78 10E6/UL (ref 4.4–5.9)
SODIUM SERPL-SCNC: 140 MMOL/L (ref 136–145)
WBC # BLD AUTO: 7.7 10E3/UL (ref 4–11)

## 2022-08-10 PROCEDURE — 85027 COMPLETE CBC AUTOMATED: CPT | Mod: ORL | Performed by: FAMILY MEDICINE

## 2022-08-10 PROCEDURE — 80053 COMPREHEN METABOLIC PANEL: CPT | Mod: ORL | Performed by: FAMILY MEDICINE

## 2022-08-12 ENCOUNTER — TELEPHONE (OUTPATIENT)
Dept: CARDIOLOGY | Facility: CLINIC | Age: 77
End: 2022-08-12

## 2022-08-12 DIAGNOSIS — I48.21 PERMANENT ATRIAL FIBRILLATION (H): ICD-10-CM

## 2022-08-12 DIAGNOSIS — I82.411 ACUTE DEEP VEIN THROMBOSIS (DVT) OF FEMORAL VEIN OF RIGHT LOWER EXTREMITY (H): ICD-10-CM

## 2022-08-12 NOTE — TELEPHONE ENCOUNTER
[facility] Call Center    Phone Message    May a detailed message be left on voicemail: yes     Reason for Call: Medication Refill Request    Has the patient contacted the pharmacy for the refill? Yes   Name of medication being requested: apixaban ANTICOAGULANT (ELIQUIS) 2.5 MG tablet  Provider who prescribed the medication: Sun  Pharmacy: [address]  Date medication is needed: 8/13/22      Action Taken: Message routed to:  Other: Cardiology    Travel Screening: Not Applicable

## 2022-08-12 NOTE — TELEPHONE ENCOUNTER
Enma Ruby, RN     MG    1:32 PM  Note  Msg rec'd 8-9-22 @ 1313:  Steven Brumfield MD Gorshe, Maureen, RN  Should stay on reduced dose of Eliquis for now                    Noted that [facility]tz touched on eliquis so refill granted. -[facility]

## 2022-08-17 ENCOUNTER — OFFICE VISIT (OUTPATIENT)
Dept: NEUROSURGERY | Facility: CLINIC | Age: 77
End: 2022-08-17
Payer: MEDICARE

## 2022-08-17 VITALS
WEIGHT: 193 LBS | HEIGHT: 70 IN | OXYGEN SATURATION: 99 % | BODY MASS INDEX: 27.63 KG/M2 | DIASTOLIC BLOOD PRESSURE: 70 MMHG | SYSTOLIC BLOOD PRESSURE: 131 MMHG | HEART RATE: 102 BPM

## 2022-08-17 DIAGNOSIS — M48.062 LUMBAR STENOSIS WITH NEUROGENIC CLAUDICATION: Primary | ICD-10-CM

## 2022-08-17 PROCEDURE — 99024 POSTOP FOLLOW-UP VISIT: CPT | Performed by: NURSE PRACTITIONER

## 2022-08-17 NOTE — PATIENT INSTRUCTIONS
General Telephone visit in six weeks to see how you are doing.   Can increase activity as tolerated, add back 5-10 pounds a week to your lifting restriction to max 25 pounds.   Continue PT.   You can take tylenol, ice/heat/lidocaine cream as needed for your low back pain.     If doing well in six weeks, maybe no MRI.

## 2022-08-17 NOTE — PROGRESS NOTES
Neurosurgery progress note  08/17/22    A/P  Thomas is a 76yo M S/P DECOMPRESSIVE LUMBAR LAMINECTOMY LUMBAR 4-LUMBAR 5 AND LUMBAR 5-SACRAL 1 BILATERAL-LEFT SIDE FIRST-PLUS BILATERAL FORAMINOTOMIES PLUS REMOVAL OF HERNIATED DISC LUMBAR 5-SACRAL 1 LEFT with Dr Samuel on 6/19/22. Arachnoid bleb intraop, covered with durgen, duraseal. No CSF leak. Was kept flat for 24hr. IR aspiration done 6/23 for fluid collection and repeated 7/1 dt enlarging collection. Acute RLE DVT found 6/29, heparin gtt started. BETA2 7/1- positive. 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  Hospitalized after last visit 7/13 due to large soft tissue hematoma right leg, failed warfarin therapy. 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     Today thomas is doing great. Was discharged from TCU, ambulating well, weakness in lower extremities from pre-op has resolved, some residual right hip and calf pain from previous hematoma still present but improved. Having some bilateral lower back/glute pain for which he takes tylenol - not severe. In home PT.     Incision well healed.  Dr. Samuel saw patient at the time of visit and discussed plan:    1. Hold off on repeat MRI as you are doing so well. Consider at 6 months if you have persistent bilateral low back/upper glute pain. Sooner of course if worsening  2. Increase activity as tolerated, add back 5-10 pounds a week to lifting restriction. Max 25 pounds.   3. Continue PT  4. Telephone visit 6 weeks to check in.   5. Can ramp gabapentin back up       Subjective: Home, doing well. Moving around much easier. Could hardly walk in the hospital and now only some low back pain after 10+ minutes of standing or walking. He notes some left pinky numbness but he wonders if it is because his gabapentin has been decreased - they are working on titrating it back up.   Denies urinary  "or bowel issues. Takes tylenol for pain which takes the edge off. Overall encouraged.     PMH: No interval change  PSH: No interval change    ROS:  A full 14 point review of systems was otherwise completed and is negative aside from that mentioned above in the HPI    O:  /70   Pulse 102   Ht 1.778 m (5' 10\")   Wt 87.5 kg (193 lb)   SpO2 99%   BMI 27.69 kg/m    General: Awake, alert, NAD  HEENT: AT/NC, EOMI, face symmetric, oral mucosa moist and pink  Heart: RRR: Nonlabored respirations without accessory muscle use.  Abd: S, NT, ND  Neuro: Awake, alert, speech is clear and content is appropriate. MAEW x 4 with full strength in b/l LE. Sensation is intact totemperature and LT.   Coordination: gait intact  Musculoskeletal: Negative straight leg raise bl  Psych: Appropriatemood and affect, pleasant, cooperative, alert and oriented x 3  Skin: Incision C/D/I - healing well   Labs: personally reviewed   Lab Results   Component Value Date     08/10/2022     07/19/2022    CO2 29 08/10/2022    CO2 24 07/19/2022    CO2 26 07/18/2022    BUN 22.8 08/10/2022    BUN 32 07/19/2022    BUN 44 07/18/2022     Recent Labs   Lab Test 08/10/22  1247 07/19/22  0456 07/18/22  0437 07/17/22  0431 07/15/22  0903 07/14/22  1154 07/14/22  0110 07/05/22  0520 07/02/22  0210 06/29/22  1424 06/29/22  0529 06/25/22  0434   WBC 7.7 7.7 8.4 7.9 8.3 8.6 10.1 6.6 10.0 11.2* 11.2* 7.6   HGB 10.8* 8.5* 8.2* 8.8* 8.9* 7.1* 7.4* 9.1* 9.1* 9.9* 9.0* 8.9*   HCT 34.8* 27.6* 26.1* 27.4* 28.3* 22.6* 23.3* 29.9* 29.5* 31.8* 29.3* 29.6*   MCV 92 91 91 90 90 90 90 92 91 92 93 95    303 314 309 256 243 230 204 253 312 324 266     Recent Labs   Lab Test 07/14/22  1154 07/14/22  0110 07/06/22  0530 07/01/22  0524 06/23/22  0506 06/15/22  1527   INR 2.22* 2.27* 1.14 1.14 1.35* 1.04   PTT  --   --   --   --   --  30       I personally visualized all imaging. None new.     Melisa Lara CNP  [facility] Neurosurgery  O: " [phone]  P: [phone]

## 2022-08-17 NOTE — LETTER
8/17/2022         RE: Thomas Steve  [address] [facility] [address]        Dear Colleague,    Thank you for referring your patient, Thomas Steve, to the [facility]. Please see a copy of my visit note below.      Neurosurgery progress note  08/17/22    A/P  Thomas is a 76yo M S/P DECOMPRESSIVE LUMBAR LAMINECTOMY LUMBAR 4-LUMBAR 5 AND LUMBAR 5-SACRAL 1 BILATERAL-LEFT SIDE FIRST-PLUS BILATERAL FORAMINOTOMIES PLUS REMOVAL OF HERNIATED DISC LUMBAR 5-SACRAL 1 LEFT with Dr Samuel on 6/19/22. Arachnoid bleb intraop, covered with durgen, duraseal. No CSF leak. Was kept flat for 24hr. IR aspiration done 6/23 for fluid collection and repeated 7/1 dt enlarging collection. Acute RLE DVT found 6/29, heparin gtt started. BETA2 7/1- positive. 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  Hospitalized after last visit 7/13 due to large soft tissue hematoma right leg, failed warfarin therapy. 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     Today thomas is doing great. Was discharged from TCU, ambulating well, weakness in lower extremities from pre-op has resolved, some residual right hip and calf pain from previous hematoma still present but improved. Having some bilateral lower back/glute pain for which he takes tylenol - not severe. In home PT.     Incision well healed.  Dr. Samuel saw patient at the time of visit and discussed plan:    1. Hold off on repeat MRI as you are doing so well. Consider at 6 months if you have persistent bilateral low back/upper glute pain. Sooner of course if worsening  2. Increase activity as tolerated, add back 5-10 pounds a week to lifting restriction. Max 25 pounds.   3. Continue PT  4. Telephone visit 6 weeks to check in.   5. Can ramp gabapentin back up       Subjective: Home, doing well. Moving around much easier. Could  "hardly walk in the hospital and now only some low back pain after 10+ minutes of standing or walking. He notes some left pinky numbness but he wonders if it is because his gabapentin has been decreased - they are working on titrating it back up.   Denies urinary or bowel issues. Takes tylenol for pain which takes the edge off. Overall encouraged.     PMH: No interval change  PSH: No interval change    ROS:  A full 14 point review of systems was otherwise completed and is negative aside from that mentioned above in the HPI    O:  /70   Pulse 102   Ht 1.778 m (5' 10\")   Wt 87.5 kg (193 lb)   SpO2 99%   BMI 27.69 kg/m    General: Awake, alert, NAD  HEENT: AT/NC, EOMI, face symmetric, oral mucosa moist and pink  Heart: RRR: Nonlabored respirations without accessory muscle use.  Abd: S, NT, ND  Neuro: Awake, alert, speech is clear and content is appropriate. MAEW x 4 with full strength in b/l LE. Sensation is intact totemperature and LT.   Coordination: gait intact  Musculoskeletal: Negative straight leg raise bl  Psych: Appropriatemood and affect, pleasant, cooperative, alert and oriented x 3  Skin: Incision C/D/I - healing well   Labs: personally reviewed   Lab Results   Component Value Date     08/10/2022     07/19/2022    CO2 29 08/10/2022    CO2 24 07/19/2022    CO2 26 07/18/2022    BUN 22.8 08/10/2022    BUN 32 07/19/2022    BUN 44 07/18/2022     Recent Labs   Lab Test 08/10/22  1247 07/19/22  0456 07/18/22  0437 07/17/22  0431 07/15/22  0903 07/14/22  1154 07/14/22  0110 07/05/22  0520 07/02/22  0210 06/29/22  1424 06/29/22  0529 06/25/22  0434   WBC 7.7 7.7 8.4 7.9 8.3 8.6 10.1 6.6 10.0 11.2* 11.2* 7.6   HGB 10.8* 8.5* 8.2* 8.8* 8.9* 7.1* 7.4* 9.1* 9.1* 9.9* 9.0* 8.9*   HCT 34.8* 27.6* 26.1* 27.4* 28.3* 22.6* 23.3* 29.9* 29.5* 31.8* 29.3* 29.6*   MCV 92 91 91 90 90 90 90 92 91 92 93 95    303 314 309 256 243 230 204 253 312 324 266     Recent Labs   Lab Test 07/14/22  1154 " 07/14/22  0110 07/06/22  0530 07/01/22  0524 06/23/22  0506 06/15/22  1527   INR 2.22* 2.27* 1.14 1.14 1.35* 1.04   PTT  --   --   --   --   --  30       I personally visualized all imaging. None new.     Melisa Lara CNP  [facility]  O: [phone]  P: [phone]            Again, thank you for allowing me to participate in the care of your patient.        Sincerely,        Melisa Lara NP

## 2022-08-19 ENCOUNTER — ANCILLARY PROCEDURE (OUTPATIENT)
Dept: CARDIOLOGY | Facility: CLINIC | Age: 77
End: 2022-08-19
Payer: MEDICARE

## 2022-08-19 DIAGNOSIS — Z95.0 PACEMAKER: Primary | ICD-10-CM

## 2022-08-19 DIAGNOSIS — I50.9 CONGESTIVE HEART FAILURE (CHF) (H): ICD-10-CM

## 2022-08-19 DIAGNOSIS — I49.5 SICK SINUS SYNDROME (H): ICD-10-CM

## 2022-08-19 PROCEDURE — 93294 REM INTERROG EVL PM/LDLS PM: CPT | Performed by: INTERNAL MEDICINE

## 2022-08-19 PROCEDURE — 93296 REM INTERROG EVL PM/IDS: CPT | Performed by: INTERNAL MEDICINE

## 2022-08-21 LAB
MDC_IDC_EPISODE_DTM: NORMAL
MDC_IDC_EPISODE_DTM: NORMAL
MDC_IDC_EPISODE_DURATION: 16 S
MDC_IDC_EPISODE_DURATION: 5 S
MDC_IDC_EPISODE_ID: 1296
MDC_IDC_EPISODE_ID: 1297
MDC_IDC_EPISODE_TYPE: NORMAL
MDC_IDC_EPISODE_TYPE: NORMAL
MDC_IDC_LEAD_IMPLANT_DT: NORMAL
MDC_IDC_LEAD_LOCATION: NORMAL
MDC_IDC_LEAD_LOCATION_DETAIL_1: NORMAL
MDC_IDC_LEAD_MFG: NORMAL
MDC_IDC_LEAD_MODEL: NORMAL
MDC_IDC_LEAD_POLARITY_TYPE: NORMAL
MDC_IDC_LEAD_SERIAL: NORMAL
MDC_IDC_MSMT_BATTERY_DTM: NORMAL
MDC_IDC_MSMT_BATTERY_REMAINING_LONGEVITY: 104 MO
MDC_IDC_MSMT_BATTERY_RRT_TRIGGER: 2.6
MDC_IDC_MSMT_BATTERY_STATUS: NORMAL
MDC_IDC_MSMT_BATTERY_VOLTAGE: 2.99 V
MDC_IDC_MSMT_LEADCHNL_LV_IMPEDANCE_VALUE: 380 OHM
MDC_IDC_MSMT_LEADCHNL_LV_IMPEDANCE_VALUE: 399 OHM
MDC_IDC_MSMT_LEADCHNL_LV_IMPEDANCE_VALUE: 418 OHM
MDC_IDC_MSMT_LEADCHNL_LV_IMPEDANCE_VALUE: 456 OHM
MDC_IDC_MSMT_LEADCHNL_LV_IMPEDANCE_VALUE: 494 OHM
MDC_IDC_MSMT_LEADCHNL_LV_IMPEDANCE_VALUE: 684 OHM
MDC_IDC_MSMT_LEADCHNL_LV_IMPEDANCE_VALUE: 703 OHM
MDC_IDC_MSMT_LEADCHNL_LV_IMPEDANCE_VALUE: 760 OHM
MDC_IDC_MSMT_LEADCHNL_LV_IMPEDANCE_VALUE: 760 OHM
MDC_IDC_MSMT_LEADCHNL_LV_IMPEDANCE_VALUE: 798 OHM
MDC_IDC_MSMT_LEADCHNL_LV_PACING_THRESHOLD_AMPLITUDE: 0.88 V
MDC_IDC_MSMT_LEADCHNL_LV_PACING_THRESHOLD_PULSEWIDTH: 0.4 MS
MDC_IDC_MSMT_LEADCHNL_RA_IMPEDANCE_VALUE: 304 OHM
MDC_IDC_MSMT_LEADCHNL_RA_IMPEDANCE_VALUE: 323 OHM
MDC_IDC_MSMT_LEADCHNL_RA_PACING_THRESHOLD_AMPLITUDE: 0.5 V
MDC_IDC_MSMT_LEADCHNL_RA_PACING_THRESHOLD_PULSEWIDTH: 0.4 MS
MDC_IDC_MSMT_LEADCHNL_RA_SENSING_INTR_AMPL: 0.25 MV
MDC_IDC_MSMT_LEADCHNL_RA_SENSING_INTR_AMPL: 0.38 MV
MDC_IDC_MSMT_LEADCHNL_RV_IMPEDANCE_VALUE: 342 OHM
MDC_IDC_MSMT_LEADCHNL_RV_IMPEDANCE_VALUE: 399 OHM
MDC_IDC_MSMT_LEADCHNL_RV_PACING_THRESHOLD_AMPLITUDE: 0.5 V
MDC_IDC_MSMT_LEADCHNL_RV_PACING_THRESHOLD_PULSEWIDTH: 0.4 MS
MDC_IDC_MSMT_LEADCHNL_RV_SENSING_INTR_AMPL: 7.75 MV
MDC_IDC_MSMT_LEADCHNL_RV_SENSING_INTR_AMPL: 7.75 MV
MDC_IDC_PG_IMPLANT_DTM: NORMAL
MDC_IDC_PG_MFG: NORMAL
MDC_IDC_PG_MODEL: NORMAL
MDC_IDC_PG_SERIAL: NORMAL
MDC_IDC_PG_TYPE: NORMAL
MDC_IDC_SESS_CLINIC_NAME: NORMAL
MDC_IDC_SESS_DTM: NORMAL
MDC_IDC_SESS_TYPE: NORMAL
MDC_IDC_SET_BRADY_LOWRATE: 60 {BEATS}/MIN
MDC_IDC_SET_BRADY_MAX_SENSOR_RATE: 130 {BEATS}/MIN
MDC_IDC_SET_BRADY_MODE: NORMAL
MDC_IDC_SET_CRT_LVRV_DELAY: 0 MS
MDC_IDC_SET_CRT_PACED_CHAMBERS: NORMAL
MDC_IDC_SET_LEADCHNL_LV_PACING_AMPLITUDE: 2 V
MDC_IDC_SET_LEADCHNL_LV_PACING_ANODE_ELECTRODE_1: NORMAL
MDC_IDC_SET_LEADCHNL_LV_PACING_ANODE_LOCATION_1: NORMAL
MDC_IDC_SET_LEADCHNL_LV_PACING_CAPTURE_MODE: NORMAL
MDC_IDC_SET_LEADCHNL_LV_PACING_CATHODE_ELECTRODE_1: NORMAL
MDC_IDC_SET_LEADCHNL_LV_PACING_CATHODE_LOCATION_1: NORMAL
MDC_IDC_SET_LEADCHNL_LV_PACING_POLARITY: NORMAL
MDC_IDC_SET_LEADCHNL_LV_PACING_PULSEWIDTH: 0.4 MS
MDC_IDC_SET_LEADCHNL_RA_SENSING_ANODE_ELECTRODE_1: NORMAL
MDC_IDC_SET_LEADCHNL_RA_SENSING_ANODE_LOCATION_1: NORMAL
MDC_IDC_SET_LEADCHNL_RA_SENSING_CATHODE_ELECTRODE_1: NORMAL
MDC_IDC_SET_LEADCHNL_RA_SENSING_CATHODE_LOCATION_1: NORMAL
MDC_IDC_SET_LEADCHNL_RA_SENSING_POLARITY: NORMAL
MDC_IDC_SET_LEADCHNL_RA_SENSING_SENSITIVITY: NORMAL
MDC_IDC_SET_LEADCHNL_RV_PACING_AMPLITUDE: 1.5 V
MDC_IDC_SET_LEADCHNL_RV_PACING_ANODE_ELECTRODE_1: NORMAL
MDC_IDC_SET_LEADCHNL_RV_PACING_ANODE_LOCATION_1: NORMAL
MDC_IDC_SET_LEADCHNL_RV_PACING_CAPTURE_MODE: NORMAL
MDC_IDC_SET_LEADCHNL_RV_PACING_CATHODE_ELECTRODE_1: NORMAL
MDC_IDC_SET_LEADCHNL_RV_PACING_CATHODE_LOCATION_1: NORMAL
MDC_IDC_SET_LEADCHNL_RV_PACING_POLARITY: NORMAL
MDC_IDC_SET_LEADCHNL_RV_PACING_PULSEWIDTH: 0.4 MS
MDC_IDC_SET_LEADCHNL_RV_SENSING_ANODE_ELECTRODE_1: NORMAL
MDC_IDC_SET_LEADCHNL_RV_SENSING_ANODE_LOCATION_1: NORMAL
MDC_IDC_SET_LEADCHNL_RV_SENSING_CATHODE_ELECTRODE_1: NORMAL
MDC_IDC_SET_LEADCHNL_RV_SENSING_CATHODE_LOCATION_1: NORMAL
MDC_IDC_SET_LEADCHNL_RV_SENSING_POLARITY: NORMAL
MDC_IDC_SET_LEADCHNL_RV_SENSING_SENSITIVITY: 0.9 MV
MDC_IDC_SET_ZONE_DETECTION_INTERVAL: 400 MS
MDC_IDC_SET_ZONE_TYPE: NORMAL
MDC_IDC_STAT_BRADY_AP_VP_PERCENT: 0 %
MDC_IDC_STAT_BRADY_AP_VS_PERCENT: 0 %
MDC_IDC_STAT_BRADY_AS_VP_PERCENT: 99.83 %
MDC_IDC_STAT_BRADY_AS_VS_PERCENT: 0.17 %
MDC_IDC_STAT_BRADY_DTM_END: NORMAL
MDC_IDC_STAT_BRADY_DTM_START: NORMAL
MDC_IDC_STAT_BRADY_RA_PERCENT_PACED: 0 %
MDC_IDC_STAT_BRADY_RV_PERCENT_PACED: 99.83 %
MDC_IDC_STAT_CRT_DTM_END: NORMAL
MDC_IDC_STAT_CRT_DTM_START: NORMAL
MDC_IDC_STAT_CRT_LV_PERCENT_PACED: 99.8 %
MDC_IDC_STAT_CRT_PERCENT_PACED: 99.8 %
MDC_IDC_STAT_EPISODE_RECENT_COUNT: 0
MDC_IDC_STAT_EPISODE_RECENT_COUNT_DTM_END: NORMAL
MDC_IDC_STAT_EPISODE_RECENT_COUNT_DTM_START: NORMAL
MDC_IDC_STAT_EPISODE_TOTAL_COUNT: 0
MDC_IDC_STAT_EPISODE_TOTAL_COUNT: 1
MDC_IDC_STAT_EPISODE_TOTAL_COUNT: 460
MDC_IDC_STAT_EPISODE_TOTAL_COUNT_DTM_END: NORMAL
MDC_IDC_STAT_EPISODE_TOTAL_COUNT_DTM_START: NORMAL
MDC_IDC_STAT_EPISODE_TYPE: NORMAL

## 2022-09-21 ENCOUNTER — OFFICE VISIT (OUTPATIENT)
Dept: CARDIOLOGY | Facility: CLINIC | Age: 77
End: 2022-09-21
Attending: INTERNAL MEDICINE
Payer: MEDICARE

## 2022-09-21 VITALS
BODY MASS INDEX: 28.92 KG/M2 | DIASTOLIC BLOOD PRESSURE: 72 MMHG | SYSTOLIC BLOOD PRESSURE: 138 MMHG | RESPIRATION RATE: 14 BRPM | HEART RATE: 66 BPM | WEIGHT: 202 LBS | HEIGHT: 70 IN

## 2022-09-21 DIAGNOSIS — I25.10 ATHEROSCLEROSIS OF NATIVE CORONARY ARTERY OF NATIVE HEART WITHOUT ANGINA PECTORIS: ICD-10-CM

## 2022-09-21 DIAGNOSIS — I48.21 PERMANENT ATRIAL FIBRILLATION (H): ICD-10-CM

## 2022-09-21 PROCEDURE — 99214 OFFICE O/P EST MOD 30 MIN: CPT | Performed by: INTERNAL MEDICINE

## 2022-09-21 PROCEDURE — 80061 LIPID PANEL: CPT | Performed by: INTERNAL MEDICINE

## 2022-09-21 PROCEDURE — 36415 COLL VENOUS BLD VENIPUNCTURE: CPT | Performed by: INTERNAL MEDICINE

## 2022-09-21 RX ORDER — GABAPENTIN 600 MG/1
1200 TABLET ORAL 2 TIMES DAILY
COMMUNITY
Start: 2022-07-29 | End: 2023-05-26 | Stop reason: ALTCHOICE

## 2022-09-21 NOTE — PATIENT INSTRUCTIONS
Thomas Steve,    It was a pleasure to see you today at the [facility].     My recommendations after this visit include:    Stop tamsulosin  Check lipids  Stay bumex, check daily weights  Go back on Deirdre Brumfield MD, FACC, [facility]

## 2022-09-21 NOTE — LETTER
9/21/2022    Moni Mcclain MD  [facility] [address]  [facility] [address]    RE: Thomas Steve       Dear Colleague,     I had the pleasure of seeing Thomas Steve in the [facility].      Cardiology Progress Note     Assessment:  Coronary artery disease, status post coronary artery bypass graft surgery in 2011, stable, no angina, negative stress test  Ischemic cardiomyopathy with mildly depressed LV systolic function, clinically no fluid overload  Permanent atrial fibrillation,  controlled ventricular response, on anticoagulation  History of atrial flutter  Biventricular pacemaker, normal function  Right bundle branch block  Hypercholesterolemia, suboptimal control due to poor tolerance of statins  Diabetes mellitus  Sleep apnea on CPAP  Spinal stenosis status postlaminectomy    Plan:  Check lipids adjust dose of statin accordingly.  We may want to dose it every other day to improve tolerance.  He can stop tamsulosin.  This medication was started during recent hospitalization for unclear reason.  He has no history of BPH or urinary frequency.    He will continue to check his weight.  We will increase dose of Bumex as needed.    He can go back on Xarelto.  I do not see any advantages of being on Eliquis    Follow-up 1-1/2 months    Subjective:   This is 77 year old male who comes in today for follow-up visit.  Early December he underwent laminectomy.  There was no cardiac complications.  Sound medications were changed and he is now on Eliquis instead of Xarelto.  Bumex was reduced to 1 mg a day.  He restarted taking Crestor 10 mg a day.  He does complain of diffuse muscle aching.  He is not sure if this is any significantly different after he began taking higher dose of rosuvastatin  He denies exertional chest pain.  He has no sensation of heart palpitation.  He denies syncope.  The walking has improved significantly after laminectomy.  He continues to feel low  "back pain though    Review of Systems:   Negative other than history of present illness    Objective:   /72 (BP Location: Right arm, Patient Position: Sitting, Cuff Size: Adult Regular)   Pulse 66   Resp 14   Ht 1.778 m (5' 10\")   Wt 91.6 kg (202 lb)   BMI 28.98 kg/m    Physical Exam:  GENERAL: no distress  NECK: No JVD  LUNGS: Clear to auscultation.  CARDIAC: irregular rhythm, S1 & S2 normal.  No heaves, thrills, gallops or murmurs.  ABDOMEN: flat, negative hepatosplenomegaly, soft and non-tender.  EXTREMITIES: No evidence of cyanosis, clubbing or edema.    Current Outpatient Medications   Medication Sig Dispense Refill     acetaminophen (TYLENOL) 500 MG tablet Take 1,000-1,500 mg by mouth every 6 hours as needed for mild pain       bumetanide (BUMEX) 1 MG tablet Take 1 tablet (1 mg) by mouth daily       cholecalciferol 50 MCG (2000 UT) CAPS Take 2 capsules by mouth daily       coenzyme Q-10 200 MG CAPS capsule Take 200 mg by mouth daily       ferrous sulfate (FEROSUL) 325 (65 Fe) MG tablet Take 1 tablet by mouth 3 times daily (with meals)       gabapentin (NEURONTIN) 600 MG tablet Take 1,200 mg by mouth 2 times daily       insulin glargine (LANTUS PEN) 100 UNIT/ML pen Inject 20 Units Subcutaneous At Bedtime (Patient taking differently: Inject 30 Units Subcutaneous At Bedtime) 15 mL      levothyroxine (SYNTHROID/LEVOTHROID) 25 MCG tablet Take 25 mcg by mouth daily       melatonin 5 MG tablet Take 5 mg by mouth At Bedtime       Omega-3 Fatty Acids (FISH OIL) 1200 MG capsule Take 1,200 mg by mouth daily       omeprazole (PRILOSEC) 20 MG DR capsule Take 1 capsule by mouth 2 times daily       rivaroxaban ANTICOAGULANT (XARELTO) 20 MG TABS tablet Take 20 mg by mouth At Bedtime       senna-docusate (SENOKOT-S/PERICOLACE) 8.6-50 MG tablet Take 1 tablet by mouth 2 times daily       vitamin C (ASCORBIC ACID) 500 MG tablet Take 500 mg by mouth 3 times daily (with meals) Take with iron supplement       zinc " gluconate 50 MG tablet Take 1 tablet (50 mg) by mouth daily       diclofenac (VOLTAREN) 1 % topical gel Apply 2 g topically 2 times daily (Patient not taking: Reported on 9/21/2022)       insulin lispro (HUMALOG KWIKPEN) 100 UNIT/ML (1 unit dial) KWIKPEN Do Not give Correction Insulin if Pre-Meal BG less than 140.   For Pre-Meal  - 164 give 1 unit.   For Pre-Meal  - 189 give 2 units.   For Pre-Meal  - 214 give 3 units.   For Pre-Meal  - 239 give 4 units.   For Pre-Meal  - 264 give 5 units.   For Pre-Meal  - 289 give 6 units.   For Pre-Meal  - 314 give 7 units.   For Pre-Meal  - 339 give 8 units.   For Pre-Meal  - 364 give 9 units.   For Pre-Meal BG greater than or equal to 365 give 10 units  To be given with prandial insulin, and based on pre-meal blood glucose.   Notify provider if glucose greater than or equal to 350 mg/dL after administration of correction dose. (Patient not taking: Reported on 9/21/2022) 15 mL 0     rosuvastatin (CRESTOR) 10 MG tablet Take 10 mg by mouth At Bedtime (Patient not taking: Reported on 9/21/2022)         Cardiographics:    Pacemaker interrogation : August 2022  Pacing %/Programmed: BiVP- 99.83%, VVIR 60  Lead (s): Stable  Battery longevity: Estimated 8.6 years remaining  Presenting: BiVP @ 60 bpm  Atrial arrhythmias: Chronic AF  Anticoagulant: Eliquis  Ventricular arrhythmias: None  Comments: Normal Device Function      Echo: June 2019    Normal left ventricular size and wall thickness.    Left ventricle ejection fraction is mildly decreased. The estimated left ventricular ejection fraction is 50%.    Right ventricle is mildly dilated with moderate systolic dysfunction.    Severe biatrial enlargement is present.    No hemodynamically significant valvular heart abnormalities.    When compared to the previous study dated 6/1/2016, no significant change.      Stress test: June 2016   1. Lexiscan stress nuclear study is negative for  inducible myocardial ischemia; there is a small area of severe nontransmural infarction in the distal anterior and apical segments.  2. Left ventricular ejection fraction is 48%.     May 2021     The nuclear stress test is abnormal.     Nuclear images demonstrate a large area of transmural infarction involving the mid to distal anteroseptal, anterior and apical wall.  Base of the inferior and anterior wall appear hyperdynamic.     The left ventricular ejection fraction at stress is 53% with akinesis of the mid to distal anteroseptal, anterior and apical walls..     The patient is at a low risk of future cardiac ischemic events.     A prior study was conducted on 6/10/2016.  Images appear similar     Lab Results    Chemistry/lipid CBC Cardiac Enzymes/BNP/TSH/INR   Recent Labs   Lab Test 06/08/22  1608   CHOL 118   HDL 36*   LDL 42   TRIG 202*     Recent Labs   Lab Test 06/08/22  1608 10/19/21  1332 09/23/20  1320   LDL 42 58 72     Recent Labs   Lab Test 08/10/22  1247      POTASSIUM 4.6   CHLORIDE 99   CO2 29   *   BUN 22.8   CR 1.07   GFRESTIMATED 72   BARBARA 9.0     Recent Labs   Lab Test 08/10/22  1247 07/19/22  0456 07/18/22  0437   CR 1.07 1.43* 1.90*     Recent Labs   Lab Test 06/17/22  0536 06/12/19  0613   A1C 7.5* 7.2*          Recent Labs   Lab Test 08/10/22  1247   WBC 7.7   HGB 10.8*   HCT 34.8*   MCV 92        Recent Labs   Lab Test 08/10/22  1247 07/19/22  0456 07/18/22  0437   HGB 10.8* 8.5* 8.2*    Recent Labs   Lab Test 07/14/22  0110   TROPONINI 0.04     Recent Labs   Lab Test 07/14/22  0110 06/20/22  1300   * 108*     Recent Labs   Lab Test 06/08/22  1608   TSH 2.05     Recent Labs   Lab Test 07/14/22  1154 07/14/22  0110 07/06/22  0530   INR 2.22* 2.27* 1.14                    Thank you for allowing me to participate in the care of your patient.      Sincerely,     Steven Brumfield MD     [facility]  cc:   Steven Brumfield,  MD  [address] [facility]  [address]  [place] [address]

## 2022-09-21 NOTE — PROGRESS NOTES
"    Cardiology Progress Note     Assessment:  Coronary artery disease, status post coronary artery bypass graft surgery in 2011, stable, no angina, negative stress test  Ischemic cardiomyopathy with mildly depressed LV systolic function, clinically no fluid overload  Permanent atrial fibrillation,  controlled ventricular response, on anticoagulation  History of atrial flutter  Biventricular pacemaker, normal function  Right bundle branch block  Hypercholesterolemia, suboptimal control due to poor tolerance of statins  Diabetes mellitus  Sleep apnea on CPAP  Spinal stenosis status postlaminectomy    Plan:  Check lipids adjust dose of statin accordingly.  We may want to dose it every other day to improve tolerance.  He can stop tamsulosin.  This medication was started during recent hospitalization for unclear reason.  He has no history of BPH or urinary frequency.    He will continue to check his weight.  We will increase dose of Bumex as needed.    He can go back on Xarelto.  I do not see any advantages of being on Eliquis    Follow-up 1-1/2 months    Subjective:   This is 77 year old male who comes in today for follow-up visit.  Early December he underwent laminectomy.  There was no cardiac complications.  Sound medications were changed and he is now on Eliquis instead of Xarelto.  Bumex was reduced to 1 mg a day.  He restarted taking Crestor 10 mg a day.  He does complain of diffuse muscle aching.  He is not sure if this is any significantly different after he began taking higher dose of rosuvastatin  He denies exertional chest pain.  He has no sensation of heart palpitation.  He denies syncope.  The walking has improved significantly after laminectomy.  He continues to feel low back pain though    Review of Systems:   Negative other than history of present illness    Objective:   /72 (BP Location: Right arm, Patient Position: Sitting, Cuff Size: Adult Regular)   Pulse 66   Resp 14   Ht 1.778 m (5' 10\")   " Wt 91.6 kg (202 lb)   BMI 28.98 kg/m    Physical Exam:  GENERAL: no distress  NECK: No JVD  LUNGS: Clear to auscultation.  CARDIAC: irregular rhythm, S1 & S2 normal.  No heaves, thrills, gallops or murmurs.  ABDOMEN: flat, negative hepatosplenomegaly, soft and non-tender.  EXTREMITIES: No evidence of cyanosis, clubbing or edema.    Current Outpatient Medications   Medication Sig Dispense Refill     acetaminophen (TYLENOL) 500 MG tablet Take 1,000-1,500 mg by mouth every 6 hours as needed for mild pain       bumetanide (BUMEX) 1 MG tablet Take 1 tablet (1 mg) by mouth daily       cholecalciferol 50 MCG (2000 UT) CAPS Take 2 capsules by mouth daily       coenzyme Q-10 200 MG CAPS capsule Take 200 mg by mouth daily       ferrous sulfate (FEROSUL) 325 (65 Fe) MG tablet Take 1 tablet by mouth 3 times daily (with meals)       gabapentin (NEURONTIN) 600 MG tablet Take 1,200 mg by mouth 2 times daily       insulin glargine (LANTUS PEN) 100 UNIT/ML pen Inject 20 Units Subcutaneous At Bedtime (Patient taking differently: Inject 30 Units Subcutaneous At Bedtime) 15 mL      levothyroxine (SYNTHROID/LEVOTHROID) 25 MCG tablet Take 25 mcg by mouth daily       melatonin 5 MG tablet Take 5 mg by mouth At Bedtime       Omega-3 Fatty Acids (FISH OIL) 1200 MG capsule Take 1,200 mg by mouth daily       omeprazole (PRILOSEC) 20 MG DR capsule Take 1 capsule by mouth 2 times daily       rivaroxaban ANTICOAGULANT (XARELTO) 20 MG TABS tablet Take 20 mg by mouth At Bedtime       senna-docusate (SENOKOT-S/PERICOLACE) 8.6-50 MG tablet Take 1 tablet by mouth 2 times daily       vitamin C (ASCORBIC ACID) 500 MG tablet Take 500 mg by mouth 3 times daily (with meals) Take with iron supplement       zinc gluconate 50 MG tablet Take 1 tablet (50 mg) by mouth daily       diclofenac (VOLTAREN) 1 % topical gel Apply 2 g topically 2 times daily (Patient not taking: Reported on 9/21/2022)       insulin lispro (HUMALOG KWIKPEN) 100 UNIT/ML (1 unit dial)  KWIKPEN Do Not give Correction Insulin if Pre-Meal BG less than 140.   For Pre-Meal  - 164 give 1 unit.   For Pre-Meal  - 189 give 2 units.   For Pre-Meal  - 214 give 3 units.   For Pre-Meal  - 239 give 4 units.   For Pre-Meal  - 264 give 5 units.   For Pre-Meal  - 289 give 6 units.   For Pre-Meal  - 314 give 7 units.   For Pre-Meal  - 339 give 8 units.   For Pre-Meal  - 364 give 9 units.   For Pre-Meal BG greater than or equal to 365 give 10 units  To be given with prandial insulin, and based on pre-meal blood glucose.   Notify provider if glucose greater than or equal to 350 mg/dL after administration of correction dose. (Patient not taking: Reported on 9/21/2022) 15 mL 0     rosuvastatin (CRESTOR) 10 MG tablet Take 10 mg by mouth At Bedtime (Patient not taking: Reported on 9/21/2022)         Cardiographics:    Pacemaker interrogation : August 2022  Pacing %/Programmed: BiVP- 99.83%, VVIR 60  Lead (s): Stable  Battery longevity: Estimated 8.6 years remaining  Presenting: BiVP @ 60 bpm  Atrial arrhythmias: Chronic AF  Anticoagulant: Eliquis  Ventricular arrhythmias: None  Comments: Normal Device Function      Echo: June 2019    Normal left ventricular size and wall thickness.    Left ventricle ejection fraction is mildly decreased. The estimated left ventricular ejection fraction is 50%.    Right ventricle is mildly dilated with moderate systolic dysfunction.    Severe biatrial enlargement is present.    No hemodynamically significant valvular heart abnormalities.    When compared to the previous study dated 6/1/2016, no significant change.      Stress test: June 2016   1. Lexiscan stress nuclear study is negative for inducible myocardial ischemia; there is a small area of severe nontransmural infarction in the distal anterior and apical segments.  2. Left ventricular ejection fraction is 48%.     May 2021     The nuclear stress test is abnormal.     Nuclear  images demonstrate a large area of transmural infarction involving the mid to distal anteroseptal, anterior and apical wall.  Base of the inferior and anterior wall appear hyperdynamic.     The left ventricular ejection fraction at stress is 53% with akinesis of the mid to distal anteroseptal, anterior and apical walls..     The patient is at a low risk of future cardiac ischemic events.     A prior study was conducted on 6/10/2016.  Images appear similar     Lab Results    Chemistry/lipid CBC Cardiac Enzymes/BNP/TSH/INR   Recent Labs   Lab Test 06/08/22  1608   CHOL 118   HDL 36*   LDL 42   TRIG 202*     Recent Labs   Lab Test 06/08/22  1608 10/19/21  1332 09/23/20  1320   LDL 42 58 72     Recent Labs   Lab Test 08/10/22  1247      POTASSIUM 4.6   CHLORIDE 99   CO2 29   *   BUN 22.8   CR 1.07   GFRESTIMATED 72   BARBARA 9.0     Recent Labs   Lab Test 08/10/22  1247 07/19/22  0456 07/18/22  0437   CR 1.07 1.43* 1.90*     Recent Labs   Lab Test 06/17/22  0536 06/12/19  0613   A1C 7.5* 7.2*          Recent Labs   Lab Test 08/10/22  1247   WBC 7.7   HGB 10.8*   HCT 34.8*   MCV 92        Recent Labs   Lab Test 08/10/22  1247 07/19/22  0456 07/18/22  0437   HGB 10.8* 8.5* 8.2*    Recent Labs   Lab Test 07/14/22  0110   TROPONINI 0.04     Recent Labs   Lab Test 07/14/22  0110 06/20/22  1300   * 108*     Recent Labs   Lab Test 06/08/22  1608   TSH 2.05     Recent Labs   Lab Test 07/14/22  1154 07/14/22  0110 07/06/22  0530   INR 2.22* 2.27* 1.14

## 2022-09-22 LAB
CHOLEST SERPL-MCNC: 118 MG/DL
HDLC SERPL-MCNC: 40 MG/DL
LDLC SERPL CALC-MCNC: 57 MG/DL
NONHDLC SERPL-MCNC: 78 MG/DL
TRIGL SERPL-MCNC: 107 MG/DL

## 2022-11-03 ENCOUNTER — ANCILLARY PROCEDURE (OUTPATIENT)
Dept: CARDIOLOGY | Facility: CLINIC | Age: 77
End: 2022-11-03
Attending: INTERNAL MEDICINE
Payer: MEDICARE

## 2022-11-03 ENCOUNTER — OFFICE VISIT (OUTPATIENT)
Dept: CARDIOLOGY | Facility: CLINIC | Age: 77
End: 2022-11-03
Payer: MEDICARE

## 2022-11-03 VITALS
SYSTOLIC BLOOD PRESSURE: 122 MMHG | HEART RATE: 60 BPM | HEIGHT: 70 IN | DIASTOLIC BLOOD PRESSURE: 62 MMHG | RESPIRATION RATE: 16 BRPM | WEIGHT: 203.8 LBS | BODY MASS INDEX: 29.18 KG/M2

## 2022-11-03 DIAGNOSIS — Z45.02 ICD (IMPLANTABLE CARDIOVERTER-DEFIBRILLATOR) BATTERY DEPLETION: Primary | ICD-10-CM

## 2022-11-03 DIAGNOSIS — I25.10 ATHEROSCLEROSIS OF NATIVE CORONARY ARTERY OF NATIVE HEART WITHOUT ANGINA PECTORIS: ICD-10-CM

## 2022-11-03 DIAGNOSIS — I48.21 PERMANENT ATRIAL FIBRILLATION (H): ICD-10-CM

## 2022-11-03 DIAGNOSIS — I25.5 ISCHEMIC CARDIOMYOPATHY: ICD-10-CM

## 2022-11-03 DIAGNOSIS — Z95.0 PACEMAKER: ICD-10-CM

## 2022-11-03 DIAGNOSIS — I50.20 HEART FAILURE WITH REDUCED EJECTION FRACTION (H): Primary | ICD-10-CM

## 2022-11-03 DIAGNOSIS — I50.20 HEART FAILURE WITH REDUCED EJECTION FRACTION (H): ICD-10-CM

## 2022-11-03 DIAGNOSIS — I49.5 SICK SINUS SYNDROME (H): ICD-10-CM

## 2022-11-03 PROCEDURE — 93281 PM DEVICE PROGR EVAL MULTI: CPT | Performed by: INTERNAL MEDICINE

## 2022-11-03 PROCEDURE — 36415 COLL VENOUS BLD VENIPUNCTURE: CPT | Performed by: INTERNAL MEDICINE

## 2022-11-03 PROCEDURE — 99214 OFFICE O/P EST MOD 30 MIN: CPT | Performed by: INTERNAL MEDICINE

## 2022-11-03 PROCEDURE — 80048 BASIC METABOLIC PNL TOTAL CA: CPT | Performed by: INTERNAL MEDICINE

## 2022-11-03 RX ORDER — BUMETANIDE 1 MG/1
2 TABLET ORAL DAILY
Qty: 180 TABLET | Refills: 3 | Status: SHIPPED | OUTPATIENT
Start: 2022-11-03

## 2022-11-03 RX ORDER — ATORVASTATIN CALCIUM 20 MG/1
20 TABLET, FILM COATED ORAL AT BEDTIME
COMMUNITY
Start: 2022-08-05 | End: 2024-04-03

## 2022-11-03 RX ORDER — LISINOPRIL 10 MG/1
TABLET ORAL
COMMUNITY
Start: 2022-10-26 | End: 2022-11-03

## 2022-11-03 NOTE — LETTER
"11/3/2022    Moni Mcclain MD  [address]  [facility] [address]    RE: Thomas Steve       Dear Colleague,     I had the pleasure of seeing Thomas Steve in the [facility].      Cardiology Progress Note     Assessment:  Coronary artery disease, status post coronary artery bypass graft surgery in 2011, stable, no angina, negative stress test  Chronic systolic heart failure due to ischemic cardiomyopathy with mildly depressed LV systolic function, clinically no fluid overload  Permanent atrial fibrillation,  controlled ventricular response, on anticoagulation  History of atrial flutter  Biventricular pacemaker, normal function  Right bundle branch block  Aortic stenosis mild  Tricuspid regurgitation moderate to severe  Hypercholesterolemia, good control  Diabetes mellitus  Sleep apnea on CPAP  Spinal stenosis status postlaminectomy      Plan:  Check basic metabolic panel today.  We will decide about appropriate dosing of diuretics and need to restart lisinopril based on the results    Subjective:   This is 77 year old male who comes in today for follow-up visit.  He reports that he felt mildly short of breath last month.  He increase Bumex to 2 mg a day and shortness of breath improved.  He can walk now several blocks without any symptoms.  He can lay flat in bed.  He denies chest pain.  He has not had heart palpitations or syncope.  He is about to leave for his winter in [place]    Review of Systems:   Negative other than history of present illness    Objective:   /62 (BP Location: Right arm, Patient Position: Sitting, Cuff Size: Adult Regular)   Pulse 60   Resp 16   Ht 1.778 m (5' 10\")   Wt 92.4 kg (203 lb 12.8 oz)   BMI 29.24 kg/m    Physical Exam:  GENERAL: no distress  NECK: No JVD  LUNGS: Clear to auscultation.  CARDIAC: irregular rhythm, S1 & S2 normal.  No heaves, thrills, gallops 2/6 systolic ejection murmur at the aortic area and 2/6 holosystolic murmur at the " left lower sternal border  ABDOMEN: flat, negative hepatosplenomegaly, soft and non-tender.  EXTREMITIES: No evidence of cyanosis, clubbing or edema.    Current Outpatient Medications   Medication Sig Dispense Refill     acetaminophen (TYLENOL) 500 MG tablet Take 1,000-1,500 mg by mouth every 6 hours as needed for mild pain       atorvastatin (LIPITOR) 20 MG tablet        bumetanide (BUMEX) 1 MG tablet Take 2 tablets (2 mg) by mouth daily 180 tablet 3     bumetanide (BUMEX) 1 MG tablet Take 1 tablet (1 mg) by mouth daily (Patient taking differently: Take 2 mg by mouth daily)       cholecalciferol 50 MCG (2000 UT) CAPS Take 2 capsules by mouth daily       coenzyme Q-10 200 MG CAPS capsule Take 200 mg by mouth daily       diclofenac (VOLTAREN) 1 % topical gel Apply 2 g topically 2 times daily       ferrous sulfate (FEROSUL) 325 (65 Fe) MG tablet Take 1 tablet by mouth 3 times daily (with meals)       gabapentin (NEURONTIN) 600 MG tablet Take 1,200 mg by mouth 2 times daily       insulin glargine (LANTUS PEN) 100 UNIT/ML pen Inject 20 Units Subcutaneous At Bedtime (Patient taking differently: Inject 30 Units Subcutaneous At Bedtime) 15 mL      insulin lispro (HUMALOG KWIKPEN) 100 UNIT/ML (1 unit dial) KWIKPEN Do Not give Correction Insulin if Pre-Meal BG less than 140.   For Pre-Meal  - 164 give 1 unit.   For Pre-Meal  - 189 give 2 units.   For Pre-Meal  - 214 give 3 units.   For Pre-Meal  - 239 give 4 units.   For Pre-Meal  - 264 give 5 units.   For Pre-Meal  - 289 give 6 units.   For Pre-Meal  - 314 give 7 units.   For Pre-Meal  - 339 give 8 units.   For Pre-Meal  - 364 give 9 units.   For Pre-Meal BG greater than or equal to 365 give 10 units  To be given with prandial insulin, and based on pre-meal blood glucose.   Notify provider if glucose greater than or equal to 350 mg/dL after administration of correction dose. 15 mL 0     levothyroxine  (SYNTHROID/LEVOTHROID) 25 MCG tablet Take 25 mcg by mouth daily       melatonin 5 MG tablet Take 5 mg by mouth At Bedtime       Omega-3 Fatty Acids (FISH OIL) 1200 MG capsule Take 1,200 mg by mouth daily       omeprazole (PRILOSEC) 20 MG DR capsule Take 1 capsule by mouth 2 times daily       rivaroxaban ANTICOAGULANT (XARELTO) 20 MG TABS tablet Take 20 mg by mouth At Bedtime       rosuvastatin (CRESTOR) 10 MG tablet Take 10 mg by mouth At Bedtime       senna-docusate (SENOKOT-S/PERICOLACE) 8.6-50 MG tablet Take 1 tablet by mouth 2 times daily       vitamin C (ASCORBIC ACID) 500 MG tablet Take 500 mg by mouth 3 times daily (with meals) Take with iron supplement       zinc gluconate 50 MG tablet Take 1 tablet (50 mg) by mouth daily         Cardiographics:    Pacemaker interrogation : August 2022  Pacing %/Programmed: BiVP- 99.83%, VVIR 60  Lead (s): Stable  Battery longevity: Estimated 8.6 years remaining  Presenting: BiVP @ 60 bpm  Atrial arrhythmias: Chronic AF  Anticoagulant: Eliquis  Ventricular arrhythmias: None  Comments: Normal Device Function        Echo: June 2019    Normal left ventricular size and wall thickness.    Left ventricle ejection fraction is mildly decreased. The estimated left ventricular ejection fraction is 50%.    Right ventricle is mildly dilated with moderate systolic dysfunction.    Severe biatrial enlargement is present.    No hemodynamically significant valvular heart abnormalities.    When compared to the previous study dated 6/1/2016, no significant change.     June 2022  The left ventricle is normal in size.  Left ventricular function is decreased. The ejection fraction is 45-50%  (mildly reduced).  There is a pacemaker lead in the right ventricle.  The left atrium is mildly dilated.  There is mild (1+) mitral regurgitation.  There is moderately severe (3+) tricuspid regurgitation.  Right ventricular systolic pressure is elevated, consistent with mild  pulmonary hypertension.  Mild  valvular aortic stenosis     Stress test: June 2016   1. Lexiscan stress nuclear study is negative for inducible myocardial ischemia; there is a small area of severe nontransmural infarction in the distal anterior and apical segments.  2. Left ventricular ejection fraction is 48%.     May 2021     The nuclear stress test is abnormal.     Nuclear images demonstrate a large area of transmural infarction involving the mid to distal anteroseptal, anterior and apical wall.  Base of the inferior and anterior wall appear hyperdynamic.     The left ventricular ejection fraction at stress is 53% with akinesis of the mid to distal anteroseptal, anterior and apical walls..     The patient is at a low risk of future cardiac ischemic events.     A prior study was conducted on 6/10/2016.  Images appear similar     Lab Results    Chemistry/lipid CBC Cardiac Enzymes/BNP/TSH/INR   Recent Labs   Lab Test 09/21/22  1521   CHOL 118   HDL 40   LDL 57   TRIG 107     Recent Labs   Lab Test 09/21/22  1521 06/08/22  1608 10/19/21  1332   LDL 57 42 58     Recent Labs   Lab Test 08/10/22  1247      POTASSIUM 4.6   CHLORIDE 99   CO2 29   *   BUN 22.8   CR 1.07   GFRESTIMATED 72   BARBARA 9.0     Recent Labs   Lab Test 08/10/22  1247 07/19/22  0456 07/18/22  0437   CR 1.07 1.43* 1.90*     Recent Labs   Lab Test 06/17/22  0536 06/12/19  0613   A1C 7.5* 7.2*          Recent Labs   Lab Test 08/10/22  1247   WBC 7.7   HGB 10.8*   HCT 34.8*   MCV 92        Recent Labs   Lab Test 08/10/22  1247 07/19/22  0456 07/18/22  0437   HGB 10.8* 8.5* 8.2*    Recent Labs   Lab Test 07/14/22  0110   TROPONINI 0.04     Recent Labs   Lab Test 07/14/22  0110 06/20/22  1300   * 108*     Recent Labs   Lab Test 06/08/22  1608   TSH 2.05     Recent Labs   Lab Test 07/14/22  1154 07/14/22  0110 07/06/22  0530   INR 2.22* 2.27* 1.14                        Thank you for allowing me to participate in the care of your patient.      Sincerely,     Steven  MD Brissa     [facility]  cc:   Steven Brumfield MD  [address]  [address]  [place] [address]

## 2022-11-03 NOTE — PROGRESS NOTES
"    Cardiology Progress Note     Assessment:  Coronary artery disease, status post coronary artery bypass graft surgery in 2011, stable, no angina, negative stress test  Chronic systolic heart failure due to ischemic cardiomyopathy with mildly depressed LV systolic function, clinically no fluid overload  Permanent atrial fibrillation,  controlled ventricular response, on anticoagulation  History of atrial flutter  Biventricular pacemaker, normal function  Right bundle branch block  Aortic stenosis mild  Tricuspid regurgitation moderate to severe  Hypercholesterolemia, good control  Diabetes mellitus  Sleep apnea on CPAP  Spinal stenosis status postlaminectomy      Plan:  Check basic metabolic panel today.  We will decide about appropriate dosing of diuretics and need to restart lisinopril based on the results    Subjective:   This is 77 year old male who comes in today for follow-up visit.  He reports that he felt mildly short of breath last month.  He increase Bumex to 2 mg a day and shortness of breath improved.  He can walk now several blocks without any symptoms.  He can lay flat in bed.  He denies chest pain.  He has not had heart palpitations or syncope.  He is about to leave for his winter in [place]    Review of Systems:   Negative other than history of present illness    Objective:   /62 (BP Location: Right arm, Patient Position: Sitting, Cuff Size: Adult Regular)   Pulse 60   Resp 16   Ht 1.778 m (5' 10\")   Wt 92.4 kg (203 lb 12.8 oz)   BMI 29.24 kg/m    Physical Exam:  GENERAL: no distress  NECK: No JVD  LUNGS: Clear to auscultation.  CARDIAC: irregular rhythm, S1 & S2 normal.  No heaves, thrills, gallops 2/6 systolic ejection murmur at the aortic area and 2/6 holosystolic murmur at the left lower sternal border  ABDOMEN: flat, negative hepatosplenomegaly, soft and non-tender.  EXTREMITIES: No evidence of cyanosis, clubbing or edema.    Current Outpatient Medications   Medication Sig Dispense " Refill     acetaminophen (TYLENOL) 500 MG tablet Take 1,000-1,500 mg by mouth every 6 hours as needed for mild pain       atorvastatin (LIPITOR) 20 MG tablet        bumetanide (BUMEX) 1 MG tablet Take 2 tablets (2 mg) by mouth daily 180 tablet 3     bumetanide (BUMEX) 1 MG tablet Take 1 tablet (1 mg) by mouth daily (Patient taking differently: Take 2 mg by mouth daily)       cholecalciferol 50 MCG (2000 UT) CAPS Take 2 capsules by mouth daily       coenzyme Q-10 200 MG CAPS capsule Take 200 mg by mouth daily       diclofenac (VOLTAREN) 1 % topical gel Apply 2 g topically 2 times daily       ferrous sulfate (FEROSUL) 325 (65 Fe) MG tablet Take 1 tablet by mouth 3 times daily (with meals)       gabapentin (NEURONTIN) 600 MG tablet Take 1,200 mg by mouth 2 times daily       insulin glargine (LANTUS PEN) 100 UNIT/ML pen Inject 20 Units Subcutaneous At Bedtime (Patient taking differently: Inject 30 Units Subcutaneous At Bedtime) 15 mL      insulin lispro (HUMALOG KWIKPEN) 100 UNIT/ML (1 unit dial) KWIKPEN Do Not give Correction Insulin if Pre-Meal BG less than 140.   For Pre-Meal  - 164 give 1 unit.   For Pre-Meal  - 189 give 2 units.   For Pre-Meal  - 214 give 3 units.   For Pre-Meal  - 239 give 4 units.   For Pre-Meal  - 264 give 5 units.   For Pre-Meal  - 289 give 6 units.   For Pre-Meal  - 314 give 7 units.   For Pre-Meal  - 339 give 8 units.   For Pre-Meal  - 364 give 9 units.   For Pre-Meal BG greater than or equal to 365 give 10 units  To be given with prandial insulin, and based on pre-meal blood glucose.   Notify provider if glucose greater than or equal to 350 mg/dL after administration of correction dose. 15 mL 0     levothyroxine (SYNTHROID/LEVOTHROID) 25 MCG tablet Take 25 mcg by mouth daily       melatonin 5 MG tablet Take 5 mg by mouth At Bedtime       Omega-3 Fatty Acids (FISH OIL) 1200 MG capsule Take 1,200 mg by mouth daily       omeprazole  (PRILOSEC) 20 MG DR capsule Take 1 capsule by mouth 2 times daily       rivaroxaban ANTICOAGULANT (XARELTO) 20 MG TABS tablet Take 20 mg by mouth At Bedtime       rosuvastatin (CRESTOR) 10 MG tablet Take 10 mg by mouth At Bedtime       senna-docusate (SENOKOT-S/PERICOLACE) 8.6-50 MG tablet Take 1 tablet by mouth 2 times daily       vitamin C (ASCORBIC ACID) 500 MG tablet Take 500 mg by mouth 3 times daily (with meals) Take with iron supplement       zinc gluconate 50 MG tablet Take 1 tablet (50 mg) by mouth daily         Cardiographics:    Pacemaker interrogation : August 2022  Pacing %/Programmed: BiVP- 99.83%, VVIR 60  Lead (s): Stable  Battery longevity: Estimated 8.6 years remaining  Presenting: BiVP @ 60 bpm  Atrial arrhythmias: Chronic AF  Anticoagulant: Eliquis  Ventricular arrhythmias: None  Comments: Normal Device Function        Echo: June 2019    Normal left ventricular size and wall thickness.    Left ventricle ejection fraction is mildly decreased. The estimated left ventricular ejection fraction is 50%.    Right ventricle is mildly dilated with moderate systolic dysfunction.    Severe biatrial enlargement is present.    No hemodynamically significant valvular heart abnormalities.    When compared to the previous study dated 6/1/2016, no significant change.     June 2022  The left ventricle is normal in size.  Left ventricular function is decreased. The ejection fraction is 45-50%  (mildly reduced).  There is a pacemaker lead in the right ventricle.  The left atrium is mildly dilated.  There is mild (1+) mitral regurgitation.  There is moderately severe (3+) tricuspid regurgitation.  Right ventricular systolic pressure is elevated, consistent with mild  pulmonary hypertension.  Mild valvular aortic stenosis     Stress test: June 2016   1. Lexiscan stress nuclear study is negative for inducible myocardial ischemia; there is a small area of severe nontransmural infarction in the distal anterior and apical  segments.  2. Left ventricular ejection fraction is 48%.     May 2021     The nuclear stress test is abnormal.     Nuclear images demonstrate a large area of transmural infarction involving the mid to distal anteroseptal, anterior and apical wall.  Base of the inferior and anterior wall appear hyperdynamic.     The left ventricular ejection fraction at stress is 53% with akinesis of the mid to distal anteroseptal, anterior and apical walls..     The patient is at a low risk of future cardiac ischemic events.     A prior study was conducted on 6/10/2016.  Images appear similar     Lab Results    Chemistry/lipid CBC Cardiac Enzymes/BNP/TSH/INR   Recent Labs   Lab Test 09/21/22  1521   CHOL 118   HDL 40   LDL 57   TRIG 107     Recent Labs   Lab Test 09/21/22  1521 06/08/22  1608 10/19/21  1332   LDL 57 42 58     Recent Labs   Lab Test 08/10/22  1247      POTASSIUM 4.6   CHLORIDE 99   CO2 29   *   BUN 22.8   CR 1.07   GFRESTIMATED 72   BARBARA 9.0     Recent Labs   Lab Test 08/10/22  1247 07/19/22  0456 07/18/22  0437   CR 1.07 1.43* 1.90*     Recent Labs   Lab Test 06/17/22  0536 06/12/19  0613   A1C 7.5* 7.2*          Recent Labs   Lab Test 08/10/22  1247   WBC 7.7   HGB 10.8*   HCT 34.8*   MCV 92        Recent Labs   Lab Test 08/10/22  1247 07/19/22  0456 07/18/22  0437   HGB 10.8* 8.5* 8.2*    Recent Labs   Lab Test 07/14/22  0110   TROPONINI 0.04     Recent Labs   Lab Test 07/14/22  0110 06/20/22  1300   * 108*     Recent Labs   Lab Test 06/08/22  1608   TSH 2.05     Recent Labs   Lab Test 07/14/22  1154 07/14/22  0110 07/06/22  0530   INR 2.22* 2.27* 1.14

## 2022-11-03 NOTE — PATIENT INSTRUCTIONS
Thomas Steve,    It was a pleasure to see you today at the [facility].     My recommendations after this visit include:    Blood work today.  We will let she know what to do about tBumex  and lisinopril    WEdwin Brumfield MD, FACC, [facility]

## 2022-11-04 ENCOUNTER — TELEPHONE (OUTPATIENT)
Dept: CARDIOLOGY | Facility: CLINIC | Age: 77
End: 2022-11-04

## 2022-11-04 DIAGNOSIS — I25.5 ISCHEMIC CARDIOMYOPATHY: Primary | ICD-10-CM

## 2022-11-04 LAB
ANION GAP SERPL CALCULATED.3IONS-SCNC: 12 MMOL/L (ref 7–15)
BUN SERPL-MCNC: 18.2 MG/DL (ref 8–23)
CALCIUM SERPL-MCNC: 9.1 MG/DL (ref 8.8–10.2)
CHLORIDE SERPL-SCNC: 95 MMOL/L (ref 98–107)
CREAT SERPL-MCNC: 1.1 MG/DL (ref 0.67–1.17)
DEPRECATED HCO3 PLAS-SCNC: 29 MMOL/L (ref 22–29)
GFR SERPL CREATININE-BSD FRML MDRD: 70 ML/MIN/1.73M2
GLUCOSE SERPL-MCNC: 277 MG/DL (ref 70–99)
POTASSIUM SERPL-SCNC: 4.4 MMOL/L (ref 3.4–5.3)
SODIUM SERPL-SCNC: 136 MMOL/L (ref 136–145)

## 2022-11-04 RX ORDER — LOSARTAN POTASSIUM 50 MG/1
50 TABLET ORAL DAILY
Qty: 90 TABLET | Refills: 3 | Status: SHIPPED | OUTPATIENT
Start: 2022-11-04 | End: 2023-10-16

## 2022-11-04 NOTE — TELEPHONE ENCOUNTER
----- Message from Steven Brumfield MD sent at 11/4/2022  2:06 PM CDT -----  Kidney function returned to baseline  Continue current dose of diuretics  Start losartan 50 mg a day for cardiomyopathy.  He should have basic metabolic panel checked in about 2 weeks when he is back in for [place]

## 2022-11-04 NOTE — TELEPHONE ENCOUNTER
Called Thomas and updated on results and recommendations from Dr. Brumfield. He verbalized understanding and will start Losartan 50 mg daily. [facility]- they will forward this to his [place] address. Lab order mailed to him as well. He will have done in 2 weeks while down in [place]. He will call back if he runs into any issues with tolerance down there. He will not be back until April. -[facility] [place]

## 2022-11-07 ENCOUNTER — LAB REQUISITION (OUTPATIENT)
Dept: LAB | Facility: CLINIC | Age: 77
End: 2022-11-07
Payer: MEDICARE

## 2022-11-07 DIAGNOSIS — E11.22 TYPE 2 DIABETES MELLITUS WITH DIABETIC CHRONIC KIDNEY DISEASE (H): ICD-10-CM

## 2022-11-07 LAB
ALBUMIN SERPL BCG-MCNC: 4.6 G/DL (ref 3.5–5.2)
ALP SERPL-CCNC: 222 U/L (ref 40–129)
ALT SERPL W P-5'-P-CCNC: 37 U/L (ref 10–50)
ANION GAP SERPL CALCULATED.3IONS-SCNC: 12 MMOL/L (ref 7–15)
AST SERPL W P-5'-P-CCNC: 34 U/L (ref 10–50)
BILIRUB SERPL-MCNC: 0.4 MG/DL
BUN SERPL-MCNC: 21.4 MG/DL (ref 8–23)
CALCIUM SERPL-MCNC: 8.7 MG/DL (ref 8.8–10.2)
CHLORIDE SERPL-SCNC: 98 MMOL/L (ref 98–107)
CREAT SERPL-MCNC: 1.07 MG/DL (ref 0.67–1.17)
DEPRECATED HCO3 PLAS-SCNC: 31 MMOL/L (ref 22–29)
GFR SERPL CREATININE-BSD FRML MDRD: 72 ML/MIN/1.73M2
GLUCOSE SERPL-MCNC: 283 MG/DL (ref 70–99)
POTASSIUM SERPL-SCNC: 4.6 MMOL/L (ref 3.4–5.3)
PROT SERPL-MCNC: 6.9 G/DL (ref 6.4–8.3)
SODIUM SERPL-SCNC: 141 MMOL/L (ref 136–145)

## 2022-11-07 PROCEDURE — 80053 COMPREHEN METABOLIC PANEL: CPT | Mod: ORL | Performed by: FAMILY MEDICINE

## 2022-11-27 ENCOUNTER — TRANSFERRED RECORDS (OUTPATIENT)
Dept: HEALTH INFORMATION MANAGEMENT | Facility: CLINIC | Age: 77
End: 2022-11-27

## 2022-12-06 LAB
MDC_IDC_EPISODE_DTM: NORMAL
MDC_IDC_EPISODE_DURATION: 10 S
MDC_IDC_EPISODE_DURATION: 10 S
MDC_IDC_EPISODE_DURATION: 13 S
MDC_IDC_EPISODE_DURATION: 18 S
MDC_IDC_EPISODE_DURATION: 5 S
MDC_IDC_EPISODE_DURATION: 8 S
MDC_IDC_EPISODE_DURATION: 8 S
MDC_IDC_EPISODE_ID: 1307
MDC_IDC_EPISODE_ID: 1308
MDC_IDC_EPISODE_ID: 1309
MDC_IDC_EPISODE_ID: 1310
MDC_IDC_EPISODE_ID: 1311
MDC_IDC_EPISODE_ID: 1312
MDC_IDC_EPISODE_ID: 1313
MDC_IDC_EPISODE_TYPE: NORMAL
MDC_IDC_LEAD_IMPLANT_DT: NORMAL
MDC_IDC_LEAD_LOCATION: NORMAL
MDC_IDC_LEAD_LOCATION_DETAIL_1: NORMAL
MDC_IDC_LEAD_MFG: NORMAL
MDC_IDC_LEAD_MODEL: NORMAL
MDC_IDC_LEAD_POLARITY_TYPE: NORMAL
MDC_IDC_LEAD_SERIAL: NORMAL
MDC_IDC_MSMT_BATTERY_DTM: NORMAL
MDC_IDC_MSMT_BATTERY_REMAINING_LONGEVITY: 107 MO
MDC_IDC_MSMT_BATTERY_RRT_TRIGGER: 2.6
MDC_IDC_MSMT_BATTERY_STATUS: NORMAL
MDC_IDC_MSMT_BATTERY_VOLTAGE: 2.99 V
MDC_IDC_MSMT_LEADCHNL_LV_IMPEDANCE_VALUE: 399 OHM
MDC_IDC_MSMT_LEADCHNL_LV_IMPEDANCE_VALUE: 418 OHM
MDC_IDC_MSMT_LEADCHNL_LV_IMPEDANCE_VALUE: 418 OHM
MDC_IDC_MSMT_LEADCHNL_LV_IMPEDANCE_VALUE: 513 OHM
MDC_IDC_MSMT_LEADCHNL_LV_IMPEDANCE_VALUE: 570 OHM
MDC_IDC_MSMT_LEADCHNL_LV_IMPEDANCE_VALUE: 722 OHM
MDC_IDC_MSMT_LEADCHNL_LV_IMPEDANCE_VALUE: 722 OHM
MDC_IDC_MSMT_LEADCHNL_LV_IMPEDANCE_VALUE: 817 OHM
MDC_IDC_MSMT_LEADCHNL_LV_IMPEDANCE_VALUE: 855 OHM
MDC_IDC_MSMT_LEADCHNL_LV_IMPEDANCE_VALUE: 912 OHM
MDC_IDC_MSMT_LEADCHNL_LV_IMPEDANCE_VALUE: 912 OHM
MDC_IDC_MSMT_LEADCHNL_LV_PACING_THRESHOLD_AMPLITUDE: 0.62 V
MDC_IDC_MSMT_LEADCHNL_LV_PACING_THRESHOLD_AMPLITUDE: 0.75 V
MDC_IDC_MSMT_LEADCHNL_LV_PACING_THRESHOLD_PULSEWIDTH: 0.4 MS
MDC_IDC_MSMT_LEADCHNL_LV_PACING_THRESHOLD_PULSEWIDTH: 0.4 MS
MDC_IDC_MSMT_LEADCHNL_RA_PACING_THRESHOLD_AMPLITUDE: 0.5 V
MDC_IDC_MSMT_LEADCHNL_RA_PACING_THRESHOLD_PULSEWIDTH: 0.4 MS
MDC_IDC_MSMT_LEADCHNL_RA_SENSING_INTR_AMPL: 0.25 MV
MDC_IDC_MSMT_LEADCHNL_RA_SENSING_INTR_AMPL: 0.38 MV
MDC_IDC_MSMT_LEADCHNL_RV_IMPEDANCE_VALUE: 380 OHM
MDC_IDC_MSMT_LEADCHNL_RV_IMPEDANCE_VALUE: 399 OHM
MDC_IDC_MSMT_LEADCHNL_RV_IMPEDANCE_VALUE: 437 OHM
MDC_IDC_MSMT_LEADCHNL_RV_IMPEDANCE_VALUE: 437 OHM
MDC_IDC_MSMT_LEADCHNL_RV_PACING_THRESHOLD_AMPLITUDE: 0.5 V
MDC_IDC_MSMT_LEADCHNL_RV_PACING_THRESHOLD_AMPLITUDE: 0.5 V
MDC_IDC_MSMT_LEADCHNL_RV_PACING_THRESHOLD_PULSEWIDTH: 0.4 MS
MDC_IDC_MSMT_LEADCHNL_RV_PACING_THRESHOLD_PULSEWIDTH: 0.4 MS
MDC_IDC_MSMT_LEADCHNL_RV_SENSING_INTR_AMPL: 11.5 MV
MDC_IDC_MSMT_LEADCHNL_RV_SENSING_INTR_AMPL: 11.5 MV
MDC_IDC_MSMT_LEADCHNL_RV_SENSING_INTR_AMPL: 11.62 MV
MDC_IDC_PG_IMPLANT_DTM: NORMAL
MDC_IDC_PG_MFG: NORMAL
MDC_IDC_PG_MODEL: NORMAL
MDC_IDC_PG_SERIAL: NORMAL
MDC_IDC_PG_TYPE: NORMAL
MDC_IDC_SESS_CLINIC_NAME: NORMAL
MDC_IDC_SESS_DTM: NORMAL
MDC_IDC_SESS_TYPE: NORMAL
MDC_IDC_SET_BRADY_LOWRATE: 60 {BEATS}/MIN
MDC_IDC_SET_BRADY_MAX_SENSOR_RATE: 130 {BEATS}/MIN
MDC_IDC_SET_BRADY_MODE: NORMAL
MDC_IDC_SET_CRT_LVRV_DELAY: 0 MS
MDC_IDC_SET_CRT_PACED_CHAMBERS: NORMAL
MDC_IDC_SET_LEADCHNL_LV_PACING_AMPLITUDE: NORMAL
MDC_IDC_SET_LEADCHNL_LV_PACING_ANODE_ELECTRODE_1: NORMAL
MDC_IDC_SET_LEADCHNL_LV_PACING_ANODE_LOCATION_1: NORMAL
MDC_IDC_SET_LEADCHNL_LV_PACING_CAPTURE_MODE: NORMAL
MDC_IDC_SET_LEADCHNL_LV_PACING_CATHODE_ELECTRODE_1: NORMAL
MDC_IDC_SET_LEADCHNL_LV_PACING_CATHODE_LOCATION_1: NORMAL
MDC_IDC_SET_LEADCHNL_LV_PACING_POLARITY: NORMAL
MDC_IDC_SET_LEADCHNL_LV_PACING_PULSEWIDTH: 0.4 MS
MDC_IDC_SET_LEADCHNL_RA_SENSING_ANODE_ELECTRODE_1: NORMAL
MDC_IDC_SET_LEADCHNL_RA_SENSING_ANODE_LOCATION_1: NORMAL
MDC_IDC_SET_LEADCHNL_RA_SENSING_CATHODE_ELECTRODE_1: NORMAL
MDC_IDC_SET_LEADCHNL_RA_SENSING_CATHODE_LOCATION_1: NORMAL
MDC_IDC_SET_LEADCHNL_RA_SENSING_POLARITY: NORMAL
MDC_IDC_SET_LEADCHNL_RA_SENSING_SENSITIVITY: NORMAL
MDC_IDC_SET_LEADCHNL_RV_PACING_AMPLITUDE: NORMAL
MDC_IDC_SET_LEADCHNL_RV_PACING_ANODE_ELECTRODE_1: NORMAL
MDC_IDC_SET_LEADCHNL_RV_PACING_ANODE_LOCATION_1: NORMAL
MDC_IDC_SET_LEADCHNL_RV_PACING_CAPTURE_MODE: NORMAL
MDC_IDC_SET_LEADCHNL_RV_PACING_CATHODE_ELECTRODE_1: NORMAL
MDC_IDC_SET_LEADCHNL_RV_PACING_CATHODE_LOCATION_1: NORMAL
MDC_IDC_SET_LEADCHNL_RV_PACING_POLARITY: NORMAL
MDC_IDC_SET_LEADCHNL_RV_PACING_PULSEWIDTH: 0.4 MS
MDC_IDC_SET_LEADCHNL_RV_SENSING_ANODE_ELECTRODE_1: NORMAL
MDC_IDC_SET_LEADCHNL_RV_SENSING_ANODE_LOCATION_1: NORMAL
MDC_IDC_SET_LEADCHNL_RV_SENSING_CATHODE_ELECTRODE_1: NORMAL
MDC_IDC_SET_LEADCHNL_RV_SENSING_CATHODE_LOCATION_1: NORMAL
MDC_IDC_SET_LEADCHNL_RV_SENSING_POLARITY: NORMAL
MDC_IDC_SET_LEADCHNL_RV_SENSING_SENSITIVITY: 0.9 MV
MDC_IDC_SET_ZONE_DETECTION_INTERVAL: 400 MS
MDC_IDC_SET_ZONE_TYPE: NORMAL
MDC_IDC_STAT_BRADY_AP_VP_PERCENT: 0 %
MDC_IDC_STAT_BRADY_AP_VS_PERCENT: 0 %
MDC_IDC_STAT_BRADY_AS_VP_PERCENT: 99.61 %
MDC_IDC_STAT_BRADY_AS_VS_PERCENT: 0.39 %
MDC_IDC_STAT_BRADY_DTM_END: NORMAL
MDC_IDC_STAT_BRADY_DTM_START: NORMAL
MDC_IDC_STAT_BRADY_RA_PERCENT_PACED: 0 %
MDC_IDC_STAT_BRADY_RV_PERCENT_PACED: 99.6 %
MDC_IDC_STAT_CRT_DTM_END: NORMAL
MDC_IDC_STAT_CRT_DTM_START: NORMAL
MDC_IDC_STAT_CRT_LV_PERCENT_PACED: 99.57 %
MDC_IDC_STAT_CRT_PERCENT_PACED: 99.57 %
MDC_IDC_STAT_EPISODE_RECENT_COUNT: 0
MDC_IDC_STAT_EPISODE_RECENT_COUNT_DTM_END: NORMAL
MDC_IDC_STAT_EPISODE_RECENT_COUNT_DTM_START: NORMAL
MDC_IDC_STAT_EPISODE_TOTAL_COUNT: 0
MDC_IDC_STAT_EPISODE_TOTAL_COUNT: 1
MDC_IDC_STAT_EPISODE_TOTAL_COUNT: 460
MDC_IDC_STAT_EPISODE_TOTAL_COUNT_DTM_END: NORMAL
MDC_IDC_STAT_EPISODE_TOTAL_COUNT_DTM_START: NORMAL
MDC_IDC_STAT_EPISODE_TYPE: NORMAL

## 2022-12-18 ENCOUNTER — HEALTH MAINTENANCE LETTER (OUTPATIENT)
Age: 77
End: 2022-12-18

## 2023-01-30 ENCOUNTER — TRANSFERRED RECORDS (OUTPATIENT)
Dept: HEALTH INFORMATION MANAGEMENT | Facility: CLINIC | Age: 78
End: 2023-01-30

## 2023-02-03 ENCOUNTER — ANCILLARY PROCEDURE (OUTPATIENT)
Dept: CARDIOLOGY | Facility: CLINIC | Age: 78
End: 2023-02-03
Attending: INTERNAL MEDICINE
Payer: MEDICARE

## 2023-02-03 DIAGNOSIS — I49.5 SICK SINUS SYNDROME (H): ICD-10-CM

## 2023-02-03 DIAGNOSIS — I50.9 CONGESTIVE HEART FAILURE (CHF) (H): ICD-10-CM

## 2023-02-03 DIAGNOSIS — Z95.0 PACEMAKER: ICD-10-CM

## 2023-02-06 LAB
MDC_IDC_EPISODE_DTM: NORMAL
MDC_IDC_EPISODE_DURATION: 10 S
MDC_IDC_EPISODE_DURATION: 12 S
MDC_IDC_EPISODE_DURATION: 13 S
MDC_IDC_EPISODE_DURATION: 16 S
MDC_IDC_EPISODE_DURATION: 17 S
MDC_IDC_EPISODE_DURATION: 9 S
MDC_IDC_EPISODE_DURATION: 9 S
MDC_IDC_EPISODE_ID: 1324
MDC_IDC_EPISODE_ID: 1325
MDC_IDC_EPISODE_ID: 1326
MDC_IDC_EPISODE_ID: 1327
MDC_IDC_EPISODE_ID: 1328
MDC_IDC_EPISODE_ID: 1329
MDC_IDC_EPISODE_ID: 1330
MDC_IDC_EPISODE_TYPE: NORMAL
MDC_IDC_LEAD_IMPLANT_DT: NORMAL
MDC_IDC_LEAD_LOCATION: NORMAL
MDC_IDC_LEAD_LOCATION_DETAIL_1: NORMAL
MDC_IDC_LEAD_MFG: NORMAL
MDC_IDC_LEAD_MODEL: NORMAL
MDC_IDC_LEAD_POLARITY_TYPE: NORMAL
MDC_IDC_LEAD_SERIAL: NORMAL
MDC_IDC_MSMT_BATTERY_DTM: NORMAL
MDC_IDC_MSMT_BATTERY_REMAINING_LONGEVITY: 101 MO
MDC_IDC_MSMT_BATTERY_RRT_TRIGGER: 2.6
MDC_IDC_MSMT_BATTERY_STATUS: NORMAL
MDC_IDC_MSMT_BATTERY_VOLTAGE: 2.99 V
MDC_IDC_MSMT_LEADCHNL_LV_IMPEDANCE_VALUE: 342 OHM
MDC_IDC_MSMT_LEADCHNL_LV_IMPEDANCE_VALUE: 361 OHM
MDC_IDC_MSMT_LEADCHNL_LV_IMPEDANCE_VALUE: 380 OHM
MDC_IDC_MSMT_LEADCHNL_LV_IMPEDANCE_VALUE: 418 OHM
MDC_IDC_MSMT_LEADCHNL_LV_IMPEDANCE_VALUE: 513 OHM
MDC_IDC_MSMT_LEADCHNL_LV_IMPEDANCE_VALUE: 646 OHM
MDC_IDC_MSMT_LEADCHNL_LV_IMPEDANCE_VALUE: 646 OHM
MDC_IDC_MSMT_LEADCHNL_LV_IMPEDANCE_VALUE: 722 OHM
MDC_IDC_MSMT_LEADCHNL_LV_IMPEDANCE_VALUE: 741 OHM
MDC_IDC_MSMT_LEADCHNL_LV_IMPEDANCE_VALUE: 817 OHM
MDC_IDC_MSMT_LEADCHNL_LV_PACING_THRESHOLD_AMPLITUDE: 0.62 V
MDC_IDC_MSMT_LEADCHNL_LV_PACING_THRESHOLD_PULSEWIDTH: 0.4 MS
MDC_IDC_MSMT_LEADCHNL_RA_IMPEDANCE_VALUE: 323 OHM
MDC_IDC_MSMT_LEADCHNL_RA_IMPEDANCE_VALUE: 399 OHM
MDC_IDC_MSMT_LEADCHNL_RA_PACING_THRESHOLD_AMPLITUDE: 0.5 V
MDC_IDC_MSMT_LEADCHNL_RA_PACING_THRESHOLD_PULSEWIDTH: 0.4 MS
MDC_IDC_MSMT_LEADCHNL_RA_SENSING_INTR_AMPL: 0.25 MV
MDC_IDC_MSMT_LEADCHNL_RA_SENSING_INTR_AMPL: 0.38 MV
MDC_IDC_MSMT_LEADCHNL_RV_IMPEDANCE_VALUE: 342 OHM
MDC_IDC_MSMT_LEADCHNL_RV_IMPEDANCE_VALUE: 380 OHM
MDC_IDC_MSMT_LEADCHNL_RV_PACING_THRESHOLD_AMPLITUDE: 0.5 V
MDC_IDC_MSMT_LEADCHNL_RV_PACING_THRESHOLD_PULSEWIDTH: 0.4 MS
MDC_IDC_MSMT_LEADCHNL_RV_SENSING_INTR_AMPL: 8.62 MV
MDC_IDC_MSMT_LEADCHNL_RV_SENSING_INTR_AMPL: 8.62 MV
MDC_IDC_PG_IMPLANT_DTM: NORMAL
MDC_IDC_PG_MFG: NORMAL
MDC_IDC_PG_MODEL: NORMAL
MDC_IDC_PG_SERIAL: NORMAL
MDC_IDC_PG_TYPE: NORMAL
MDC_IDC_SESS_CLINIC_NAME: NORMAL
MDC_IDC_SESS_DTM: NORMAL
MDC_IDC_SESS_TYPE: NORMAL
MDC_IDC_SET_BRADY_LOWRATE: 60 {BEATS}/MIN
MDC_IDC_SET_BRADY_MAX_SENSOR_RATE: 130 {BEATS}/MIN
MDC_IDC_SET_BRADY_MODE: NORMAL
MDC_IDC_SET_CRT_LVRV_DELAY: 0 MS
MDC_IDC_SET_CRT_PACED_CHAMBERS: NORMAL
MDC_IDC_SET_LEADCHNL_LV_PACING_AMPLITUDE: 1.75 V
MDC_IDC_SET_LEADCHNL_LV_PACING_ANODE_ELECTRODE_1: NORMAL
MDC_IDC_SET_LEADCHNL_LV_PACING_ANODE_LOCATION_1: NORMAL
MDC_IDC_SET_LEADCHNL_LV_PACING_CAPTURE_MODE: NORMAL
MDC_IDC_SET_LEADCHNL_LV_PACING_CATHODE_ELECTRODE_1: NORMAL
MDC_IDC_SET_LEADCHNL_LV_PACING_CATHODE_LOCATION_1: NORMAL
MDC_IDC_SET_LEADCHNL_LV_PACING_POLARITY: NORMAL
MDC_IDC_SET_LEADCHNL_LV_PACING_PULSEWIDTH: 0.4 MS
MDC_IDC_SET_LEADCHNL_RA_SENSING_ANODE_ELECTRODE_1: NORMAL
MDC_IDC_SET_LEADCHNL_RA_SENSING_ANODE_LOCATION_1: NORMAL
MDC_IDC_SET_LEADCHNL_RA_SENSING_CATHODE_ELECTRODE_1: NORMAL
MDC_IDC_SET_LEADCHNL_RA_SENSING_CATHODE_LOCATION_1: NORMAL
MDC_IDC_SET_LEADCHNL_RA_SENSING_POLARITY: NORMAL
MDC_IDC_SET_LEADCHNL_RA_SENSING_SENSITIVITY: NORMAL
MDC_IDC_SET_LEADCHNL_RV_PACING_AMPLITUDE: 1.5 V
MDC_IDC_SET_LEADCHNL_RV_PACING_ANODE_ELECTRODE_1: NORMAL
MDC_IDC_SET_LEADCHNL_RV_PACING_ANODE_LOCATION_1: NORMAL
MDC_IDC_SET_LEADCHNL_RV_PACING_CAPTURE_MODE: NORMAL
MDC_IDC_SET_LEADCHNL_RV_PACING_CATHODE_ELECTRODE_1: NORMAL
MDC_IDC_SET_LEADCHNL_RV_PACING_CATHODE_LOCATION_1: NORMAL
MDC_IDC_SET_LEADCHNL_RV_PACING_POLARITY: NORMAL
MDC_IDC_SET_LEADCHNL_RV_PACING_PULSEWIDTH: 0.4 MS
MDC_IDC_SET_LEADCHNL_RV_SENSING_ANODE_ELECTRODE_1: NORMAL
MDC_IDC_SET_LEADCHNL_RV_SENSING_ANODE_LOCATION_1: NORMAL
MDC_IDC_SET_LEADCHNL_RV_SENSING_CATHODE_ELECTRODE_1: NORMAL
MDC_IDC_SET_LEADCHNL_RV_SENSING_CATHODE_LOCATION_1: NORMAL
MDC_IDC_SET_LEADCHNL_RV_SENSING_POLARITY: NORMAL
MDC_IDC_SET_LEADCHNL_RV_SENSING_SENSITIVITY: 0.9 MV
MDC_IDC_SET_ZONE_DETECTION_INTERVAL: 400 MS
MDC_IDC_SET_ZONE_TYPE: NORMAL
MDC_IDC_STAT_BRADY_AP_VP_PERCENT: 0 %
MDC_IDC_STAT_BRADY_AP_VS_PERCENT: 0 %
MDC_IDC_STAT_BRADY_AS_VP_PERCENT: 99.52 %
MDC_IDC_STAT_BRADY_AS_VS_PERCENT: 0.48 %
MDC_IDC_STAT_BRADY_DTM_END: NORMAL
MDC_IDC_STAT_BRADY_DTM_START: NORMAL
MDC_IDC_STAT_BRADY_RA_PERCENT_PACED: 0 %
MDC_IDC_STAT_BRADY_RV_PERCENT_PACED: 99.52 %
MDC_IDC_STAT_CRT_DTM_END: NORMAL
MDC_IDC_STAT_CRT_DTM_START: NORMAL
MDC_IDC_STAT_CRT_LV_PERCENT_PACED: 99.5 %
MDC_IDC_STAT_CRT_PERCENT_PACED: 99.49 %
MDC_IDC_STAT_EPISODE_RECENT_COUNT: 0
MDC_IDC_STAT_EPISODE_RECENT_COUNT_DTM_END: NORMAL
MDC_IDC_STAT_EPISODE_RECENT_COUNT_DTM_START: NORMAL
MDC_IDC_STAT_EPISODE_TOTAL_COUNT: 0
MDC_IDC_STAT_EPISODE_TOTAL_COUNT: 1
MDC_IDC_STAT_EPISODE_TOTAL_COUNT: 460
MDC_IDC_STAT_EPISODE_TOTAL_COUNT_DTM_END: NORMAL
MDC_IDC_STAT_EPISODE_TOTAL_COUNT_DTM_START: NORMAL
MDC_IDC_STAT_EPISODE_TYPE: NORMAL

## 2023-02-06 PROCEDURE — 93296 REM INTERROG EVL PM/IDS: CPT | Performed by: INTERNAL MEDICINE

## 2023-02-06 PROCEDURE — 93294 REM INTERROG EVL PM/LDLS PM: CPT | Performed by: INTERNAL MEDICINE

## 2023-04-09 ENCOUNTER — HEALTH MAINTENANCE LETTER (OUTPATIENT)
Age: 78
End: 2023-04-09

## 2023-04-25 ENCOUNTER — TRANSFERRED RECORDS (OUTPATIENT)
Dept: HEALTH INFORMATION MANAGEMENT | Facility: CLINIC | Age: 78
End: 2023-04-25
Payer: MEDICARE

## 2023-05-12 ENCOUNTER — ANCILLARY PROCEDURE (OUTPATIENT)
Dept: CARDIOLOGY | Facility: CLINIC | Age: 78
End: 2023-05-12
Attending: INTERNAL MEDICINE
Payer: MEDICARE

## 2023-05-12 DIAGNOSIS — I49.5 SICK SINUS SYNDROME (H): ICD-10-CM

## 2023-05-12 DIAGNOSIS — I50.9 CONGESTIVE HEART FAILURE (CHF) (H): ICD-10-CM

## 2023-05-12 DIAGNOSIS — Z95.0 PACEMAKER: ICD-10-CM

## 2023-05-12 LAB
MDC_IDC_EPISODE_DTM: NORMAL
MDC_IDC_EPISODE_DURATION: 13 S
MDC_IDC_EPISODE_DURATION: 17 S
MDC_IDC_EPISODE_DURATION: 22 S
MDC_IDC_EPISODE_DURATION: 6 S
MDC_IDC_EPISODE_DURATION: 8 S
MDC_IDC_EPISODE_ID: 1331
MDC_IDC_EPISODE_ID: 1332
MDC_IDC_EPISODE_ID: 1333
MDC_IDC_EPISODE_ID: 1334
MDC_IDC_EPISODE_ID: 1335
MDC_IDC_EPISODE_ID: 36
MDC_IDC_EPISODE_TYPE: NORMAL
MDC_IDC_LEAD_IMPLANT_DT: NORMAL
MDC_IDC_LEAD_LOCATION: NORMAL
MDC_IDC_LEAD_LOCATION_DETAIL_1: NORMAL
MDC_IDC_LEAD_MFG: NORMAL
MDC_IDC_LEAD_MODEL: NORMAL
MDC_IDC_LEAD_POLARITY_TYPE: NORMAL
MDC_IDC_LEAD_SERIAL: NORMAL
MDC_IDC_MSMT_BATTERY_DTM: NORMAL
MDC_IDC_MSMT_BATTERY_REMAINING_LONGEVITY: 97 MO
MDC_IDC_MSMT_BATTERY_RRT_TRIGGER: 2.6
MDC_IDC_MSMT_BATTERY_STATUS: NORMAL
MDC_IDC_MSMT_BATTERY_VOLTAGE: 2.99 V
MDC_IDC_MSMT_LEADCHNL_LV_IMPEDANCE_VALUE: 380 OHM
MDC_IDC_MSMT_LEADCHNL_LV_IMPEDANCE_VALUE: 399 OHM
MDC_IDC_MSMT_LEADCHNL_LV_IMPEDANCE_VALUE: 437 OHM
MDC_IDC_MSMT_LEADCHNL_LV_IMPEDANCE_VALUE: 475 OHM
MDC_IDC_MSMT_LEADCHNL_LV_IMPEDANCE_VALUE: 551 OHM
MDC_IDC_MSMT_LEADCHNL_LV_IMPEDANCE_VALUE: 684 OHM
MDC_IDC_MSMT_LEADCHNL_LV_IMPEDANCE_VALUE: 722 OHM
MDC_IDC_MSMT_LEADCHNL_LV_IMPEDANCE_VALUE: 836 OHM
MDC_IDC_MSMT_LEADCHNL_LV_IMPEDANCE_VALUE: 855 OHM
MDC_IDC_MSMT_LEADCHNL_LV_IMPEDANCE_VALUE: 874 OHM
MDC_IDC_MSMT_LEADCHNL_LV_PACING_THRESHOLD_AMPLITUDE: 0.88 V
MDC_IDC_MSMT_LEADCHNL_LV_PACING_THRESHOLD_PULSEWIDTH: 0.4 MS
MDC_IDC_MSMT_LEADCHNL_RA_IMPEDANCE_VALUE: 323 OHM
MDC_IDC_MSMT_LEADCHNL_RA_IMPEDANCE_VALUE: 399 OHM
MDC_IDC_MSMT_LEADCHNL_RA_PACING_THRESHOLD_AMPLITUDE: 0.5 V
MDC_IDC_MSMT_LEADCHNL_RA_PACING_THRESHOLD_PULSEWIDTH: 0.4 MS
MDC_IDC_MSMT_LEADCHNL_RA_SENSING_INTR_AMPL: 0.25 MV
MDC_IDC_MSMT_LEADCHNL_RA_SENSING_INTR_AMPL: 0.38 MV
MDC_IDC_MSMT_LEADCHNL_RV_IMPEDANCE_VALUE: 342 OHM
MDC_IDC_MSMT_LEADCHNL_RV_IMPEDANCE_VALUE: 380 OHM
MDC_IDC_MSMT_LEADCHNL_RV_PACING_THRESHOLD_AMPLITUDE: 0.5 V
MDC_IDC_MSMT_LEADCHNL_RV_PACING_THRESHOLD_PULSEWIDTH: 0.4 MS
MDC_IDC_MSMT_LEADCHNL_RV_SENSING_INTR_AMPL: 9.38 MV
MDC_IDC_MSMT_LEADCHNL_RV_SENSING_INTR_AMPL: 9.38 MV
MDC_IDC_PG_IMPLANT_DTM: NORMAL
MDC_IDC_PG_MFG: NORMAL
MDC_IDC_PG_MODEL: NORMAL
MDC_IDC_PG_SERIAL: NORMAL
MDC_IDC_PG_TYPE: NORMAL
MDC_IDC_SESS_CLINIC_NAME: NORMAL
MDC_IDC_SESS_DTM: NORMAL
MDC_IDC_SESS_TYPE: NORMAL
MDC_IDC_SET_BRADY_LOWRATE: 60 {BEATS}/MIN
MDC_IDC_SET_BRADY_MAX_SENSOR_RATE: 130 {BEATS}/MIN
MDC_IDC_SET_BRADY_MODE: NORMAL
MDC_IDC_SET_CRT_LVRV_DELAY: 0 MS
MDC_IDC_SET_CRT_PACED_CHAMBERS: NORMAL
MDC_IDC_SET_LEADCHNL_LV_PACING_AMPLITUDE: 2 V
MDC_IDC_SET_LEADCHNL_LV_PACING_ANODE_ELECTRODE_1: NORMAL
MDC_IDC_SET_LEADCHNL_LV_PACING_ANODE_LOCATION_1: NORMAL
MDC_IDC_SET_LEADCHNL_LV_PACING_CAPTURE_MODE: NORMAL
MDC_IDC_SET_LEADCHNL_LV_PACING_CATHODE_ELECTRODE_1: NORMAL
MDC_IDC_SET_LEADCHNL_LV_PACING_CATHODE_LOCATION_1: NORMAL
MDC_IDC_SET_LEADCHNL_LV_PACING_POLARITY: NORMAL
MDC_IDC_SET_LEADCHNL_LV_PACING_PULSEWIDTH: 0.4 MS
MDC_IDC_SET_LEADCHNL_RA_SENSING_ANODE_ELECTRODE_1: NORMAL
MDC_IDC_SET_LEADCHNL_RA_SENSING_ANODE_LOCATION_1: NORMAL
MDC_IDC_SET_LEADCHNL_RA_SENSING_CATHODE_ELECTRODE_1: NORMAL
MDC_IDC_SET_LEADCHNL_RA_SENSING_CATHODE_LOCATION_1: NORMAL
MDC_IDC_SET_LEADCHNL_RA_SENSING_POLARITY: NORMAL
MDC_IDC_SET_LEADCHNL_RA_SENSING_SENSITIVITY: NORMAL
MDC_IDC_SET_LEADCHNL_RV_PACING_AMPLITUDE: 1.5 V
MDC_IDC_SET_LEADCHNL_RV_PACING_ANODE_ELECTRODE_1: NORMAL
MDC_IDC_SET_LEADCHNL_RV_PACING_ANODE_LOCATION_1: NORMAL
MDC_IDC_SET_LEADCHNL_RV_PACING_CAPTURE_MODE: NORMAL
MDC_IDC_SET_LEADCHNL_RV_PACING_CATHODE_ELECTRODE_1: NORMAL
MDC_IDC_SET_LEADCHNL_RV_PACING_CATHODE_LOCATION_1: NORMAL
MDC_IDC_SET_LEADCHNL_RV_PACING_POLARITY: NORMAL
MDC_IDC_SET_LEADCHNL_RV_PACING_PULSEWIDTH: 0.4 MS
MDC_IDC_SET_LEADCHNL_RV_SENSING_ANODE_ELECTRODE_1: NORMAL
MDC_IDC_SET_LEADCHNL_RV_SENSING_ANODE_LOCATION_1: NORMAL
MDC_IDC_SET_LEADCHNL_RV_SENSING_CATHODE_ELECTRODE_1: NORMAL
MDC_IDC_SET_LEADCHNL_RV_SENSING_CATHODE_LOCATION_1: NORMAL
MDC_IDC_SET_LEADCHNL_RV_SENSING_POLARITY: NORMAL
MDC_IDC_SET_LEADCHNL_RV_SENSING_SENSITIVITY: 0.9 MV
MDC_IDC_SET_ZONE_DETECTION_INTERVAL: 400 MS
MDC_IDC_SET_ZONE_TYPE: NORMAL
MDC_IDC_STAT_BRADY_AP_VP_PERCENT: 0 %
MDC_IDC_STAT_BRADY_AP_VS_PERCENT: 0 %
MDC_IDC_STAT_BRADY_AS_VP_PERCENT: 99.78 %
MDC_IDC_STAT_BRADY_AS_VS_PERCENT: 0.22 %
MDC_IDC_STAT_BRADY_DTM_END: NORMAL
MDC_IDC_STAT_BRADY_DTM_START: NORMAL
MDC_IDC_STAT_BRADY_RA_PERCENT_PACED: 0 %
MDC_IDC_STAT_BRADY_RV_PERCENT_PACED: 99.77 %
MDC_IDC_STAT_CRT_DTM_END: NORMAL
MDC_IDC_STAT_CRT_DTM_START: NORMAL
MDC_IDC_STAT_CRT_LV_PERCENT_PACED: 99.75 %
MDC_IDC_STAT_CRT_PERCENT_PACED: 99.75 %
MDC_IDC_STAT_EPISODE_RECENT_COUNT: 0
MDC_IDC_STAT_EPISODE_RECENT_COUNT_DTM_END: NORMAL
MDC_IDC_STAT_EPISODE_RECENT_COUNT_DTM_START: NORMAL
MDC_IDC_STAT_EPISODE_TOTAL_COUNT: 0
MDC_IDC_STAT_EPISODE_TOTAL_COUNT: 1
MDC_IDC_STAT_EPISODE_TOTAL_COUNT: 460
MDC_IDC_STAT_EPISODE_TOTAL_COUNT_DTM_END: NORMAL
MDC_IDC_STAT_EPISODE_TOTAL_COUNT_DTM_START: NORMAL
MDC_IDC_STAT_EPISODE_TYPE: NORMAL

## 2023-05-12 PROCEDURE — 93294 REM INTERROG EVL PM/LDLS PM: CPT | Performed by: INTERNAL MEDICINE

## 2023-05-12 PROCEDURE — 93296 REM INTERROG EVL PM/IDS: CPT | Performed by: INTERNAL MEDICINE

## 2023-05-15 ENCOUNTER — TRANSFERRED RECORDS (OUTPATIENT)
Dept: HEALTH INFORMATION MANAGEMENT | Facility: CLINIC | Age: 78
End: 2023-05-15
Payer: MEDICARE

## 2023-05-16 ENCOUNTER — TELEPHONE (OUTPATIENT)
Dept: MULTI SPECIALTY CLINIC | Facility: CLINIC | Age: 78
End: 2023-05-16
Payer: MEDICARE

## 2023-05-16 ENCOUNTER — MEDICAL CORRESPONDENCE (OUTPATIENT)
Dept: HEALTH INFORMATION MANAGEMENT | Facility: CLINIC | Age: 78
End: 2023-05-16
Payer: MEDICARE

## 2023-05-16 NOTE — TELEPHONE ENCOUNTER
External referral from [facility], [phone]  Referring: Dr. Moni Mcclain  DX: Peripheral vascular disease, Toe infection, Diabetes    Referral and records received on 05.16 at 8:25am in ID consult folder, please review and advise on scheduling.

## 2023-05-23 NOTE — TELEPHONE ENCOUNTER
Spoke with pt and appt scheduled with Dr. Goss for next week 5/30. Pt said that he does have some records from [place]. He is also wondering what he should do since he will be out of his antibiotics the end of this week, he is on Doxycycline for 90days prescribed by the ID provider in [place].

## 2023-05-23 NOTE — TELEPHONE ENCOUNTER
We do have notes from [facility] [place], which states that the pt had osteomyelitis this winter in [place] and he has those records. Please ensure that the pt has these records, including labs, imaging, and tx. If he does, ok to schedule first available.

## 2023-05-26 ENCOUNTER — OFFICE VISIT (OUTPATIENT)
Dept: CARDIOLOGY | Facility: CLINIC | Age: 78
End: 2023-05-26
Attending: INTERNAL MEDICINE
Payer: MEDICARE

## 2023-05-26 VITALS
WEIGHT: 208 LBS | OXYGEN SATURATION: 96 % | RESPIRATION RATE: 16 BRPM | BODY MASS INDEX: 29.84 KG/M2 | HEART RATE: 65 BPM | SYSTOLIC BLOOD PRESSURE: 114 MMHG | DIASTOLIC BLOOD PRESSURE: 56 MMHG

## 2023-05-26 DIAGNOSIS — I73.9 CLAUDICATION OF BOTH LOWER EXTREMITIES (H): ICD-10-CM

## 2023-05-26 DIAGNOSIS — I50.23 ACUTE ON CHRONIC SYSTOLIC CONGESTIVE HEART FAILURE (H): Primary | ICD-10-CM

## 2023-05-26 DIAGNOSIS — I50.20 HEART FAILURE WITH REDUCED EJECTION FRACTION (H): ICD-10-CM

## 2023-05-26 LAB
ANION GAP SERPL CALCULATED.3IONS-SCNC: 13 MMOL/L (ref 7–15)
BUN SERPL-MCNC: 23.2 MG/DL (ref 8–23)
CALCIUM SERPL-MCNC: 9.4 MG/DL (ref 8.8–10.2)
CHLORIDE SERPL-SCNC: 102 MMOL/L (ref 98–107)
CREAT SERPL-MCNC: 1.13 MG/DL (ref 0.67–1.17)
DEPRECATED HCO3 PLAS-SCNC: 26 MMOL/L (ref 22–29)
GFR SERPL CREATININE-BSD FRML MDRD: 67 ML/MIN/1.73M2
GLUCOSE SERPL-MCNC: 129 MG/DL (ref 70–99)
NT-PROBNP SERPL-MCNC: 792 PG/ML (ref 0–1800)
POTASSIUM SERPL-SCNC: 4.2 MMOL/L (ref 3.4–5.3)
SODIUM SERPL-SCNC: 141 MMOL/L (ref 136–145)

## 2023-05-26 PROCEDURE — 83880 ASSAY OF NATRIURETIC PEPTIDE: CPT | Performed by: INTERNAL MEDICINE

## 2023-05-26 PROCEDURE — 36415 COLL VENOUS BLD VENIPUNCTURE: CPT | Performed by: INTERNAL MEDICINE

## 2023-05-26 PROCEDURE — 80048 BASIC METABOLIC PNL TOTAL CA: CPT | Performed by: INTERNAL MEDICINE

## 2023-05-26 PROCEDURE — 99214 OFFICE O/P EST MOD 30 MIN: CPT | Performed by: INTERNAL MEDICINE

## 2023-05-26 RX ORDER — DOXYCYCLINE 100 MG/1
100 CAPSULE ORAL 2 TIMES DAILY
COMMUNITY
Start: 2023-03-24 | End: 2024-04-03

## 2023-05-26 RX ORDER — GLIMEPIRIDE 4 MG/1
4 TABLET ORAL
COMMUNITY

## 2023-05-26 NOTE — LETTER
5/26/2023    Moni Mcclain MD  [address]  [facility] [address]    RE: Thomas Steve       Dear Colleague,     I had the pleasure of seeing Thomas Steve in the [facility].      Cardiology Progress Note     Assessment:  Coronary artery disease, status post coronary artery bypass graft surgery in 2011, stable, no angina, negative stress test  Chronic systolic heart failure due to ischemic cardiomyopathy with mildly depressed LV systolic function, clinically no fluid overload  Permanent atrial fibrillation,  controlled ventricular response, on anticoagulation  History of atrial flutter  Biventricular pacemaker, normal function  Right bundle branch block  Aortic stenosis mild  Tricuspid regurgitation moderate to severe  Hypercholesterolemia, good control  Diabetes mellitus  Sleep apnea on CPAP  Spinal stenosis status postlaminectomy  Osteomyelitis right foot  Peripheral arterial disease involving lower extremities with claudication    Plan:  Referral to vascular surgeon  Check BMP and BNP today and then adjust diuretics accordingly  Follow-up based on the results of the tests      Subjective:   This is 78 year old male who comes in today for routine follow-up.  He spent winter in [place] back.  He developed ulceration and then osteomyelitis of right foot.  He was seen by cardiologist down there.  He had echo which showed pretty much unchanged findings from our evaluation in [place].  He also underwent vascular studies which show severe bilateral peripheral arterial disease.  He did not see vascular surgeon in [place].  He is now back in [place].  He has upcoming appointment with infectious diseases doctor.    He gets short of breath but denies PND, orthopnea.  His weight has not changed significantly.  He has not had chest pains.  He has not had heart palpitations or syncope.    Review of Systems:   Negative other than history of present illness    Objective:   BP  114/56 (BP Location: Right arm, Patient Position: Sitting, Cuff Size: Adult Large)   Pulse 65   Resp 16   Wt 94.3 kg (208 lb)   SpO2 96%   BMI 29.84 kg/m    Physical Exam:  GENERAL: no distress  NECK: No JVD  LUNGS: Clear to auscultation.  CARDIAC: regular rhythm, S1 & S2 normal.  No heaves, thrills, gallops or murmurs.  ABDOMEN: flat, negative hepatosplenomegaly, soft and non-tender.  EXTREMITIES: No evidence of cyanosis, clubbing 1+ bilateral lower extremity edema.    Current Outpatient Medications   Medication Sig Dispense Refill    acetaminophen (TYLENOL) 500 MG tablet Take 1,000-1,500 mg by mouth every 6 hours as needed for mild pain      bumetanide (BUMEX) 1 MG tablet Take 2 tablets (2 mg) by mouth daily 180 tablet 3    cholecalciferol 50 MCG (2000 UT) CAPS Take 2 capsules by mouth daily      coenzyme Q-10 200 MG CAPS capsule Take 200 mg by mouth daily      doxycycline hyclate (VIBRAMYCIN) 100 MG capsule Take 100 mg by mouth 2 times daily      ferrous sulfate (FEROSUL) 325 (65 Fe) MG tablet Take 1 tablet by mouth 3 times daily (with meals)      glimepiride (AMARYL) 4 MG tablet Take 4 mg by mouth every morning (before breakfast)      insulin glargine (LANTUS PEN) 100 UNIT/ML pen Inject 20 Units Subcutaneous At Bedtime (Patient taking differently: Inject 30 Units Subcutaneous At Bedtime) 15 mL     levothyroxine (SYNTHROID/LEVOTHROID) 25 MCG tablet Take 25 mcg by mouth daily      losartan (COZAAR) 50 MG tablet Take 1 tablet (50 mg) by mouth daily 90 tablet 3    melatonin 5 MG tablet Take 5 mg by mouth At Bedtime      Omega-3 Fatty Acids (FISH OIL) 1200 MG capsule Take 1,200 mg by mouth daily      omeprazole (PRILOSEC) 20 MG DR capsule Take 1 capsule by mouth 2 times daily      POTASSIUM PO Take by mouth daily      rivaroxaban ANTICOAGULANT (XARELTO) 20 MG TABS tablet Take 20 mg by mouth At Bedtime      rosuvastatin (CRESTOR) 10 MG tablet Take 10 mg by mouth At Bedtime      senna-docusate  (SENOKOT-S/PERICOLACE) 8.6-50 MG tablet Take 1 tablet by mouth 2 times daily      vitamin C (ASCORBIC ACID) 500 MG tablet Take 500 mg by mouth 3 times daily (with meals) Take with iron supplement      zinc gluconate 50 MG tablet Take 1 tablet (50 mg) by mouth daily      atorvastatin (LIPITOR) 20 MG tablet Take 20 mg by mouth At Bedtime (Patient not taking: Reported on 5/26/2023)      bumetanide (BUMEX) 1 MG tablet Take 1 tablet (1 mg) by mouth daily (Patient not taking: Reported on 5/26/2023)      diclofenac (VOLTAREN) 1 % topical gel Apply 2 g topically 2 times daily (Patient not taking: Reported on 5/26/2023)      gabapentin (NEURONTIN) 600 MG tablet Take 1,200 mg by mouth 2 times daily         Cardiographics:    Pacemaker interrogation: 5/12/2023  Device: Medtronic Percepta Quad.  Pacing % /Programmed: biVP 99% at VVIR 60.  Lead(s): Stable.  Battery longevity: 8yrs, 1mo.  Presenting: biventricular pacing 60 bpm  Atrial arrhythmia: n/a.  Anticoagulant: Xarelto.  Ventricular arrhythmia: since 2/2/23; none detected.  Device/Lead alerts: Percepta Quad device on advisory   Comments: normal pacemaker function.    Echo: June 2019  Normal left ventricular size and wall thickness.  Left ventricle ejection fraction is mildly decreased. The estimated left ventricular ejection fraction is 50%.  Right ventricle is mildly dilated with moderate systolic dysfunction.  Severe biatrial enlargement is present.  No hemodynamically significant valvular heart abnormalities.  When compared to the previous study dated 6/1/2016, no significant change.     June 2022  The left ventricle is normal in size.  Left ventricular function is decreased. The ejection fraction is 45-50%  (mildly reduced).  There is a pacemaker lead in the right ventricle.  The left atrium is mildly dilated.  There is mild (1+) mitral regurgitation.  There is moderately severe (3+) tricuspid regurgitation.  Right ventricular systolic pressure is elevated, consistent  with mild  pulmonary hypertension.  Mild valvular aortic stenosis     Stress test: June 2016   1. Lexiscan stress nuclear study is negative for inducible myocardial ischemia; there is a small area of severe nontransmural infarction in the distal anterior and apical segments.  2. Left ventricular ejection fraction is 48%.     May 2021    The nuclear stress test is abnormal.    Nuclear images demonstrate a large area of transmural infarction involving the mid to distal anteroseptal, anterior and apical wall.  Base of the inferior and anterior wall appear hyperdynamic.    The left ventricular ejection fraction at stress is 53% with akinesis of the mid to distal anteroseptal, anterior and apical walls..    The patient is at a low risk of future cardiac ischemic events.    A prior study was conducted on 6/10/2016.  Images appear similar        Lab Results    Chemistry/lipid CBC Cardiac Enzymes/BNP/TSH/INR   Recent Labs   Lab Test 09/21/22  1521   CHOL 118   HDL 40   LDL 57   TRIG 107     Recent Labs   Lab Test 09/21/22  1521 06/08/22  1608 10/19/21  1332   LDL 57 42 58     Recent Labs   Lab Test 11/07/22  1156      POTASSIUM 4.6   CHLORIDE 98   CO2 31*   *   BUN 21.4   CR 1.07   GFRESTIMATED 72   BARBARA 8.7*     Recent Labs   Lab Test 11/07/22  1156 11/03/22  1515 08/10/22  1247   CR 1.07 1.10 1.07     Recent Labs   Lab Test 06/17/22  0536 06/12/19  0613   A1C 7.5* 7.2*          Recent Labs   Lab Test 08/10/22  1247   WBC 7.7   HGB 10.8*   HCT 34.8*   MCV 92        Recent Labs   Lab Test 08/10/22  1247 07/19/22  0456 07/18/22  0437   HGB 10.8* 8.5* 8.2*    Recent Labs   Lab Test 07/14/22  0110   TROPONINI 0.04     Recent Labs   Lab Test 07/14/22  0110 06/20/22  1300   * 108*     Recent Labs   Lab Test 06/08/22  1608   TSH 2.05     Recent Labs   Lab Test 07/14/22  1154 07/14/22  0110 07/06/22  0530   INR 2.22* 2.27* 1.14            Thank you for allowing me to participate in the care of your  patient.    Sincerely,   Steven Brumfield MD   [facility]    cc:   Steven Brumfield MD  [address]  [address]  [place] [address]

## 2023-05-26 NOTE — PATIENT INSTRUCTIONS
Thomas Steve,    It was a pleasure to see you today at the [facility].     My recommendations after this visit include:    Blood work    RONALDO Brumfield MD, FACC, ZENAIDA

## 2023-05-26 NOTE — PROGRESS NOTES
Cardiology Progress Note     Assessment:  Coronary artery disease, status post coronary artery bypass graft surgery in 2011, stable, no angina, negative stress test  Chronic systolic heart failure due to ischemic cardiomyopathy with mildly depressed LV systolic function, clinically no fluid overload  Permanent atrial fibrillation,  controlled ventricular response, on anticoagulation  History of atrial flutter  Biventricular pacemaker, normal function  Right bundle branch block  Aortic stenosis mild  Tricuspid regurgitation moderate to severe  Hypercholesterolemia, good control  Diabetes mellitus  Sleep apnea on CPAP  Spinal stenosis status postlaminectomy  Osteomyelitis right foot  Peripheral arterial disease involving lower extremities with claudication    Plan:  Referral to vascular surgeon  Check BMP and BNP today and then adjust diuretics accordingly  Follow-up based on the results of the tests      Subjective:   This is 78 year old male who comes in today for routine follow-up.  He spent winter in [place] back.  He developed ulceration and then osteomyelitis of right foot.  He was seen by cardiologist down there.  He had echo which showed pretty much unchanged findings from our evaluation in [place].  He also underwent vascular studies which show severe bilateral peripheral arterial disease.  He did not see vascular surgeon in [place].  He is now back in [place].  He has upcoming appointment with infectious diseases doctor.    He gets short of breath but denies PND, orthopnea.  His weight has not changed significantly.  He has not had chest pains.  He has not had heart palpitations or syncope.    Review of Systems:   Negative other than history of present illness    Objective:   /56 (BP Location: Right arm, Patient Position: Sitting, Cuff Size: Adult Large)   Pulse 65   Resp 16   Wt 94.3 kg (208 lb)   SpO2 96%   BMI 29.84 kg/m    Physical Exam:  GENERAL: no distress  NECK: No  JVD  LUNGS: Clear to auscultation.  CARDIAC: regular rhythm, S1 & S2 normal.  No heaves, thrills, gallops or murmurs.  ABDOMEN: flat, negative hepatosplenomegaly, soft and non-tender.  EXTREMITIES: No evidence of cyanosis, clubbing 1+ bilateral lower extremity edema.    Current Outpatient Medications   Medication Sig Dispense Refill     acetaminophen (TYLENOL) 500 MG tablet Take 1,000-1,500 mg by mouth every 6 hours as needed for mild pain       bumetanide (BUMEX) 1 MG tablet Take 2 tablets (2 mg) by mouth daily 180 tablet 3     cholecalciferol 50 MCG (2000 UT) CAPS Take 2 capsules by mouth daily       coenzyme Q-10 200 MG CAPS capsule Take 200 mg by mouth daily       doxycycline hyclate (VIBRAMYCIN) 100 MG capsule Take 100 mg by mouth 2 times daily       ferrous sulfate (FEROSUL) 325 (65 Fe) MG tablet Take 1 tablet by mouth 3 times daily (with meals)       glimepiride (AMARYL) 4 MG tablet Take 4 mg by mouth every morning (before breakfast)       insulin glargine (LANTUS PEN) 100 UNIT/ML pen Inject 20 Units Subcutaneous At Bedtime (Patient taking differently: Inject 30 Units Subcutaneous At Bedtime) 15 mL      levothyroxine (SYNTHROID/LEVOTHROID) 25 MCG tablet Take 25 mcg by mouth daily       losartan (COZAAR) 50 MG tablet Take 1 tablet (50 mg) by mouth daily 90 tablet 3     melatonin 5 MG tablet Take 5 mg by mouth At Bedtime       Omega-3 Fatty Acids (FISH OIL) 1200 MG capsule Take 1,200 mg by mouth daily       omeprazole (PRILOSEC) 20 MG DR capsule Take 1 capsule by mouth 2 times daily       POTASSIUM PO Take by mouth daily       rivaroxaban ANTICOAGULANT (XARELTO) 20 MG TABS tablet Take 20 mg by mouth At Bedtime       rosuvastatin (CRESTOR) 10 MG tablet Take 10 mg by mouth At Bedtime       senna-docusate (SENOKOT-S/PERICOLACE) 8.6-50 MG tablet Take 1 tablet by mouth 2 times daily       vitamin C (ASCORBIC ACID) 500 MG tablet Take 500 mg by mouth 3 times daily (with meals) Take with iron supplement       zinc  gluconate 50 MG tablet Take 1 tablet (50 mg) by mouth daily       atorvastatin (LIPITOR) 20 MG tablet Take 20 mg by mouth At Bedtime (Patient not taking: Reported on 5/26/2023)       bumetanide (BUMEX) 1 MG tablet Take 1 tablet (1 mg) by mouth daily (Patient not taking: Reported on 5/26/2023)       diclofenac (VOLTAREN) 1 % topical gel Apply 2 g topically 2 times daily (Patient not taking: Reported on 5/26/2023)       gabapentin (NEURONTIN) 600 MG tablet Take 1,200 mg by mouth 2 times daily         Cardiographics:    Pacemaker interrogation: 5/12/2023  Device: Medtronic Percepta Quad.  Pacing % /Programmed: biVP 99% at VVIR 60.  Lead(s): Stable.  Battery longevity: 8yrs, 1mo.  Presenting: biventricular pacing 60 bpm  Atrial arrhythmia: n/a.  Anticoagulant: Xarelto.  Ventricular arrhythmia: since 2/2/23; none detected.  Device/Lead alerts: Percepta Quad device on advisory   Comments: normal pacemaker function.    Echo: June 2019    Normal left ventricular size and wall thickness.    Left ventricle ejection fraction is mildly decreased. The estimated left ventricular ejection fraction is 50%.    Right ventricle is mildly dilated with moderate systolic dysfunction.    Severe biatrial enlargement is present.    No hemodynamically significant valvular heart abnormalities.    When compared to the previous study dated 6/1/2016, no significant change.     June 2022  The left ventricle is normal in size.  Left ventricular function is decreased. The ejection fraction is 45-50%  (mildly reduced).  There is a pacemaker lead in the right ventricle.  The left atrium is mildly dilated.  There is mild (1+) mitral regurgitation.  There is moderately severe (3+) tricuspid regurgitation.  Right ventricular systolic pressure is elevated, consistent with mild  pulmonary hypertension.  Mild valvular aortic stenosis     Stress test: June 2016   1. Lexiscan stress nuclear study is negative for inducible myocardial ischemia; there is a  small area of severe nontransmural infarction in the distal anterior and apical segments.  2. Left ventricular ejection fraction is 48%.     May 2021     The nuclear stress test is abnormal.     Nuclear images demonstrate a large area of transmural infarction involving the mid to distal anteroseptal, anterior and apical wall.  Base of the inferior and anterior wall appear hyperdynamic.     The left ventricular ejection fraction at stress is 53% with akinesis of the mid to distal anteroseptal, anterior and apical walls..     The patient is at a low risk of future cardiac ischemic events.     A prior study was conducted on 6/10/2016.  Images appear similar        Lab Results    Chemistry/lipid CBC Cardiac Enzymes/BNP/TSH/INR   Recent Labs   Lab Test 09/21/22  1521   CHOL 118   HDL 40   LDL 57   TRIG 107     Recent Labs   Lab Test 09/21/22  1521 06/08/22  1608 10/19/21  1332   LDL 57 42 58     Recent Labs   Lab Test 11/07/22  1156      POTASSIUM 4.6   CHLORIDE 98   CO2 31*   *   BUN 21.4   CR 1.07   GFRESTIMATED 72   BARBARA 8.7*     Recent Labs   Lab Test 11/07/22  1156 11/03/22  1515 08/10/22  1247   CR 1.07 1.10 1.07     Recent Labs   Lab Test 06/17/22  0536 06/12/19  0613   A1C 7.5* 7.2*          Recent Labs   Lab Test 08/10/22  1247   WBC 7.7   HGB 10.8*   HCT 34.8*   MCV 92        Recent Labs   Lab Test 08/10/22  1247 07/19/22  0456 07/18/22  0437   HGB 10.8* 8.5* 8.2*    Recent Labs   Lab Test 07/14/22  0110   TROPONINI 0.04     Recent Labs   Lab Test 07/14/22  0110 06/20/22  1300   * 108*     Recent Labs   Lab Test 06/08/22  1608   TSH 2.05     Recent Labs   Lab Test 07/14/22  1154 07/14/22  0110 07/06/22  0530   INR 2.22* 2.27* 1.14

## 2023-05-30 ENCOUNTER — OFFICE VISIT (OUTPATIENT)
Dept: INFECTIOUS DISEASES | Facility: CLINIC | Age: 78
End: 2023-05-30
Payer: MEDICARE

## 2023-05-30 ENCOUNTER — DOCUMENTATION ONLY (OUTPATIENT)
Dept: VASCULAR SURGERY | Facility: CLINIC | Age: 78
End: 2023-05-30

## 2023-05-30 ENCOUNTER — LAB (OUTPATIENT)
Dept: LAB | Facility: CLINIC | Age: 78
End: 2023-05-30
Payer: MEDICARE

## 2023-05-30 VITALS
HEART RATE: 78 BPM | BODY MASS INDEX: 29.53 KG/M2 | OXYGEN SATURATION: 99 % | SYSTOLIC BLOOD PRESSURE: 126 MMHG | WEIGHT: 205.8 LBS | TEMPERATURE: 97.7 F | DIASTOLIC BLOOD PRESSURE: 62 MMHG

## 2023-05-30 DIAGNOSIS — R21 RASH IN ADULT: ICD-10-CM

## 2023-05-30 DIAGNOSIS — M86.9 OSTEOMYELITIS OF RIGHT FOOT, UNSPECIFIED TYPE (H): ICD-10-CM

## 2023-05-30 DIAGNOSIS — M86.9 OSTEOMYELITIS OF RIGHT FOOT, UNSPECIFIED TYPE (H): Primary | ICD-10-CM

## 2023-05-30 DIAGNOSIS — L08.9 TOE INFECTION: ICD-10-CM

## 2023-05-30 LAB
ALBUMIN SERPL BCG-MCNC: 4.8 G/DL (ref 3.5–5.2)
ALP SERPL-CCNC: 185 U/L (ref 40–129)
ALT SERPL W P-5'-P-CCNC: 31 U/L (ref 10–50)
ANION GAP SERPL CALCULATED.3IONS-SCNC: 13 MMOL/L (ref 7–15)
AST SERPL W P-5'-P-CCNC: 36 U/L (ref 10–50)
BASOPHILS # BLD AUTO: 0 10E3/UL (ref 0–0.2)
BASOPHILS NFR BLD AUTO: 0 %
BILIRUB SERPL-MCNC: 0.4 MG/DL
BUN SERPL-MCNC: 20.1 MG/DL (ref 8–23)
CALCIUM SERPL-MCNC: 9.6 MG/DL (ref 8.8–10.2)
CHLORIDE SERPL-SCNC: 102 MMOL/L (ref 98–107)
CREAT SERPL-MCNC: 1.24 MG/DL (ref 0.67–1.17)
CRP SERPL-MCNC: 5.12 MG/L
DEPRECATED HCO3 PLAS-SCNC: 29 MMOL/L (ref 22–29)
EOSINOPHIL # BLD AUTO: 0.3 10E3/UL (ref 0–0.7)
EOSINOPHIL NFR BLD AUTO: 4 %
ERYTHROCYTE [DISTWIDTH] IN BLOOD BY AUTOMATED COUNT: 15.1 % (ref 10–15)
ERYTHROCYTE [SEDIMENTATION RATE] IN BLOOD BY WESTERGREN METHOD: 33 MM/HR (ref 0–20)
GFR SERPL CREATININE-BSD FRML MDRD: 60 ML/MIN/1.73M2
GLUCOSE SERPL-MCNC: 135 MG/DL (ref 70–99)
HCT VFR BLD AUTO: 37.2 % (ref 40–53)
HGB BLD-MCNC: 12.4 G/DL (ref 13.3–17.7)
IMM GRANULOCYTES # BLD: 0 10E3/UL
IMM GRANULOCYTES NFR BLD: 0 %
LYMPHOCYTES # BLD AUTO: 2.1 10E3/UL (ref 0.8–5.3)
LYMPHOCYTES NFR BLD AUTO: 29 %
MCH RBC QN AUTO: 29.6 PG (ref 26.5–33)
MCHC RBC AUTO-ENTMCNC: 33.3 G/DL (ref 31.5–36.5)
MCV RBC AUTO: 89 FL (ref 78–100)
MONOCYTES # BLD AUTO: 0.7 10E3/UL (ref 0–1.3)
MONOCYTES NFR BLD AUTO: 10 %
NEUTROPHILS # BLD AUTO: 4.1 10E3/UL (ref 1.6–8.3)
NEUTROPHILS NFR BLD AUTO: 56 %
PLATELET # BLD AUTO: 191 10E3/UL (ref 150–450)
POTASSIUM SERPL-SCNC: 4.3 MMOL/L (ref 3.4–5.3)
PROT SERPL-MCNC: 7.7 G/DL (ref 6.4–8.3)
RBC # BLD AUTO: 4.19 10E6/UL (ref 4.4–5.9)
SODIUM SERPL-SCNC: 144 MMOL/L (ref 136–145)
WBC # BLD AUTO: 7.3 10E3/UL (ref 4–11)

## 2023-05-30 PROCEDURE — 36415 COLL VENOUS BLD VENIPUNCTURE: CPT

## 2023-05-30 PROCEDURE — 80053 COMPREHEN METABOLIC PANEL: CPT

## 2023-05-30 PROCEDURE — 87070 CULTURE OTHR SPECIMN AEROBIC: CPT | Performed by: STUDENT IN AN ORGANIZED HEALTH CARE EDUCATION/TRAINING PROGRAM

## 2023-05-30 PROCEDURE — 87077 CULTURE AEROBIC IDENTIFY: CPT | Performed by: STUDENT IN AN ORGANIZED HEALTH CARE EDUCATION/TRAINING PROGRAM

## 2023-05-30 PROCEDURE — 99214 OFFICE O/P EST MOD 30 MIN: CPT | Performed by: STUDENT IN AN ORGANIZED HEALTH CARE EDUCATION/TRAINING PROGRAM

## 2023-05-30 PROCEDURE — 85025 COMPLETE CBC W/AUTO DIFF WBC: CPT

## 2023-05-30 PROCEDURE — 87186 SC STD MICRODIL/AGAR DIL: CPT | Performed by: STUDENT IN AN ORGANIZED HEALTH CARE EDUCATION/TRAINING PROGRAM

## 2023-05-30 PROCEDURE — 86140 C-REACTIVE PROTEIN: CPT

## 2023-05-30 PROCEDURE — 85652 RBC SED RATE AUTOMATED: CPT

## 2023-05-30 NOTE — TELEPHONE ENCOUNTER
Scheduled to see Dr. Mckinnon on 6/1/23 and Dr. Barba on 6/13/2023.  The appointment with Dr. Barba is placed on the wait list, pt would prefer [place] location late morning/early afternoon, but getting in sooner is most important to him.

## 2023-05-30 NOTE — PROGRESS NOTES
[facility] new patient note.    Name: Thomas Steve  :   1945  MRN:   [id number]  PCP:    Moni Mcclain  DOS:    23      CC: Toe infection    HPI/Interval History:  Thomas Steve is a 78 year old old male with the history of type 2 diabetes mellitus, peripheral artery disease, hypertension who is in ID clinic for right fourth toe osteomyelitis.  He sustained an injury to the toe back in 2022.  A subsequent wound was infected with MSSA.  Susceptibility to tetracycline was not reported in the final result.  On 2023, he had an x-ray of the right foot which confirmed an osteomyelitis of the right fourth toe.  He has been on doxycycline now continuously for about 90 days.  His current supply will last him until 2023.  He has not noticed any significant improvement while on doxycycline.  He was referred to vascular surgery by his primary.  Appointment made yet.  He also reports some intermittent skin rash with a serpiginous nature to with on the left hand.  Reports having had similar rash on his back that is pruritic.  ===========================  Past Medical History:  Past Medical History:   Diagnosis Date     Acute sinusitis      Atrial fibrillation (H)     Resolved after medication and CPAP     Back pain      CHF (congestive heart failure) (H)      Coronary artery disease      Diabetes mellitus (H)      Gastroesophageal reflux disease with esophagitis      HTN (hypertension)      Hyperlipemia      Hyperlipidemia      Joint pain      Lipoma of neck      Nocturia      Sciatic leg pain      Shoulder impingement syndrome     BILATERAL     Sleep apnea     uses CPAP     Typical atrial flutter (H) 2016    Demonstrated on 12-lead EKG 2016       Past Surgical History:  Past Surgical History:   Procedure Laterality Date     CORONARY ARTERY BYPASS       EP BIV PACEMAKER INSERT N/A 2019    Procedure: EP Biventricular Pacemaker Insertion;  Surgeon: Atiya  Durga LANG MD;  Location: [facility];  Service: Cardiology     IMPLANT PACEMAKER       IR FINE NEEDLE ASPIRATION W ULTRASOUND  2022     LAMINECTOMY LUMBAR TWO LEVELS Bilateral 2022    Procedure: DECOMPRESSIVE LUMBAR LAMINECTOMY LUMBAR 4-LUMBAR 5 AND LUMBAR 5-SACRAL 1 BILATERAL-LEFT SIDE FIRST-PLUS BILATERAL FORAMINOTOMIES PLUS REMOVAL OF HERNIATED DISC LUMBAR 5-SACRAL 1 LEFT;  Surgeon: Israel Samuel MD;  Location: [facility] Main OR     [place] CARDIOVERSION ELECTIVE ARRHYTHMIA EXTERNAL  2014    Description: Elective Cardioversion External;  Recorded: 2014;     [facility] CABG, VEIN, SINGLE  2011    Description: CABG (CABG);  Proc Date: 2011;  Comments: LIMA^LAD, SVG^OM, SVG^distal RCA       Social History:  Social History     Socioeconomic History     Marital status:      Spouse name: Not on file     Number of children: Not on file     Years of education: Not on file     Highest education level: Not on file   Occupational History     Not on file   Tobacco Use     Smoking status: Former     Packs/day: 1.50     Years: 23.00     Pack years: 34.50     Types: Cigarettes     Quit date: 1986     Years since quittin.4     Smokeless tobacco: Never   Vaping Use     Vaping status: Not on file   Substance and Sexual Activity     Alcohol use: Yes     Alcohol/week: 1.0 standard drink of alcohol     Comment: one beer per day     Drug use: No     Sexual activity: Never   Other Topics Concern     Not on file   Social History Narrative    .  3 children.  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     Social Determinants of Health     Financial Resource Strain: Not on file   Food Insecurity: Not on file   Transportation Needs: Not on file   Physical Activity: Not on file   Stress: Not on file   Social Connections: Not on file   Intimate Partner Violence: Not on file   Housing Stability: Not on file       Family Medical History:  Family History   Problem Relation Age of  Onset     Heart Disease Mother      Snoring Father      Heart Disease Father      Hypertension Father      Alzheimer Disease Father      No Known Problems Daughter      No Known Problems Daughter      No Known Problems Daughter      Chronic Obstructive Pulmonary Disease Sister      Hodgkin's lymphoma Brother      No Known Problems Son      Diabetes Sister      Substance Abuse Sister      Chronic Obstructive Pulmonary Disease Brother      Heart Disease Brother      Diabetes Brother      Substance Abuse Brother        Allergies:     Allergies   Allergen Reactions     Aspirin Other (See Comments)     Contraindicated due to xarelto use     Bee Pollen Unknown     Scratchy throat     Codeine Nausea and Vomiting     Pollen Extracts [Pollen Extract] Unknown     Scratchy throat     Vicodin [Hydrocodone-Acetaminophen] Nausea and Vomiting       Medications:  Current Outpatient Medications   Medication Sig Dispense Refill     acetaminophen (TYLENOL) 500 MG tablet Take 1,000-1,500 mg by mouth every 6 hours as needed for mild pain       atorvastatin (LIPITOR) 20 MG tablet Take 20 mg by mouth At Bedtime (Patient not taking: Reported on 5/26/2023)       bumetanide (BUMEX) 1 MG tablet Take 2 tablets (2 mg) by mouth daily 180 tablet 3     cholecalciferol 50 MCG (2000 UT) CAPS Take 2 capsules by mouth daily       coenzyme Q-10 200 MG CAPS capsule Take 200 mg by mouth daily       doxycycline hyclate (VIBRAMYCIN) 100 MG capsule Take 100 mg by mouth 2 times daily       ferrous sulfate (FEROSUL) 325 (65 Fe) MG tablet Take 1 tablet by mouth 3 times daily (with meals)       glimepiride (AMARYL) 4 MG tablet Take 4 mg by mouth every morning (before breakfast)       insulin glargine (LANTUS PEN) 100 UNIT/ML pen Inject 20 Units Subcutaneous At Bedtime (Patient taking differently: Inject 30 Units Subcutaneous At Bedtime) 15 mL      levothyroxine (SYNTHROID/LEVOTHROID) 25 MCG tablet Take 25 mcg by mouth daily       losartan (COZAAR) 50 MG tablet  Take 1 tablet (50 mg) by mouth daily 90 tablet 3     melatonin 5 MG tablet Take 5 mg by mouth At Bedtime       Omega-3 Fatty Acids (FISH OIL) 1200 MG capsule Take 1,200 mg by mouth daily       omeprazole (PRILOSEC) 20 MG DR capsule Take 1 capsule by mouth 2 times daily       POTASSIUM PO Take by mouth daily       rivaroxaban ANTICOAGULANT (XARELTO) 20 MG TABS tablet Take 20 mg by mouth At Bedtime       rosuvastatin (CRESTOR) 10 MG tablet Take 10 mg by mouth At Bedtime       senna-docusate (SENOKOT-S/PERICOLACE) 8.6-50 MG tablet Take 1 tablet by mouth 2 times daily       vitamin C (ASCORBIC ACID) 500 MG tablet Take 500 mg by mouth 3 times daily (with meals) Take with iron supplement       zinc gluconate 50 MG tablet Take 1 tablet (50 mg) by mouth daily         Immunizations:  Immunization History   Administered Date(s) Administered     COVID-19 Monovalent 18+ (Moderna) 04/16/2021, 05/14/2021, 11/23/2021, 07/05/2022       ==================    Review of System:  12 points review of system is negative except for findings in the HPI.    Exam  VS: /62   Pulse 78   Temp 97.7  F (36.5  C) (Oral)   Wt 93.4 kg (205 lb 12.8 oz)   SpO2 99%   BMI 29.53 kg/m      Gen: Pleasant in no acute distress.  HEENT: NCAT. EOMI.  Neck: No LAD.  Lungs: Clear to auscultation bilaterally with no crackles or wheezes.   Card: RRR. No RMG. Peripheral pulses present and symmetrical. No edema.   Abd: Soft NT ND. No hepatomegaly or splenomegaly.  Ext: Right fourth toe is swollen with a small ulcer on the lateral aspect.  Mild drainage on pressure.  Lymph: No cervical or supraclavicular adenopathy.  Skin: No rash  Neuro: Alert and oriented to place time and person. Cranial nerves grossly intact.     Labs:  Reviewed.    Imaging:      Assessment:  Thomas Steve is a 78 year old old male with   1.  Type 2 diabetes mellitus.  2.  Peripheral artery disease.  Need to see vascular surgery.  3.  Right fourth toe osteomyelitis as seen on x-ray on  1/25/2023.  Reveals cultures with MSSA.  Tetracycline susceptibility not reported.  On doxycycline for 90 days uninterrupted.  Supply to last until 6/4/2023.  The nonhealing aspect of this wound could be secondary to ineffective treatment with doxycycline or a combination of ineffective treatment with need for revascularization.  Wound was swabbed in clinic today for cultures.  4.  Intermittent serpiginous rash in a patient who was in the  in [place].  Strongyloidiasis needs to be ruled out.      Recommendations:  CBC with differential, CRP, ESR, CMP, strongyloidiasis antibody.  Wound cultures.  Done.  Continue doxycycline for now.  Follow wound culture and adjust antibiotic therapy accordingly.  Clinic staff to assist scheduling vascular surgery visit.  Follow-up in 2 to 3 weeks.      Aleksey Goss MD  [facility]  [facility]  Office Telephone [phone].  Fax [phone]  [facility] paging

## 2023-05-30 NOTE — PROGRESS NOTES
Vascular Referral Intake    Referred by: Dr. Brumfield for Claudication of both lower extremities    Specialty: Vascular Surgery    Specific Provider if Necessary:  MD Nely Lam, MD Elisa Vaughan, MD Dacia Barba or MD Nick Sanford. Also needs to see Dr. Mckinnon for toe wound.     Visit Type: Vascular Surgery or Wound Clinic    Time Frame: needs to see Dr. Mckinnon within 1 week and vascular at next available.     Testing/Imaging Needed Before Consult: none. EDWINA's done in [place] - will scan into media    Appt Note: (to be pasted into appt comments, also add where additional information is located, ie, outside images pushed to PACS, in Epic, sent to HIMS, etc)  Consult. PAD, diabetic, Right 4th toe wound confirmed with osteo. EDWINA's done in [place] (see media). Right EDWINA 0.71 and right TBI 0.86. On xarelto and statin.

## 2023-06-01 ENCOUNTER — OFFICE VISIT (OUTPATIENT)
Dept: VASCULAR SURGERY | Facility: CLINIC | Age: 78
End: 2023-06-01
Attending: PODIATRIST
Payer: MEDICARE

## 2023-06-01 VITALS
WEIGHT: 205 LBS | HEIGHT: 70 IN | OXYGEN SATURATION: 99 % | BODY MASS INDEX: 29.35 KG/M2 | SYSTOLIC BLOOD PRESSURE: 150 MMHG | DIASTOLIC BLOOD PRESSURE: 81 MMHG | HEART RATE: 67 BPM

## 2023-06-01 DIAGNOSIS — L97.514 DIABETIC ULCER OF TOE OF RIGHT FOOT ASSOCIATED WITH DIABETES MELLITUS DUE TO UNDERLYING CONDITION, WITH NECROSIS OF BONE (H): Primary | ICD-10-CM

## 2023-06-01 DIAGNOSIS — E08.621 DIABETIC ULCER OF TOE OF RIGHT FOOT ASSOCIATED WITH DIABETES MELLITUS DUE TO UNDERLYING CONDITION, WITH NECROSIS OF BONE (H): Primary | ICD-10-CM

## 2023-06-01 PROCEDURE — G0463 HOSPITAL OUTPT CLINIC VISIT: HCPCS | Performed by: PODIATRIST

## 2023-06-01 PROCEDURE — 11044 DBRDMT BONE 1ST 20 SQ CM/<: CPT | Performed by: PODIATRIST

## 2023-06-01 PROCEDURE — 99204 OFFICE O/P NEW MOD 45 MIN: CPT | Mod: 25 | Performed by: PODIATRIST

## 2023-06-01 RX ORDER — DOXYCYCLINE HYCLATE 100 MG
TABLET ORAL
COMMUNITY
Start: 2023-05-24 | End: 2024-04-03

## 2023-06-01 ASSESSMENT — PAIN SCALES - GENERAL: PAINLEVEL: NO PAIN (0)

## 2023-06-01 NOTE — PROGRESS NOTES
FOOT AND ANKLE SURGERY/PODIATRY CONSULT NOTE        ASSESSMENT:   Osteomyelitis 4th digit right foot  Diabetic Ulceration 4th digit right foot      TREATMENT:  -I discussed with the patient that the ulceration along the lateral fourth digit right foot probes to deep tissues and to bone, no erythema or purulent drainage noted on exam today.    -X-rays obtained while the patient was in [place] indicated osteomyelitis of the fourth digit.    -I recommend and have referred him for an MRI of the right foot for further evaluation of osteomyelitis.    -I discussed with the patient that because he has had the ulceration with bone infection since November he likely will not be able to salvage the fourth digit amputation may be necessary.  We discussed postoperative period to remain nonweightbearing for minimum of 4 weeks.  Also reviewed that because he is a sole caregiver to his wife he would have to make other arrangements.  Discussed hospitalization with transfer to TCU if necessary.    -ABIs obtained in January while he was staying in [place] indicated a right TBI of 108 mmHg.    -Antibiotics per ID.    -Hemoglobin A1c from 6/17/2022 was 7.5.  We will obtain updated hemoglobin A1c.    -After discussion of risk factors nursing staff removed dressing, cleansed wound and consent obtained 2% Lidocaine HCL jelly was applied, under clean conditions, the fourth digit right ulceration(s) were debrided using .Bone.  Devitalized and nonviable tissue, along with any fibrin and slough, was removed to improve granulation tissue formation, stimulate wound healing, decrease overall bacteria load, disrupt biofilm formation and decrease edge senescence. Wound drainage was scant No. Total excisional debridement was 0.4 sq cm Bone with a depth of 1 cm.   Ulcers were improved afterwards and .  Measures were as noted on the flow sheet. A gauze dressing was applied. He will continue to apply a gauze dressing qday.    -I will contact  the patient with the MRI report when available and we will be guided by the results.     Juan Mckinnon DPM  [facility] [place]      HPI: Thomas Steve was seen today for an ulceration on the fourth digit right foot.  The patient reports he first noticed a sore on the fourth digit on the right foot in November of last year while living in [place].  He has been followed by a podiatrist in [place] and also was sent to a vascular surgeon who did blood flow studies.  Patient reports he was diagnosed with osteomyelitis and also treated by infectious disease while living in [place].  He was recently seen by Dr. Goss for osteomyelitis.  Patient reports that he is a sole caregiver for his wife.  Past medical history significant for type 2 diabetes.    Past Medical History:   Diagnosis Date     Acute sinusitis      Atrial fibrillation (H)     Resolved after medication and CPAP     Back pain      CHF (congestive heart failure) (H)      Coronary artery disease      Diabetes mellitus (H)      Gastroesophageal reflux disease with esophagitis      HTN (hypertension)      Hyperlipemia      Hyperlipidemia      Joint pain      Lipoma of neck      Nocturia      Sciatic leg pain      Shoulder impingement syndrome     BILATERAL     Sleep apnea     uses CPAP     Typical atrial flutter (H) 07/07/2016    Demonstrated on 12-lead EKG July 7, 2016       Past Surgical History:   Procedure Laterality Date     CORONARY ARTERY BYPASS       EP BIV PACEMAKER INSERT N/A 06/11/2019    Procedure: EP Biventricular Pacemaker Insertion;  Surgeon: Durga Rajput MD;  [facility]: [facility];  Service: Cardiology     IMPLANT PACEMAKER       IR FINE NEEDLE ASPIRATION W ULTRASOUND  06/23/2022     LAMINECTOMY LUMBAR TWO LEVELS Bilateral 06/16/2022    Procedure: DECOMPRESSIVE LUMBAR LAMINECTOMY LUMBAR 4-LUMBAR 5 AND LUMBAR 5-SACRAL 1 BILATERAL-LEFT SIDE FIRST-PLUS BILATERAL FORAMINOTOMIES PLUS REMOVAL OF HERNIATED DISC LUMBAR 5-SACRAL 1  LEFT;  Surgeon: Israel Samuel MD;  Location: [facility] Main OR     [place] CARDIOVERSION ELECTIVE ARRHYTHMIA EXTERNAL  06/03/2014    Description: Elective Cardioversion External;  Recorded: 06/03/2014;     [facility] CABG, VEIN, SINGLE  01/01/2011    Description: CABG (CABG);  Proc Date: 09/26/2011;  Comments: LIMA^LAD, SVG^OM, SVG^distal RCA        Allergies   Allergen Reactions     Aspirin Other (See Comments)     Contraindicated due to xarelto use     Bee Pollen Unknown     Scratchy throat     Codeine Nausea and Vomiting     Pollen Extracts [Pollen Extract] Unknown     Scratchy throat     Vicodin [Hydrocodone-Acetaminophen] Nausea and Vomiting         Current Outpatient Medications:      acetaminophen (TYLENOL) 500 MG tablet, Take 1,000-1,500 mg by mouth every 6 hours as needed for mild pain, Disp: , Rfl:      bumetanide (BUMEX) 1 MG tablet, Take 2 tablets (2 mg) by mouth daily, Disp: 180 tablet, Rfl: 3     cholecalciferol 50 MCG (2000 UT) CAPS, Take 2 capsules by mouth daily, Disp: , Rfl:      coenzyme Q-10 200 MG CAPS capsule, Take 200 mg by mouth daily, Disp: , Rfl:      doxycycline hyclate (VIBRA-TABS) 100 MG tablet, , Disp: , Rfl:      doxycycline hyclate (VIBRAMYCIN) 100 MG capsule, Take 100 mg by mouth 2 times daily, Disp: , Rfl:      ferrous sulfate (FEROSUL) 325 (65 Fe) MG tablet, Take 1 tablet by mouth 3 times daily (with meals), Disp: , Rfl:      glimepiride (AMARYL) 4 MG tablet, Take 4 mg by mouth every morning (before breakfast), Disp: , Rfl:      insulin glargine (LANTUS PEN) 100 UNIT/ML pen, Inject 20 Units Subcutaneous At Bedtime (Patient taking differently: Inject 30 Units Subcutaneous At Bedtime), Disp: 15 mL, Rfl:      levothyroxine (SYNTHROID/LEVOTHROID) 25 MCG tablet, Take 25 mcg by mouth daily, Disp: , Rfl:      losartan (COZAAR) 50 MG tablet, Take 1 tablet (50 mg) by mouth daily, Disp: 90 tablet, Rfl: 3     melatonin 5 MG tablet, Take 5 mg by mouth At Bedtime, Disp: , Rfl:      Omega-3 Fatty Acids  "(FISH OIL) 1200 MG capsule, Take 1,200 mg by mouth daily, Disp: , Rfl:      omeprazole (PRILOSEC) 20 MG DR capsule, Take 1 capsule by mouth 2 times daily, Disp: , Rfl:      POTASSIUM PO, Take by mouth daily, Disp: , Rfl:      rivaroxaban ANTICOAGULANT (XARELTO) 20 MG TABS tablet, Take 20 mg by mouth At Bedtime, Disp: , Rfl:      rosuvastatin (CRESTOR) 10 MG tablet, Take 10 mg by mouth At Bedtime, Disp: , Rfl:      senna-docusate (SENOKOT-S/PERICOLACE) 8.6-50 MG tablet, Take 1 tablet by mouth 2 times daily, Disp: , Rfl:      vitamin C (ASCORBIC ACID) 500 MG tablet, Take 500 mg by mouth 3 times daily (with meals) Take with iron supplement, Disp: , Rfl:      zinc gluconate 50 MG tablet, Take 1 tablet (50 mg) by mouth daily, Disp: , Rfl:      atorvastatin (LIPITOR) 20 MG tablet, Take 20 mg by mouth At Bedtime (Patient not taking: Reported on 5/26/2023), Disp: , Rfl:     Social History     Social History Narrative    .  3 children.  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       Family History   Problem Relation Age of Onset     Heart Disease Mother      Snoring Father      Heart Disease Father      Hypertension Father      Alzheimer Disease Father      No Known Problems Daughter      No Known Problems Daughter      No Known Problems Daughter      Chronic Obstructive Pulmonary Disease Sister      Hodgkin's lymphoma Brother      No Known Problems Son      Diabetes Sister      Substance Abuse Sister      Chronic Obstructive Pulmonary Disease Brother      Heart Disease Brother      Diabetes Brother      Substance Abuse Brother        Review of Systems - 10 point Review of Systems is negative except for right foot osteomyelitis fourth digit which is noted in HPI.      OBJECTIVE:  Appearance: alert, well appearing, and in no distress.    BP (!) 150/81   Pulse 67   Ht 5' 10\" (1.778 m)   Wt 205 lb (93 kg)   SpO2 99%   BMI 29.41 kg/m      BMI= Body mass index is 29.41 kg/m .    General appearance: Patient is " alert and fully cooperative with history & exam.  No sign of distress is noted during the visit.     Psychiatric: Affect is pleasant & appropriate.  Patient appears motivated to improve health.     Respiratory: Breathing is regular & unlabored while sitting.     HEENT: Hearing is intact to spoken word.  Speech is clear.  No gross evidence of visual impairment that would impact ambulation.    Vascular: Dorsalis pedis non-palpableRight.  Dermatologic:   Wound Leg  (Active)       VASC Wound Right 4th (Active)   Pre Size Length 0.3 06/01/23 1400   Pre Size Width 0.3 06/01/23 1400   Pre Size Depth 1 06/01/23 1400   Pre Total Sq cm 0.09 06/01/23 1400       Incision/Surgical Site 06/16/22 Back (Active)   Ulceration lateral fourth digit right foot probes to deep tissue into bone, no purulent drainage or erythema noted.  Neurologic: Diminished to light touch Right.  Musculoskeletal: Contracted digits noted Right.    @[facility](10,16,17)@    Imaging:     Cardiac Device Check - Remote    Result Date: 5/12/2023  Encounter Type: Routine remote CRT-P transmission. Device: Medtronic Percepta Quad. Pacing % /Programmed: biVP 99% at VVIR 60. Lead(s): Stable. Battery longevity: 8yrs, 1mo. Presenting: biventricular pacing 60 bpm Atrial arrhythmia: n/a. Anticoagulant: Xarelto. Ventricular arrhythmia: since 2/2/23; none detected. Device/Lead alerts: Percepta Quad device on advisory Comments: normal pacemaker function. Plan: Next remote check scheduled for 8/22/23. SILVIANO Christianson, Device Specialist Device follow up for the entirety of having the Pacemaker, based on best practices determined by Heart Rhythm Society and in Compliance with Medicare guidelines. Continue remote device monitoring per patient plan. I have reviewed and interpreted the device interrogation, settings, programming, and encounter summary. The device is functioning within normal device parameters. I agree with the current findings, assessment and plan.

## 2023-06-01 NOTE — PATIENT INSTRUCTIONS
Call to schedule your -[phone]      Wrap your toe with dry gauze and change daily and as needed.     Dr. Mckinnon will call you with MRI results and next steps.    Keep your appointment with Dr. Barba.

## 2023-06-02 ENCOUNTER — DOCUMENTATION ONLY (OUTPATIENT)
Dept: CARDIOLOGY | Facility: CLINIC | Age: 78
End: 2023-06-02
Payer: MEDICARE

## 2023-06-02 LAB
BACTERIA WND CULT: ABNORMAL
BACTERIA WND CULT: ABNORMAL

## 2023-06-02 NOTE — PROGRESS NOTES
6/2/23:     Is the implanted device safe for MRI Exam?  Yes  Is this device 3T compatible? Yes  Device Type: Pacemaker      Device Information: Medtronic, CRT-P Percepta      Cardiology Orders for Device Programming     -- Yes -- The patient has a MRI conditional pulse generator and leads from the same     -- Yes -- The pulse generator and leads have been implanted for at least 6 weeks. (Does not apply to [employer] devices)    -- Yes-- The device is implanted in the right or left pectoral region    -- Yes -- There are not any additional active cardiac devices, abandoned leads, lead extenders or adapters    -- Yes -- The device lead impedance measurements are within the normal range. (Manufacture recommendations: Medtronic Advisa and Revo 200-1,500 ohms; Medtronic ICD and CRT's 200-3000 ohms and defibrillation lead impedance   ohms)    -- Yes -- If the patient is pacemaker dependent the thresholds are less than or equal to 2.0V @ 0.4ms.    -- Yes -- RA and RV leads are programmed to bipolar pacing operation or pacing off. If no, CONTACT THE DEVICE REP FOR PROGRAMMING           Date of last In-clinic Device check: 11/3/2022  Results of last Device check:  1.   Right atrium impedance: n/a  2.   Right ventricle impedance: 437  3.   Left ventricle impedance: 912     4.   Right atrium threshold: n/a  5.   Right ventricle threshold: 0.5V@0.4ms  6.   Left ventricle threshold: 0.75V@0.4ms     Device programming during the scan guidelines   Pacing Mode (check one): VOO  Pacing Rate: 80  bpm   Karuna Rosales Device RN

## 2023-06-06 NOTE — PROGRESS NOTES
"[facility]    Consult. PAD, diabetic, Right 4th toe wound confirmed with osteo. EDWINA's done in [place] (see media). Right EDWINA 0.71 and right TBI 0.86    Patient is here for a consult to discuss Peripheral artery disease     can walk 30-40 yards pain right leg right leg. Bilateral leg pain with standing and walking. No open wounds.      Pt is currently taking Statin and Xarelto    Diabetic.    /72   Pulse 63   Ht 5' 10\" (1.778 m)   Wt 205 lb (93 kg)   SpO2 99%   BMI 29.41 kg/m      The provider has been notified that the patient has where weakness and pain is coming in lower extremities.     Questions patient would like addressed today are: N/A.    Refills are needed: No    Has homecare services and agency name:  No         "

## 2023-06-06 NOTE — PATIENT INSTRUCTIONS
Sheron Guzman,    Thank you for entrusting your care with us today. After your visit today with MD Dacia Barba this is the plan that was discussed at your appointment.    Dr. Barba would like you to get an ultrasound to recheck your blood flow. This has been scheduled for you.       Follow up with infectious disease regarding your antibiotics.    Get MRI as planned.  Dr. Mckinnon will call you to discuss the results.     I am including additional information on these things and our contact information if you have any questions or concerns.   Please do not hesitate to reach out to us if you felt we did not answer your questions or you are unsure of the treatment plan after your visit today. Our number is [phone].Thank you for trusting us with your care.         Again thank you for your time.         Ankle-Brachial Index (EDWINA) or Physiologic Test    Description  An ankle-brachial index test is relatively pain free. Blood pressure cuffs of various sizes are placed on your thigh, calf, foot and toes.  Similar to having your blood pressure checked with an arm cuff, as the technician inflates the cuffs, they progressively tighten and are then quickly released.  You may feel some discomfort, but generally for less than 60 seconds for each measurement. You will be asked to remove your socks and shoes and possibly your pants or shorts. Gowns will be provided. It usually takes about 30-60 minutes.   Depending on the initial readings and patient symptoms, you may be asked to perform a light walk on a treadmill.  The technician will apply ultrasound gel, usually warmed for your comfort, to your ankles and wrists. Through the gel, the technician will use a small hand-held device that emits sound waves.  Risks  There are typically no side effects or complications associated with a physiologic study.  How to Prepare  Eat and take medications as usual.  There is no preparation required for an ankle-brachial index (EDWINA) or  physiologic exam.  What Can I Expect After the Test?  The technician will send the ultrasound images to your vascular surgeon for evaluation. Typically, a report is available in 2-3 days. If anything critical is found, it is standard practice to notify the vascular surgeon immediately.  Reference: https://vascular.org/patient-resources/vascular-tests

## 2023-06-12 ENCOUNTER — TELEPHONE (OUTPATIENT)
Dept: INFECTIOUS DISEASES | Facility: CLINIC | Age: 78
End: 2023-06-12
Payer: MEDICARE

## 2023-06-12 ENCOUNTER — TELEPHONE (OUTPATIENT)
Dept: VASCULAR SURGERY | Facility: CLINIC | Age: 78
End: 2023-06-12
Payer: MEDICARE

## 2023-06-12 DIAGNOSIS — L08.9 TOE INFECTION: Primary | ICD-10-CM

## 2023-06-12 DIAGNOSIS — M86.9 OSTEOMYELITIS OF RIGHT FOOT, UNSPECIFIED TYPE (H): ICD-10-CM

## 2023-06-12 NOTE — TELEPHONE ENCOUNTER
Caller: Thomas    Provider: SHARLA Mckinnon    Detailed reason for call: He was not able to get in for MRI until 06/22/23. He did see Infectious Disease. He is waiting for a call back with results of culture done on 05/30/2023. He completed anti-biotic a week ago. Should he refill.  He asked if Dr. Mckinnon could review ID notes and culture results and advise if he should be doing something prior to his MRI.    Best phone number to contact: [phone]    Best time to contact: any    Ok to leave a detailed message: Yes

## 2023-06-12 NOTE — TELEPHONE ENCOUNTER
I reviewed with Dr Goss, send Augmentin 875-125 BID x 4 weeks and have the pt keep the follow up. I called LM for the pt to c/b to inform and to see what pharmacy I should send the prescription to.

## 2023-06-12 NOTE — TELEPHONE ENCOUNTER
Pt calling for culture results/abx plan. The pt has been off of doxycycline for about one week now. I informed I will review with the MD and contact him back.

## 2023-06-13 ENCOUNTER — OFFICE VISIT (OUTPATIENT)
Dept: VASCULAR SURGERY | Facility: CLINIC | Age: 78
End: 2023-06-13
Attending: INTERNAL MEDICINE
Payer: MEDICARE

## 2023-06-13 VITALS
SYSTOLIC BLOOD PRESSURE: 138 MMHG | HEIGHT: 70 IN | BODY MASS INDEX: 29.35 KG/M2 | HEART RATE: 63 BPM | WEIGHT: 205 LBS | OXYGEN SATURATION: 99 % | DIASTOLIC BLOOD PRESSURE: 72 MMHG

## 2023-06-13 DIAGNOSIS — L97.514 DIABETIC ULCER OF TOE OF RIGHT FOOT ASSOCIATED WITH DIABETES MELLITUS DUE TO UNDERLYING CONDITION, WITH NECROSIS OF BONE (H): Primary | ICD-10-CM

## 2023-06-13 DIAGNOSIS — E08.621 DIABETIC ULCER OF TOE OF RIGHT FOOT ASSOCIATED WITH DIABETES MELLITUS DUE TO UNDERLYING CONDITION, WITH NECROSIS OF BONE (H): Primary | ICD-10-CM

## 2023-06-13 DIAGNOSIS — I73.9 CLAUDICATION OF BOTH LOWER EXTREMITIES (H): ICD-10-CM

## 2023-06-13 PROCEDURE — G0463 HOSPITAL OUTPT CLINIC VISIT: HCPCS

## 2023-06-13 NOTE — PROGRESS NOTES
VASCULAR AND INTERVENTIONAL OUTPATIENT CONSULT OR VISIT  PHYSICIAN: Dacia Barba MD  NEW PATIENT    LOCATION: [facility]    Thomas Steve   Medical Record #:  [id number]  YOB: 1945  Age:  78 year old     Date of Service: 6/13/2023    PRIMARY CARE PROVIDER: Moni Mcclain    Reason for visit: Peripheral vascular disease, right fourth toe osteomyelitis    IMPRESSION: 78-year-old male with history of peripheral vascular disease presents with osteomyelitis of the right fourth toe which will require amputation.  The patient underwent noninvasive imaging on 1/30/2023 in [place].  Images are not available for review, however, based on the report the patient has a digit pressure of 108 mmHg on the right.  The patient also reports bilateral lower extremity claudication while walking his dogs approximately 1 block.    RECOMMENDATION:    1.  Guideline recommended medical therapy for peripheral chill disease  -Continue high intensity statin therapy with Lipitor 20 mg once daily  -Continue anticoagulation with Xarelto.  No indication for addition of antiplatelet therapy at this time  -The patient underwent a screening carotid ultrasound while in [place] this winter.  As per report, there is mild disease bilaterally.  -No abdominal aortic aneurysm on lumbar MRI from 2022.    I would suspect the patient has adequate perfusion of his right foot to allow for healing of his fourth digit post amputation based on his noninvasive imaging from January.  We will obtain repeat resting/exercise ABIs/TCO2 to confirm.  The patient will obtain his MRI later this month followed by amputation with Dr. Mckinnon.  We will plan for a clinic follow-up in 3 months or sooner should the need arise.    HPI:  Thomas Steve is a 78 year old male who was evaluated today for peripheral arterial disease.  The patient reports a history of a right fourth digit wound which was first noticed in November while he was in [place].   The patient reports he underwent noninvasive vascular imaging and saw a podiatrist at that time.  He has since returned to [place] and has been seen by Dr. Mckinnon.  He is scheduled to undergo a MRI of the right foot and will likely require amputation of the fourth digit.  The patient also reports bilateral lower extremity claudication after walking his dogs approximately 1 block.  He denies any prior history of lower extremity wounds or ulcerations.  He denies any rest pain.  He is on anticoagulation for atrial fibrillation.    PHH:    Past Medical History:   Diagnosis Date     Acute sinusitis      Atrial fibrillation (H)     Resolved after medication and CPAP     Back pain      CHF (congestive heart failure) (H)      Coronary artery disease      Diabetes mellitus (H)      Gastroesophageal reflux disease with esophagitis      HTN (hypertension)      Hyperlipemia      Hyperlipidemia      Joint pain      Lipoma of neck      Nocturia      Sciatic leg pain      Shoulder impingement syndrome     BILATERAL     Sleep apnea     uses CPAP     Typical atrial flutter (H) 07/07/2016    Demonstrated on 12-lead EKG July 7, 2016        Past Surgical History:   Procedure Laterality Date     CORONARY ARTERY BYPASS       EP BIV PACEMAKER INSERT N/A 06/11/2019    Procedure: EP Biventricular Pacemaker Insertion;  Surgeon: Durga Rajput MD;  Location: [facility] Cath Lab;  Service: Cardiology     IMPLANT PACEMAKER       IR FINE NEEDLE ASPIRATION W ULTRASOUND  06/23/2022     LAMINECTOMY LUMBAR TWO LEVELS Bilateral 06/16/2022    Procedure: DECOMPRESSIVE LUMBAR LAMINECTOMY LUMBAR 4-LUMBAR 5 AND LUMBAR 5-SACRAL 1 BILATERAL-LEFT SIDE FIRST-PLUS BILATERAL FORAMINOTOMIES PLUS REMOVAL OF HERNIATED DISC LUMBAR 5-SACRAL 1 LEFT;  Surgeon: Israel Samuel MD;  Location: [facility] OR     ID CARDIOVERSION ELECTIVE ARRHYTHMIA EXTERNAL  06/03/2014    Description: Elective Cardioversion External;  Recorded: 06/03/2014;     [facility] CABG, VEIN,  SINGLE  01/01/2011    Description: CABG (CABG);  Proc Date: 09/26/2011;  Comments: LIMA^LAD, SVG^OM, SVG^distal RCA       ALLERGIES:  Aspirin, Bee pollen, Codeine, Pollen extracts [pollen extract], and Vicodin [hydrocodone-acetaminophen]    MEDS:    Current Outpatient Medications:      acetaminophen (TYLENOL) 500 MG tablet, Take 1,000-1,500 mg by mouth every 6 hours as needed for mild pain, Disp: , Rfl:      atorvastatin (LIPITOR) 20 MG tablet, Take 20 mg by mouth At Bedtime, Disp: , Rfl:      bumetanide (BUMEX) 1 MG tablet, Take 2 tablets (2 mg) by mouth daily, Disp: 180 tablet, Rfl: 3     cholecalciferol 50 MCG (2000 UT) CAPS, Take 2 capsules by mouth daily, Disp: , Rfl:      coenzyme Q-10 200 MG CAPS capsule, Take 200 mg by mouth daily, Disp: , Rfl:      ferrous sulfate (FEROSUL) 325 (65 Fe) MG tablet, Take 1 tablet by mouth 3 times daily (with meals), Disp: , Rfl:      insulin glargine (LANTUS PEN) 100 UNIT/ML pen, Inject 20 Units Subcutaneous At Bedtime (Patient taking differently: Inject 30 Units Subcutaneous At Bedtime), Disp: 15 mL, Rfl:      levothyroxine (SYNTHROID/LEVOTHROID) 25 MCG tablet, Take 25 mcg by mouth daily, Disp: , Rfl:      losartan (COZAAR) 50 MG tablet, Take 1 tablet (50 mg) by mouth daily, Disp: 90 tablet, Rfl: 3     melatonin 5 MG tablet, Take 5 mg by mouth At Bedtime, Disp: , Rfl:      Omega-3 Fatty Acids (FISH OIL) 1200 MG capsule, Take 1,200 mg by mouth daily, Disp: , Rfl:      omeprazole (PRILOSEC) 20 MG DR capsule, Take 1 capsule by mouth 2 times daily, Disp: , Rfl:      POTASSIUM PO, Take by mouth daily, Disp: , Rfl:      rivaroxaban ANTICOAGULANT (XARELTO) 20 MG TABS tablet, Take 20 mg by mouth At Bedtime, Disp: , Rfl:      rosuvastatin (CRESTOR) 10 MG tablet, Take 10 mg by mouth At Bedtime, Disp: , Rfl:      senna-docusate (SENOKOT-S/PERICOLACE) 8.6-50 MG tablet, Take 1 tablet by mouth 2 times daily, Disp: , Rfl:      vitamin C (ASCORBIC ACID) 500 MG tablet, Take 500 mg by mouth 3  times daily (with meals) Take with iron supplement, Disp: , Rfl:      zinc gluconate 50 MG tablet, Take 1 tablet (50 mg) by mouth daily, Disp: , Rfl:      doxycycline hyclate (VIBRA-TABS) 100 MG tablet, , Disp: , Rfl:      doxycycline hyclate (VIBRAMYCIN) 100 MG capsule, Take 100 mg by mouth 2 times daily (Patient not taking: Reported on 6/13/2023), Disp: , Rfl:      glimepiride (AMARYL) 4 MG tablet, Take 4 mg by mouth every morning (before breakfast), Disp: , Rfl:     SOCIAL HABITS:    History   Smoking Status     Former     Packs/day: 1.50     Years: 23.00     Types: Cigarettes     Quit date: 1/1/1986   Smokeless Tobacco     Never     Social History    Substance and Sexual Activity      Alcohol use: Yes        Alcohol/week: 1.0 standard drink of alcohol        Comment: one beer per day      History   Drug Use No       FAMILY HISTORY:    Family History   Problem Relation Age of Onset     Heart Disease Mother      Snoring Father      Heart Disease Father      Hypertension Father      Alzheimer Disease Father      No Known Problems Daughter      No Known Problems Daughter      No Known Problems Daughter      Chronic Obstructive Pulmonary Disease Sister      Hodgkin's lymphoma Brother      No Known Problems Son      Diabetes Sister      Substance Abuse Sister      Chronic Obstructive Pulmonary Disease Brother      Heart Disease Brother      Diabetes Brother      Substance Abuse Brother        ADVANCE CARE DIRECTIVES:    Advance care directives reviewed in the chart and no changes made.     PE:  There were no vitals taken for this visit.  Wt Readings from Last 1 Encounters:   06/01/23 93 kg (205 lb)     There is no height or weight on file to calculate BMI.    EXAM:  GENERAL: This is a well-developed 78 year old male who appears his stated age  EYES: Grossly normal.  MOUTH: Buccal mucosa normal   MUSCULOSKELETAL: Grossly normal and both lower extremities are intact.  HEME/LYMPH: No lymphedema  NEUROLOGIC: Focally  intact, Alert and oriented x 3.   PSYCH: appropriate affect    DIAGNOSTIC STUDIES:     Images:  No results found.    LABS:      Sodium   Date Value Ref Range Status   05/30/2023 144 136 - 145 mmol/L Final   05/26/2023 141 136 - 145 mmol/L Final   11/07/2022 141 136 - 145 mmol/L Final     Urea Nitrogen   Date Value Ref Range Status   05/30/2023 20.1 8.0 - 23.0 mg/dL Final   05/26/2023 23.2 (H) 8.0 - 23.0 mg/dL Final   11/07/2022 21.4 8.0 - 23.0 mg/dL Final   07/19/2022 32 (H) 8 - 28 mg/dL Final   07/18/2022 44 (H) 8 - 28 mg/dL Final   07/17/2022 64 (H) 8 - 28 mg/dL Final     Hemoglobin   Date Value Ref Range Status   05/30/2023 12.4 (L) 13.3 - 17.7 g/dL Final   08/10/2022 10.8 (L) 13.3 - 17.7 g/dL Final   07/19/2022 8.5 (L) 13.3 - 17.7 g/dL Final     Platelet Count   Date Value Ref Range Status   05/30/2023 191 150 - 450 10e3/uL Final   08/10/2022 206 150 - 450 10e3/uL Final   07/19/2022 303 150 - 450 10e3/uL Final     BNP   Date Value Ref Range Status   07/14/2022 197 (H) 0 - 78 pg/mL Final   06/20/2022 108 (H) 0 - 78 pg/mL Final     INR   Date Value Ref Range Status   07/14/2022 2.22 (H) 0.85 - 1.15 Final   07/14/2022 2.27 (H) 0.85 - 1.15 Final   07/06/2022 1.14 0.85 - 1.15 Final       This was a in person visit in which 30 minutes of  total time was spent (either in face-to-face or non-face-to-face time).    Dacia Barba MD, [facility]  VASCULAR AND INTERVENTIONAL PHYSICIAN  VASCULAR MEDICINE  INTERNAL MEDICINE  PAGER: [phone]  CALL SERVICE: [phone]

## 2023-06-13 NOTE — TELEPHONE ENCOUNTER
Called patient back to inform him that Dr. Mckinnon agrees with ID's plan. Also updated him that ID left him a voicemail yesterday to update on the plan for antibiotics going forward and the he needs to call them to get the plan. Instructed to continue with the same dressing change for now and Dr. Mckinnon will call with the MRI results. Pt reports that he will call ID.

## 2023-06-14 ENCOUNTER — ANCILLARY PROCEDURE (OUTPATIENT)
Dept: VASCULAR ULTRASOUND | Facility: CLINIC | Age: 78
End: 2023-06-14
Attending: RADIOLOGY
Payer: MEDICARE

## 2023-06-14 DIAGNOSIS — E08.621 DIABETIC ULCER OF TOE OF RIGHT FOOT ASSOCIATED WITH DIABETES MELLITUS DUE TO UNDERLYING CONDITION, WITH NECROSIS OF BONE (H): ICD-10-CM

## 2023-06-14 DIAGNOSIS — L97.514 DIABETIC ULCER OF TOE OF RIGHT FOOT ASSOCIATED WITH DIABETES MELLITUS DUE TO UNDERLYING CONDITION, WITH NECROSIS OF BONE (H): ICD-10-CM

## 2023-06-14 DIAGNOSIS — I73.9 CLAUDICATION OF BOTH LOWER EXTREMITIES (H): ICD-10-CM

## 2023-06-14 PROCEDURE — 93925 LOWER EXTREMITY STUDY: CPT

## 2023-06-14 PROCEDURE — 93925 LOWER EXTREMITY STUDY: CPT | Mod: 26 | Performed by: SURGERY

## 2023-06-14 PROCEDURE — 93923 UPR/LXTR ART STDY 3+ LVLS: CPT

## 2023-06-14 PROCEDURE — 93923 UPR/LXTR ART STDY 3+ LVLS: CPT | Mod: 26 | Performed by: SURGERY

## 2023-06-22 ENCOUNTER — HOSPITAL ENCOUNTER (OUTPATIENT)
Dept: MRI IMAGING | Facility: CLINIC | Age: 78
Discharge: HOME | End: 2023-06-22
Attending: PODIATRIST
Payer: MEDICARE

## 2023-06-22 ENCOUNTER — HOSPITAL ENCOUNTER (OUTPATIENT)
Dept: RADIOLOGY | Facility: CLINIC | Age: 78
Discharge: HOME | End: 2023-06-22
Attending: PODIATRIST
Payer: MEDICARE

## 2023-06-22 ENCOUNTER — TELEPHONE (OUTPATIENT)
Dept: VASCULAR SURGERY | Facility: CLINIC | Age: 78
End: 2023-06-22
Payer: MEDICARE

## 2023-06-22 VITALS — HEART RATE: 85 BPM | OXYGEN SATURATION: 97 %

## 2023-06-22 DIAGNOSIS — E08.621 DIABETIC ULCER OF TOE OF RIGHT FOOT ASSOCIATED WITH DIABETES MELLITUS DUE TO UNDERLYING CONDITION, WITH NECROSIS OF BONE (H): ICD-10-CM

## 2023-06-22 DIAGNOSIS — L97.514 DIABETIC ULCER OF TOE OF RIGHT FOOT ASSOCIATED WITH DIABETES MELLITUS DUE TO UNDERLYING CONDITION, WITH NECROSIS OF BONE (H): ICD-10-CM

## 2023-06-22 DIAGNOSIS — Z95.0 MRI SAFE CARDIAC PACEMAKER IN SITU: ICD-10-CM

## 2023-06-22 PROCEDURE — 255N000002 HC RX 255 OP 636: Mod: JZ | Performed by: PODIATRIST

## 2023-06-22 PROCEDURE — 73720 MRI LWR EXTREMITY W/O&W/DYE: CPT | Mod: RT,MG

## 2023-06-22 PROCEDURE — G1010 CDSM STANSON: HCPCS

## 2023-06-22 PROCEDURE — 71045 X-RAY EXAM CHEST 1 VIEW: CPT

## 2023-06-22 PROCEDURE — A9585 GADOBUTROL INJECTION: HCPCS | Mod: JZ | Performed by: PODIATRIST

## 2023-06-22 RX ORDER — GADOBUTROL 604.72 MG/ML
9 INJECTION INTRAVENOUS ONCE
Status: COMPLETED | OUTPATIENT
Start: 2023-06-22 | End: 2023-06-22

## 2023-06-22 RX ADMIN — GADOBUTROL 9 ML: 604.72 INJECTION INTRAVENOUS at 10:12

## 2023-06-22 NOTE — TELEPHONE ENCOUNTER
Spoke with pt to schedule MRI results telephone call.   Pt states he is driving home and can not take a call to discuss results.  Would like to schedule MRI for Monday 6/26/23.    Paty Ruiz LPN on 6/22/2023 at 12:09 PM

## 2023-06-22 NOTE — PROGRESS NOTES
"Patient's device placed into MRI Surescan mode prior to scan with [facility]/Device.  Device check \"passed\" per report. Patient monitored by RN via ECG and pulse oximetry throughout procedure.  Patient stable throughout.  Device placed back into previous mode at completion of MRI with [facility].   "

## 2023-06-26 ENCOUNTER — DOCUMENTATION ONLY (OUTPATIENT)
Dept: VASCULAR SURGERY | Facility: CLINIC | Age: 78
End: 2023-06-26

## 2023-06-26 ENCOUNTER — HOSPITAL ENCOUNTER (OUTPATIENT)
Facility: HOSPITAL | Age: 78
End: 2023-06-26
Attending: PODIATRIST | Admitting: PODIATRIST
Payer: MEDICARE

## 2023-06-26 ENCOUNTER — PREP FOR PROCEDURE (OUTPATIENT)
Dept: VASCULAR SURGERY | Facility: CLINIC | Age: 78
End: 2023-06-26
Payer: MEDICARE

## 2023-06-26 ENCOUNTER — LAB REQUISITION (OUTPATIENT)
Dept: LAB | Facility: CLINIC | Age: 78
End: 2023-06-26
Payer: MEDICARE

## 2023-06-26 ENCOUNTER — DOCUMENTATION ONLY (OUTPATIENT)
Dept: OTHER | Facility: CLINIC | Age: 78
End: 2023-06-26

## 2023-06-26 ENCOUNTER — VIRTUAL VISIT (OUTPATIENT)
Dept: VASCULAR SURGERY | Facility: CLINIC | Age: 78
End: 2023-06-26
Attending: PODIATRIST
Payer: MEDICARE

## 2023-06-26 DIAGNOSIS — M86.9 OSTEOMYELITIS OF ANKLE AND FOOT (H): Primary | ICD-10-CM

## 2023-06-26 DIAGNOSIS — E11.40 TYPE 2 DIABETES MELLITUS WITH DIABETIC NEUROPATHY, UNSPECIFIED (H): ICD-10-CM

## 2023-06-26 LAB
ALBUMIN SERPL BCG-MCNC: 4.6 G/DL (ref 3.5–5.2)
ALP SERPL-CCNC: 164 U/L (ref 40–129)
ALT SERPL W P-5'-P-CCNC: 44 U/L (ref 0–70)
ANION GAP SERPL CALCULATED.3IONS-SCNC: 12 MMOL/L (ref 7–15)
AST SERPL W P-5'-P-CCNC: 40 U/L (ref 0–45)
BILIRUB SERPL-MCNC: 0.4 MG/DL
BUN SERPL-MCNC: 19.3 MG/DL (ref 8–23)
CALCIUM SERPL-MCNC: 9.3 MG/DL (ref 8.8–10.2)
CHLORIDE SERPL-SCNC: 100 MMOL/L (ref 98–107)
CREAT SERPL-MCNC: 1.07 MG/DL (ref 0.67–1.17)
DEPRECATED HCO3 PLAS-SCNC: 29 MMOL/L (ref 22–29)
GFR SERPL CREATININE-BSD FRML MDRD: 71 ML/MIN/1.73M2
GLUCOSE SERPL-MCNC: 218 MG/DL (ref 70–99)
POTASSIUM SERPL-SCNC: 4.5 MMOL/L (ref 3.4–5.3)
PROT SERPL-MCNC: 7.1 G/DL (ref 6.4–8.3)
SODIUM SERPL-SCNC: 141 MMOL/L (ref 136–145)

## 2023-06-26 PROCEDURE — 99442 PR PHYSICIAN TELEPHONE EVALUATION 11-20 MIN: CPT | Performed by: PODIATRIST

## 2023-06-26 PROCEDURE — 80053 COMPREHEN METABOLIC PANEL: CPT | Mod: ORL | Performed by: FAMILY MEDICINE

## 2023-06-26 NOTE — PROGRESS NOTES
Message sent to cardiology to see if ok to hold Xarelto for 3 days prior to surgery and if bridging is needed.  Awaiting reply.

## 2023-06-26 NOTE — PROGRESS NOTES
FOOT AND ANKLE SURGERY/PODIATRY Progress Note      ASSESSMENT:   Osteomyelitis 4th digit right foot    Type of service:  Telephone Visit       Telephone Start and End Time : 10:45/11:00    Originating Location (pt. Location):  home      Distant Location :  [facility]         Mode of Communication: Telephone    TREATMENT:  MRI right foot: 1.  Osteomyelitis fourth toe proximal and middle phalanges with intervening PIP joint septic arthritis. Adjacent soft tissue infection/ulceration.  2.  No abscess.  3.  Erosive change and synovitis first MTP joint can be seen with gouty arthropathy. Correlation with radiographic appearance and the patient's history is recommended.    -Due to the presence of osteomyelitis, I reviewed the treatment options to include either 6 weeks IV antibiotics with Infectious Disease/surgical debridement with removal of bone/use of wound vac/non-weight bearing vs amputation of the involved bone.  Due to the presence of osteomyelitis and 2 out of the 3 phalanges of the fourth digit and in the presence of septic arthritis, I recommend amputation at this time.    -We discussed the surgical procedure including postoperative course to remain nonweightbearing for minimum of 4 weeks.  Risks include but limited to distance of the surgical site and need for additional surgical intervention.  All questions invited and answered.  Patient consents to surgery.    -I have referred him for a knee walker and wheelchair to help remain nonweightbearing postoperatively.  Also discussed hospital admission with transfer to TCU, patient declines.    -Most recent TBI right foot is 54 mmHg.  I have asked input from vascular surgery/interventional radiology before proceeding with right foot surgery.    -I will asked my office to coordinate surgery at [facility] pending input from vascular surgery/interventional radiology.    Juan Mckinnon DPM  [facility]      HPI:  Thomas Steve was seen again today to review his right foot MRI today. Patient reports that he believes the sore on the 4th digit has resolved. He is scheduled to see ID tomorrow.     Past Medical History:   Diagnosis Date     Acute sinusitis      Atrial fibrillation (H)     Resolved after medication and CPAP     Back pain      CHF (congestive heart failure) (H)      Coronary artery disease      Diabetes mellitus (H)      Gastroesophageal reflux disease with esophagitis      HTN (hypertension)      Hyperlipemia      Hyperlipidemia      Joint pain      Lipoma of neck      Nocturia      Sciatic leg pain      Shoulder impingement syndrome     BILATERAL     Sleep apnea     uses CPAP     Typical atrial flutter (H) 07/07/2016    Demonstrated on 12-lead EKG July 7, 2016       Past Surgical History:   Procedure Laterality Date     CORONARY ARTERY BYPASS       EP BIV PACEMAKER INSERT N/A 06/11/2019    Procedure: EP Biventricular Pacemaker Insertion;  Surgeon: Durga Rajput MD;  Location: [facility];  Service: Cardiology     IMPLANT PACEMAKER       IR FINE NEEDLE ASPIRATION W ULTRASOUND  06/23/2022     LAMINECTOMY LUMBAR TWO LEVELS Bilateral 06/16/2022    Procedure: DECOMPRESSIVE LUMBAR LAMINECTOMY LUMBAR 4-LUMBAR 5 AND LUMBAR 5-SACRAL 1 BILATERAL-LEFT SIDE FIRST-PLUS BILATERAL FORAMINOTOMIES PLUS REMOVAL OF HERNIATED DISC LUMBAR 5-SACRAL 1 LEFT;  Surgeon: Israel Samuel MD;  [place] [facility] [place]     ID CARDIOVERSION ELECTIVE ARRHYTHMIA EXTERNAL  06/03/2014    Description: Elective Cardioversion External;  Recorded: 06/03/2014;     [facility] CABG, VEIN, SINGLE  01/01/2011    Description: CABG (CABG);  Proc Date: 09/26/2011;  Comments: LIMA^LAD, SVG^OM, SVG^distal RCA       Allergies   Allergen Reactions     Aspirin Other (See Comments)     Contraindicated due to xarelto use     Bee Pollen Unknown     Scratchy throat     Codeine Nausea and Vomiting     Pollen Extracts [Pollen Extract] Unknown     Scratchy  throat     Vicodin [Hydrocodone-Acetaminophen] Nausea and Vomiting         Current Outpatient Medications:      acetaminophen (TYLENOL) 500 MG tablet, Take 1,000-1,500 mg by mouth every 6 hours as needed for mild pain, Disp: , Rfl:      amoxicillin-clavulanate (AUGMENTIN) 875-125 MG tablet, Take 1 tablet by mouth 2 times daily for 28 days, Disp: 56 tablet, Rfl: 0     atorvastatin (LIPITOR) 20 MG tablet, Take 20 mg by mouth At Bedtime, Disp: , Rfl:      bumetanide (BUMEX) 1 MG tablet, Take 2 tablets (2 mg) by mouth daily, Disp: 180 tablet, Rfl: 3     cholecalciferol 50 MCG (2000 UT) CAPS, Take 2 capsules by mouth daily, Disp: , Rfl:      coenzyme Q-10 200 MG CAPS capsule, Take 200 mg by mouth daily, Disp: , Rfl:      doxycycline hyclate (VIBRA-TABS) 100 MG tablet, , Disp: , Rfl:      doxycycline hyclate (VIBRAMYCIN) 100 MG capsule, Take 100 mg by mouth 2 times daily, Disp: , Rfl:      ferrous sulfate (FEROSUL) 325 (65 Fe) MG tablet, Take 1 tablet by mouth 3 times daily (with meals), Disp: , Rfl:      glimepiride (AMARYL) 4 MG tablet, Take 4 mg by mouth every morning (before breakfast), Disp: , Rfl:      insulin glargine (LANTUS PEN) 100 UNIT/ML pen, Inject 20 Units Subcutaneous At Bedtime (Patient taking differently: Inject 30 Units Subcutaneous At Bedtime), Disp: 15 mL, Rfl:      levothyroxine (SYNTHROID/LEVOTHROID) 25 MCG tablet, Take 25 mcg by mouth daily, Disp: , Rfl:      losartan (COZAAR) 50 MG tablet, Take 1 tablet (50 mg) by mouth daily, Disp: 90 tablet, Rfl: 3     melatonin 5 MG tablet, Take 5 mg by mouth At Bedtime, Disp: , Rfl:      Omega-3 Fatty Acids (FISH OIL) 1200 MG capsule, Take 1,200 mg by mouth daily, Disp: , Rfl:      omeprazole (PRILOSEC) 20 MG DR capsule, Take 1 capsule by mouth 2 times daily, Disp: , Rfl:      POTASSIUM PO, Take by mouth daily, Disp: , Rfl:      rivaroxaban ANTICOAGULANT (XARELTO) 20 MG TABS tablet, Take 20 mg by mouth At Bedtime, Disp: , Rfl:      rosuvastatin (CRESTOR) 10 MG  tablet, Take 10 mg by mouth At Bedtime, Disp: , Rfl:      senna-docusate (SENOKOT-S/PERICOLACE) 8.6-50 MG tablet, Take 1 tablet by mouth 2 times daily, Disp: , Rfl:      vitamin C (ASCORBIC ACID) 500 MG tablet, Take 500 mg by mouth 3 times daily (with meals) Take with iron supplement, Disp: , Rfl:      zinc gluconate 50 MG tablet, Take 1 tablet (50 mg) by mouth daily, Disp: , Rfl:     Review of Systems - 10 point Review of Systems is negative except for right foot osteomyelitis which is noted in HPI.    Imaging:     MR Foot Right w/o & w Contrast    Result Date: 6/22/2023  EXAM: MR FOOT RIGHT W/O and W CONTRAST LOCATION: [facility] DATE: 6/22/2023 INDICATION: Osteomyelitis fourth digit right foot. COMPARISON: None available. TECHNIQUE: Routine. Additional postgadolinium T1 sequences were obtained. IV CONTRAST: 9 ml Gadavist. FINDINGS: There is cortical indistinctness, marrow replacement, remodeling and enhancement/edema of the fourth toe proximal and middle phalanx with associated abnormal signal in the intervening PIP joint. Adjacent soft tissue ulceration/injury is likely present along the dorsal aspect of the toe at the site where there is thickening and enhancement. No abscess. The remaining bones of the forefoot show erosive change at the first MTP joint with synovitis, a finding which can be seen with gouty arthropathy in the appropriate clinical setting. Mild scattered arthritic changes throughout the TMT joints. There is no acute tendon abnormality or tenosynovitis. Fatty atrophy intrinsic muscles of the foot. There is granulation tissue versus scar along the plantar subcutaneous tissues of the fifth MTP joint, likely chronic. No rim-enhancing abscess. Lisfranc ligament complex is intact.     IMPRESSION: 1.  Osteomyelitis fourth toe proximal and middle phalanges with intervening PIP joint septic arthritis. Adjacent soft tissue infection/ulceration. 2.  No abscess. 3.  Erosive change  and synovitis first MTP joint can be seen with gouty arthropathy. Correlation with radiographic appearance and the patient's history is recommended. NOTE: ABNORMAL REPORT THE DICTATION ABOVE DESCRIBES AN ABNORMALITY FOR WHICH FOLLOW-UP IS NEEDED.     XR Chest 1 View    Result Date: 6/22/2023  EXAM: XR CHEST 1 VIEW LOCATION: [facility] DATE: 6/22/2023 INDICATION: Pre MRI verify device lead integrity COMPARISON: 07/15/2022     IMPRESSION: Poststernotomy changes. Multi lead left subclavian venous pacer. Leads are intact. Heart is enlarged but unchanged. No signs of pneumonia or failure.    US Lower Extremity Arterial Duplex Bilateral    Result Date: 6/18/2023  Table formatting from the original result was not included. Arterial Duplex Ultrasound (Date: 06/14/23) Lower Extremity Artery Evaluation Indication: Bilateral leg pain/Hx of known spinal stenosis/known right 4th toe osteomyelitis/rule out healing potential for toe amputation  Previous: 08/19/2016-EDWINA  History: Previous Smoker, Diabetic, Hyperlipidemia, and CAD Technique: Duplex imaging is performed utilizing gray-scale, two-dimensional images, and color-flow imaging. Doppler waveform analysis and spectral Doppler imaging is also performed. LOWER EXTREMITY ARTERIAL DUPLEX EXAM WITH WAVEFORMS Right Leg:(cm/s) Location: Velocities Waveforms EIA:   105  B CFA:   94  B PFA:   83  B SFA Proximal:   83  T SFA Mid:   118  T SFA Distal:   83  T Popliteal Artery:   127  T PTA:   53   M BRICE:   123  M DPA:   82  M Waveforms: T=Triphasic, M=Monophasic, B=Biphasic Left Leg:(cm/s) Location: Velocities Waveforms EIA:   76  T CFA:   94  T PFA:   97  B SFA Proximal:   109  T SFA Mid:   151  T SFA Distal:   48  T Popliteal Artery:   84  T PTA:   74   M BRICE:   90  M DPA:   136  M Waveforms: T=Triphasic, M=Monophasic, B=Biphasic Impression: Right Lower Extremity: Biphasic flow in common femoral artery suggesting no inflow disease.  Otherwise, there is no  evidence of hemodynamily significant stenosis in the right lower extremity.  Transition to monophasic flow in tibial vessels without obvious hemodynamic significance Left Lower Extremity: Similarly, triphasic flow in common femoral artery suggesting no inflow disease.  No evidence of hemodynamically significant stenosis in the left lower extremity.  Transition to monophasic flow in tibial vessels without obvious hemodynamic significance. Reference: Category Normal 1-19% 20-49% 50-99% Occluded PSV <160 cm/sec without spectral broadening <160 cm/sec with spectral broadening Increased Increased Absent Flow Ratio N/A N/A < 2.0 >2.0 N/A Post-Stenotic Turbulence No No No Yes N/A      US TCO2 and EDWINA    Result Date: 2023  Table formatting from the original result was not included. BILATERAL RESTING ANKLE-BRACHIAL INDICES (EDWINA'S) (Date: 23) Indication: Bilateral leg pain/Hx of known spinal stenosis/known right 4th toe osteomyelitis/rule out healing potential for toe amputation Previous: 2016-EDWINA History: Previous Smoker, Diabetic, Hyperlipidemia, and CAD  Resting EDWINA's          Right: mmHg Index     Brachial: 127           Ankle: 106 0.83          Digit: 54 0.42 Tcp02-Upper Le  Tcp02-Lower Le  Tcp02-Ankle: 67  Tcp02-Foot: 77                Left: mmHg Index     Brachial: 128           Ankle: 110 0.86          Digit: 68 0.53 Tcp02-Upper Le  Tcp02-Lower Le  Tcp02-Ankle: 93  Tcp02-Foot: 109  Resting ankle-brachial index of 0.83 on the right. Toe Pressures of 54 mmHg and TBI of 0.42. Tcp02 Pressures of 77 mmHg at the level of the foot. Resting ankle-brachial index of 0.86 on the left. Toe Pressures of 68 mmHg and TBI of 0.53. Tcp02 Pressures of 109 mmHg at the level of the foot. VPR WAVEFORMS: The right volume plethysmography waveforms are mildly abnormal at the lower thigh level, mildly abnormal at the upper calf level and mildly abnormal at the ankle. The left volume plethysmography  waveforms are mildly abnormal at the lower thigh level, mildly abnormal at the upper calf level and mildly abnormal at the ankle. Impression:  1. RIGHT LOWER EXTREMITY: EDWINA is Abnormal with an EDWINA of 0.84 indicating single level disease. Toe Pressures are Normal and adequate for wound healing with toe pressures of 54 mmHg. Tcp02 Pressures are Normal and adequate for wound healing with Tcp02 pressures of 77 mmHg. 2. LEFT LOWER EXTREMITY: EDWINA is Abnormal with an EDWINA of 0.86 indicating single level disease. Toe Pressures are Normal and adequate for wound healing with toe pressures of 70 mmHg. Tcp02 Pressures are 109 Reference: Wound classification Grade EDWINA Ankle Systolic Pressure Toe Pressures 0 > 0.80 > 100 mmHg > 60 mmHg 1 0.6 - 0.79 70 - 100 mmHg 40 - 59 mmHg 2 0.4 - 0.59 50-70 mmHg 30 - 39 mmHg 3 < 0.39 < 50 mmHg < 30 mmHg Digit Pressures DBI Disease Category > 0.70 Normal < 0.70 Abnormal > 30 mmHg Potential wound healing < 30 mmHg Impaired wound healing Ankle Brachial Pressures EDWINA Disease Category > 1.3  Likely vessel calcification with monophasic waveforms, non-diagnostic 0.95-1.30 Normal with multiphasic waveforms 0.50-0.95 Single level disease 0.30-0.50 Multilevel disease < 0.30 Critical limb ischema Volume Plethysmography Recording (VPR) at all levels Normal Sharp systolic peak, fast upstroke, prominent dicrotic notch in wave Mild Sharp systolic peak, fast upstroke, absent dicrotic notch in wave Moderate Flattened systolic peak, slowed upstroke, absent dicrotic notch inwave Severe amplitude wave with = upslope and down slope Occluded Flat Line TCP02 Criteria Normal Values 50-80 WO/Supplemental Oxygen Impaired Healing <30 WO/Supplemental Oxygen

## 2023-06-26 NOTE — PROGRESS NOTES
Surgery Scheduled    Patient needs okay from vascular surgery to proceed with foot surgery. Also likely needs to be seen by cardiology. Confirmed surgery date with pt.  Pt didn't want to schedule earlier - they felt rushed.  Pt on xarelto - msg routed to nursing to address.  Pt has visit with PCP on 6/26/23    Surgery/Procedure: AMPUTATION, fourth digit right foot    CPT: 38381     Equipment: pulse lavage     Location: [facility]:  [address] [place] [address] (phone: [phone], Fax: [phone])    Date: 7/6/23    Time: 7:20 AM    Admission Type: Outpatient    Surgeon: Dr. Mckinnon    OR Confirmed & :  Yes with GIL on 6/26/2023    Entered on provider calendar:  Yes    Post Op: see appt desk    Wound Vac Needed: No    Home Care Needed: No    Blood Thinners Addressed: pt on Xarelto - routing message to nursing to address with pt    Stress Test Clearance: NO

## 2023-06-26 NOTE — PROGRESS NOTES
Patient having amputation of his 4th right toe on 7/6/23 with Dr. Mckinnon.  Would like Xarelto held for 3 days prior to procedure.  Routing message to cardiology for approval to hold Xarelto for 3 days prior and if bridging is necessary. If bridging, would like cardiology clinic to manage.  Awaiting reply.

## 2023-06-26 NOTE — LETTER
June 26, 2023      Thomas Steve  [address] [address] [address]              Dear Thomas Guzman,    Your surgery with [facility] has been scheduled. Please read thoroughly and follow instructions.     Procedure:   AMPUTATION, fourth digit right foot  Procedure Date :   7/6/23  *A surgery nurse will call you 1-2 days before surgery to inform you of the time of arrival for surgery.  Surgeon:   Dr. Juan Mckinnon  Admission Type:   Outpatient  Procedure Location:   [facility]:  [address] [place] [id number] (phone: [phone], Fax: [phone])    Post Operative Appointment:   7/14/2023 10:00 AM (Arrive by 9:45 AM) Juan Mckinnon DPM [facility]   7/25/2023 1:00 PM (Arrive by 12:45 PM) Juan Mckinnon DPM [facility]       Preparation Instructions to complete:    []  You will need a Pre-op Physical within 30 days before surgery with your primary care provider. This exam is required by the anesthesiologist to ensure a safe surgical experience.      Failure  to obtain your pre-op physical will lead to cancellation of your surgery    Call them right away to schedule this. Please ensure your Preoperative Physical is faxed to the Hospital (numbers listed above)    [] Preoperative Medication Instructions    We would like you to stop your anticoagulation medications 3-5 days before surgery HOWEVER contact your prescribing provider for instructions before discontinuing any medications. (Examples: Coumadin, Plavix, Xarelto, Eliquis, Pradaxa, Effient, Brilinta) If you are on Coumadin, we would like the goal INR ? 1.2.    IT IS OK TO STAY ON ASPIRIN PRIOR TO SURGERY.     Hold Ibuprofen, Herbal Supplements and Vitamin E 7 days before    Stop Cialis, Levitra and Viagra 2-3 days before surgery    [] Fasting Requirements    Nothing to eat for 8 hours before surgery unless  instructed differently by the surgery nurse.    You may have clear liquids up to two hours before your arrival time (coffee, tea, water, or Gatorade. No cream or milk)    If your primary care provider has instructed you to continue oral medications, you may take them on the morning of surgery with a small sip of water.      No alcohol or smoking after 12:00am the day of surgery    []  COVID-19 test is no longer needed    If you are experiencing COVID symptoms or have tested positive for COVID-19 within 14 days of procedure date, reach out to the care team to reschedule please.     [] Contact your insurance regarding coverage    If you would like a Good [place] Estimate for your upcoming procedure at [facility] Location, contact Cost of Care Estimates     Advocates are available Monday through Friday 8am - 5pm   [phone]    You may also submit a request online through your [employer] account.    For all self-pay, estimate, or anesthesia billing questions at [facility], the contact information is below:    Who to contact: Cierra MENSAH    [facility] number: [phone]    Prepay number: [phone]    [] DO NOT BRING FMLA WITH TO SURGERY.  These should be sent to the provider's office by fax to [phone].     [] Day of Surgery    Medications - Take as indicated with sips of water.     Wear comfortable loose fitting clothes. Wear your glasses-Not contacts. Do not wear jewelry and remove body piercing's. Surgery may be cancelled if they are not removed.     You may have 1 family member wait in the lobby during your surgery. Visitor restrictions are subject to change. Please verify with the surgery nurse when they call.     If same day surgery-Have a someone come with you to surgery that can help you understand the surgeon's instructions, drive you home and stay with you overnight the first night.    [] If the community sees a new COVID-19 surge, your procedure may need to be  postponed. We will contact you if this happens.    [] You will receive a call from a surgery nurse 1-3 days prior to surgery. They will go over more details with you.             ** AFTER SURGERY INSTRUCTIONS **      [] You are to remain NON WEIGHT BEARING on your right foot NON WEIGHT BEARING MEANS NO PRESSURE ON YOUR FOOT OR HEEL AT ANY TIME FOR ANY REASON!      [] During surgery Dr. Mckinnon will apply a gauze dressing to your foot. This will remain intact until you are seen in clinic for follow up    [] It is NOT OKAY to get your surgical site wet while bathing, you will need to purchase a cast cover to protect your foot from getting wet. You can purchase this at [facility] or your local pharmacy.    [] It is important that you elevate your affected foot after surgery. Proper elevation is raising your foot above your waist. The fluid in your lower extremities needs to get back to your heart so it can get pumped to your kidneys and expelled through urination. So if you notice you have to go to the bathroom more frequently when you are elevating your leg this is a good sign that it is working.     [] It is important that you increase your protein intake after surgery. Protein is essential for wound healing. We recommend you take a protein supplement twice per day. This is in addition to your normal diet. Examples of protein supplements are Ensure, Boost, Glucerna (if you are diabetic), or protein powder. You can purchase these at your local retailer or grocery store.       Notify our office right away, if you have any changes in your health status, or if you develop a cold, flu, diarrhea, infection, fever or sore throat before your scheduled surgery date. We can be reached at [phone]   Monday-Friday 8 am-4:30 pm if you have any questions.     Thank you for trusting us with your care!

## 2023-06-27 ENCOUNTER — OFFICE VISIT (OUTPATIENT)
Dept: INFECTIOUS DISEASES | Facility: CLINIC | Age: 78
End: 2023-06-27
Payer: MEDICARE

## 2023-06-27 VITALS
HEART RATE: 65 BPM | SYSTOLIC BLOOD PRESSURE: 110 MMHG | BODY MASS INDEX: 29.99 KG/M2 | OXYGEN SATURATION: 96 % | WEIGHT: 209 LBS | DIASTOLIC BLOOD PRESSURE: 62 MMHG

## 2023-06-27 DIAGNOSIS — L08.9 TOE INFECTION: ICD-10-CM

## 2023-06-27 DIAGNOSIS — M86.9 OSTEOMYELITIS OF RIGHT FOOT, UNSPECIFIED TYPE (H): Primary | ICD-10-CM

## 2023-06-27 PROCEDURE — 99213 OFFICE O/P EST LOW 20 MIN: CPT | Performed by: STUDENT IN AN ORGANIZED HEALTH CARE EDUCATION/TRAINING PROGRAM

## 2023-06-27 RX ORDER — PREGABALIN 75 MG/1
CAPSULE ORAL
COMMUNITY
Start: 2023-06-17

## 2023-06-27 NOTE — PROGRESS NOTES
View All Conversations on this Encounter  Steven Brumfield MD  [facility] Support Pool 1 hour ago (8:52 AM)     TZ  May hold Xarelto for 3 days before amputation     Adilia Currie routed conversation to You 17 hours ago (4:04 PM)     Alayna De Guzman, RN  [facility]; Steven Brumfield MD 18 hours ago (3:45 PM)     CG  Can patietn hold xarelto for 3 days prior to toe amp and is bridging needed?  If bridging can cardiology manage?

## 2023-06-27 NOTE — PROGRESS NOTES
Steven Brumfield MD  [facility] Support Pool 2 hours ago (8:52 AM)     TZ  May hold Xarelto for 3 days before amputation     Dr. Brumfield      Got OK from Cardiology to hold Xarelto for 3 days before amputation.

## 2023-06-27 NOTE — PROGRESS NOTES
Spoke with Thomas. He reports that he just had his appt with ID and that Dr. Goss recommends completing the 6 weeks of antibiotics and holding off on amputation at this time. Pt reports that Dr. Goss will be reaching out to Dr. Mckinnon to discuss. Reviewed that writer had been calling to discuss holding the xarelto for 3 days per the cardiologist and he reports that his primary MD yesterday recommended holding xarelto for 7 days prior to surgery. He is asking for an updated plan once Dr. Goss and Dr. Mckinnon yelena to a plan. Forwarded this information on to Dr. Mckinnon's .

## 2023-06-27 NOTE — PROGRESS NOTES
[facility] follow up.    Name: Thomas Steve  :   1945  MRN:   [id number]  PCP:    Moni Mcclain  DOS:    23      CC: Right fourth toe osteomyelitis    HPI/Interval History:  Thomas Steve is a 78 year old old male with the history of type 2 diabetes mellitus, peripheral artery disease, and right fourth toe osteomyelitis who is in ID clinic for follow-up.  Cultures from drainage grew staph epi and Enterococcus faecalis.  On 2023, the patient was started on p.o. Augmentin.  In clinic today he reports feeling much better.  The wound between the fifth and fourth toe has healed.  The swelling is significantly better.    ===========================  Past Medical History:  Past Medical History:   Diagnosis Date     Acute sinusitis      Atrial fibrillation (H)     Resolved after medication and CPAP     Back pain      CHF (congestive heart failure) (H)      Coronary artery disease      Diabetes mellitus (H)      Gastroesophageal reflux disease with esophagitis      HTN (hypertension)      Hyperlipemia      Hyperlipidemia      Joint pain      Lipoma of neck      Nocturia      Sciatic leg pain      Shoulder impingement syndrome     BILATERAL     Sleep apnea     uses CPAP     Typical atrial flutter (H) 2016    Demonstrated on 12-lead EKG 2016       Past Surgical History:  Past Surgical History:   Procedure Laterality Date     CORONARY ARTERY BYPASS       EP BIV PACEMAKER INSERT N/A 2019    Procedure: EP Biventricular Pacemaker Insertion;  Surgeon: Durga Rajput MD;  Location: [facility] Cath Lab;  Service: Cardiology     IMPLANT PACEMAKER       IR FINE NEEDLE ASPIRATION W ULTRASOUND  2022     LAMINECTOMY LUMBAR TWO LEVELS Bilateral 2022    Procedure: DECOMPRESSIVE LUMBAR LAMINECTOMY LUMBAR 4-LUMBAR 5 AND LUMBAR 5-SACRAL 1 BILATERAL-LEFT SIDE FIRST-PLUS BILATERAL FORAMINOTOMIES PLUS REMOVAL OF HERNIATED DISC LUMBAR 5-SACRAL 1 LEFT;  Surgeon: Jimmie  Israel ZALDIVAR MD;  Location: [facility] Main OR     [place] CARDIOVERSION ELECTIVE ARRHYTHMIA EXTERNAL  2014    Description: Elective Cardioversion External;  Recorded: 2014;     ZZC CABG, VEIN, SINGLE  2011    Description: CABG (CABG);  Proc Date: 2011;  Comments: LIMA^LAD, SVG^OM, SVG^distal RCA       Social History:  Social History     Socioeconomic History     Marital status:      Spouse name: Not on file     Number of children: Not on file     Years of education: Not on file     Highest education level: Not on file   Occupational History     Not on file   Tobacco Use     Smoking status: Former     Packs/day: 1.50     Years: 23.00     Pack years: 34.50     Types: Cigarettes     Quit date: 1986     Years since quittin.5     Smokeless tobacco: Never   Substance and Sexual Activity     Alcohol use: Yes     Alcohol/week: 1.0 standard drink of alcohol     Comment: one beer per day     Drug use: No     Sexual activity: Never   Other Topics Concern     Not on file   Social History Narrative    .  3 children.  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     Social Determinants of Health     Financial Resource Strain: Not on file   Food Insecurity: Not on file   Transportation Needs: Not on file   Physical Activity: Not on file   Stress: Not on file   Social Connections: Not on file   Intimate Partner Violence: Not on file   Housing Stability: Not on file       Family Medical History:  Family History   Problem Relation Age of Onset     Heart Disease Mother      Snoring Father      Heart Disease Father      Hypertension Father      Alzheimer Disease Father      No Known Problems Daughter      No Known Problems Daughter      No Known Problems Daughter      Chronic Obstructive Pulmonary Disease Sister      Hodgkin's lymphoma Brother      No Known Problems Son      Diabetes Sister      Substance Abuse Sister      Chronic Obstructive Pulmonary Disease Brother      Heart Disease  Brother      Diabetes Brother      Substance Abuse Brother        Allergies:     Allergies   Allergen Reactions     Aspirin Other (See Comments)     Contraindicated due to xarelto use     Bee Pollen Unknown     Scratchy throat     Codeine Nausea and Vomiting     Pollen Extracts [Pollen Extract] Unknown     Scratchy throat     Vicodin [Hydrocodone-Acetaminophen] Nausea and Vomiting       Medications:  Current Outpatient Medications   Medication Sig Dispense Refill     acetaminophen (TYLENOL) 500 MG tablet Take 1,000-1,500 mg by mouth every 6 hours as needed for mild pain       amoxicillin-clavulanate (AUGMENTIN) 875-125 MG tablet Take 1 tablet by mouth 2 times daily for 28 days 56 tablet 0     atorvastatin (LIPITOR) 20 MG tablet Take 20 mg by mouth At Bedtime       bumetanide (BUMEX) 1 MG tablet Take 2 tablets (2 mg) by mouth daily 180 tablet 3     cholecalciferol 50 MCG (2000 UT) CAPS Take 2 capsules by mouth daily       coenzyme Q-10 200 MG CAPS capsule Take 200 mg by mouth daily       doxycycline hyclate (VIBRA-TABS) 100 MG tablet        doxycycline hyclate (VIBRAMYCIN) 100 MG capsule Take 100 mg by mouth 2 times daily       ferrous sulfate (FEROSUL) 325 (65 Fe) MG tablet Take 1 tablet by mouth 3 times daily (with meals)       glimepiride (AMARYL) 4 MG tablet Take 4 mg by mouth every morning (before breakfast)       insulin glargine (LANTUS PEN) 100 UNIT/ML pen Inject 20 Units Subcutaneous At Bedtime (Patient taking differently: Inject 30 Units Subcutaneous At Bedtime) 15 mL      levothyroxine (SYNTHROID/LEVOTHROID) 25 MCG tablet Take 25 mcg by mouth daily       losartan (COZAAR) 50 MG tablet Take 1 tablet (50 mg) by mouth daily 90 tablet 3     melatonin 5 MG tablet Take 5 mg by mouth At Bedtime       Omega-3 Fatty Acids (FISH OIL) 1200 MG capsule Take 1,200 mg by mouth daily       omeprazole (PRILOSEC) 20 MG DR capsule Take 1 capsule by mouth 2 times daily Take one morning and one at night time       POTASSIUM PO  Take by mouth daily       pregabalin (LYRICA) 75 MG capsule Take 2 capsule by mouth once daily.       rivaroxaban ANTICOAGULANT (XARELTO) 20 MG TABS tablet Take 20 mg by mouth At Bedtime       rosuvastatin (CRESTOR) 10 MG tablet Take 10 mg by mouth At Bedtime       senna-docusate (SENOKOT-S/PERICOLACE) 8.6-50 MG tablet Take 1 tablet by mouth 2 times daily       vitamin C (ASCORBIC ACID) 500 MG tablet Take 500 mg by mouth 3 times daily (with meals) Take with iron supplement       zinc gluconate 50 MG tablet Take 1 tablet (50 mg) by mouth daily         Immunizations:  Immunization History   Administered Date(s) Administered     COVID-19 Monovalent 18+ (Moderna) 04/16/2021, 05/14/2021, 11/23/2021, 07/05/2022       ==================    Review of System:  12 points review of system is negative except for findings in the HPI.    Exam  VS: /62 (BP Location: Right arm, Patient Position: Sitting, Cuff Size: Adult Large)   Pulse 65   Wt 94.8 kg (209 lb)   SpO2 96%   BMI 29.99 kg/m      Gen: Pleasant in no acute distress.  HEENT: NCAT. EOMI.  Neck: No LAD.  Lungs: Clear to auscultation bilaterally with no crackles or wheezes.   Card: RRR. No RMG. Peripheral pulses present and symmetrical. No edema.   Abd: Soft NT ND. No hepatomegaly or splenomegaly.  Ext: Right fourth toe is much smaller.  The small sinus on the lateral aspect of the toe has also healed.  Lymph: No cervical or supraclavicular adenopathy.  Skin: No rash  Neuro: Alert and oriented to place time and person. Cranial nerves grossly intact.     Labs:  Reviewed    Imaging:  Reviewed    Assessment:  Thomas Steve is a 78 year old old male with right fourth toe osteomyelitis.  Superficial cultures with ampicillin susceptible Enterococcus faecalis.  On p.o. Augmentin since 6/12/2023.  Sinus tract has healed.  Swelling of the toe has also significantly improved.  The patient reported being placed with the significant improvement.  At this stage I think it is  beneficial to avoid amputation in my opinion.  Currently scheduled for amputation on 7/6/2023.    Recommendations:  Continue p.o. Augmentin.  2 additional weeks added to the previous treatment supply to make it to a total of 6 weeks.  I would avoid amputation at this point.  If fails to continue to improve or recurrence once done with antibiotic therapy, amputation would be indicated.  Will discuss with Dr. Mckinnon.      Aleksey Goss MD  [facility]  [facility], located within [facility]  Office Telephone [phone].  Fax [phone]  [facility] paging

## 2023-06-28 NOTE — PROGRESS NOTES
Patient aware of holding Xarelto.      Dr. Mckinnon also would like to see him in clinic tomorrow to discuss surgery.  Dr. Goss would like patient to wait on surgery.  Dr Valencia would like to eval wound first and discuss with patient.  Appointment scheduled.

## 2023-06-29 ENCOUNTER — OFFICE VISIT (OUTPATIENT)
Dept: VASCULAR SURGERY | Facility: CLINIC | Age: 78
End: 2023-06-29
Attending: PODIATRIST
Payer: MEDICARE

## 2023-06-29 VITALS — OXYGEN SATURATION: 99 % | HEART RATE: 88 BPM | DIASTOLIC BLOOD PRESSURE: 86 MMHG | SYSTOLIC BLOOD PRESSURE: 152 MMHG

## 2023-06-29 DIAGNOSIS — M86.9 OSTEOMYELITIS OF ANKLE AND FOOT (H): Primary | ICD-10-CM

## 2023-06-29 PROCEDURE — G0463 HOSPITAL OUTPT CLINIC VISIT: HCPCS | Performed by: PODIATRIST

## 2023-06-29 PROCEDURE — 99213 OFFICE O/P EST LOW 20 MIN: CPT | Performed by: PODIATRIST

## 2023-06-29 ASSESSMENT — PAIN SCALES - GENERAL: PAINLEVEL: NO PAIN (0)

## 2023-06-29 NOTE — PATIENT INSTRUCTIONS
Finish your antibiotics.    Gauze dressing applied in clinic today. You may remove the dressing in 2-3 days, no need to reapply.    Return for follow up if you notice any new open areas.

## 2023-06-29 NOTE — PROGRESS NOTES
FOOT AND ANKLE SURGERY/PODIATRY Progress Note      ASSESSMENT: Osteomyelitis fourth digit right foot      TREATMENT:  -I discussed with the patient that there is mild edema on the fourth digit right foot without erythema or signs of infection.  I also was unable to identify any open lesions.    -I agree with Dr. Goss and we will avoid amputation of the digit at this time, continue with antibiotic therapy.    -I have asked the patient to closely monitor for any signs of infection including erythema, edema or open lesions and to return to see me should this occur.    -All questions invited and answered.  Patient is discharged from care at this time but encouraged to follow-up with any problems questions or concerns as they develop.     Juan Mckinnon DPM  [facility]      HPI: Thomas Steve was seen again today for an ulceration with osteomyelitis on the fourth digit right foot.  Patient reports that since his previous visit the ulceration on the right foot has resolved.  He has been seen by infectious disease who is recommended against amputation and continued antibiotics for treatment of underlying bone infection.    Past Medical History:   Diagnosis Date     Acute sinusitis      Atrial fibrillation (H)     Resolved after medication and CPAP     Back pain      CHF (congestive heart failure) (H)      Coronary artery disease      Diabetes mellitus (H)      Gastroesophageal reflux disease with esophagitis      HTN (hypertension)      Hyperlipemia      Hyperlipidemia      Joint pain      Lipoma of neck      Nocturia      Sciatic leg pain      Shoulder impingement syndrome     BILATERAL     Sleep apnea     uses CPAP     Typical atrial flutter (H) 07/07/2016    Demonstrated on 12-lead EKG July 7, 2016       Past Surgical History:   Procedure Laterality Date     CORONARY ARTERY BYPASS       EP BIV PACEMAKER INSERT N/A 06/11/2019    Procedure: EP Biventricular Pacemaker Insertion;  Surgeon: Atiya  Durga LANG MD;  Location: [facility] Cath Lab;  Service: Cardiology     IMPLANT PACEMAKER       IR FINE NEEDLE ASPIRATION W ULTRASOUND  06/23/2022     LAMINECTOMY LUMBAR TWO LEVELS Bilateral 06/16/2022    Procedure: DECOMPRESSIVE LUMBAR LAMINECTOMY LUMBAR 4-LUMBAR 5 AND LUMBAR 5-SACRAL 1 BILATERAL-LEFT SIDE FIRST-PLUS BILATERAL FORAMINOTOMIES PLUS REMOVAL OF HERNIATED DISC LUMBAR 5-SACRAL 1 LEFT;  Surgeon: Israel Samuel MD;  Location: [facility] Main OR     [place] CARDIOVERSION ELECTIVE ARRHYTHMIA EXTERNAL  06/03/2014    Description: Elective Cardioversion External;  Recorded: 06/03/2014;     [facility] CABG, VEIN, SINGLE  01/01/2011    Description: CABG (CABG);  Proc Date: 09/26/2011;  Comments: LIMA^LAD, SVG^OM, SVG^distal RCA       Allergies   Allergen Reactions     Aspirin Other (See Comments)     Contraindicated due to xarelto use     Bee Pollen Unknown     Scratchy throat     Codeine Nausea and Vomiting     Pollen Extracts [Pollen Extract] Unknown     Scratchy throat     Vicodin [Hydrocodone-Acetaminophen] Nausea and Vomiting         Current Outpatient Medications:      acetaminophen (TYLENOL) 500 MG tablet, Take 1,000-1,500 mg by mouth every 6 hours as needed for mild pain, Disp: , Rfl:      [START ON 7/12/2023] amoxicillin-clavulanate (AUGMENTIN) 875-125 MG tablet, Take 1 tablet by mouth 2 times daily for 14 days, Disp: 28 tablet, Rfl: 0     atorvastatin (LIPITOR) 20 MG tablet, Take 20 mg by mouth At Bedtime, Disp: , Rfl:      bumetanide (BUMEX) 1 MG tablet, Take 2 tablets (2 mg) by mouth daily, Disp: 180 tablet, Rfl: 3     cholecalciferol 50 MCG (2000 UT) CAPS, Take 2 capsules by mouth daily, Disp: , Rfl:      coenzyme Q-10 200 MG CAPS capsule, Take 200 mg by mouth daily, Disp: , Rfl:      doxycycline hyclate (VIBRA-TABS) 100 MG tablet, , Disp: , Rfl:      doxycycline hyclate (VIBRAMYCIN) 100 MG capsule, Take 100 mg by mouth 2 times daily, Disp: , Rfl:      ferrous sulfate (FEROSUL) 325 (65 Fe) MG tablet, Take 1  tablet by mouth 3 times daily (with meals), Disp: , Rfl:      glimepiride (AMARYL) 4 MG tablet, Take 4 mg by mouth every morning (before breakfast), Disp: , Rfl:      insulin glargine (LANTUS PEN) 100 UNIT/ML pen, Inject 20 Units Subcutaneous At Bedtime (Patient taking differently: Inject 30 Units Subcutaneous At Bedtime), Disp: 15 mL, Rfl:      levothyroxine (SYNTHROID/LEVOTHROID) 25 MCG tablet, Take 25 mcg by mouth daily, Disp: , Rfl:      losartan (COZAAR) 50 MG tablet, Take 1 tablet (50 mg) by mouth daily, Disp: 90 tablet, Rfl: 3     melatonin 5 MG tablet, Take 5 mg by mouth At Bedtime, Disp: , Rfl:      Omega-3 Fatty Acids (FISH OIL) 1200 MG capsule, Take 1,200 mg by mouth daily, Disp: , Rfl:      omeprazole (PRILOSEC) 20 MG DR capsule, Take 1 capsule by mouth 2 times daily Take one morning and one at night time, Disp: , Rfl:      POTASSIUM PO, Take by mouth daily, Disp: , Rfl:      pregabalin (LYRICA) 75 MG capsule, Take 2 capsule by mouth once daily., Disp: , Rfl:      rivaroxaban ANTICOAGULANT (XARELTO) 20 MG TABS tablet, Take 20 mg by mouth At Bedtime, Disp: , Rfl:      rosuvastatin (CRESTOR) 10 MG tablet, Take 10 mg by mouth At Bedtime, Disp: , Rfl:      senna-docusate (SENOKOT-S/PERICOLACE) 8.6-50 MG tablet, Take 1 tablet by mouth 2 times daily, Disp: , Rfl:      vitamin C (ASCORBIC ACID) 500 MG tablet, Take 500 mg by mouth 3 times daily (with meals) Take with iron supplement, Disp: , Rfl:      zinc gluconate 50 MG tablet, Take 1 tablet (50 mg) by mouth daily, Disp: , Rfl:     Review of Systems - 10 point Review of Systems is negative except for osteomyelitis fourth digit right footnon-palpable which is noted in HPI.      OBJECTIVE:  BP (!) 152/86   Pulse 88   SpO2 99%   General appearance: Patient is alert and fully cooperative with history & exam.  No sign of distress is noted during the visit.    Vascular: Dorsalis pedis non-palpableRight.  Mild to moderate edema fourth digit right  foot.  Dermatologic:    Wound Leg  (Active)       VASC Wound Right 4th (Active)   Pre Size Length 0.3 06/01/23 1400   Pre Size Width 0.3 06/01/23 1400   Pre Size Depth 1 06/01/23 1400   Pre Total Sq cm 0.09 06/01/23 1400   Post Size Length 0 06/29/23 1100   Post Size Width 0 06/29/23 1100   Post Size Depth 0 06/29/23 1100   Post Total Sq cm 0 06/29/23 1100       Incision/Surgical Site 06/16/22 Back (Active)     Neurologic: Diminished to light touch Right.  Musculoskeletal: Contracted digits noted Right.    Imaging:     MR Foot Right w/o & w Contrast    Result Date: 6/22/2023  EXAM: MR FOOT RIGHT W/O and W CONTRAST LOCATION: [facility] DATE: 6/22/2023 INDICATION: Osteomyelitis fourth digit right foot. COMPARISON: None available. TECHNIQUE: Routine. Additional postgadolinium T1 sequences were obtained. IV CONTRAST: 9 ml Gadavist. FINDINGS: There is cortical indistinctness, marrow replacement, remodeling and enhancement/edema of the fourth toe proximal and middle phalanx with associated abnormal signal in the intervening PIP joint. Adjacent soft tissue ulceration/injury is likely present along the dorsal aspect of the toe at the site where there is thickening and enhancement. No abscess. The remaining bones of the forefoot show erosive change at the first MTP joint with synovitis, a finding which can be seen with gouty arthropathy in the appropriate clinical setting. Mild scattered arthritic changes throughout the TMT joints. There is no acute tendon abnormality or tenosynovitis. Fatty atrophy intrinsic muscles of the foot. There is granulation tissue versus scar along the plantar subcutaneous tissues of the fifth MTP joint, likely chronic. No rim-enhancing abscess. Lisfranc ligament complex is intact.     IMPRESSION: 1.  Osteomyelitis fourth toe proximal and middle phalanges with intervening PIP joint septic arthritis. Adjacent soft tissue infection/ulceration. 2.  No abscess. 3.  Erosive  change and synovitis first MTP joint can be seen with gouty arthropathy. Correlation with radiographic appearance and the patient's history is recommended. NOTE: ABNORMAL REPORT THE DICTATION ABOVE DESCRIBES AN ABNORMALITY FOR WHICH FOLLOW-UP IS NEEDED.     XR Chest 1 View    Result Date: 6/22/2023  EXAM: XR CHEST 1 VIEW LOCATION: [facility] DATE: 6/22/2023 INDICATION: Pre MRI verify device lead integrity COMPARISON: 07/15/2022     IMPRESSION: Poststernotomy changes. Multi lead left subclavian venous pacer. Leads are intact. Heart is enlarged but unchanged. No signs of pneumonia or failure.    US Lower Extremity Arterial Duplex Bilateral    Result Date: 6/18/2023  Table formatting from the original result was not included. Arterial Duplex Ultrasound (Date: 06/14/23) Lower Extremity Artery Evaluation Indication: Bilateral leg pain/Hx of known spinal stenosis/known right 4th toe osteomyelitis/rule out healing potential for toe amputation  Previous: 08/19/2016-EDWINA  History: Previous Smoker, Diabetic, Hyperlipidemia, and CAD Technique: Duplex imaging is performed utilizing gray-scale, two-dimensional images, and color-flow imaging. Doppler waveform analysis and spectral Doppler imaging is also performed. LOWER EXTREMITY ARTERIAL DUPLEX EXAM WITH WAVEFORMS Right Leg:(cm/s) Location: Velocities Waveforms EIA:   105  B CFA:   94  B PFA:   83  B SFA Proximal:   83  T SFA Mid:   118  T SFA Distal:   83  T Popliteal Artery:   127  T PTA:   53   M BRICE:   123  M DPA:   82  M Waveforms: T=Triphasic, M=Monophasic, B=Biphasic Left Leg:(cm/s) Location: Velocities Waveforms EIA:   76  T CFA:   94  T PFA:   97  B SFA Proximal:   109  T SFA Mid:   151  T SFA Distal:   48  T Popliteal Artery:   84  T PTA:   74   M BRICE:   90  M DPA:   136  M Waveforms: T=Triphasic, M=Monophasic, B=Biphasic Impression: Right Lower Extremity: Biphasic flow in common femoral artery suggesting no inflow disease.  Otherwise, there  is no evidence of hemodynamily significant stenosis in the right lower extremity.  Transition to monophasic flow in tibial vessels without obvious hemodynamic significance Left Lower Extremity: Similarly, triphasic flow in common femoral artery suggesting no inflow disease.  No evidence of hemodynamically significant stenosis in the left lower extremity.  Transition to monophasic flow in tibial vessels without obvious hemodynamic significance. Reference: Category Normal 1-19% 20-49% 50-99% Occluded PSV <160 cm/sec without spectral broadening <160 cm/sec with spectral broadening Increased Increased Absent Flow Ratio N/A N/A < 2.0 >2.0 N/A Post-Stenotic Turbulence No No No Yes N/A      US TCO2 and EDWINA    Result Date: 2023  Table formatting from the original result was not included. BILATERAL RESTING ANKLE-BRACHIAL INDICES (EDWINA'S) (Date: 23) Indication: Bilateral leg pain/Hx of known spinal stenosis/known right 4th toe osteomyelitis/rule out healing potential for toe amputation Previous: 2016-EDWINA History: Previous Smoker, Diabetic, Hyperlipidemia, and CAD  Resting EDWINA's          Right: mmHg Index     Brachial: 127           Ankle: 106 0.83          Digit: 54 0.42 Tcp02-Upper Le  Tcp02-Lower Le  Tcp02-Ankle: 67  Tcp02-Foot: 77                Left: mmHg Index     Brachial: 128           Ankle: 110 0.86          Digit: 68 0.53 Tcp02-Upper Le  Tcp02-Lower Le  Tcp02-Ankle: 93  Tcp02-Foot: 109  Resting ankle-brachial index of 0.83 on the right. Toe Pressures of 54 mmHg and TBI of 0.42. Tcp02 Pressures of 77 mmHg at the level of the foot. Resting ankle-brachial index of 0.86 on the left. Toe Pressures of 68 mmHg and TBI of 0.53. Tcp02 Pressures of 109 mmHg at the level of the foot. VPR WAVEFORMS: The right volume plethysmography waveforms are mildly abnormal at the lower thigh level, mildly abnormal at the upper calf level and mildly abnormal at the ankle. The left volume plethysmography  waveforms are mildly abnormal at the lower thigh level, mildly abnormal at the upper calf level and mildly abnormal at the ankle. Impression:  1. RIGHT LOWER EXTREMITY: EDWINA is Abnormal with an EDWINA of 0.84 indicating single level disease. Toe Pressures are Normal and adequate for wound healing with toe pressures of 54 mmHg. Tcp02 Pressures are Normal and adequate for wound healing with Tcp02 pressures of 77 mmHg. 2. LEFT LOWER EXTREMITY: EDWINA is Abnormal with an EDWINA of 0.86 indicating single level disease. Toe Pressures are Normal and adequate for wound healing with toe pressures of 70 mmHg. Tcp02 Pressures are 109 Reference: Wound classification Grade EDWINA Ankle Systolic Pressure Toe Pressures 0 > 0.80 > 100 mmHg > 60 mmHg 1 0.6 - 0.79 70 - 100 mmHg 40 - 59 mmHg 2 0.4 - 0.59 50-70 mmHg 30 - 39 mmHg 3 < 0.39 < 50 mmHg < 30 mmHg Digit Pressures DBI Disease Category > 0.70 Normal < 0.70 Abnormal > 30 mmHg Potential wound healing < 30 mmHg Impaired wound healing Ankle Brachial Pressures EDWINA Disease Category > 1.3  Likely vessel calcification with monophasic waveforms, non-diagnostic 0.95-1.30 Normal with multiphasic waveforms 0.50-0.95 Single level disease 0.30-0.50 Multilevel disease < 0.30 Critical limb ischema Volume Plethysmography Recording (VPR) at all levels Normal Sharp systolic peak, fast upstroke, prominent dicrotic notch in wave Mild Sharp systolic peak, fast upstroke, absent dicrotic notch in wave Moderate Flattened systolic peak, slowed upstroke, absent dicrotic notch inwave Severe amplitude wave with = upslope and down slope Occluded Flat Line TCP02 Criteria Normal Values 50-80 WO/Supplemental Oxygen Impaired Healing <30 WO/Supplemental Oxygen           Picture:

## 2023-07-02 ENCOUNTER — HEALTH MAINTENANCE LETTER (OUTPATIENT)
Age: 78
End: 2023-07-02

## 2023-07-12 ENCOUNTER — TELEPHONE (OUTPATIENT)
Dept: INFECTIOUS DISEASES | Facility: CLINIC | Age: 78
End: 2023-07-12
Payer: MEDICARE

## 2023-07-12 ENCOUNTER — TELEPHONE (OUTPATIENT)
Dept: VASCULAR SURGERY | Facility: CLINIC | Age: 78
End: 2023-07-12
Payer: MEDICARE

## 2023-07-12 ENCOUNTER — TELEPHONE (OUTPATIENT)
Dept: NEUROSURGERY | Facility: CLINIC | Age: 78
End: 2023-07-12
Payer: MEDICARE

## 2023-07-12 DIAGNOSIS — L97.514 DIABETIC ULCER OF TOE OF RIGHT FOOT ASSOCIATED WITH DIABETES MELLITUS DUE TO UNDERLYING CONDITION, WITH NECROSIS OF BONE (H): ICD-10-CM

## 2023-07-12 DIAGNOSIS — M48.062 LUMBAR STENOSIS WITH NEUROGENIC CLAUDICATION: Primary | ICD-10-CM

## 2023-07-12 DIAGNOSIS — M86.9 OSTEOMYELITIS OF ANKLE AND FOOT (H): Primary | ICD-10-CM

## 2023-07-12 DIAGNOSIS — I73.9 CLAUDICATION OF BOTH LOWER EXTREMITIES (H): ICD-10-CM

## 2023-07-12 DIAGNOSIS — E08.621 DIABETIC ULCER OF TOE OF RIGHT FOOT ASSOCIATED WITH DIABETES MELLITUS DUE TO UNDERLYING CONDITION, WITH NECROSIS OF BONE (H): ICD-10-CM

## 2023-07-12 NOTE — TELEPHONE ENCOUNTER
[facility] Call Center    Phone Message    May a detailed message be left on voicemail: yes     Reason for Call: Medication Question or concern regarding medication   Prescription Clarification  Name of Medication: amoxicillin  Prescribing Provider: Dr. Goss    Pharmacy:    What on the order needs clarification?     Pt calling almost done with his antibiotics and would like to know what the next steps are. Please reach out to pt.       Action Taken: Other: [facility] ID    Travel Screening: Not Applicable

## 2023-07-12 NOTE — TELEPHONE ENCOUNTER
Caller: Patient    Provider: MD Dacia Barba    Detailed reason for call: Patient called to schedule follow up PAD appt. Per last note from Dr. Barba he is to follow up in 3mos; are any studies needed at that time?     Best phone number to contact: [phone]    Best time to contact: Any    Ok to leave a detailed message: Yes    Ok to speak to authorized person if needed: No      (Noted to patient if reason is related to wound or incision, to please send a photo via email or [employer].)

## 2023-07-12 NOTE — TELEPHONE ENCOUNTER
From 6/27 OV note;  Recommendations:  Continue p.o. Augmentin.  2 additional weeks added to the previous treatment supply to make it to a total of 6 weeks.  I would avoid amputation at this point.  If fails to continue to improve or recurrence once done with antibiotic therapy, amputation would be indicated.  Will discuss with Dr. Mckinnon.    I discussed the above with the pt. He understands and will monitor.

## 2023-07-12 NOTE — TELEPHONE ENCOUNTER
"Who's calling: Patient  (Patient/ family calling on behalf of the patient)  On C2C: n/a  (Family/friends calling are they on patient Consent to Communicate form)   Reason for call: Patient called looking for an appt to come back and see Dr. Samuel. Per last OV with EMIL if pain is worsening to come back for an appt. Patient states \" he continue to have pain and needs to get in to see care team\" will there be any imaging required before the appt. Please advise   (Keep it simple but as detailed as to why the patient/family is calling)  Preferred Pharmacy: n/a  Call back #: [phone]  Providers name: Dr. Samuel       "

## 2023-07-17 DIAGNOSIS — I48.21 PERMANENT ATRIAL FIBRILLATION (H): ICD-10-CM

## 2023-07-17 DIAGNOSIS — I73.9 CLAUDICATION OF BOTH LOWER EXTREMITIES (H): Primary | ICD-10-CM

## 2023-08-14 ENCOUNTER — HOSPITAL ENCOUNTER (OUTPATIENT)
Dept: MRI IMAGING | Facility: HOSPITAL | Age: 78
Discharge: HOME | End: 2023-08-14
Attending: SURGERY | Admitting: SURGERY
Payer: MEDICARE

## 2023-08-14 VITALS — OXYGEN SATURATION: 95 % | HEART RATE: 65 BPM

## 2023-08-14 DIAGNOSIS — M48.062 LUMBAR STENOSIS WITH NEUROGENIC CLAUDICATION: ICD-10-CM

## 2023-08-14 PROCEDURE — G1010 CDSM STANSON: HCPCS

## 2023-08-14 PROCEDURE — 255N000002 HC RX 255 OP 636: Mod: JZ | Performed by: SURGERY

## 2023-08-14 PROCEDURE — A9585 GADOBUTROL INJECTION: HCPCS | Mod: JZ | Performed by: SURGERY

## 2023-08-14 PROCEDURE — 72158 MRI LUMBAR SPINE W/O & W/DYE: CPT | Mod: MF

## 2023-08-14 RX ORDER — GADOBUTROL 604.72 MG/ML
10 INJECTION INTRAVENOUS ONCE
Status: COMPLETED | OUTPATIENT
Start: 2023-08-14 | End: 2023-08-14

## 2023-08-14 RX ADMIN — GADOBUTROL 10 ML: 604.72 INJECTION INTRAVENOUS at 08:50

## 2023-08-14 NOTE — PROGRESS NOTES
Patient pacemaker placed in MRI safe mode by sure scan. VOO rate of 85, which is what shows on monitor., O2 sat 97%. Patient tolerating well.

## 2023-08-14 NOTE — PROGRESS NOTES
Pacemaker returned to previous mode by surescan. Paced rate of 63 95%% O2 sat. Patient tolerating well.

## 2023-08-22 ENCOUNTER — ANCILLARY PROCEDURE (OUTPATIENT)
Dept: CARDIOLOGY | Facility: CLINIC | Age: 78
End: 2023-08-22
Attending: INTERNAL MEDICINE
Payer: MEDICARE

## 2023-08-22 DIAGNOSIS — I49.5 SICK SINUS SYNDROME (H): ICD-10-CM

## 2023-08-22 DIAGNOSIS — I50.9 CONGESTIVE HEART FAILURE (CHF) (H): ICD-10-CM

## 2023-08-22 DIAGNOSIS — Z95.0 PACEMAKER: ICD-10-CM

## 2023-08-23 LAB
MDC_IDC_EPISODE_DTM: NORMAL
MDC_IDC_EPISODE_DURATION: 1 S
MDC_IDC_EPISODE_DURATION: 10 S
MDC_IDC_EPISODE_DURATION: 24 S
MDC_IDC_EPISODE_DURATION: 3 S
MDC_IDC_EPISODE_DURATION: 5 S
MDC_IDC_EPISODE_DURATION: 6 S
MDC_IDC_EPISODE_DURATION: 71 S
MDC_IDC_EPISODE_ID: 1336
MDC_IDC_EPISODE_ID: 1337
MDC_IDC_EPISODE_ID: 1338
MDC_IDC_EPISODE_ID: 1339
MDC_IDC_EPISODE_ID: 1340
MDC_IDC_EPISODE_ID: 1341
MDC_IDC_EPISODE_ID: 462
MDC_IDC_EPISODE_TYPE: NORMAL
MDC_IDC_LEAD_IMPLANT_DT: NORMAL
MDC_IDC_LEAD_LOCATION: NORMAL
MDC_IDC_LEAD_LOCATION_DETAIL_1: NORMAL
MDC_IDC_LEAD_MFG: NORMAL
MDC_IDC_LEAD_MODEL: NORMAL
MDC_IDC_LEAD_POLARITY_TYPE: NORMAL
MDC_IDC_LEAD_SERIAL: NORMAL
MDC_IDC_MSMT_BATTERY_DTM: NORMAL
MDC_IDC_MSMT_BATTERY_REMAINING_LONGEVITY: 94 MO
MDC_IDC_MSMT_BATTERY_RRT_TRIGGER: 2.6
MDC_IDC_MSMT_BATTERY_STATUS: NORMAL
MDC_IDC_MSMT_BATTERY_VOLTAGE: 2.98 V
MDC_IDC_MSMT_LEADCHNL_LV_IMPEDANCE_VALUE: 380 OHM
MDC_IDC_MSMT_LEADCHNL_LV_IMPEDANCE_VALUE: 399 OHM
MDC_IDC_MSMT_LEADCHNL_LV_IMPEDANCE_VALUE: 437 OHM
MDC_IDC_MSMT_LEADCHNL_LV_IMPEDANCE_VALUE: 456 OHM
MDC_IDC_MSMT_LEADCHNL_LV_IMPEDANCE_VALUE: 551 OHM
MDC_IDC_MSMT_LEADCHNL_LV_IMPEDANCE_VALUE: 684 OHM
MDC_IDC_MSMT_LEADCHNL_LV_IMPEDANCE_VALUE: 703 OHM
MDC_IDC_MSMT_LEADCHNL_LV_IMPEDANCE_VALUE: 855 OHM
MDC_IDC_MSMT_LEADCHNL_LV_IMPEDANCE_VALUE: 855 OHM
MDC_IDC_MSMT_LEADCHNL_LV_IMPEDANCE_VALUE: 874 OHM
MDC_IDC_MSMT_LEADCHNL_LV_PACING_THRESHOLD_AMPLITUDE: 0.75 V
MDC_IDC_MSMT_LEADCHNL_LV_PACING_THRESHOLD_PULSEWIDTH: 0.4 MS
MDC_IDC_MSMT_LEADCHNL_RA_IMPEDANCE_VALUE: 323 OHM
MDC_IDC_MSMT_LEADCHNL_RA_IMPEDANCE_VALUE: 399 OHM
MDC_IDC_MSMT_LEADCHNL_RA_PACING_THRESHOLD_AMPLITUDE: 0.5 V
MDC_IDC_MSMT_LEADCHNL_RA_PACING_THRESHOLD_PULSEWIDTH: 0.4 MS
MDC_IDC_MSMT_LEADCHNL_RA_SENSING_INTR_AMPL: 0.25 MV
MDC_IDC_MSMT_LEADCHNL_RA_SENSING_INTR_AMPL: 0.38 MV
MDC_IDC_MSMT_LEADCHNL_RV_IMPEDANCE_VALUE: 342 OHM
MDC_IDC_MSMT_LEADCHNL_RV_IMPEDANCE_VALUE: 399 OHM
MDC_IDC_MSMT_LEADCHNL_RV_PACING_THRESHOLD_AMPLITUDE: 0.5 V
MDC_IDC_MSMT_LEADCHNL_RV_PACING_THRESHOLD_PULSEWIDTH: 0.4 MS
MDC_IDC_MSMT_LEADCHNL_RV_SENSING_INTR_AMPL: 9.25 MV
MDC_IDC_MSMT_LEADCHNL_RV_SENSING_INTR_AMPL: 9.25 MV
MDC_IDC_PG_IMPLANT_DTM: NORMAL
MDC_IDC_PG_MFG: NORMAL
MDC_IDC_PG_MODEL: NORMAL
MDC_IDC_PG_SERIAL: NORMAL
MDC_IDC_PG_TYPE: NORMAL
MDC_IDC_SESS_CLINIC_NAME: NORMAL
MDC_IDC_SESS_DTM: NORMAL
MDC_IDC_SESS_TYPE: NORMAL
MDC_IDC_SET_BRADY_LOWRATE: 60 {BEATS}/MIN
MDC_IDC_SET_BRADY_MAX_SENSOR_RATE: 130 {BEATS}/MIN
MDC_IDC_SET_BRADY_MODE: NORMAL
MDC_IDC_SET_CRT_LVRV_DELAY: 0 MS
MDC_IDC_SET_CRT_PACED_CHAMBERS: NORMAL
MDC_IDC_SET_LEADCHNL_LV_PACING_AMPLITUDE: 1.75 V
MDC_IDC_SET_LEADCHNL_LV_PACING_ANODE_ELECTRODE_1: NORMAL
MDC_IDC_SET_LEADCHNL_LV_PACING_ANODE_LOCATION_1: NORMAL
MDC_IDC_SET_LEADCHNL_LV_PACING_CAPTURE_MODE: NORMAL
MDC_IDC_SET_LEADCHNL_LV_PACING_CATHODE_ELECTRODE_1: NORMAL
MDC_IDC_SET_LEADCHNL_LV_PACING_CATHODE_LOCATION_1: NORMAL
MDC_IDC_SET_LEADCHNL_LV_PACING_POLARITY: NORMAL
MDC_IDC_SET_LEADCHNL_LV_PACING_PULSEWIDTH: 0.4 MS
MDC_IDC_SET_LEADCHNL_RA_SENSING_ANODE_ELECTRODE_1: NORMAL
MDC_IDC_SET_LEADCHNL_RA_SENSING_ANODE_LOCATION_1: NORMAL
MDC_IDC_SET_LEADCHNL_RA_SENSING_CATHODE_ELECTRODE_1: NORMAL
MDC_IDC_SET_LEADCHNL_RA_SENSING_CATHODE_LOCATION_1: NORMAL
MDC_IDC_SET_LEADCHNL_RA_SENSING_POLARITY: NORMAL
MDC_IDC_SET_LEADCHNL_RA_SENSING_SENSITIVITY: NORMAL
MDC_IDC_SET_LEADCHNL_RV_PACING_AMPLITUDE: 1.5 V
MDC_IDC_SET_LEADCHNL_RV_PACING_ANODE_ELECTRODE_1: NORMAL
MDC_IDC_SET_LEADCHNL_RV_PACING_ANODE_LOCATION_1: NORMAL
MDC_IDC_SET_LEADCHNL_RV_PACING_CAPTURE_MODE: NORMAL
MDC_IDC_SET_LEADCHNL_RV_PACING_CATHODE_ELECTRODE_1: NORMAL
MDC_IDC_SET_LEADCHNL_RV_PACING_CATHODE_LOCATION_1: NORMAL
MDC_IDC_SET_LEADCHNL_RV_PACING_POLARITY: NORMAL
MDC_IDC_SET_LEADCHNL_RV_PACING_PULSEWIDTH: 0.4 MS
MDC_IDC_SET_LEADCHNL_RV_SENSING_ANODE_ELECTRODE_1: NORMAL
MDC_IDC_SET_LEADCHNL_RV_SENSING_ANODE_LOCATION_1: NORMAL
MDC_IDC_SET_LEADCHNL_RV_SENSING_CATHODE_ELECTRODE_1: NORMAL
MDC_IDC_SET_LEADCHNL_RV_SENSING_CATHODE_LOCATION_1: NORMAL
MDC_IDC_SET_LEADCHNL_RV_SENSING_POLARITY: NORMAL
MDC_IDC_SET_LEADCHNL_RV_SENSING_SENSITIVITY: 0.9 MV
MDC_IDC_SET_ZONE_DETECTION_INTERVAL: 400 MS
MDC_IDC_SET_ZONE_TYPE: NORMAL
MDC_IDC_STAT_BRADY_AP_VP_PERCENT: 0 %
MDC_IDC_STAT_BRADY_AP_VS_PERCENT: 0 %
MDC_IDC_STAT_BRADY_AS_VP_PERCENT: 99.78 %
MDC_IDC_STAT_BRADY_AS_VS_PERCENT: 0.22 %
MDC_IDC_STAT_BRADY_DTM_END: NORMAL
MDC_IDC_STAT_BRADY_DTM_START: NORMAL
MDC_IDC_STAT_BRADY_RA_PERCENT_PACED: 0 %
MDC_IDC_STAT_BRADY_RV_PERCENT_PACED: 99.77 %
MDC_IDC_STAT_CRT_DTM_END: NORMAL
MDC_IDC_STAT_CRT_DTM_START: NORMAL
MDC_IDC_STAT_CRT_LV_PERCENT_PACED: 99.75 %
MDC_IDC_STAT_CRT_PERCENT_PACED: 99.75 %
MDC_IDC_STAT_EPISODE_RECENT_COUNT: 0
MDC_IDC_STAT_EPISODE_RECENT_COUNT: 1
MDC_IDC_STAT_EPISODE_RECENT_COUNT_DTM_END: NORMAL
MDC_IDC_STAT_EPISODE_RECENT_COUNT_DTM_START: NORMAL
MDC_IDC_STAT_EPISODE_TOTAL_COUNT: 0
MDC_IDC_STAT_EPISODE_TOTAL_COUNT: 2
MDC_IDC_STAT_EPISODE_TOTAL_COUNT: 460
MDC_IDC_STAT_EPISODE_TOTAL_COUNT_DTM_END: NORMAL
MDC_IDC_STAT_EPISODE_TOTAL_COUNT_DTM_START: NORMAL
MDC_IDC_STAT_EPISODE_TYPE: NORMAL

## 2023-08-23 PROCEDURE — 93296 REM INTERROG EVL PM/IDS: CPT | Performed by: INTERNAL MEDICINE

## 2023-08-23 PROCEDURE — 93294 REM INTERROG EVL PM/LDLS PM: CPT | Performed by: INTERNAL MEDICINE

## 2023-09-11 ENCOUNTER — TELEPHONE (OUTPATIENT)
Dept: NEUROSURGERY | Facility: CLINIC | Age: 78
End: 2023-09-11
Payer: MEDICARE

## 2023-09-11 NOTE — TELEPHONE ENCOUNTER
Patient wondering why no one has contacted him about his MRI Results that happened on 8/14/23. Please contact patient to discuss results and future appointment planning.   Call back # [phone]    Thank you !

## 2023-09-15 ENCOUNTER — ANCILLARY PROCEDURE (OUTPATIENT)
Dept: VASCULAR ULTRASOUND | Facility: CLINIC | Age: 78
End: 2023-09-15
Attending: RADIOLOGY
Payer: MEDICARE

## 2023-09-15 DIAGNOSIS — I73.9 CLAUDICATION OF BOTH LOWER EXTREMITIES (H): ICD-10-CM

## 2023-09-15 DIAGNOSIS — M86.9 OSTEOMYELITIS OF ANKLE AND FOOT (H): ICD-10-CM

## 2023-09-15 DIAGNOSIS — E08.621 DIABETIC ULCER OF TOE OF RIGHT FOOT ASSOCIATED WITH DIABETES MELLITUS DUE TO UNDERLYING CONDITION, WITH NECROSIS OF BONE (H): ICD-10-CM

## 2023-09-15 DIAGNOSIS — L97.514 DIABETIC ULCER OF TOE OF RIGHT FOOT ASSOCIATED WITH DIABETES MELLITUS DUE TO UNDERLYING CONDITION, WITH NECROSIS OF BONE (H): ICD-10-CM

## 2023-09-15 PROCEDURE — 93923 UPR/LXTR ART STDY 3+ LVLS: CPT

## 2023-09-19 ENCOUNTER — OFFICE VISIT (OUTPATIENT)
Dept: VASCULAR SURGERY | Facility: CLINIC | Age: 78
End: 2023-09-19
Attending: RADIOLOGY
Payer: MEDICARE

## 2023-09-19 VITALS
TEMPERATURE: 97.9 F | DIASTOLIC BLOOD PRESSURE: 60 MMHG | RESPIRATION RATE: 16 BRPM | OXYGEN SATURATION: 96 % | SYSTOLIC BLOOD PRESSURE: 141 MMHG | HEART RATE: 77 BPM

## 2023-09-19 DIAGNOSIS — E08.621 DIABETIC ULCER OF TOE OF RIGHT FOOT ASSOCIATED WITH DIABETES MELLITUS DUE TO UNDERLYING CONDITION, WITH NECROSIS OF BONE (H): Primary | ICD-10-CM

## 2023-09-19 DIAGNOSIS — L97.514 DIABETIC ULCER OF TOE OF RIGHT FOOT ASSOCIATED WITH DIABETES MELLITUS DUE TO UNDERLYING CONDITION, WITH NECROSIS OF BONE (H): Primary | ICD-10-CM

## 2023-09-19 PROCEDURE — G0463 HOSPITAL OUTPT CLINIC VISIT: HCPCS

## 2023-09-19 ASSESSMENT — PAIN SCALES - GENERAL: PAINLEVEL: NO PAIN (0)

## 2023-09-19 NOTE — PATIENT INSTRUCTIONS
Sheron Guzman,    Thank you for entrusting your care with us today. After your visit today with MD Dacia Barba this is the plan that was discussed at your appointment.    Dr. Barba will review your CTA scan if there is anything abnormal we will contact you.     2. Follow-up as needed     I am including additional information on these things and our contact information if you have any questions or concerns.   Please do not hesitate to reach out to us if you felt we did not answer your questions or you are unsure of the treatment plan after your visit today. Our number is [phone].Thank you for trusting us with your care.         Again thank you for your time.

## 2023-09-19 NOTE — PROGRESS NOTES
[facility]        Patient is here for a 3 onth follow up  to discuss Peripheral artery disease (PAD). Symptoms include claudicaton: right buttocks and left buttocks at distance of 60-70 feet in both lower extremities.    Pt is currently taking Statin and Xarelto.    BP (!) 141/60   Pulse 77   Temp 97.9  F (36.6  C)   Resp 16   SpO2 96%     The provider has been notified that the patient has no concerns.     Questions patient would like addressed today are: N/A.    Refills are needed: N/A    Has homecare services and agency name:  No

## 2023-09-22 NOTE — PROGRESS NOTES
VASCULAR AND INTERVENTIONAL OUTPATIENT CONSULT OR VISIT  PHYSICIAN: Dacia Barba MD  ESTABLISHED PATIENT    LOCATION: [facility]    Thomas Steve   Medical Record #:  [id number]  YOB: 1945  Age:  78 year old     Date of Service: 9/19/2023    PRIMARY CARE PROVIDER: Moni Mcclain    Reason for visit: Peripheral vascular disease, right fourth toe osteomyelitis     IMPRESSION: 78-year-old male with history of peripheral vascular disease presents with osteomyelitis of the right fourth toe.  The patient was initially scheduled for amputation of the digit, however, upon meeting his infectious disease doctor it was noted that there was adequate healing.  The patient has almost completed a course of oral antibiotics.  He reports mild pain at the fourth digit.  At this point, there are no plans for amputation.  His digit pressures/TCO2s obtained in the lab demonstrate adequate wound healing potential bilaterally.     RECOMMENDATION:    1.  Guideline recommended medical therapy for peripheral chill disease  -Continue high intensity statin therapy with Lipitor 20 mg once daily  -Continue anticoagulation with Xarelto.  No indication for addition of antiplatelet therapy at this time  -The patient underwent a screening carotid ultrasound while in [place] this winter.  As per report, there is mild disease bilaterally.  -No abdominal aortic aneurysm on lumbar MRI from 2022.    Overall, excellent response to conservative medical therapy.  Return to the clinic in the future as needed or if there is worsening of his right fourth digit.    HPI:  Thomas Steve is a 78 year old male who was seen today in follow-up for peripheral arterial disease.  The patient reports a history of a right fourth digit wound which was first noticed in November while he was in [place].  The patient reports he underwent noninvasive vascular imaging and saw a podiatrist at that time.  He has since returned to [place] and  has been seen by Dr. Mckinnon who initially planned for amputation of the fourth digit.  Patient underwent evaluation by infectious disease and has nearly completed his course of oral antibiotics.  He was told he no longer requires amputation of his digit.  The patient also reports bilateral lower extremity claudication after walking his dogs approximately 1 block.  He denies any prior history of lower extremity wounds or ulcerations.  He denies any rest pain.  He is on anticoagulation for atrial fibrillation.    PHH:    Past Medical History:   Diagnosis Date    Acute sinusitis     Atrial fibrillation (H)     Resolved after medication and CPAP    Back pain     CHF (congestive heart failure) (H)     Coronary artery disease     Diabetes mellitus (H)     Gastroesophageal reflux disease with esophagitis     HTN (hypertension)     Hyperlipemia     Hyperlipidemia     Joint pain     Lipoma of neck     Nocturia     Sciatic leg pain     Shoulder impingement syndrome     BILATERAL    Sleep apnea     uses CPAP    Typical atrial flutter (H) 07/07/2016    Demonstrated on 12-lead EKG July 7, 2016        Past Surgical History:   Procedure Laterality Date    CORONARY ARTERY BYPASS      EP BIV PACEMAKER INSERT N/A 06/11/2019    Procedure: EP Biventricular Pacemaker Insertion;  Surgeon: Durga Rajput MD;  Location: [facility];  Service: Cardiology    IMPLANT PACEMAKER      IR FINE NEEDLE ASPIRATION W ULTRASOUND  06/23/2022    LAMINECTOMY LUMBAR TWO LEVELS Bilateral 06/16/2022    Procedure: DECOMPRESSIVE LUMBAR LAMINECTOMY LUMBAR 4-LUMBAR 5 AND LUMBAR 5-SACRAL 1 BILATERAL-LEFT SIDE FIRST-PLUS BILATERAL FORAMINOTOMIES PLUS REMOVAL OF HERNIATED DISC LUMBAR 5-SACRAL 1 LEFT;  Surgeon: Israel Samuel MD;  Location: [facility] OR    ME CARDIOVERSION ELECTIVE ARRHYTHMIA EXTERNAL  06/03/2014    Description: Elective Cardioversion External;  Recorded: 06/03/2014;    [facility] CABG, VEIN, SINGLE  01/01/2011    Description: CABG  (CABG);  Proc Date: 09/26/2011;  Comments: LIMA^LAD, SVG^OM, SVG^distal RCA       ALLERGIES:  Aspirin, Bee pollen, Codeine, Pollen extracts [pollen extract], and Vicodin [hydrocodone-acetaminophen]    MEDS:    Current Outpatient Medications:     acetaminophen (TYLENOL) 500 MG tablet, Take 1,000-1,500 mg by mouth every 6 hours as needed for mild pain, Disp: , Rfl:     atorvastatin (LIPITOR) 20 MG tablet, Take 20 mg by mouth At Bedtime, Disp: , Rfl:     bumetanide (BUMEX) 1 MG tablet, Take 2 tablets (2 mg) by mouth daily, Disp: 180 tablet, Rfl: 3    cholecalciferol 50 MCG (2000 UT) CAPS, Take 2 capsules by mouth daily, Disp: , Rfl:     coenzyme Q-10 200 MG CAPS capsule, Take 200 mg by mouth daily, Disp: , Rfl:     glimepiride (AMARYL) 4 MG tablet, Take 4 mg by mouth every morning (before breakfast), Disp: , Rfl:     insulin glargine (LANTUS PEN) 100 UNIT/ML pen, Inject 20 Units Subcutaneous At Bedtime (Patient taking differently: Inject 30 Units Subcutaneous At Bedtime), Disp: 15 mL, Rfl:     levothyroxine (SYNTHROID/LEVOTHROID) 25 MCG tablet, Take 25 mcg by mouth daily, Disp: , Rfl:     losartan (COZAAR) 50 MG tablet, Take 1 tablet (50 mg) by mouth daily, Disp: 90 tablet, Rfl: 3    Omega-3 Fatty Acids (FISH OIL) 1200 MG capsule, Take 1,200 mg by mouth daily, Disp: , Rfl:     omeprazole (PRILOSEC) 20 MG DR capsule, Take 1 capsule by mouth 2 times daily Take one morning and one at night time, Disp: , Rfl:     POTASSIUM PO, Take by mouth daily, Disp: , Rfl:     pregabalin (LYRICA) 75 MG capsule, Take 2 capsule by mouth once daily., Disp: , Rfl:     rivaroxaban ANTICOAGULANT (XARELTO) 20 MG TABS tablet, Take 1 tablet (20 mg) by mouth At Bedtime, Disp: 90 tablet, Rfl: 3    rosuvastatin (CRESTOR) 10 MG tablet, Take 10 mg by mouth At Bedtime, Disp: , Rfl:     senna-docusate (SENOKOT-S/PERICOLACE) 8.6-50 MG tablet, Take 1 tablet by mouth 2 times daily, Disp: , Rfl:     vitamin C (ASCORBIC ACID) 500 MG tablet, Take 500 mg by  mouth 3 times daily (with meals) Take with iron supplement, Disp: , Rfl:     zinc gluconate 50 MG tablet, Take 1 tablet (50 mg) by mouth daily, Disp: , Rfl:     doxycycline hyclate (VIBRA-TABS) 100 MG tablet, , Disp: , Rfl:     doxycycline hyclate (VIBRAMYCIN) 100 MG capsule, Take 100 mg by mouth 2 times daily (Patient not taking: Reported on 9/19/2023), Disp: , Rfl:     ferrous sulfate (FEROSUL) 325 (65 Fe) MG tablet, Take 1 tablet by mouth 3 times daily (with meals) (Patient not taking: Reported on 9/19/2023), Disp: , Rfl:     melatonin 5 MG tablet, Take 5 mg by mouth At Bedtime, Disp: , Rfl:     SOCIAL HABITS:    History   Smoking Status    Former    Packs/day: 1.50    Years: 23.00    Types: Cigarettes    Quit date: 1/1/1986   Smokeless Tobacco    Never     Social History    Substance and Sexual Activity      Alcohol use: Yes        Alcohol/week: 1.0 standard drink of alcohol        Comment: one beer per day      History   Drug Use No       FAMILY HISTORY:    Family History   Problem Relation Age of Onset    Heart Disease Mother     Snoring Father     Heart Disease Father     Hypertension Father     Alzheimer Disease Father     No Known Problems Daughter     No Known Problems Daughter     No Known Problems Daughter     Chronic Obstructive Pulmonary Disease Sister     Hodgkin's lymphoma Brother     No Known Problems Son     Diabetes Sister     Substance Abuse Sister     Chronic Obstructive Pulmonary Disease Brother     Heart Disease Brother     Diabetes Brother     Substance Abuse Brother        ADVANCE CARE DIRECTIVES:    Advance care directives reviewed in the chart and no changes made.     PE:  BP (!) 141/60   Pulse 77   Temp 97.9  F (36.6  C)   Resp 16   SpO2 96%   Wt Readings from Last 1 Encounters:   06/27/23 94.8 kg (209 lb)     There is no height or weight on file to calculate BMI.    EXAM:  GENERAL: This is a well-developed 78 year old male who appears his stated age  EYES: Grossly normal.  MOUTH:  Buccal mucosa normal   MUSCULOSKELETAL: Grossly normal and both lower extremities are intact.  HEME/LYMPH: No lymphedema  NEUROLOGIC: Focally intact, Alert and oriented x 3.   PSYCH: appropriate affect  INTEGUMENT: No open lesions or ulcers    DIAGNOSTIC STUDIES:     Images:  US TCO2 and EDWINA    Result Date: 2023  Table formatting from the original result was not included. BILATERAL RESTING ANKLE-BRACHIAL INDICES (EDWINA'S) with TCPO2 (Date: 09/15/23) Indication: Surveillance EDWINA's/ Tcp02: Right 4th Toe Osteomyelitis. Has been healing with antibiotics. Holding off on amputation.  Decreased lower extremity pulses, lower extremity pain      Hx: Known Spinal Stenosis. Previous: 2023 History: Previous Smoker, Diabetic, Hyperlipidemia, CAD, and Vascular Ulcers  Resting EDWINA's          Right: mmHg Index     Brachial: 114           Ankle: 127 1.11          Digit:(Big Toe)  61 0.53 Digit: ( 4th Toe)  89 0.78    Tcp02-Upper Le  Tcp02-Lower Le  Tcp02-Ankle: 46  Tcp02-Foot: (1st Ray)  63  Tcp02-Foot: (3rd Ray)  66                Left: mmHg Index     Brachial: 114           Ankle: 127 1.11          Digit:( Big Toe)  80 0.70 Digit: ( 4th Toe)  75 0.66    Tcp02-Lower Le  Tcp02-Ankle: 62  Tcp02-Foot: 58  Resting ankle-brachial index of 1.11 on the right. Toe Pressures of 61 mmHg and TBI of 0.78. Tcp02 Pressures of 63 mmHg at the level of the foot. Resting ankle-brachial index of 1.11 on the left. Toe Pressures of 80 mmHg and TBI of 0.70. Tcp02 Pressures of 58 mmHg at the level of the foot. VPR WAVEFORMS: The right volume plethysmography waveforms are mildly abnormal at the lower thigh level, mildly abnormal at the upper calf level and mildly abnormal at the ankle. The left volume plethysmography waveforms are mildly abnormal at the lower thigh level, mildly abnormal at the upper calf level and mildly abnormal at the ankle. Comments: See Alaniz EDWINA study also done today with Toe/ Digit Waveforms/ Pressures.   Impression:  1. RIGHT LOWER EXTREMITY: EDWINA is Normal with multiphasic waveforms with an EDWINA of 1.11. Toe Pressures are Mildly abnormal but adequate for wound healing with toe pressures of 61 mmHg. Tcp02 Pressures are Normal and adequate for wound healing with Tcp02 pressures of 63 mmHg. 2. LEFT LOWER EXTREMITY: EDWINA is Normal with multiphasic waveforms with an EDWINA of 1.11. Toe Pressures are Normal and adequate for wound healing with toe pressures of 80 mmHg. Tcp02 Pressures are Normal and adequate for wound healing with Tcp02 pressures of 58 mmHg. Reference: Wound classification Grade EDWINA Ankle Systolic Pressure Toe Pressures 0 > 0.80 > 100 mmHg > 60 mmHg 1 0.6 - 0.79 70 - 100 mmHg 40 - 59 mmHg 2 0.4 - 0.59 50-70 mmHg 30 - 39 mmHg 3 < 0.39 < 50 mmHg < 30 mmHg Digit Pressures DBI Disease Category > 0.70 Normal < 0.70 Abnormal > 30 mmHg Potential wound healing < 30 mmHg Impaired wound healing Ankle Brachial Pressures EDWINA Disease Category > 1.3  Likely vessel calcification with monophasic waveforms, non-diagnostic 0.95-1.30 Normal with multiphasic waveforms 0.50-0.95 Single level disease 0.30-0.50 Multilevel disease < 0.30 Critical limb ischema Volume Plethysmography Recording (VPR) at all levels Normal Sharp systolic peak, fast upstroke, prominent dicrotic notch in wave Mild Sharp systolic peak, fast upstroke, absent dicrotic notch in wave Moderate Flattened systolic peak, slowed upstroke, absent dicrotic notch inwave Severe amplitude wave with = upslope and down slope Occluded Flat Line TCP02 Criteria Normal Values 50-80 WO/Supplemental Oxygen Impaired Healing <30 WO/Supplemental Oxygen        LABS:      Sodium   Date Value Ref Range Status   06/26/2023 141 136 - 145 mmol/L Final   05/30/2023 144 136 - 145 mmol/L Final   05/26/2023 141 136 - 145 mmol/L Final     Urea Nitrogen   Date Value Ref Range Status   06/26/2023 19.3 8.0 - 23.0 mg/dL Final   05/30/2023 20.1 8.0 - 23.0 mg/dL Final   05/26/2023 23.2 (H) 8.0 - 23.0  mg/dL Final   07/19/2022 32 (H) 8 - 28 mg/dL Final   07/18/2022 44 (H) 8 - 28 mg/dL Final   07/17/2022 64 (H) 8 - 28 mg/dL Final     Hemoglobin   Date Value Ref Range Status   05/30/2023 12.4 (L) 13.3 - 17.7 g/dL Final   08/10/2022 10.8 (L) 13.3 - 17.7 g/dL Final   07/19/2022 8.5 (L) 13.3 - 17.7 g/dL Final     Platelet Count   Date Value Ref Range Status   05/30/2023 191 150 - 450 10e3/uL Final   08/10/2022 206 150 - 450 10e3/uL Final   07/19/2022 303 150 - 450 10e3/uL Final     BNP   Date Value Ref Range Status   07/14/2022 197 (H) 0 - 78 pg/mL Final   06/20/2022 108 (H) 0 - 78 pg/mL Final     INR   Date Value Ref Range Status   07/14/2022 2.22 (H) 0.85 - 1.15 Final   07/14/2022 2.27 (H) 0.85 - 1.15 Final   07/06/2022 1.14 0.85 - 1.15 Final       This was a in person visit in which 30 minutes of  total time was spent (either in face-to-face or non-face-to-face time).    Dacia Barba MD, [facility]  VASCULAR AND INTERVENTIONAL PHYSICIAN  VASCULAR MEDICINE  INTERNAL MEDICINE  PAGER: [phone]  CALL SERVICE: [phone]

## 2023-10-03 ENCOUNTER — TELEPHONE (OUTPATIENT)
Dept: NEUROSURGERY | Facility: CLINIC | Age: 78
End: 2023-10-03
Payer: MEDICARE

## 2023-10-04 ENCOUNTER — OFFICE VISIT (OUTPATIENT)
Dept: NEUROSURGERY | Facility: CLINIC | Age: 78
End: 2023-10-04
Payer: MEDICARE

## 2023-10-04 VITALS — HEART RATE: 69 BPM | SYSTOLIC BLOOD PRESSURE: 156 MMHG | DIASTOLIC BLOOD PRESSURE: 86 MMHG | OXYGEN SATURATION: 97 %

## 2023-10-04 DIAGNOSIS — R52 PAIN: Primary | ICD-10-CM

## 2023-10-04 DIAGNOSIS — M48.061 SPINAL STENOSIS, LUMBAR REGION, WITHOUT NEUROGENIC CLAUDICATION: ICD-10-CM

## 2023-10-04 DIAGNOSIS — M51.26 LUMBAR HERNIATED DISC: ICD-10-CM

## 2023-10-04 DIAGNOSIS — M54.16 BILATERAL LUMBAR RADICULOPATHY: ICD-10-CM

## 2023-10-04 DIAGNOSIS — M48.062 LUMBAR STENOSIS WITH NEUROGENIC CLAUDICATION: ICD-10-CM

## 2023-10-04 DIAGNOSIS — M43.16 SPONDYLOLISTHESIS OF LUMBAR REGION: ICD-10-CM

## 2023-10-04 PROCEDURE — 99213 OFFICE O/P EST LOW 20 MIN: CPT | Performed by: SURGERY

## 2023-10-04 ASSESSMENT — PAIN SCALES - GENERAL: PAINLEVEL: SEVERE PAIN (6)

## 2023-10-04 NOTE — NURSING NOTE
"Thomas Steve is a 78 year old male who presents for:  Chief Complaint   Patient presents with    RECHECK        Initial Vitals:  BP (!) 156/86   Pulse 69   SpO2 97%  Estimated body mass index is 29.99 kg/m  as calculated from the following:    Height as of 6/13/23: 5' 10\" (1.778 m).    Weight as of 6/27/23: 209 lb (94.8 kg).. There is no height or weight on file to calculate BSA. BP completed using cuff size: regular  Severe Pain (6)    Galo Rico    "

## 2023-10-04 NOTE — LETTER
10/4/2023         RE: Thomas Steve  [id number] [address] [facility] [address]        Dear Colleague,    Thank you for referring your patient, Thomas Steve, to the [facility]. Please see a copy of my visit note below.    The patient is a 78-year-old male.  I did a decompressive laminectomy L4-5 and L5-S1 bilateral 6/16/2022 and also removed a herniated disc L5-S1 on the left.  The patient complains of pain in his back and upper legs.  It interferes with his ability to function.  His postop MRI does not look too bad.  His central canal appears to be patent.  He has a synovial cyst at L4-5.  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  I would like him to come to the spine center for a consult and an outline of conservative treatment.  Also an EMG of both legs.  Also plan CT of the lumbar spine to look at his canal and foramina.  He is satisfied with the plan.  Total time 15 minutes, more than 50% spent counseling and/or coordinating care.      Again, thank you for allowing me to participate in the care of your patient.        Sincerely,        Israel Samuel MD

## 2023-10-04 NOTE — PROGRESS NOTES
The patient is a 78-year-old male.  I did a decompressive laminectomy L4-5 and L5-S1 bilateral 6/16/2022 and also removed a herniated disc L5-S1 on the left.  The patient complains of pain in his back and upper legs.  It interferes with his ability to function.  His postop MRI does not look too bad.  His central canal appears to be patent.  He has a synovial cyst at L4-5.  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  I would like him to come to the spine center for a consult and an outline of conservative treatment.  Also an EMG of both legs.  Also plan CT of the lumbar spine to look at his canal and foramina.  He is satisfied with the plan.  Total time 15 minutes, more than 50% spent counseling and/or coordinating care.

## 2023-10-16 DIAGNOSIS — I10 ESSENTIAL HYPERTENSION: ICD-10-CM

## 2023-10-16 DIAGNOSIS — I25.5 ISCHEMIC CARDIOMYOPATHY: ICD-10-CM

## 2023-10-16 DIAGNOSIS — I48.21 PERMANENT ATRIAL FIBRILLATION (H): ICD-10-CM

## 2023-10-16 DIAGNOSIS — I50.9 CONGESTIVE HEART FAILURE (CHF) (H): Primary | ICD-10-CM

## 2023-10-16 RX ORDER — LOSARTAN POTASSIUM 50 MG/1
50 TABLET ORAL DAILY
Qty: 90 TABLET | Refills: 2 | Status: SHIPPED | OUTPATIENT
Start: 2023-10-16 | End: 2024-04-30

## 2023-10-31 ENCOUNTER — OFFICE VISIT (OUTPATIENT)
Dept: PHYSICAL MEDICINE AND REHAB | Facility: CLINIC | Age: 78
End: 2023-10-31
Attending: SURGERY
Payer: MEDICARE

## 2023-10-31 ENCOUNTER — HOSPITAL ENCOUNTER (OUTPATIENT)
Dept: CT IMAGING | Facility: HOSPITAL | Age: 78
Discharge: HOME | End: 2023-10-31
Attending: SURGERY | Admitting: SURGERY
Payer: MEDICARE

## 2023-10-31 VITALS
HEART RATE: 75 BPM | BODY MASS INDEX: 30.06 KG/M2 | DIASTOLIC BLOOD PRESSURE: 60 MMHG | HEIGHT: 70 IN | SYSTOLIC BLOOD PRESSURE: 128 MMHG | WEIGHT: 210 LBS

## 2023-10-31 DIAGNOSIS — G62.9 POLYNEUROPATHY: ICD-10-CM

## 2023-10-31 DIAGNOSIS — R29.898 LEG FATIGUE: ICD-10-CM

## 2023-10-31 DIAGNOSIS — M48.062 LUMBAR STENOSIS WITH NEUROGENIC CLAUDICATION: ICD-10-CM

## 2023-10-31 DIAGNOSIS — M43.16 SPONDYLOLISTHESIS OF LUMBAR REGION: ICD-10-CM

## 2023-10-31 DIAGNOSIS — R52 PAIN: ICD-10-CM

## 2023-10-31 DIAGNOSIS — M54.16 BILATERAL LUMBAR RADICULOPATHY: ICD-10-CM

## 2023-10-31 DIAGNOSIS — M48.061 SPINAL STENOSIS, LUMBAR REGION, WITHOUT NEUROGENIC CLAUDICATION: ICD-10-CM

## 2023-10-31 DIAGNOSIS — M47.816 SPONDYLOSIS OF LUMBAR REGION WITHOUT MYELOPATHY OR RADICULOPATHY: ICD-10-CM

## 2023-10-31 DIAGNOSIS — M48.061 SPINAL STENOSIS, LUMBAR REGION, WITHOUT NEUROGENIC CLAUDICATION: Primary | ICD-10-CM

## 2023-10-31 DIAGNOSIS — M51.26 LUMBAR HERNIATED DISC: ICD-10-CM

## 2023-10-31 PROCEDURE — 95886 MUSC TEST DONE W/N TEST COMP: CPT | Performed by: PHYSICAL MEDICINE & REHABILITATION

## 2023-10-31 PROCEDURE — G1010 CDSM STANSON: HCPCS

## 2023-10-31 PROCEDURE — 95911 NRV CNDJ TEST 9-10 STUDIES: CPT | Performed by: PHYSICAL MEDICINE & REHABILITATION

## 2023-10-31 PROCEDURE — 72131 CT LUMBAR SPINE W/O DYE: CPT | Mod: MF

## 2023-10-31 PROCEDURE — 99214 OFFICE O/P EST MOD 30 MIN: CPT | Performed by: NURSE PRACTITIONER

## 2023-10-31 ASSESSMENT — PAIN SCALES - GENERAL: PAINLEVEL: MODERATE PAIN (5)

## 2023-10-31 NOTE — PATIENT INSTRUCTIONS
Thank you for choosing the [facility] for your EMG testing.    The ordering provider will receive your final EMG results within the next few days.  Please follow up with your provider for the results and further treatment recommendations.

## 2023-10-31 NOTE — LETTER
10/31/2023         RE: Thomas Steve  [address] [facility] [address]        Dear Colleague,    Thank you for referring your patient, Thomas Steve, to the [facility]. Please see a copy of my visit note below.    ASSESSMENT: Thomas Steve is a 78 year old male who presents for consultation at the request of PCP Moni Mcclain, who presents today for new patient evaluation of:    -Lumbar radiculopathy, low back pain    Patient complains of chronic severe bilateral gluteal region pain.  He has chronic sensory loss of his left lateral lower leg and foot. He is otherwise full strength on exam today.   His back pain was present prior to his lumbar surgery in 2022, and persist afterwards.  He had briefly started physical therapy last fall, but got deferred after a toe injury. We reviewed his lumbar MRI. He does not have any severe canal stenosis or severe narrowing around his nerves. He has postop changes in the soft tissue with resolving postop seroma, with enhancement in soft tissue from L4-S1, and the L4-5 facet complexes. He has granulation tissue around the exiting S1 nerve roots. He has small synovial cysts at L4-5, but with minimal canal stenosis. We reviewed his EMG from today which showed polyneuropathy and likely chronic left L5 and S1 radiculopathies. The study could not fully rule out an active portion however given that patient is not experiencing any leg symptoms, I would suspect it is chronic at this point. I recommended a new course of physical therapy as a starting line for treatment. He asks if home PT would be possible. If no improvement, we did discuss potential lumbar medial branch blocks/RFA, which he would be interested in. He may be experiencing referred facetogenic back pain. We will watch for leg symptoms consistent with chronic radiculopathy.  I also recommended referral to neurology for work-up of his polyneuropathy. I recommended calling  neurosurgery to discuss his CT results as well.  He does agree with plan and will follow-up with us after physical therapy to let us know how he is doing.          10/4/2023     3:00 PM   OSWESTRY DISABILITY INDEX   Count 10   Sum 16   Oswestry Score (%) 32 %            Diagnoses and all orders for this visit:  Spinal stenosis, lumbar region, without neurogenic claudication  Pain  -     Spine  Referral  Spondylolisthesis of lumbar region  -     Physical Therapy Referral; Future  -     Home Care Referral  Spondylosis of lumbar region without myelopathy or radiculopathy  -     Physical Therapy Referral; Future  -     Home Care Referral  Leg fatigue  -     Adult Neurology  Referral; Future  Polyneuropathy  -     Adult Neurology  Referral; Future      PLAN:  Reviewed spine anatomy and disease process. Discussed diagnosis and treatment options with the patient today. A shared decision making model was used.  The patient's values and choices were respected. The following represents what was discussed and decided upon by the provider and the patient.      -DIAGNOSTIC TESTS:  Images were personally reviewed and interpreted and explained to patient today using a spine model.   -- I did not order any new diagnostic imaging today    -PHYSICAL THERAPY:    -- I referred patient to full course of physical therapy today  Discussed the importance of core strengthening, ROM, stretching exercises with the patient and how each of these entities is important in decreasing pain.  Explained to the patient that the purpose of physical therapy is to teach the patient a home exercise program.  These exercises need to be performed every day in order to decrease pain and prevent future occurrences of pain.        -MEDICATIONS:    -Okay to continue Tylenol over-the-counter as needed  -Okay to continue Lyrica as directed.  We could consider going up on this at some point if needed.  -- Patient is not an NSAID  candidate given Eliquis  Discussed multiple medication options today with patient. Discussed risks, side effects, and proper use of medications. Patient verbalized understanding.    -INTERVENTIONS:    --We discussed lumbar medial branch blocks/RFA if patient were not to improve with physical therapy. He would like to consider this    -PATIENT EDUCATION:  Total time of 40 minutes, on the day of service, spent with the patient, reviewing the chart, placing orders, and documenting.   -Today we also discussed the pros and cons of the current treatment plan.    -FOLLOW-UP:   Follow-up in approximately 6 weeks for symptom review    Advised patient to call the Spine Center if symptoms worsen, new numbness or weakness develops in the legs, or if they develop new or worsening problems controlling bladder or bowel function.   ______________________________________________________________________    SUBJECTIVE:    HPI:  Thomas Steve  Is a 78 year old male on xarelto with history of A-fib, DVT, MI, pacemaker, type 2 diabetes, anemia, neuropathy, acid reflux who presents today for new patient evaluation of low back pain.    He endorses lower back pain which began a few years prior to his lumbar surgery. He then underwent a decompressive laminectomy L4-5 and L5-S1 bilateral, with removal of herniated disc on the left at L5-S1 on 6/16/2022. The pain is in both gluteal regions. He describes it as an extreme ache. It is worse with prolonged standing, walking, and carrying objects >10lbs. He has some relief with sitting. The pain today is a 6/10, a 10/10 at worst, and a 3/10 at best. He has fatigue and weakness in both lower extremities. He endorses central lower back pain with lying down, which is different from his primary juju gluteal region pain. He was hoping his pain would improve with surgery. He feels that at first, it may have. He then had a complicated postop course including right leg swelling which was found to be  related to an extensive RLE DVT. He started getting into PT last year postop when he unfortunately sustained a toe injury and could no longer attend PT. The pain related to his toe radiated into his left inner lower leg like a toothache, but this is now improved. His treatment of his toe lasted from Nov to June 2023. He has been seen by Dr Samuel since then for further evaluation and review of a new lumbar MRI postop done in August 2023. He was referred to spine medicine for conservative treatment and for a lumbar CT and juju lower extremity EMG which he just had done today. He has been taking tylenol without relief.  He is on Lyrica on a chronic basis    He has chronic left lateral lower leg numbness which has not changed. He has a history of left leg infection after a vein harvesting in 2011 for his open heart surgery.    He denies any changes in bowel or bladder control.     He is the primary caregiver for his wife, and walks the dogs, does lots of odd jobs, yard work and housework    He has not done any chiropractic care or had any previous spinal injections  He would be interested in pursuing home PT which he has had in the past      -Treatment to Date:     -Medications:  Tylenol  Lyrica    Current Outpatient Medications   Medication     acetaminophen (TYLENOL) 500 MG tablet     atorvastatin (LIPITOR) 20 MG tablet     bumetanide (BUMEX) 1 MG tablet     cholecalciferol 50 MCG (2000 UT) CAPS     coenzyme Q-10 200 MG CAPS capsule     doxycycline hyclate (VIBRA-TABS) 100 MG tablet     doxycycline hyclate (VIBRAMYCIN) 100 MG capsule     ferrous sulfate (FEROSUL) 325 (65 Fe) MG tablet     glimepiride (AMARYL) 4 MG tablet     insulin glargine (LANTUS PEN) 100 UNIT/ML pen     levothyroxine (SYNTHROID/LEVOTHROID) 25 MCG tablet     losartan (COZAAR) 50 MG tablet     melatonin 5 MG tablet     Omega-3 Fatty Acids (FISH OIL) 1200 MG capsule     omeprazole (PRILOSEC) 20 MG DR capsule     POTASSIUM PO     pregabalin (LYRICA)  75 MG capsule     rivaroxaban ANTICOAGULANT (XARELTO) 20 MG TABS tablet     rosuvastatin (CRESTOR) 10 MG tablet     senna-docusate (SENOKOT-S/PERICOLACE) 8.6-50 MG tablet     vitamin C (ASCORBIC ACID) 500 MG tablet     zinc gluconate 50 MG tablet     No current facility-administered medications for this visit.       Allergies   Allergen Reactions     Aspirin Other (See Comments)     Contraindicated due to xarelto use     Bee Pollen Unknown     Scratchy throat     Codeine Nausea and Vomiting     Pollen Extracts [Pollen Extract] Unknown     Scratchy throat     Vicodin [Hydrocodone-Acetaminophen] Nausea and Vomiting       Past Medical History:   Diagnosis Date     Acute sinusitis      Atrial fibrillation (H)     Resolved after medication and CPAP     Back pain      CHF (congestive heart failure) (H)      Coronary artery disease      Diabetes mellitus (H)      Gastroesophageal reflux disease with esophagitis      HTN (hypertension)      Hyperlipemia      Hyperlipidemia      Joint pain      Lipoma of neck      Nocturia      Sciatic leg pain      Shoulder impingement syndrome     BILATERAL     Sleep apnea     uses CPAP     Typical atrial flutter (H) 07/07/2016    Demonstrated on 12-lead EKG July 7, 2016        Patient Active Problem List   Diagnosis     Type 2 diabetes mellitus (H)     Essential hypertension     Coronary atherosclerosis     Ischemic cardiomyopathy     Right bundle branch block     Mixed hyperlipidemia     Atrial fibrillation (H)     Obstructive sleep apnea     Congestive Heart Failure     Typical atrial flutter (H)     Leg pain, bilateral     Leg swelling     Venous insufficiency of both lower extremities     Acquired lymphedema of leg     Claudication of both lower extremities (H24)     Diabetic peripheral neuropathy associated with type 2 diabetes mellitus (H)     SSS (sick sinus syndrome) (H)     Acute systolic heart failure (H)     Biventricular cardiac pacemaker in situ     Lumbar stenosis with  neurogenic claudication     Gastroesophageal reflux disease without esophagitis     Hypothyroidism     Normocytic anemia     Chronic left shoulder pain     Nasal congestion     Lumbar spinal stenosis     Acute deep vein thrombosis (DVT) of femoral vein of right lower extremity (H)     ARF (acute renal failure) (H24)     Anemia     Anemia due to blood loss, acute     Spinal stenosis, lumbar region, without neurogenic claudication     Lumbar herniated disc     Traumatic hematoma of buttock, initial encounter     Anemia, unspecified type     Heart failure with reduced ejection fraction (H)     Diabetic ulcer of toe of right foot associated with diabetes mellitus due to underlying condition, with necrosis of bone (H)       Past Surgical History:   Procedure Laterality Date     CORONARY ARTERY BYPASS       EP BIV PACEMAKER INSERT N/A 06/11/2019    Procedure: EP Biventricular Pacemaker Insertion;  Surgeon: Durga Rajput MD;  Location: [facility];  Service: Cardiology     IMPLANT PACEMAKER       IR FINE NEEDLE ASPIRATION W ULTRASOUND  06/23/2022     LAMINECTOMY LUMBAR TWO LEVELS Bilateral 06/16/2022    Procedure: DECOMPRESSIVE LUMBAR LAMINECTOMY LUMBAR 4-LUMBAR 5 AND LUMBAR 5-SACRAL 1 BILATERAL-LEFT SIDE FIRST-PLUS BILATERAL FORAMINOTOMIES PLUS REMOVAL OF HERNIATED DISC LUMBAR 5-SACRAL 1 LEFT;  Surgeon: Israel Samuel MD;  Location: [facility] OR     [place] CARDIOVERSION ELECTIVE ARRHYTHMIA EXTERNAL  06/03/2014    Description: Elective Cardioversion External;  Recorded: 06/03/2014;     [facility] CABG, VEIN, SINGLE  01/01/2011    Description: CABG (CABG);  Proc Date: 09/26/2011;  Comments: LIMA^LAD, SVG^OM, SVG^distal RCA       Family History   Problem Relation Age of Onset     Heart Disease Mother      Snoring Father      Heart Disease Father      Hypertension Father      Alzheimer Disease Father      No Known Problems Daughter      No Known Problems Daughter      No Known Problems Daughter      Chronic  "Obstructive Pulmonary Disease Sister      Hodgkin's lymphoma Brother      No Known Problems Son      Diabetes Sister      Substance Abuse Sister      Chronic Obstructive Pulmonary Disease Brother      Heart Disease Brother      Diabetes Brother      Substance Abuse Brother        Reviewed past medical, surgical, and family history with patient found on new patient intake packet located in EMR [employer] tab.     SOCIAL HX: Former smoker, limited alcohol use, no recreational drug use    ROS: Positive for sexual dysfunction, changes in vision, shortness of breath, joint pain, muscle pain, muscle fatigue, itching, imbalance, falls, excessive bleeding, easy bruising, insomnia, excessive tiredness.  Specifically negative for bowel/bladder dysfunction, balance changes, headache, dizziness, foot drop, fevers, chills, appetite changes, nausea/vomiting, unexplained weight loss. Otherwise 13 systems reviewed are negative. Please see the patient's intake questionnaire from today for details.    OBJECTIVE:  /60   Pulse 75   Ht 5' 9.5\" (1.765 m)   Wt 210 lb (95.3 kg)   BMI 30.57 kg/m      PHYSICAL EXAMINATION:    --CONSTITUTIONAL:  Vital signs as above.  No acute distress.  The patient is well nourished and well groomed.  --PSYCHIATRIC:  Appropriate mood and affect. The patient is awake, alert, oriented to person, place, time and answering questions appropriately with clear speech.    --SKIN:  Skin over the face, bilateral lower extremities, and posterior torso is clean, dry, intact without rashes.    --RESPIRATORY: Normal rhythm and effort. No abnormal accessory muscle breathing patterns noted.   --ABDOMINAL:  Non-distended.    --GROSS MOTOR: Gait is non-antalgic. Easily arises from a seated position. Toe walking and heel walking are normal without significant difficulty.     Upper extremity motor testing  5/5 throughout both deltoids, triceps, biceps, handgrips, intrinsics, extensors  Sensation of upper extremities is " intact and equal throughout  Reflexes in the upper extremities were 1/4 bilaterally throughout  Negative quinteros      --LOWER EXTREMITY MOTOR TESTING:  Hip flexion: right 5/5, left 5/5  Hip abduction: right 5/5, left 5/5  Hip adduction: right 5/5, left 5/5   Quads: right 5/5, left 5/5  Hamstrings: right 5/5, left 5/5  Dorsiflexion: right 5/5, left 5/5  Plantar flexion: right 5/5, left 5/5    Great toe MTP extension/EHL: right 5/5, left 5/5    --NEUROLOGICAL:  1/4 patellar and achilles reflexes bilaterally. Sensation to light touch is intact throughout both lower extremities. Babinski is negative. No clonus.    --STANDING EXAMINATION:  Normal lumbar lordosis noted, no lateral shift.    --MUSCULOSKELETAL: Lumbar spine inspection reveals no evidence of deformity. Range of motion is not limited in lumbar flexion, extension, lateral rotation. No point tenderness to palpation lumbar spine. No paraspinal musculature tenderness.     Straight leg raising is negative.    --HIPS: Full range of motion bilaterally. Negative YARELI and Negative FADIR bilaterally. Negative hip joint tenderness to palp.    --SACROILIAC JOINT: Distraction maneuver was negative. Gaenslen's Test was negative.Thigh thrust was negative. Sacroiliac Joint Compression Test was negative. One finger point test was negative. Negative SI joint tenderness to palpation bilaterally.    --VASCULAR:  Bilateral lower extremities are warm without any discoloration.  There is no pitting edema of the bilateral lower extremities.    RESULTS:   Prior medical records from [facility]where were reviewed today.    Imaging: Spine imaging was personally reviewed and interpreted today. The images were shown to the patient and the findings were explained using a spine model.      CT Lumbar Spine w/o Contrast    Result Date: 10/31/2023  EXAM: CT LUMBAR SPINE W/O CONTRAST LOCATION: [facility] DATE: 10/31/2023 INDICATION: Bilateral leg  pain COMPARISON: MRI scan 08/14/2023. CT scan 04/26/2022. TECHNIQUE: Routine CT Lumbar Spine without IV contrast. Multiplanar reformats. Dose reduction techniques were used. FINDINGS: Limited views of the retroperitoneal structures reveal no gross abnormalities involving the kidneys or psoas muscles. Atherosclerotic calcification throughout the infrarenal abdominal aorta is noted. No evidence of lumbar spine fracture. Limited views of the sacrum reveal no evidence of sacral fracture. Incidentally noted fusion of the SI joints bilaterally. T12-L1: Normal disc height. No herniation. Normal facets. No spinal canal or neural foraminal stenosis. L1-L2: Normal disc height. No herniation. Normal facets. No spinal canal or neural foraminal stenosis. L2-L3: Anterior/left bridging syndesmophyte. Preservation of disc height. Spinal canal and foramen are patent. L3-L4: Mild disc bulging. Bilateral facet degeneration with thickening of the ligamentum flavum. Mild spinal canal stenosis. Foramen are patent. Overall appearance is similar when compared to MRI scan performed 08/14/2023. L4-L5: Decompressive laminectomy. Grade I anterior spondylolisthesis of L4 on L5. Bilateral facet degeneration. Disc bulging and right-sided facet hypertrophic change results in right-sided L5 lateral recess stenosis. Bilateral foraminal stenosis which was seen on previous MRI scan. L5-S1: Decompressive laminectomy. Loss of disc height. Thickening of the ligamentum flavum which results in narrowing of the thecal sac in the lateral dimension with mild spinal canal stenosis. Subtle encroachment upon the right lateral recess. Bilateral  foraminal stenosis.     IMPRESSION: 1.  Decompressive laminectomy at L4-L5 and L5-S1. 2.  Degenerative change at L4-L5 with grade I anterior spondylolisthesis of L4 on L5. Bilateral facet degeneration. Disc bulging and right-sided facet hypertrophic change results in right-sided L5 lateral recess stenosis. Bilateral  foraminal stenosis which was seen on previous MRI scan. 3.  Degenerative change at L5-S1 with loss of disc height. Thickening of the ligamentum flavum which results in narrowing of the thecal sac in the lateral dimension. Mild spinal canal stenosis. Subtle encroachment upon the right lateral recess. Bilateral  foraminal narrowing. 4.  Mild degenerative change at L3-L4 with mild disc bulging. Bilateral facet degeneration with thickening of the ligamentum flavum. Mild spinal canal stenosis. Foramen are patent. Overall appearance is similar compared to MRI scan of 08/14/2023. 5.  Anterior/left bridging syndesmophyte at the L2-L3 level is incidentally noted. Spinal canal and foramen are patent at this level.          Noreen CALDWELLP-C  [facility]  O. [phone]             Again, thank you for allowing me to participate in the care of your patient.        Sincerely,        VALERIE Dawson CNP

## 2023-10-31 NOTE — PATIENT INSTRUCTIONS
~You have been referred for Physical Therapy to [facility]. They will call you to schedule an appointment.      Scheduling phone number is [phone] [facility], or [place] location.  If you have not heard from the scheduling office within 2 business days, please call [phone] for ALL other locations.    Discussed the importance of core strengthening, ROM, stretching exercises and how each of these entities is important in decreasing pain and improving long term spine health.  The purpose of physical therapy is to teach you an individualized home exercise program.  These exercises need to be performed every day in order to decrease pain and prevent future occurrences of pain.           If your back pain does not improve with PT, we could consider doing lumbar medial branch blocks/radiofrequency ablation. We will see how you progress with PT       You have been referred to see the Neurology Department for further evaluation of your polyneuropathy results on your EMG today   Please call [facility] to schedule your appointment, [phone].    [facility]  [address] [place] [address], [address]  [place] [address]    Phone: [phone]  Fax: [phone]         ~Please call our [facility] Nurse Navigation line [phone] with any questions or concerns about your treatment plan, if symptoms worsen and you would like to be seen urgently, or if you have any new or worsening numbness, weakness, or problems controlling bladder and bowel function.  ~You are also welcome to contact Noreen Lynn via [employer], but please be aware that responses to [employer] message may take 2-3 days due to the high volume of patients seen in clinic.

## 2023-10-31 NOTE — LETTER
10/31/2023         RE: Thomas Steve  [id number] [facility] [id number]        Dear Colleague,    Thank you for referring your patient, Thomas Steve, to the [facility]. Please see a copy of my visit note below.    [facility]  [address] [place] [address] [address]  [place] [address]  Office: [phone] Fax: [phone]    Electromyography and Nerve Conduction Study Report        Indication: Patient presents at the request of Dr. Israel Samuel for bilateral lower extremity EMG.  He has low back pain with bilateral gluteal painRight equal to left with prolonged standing and walking.  Feels leg weakness.  Has numbness and tingling in the feet and tells me he has known history of neuropathy.  On exam he has generalized decree sensation to light touch through the lower extremities to the knees or more proximally.  1+ patellar 0 Achilles reflexes bilaterally, normal muscle strength throughout the major muscle groups of the bilateral lower extremities.        Pt Exam Discussion (Communication Barriers):  Electromyography and nerve conduction testing, including associated discomfort, risks, benefits, and alternatives was discussed with the patient prior to the procedure.  No learning/ communication barriers; patient verbalized understanding of procedure.  Informed consent was obtained.           Pt Assessment:  Testing was successfully completed; patient tolerated testing well.       Blood Thinners: Xarelto Skin Temperature: Warmed 32.4                   EMG/NCS  results:     Nerve Conduction Studies  Motor Sites      Segment Distal Latency Neg. Amp CV F-Latency F-Estimate Comment   Site  (ms) mV m/s ms ms    Left Fibular (EDB) Motor   Ankle Ankle-EDB 4.6 0.35       Fib Head Fib Head-Ankle 12.4 0.28 38      Knee Knee-Ankle 15.5 *0.22 *38      Right Fibular (EDB) Motor   Ankle Ankle-EDB 5.4 1.30       Fib Head Fib Head-Ankle 13.3 1.23 38      Knee  Knee-Ankle 15.9 *1.31 *38      Left Tibial (AH) Motor   Ankle Ankle-AH 3.4 1.33       Knee Knee-Ankle 15.5 *0.63 *36      Right Tibial (AH) Motor   Ankle Ankle-AH 3.7 0.66  23.8 -    Knee Knee-Ankle 16.6 *0.81 *33      Right Ulnar (ADM) Motor   Wrist  3.3 9.4  38.5 -    Bel Elbow Bel Elbow-Wrist 7.9 8.9 50      Ab Elbow Ab Elbow-Wrist 10.6 8.5 49        Sensory Sites      Onset Lat Peak Lat Amp CV Comment   Site (ms) (ms)  V m/s    Right Radial Sensory   Forearm-Wrist 2.2 *2.8 *17 45    Left Sural Sensory   B-Ankle *NR *NR *NR *NR    Right Sural Sensory   B-Ankle *NR *NR *NR *NR        NCS Waveforms:    Motor                  F-Wave         Sensory             Electromyography     Side Muscle Nerve Root Ins Act Fibs Psw Fasc Recrt Dur Amp Poly Comment   Right AntTibialis Dp Br Fibular L4-5 Nml Nml Nml Nml Nml Nml Nml 0    Right Gastroc Tibial S1-2 *Incr *1+ *1+ Nml Nml Nml Nml 0    Right Fibularis Long Sup Br Fibular L5-S1 Nml Nml Nml Nml Nml Nml Nml 0    Right VastusLat Femoral L2-4 Nml Nml Nml Nml Nml Nml Nml 0    Right TensorFascLat SupGluteal L4-5, S1 Nml Nml Nml Nml Nml Nml Nml 0    Right GluteusMax InfGluteal L5-S2 Nml Nml Nml Nml Nml Nml Nml 0    Left AntTibialis Dp Br Fibular L4-5 Nml Nml Nml Nml Nml Nml Nml 0    Left Gastroc Tibial S1-2 *Incr *1+ *1+ Nml Nml *>12ms *Incr 0    Left Fibularis Long Sup Br Fibular L5-S1 Nml Nml Nml Nml Nml Nml Nml 0    Left VastusLat Femoral L2-4 Nml Nml Nml Nml Nml Nml Nml 0    Left TensorFascLat SupGluteal L4-5, S1 Nml Nml Nml Nml Nml *>12ms *Incr 0    Left GluteusMax InfGluteal L5-S2 Nml Nml Nml Nml Nml Nml Nml 0          Comment NCS: Abnormal study  1.  Absent bilateral sural SNAPs.  2.  Diffusely low amplitude bilateral peroneal and tibial CMAP's.  3.  Mildly prolonged latency and reduced amplitude right radial SNAP and borderline conduction velocity right ulnar CMAP    Comment EMG: Abnormal study  1.  Increased insertional activity positive waves and fibrillation  potentials bilateral medial gastrocnemius muscles.  Prolonged motor unit and potential duration left medial gastrocnemius muscle and left TFL.  Remainder of the bilateral lower extremity needle EMG normal.    Interpretation: Abnormal study: There is electrodiagnostic evidence of:    1.  Electrodiagnostic findings are consistent with a length-dependent sensorimotor polyneuropathy, predominantly axonal.  This results in denervation through the medial gastrocnemius muscles in the bilateral lower extremities.      2.  Left S1 and/or L5 radiculopathy, chronic.  Cannot completely exclude an active component     3.  There is no convincing electrodiagnostic evidence of lumbosacral radiculopathy in the right lower extremity or focal neuropathy in the bilateral lower extremities.    The testing was completed in its entirety by the physician.      It was our pleasure caring for your patient today, if there any questions or concerns please do not hesitate to contact us.      Again, thank you for allowing me to participate in the care of your patient.        Sincerely,        Darius Petty, DO

## 2023-10-31 NOTE — PROGRESS NOTES
ASSESSMENT: Thomas Steve is a 78 year old male who presents for consultation at the request of PCP Moni Mcclain, who presents today for new patient evaluation of:    -Lumbar radiculopathy, low back pain    Patient complains of chronic severe bilateral gluteal region pain.  He has chronic sensory loss of his left lateral lower leg and foot. He is otherwise full strength on exam today.   His back pain was present prior to his lumbar surgery in 2022, and persist afterwards.  He had briefly started physical therapy last fall, but got deferred after a toe injury. We reviewed his lumbar MRI. He does not have any severe canal stenosis or severe narrowing around his nerves. He has postop changes in the soft tissue with resolving postop seroma, with enhancement in soft tissue from L4-S1, and the L4-5 facet complexes. He has granulation tissue around the exiting S1 nerve roots. He has small synovial cysts at L4-5, but with minimal canal stenosis. We reviewed his EMG from today which showed polyneuropathy and likely chronic left L5 and S1 radiculopathies. The study could not fully rule out an active portion however given that patient is not experiencing any leg symptoms, I would suspect it is chronic at this point. I recommended a new course of physical therapy as a starting line for treatment. He asks if home PT would be possible. If no improvement, we did discuss potential lumbar medial branch blocks/RFA, which he would be interested in. He may be experiencing referred facetogenic back pain. We will watch for leg symptoms consistent with chronic radiculopathy.  I also recommended referral to neurology for work-up of his polyneuropathy. I recommended calling neurosurgery to discuss his CT results as well.  He does agree with plan and will follow-up with us after physical therapy to let us know how he is doing.          10/4/2023     3:00 PM   OSWESTRY DISABILITY INDEX   Count 10   Sum 16   Oswestry Score (%) 32 %             Diagnoses and all orders for this visit:  Spinal stenosis, lumbar region, without neurogenic claudication  Pain  -     Spine  Referral  Spondylolisthesis of lumbar region  -     Physical Therapy Referral; Future  -     Home Care Referral  Spondylosis of lumbar region without myelopathy or radiculopathy  -     Physical Therapy Referral; Future  -     Home Care Referral  Leg fatigue  -     Adult Neurology  Referral; Future  Polyneuropathy  -     Adult Neurology  Referral; Future      PLAN:  Reviewed spine anatomy and disease process. Discussed diagnosis and treatment options with the patient today. A shared decision making model was used.  The patient's values and choices were respected. The following represents what was discussed and decided upon by the provider and the patient.      -DIAGNOSTIC TESTS:  Images were personally reviewed and interpreted and explained to patient today using a spine model.   -- I did not order any new diagnostic imaging today    -PHYSICAL THERAPY:    -- I referred patient to full course of physical therapy today  Discussed the importance of core strengthening, ROM, stretching exercises with the patient and how each of these entities is important in decreasing pain.  Explained to the patient that the purpose of physical therapy is to teach the patient a home exercise program.  These exercises need to be performed every day in order to decrease pain and prevent future occurrences of pain.        -MEDICATIONS:    -Okay to continue Tylenol over-the-counter as needed  -Okay to continue Lyrica as directed.  We could consider going up on this at some point if needed.  -- Patient is not an NSAID candidate given Eliquis  Discussed multiple medication options today with patient. Discussed risks, side effects, and proper use of medications. Patient verbalized understanding.    -INTERVENTIONS:    --We discussed lumbar medial branch blocks/RFA if patient were not  to improve with physical therapy. He would like to consider this    -PATIENT EDUCATION:  Total time of 40 minutes, on the day of service, spent with the patient, reviewing the chart, placing orders, and documenting.   -Today we also discussed the pros and cons of the current treatment plan.    -FOLLOW-UP:   Follow-up in approximately 6 weeks for symptom review    Advised patient to call the Spine Center if symptoms worsen, new numbness or weakness develops in the legs, or if they develop new or worsening problems controlling bladder or bowel function.   ______________________________________________________________________    SUBJECTIVE:    HPI:  Thomas Steve  Is a 78 year old male on xarelto with history of A-fib, DVT, MI, pacemaker, type 2 diabetes, anemia, neuropathy, acid reflux who presents today for new patient evaluation of low back pain.    He endorses lower back pain which began a few years prior to his lumbar surgery. He then underwent a decompressive laminectomy L4-5 and L5-S1 bilateral, with removal of herniated disc on the left at L5-S1 on 6/16/2022. The pain is in both gluteal regions. He describes it as an extreme ache. It is worse with prolonged standing, walking, and carrying objects >10lbs. He has some relief with sitting. The pain today is a 6/10, a 10/10 at worst, and a 3/10 at best. He has fatigue and weakness in both lower extremities. He endorses central lower back pain with lying down, which is different from his primary juju gluteal region pain. He was hoping his pain would improve with surgery. He feels that at first, it may have. He then had a complicated postop course including right leg swelling which was found to be related to an extensive RLE DVT. He started getting into PT last year postop when he unfortunately sustained a toe injury and could no longer attend PT. The pain related to his toe radiated into his left inner lower leg like a toothache, but this is now improved. His  treatment of his toe lasted from Nov to June 2023. He has been seen by Dr Samuel since then for further evaluation and review of a new lumbar MRI postop done in August 2023. He was referred to spine medicine for conservative treatment and for a lumbar CT and juju lower extremity EMG which he just had done today. He has been taking tylenol without relief.  He is on Lyrica on a chronic basis    He has chronic left lateral lower leg numbness which has not changed. He has a history of left leg infection after a vein harvesting in 2011 for his open heart surgery.    He denies any changes in bowel or bladder control.     He is the primary caregiver for his wife, and walks the dogs, does lots of odd jobs, yard work and housework    He has not done any chiropractic care or had any previous spinal injections  He would be interested in pursuing home PT which he has had in the past      -Treatment to Date:     -Medications:  Tylenol  Lyrica    Current Outpatient Medications   Medication    acetaminophen (TYLENOL) 500 MG tablet    atorvastatin (LIPITOR) 20 MG tablet    bumetanide (BUMEX) 1 MG tablet    cholecalciferol 50 MCG (2000 UT) CAPS    coenzyme Q-10 200 MG CAPS capsule    doxycycline hyclate (VIBRA-TABS) 100 MG tablet    doxycycline hyclate (VIBRAMYCIN) 100 MG capsule    ferrous sulfate (FEROSUL) 325 (65 Fe) MG tablet    glimepiride (AMARYL) 4 MG tablet    insulin glargine (LANTUS PEN) 100 UNIT/ML pen    levothyroxine (SYNTHROID/LEVOTHROID) 25 MCG tablet    losartan (COZAAR) 50 MG tablet    melatonin 5 MG tablet    Omega-3 Fatty Acids (FISH OIL) 1200 MG capsule    omeprazole (PRILOSEC) 20 MG DR capsule    POTASSIUM PO    pregabalin (LYRICA) 75 MG capsule    rivaroxaban ANTICOAGULANT (XARELTO) 20 MG TABS tablet    rosuvastatin (CRESTOR) 10 MG tablet    senna-docusate (SENOKOT-S/PERICOLACE) 8.6-50 MG tablet    vitamin C (ASCORBIC ACID) 500 MG tablet    zinc gluconate 50 MG tablet     No current facility-administered  medications for this visit.       Allergies   Allergen Reactions    Aspirin Other (See Comments)     Contraindicated due to xarelto use    Bee Pollen Unknown     Scratchy throat    Codeine Nausea and Vomiting    Pollen Extracts [Pollen Extract] Unknown     Scratchy throat    Vicodin [Hydrocodone-Acetaminophen] Nausea and Vomiting       Past Medical History:   Diagnosis Date    Acute sinusitis     Atrial fibrillation (H)     Resolved after medication and CPAP    Back pain     CHF (congestive heart failure) (H)     Coronary artery disease     Diabetes mellitus (H)     Gastroesophageal reflux disease with esophagitis     HTN (hypertension)     Hyperlipemia     Hyperlipidemia     Joint pain     Lipoma of neck     Nocturia     Sciatic leg pain     Shoulder impingement syndrome     BILATERAL    Sleep apnea     uses CPAP    Typical atrial flutter (H) 07/07/2016    Demonstrated on 12-lead EKG July 7, 2016        Patient Active Problem List   Diagnosis    Type 2 diabetes mellitus (H)    Essential hypertension    Coronary atherosclerosis    Ischemic cardiomyopathy    Right bundle branch block    Mixed hyperlipidemia    Atrial fibrillation (H)    Obstructive sleep apnea    Congestive Heart Failure    Typical atrial flutter (H)    Leg pain, bilateral    Leg swelling    Venous insufficiency of both lower extremities    Acquired lymphedema of leg    Claudication of both lower extremities (H24)    Diabetic peripheral neuropathy associated with type 2 diabetes mellitus (H)    SSS (sick sinus syndrome) (H)    Acute systolic heart failure (H)    Biventricular cardiac pacemaker in situ    Lumbar stenosis with neurogenic claudication    Gastroesophageal reflux disease without esophagitis    Hypothyroidism    Normocytic anemia    Chronic left shoulder pain    Nasal congestion    Lumbar spinal stenosis    Acute deep vein thrombosis (DVT) of femoral vein of right lower extremity (H)    ARF (acute renal failure) (H24)    Anemia    Anemia  due to blood loss, acute    Spinal stenosis, lumbar region, without neurogenic claudication    Lumbar herniated disc    Traumatic hematoma of buttock, initial encounter    Anemia, unspecified type    Heart failure with reduced ejection fraction (H)    Diabetic ulcer of toe of right foot associated with diabetes mellitus due to underlying condition, with necrosis of bone (H)       Past Surgical History:   Procedure Laterality Date    CORONARY ARTERY BYPASS      EP BIV PACEMAKER INSERT N/A 06/11/2019    Procedure: EP Biventricular Pacemaker Insertion;  Surgeon: Durga Rajput MD;  Location: [facility];  Service: Cardiology    IMPLANT PACEMAKER      IR FINE NEEDLE ASPIRATION W ULTRASOUND  06/23/2022    LAMINECTOMY LUMBAR TWO LEVELS Bilateral 06/16/2022    Procedure: DECOMPRESSIVE LUMBAR LAMINECTOMY LUMBAR 4-LUMBAR 5 AND LUMBAR 5-SACRAL 1 BILATERAL-LEFT SIDE FIRST-PLUS BILATERAL FORAMINOTOMIES PLUS REMOVAL OF HERNIATED DISC LUMBAR 5-SACRAL 1 LEFT;  Surgeon: Israel Samuel MD;  Location: [facility] Main OR    SD CARDIOVERSION ELECTIVE ARRHYTHMIA EXTERNAL  06/03/2014    Description: Elective Cardioversion External;  Recorded: 06/03/2014;    [facility] CABG, VEIN, SINGLE  01/01/2011    Description: CABG (CABG);  Proc Date: 09/26/2011;  Comments: LIMA^LAD, SVG^OM, SVG^distal RCA       Family History   Problem Relation Age of Onset    Heart Disease Mother     Snoring Father     Heart Disease Father     Hypertension Father     Alzheimer Disease Father     No Known Problems Daughter     No Known Problems Daughter     No Known Problems Daughter     Chronic Obstructive Pulmonary Disease Sister     Hodgkin's lymphoma Brother     No Known Problems Son     Diabetes Sister     Substance Abuse Sister     Chronic Obstructive Pulmonary Disease Brother     Heart Disease Brother     Diabetes Brother     Substance Abuse Brother        Reviewed past medical, surgical, and family history with patient found on new patient intake  "packet located in [employer] tab.     SOCIAL HX: Former smoker, limited alcohol use, no recreational drug use    ROS: Positive for sexual dysfunction, changes in vision, shortness of breath, joint pain, muscle pain, muscle fatigue, itching, imbalance, falls, excessive bleeding, easy bruising, insomnia, excessive tiredness.  Specifically negative for bowel/bladder dysfunction, balance changes, headache, dizziness, foot drop, fevers, chills, appetite changes, nausea/vomiting, unexplained weight loss. Otherwise 13 systems reviewed are negative. Please see the patient's intake questionnaire from today for details.    OBJECTIVE:  /60   Pulse 75   Ht 5' 9.5\" (1.765 m)   Wt 210 lb (95.3 kg)   BMI 30.57 kg/m      PHYSICAL EXAMINATION:    --CONSTITUTIONAL:  Vital signs as above.  No acute distress.  The patient is well nourished and well groomed.  --PSYCHIATRIC:  Appropriate mood and affect. The patient is awake, alert, oriented to person, place, time and answering questions appropriately with clear speech.    --SKIN:  Skin over the face, bilateral lower extremities, and posterior torso is clean, dry, intact without rashes.    --RESPIRATORY: Normal rhythm and effort. No abnormal accessory muscle breathing patterns noted.   --ABDOMINAL:  Non-distended.    --GROSS MOTOR: Gait is non-antalgic. Easily arises from a seated position. Toe walking and heel walking are normal without significant difficulty.     Upper extremity motor testing  5/5 throughout both deltoids, triceps, biceps, handgrips, intrinsics, extensors  Sensation of upper extremities is intact and equal throughout  Reflexes in the upper extremities were 1/4 bilaterally throughout  Negative quinteros      --LOWER EXTREMITY MOTOR TESTING:  Hip flexion: right 5/5, left 5/5  Hip abduction: right 5/5, left 5/5  Hip adduction: right 5/5, left 5/5   Quads: right 5/5, left 5/5  Hamstrings: right 5/5, left 5/5  Dorsiflexion: right 5/5, left 5/5  Plantar flexion: right " 5/5, left 5/5    Great toe MTP extension/EHL: right 5/5, left 5/5    --NEUROLOGICAL:  1/4 patellar and achilles reflexes bilaterally. Sensation to light touch is intact throughout both lower extremities. Babinski is negative. No clonus.    --STANDING EXAMINATION:  Normal lumbar lordosis noted, no lateral shift.    --MUSCULOSKELETAL: Lumbar spine inspection reveals no evidence of deformity. Range of motion is not limited in lumbar flexion, extension, lateral rotation. No point tenderness to palpation lumbar spine. No paraspinal musculature tenderness.     Straight leg raising is negative.    --HIPS: Full range of motion bilaterally. Negative YARELI and Negative FADIR bilaterally. Negative hip joint tenderness to palp.    --SACROILIAC JOINT: Distraction maneuver was negative. Gaenslen's Test was negative.Thigh thrust was negative. Sacroiliac Joint Compression Test was negative. One finger point test was negative. Negative SI joint tenderness to palpation bilaterally.    --VASCULAR:  Bilateral lower extremities are warm without any discoloration.  There is no pitting edema of the bilateral lower extremities.    RESULTS:   Prior medical records from [facility] were reviewed today.    Imaging: Spine imaging was personally reviewed and interpreted today. The images were shown to the patient and the findings were explained using a spine model.      CT Lumbar Spine w/o Contrast    Result Date: 10/31/2023  EXAM: CT LUMBAR SPINE W/O CONTRAST LOCATION: [facility] DATE: 10/31/2023 INDICATION: Bilateral leg pain COMPARISON: MRI scan 08/14/2023. CT scan 04/26/2022. TECHNIQUE: Routine CT Lumbar Spine without IV contrast. Multiplanar reformats. Dose reduction techniques were used. FINDINGS: Limited views of the retroperitoneal structures reveal no gross abnormalities involving the kidneys or psoas muscles. Atherosclerotic calcification throughout the infrarenal abdominal aorta is  noted. No evidence of lumbar spine fracture. Limited views of the sacrum reveal no evidence of sacral fracture. Incidentally noted fusion of the SI joints bilaterally. T12-L1: Normal disc height. No herniation. Normal facets. No spinal canal or neural foraminal stenosis. L1-L2: Normal disc height. No herniation. Normal facets. No spinal canal or neural foraminal stenosis. L2-L3: Anterior/left bridging syndesmophyte. Preservation of disc height. Spinal canal and foramen are patent. L3-L4: Mild disc bulging. Bilateral facet degeneration with thickening of the ligamentum flavum. Mild spinal canal stenosis. Foramen are patent. Overall appearance is similar when compared to MRI scan performed 08/14/2023. L4-L5: Decompressive laminectomy. Grade I anterior spondylolisthesis of L4 on L5. Bilateral facet degeneration. Disc bulging and right-sided facet hypertrophic change results in right-sided L5 lateral recess stenosis. Bilateral foraminal stenosis which was seen on previous MRI scan. L5-S1: Decompressive laminectomy. Loss of disc height. Thickening of the ligamentum flavum which results in narrowing of the thecal sac in the lateral dimension with mild spinal canal stenosis. Subtle encroachment upon the right lateral recess. Bilateral  foraminal stenosis.     IMPRESSION: 1.  Decompressive laminectomy at L4-L5 and L5-S1. 2.  Degenerative change at L4-L5 with grade I anterior spondylolisthesis of L4 on L5. Bilateral facet degeneration. Disc bulging and right-sided facet hypertrophic change results in right-sided L5 lateral recess stenosis. Bilateral foraminal stenosis which was seen on previous MRI scan. 3.  Degenerative change at L5-S1 with loss of disc height. Thickening of the ligamentum flavum which results in narrowing of the thecal sac in the lateral dimension. Mild spinal canal stenosis. Subtle encroachment upon the right lateral recess. Bilateral  foraminal narrowing. 4.  Mild degenerative change at L3-L4 with mild  disc bulging. Bilateral facet degeneration with thickening of the ligamentum flavum. Mild spinal canal stenosis. Foramen are patent. Overall appearance is similar compared to MRI scan of 08/14/2023. 5.  Anterior/left bridging syndesmophyte at the L2-L3 level is incidentally noted. Spinal canal and foramen are patent at this level.          Noreen Lynn FNP-C  [facility]  O. [phone]

## 2023-10-31 NOTE — PROGRESS NOTES
[facility]  [address] [place] [address] [address]  [place] [address]  Office: [phone] Fax: [phone]    Electromyography and Nerve Conduction Study Report        Indication: Patient presents at the request of Dr. Israel Samuel for bilateral lower extremity EMG.  He has low back pain with bilateral gluteal painRight equal to left with prolonged standing and walking.  Feels leg weakness.  Has numbness and tingling in the feet and tells me he has known history of neuropathy.  On exam he has generalized decree sensation to light touch through the lower extremities to the knees or more proximally.  1+ patellar 0 Achilles reflexes bilaterally, normal muscle strength throughout the major muscle groups of the bilateral lower extremities.        Pt Exam Discussion (Communication Barriers):  Electromyography and nerve conduction testing, including associated discomfort, risks, benefits, and alternatives was discussed with the patient prior to the procedure.  No learning/ communication barriers; patient verbalized understanding of procedure.  Informed consent was obtained.           Pt Assessment:  Testing was successfully completed; patient tolerated testing well.       Blood Thinners: Xarelto Skin Temperature: Warmed 32.4                   EMG/NCS  results:     Nerve Conduction Studies  Motor Sites      Segment Distal Latency Neg. Amp CV F-Latency F-Estimate Comment   Site  (ms) mV m/s ms ms    Left Fibular (EDB) Motor   Ankle Ankle-EDB 4.6 0.35       Fib Head Fib Head-Ankle 12.4 0.28 38      Knee Knee-Ankle 15.5 *0.22 *38      Right Fibular (EDB) Motor   Ankle Ankle-EDB 5.4 1.30       Fib Head Fib Head-Ankle 13.3 1.23 38      Knee Knee-Ankle 15.9 *1.31 *38      Left Tibial (AH) Motor   Ankle Ankle-AH 3.4 1.33       Knee Knee-Ankle 15.5 *0.63 *36      Right Tibial (AH) Motor   Ankle Ankle-AH 3.7 0.66  23.8 -    Knee Knee-Ankle 16.6 *0.81 *33      Right Ulnar (ADM) Motor   Wrist  3.3 9.4   38.5 -    Bel Elbow Bel Elbow-Wrist 7.9 8.9 50      Ab Elbow Ab Elbow-Wrist 10.6 8.5 49        Sensory Sites      Onset Lat Peak Lat Amp CV Comment   Site (ms) (ms)  V m/s    Right Radial Sensory   Forearm-Wrist 2.2 *2.8 *17 45    Left Sural Sensory   B-Ankle *NR *NR *NR *NR    Right Sural Sensory   B-Ankle *NR *NR *NR *NR        NCS Waveforms:    Motor                  F-Wave         Sensory             Electromyography     Side Muscle Nerve Root Ins Act Fibs Psw Fasc Recrt Dur Amp Poly Comment   Right AntTibialis Dp Br Fibular L4-5 Nml Nml Nml Nml Nml Nml Nml 0    Right Gastroc Tibial S1-2 *Incr *1+ *1+ Nml Nml Nml Nml 0    Right Fibularis Long Sup Br Fibular L5-S1 Nml Nml Nml Nml Nml Nml Nml 0    Right VastusLat Femoral L2-4 Nml Nml Nml Nml Nml Nml Nml 0    Right TensorFascLat SupGluteal L4-5, S1 Nml Nml Nml Nml Nml Nml Nml 0    Right GluteusMax InfGluteal L5-S2 Nml Nml Nml Nml Nml Nml Nml 0    Left AntTibialis Dp Br Fibular L4-5 Nml Nml Nml Nml Nml Nml Nml 0    Left Gastroc Tibial S1-2 *Incr *1+ *1+ Nml Nml *>12ms *Incr 0    Left Fibularis Long Sup Br Fibular L5-S1 Nml Nml Nml Nml Nml Nml Nml 0    Left VastusLat Femoral L2-4 Nml Nml Nml Nml Nml Nml Nml 0    Left TensorFascLat SupGluteal L4-5, S1 Nml Nml Nml Nml Nml *>12ms *Incr 0    Left GluteusMax InfGluteal L5-S2 Nml Nml Nml Nml Nml Nml Nml 0          Comment NCS: Abnormal study  1.  Absent bilateral sural SNAPs.  2.  Diffusely low amplitude bilateral peroneal and tibial CMAP's.  3.  Mildly prolonged latency and reduced amplitude right radial SNAP and borderline conduction velocity right ulnar CMAP    Comment EMG: Abnormal study  1.  Increased insertional activity positive waves and fibrillation potentials bilateral medial gastrocnemius muscles.  Prolonged motor unit and potential duration left medial gastrocnemius muscle and left TFL.  Remainder of the bilateral lower extremity needle EMG normal.    Interpretation: Abnormal study: There is electrodiagnostic  evidence of:    1.  Electrodiagnostic findings are consistent with a length-dependent sensorimotor polyneuropathy, predominantly axonal.  This results in denervation through the medial gastrocnemius muscles in the bilateral lower extremities.      2.  Left S1 and/or L5 radiculopathy, chronic.  Cannot completely exclude an active component     3.  There is no convincing electrodiagnostic evidence of lumbosacral radiculopathy in the right lower extremity or focal neuropathy in the bilateral lower extremities.    The testing was completed in its entirety by the physician.      It was our pleasure caring for your patient today, if there any questions or concerns please do not hesitate to contact us.

## 2023-11-13 ENCOUNTER — TELEPHONE (OUTPATIENT)
Dept: NEUROSURGERY | Facility: CLINIC | Age: 78
End: 2023-11-13
Payer: MEDICARE

## 2023-11-13 NOTE — TELEPHONE ENCOUNTER
[facility] Call Center    Phone Message    May a detailed message be left on voicemail: yes     Reason for Call: Other: Patient is calling in because he is in need of Noreen PADILLA CNP to call [facility] to discuss if patient is a candidate for at home physical therapy services. Please call Charu at [phone]     Action Taken: Other: [phone]    Travel Screening: Not Applicable

## 2023-11-13 NOTE — TELEPHONE ENCOUNTER
Spoke with Charu. I had referred patient for home PT at time of his appointment on 10/31.     Charu is requesting my home PT referral order be faxed to her attn as well as my office note from that day if possible:    [facility] , Charu  Fax #: [phone]

## 2023-11-15 ENCOUNTER — OFFICE VISIT (OUTPATIENT)
Dept: NEUROSURGERY | Facility: CLINIC | Age: 78
End: 2023-11-15
Payer: MEDICARE

## 2023-11-15 VITALS — DIASTOLIC BLOOD PRESSURE: 73 MMHG | HEART RATE: 71 BPM | OXYGEN SATURATION: 97 % | SYSTOLIC BLOOD PRESSURE: 135 MMHG

## 2023-11-15 DIAGNOSIS — R52 PAIN: Primary | ICD-10-CM

## 2023-11-15 PROCEDURE — 99214 OFFICE O/P EST MOD 30 MIN: CPT | Performed by: SURGERY

## 2023-11-15 ASSESSMENT — PAIN SCALES - GENERAL: PAINLEVEL: SEVERE PAIN (6)

## 2023-11-15 NOTE — NURSING NOTE
"Thomas Steve is a 78 year old male who presents for:  Chief Complaint   Patient presents with    RECHECK        Initial Vitals:  /73   Pulse 71   SpO2 97%  Estimated body mass index is 30.57 kg/m  as calculated from the following:    Height as of 10/31/23: 5' 9.5\" (1.765 m).    Weight as of 10/31/23: 210 lb (95.3 kg).. There is no height or weight on file to calculate BSA. BP completed using cuff size: regular  Severe Pain (6)    Galo Rico    "

## 2023-11-15 NOTE — PROGRESS NOTES
The patient is a 78-year-old male.  He is 1 year +5 months postop decompressive laminectomy L4-5 and L5-S1 bilateral plus removal of herniated disc L5-S1 on the left.  He says that when he stands or walks or carries he gets pain in the region of the sacroiliac joints.  He complains of leg weakness and numbness.  He has known neuropathy in his legs and feet because of diabetes.  He does take insulin.  He does not have radicular leg pain.  On exam he does have tender sacroiliac joints.  I would like to get x-ray of his sacroiliac joints and a CT of his sacroiliac joints and also some flexion-extension views of his lumbar spine.  I did show him his lumbar CT pictures.  He did get the consult at the spine center.  He is going to return for follow-up.  He did get the EMG of the legs.  Total time 25 minutes, more than 50% spent counseling and/or coordinating care.

## 2023-11-21 ENCOUNTER — LAB REQUISITION (OUTPATIENT)
Dept: LAB | Facility: CLINIC | Age: 78
End: 2023-11-21
Payer: MEDICARE

## 2023-11-21 DIAGNOSIS — I25.10 ATHEROSCLEROTIC HEART DISEASE OF NATIVE CORONARY ARTERY WITHOUT ANGINA PECTORIS: ICD-10-CM

## 2023-11-21 DIAGNOSIS — E11.65 TYPE 2 DIABETES MELLITUS WITH HYPERGLYCEMIA (H): ICD-10-CM

## 2023-11-21 DIAGNOSIS — E11.22 TYPE 2 DIABETES MELLITUS WITH DIABETIC CHRONIC KIDNEY DISEASE (H): ICD-10-CM

## 2023-11-21 LAB
ALBUMIN SERPL BCG-MCNC: 4.5 G/DL (ref 3.5–5.2)
ALP SERPL-CCNC: 150 U/L (ref 40–150)
ALT SERPL W P-5'-P-CCNC: 26 U/L (ref 0–70)
ANION GAP SERPL CALCULATED.3IONS-SCNC: 10 MMOL/L (ref 7–15)
AST SERPL W P-5'-P-CCNC: 25 U/L (ref 0–45)
BILIRUB SERPL-MCNC: 0.5 MG/DL
BUN SERPL-MCNC: 17.7 MG/DL (ref 8–23)
CALCIUM SERPL-MCNC: 9.2 MG/DL (ref 8.8–10.2)
CHLORIDE SERPL-SCNC: 99 MMOL/L (ref 98–107)
CHOLEST SERPL-MCNC: 128 MG/DL
CREAT SERPL-MCNC: 1.02 MG/DL (ref 0.67–1.17)
DEPRECATED HCO3 PLAS-SCNC: 30 MMOL/L (ref 22–29)
EGFRCR SERPLBLD CKD-EPI 2021: 76 ML/MIN/1.73M2
GLUCOSE SERPL-MCNC: 348 MG/DL (ref 70–99)
HDLC SERPL-MCNC: 37 MG/DL
LDLC SERPL CALC-MCNC: 51 MG/DL
NONHDLC SERPL-MCNC: 91 MG/DL
POTASSIUM SERPL-SCNC: 4.4 MMOL/L (ref 3.4–5.3)
PROT SERPL-MCNC: 7.2 G/DL (ref 6.4–8.3)
SODIUM SERPL-SCNC: 139 MMOL/L (ref 135–145)
TRIGL SERPL-MCNC: 199 MG/DL

## 2023-11-21 PROCEDURE — 82570 ASSAY OF URINE CREATININE: CPT | Mod: ORL | Performed by: FAMILY MEDICINE

## 2023-11-21 PROCEDURE — 80053 COMPREHEN METABOLIC PANEL: CPT | Mod: ORL | Performed by: FAMILY MEDICINE

## 2023-11-21 PROCEDURE — 80061 LIPID PANEL: CPT | Mod: ORL | Performed by: FAMILY MEDICINE

## 2023-11-22 ENCOUNTER — HOSPITAL ENCOUNTER (OUTPATIENT)
Dept: GENERAL RADIOLOGY | Facility: HOSPITAL | Age: 78
Discharge: HOME | End: 2023-11-22
Attending: SURGERY
Payer: MEDICARE

## 2023-11-22 ENCOUNTER — HOSPITAL ENCOUNTER (OUTPATIENT)
Dept: CT IMAGING | Facility: HOSPITAL | Age: 78
Discharge: HOME | End: 2023-11-22
Attending: SURGERY
Payer: MEDICARE

## 2023-11-22 DIAGNOSIS — R52 PAIN: ICD-10-CM

## 2023-11-22 LAB
CREAT UR-MCNC: 15.5 MG/DL
MICROALBUMIN UR-MCNC: <12 MG/L
MICROALBUMIN/CREAT UR: NORMAL MG/G{CREAT}

## 2023-11-22 PROCEDURE — 72192 CT PELVIS W/O DYE: CPT | Mod: MG

## 2023-11-22 PROCEDURE — 72114 X-RAY EXAM L-S SPINE BENDING: CPT

## 2023-11-22 PROCEDURE — 72200 X-RAY EXAM SI JOINTS: CPT

## 2023-11-29 ENCOUNTER — OFFICE VISIT (OUTPATIENT)
Dept: NEUROSURGERY | Facility: CLINIC | Age: 78
End: 2023-11-29
Payer: MEDICARE

## 2023-11-29 VITALS — DIASTOLIC BLOOD PRESSURE: 79 MMHG | SYSTOLIC BLOOD PRESSURE: 150 MMHG | HEART RATE: 68 BPM | OXYGEN SATURATION: 97 %

## 2023-11-29 DIAGNOSIS — R52 PAIN: Primary | ICD-10-CM

## 2023-11-29 PROCEDURE — 99213 OFFICE O/P EST LOW 20 MIN: CPT | Performed by: SURGERY

## 2023-11-29 ASSESSMENT — PAIN SCALES - GENERAL: PAINLEVEL: SEVERE PAIN (6)

## 2023-11-29 NOTE — NURSING NOTE
"Thomas Steve is a 78 year old male who presents for:  Chief Complaint   Patient presents with    Follow Up     Review imaging  Low back/hip pain          Initial Vitals:  BP (!) 150/79   Pulse 68   SpO2 97%  Estimated body mass index is 30.57 kg/m  as calculated from the following:    Height as of 10/31/23: 5' 9.5\" (1.765 m).    Weight as of 10/31/23: 210 lb (95.3 kg).. There is no height or weight on file to calculate BSA. BP completed using cuff size: regular  Severe Pain (6)    Nursing Comments: Thomas to follow up with Primary Care provider regarding elevated blood pressure.      Juan Ramon Rodrigues  "

## 2023-11-29 NOTE — LETTER
11/29/2023         RE: Thomas Steve  [address] [address] [facility] [address]        Dear Colleague,    Thank you for referring your patient, Thomas Steve, to the [facility]. Please see a copy of my visit note below.    The patient is 78 years old.  He had a decompressive laminectomy by me in June 2022.  The surgery included L4-5 and L5-S1 bilateral plus herniated disc L5-S1 on the left.  He now complains of pain that seems focused on his sacroiliac joints.  Plain x-ray was read as showing inflammatory arthritis or DISH.  CT shows sacroiliac joint bridging.  Flexion-extension views stable.  EMG showed polyneuropathy with possible chronic left L5 and S1 radiculopathy.  The patient has diabetes.  He takes insulin.  His pain gets worse with standing or walking.  Plan a visit at the spine center to try conservative treatment directed at the sacroiliac joints.  Plan neurology consult.  Total time 15 minutes, more than 50% spent counseling and/or coordinating care.      Again, thank you for allowing me to participate in the care of your patient.        Sincerely,        Israel Samuel MD

## 2023-12-07 ENCOUNTER — LAB (OUTPATIENT)
Dept: LAB | Facility: HOSPITAL | Age: 78
End: 2023-12-07
Payer: MEDICARE

## 2023-12-07 ENCOUNTER — OFFICE VISIT (OUTPATIENT)
Dept: PHYSICAL MEDICINE AND REHAB | Facility: CLINIC | Age: 78
End: 2023-12-07
Payer: MEDICARE

## 2023-12-07 VITALS — HEART RATE: 87 BPM | SYSTOLIC BLOOD PRESSURE: 145 MMHG | DIASTOLIC BLOOD PRESSURE: 74 MMHG

## 2023-12-07 DIAGNOSIS — R52 PAIN: ICD-10-CM

## 2023-12-07 DIAGNOSIS — M47.816 SPONDYLOSIS OF LUMBAR REGION WITHOUT MYELOPATHY OR RADICULOPATHY: ICD-10-CM

## 2023-12-07 DIAGNOSIS — M19.90 INFLAMMATORY ARTHRITIS: ICD-10-CM

## 2023-12-07 DIAGNOSIS — M53.3 SACROILIAC PAIN: ICD-10-CM

## 2023-12-07 DIAGNOSIS — M19.90 INFLAMMATORY ARTHRITIS: Primary | ICD-10-CM

## 2023-12-07 LAB
CRP SERPL-MCNC: 3.4 MG/L
ERYTHROCYTE [SEDIMENTATION RATE] IN BLOOD BY WESTERGREN METHOD: 27 MM/HR (ref 0–20)

## 2023-12-07 PROCEDURE — 36415 COLL VENOUS BLD VENIPUNCTURE: CPT

## 2023-12-07 PROCEDURE — 85652 RBC SED RATE AUTOMATED: CPT

## 2023-12-07 PROCEDURE — 99213 OFFICE O/P EST LOW 20 MIN: CPT | Performed by: NURSE PRACTITIONER

## 2023-12-07 PROCEDURE — 86140 C-REACTIVE PROTEIN: CPT

## 2023-12-07 PROCEDURE — 86038 ANTINUCLEAR ANTIBODIES: CPT

## 2023-12-07 ASSESSMENT — PAIN SCALES - GENERAL: PAINLEVEL: MODERATE PAIN (5)

## 2023-12-07 NOTE — PATIENT INSTRUCTIONS
Labs were ordered for you to get done today  Please go to [facility] to have these done today as a walk-in       [facility]  [address] [address]        ~You have been referred for Physical Therapy to [facility]. They will call you to schedule an appointment.      Scheduling phone number is [phone] for [employer] [place], [place], or [place] location.  If you have not heard from the scheduling office within 2 business days, please call [phone] for ALL other locations.    Discussed the importance of core strengthening, ROM, stretching exercises and how each of these entities is important in decreasing pain and improving long term spine health.  The purpose of physical therapy is to teach you an individualized home exercise program.  These exercises need to be performed every day in order to decrease pain and prevent future occurrences of pain.           I will check and see if we are able to coordinate SI injections if appropriate based on your imaging. If not, we can pursue MBB/RFA when you return from the [facility]    ~Please call our [facility] Nurse Navigation line [phone] with any questions or concerns about your treatment plan, if symptoms worsen and you would like to be seen urgently, or if you have any new or worsening numbness, weakness, or problems controlling bladder and bowel function.  ~You are also welcome to contact Noreen Lynn via [email or web address], but please be aware that responses to [email or web address] message may take 2-3 days due to the high volume of patients seen in clinic.

## 2023-12-07 NOTE — PROGRESS NOTES
ASSESSMENT: Thomas Steve is a 78 year old male who presents for consultation at the request of PCP Moni Mcclain, who presents today for follow-up patient evaluation of:    -Lumbar radiculopathy, low back pain    Patient referred back to see me by Dr. Samuel who recommends conservative treatment of sacroiliac joint pain.  Some SI maneuvers on exam are positive today.  We reviewed his CT pelvis, XR SI joints, XR lumbar spine which demonstrate moderate hypertrophic changes in both SI joints with solid bridging bone across both SI joints.  Will verify with team that sacroiliac joint injections are possible with partial fusion of SI joints.  We will call him and add SI injections if so.  He will also start a course of physical therapy for SI and back pain.  We also discussed medial branch blocks/RFA for his facet arthropathy, but it is unlikely he has enough time before he goes south to complete the 3 part series for this.  I have ordered some rheumatological labs and placed the rheum referral given his inflammatory arthritis/DISH.  He will continue his chronic Lyrica and Tylenol as directed as needed for pain control.  We will call him regarding lab results and with injection plan.          11/29/2023     2:00 PM   OSWESTRY DISABILITY INDEX   Count 10   Sum 19   Oswestry Score (%) 38 %            Diagnoses and all orders for this visit:  Inflammatory arthritis  -     Anti Nuclear Kassi IgG by IFA with Reflex; Future  -     CRP inflammation; Future  -     Erythrocyte sedimentation rate auto; Future  -     Adult Rheumatology  Referral; Future  Pain  -     Spine  Referral  Spondylosis of lumbar region without myelopathy or radiculopathy  -     Physical Therapy Referral; Future  Sacroiliac pain  -     Physical Therapy Referral; Future      PLAN:  Reviewed spine anatomy and disease process. Discussed diagnosis and treatment options with the patient today. A shared decision making model was used.   The patient's values and choices were respected. The following represents what was discussed and decided upon by the provider and the patient.      -DIAGNOSTIC TESTS:  Images were personally reviewed and interpreted and explained to patient today using a spine model.   -- I did not order any new diagnostic imaging today    -PHYSICAL THERAPY:    -- I referred patient to full course of physical therapy today  Discussed the importance of core strengthening, ROM, stretching exercises with the patient and how each of these entities is important in decreasing pain.  Explained to the patient that the purpose of physical therapy is to teach the patient a home exercise program.  These exercises need to be performed every day in order to decrease pain and prevent future occurrences of pain.      LABS:  -I ordered some brief rheumatological labs to start his rheumatological workup of inflammatory arthritis    REFERRALS  -I referred patient for rheumatological consult given inflammatory arthritis diagnosis based on what looks like Ankylosing spondylitis or DISH on his imaging      -MEDICATIONS:    -continue Tylenol over-the-counter as needed as directed  -continue Lyrica as directed.  We could consider going up on this at some point if needed.  -- Patient is not an NSAID candidate given Eliquis  Discussed multiple medication options today with patient. Discussed risks, side effects, and proper use of medications. Patient verbalized understanding.    -INTERVENTIONS:    -I ordered bilateral sacroiliac joint injections today  --We discussed lumbar medial branch blocks/RFA if patient were not to improve with physical therapy. He would like to consider this    -PATIENT EDUCATION:  Total time of 40 minutes, on the day of service, spent with the patient, reviewing the chart, placing orders, and documenting.   -Today we also discussed the pros and cons of the current treatment plan.    -FOLLOW-UP:   Follow-up per routine following  injection    Advised patient to call the [facility] if symptoms worsen, new numbness or weakness develops in the legs, or if they develop new or worsening problems controlling bladder or bowel function.   ______________________________________________________________________    SUBJECTIVE:  Thomas was referred back for conservative treatment of bilateral sacroiliac joint pain by Dr. Samuel.  He states he was told he has inflammatory arthritis as well.  He would like to discuss further options for his severe lower back pain radiating into the bilateral gluteal regions.  It is a 5 out of 10 today, at worst a 10 out of 10 with standing and walking.  It improves with sitting or lying down to a 3 out of 10.  It is not changed since prior visit.  He is not having any new symptoms.  He has not started physical therapy yet.  He has continued to take extra strength Tylenol as needed which helps to some degree.  He is continuing to take Lyrica on a chronic basis.  He is on Xarelto he denies any leg pain or numbness or weakness.  Also endorses left shoulder pain.  He states he and his wife will be going down south after [place] for several months.        Per previous visit  HPI:  Thomas Steve  Is a 78 year old male on xarelto with history of A-fib, DVT, MI, pacemaker, type 2 diabetes, anemia, neuropathy, acid reflux who presents today for new patient evaluation of low back pain.    He endorses lower back pain which began a few years prior to his lumbar surgery. He then underwent a decompressive laminectomy L4-5 and L5-S1 bilateral, with removal of herniated disc on the left at L5-S1 on 6/16/2022. The pain is in both gluteal regions. He describes it as an extreme ache. It is worse with prolonged standing, walking, and carrying objects >10lbs. He has some relief with sitting. The pain today is a 6/10, a 10/10 at worst, and a 3/10 at best. He has fatigue and weakness in both lower extremities. He endorses central lower back  pain with lying down, which is different from his primary juju gluteal region pain. He was hoping his pain would improve with surgery. He feels that at first, it may have. He then had a complicated postop course including right leg swelling which was found to be related to an extensive RLE DVT. He started getting into PT last year postop when he unfortunately sustained a toe injury and could no longer attend PT. The pain related to his toe radiated into his left inner lower leg like a toothache, but this is now improved. His treatment of his toe lasted from Nov to June 2023. He has been seen by Dr Samuel since then for further evaluation and review of a new lumbar MRI postop done in August 2023. He was referred to spine medicine for conservative treatment and for a lumbar CT and juju lower extremity EMG which he just had done today. He has been taking tylenol without relief.  He is on Lyrica on a chronic basis    He has chronic left lateral lower leg numbness which has not changed. He has a history of left leg infection after a vein harvesting in 2011 for his open heart surgery.    He denies any changes in bowel or bladder control.     He is the primary caregiver for his wife, and walks the dogs, does lots of odd jobs, yard work and housework    He has not done any chiropractic care or had any previous spinal injections  He would be interested in pursuing home PT which he has had in the past      -Treatment to Date:     -Medications:  Tylenol  Lyrica    Current Outpatient Medications   Medication    acetaminophen (TYLENOL) 500 MG tablet    atorvastatin (LIPITOR) 20 MG tablet    bumetanide (BUMEX) 1 MG tablet    cholecalciferol 50 MCG (2000 UT) CAPS    coenzyme Q-10 200 MG CAPS capsule    doxycycline hyclate (VIBRA-TABS) 100 MG tablet    doxycycline hyclate (VIBRAMYCIN) 100 MG capsule    ferrous sulfate (FEROSUL) 325 (65 Fe) MG tablet    glimepiride (AMARYL) 4 MG tablet    insulin glargine (LANTUS PEN) 100 UNIT/ML pen     levothyroxine (SYNTHROID/LEVOTHROID) 25 MCG tablet    losartan (COZAAR) 50 MG tablet    melatonin 5 MG tablet    Omega-3 Fatty Acids (FISH OIL) 1200 MG capsule    omeprazole (PRILOSEC) 20 MG DR capsule    POTASSIUM PO    pregabalin (LYRICA) 75 MG capsule    rivaroxaban ANTICOAGULANT (XARELTO) 20 MG TABS tablet    rosuvastatin (CRESTOR) 10 MG tablet    senna-docusate (SENOKOT-S/PERICOLACE) 8.6-50 MG tablet    vitamin C (ASCORBIC ACID) 500 MG tablet    zinc gluconate 50 MG tablet     No current facility-administered medications for this visit.       Allergies   Allergen Reactions    Aspirin Other (See Comments)     Contraindicated due to xarelto use    Bee Pollen Unknown     Scratchy throat    Codeine Nausea and Vomiting    Pollen Extracts [Pollen Extract] Unknown     Scratchy throat    Vicodin [Hydrocodone-Acetaminophen] Nausea and Vomiting       Past Medical History:   Diagnosis Date    Acute sinusitis     Atrial fibrillation (H)     Resolved after medication and CPAP    Back pain     CHF (congestive heart failure) (H)     Coronary artery disease     Diabetes mellitus (H)     Gastroesophageal reflux disease with esophagitis     HTN (hypertension)     Hyperlipemia     Hyperlipidemia     Joint pain     Lipoma of neck     Nocturia     Sciatic leg pain     Shoulder impingement syndrome     BILATERAL    Sleep apnea     uses CPAP    Typical atrial flutter (H) 07/07/2016    Demonstrated on 12-lead EKG July 7, 2016        Patient Active Problem List   Diagnosis    Type 2 diabetes mellitus (H)    Essential hypertension    Coronary atherosclerosis    Ischemic cardiomyopathy    Right bundle branch block    Mixed hyperlipidemia    Atrial fibrillation (H)    Obstructive sleep apnea    Congestive Heart Failure    Typical atrial flutter (H)    Leg pain, bilateral    Leg swelling    Venous insufficiency of both lower extremities    Acquired lymphedema of leg    Claudication of both lower extremities (H24)    Diabetic peripheral  neuropathy associated with type 2 diabetes mellitus (H)    SSS (sick sinus syndrome) (H)    Acute systolic heart failure (H)    Biventricular cardiac pacemaker in situ    Lumbar stenosis with neurogenic claudication    Gastroesophageal reflux disease without esophagitis    Hypothyroidism    Normocytic anemia    Chronic left shoulder pain    Nasal congestion    Lumbar spinal stenosis    Acute deep vein thrombosis (DVT) of femoral vein of right lower extremity (H)    ARF (acute renal failure) (H24)    Anemia    Anemia due to blood loss, acute    Spinal stenosis, lumbar region, without neurogenic claudication    Lumbar herniated disc    Traumatic hematoma of buttock, initial encounter    Anemia, unspecified type    Heart failure with reduced ejection fraction (H)    Diabetic ulcer of toe of right foot associated with diabetes mellitus due to underlying condition, with necrosis of bone (H)       Past Surgical History:   Procedure Laterality Date    CORONARY ARTERY BYPASS      EP BIV PACEMAKER INSERT N/A 06/11/2019    Procedure: EP Biventricular Pacemaker Insertion;  Surgeon: Durga Rajput MD;  Location: [facility];  Service: Cardiology    IMPLANT PACEMAKER      IR FINE NEEDLE ASPIRATION W ULTRASOUND  06/23/2022    LAMINECTOMY LUMBAR TWO LEVELS Bilateral 06/16/2022    Procedure: DECOMPRESSIVE LUMBAR LAMINECTOMY LUMBAR 4-LUMBAR 5 AND LUMBAR 5-SACRAL 1 BILATERAL-LEFT SIDE FIRST-PLUS BILATERAL FORAMINOTOMIES PLUS REMOVAL OF HERNIATED DISC LUMBAR 5-SACRAL 1 LEFT;  Surgeon: Israel Samuel MD;  Location: [facility]    [place] CARDIOVERSION ELECTIVE ARRHYTHMIA EXTERNAL  06/03/2014    Description: Elective Cardioversion External;  Recorded: 06/03/2014;    [facility] CABG, VEIN, SINGLE  01/01/2011    Description: CABG (CABG);  Proc Date: 09/26/2011;  Comments: LIMA^LAD, SVG^OM, SVG^distal RCA       Family History   Problem Relation Age of Onset    Heart Disease Mother     Snoring Father     Heart Disease Father      Hypertension Father     Alzheimer Disease Father     No Known Problems Daughter     No Known Problems Daughter     No Known Problems Daughter     Chronic Obstructive Pulmonary Disease Sister     Hodgkin's lymphoma Brother     No Known Problems Son     Diabetes Sister     Substance Abuse Sister     Chronic Obstructive Pulmonary Disease Brother     Heart Disease Brother     Diabetes Brother     Substance Abuse Brother        Reviewed past medical, surgical, and family history with patient found on new patient intake packet located in EMR Media tab.     SOCIAL HX: Former smoker, limited alcohol use, no recreational drug use      OBJECTIVE:  BP (!) 145/74   Pulse 87     PHYSICAL EXAMINATION:    --CONSTITUTIONAL:  Vital signs as above.  No acute distress.  The patient is well nourished and well groomed.  --PSYCHIATRIC:  Appropriate mood and affect. The patient is awake, alert, oriented to person, place, time and answering questions appropriately with clear speech.    --SKIN:  Skin over the face, bilateral lower extremities, and posterior torso is clean, dry, intact without rashes.    --RESPIRATORY: Normal rhythm and effort. No abnormal accessory muscle breathing patterns noted.   --ABDOMINAL:  Non-distended.    --GROSS MOTOR: Gait is non-antalgic. Easily arises from a seated position.      --LOWER EXTREMITY MOTOR TESTING:  Hip flexion: right 5/5, left 5/5  Hip abduction: right 5/5, left 5/5  Hip adduction: right 5/5, left 5/5   Quads: right 5/5, left 5/5  Hamstrings: right 5/5, left 5/5  Dorsiflexion: right 5/5, left 5/5  Plantar flexion: right 5/5, left 5/5    Great toe MTP extension/EHL: right 5/5, left 5/5    --NEUROLOGICAL:  1/4 patellar and achilles reflexes bilaterally. Sensation to light touch is intact throughout both lower extremities. Babinski is negative. No clonus.    --MUSCULOSKELETAL: Lumbar spine inspection reveals no evidence of deformity. No point tenderness to palpation lumbar spine. No paraspinal  musculature tenderness.     Straight leg raising is negative.    --HIPS: Decreased abduction, otherwise full range of motion bilaterally.  Positive YARELI and positive FADIR bilaterally. Negative hip joint tenderness to palp.    --SACROILIAC JOINT: Distraction maneuver was negative. Gaenslen's Test was positive bilaterally. Thigh thrust was positive bilaterally. Sacroiliac Joint Compression Test was negative. One finger point test was positive bilaterally. Negative SI joint tenderness to palpation bilaterally.      --VASCULAR:  Bilateral lower extremities are warm without any discoloration.  There is no pitting edema of the bilateral lower extremities.    RESULTS:   Prior medical records from [facility] Everywhere were reviewed today.    Imaging: Spine imaging was personally reviewed and interpreted today. The images were shown to the patient and the findings were explained using a spine model.    Narrative & Impression   EXAM: XR LUMBAR SPINE W BENDING COMP G/E 6 VIEWS  LOCATION: [facility]  DATE: 11/22/2023     INDICATION: Sacroiliac joint pain that gets worse with movement.  COMPARISON: 10/31/2023.                                                                      IMPRESSION: Multilevel anterior osteophytic changes. Vertebral body heights are maintained. Mild posterior element degenerative changes. Multilevel mild loss of disc height. No abnormal motion on flexion or extension views. Stable fusion of the   sacroiliac joints.       Narrative & Impression   EXAM: XR SACROILIAC JOINT 1/2 VIEWS  LOCATION: [facility]  DATE: 11/22/2023     INDICATION: Pain.  COMPARISON: None.                                                                      IMPRESSION: Bones are demineralized. There is partial bony bridging across both SI joints which can be seen with a long-standing inflammatory arthritis or DISH. Atherosclerotic vascular calcifications. Posterior  decompressive changes lower lumbar spine.        EXAM: CT PELVIS BONE WO CONTRAST  LOCATION: [facility]  DATE: 11/22/2023     INDICATION: Bilateral sacroiliac joint pain.  COMPARISON: CT from 07/13/2022.  TECHNIQUE: CT scan of the pelvis was performed without IV contrast. Multiplanar reformats were obtained. Dose reduction techniques were used.  CONTRAST: None.     FINDINGS:     PELVIS ORGANS: Negative.     MUSCULOSKELETAL: Postoperative and degenerative changes in the visualized spine.     There are moderate hypertrophic changes and there is also solid bridging bone across both SI joints.     Mild hypertrophic changes of the pubic symphysis. Mild osteoarthrosis of both hips.     There is no evidence of fracture or osteonecrosis.     No gross muscle or tendon pathology. No hematoma, cyst or mass. No edema.                                                                      IMPRESSION:  1.  Moderate hypertrophic changes in both SI joints and there is also solid bridging bone across both SI joints. The findings are similar to 07/13/2022.  2.  Postoperative and degenerative changes in the visualized spine as well as degenerative changes in the pubic symphysis and both hips again noted.  3.  No new findings.        CT Lumbar Spine w/o Contrast    Result Date: 10/31/2023  EXAM: CT LUMBAR SPINE W/O CONTRAST LOCATION: [facility] DATE: 10/31/2023 INDICATION: Bilateral leg pain COMPARISON: MRI scan 08/14/2023. CT scan 04/26/2022. TECHNIQUE: Routine CT Lumbar Spine without IV contrast. Multiplanar reformats. Dose reduction techniques were used. FINDINGS: Limited views of the retroperitoneal structures reveal no gross abnormalities involving the kidneys or psoas muscles. Atherosclerotic calcification throughout the infrarenal abdominal aorta is noted. No evidence of lumbar spine fracture. Limited views of the sacrum reveal no evidence of sacral fracture. Incidentally noted  fusion of the SI joints bilaterally. T12-L1: Normal disc height. No herniation. Normal facets. No spinal canal or neural foraminal stenosis. L1-L2: Normal disc height. No herniation. Normal facets. No spinal canal or neural foraminal stenosis. L2-L3: Anterior/left bridging syndesmophyte. Preservation of disc height. Spinal canal and foramen are patent. L3-L4: Mild disc bulging. Bilateral facet degeneration with thickening of the ligamentum flavum. Mild spinal canal stenosis. Foramen are patent. Overall appearance is similar when compared to MRI scan performed 08/14/2023. L4-L5: Decompressive laminectomy. Grade I anterior spondylolisthesis of L4 on L5. Bilateral facet degeneration. Disc bulging and right-sided facet hypertrophic change results in right-sided L5 lateral recess stenosis. Bilateral foraminal stenosis which was seen on previous MRI scan. L5-S1: Decompressive laminectomy. Loss of disc height. Thickening of the ligamentum flavum which results in narrowing of the thecal sac in the lateral dimension with mild spinal canal stenosis. Subtle encroachment upon the right lateral recess. Bilateral  foraminal stenosis.     IMPRESSION: 1.  Decompressive laminectomy at L4-L5 and L5-S1. 2.  Degenerative change at L4-L5 with grade I anterior spondylolisthesis of L4 on L5. Bilateral facet degeneration. Disc bulging and right-sided facet hypertrophic change results in right-sided L5 lateral recess stenosis. Bilateral foraminal stenosis which was seen on previous MRI scan. 3.  Degenerative change at L5-S1 with loss of disc height. Thickening of the ligamentum flavum which results in narrowing of the thecal sac in the lateral dimension. Mild spinal canal stenosis. Subtle encroachment upon the right lateral recess. Bilateral  foraminal narrowing. 4.  Mild degenerative change at L3-L4 with mild disc bulging. Bilateral facet degeneration with thickening of the ligamentum flavum. Mild spinal canal stenosis. Foramen are patent.  Overall appearance is similar compared to MRI scan of 08/14/2023. 5.  Anterior/left bridging syndesmophyte at the L2-L3 level is incidentally noted. Spinal canal and foramen are patent at this level.          Noreen Lynn FNP-C  [facility]  O. [phone]

## 2023-12-07 NOTE — LETTER
12/7/2023         RE: Thomas Steve  [address] [facility] [address]        Dear Colleague,    Thank you for referring your patient, Thomas Steve, to the [facility]. Please see a copy of my visit note below.    ASSESSMENT: Thomas Steve is a 78 year old male who presents for consultation at the request of PCP Moni Mcclain, who presents today for follow-up patient evaluation of:    -Lumbar radiculopathy, low back pain    Patient referred back to see me by Dr. Samuel who recommends conservative treatment of sacroiliac joint pain.  Some SI maneuvers on exam are positive today.  We reviewed his CT pelvis, XR SI joints, XR lumbar spine which demonstrate moderate hypertrophic changes in both SI joints with solid bridging bone across both SI joints.  Will verify with team that sacroiliac joint injections are possible with partial fusion of SI joints.  We will call him and add SI injections if so.  He will also start a course of physical therapy for SI and back pain.  We also discussed medial branch blocks/RFA for his facet arthropathy, but it is unlikely he has enough time before he goes south to complete the 3 part series for this.  I have ordered some rheumatological labs and placed the rheum referral given his inflammatory arthritis/DISH.  He will continue his chronic Lyrica and Tylenol as directed as needed for pain control.  We will call him regarding lab results and with injection plan.          11/29/2023     2:00 PM   OSWESTRY DISABILITY INDEX   Count 10   Sum 19   Oswestry Score (%) 38 %            Diagnoses and all orders for this visit:  Inflammatory arthritis  -     Anti Nuclear Kassi IgG by IFA with Reflex; Future  -     CRP inflammation; Future  -     Erythrocyte sedimentation rate auto; Future  -     Adult Rheumatology  Referral; Future  Pain  -     Spine  Referral  Spondylosis of lumbar region without myelopathy or radiculopathy  -      Physical Therapy Referral; Future  Sacroiliac pain  -     Physical Therapy Referral; Future      PLAN:  Reviewed spine anatomy and disease process. Discussed diagnosis and treatment options with the patient today. A shared decision making model was used.  The patient's values and choices were respected. The following represents what was discussed and decided upon by the provider and the patient.      -DIAGNOSTIC TESTS:  Images were personally reviewed and interpreted and explained to patient today using a spine model.   -- I did not order any new diagnostic imaging today    -PHYSICAL THERAPY:    -- I referred patient to full course of physical therapy today  Discussed the importance of core strengthening, ROM, stretching exercises with the patient and how each of these entities is important in decreasing pain.  Explained to the patient that the purpose of physical therapy is to teach the patient a home exercise program.  These exercises need to be performed every day in order to decrease pain and prevent future occurrences of pain.      LABS:  -I ordered some brief rheumatological labs to start his rheumatological workup of inflammatory arthritis    REFERRALS  -I referred patient for rheumatological consult given inflammatory arthritis diagnosis based on what looks like Ankylosing spondylitis or DISH on his imaging      -MEDICATIONS:    -continue Tylenol over-the-counter as needed as directed  -continue Lyrica as directed.  We could consider going up on this at some point if needed.  -- Patient is not an NSAID candidate given Eliquis  Discussed multiple medication options today with patient. Discussed risks, side effects, and proper use of medications. Patient verbalized understanding.    -INTERVENTIONS:    -I ordered bilateral sacroiliac joint injections today  --We discussed lumbar medial branch blocks/RFA if patient were not to improve with physical therapy. He would like to consider this    -PATIENT EDUCATION:   Total time of 40 minutes, on the day of service, spent with the patient, reviewing the chart, placing orders, and documenting.   -Today we also discussed the pros and cons of the current treatment plan.    -FOLLOW-UP:   Follow-up per routine following injection    Advised patient to call the [facility] if symptoms worsen, new numbness or weakness develops in the legs, or if they develop new or worsening problems controlling bladder or bowel function.   ______________________________________________________________________    SUBJECTIVE:  Thomas was referred back for conservative treatment of bilateral sacroiliac joint pain by Dr. Samuel.  He states he was told he has inflammatory arthritis as well.  He would like to discuss further options for his severe lower back pain radiating into the bilateral gluteal regions.  It is a 5 out of 10 today, at worst a 10 out of 10 with standing and walking.  It improves with sitting or lying down to a 3 out of 10.  It is not changed since prior visit.  He is not having any new symptoms.  He has not started physical therapy yet.  He has continued to take extra strength Tylenol as needed which helps to some degree.  He is continuing to take Lyrica on a chronic basis.  He is on Xarelto he denies any leg pain or numbness or weakness.  Also endorses left shoulder pain.  He states he and his wife will be going down south after [place] for several months.        Per previous visit  HPI:  Thomas Steve  Is a 78 year old male on xarelto with history of A-fib, DVT, MI, pacemaker, type 2 diabetes, anemia, neuropathy, acid reflux who presents today for new patient evaluation of low back pain.    He endorses lower back pain which began a few years prior to his lumbar surgery. He then underwent a decompressive laminectomy L4-5 and L5-S1 bilateral, with removal of herniated disc on the left at L5-S1 on 6/16/2022. The pain is in both gluteal regions. He describes it as an extreme ache. It is  worse with prolonged standing, walking, and carrying objects >10lbs. He has some relief with sitting. The pain today is a 6/10, a 10/10 at worst, and a 3/10 at best. He has fatigue and weakness in both lower extremities. He endorses central lower back pain with lying down, which is different from his primary juju gluteal region pain. He was hoping his pain would improve with surgery. He feels that at first, it may have. He then had a complicated postop course including right leg swelling which was found to be related to an extensive RLE DVT. He started getting into PT last year postop when he unfortunately sustained a toe injury and could no longer attend PT. The pain related to his toe radiated into his left inner lower leg like a toothache, but this is now improved. His treatment of his toe lasted from Nov to June 2023. He has been seen by Dr Samuel since then for further evaluation and review of a new lumbar MRI postop done in August 2023. He was referred to spine medicine for conservative treatment and for a lumbar CT and juju lower extremity EMG which he just had done today. He has been taking tylenol without relief.  He is on Lyrica on a chronic basis    He has chronic left lateral lower leg numbness which has not changed. He has a history of left leg infection after a vein harvesting in 2011 for his open heart surgery.    He denies any changes in bowel or bladder control.     He is the primary caregiver for his wife, and walks the dogs, does lots of odd jobs, yard work and housework    He has not done any chiropractic care or had any previous spinal injections  He would be interested in pursuing home PT which he has had in the past      -Treatment to Date:     -Medications:  Tylenol  Lyrica    Current Outpatient Medications   Medication     acetaminophen (TYLENOL) 500 MG tablet     atorvastatin (LIPITOR) 20 MG tablet     bumetanide (BUMEX) 1 MG tablet     cholecalciferol 50 MCG (2000 UT) CAPS     coenzyme Q-10  200 MG CAPS capsule     doxycycline hyclate (VIBRA-TABS) 100 MG tablet     doxycycline hyclate (VIBRAMYCIN) 100 MG capsule     ferrous sulfate (FEROSUL) 325 (65 Fe) MG tablet     glimepiride (AMARYL) 4 MG tablet     insulin glargine (LANTUS PEN) 100 UNIT/ML pen     levothyroxine (SYNTHROID/LEVOTHROID) 25 MCG tablet     losartan (COZAAR) 50 MG tablet     melatonin 5 MG tablet     Omega-3 Fatty Acids (FISH OIL) 1200 MG capsule     omeprazole (PRILOSEC) 20 MG DR capsule     POTASSIUM PO     pregabalin (LYRICA) 75 MG capsule     rivaroxaban ANTICOAGULANT (XARELTO) 20 MG TABS tablet     rosuvastatin (CRESTOR) 10 MG tablet     senna-docusate (SENOKOT-S/PERICOLACE) 8.6-50 MG tablet     vitamin C (ASCORBIC ACID) 500 MG tablet     zinc gluconate 50 MG tablet     No current facility-administered medications for this visit.       Allergies   Allergen Reactions     Aspirin Other (See Comments)     Contraindicated due to xarelto use     Bee Pollen Unknown     Scratchy throat     Codeine Nausea and Vomiting     Pollen Extracts [Pollen Extract] Unknown     Scratchy throat     Vicodin [Hydrocodone-Acetaminophen] Nausea and Vomiting       Past Medical History:   Diagnosis Date     Acute sinusitis      Atrial fibrillation (H)     Resolved after medication and CPAP     Back pain      CHF (congestive heart failure) (H)      Coronary artery disease      Diabetes mellitus (H)      Gastroesophageal reflux disease with esophagitis      HTN (hypertension)      Hyperlipemia      Hyperlipidemia      Joint pain      Lipoma of neck      Nocturia      Sciatic leg pain      Shoulder impingement syndrome     BILATERAL     Sleep apnea     uses CPAP     Typical atrial flutter (H) 07/07/2016    Demonstrated on 12-lead EKG July 7, 2016        Patient Active Problem List   Diagnosis     Type 2 diabetes mellitus (H)     Essential hypertension     Coronary atherosclerosis     Ischemic cardiomyopathy     Right bundle branch block     Mixed hyperlipidemia      Atrial fibrillation (H)     Obstructive sleep apnea     Congestive Heart Failure     Typical atrial flutter (H)     Leg pain, bilateral     Leg swelling     Venous insufficiency of both lower extremities     Acquired lymphedema of leg     Claudication of both lower extremities (H24)     Diabetic peripheral neuropathy associated with type 2 diabetes mellitus (H)     SSS (sick sinus syndrome) (H)     Acute systolic heart failure (H)     Biventricular cardiac pacemaker in situ     Lumbar stenosis with neurogenic claudication     Gastroesophageal reflux disease without esophagitis     Hypothyroidism     Normocytic anemia     Chronic left shoulder pain     Nasal congestion     Lumbar spinal stenosis     Acute deep vein thrombosis (DVT) of femoral vein of right lower extremity (H)     ARF (acute renal failure) (H24)     Anemia     Anemia due to blood loss, acute     Spinal stenosis, lumbar region, without neurogenic claudication     Lumbar herniated disc     Traumatic hematoma of buttock, initial encounter     Anemia, unspecified type     Heart failure with reduced ejection fraction (H)     Diabetic ulcer of toe of right foot associated with diabetes mellitus due to underlying condition, with necrosis of bone (H)       Past Surgical History:   Procedure Laterality Date     CORONARY ARTERY BYPASS       EP BIV PACEMAKER INSERT N/A 06/11/2019    Procedure: EP Biventricular Pacemaker Insertion;  Surgeon: Durga Rajput MD;  Location: [facility];  Service: Cardiology     IMPLANT PACEMAKER       IR FINE NEEDLE ASPIRATION W ULTRASOUND  06/23/2022     LAMINECTOMY LUMBAR TWO LEVELS Bilateral 06/16/2022    Procedure: DECOMPRESSIVE LUMBAR LAMINECTOMY LUMBAR 4-LUMBAR 5 AND LUMBAR 5-SACRAL 1 BILATERAL-LEFT SIDE FIRST-PLUS BILATERAL FORAMINOTOMIES PLUS REMOVAL OF HERNIATED DISC LUMBAR 5-SACRAL 1 LEFT;  Surgeon: Israel Samuel MD;  Location: [facility] OR     MT CARDIOVERSION ELECTIVE ARRHYTHMIA EXTERNAL   06/03/2014    Description: Elective Cardioversion External;  Recorded: 06/03/2014;     [facility] CABG, VEIN, SINGLE  01/01/2011    Description: CABG (CABG);  Proc Date: 09/26/2011;  Comments: LIMA^LAD, SVG^OM, SVG^distal RCA       Family History   Problem Relation Age of Onset     Heart Disease Mother      Snoring Father      Heart Disease Father      Hypertension Father      Alzheimer Disease Father      No Known Problems Daughter      No Known Problems Daughter      No Known Problems Daughter      Chronic Obstructive Pulmonary Disease Sister      Hodgkin's lymphoma Brother      No Known Problems Son      Diabetes Sister      Substance Abuse Sister      Chronic Obstructive Pulmonary Disease Brother      Heart Disease Brother      Diabetes Brother      Substance Abuse Brother        Reviewed past medical, surgical, and family history with patient found on new patient intake packet located in EMR Media tab.     SOCIAL HX: Former smoker, limited alcohol use, no recreational drug use      OBJECTIVE:  BP (!) 145/74   Pulse 87     PHYSICAL EXAMINATION:    --CONSTITUTIONAL:  Vital signs as above.  No acute distress.  The patient is well nourished and well groomed.  --PSYCHIATRIC:  Appropriate mood and affect. The patient is awake, alert, oriented to person, place, time and answering questions appropriately with clear speech.    --SKIN:  Skin over the face, bilateral lower extremities, and posterior torso is clean, dry, intact without rashes.    --RESPIRATORY: Normal rhythm and effort. No abnormal accessory muscle breathing patterns noted.   --ABDOMINAL:  Non-distended.    --GROSS MOTOR: Gait is non-antalgic. Easily arises from a seated position.      --LOWER EXTREMITY MOTOR TESTING:  Hip flexion: right 5/5, left 5/5  Hip abduction: right 5/5, left 5/5  Hip adduction: right 5/5, left 5/5   Quads: right 5/5, left 5/5  Hamstrings: right 5/5, left 5/5  Dorsiflexion: right 5/5, left 5/5  Plantar flexion: right 5/5, left 5/5     Great toe MTP extension/EHL: right 5/5, left 5/5    --NEUROLOGICAL:  1/4 patellar and achilles reflexes bilaterally. Sensation to light touch is intact throughout both lower extremities. Babinski is negative. No clonus.    --MUSCULOSKELETAL: Lumbar spine inspection reveals no evidence of deformity. No point tenderness to palpation lumbar spine. No paraspinal musculature tenderness.     Straight leg raising is negative.    --HIPS: Decreased abduction, otherwise full range of motion bilaterally.  Positive YARELI and positive FADIR bilaterally. Negative hip joint tenderness to palp.    --SACROILIAC JOINT: Distraction maneuver was negative. Gaenslen's Test was positive bilaterally. Thigh thrust was positive bilaterally. Sacroiliac Joint Compression Test was negative. One finger point test was positive bilaterally. Negative SI joint tenderness to palpation bilaterally.      --VASCULAR:  Bilateral lower extremities are warm without any discoloration.  There is no pitting edema of the bilateral lower extremities.    RESULTS:   Prior medical records from [facility] and Care Everywhere were reviewed today.    Imaging: Spine imaging was personally reviewed and interpreted today. The images were shown to the patient and the findings were explained using a spine model.    Narrative & Impression   EXAM: XR LUMBAR SPINE W BENDING COMP G/E 6 VIEWS  LOCATION: [facility]  DATE: 11/22/2023     INDICATION: Sacroiliac joint pain that gets worse with movement.  COMPARISON: 10/31/2023.                                                                      IMPRESSION: Multilevel anterior osteophytic changes. Vertebral body heights are maintained. Mild posterior element degenerative changes. Multilevel mild loss of disc height. No abnormal motion on flexion or extension views. Stable fusion of the   sacroiliac joints.       Narrative & Impression   EXAM: XR SACROILIAC JOINT 1/2 VIEWS  LOCATION: [facility]  DATE: 11/22/2023     INDICATION: Pain.  COMPARISON: None.                                                                      IMPRESSION: Bones are demineralized. There is partial bony bridging across both SI joints which can be seen with a long-standing inflammatory arthritis or DISH. Atherosclerotic vascular calcifications. Posterior decompressive changes lower lumbar spine.        EXAM: CT PELVIS BONE WO CONTRAST  LOCATION: [facility]  DATE: 11/22/2023     INDICATION: Bilateral sacroiliac joint pain.  COMPARISON: CT from 07/13/2022.  TECHNIQUE: CT scan of the pelvis was performed without IV contrast. Multiplanar reformats were obtained. Dose reduction techniques were used.  CONTRAST: None.     FINDINGS:     PELVIS ORGANS: Negative.     MUSCULOSKELETAL: Postoperative and degenerative changes in the visualized spine.     There are moderate hypertrophic changes and there is also solid bridging bone across both SI joints.     Mild hypertrophic changes of the pubic symphysis. Mild osteoarthrosis of both hips.     There is no evidence of fracture or osteonecrosis.     No gross muscle or tendon pathology. No hematoma, cyst or mass. No edema.                                                                      IMPRESSION:  1.  Moderate hypertrophic changes in both SI joints and there is also solid bridging bone across both SI joints. The findings are similar to 07/13/2022.  2.  Postoperative and degenerative changes in the visualized spine as well as degenerative changes in the pubic symphysis and both hips again noted.  3.  No new findings.        CT Lumbar Spine w/o Contrast    Result Date: 10/31/2023  EXAM: CT LUMBAR SPINE W/O CONTRAST LOCATION: [facility] DATE: 10/31/2023 INDICATION: Bilateral leg pain COMPARISON: MRI scan 08/14/2023. CT scan 04/26/2022. TECHNIQUE: Routine CT Lumbar Spine without IV contrast. Multiplanar reformats. Dose  reduction techniques were used. FINDINGS: Limited views of the retroperitoneal structures reveal no gross abnormalities involving the kidneys or psoas muscles. Atherosclerotic calcification throughout the infrarenal abdominal aorta is noted. No evidence of lumbar spine fracture. Limited views of the sacrum reveal no evidence of sacral fracture. Incidentally noted fusion of the SI joints bilaterally. T12-L1: Normal disc height. No herniation. Normal facets. No spinal canal or neural foraminal stenosis. L1-L2: Normal disc height. No herniation. Normal facets. No spinal canal or neural foraminal stenosis. L2-L3: Anterior/left bridging syndesmophyte. Preservation of disc height. Spinal canal and foramen are patent. L3-L4: Mild disc bulging. Bilateral facet degeneration with thickening of the ligamentum flavum. Mild spinal canal stenosis. Foramen are patent. Overall appearance is similar when compared to MRI scan performed 08/14/2023. L4-L5: Decompressive laminectomy. Grade I anterior spondylolisthesis of L4 on L5. Bilateral facet degeneration. Disc bulging and right-sided facet hypertrophic change results in right-sided L5 lateral recess stenosis. Bilateral foraminal stenosis which was seen on previous MRI scan. L5-S1: Decompressive laminectomy. Loss of disc height. Thickening of the ligamentum flavum which results in narrowing of the thecal sac in the lateral dimension with mild spinal canal stenosis. Subtle encroachment upon the right lateral recess. Bilateral  foraminal stenosis.     IMPRESSION: 1.  Decompressive laminectomy at L4-L5 and L5-S1. 2.  Degenerative change at L4-L5 with grade I anterior spondylolisthesis of L4 on L5. Bilateral facet degeneration. Disc bulging and right-sided facet hypertrophic change results in right-sided L5 lateral recess stenosis. Bilateral foraminal stenosis which was seen on previous MRI scan. 3.  Degenerative change at L5-S1 with loss of disc height. Thickening of the ligamentum  flavum which results in narrowing of the thecal sac in the lateral dimension. Mild spinal canal stenosis. Subtle encroachment upon the right lateral recess. Bilateral  foraminal narrowing. 4.  Mild degenerative change at L3-L4 with mild disc bulging. Bilateral facet degeneration with thickening of the ligamentum flavum. Mild spinal canal stenosis. Foramen are patent. Overall appearance is similar compared to MRI scan of 08/14/2023. 5.  Anterior/left bridging syndesmophyte at the L2-L3 level is incidentally noted. Spinal canal and foramen are patent at this level.          Noreen CALDWELLP-C  [facility]  O. [phone]             Again, thank you for allowing me to participate in the care of your patient.        Sincerely,        VALERIE Dawson CNP

## 2023-12-08 LAB — ANA SER QL IF: NEGATIVE

## 2024-01-10 ENCOUNTER — TELEPHONE (OUTPATIENT)
Dept: PHYSICAL MEDICINE AND REHAB | Facility: CLINIC | Age: 79
End: 2024-01-10
Payer: MEDICARE

## 2024-01-10 NOTE — TELEPHONE ENCOUNTER
Phone call to patient to review results and provider's recommendations. Results given and explained. Explained that [facility] states injections can be pursued upon return to [place]. Patient does report they haven't arrived south yet due to some other health issues and an issue with their van. He will call back when they do return. Appreciative of the call today.

## 2024-01-10 NOTE — TELEPHONE ENCOUNTER
----- Message from VALERIE Shaffer CNP sent at 1/10/2024  2:56 PM CST -----  Please let Thomas know there was some difficulty with results follow up given unexpected absence in December. If he has not heard yet: Rheumatological labs are negative. We can pursue SI injections or MBB/RFA when he returns from his time [place]

## 2024-01-28 ENCOUNTER — HEALTH MAINTENANCE LETTER (OUTPATIENT)
Age: 79
End: 2024-01-28

## 2024-04-03 PROBLEM — I50.20 HEART FAILURE WITH REDUCED EJECTION FRACTION (H): Status: RESOLVED | Noted: 2022-07-28 | Resolved: 2024-04-03

## 2024-04-03 PROBLEM — R09.81 NASAL CONGESTION: Status: RESOLVED | Noted: 2022-06-16 | Resolved: 2024-04-03

## 2024-04-03 PROBLEM — D64.9 ANEMIA: Status: RESOLVED | Noted: 2022-07-14 | Resolved: 2024-04-03

## 2024-04-03 PROBLEM — D64.9 ANEMIA, UNSPECIFIED TYPE: Status: RESOLVED | Noted: 2022-07-15 | Resolved: 2024-04-03

## 2024-04-05 NOTE — PROGRESS NOTES
Rheumatology Clinic Visit  [facility]  MAT Turcios     Thomas Steve MRN# [id number]   YOB: 1945 Age: 79 year old   Date of Visit: 4/14/2024  Primary care provider: Moni Mcclain          Assessment and Plan:     1.  Ankylosing spondylitis  2.  High-risk medication use    Patient presents today for an initial evaluation.  He has been working with a spine provider regarding his low back pain.  Upon further imaging studies he was found to have anterior/left bridging syndesmophytes at the L2-L3 level as well as bony bridging across both of his SI joints.  We discussed to the diagnosis of ankylosing spondylitis/sacroiliitis.  We did however review that his symptoms are more suggestive of the stenosis and degenerative changes that he has in the spine given that he has more pain with standing and walking and rest tends to make things better.  He does not have any swelling over his peripheral joints.  He does have significant infection history as well.  Reviewed with the patient given the infection history that he has would not recommend initiating treatment with biologic such as Humira.  Although this may be an option in the future we can initially try conventional DMARD.  Would recommend trying sulfasalazine.  Will keep it at a very low dose.  Will titrate to 1 tablet twice a day.  Will check labs today and then again in 1 month.  Follow-up with me in 3 months, sooner if needed.    Plan:     Schedule follow-up with Jo-Ann Griggs PA-C in 3 months.   Labs: CBC, creatinine, Albumin, AST, ALT today and in 1 month  Medication recommendations:   Start Sulfasalazine 500mg: Take 1 tablet daily for 1 week, then take 1 tablet twice daily Take this medication with food.  # Sulfasalazine Risks and Benefits: The risks and benefits of sulfasalazine were discussed in detail and the patient verbalized understanding.  The risks discussed include, but are not limited to, the risk for  hypersensitivity, anaphylaxis, anaphylactoid reactions, infections, bone marrow suppression,  hepatotoxicity, nausea, vomiting, and GI upset.  Oligospermia may occur in males.  I encouraged reviewing the package insert and asking any questions about the medication.      Jo-Ann Griggs, [facility]  Rheumatology         History of Present Illness:   Thomas Steve presents for evaluation of back pain.      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 He had back surgery in 2022. It helped with some of the pain that he had. He states that he has pain in his buttock. He has pain with standing and walking. He feels better with sitting and resting. The pain does not wake him up at night. He states that he has pain in his left leg. He states that since 12/2022 he has had issues. He states that when getting into his vehicle. He has to lift it with his hands. When he lays down he need a pillow under the leg due to the pressure of it trying settle. He does get eczema. No swelling in his joints. He tends to feel worse after walking his dogs. He has pain in the buttocks with lifting his dogs.     He has a history of a significant infection where his vein was grafted for his heart. He has also had cellulitis in the left leg recently. History of a bone infection as well from an injury to his toe.          Review of Systems:     Constitutional: negative  Skin: negative  Eyes: negative  Ears/Nose/Throat: negative  Respiratory: No shortness of breath, dyspnea on exertion, cough, or hemoptysis  Cardiovascular: negative  Gastrointestinal: negative  Genitourinary: negative  Musculoskeletal: as above  Neurologic: negative  Psychiatric: negative  Hematologic/Lymphatic/Immunologic: negative  Endocrine: negative         Active Problem List:     Patient Active Problem List    Diagnosis Date Noted    Diabetic ulcer of toe of right foot associated with diabetes mellitus due to underlying condition, with necrosis of bone  (H) 06/01/2023     Priority: Medium    Spinal stenosis, lumbar region, without neurogenic claudication 07/15/2022     Priority: Medium    Lumbar herniated disc 07/15/2022     Priority: Medium    Traumatic hematoma of buttock, initial encounter 07/15/2022     Priority: Medium    Anemia due to blood loss, acute 07/14/2022     Priority: Medium    Atrial fibrillation (H) 07/11/2022     Priority: Medium     Dx in 2013  CHADS -Vasc = 5 (age, HTN, ASCVD, DM, CHF)  CV Aug 2013 and repeat on Amio Nov 2013 (early recurrence < 48hrs)  Spontaneous conversion to NSR in Apr 2014 (by history)  Amiodarone stopped Oct 2014      Formatting of this note might be different from the original.  Dx in 2013  CHADS -Vasc = 5 (age, HTN, ASCVD, DM, CHF)  CV Aug 2013 and repeat on Amio Nov 2013 (early recurrence < 48hrs)  Spontaneous conversion to NSR in Apr 2014 (by history)  Amiodarone stopped Oct 2014      ARF (acute renal failure) (H24) 07/07/2022     Priority: Medium    Acute deep vein thrombosis (DVT) of femoral vein of right lower extremity (H) 06/29/2022     Priority: Medium    Lumbar spinal stenosis 06/17/2022     Priority: Medium    Lumbar stenosis with neurogenic claudication 06/16/2022     Priority: Medium    Gastroesophageal reflux disease without esophagitis 06/16/2022     Priority: Medium    Hypothyroidism 06/16/2022     Priority: Medium    Normocytic anemia 06/16/2022     Priority: Medium    Chronic left shoulder pain 06/16/2022     Priority: Medium    Biventricular cardiac pacemaker in situ 06/18/2019     Priority: Medium     Medtronic [id number] Percepta Quad CRT-P  DOI: 6/11/19      Formatting of this note might be different from the original.  Medtronic [id number] Percepta Quad CRT-P  DOI: 6/11/19      Essential hypertension      Priority: Medium     Created by Conversion  Replacement Utility updated for latest IMO load      Formatting of this note might be different from the original.  Created by Conversion    Replacement Utility  updated for latest IMO load      Coronary atherosclerosis      Priority: Medium     Created by Conversion  Replacement Utility updated for latest IMO load      Formatting of this note might be different from the original.  Created by Conversion    Replacement Utility updated for latest IMO load      Ischemic cardiomyopathy      Priority: Medium     Created by Conversion      Formatting of this note might be different from the original.  Created by Conversion      Mixed hyperlipidemia      Priority: Medium     Created by Conversion      Formatting of this note might be different from the original.  Created by Conversion      Obstructive sleep apnea      Priority: Medium     Severe and using BIPAP routinely      Formatting of this note might be different from the original.  Severe and using BIPAP routinely      Acute systolic heart failure (H)      Priority: Medium    SSS (sick sinus syndrome) (H) 06/07/2019     Priority: Medium     Added automatically from request for surgery [id number]      Formatting of this note might be different from the original.  Added automatically from request for surgery [id number]      Congestive Heart Failure      Priority: Medium     LVEF 55% by echo Sept 2013  Replacement Utility updated for latest IMO load      Formatting of this note might be different from the original.  LVEF 55% by echo Sept 2013  Replacement Utility updated for latest IMO load      Venous insufficiency of both lower extremities 07/25/2016     Priority: Medium    Acquired lymphedema of leg 07/25/2016     Priority: Medium    Claudication of both lower extremities (H24) 07/25/2016     Priority: Medium    Diabetic peripheral neuropathy associated with type 2 diabetes mellitus (H) 07/25/2016     Priority: Medium    Typical atrial flutter (H) 07/07/2016     Priority: Medium     Typical flutter on 12-lead EKG July 7, 2016      Formatting of this note might be different from the original.  Typical flutter on 12-lead EKG July 7,  2016      Right bundle branch block      Priority: Medium     Created by Conversion      Formatting of this note might be different from the original.  Created by Conversion      Type 2 diabetes mellitus (H)      Priority: Medium     Created by Conversion      Formatting of this note might be different from the original.  Created by Conversion              Past Medical History:     Past Medical History:   Diagnosis Date    Acute sinusitis     Atrial fibrillation (H)     Resolved after medication and CPAP    Back pain     CHF (congestive heart failure) (H)     Coronary artery disease     Diabetes mellitus (H)     Gastroesophageal reflux disease with esophagitis     HTN (hypertension)     Hyperlipemia     Hyperlipidemia     Joint pain     Lipoma of neck     Nocturia     Sciatic leg pain     Shoulder impingement syndrome     BILATERAL    Sleep apnea     uses CPAP    Typical atrial flutter (H) 07/07/2016    Demonstrated on 12-lead EKG July 7, 2016     Past Surgical History:   Procedure Laterality Date    CORONARY ARTERY BYPASS      EP BIV PACEMAKER INSERT N/A 06/11/2019    Procedure: EP Biventricular Pacemaker Insertion;  Surgeon: Durga Rajput MD;  Location: [facility];  Service: Cardiology    IMPLANT PACEMAKER      IR FINE NEEDLE ASPIRATION W ULTRASOUND  06/23/2022    LAMINECTOMY LUMBAR TWO LEVELS Bilateral 06/16/2022    Procedure: DECOMPRESSIVE LUMBAR LAMINECTOMY LUMBAR 4-LUMBAR 5 AND LUMBAR 5-SACRAL 1 BILATERAL-LEFT SIDE FIRST-PLUS BILATERAL FORAMINOTOMIES PLUS REMOVAL OF HERNIATED DISC LUMBAR 5-SACRAL 1 LEFT;  Surgeon: Israel Samuel MD;  Location: [facility] [place] OR    MO CARDIOVERSION ELECTIVE ARRHYTHMIA EXTERNAL  06/03/2014    Description: Elective Cardioversion External;  Recorded: 06/03/2014;    [facility] CABG, VEIN, SINGLE  01/01/2011    Description: CABG (CABG);  Proc Date: 09/26/2011;  Comments: LIMA^LAD, SVG^OM, SVG^distal RCA            Social History:     Social History     Socioeconomic  History    Marital status:      Spouse name: Not on file    Number of children: Not on file    Years of education: Not on file    Highest education level: Not on file   Occupational History    Not on file   Tobacco Use    Smoking status: Former     Current packs/day: 0.00     Average packs/day: 1.5 packs/day for 23.0 years (34.5 ttl pk-yrs)     Types: Cigarettes     Start date: 1963     Quit date: 1986     Years since quittin.3    Smokeless tobacco: Never   Substance and Sexual Activity    Alcohol use: Yes     Alcohol/week: 1.0 standard drink of alcohol     Comment: one beer per day    Drug use: No    Sexual activity: Never   Other Topics Concern    Not on file   Social History Narrative    .  3 children.  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     Social Determinants of Health     Financial Resource Strain: Not on File (2023)    Received from [facility]     Financial Resource Strain     Financial Resource Strain: 0   Food Insecurity: Not on File (2023)    Received from [facility]     Food Insecurity     Food: 0   Transportation Needs: Not on File (2023)    Received from [facility]     Transportation Needs     Transportation: 0   Physical Activity: Not on File (2023)    Received from [facility]     Physical Activity     Physical Activity: 0   Stress: Not on File (2023)    Received from [facility]     Stress     Stress: 0   Social Connections: Not on File (2023)    Received from [facility]     Social Connections     Social Connections and Isolation: 0   Interpersonal Safety: Not on file   Housing Stability: Not on File (2023)    Received from [facility]     Housing Stability     Housin          Family History:     Family History   Problem Relation Age of Onset    Heart Disease Mother     Snoring Father     Heart Disease Father     Hypertension Father     Alzheimer Disease Father     No Known Problems Daughter     No  Known Problems Daughter     No Known Problems Daughter     Chronic Obstructive Pulmonary Disease Sister     Hodgkin's lymphoma Brother     No Known Problems Son     Diabetes Sister     Substance Abuse Sister     Chronic Obstructive Pulmonary Disease Brother     Heart Disease Brother     Diabetes Brother     Substance Abuse Brother             Allergies:     Allergies   Allergen Reactions    Aspirin Other (See Comments)     Contraindicated due to xarelto use    Bee Pollen Unknown     Scratchy throat    Codeine Nausea and Vomiting    Pollen Extracts [Pollen Extract] Unknown     Scratchy throat    Vicodin [Hydrocodone-Acetaminophen] Nausea and Vomiting            Medications:     Current Outpatient Medications   Medication Sig Dispense Refill    acetaminophen (TYLENOL) 500 MG tablet Take 1,000-1,500 mg by mouth every 6 hours as needed for mild pain      bumetanide (BUMEX) 1 MG tablet Take 2 tablets (2 mg) by mouth daily 180 tablet 3    cholecalciferol 50 MCG (2000 UT) CAPS Take 2 capsules by mouth daily      coenzyme Q-10 200 MG CAPS capsule Take 200 mg by mouth daily      ferrous sulfate (FEROSUL) 325 (65 Fe) MG tablet Take 1 tablet by mouth 3 times daily (with meals)      glimepiride (AMARYL) 4 MG tablet Take 4 mg by mouth every morning (before breakfast)      insulin glargine (LANTUS PEN) 100 UNIT/ML pen Inject 20 Units Subcutaneous At Bedtime (Patient taking differently: Inject 30 Units Subcutaneous at bedtime) 15 mL     levothyroxine (SYNTHROID/LEVOTHROID) 25 MCG tablet Take 25 mcg by mouth daily      losartan (COZAAR) 50 MG tablet TAKE 1 TABLET DAILY 90 tablet 2    melatonin 5 MG tablet Take 10 mg by mouth At Bedtime      Omega-3 Fatty Acids (FISH OIL) 1200 MG capsule Take 1,200 mg by mouth daily      omeprazole (PRILOSEC) 20 MG DR capsule Take 1 capsule by mouth 2 times daily Take one morning and one at night time      POTASSIUM PO Take by mouth daily      pregabalin (LYRICA) 75 MG capsule Take 2 capsule by  "mouth once daily.      rivaroxaban ANTICOAGULANT (XARELTO) 20 MG TABS tablet Take 1 tablet (20 mg) by mouth At Bedtime 90 tablet 3    rosuvastatin (CRESTOR) 10 MG tablet Take 10 mg by mouth At Bedtime      senna-docusate (SENOKOT-S/PERICOLACE) 8.6-50 MG tablet Take 1 tablet by mouth 2 times daily      sulfaSALAzine (AZULFIDINE) 500 MG tablet Take 1 tablet daily for 1 week, then take 1 tablet twice daily. Take this medication with food. 60 tablet 2    vitamin C (ASCORBIC ACID) 500 MG tablet Take 500 mg by mouth 3 times daily (with meals) Take with iron supplement      zinc gluconate 50 MG tablet Take 1 tablet (50 mg) by mouth daily              Physical Exam:   Blood pressure 139/78, pulse 82, temperature 97.1  F (36.2  C), temperature source Tympanic, height 1.765 m (5' 9.5\"), weight 95.1 kg (209 lb 9.6 oz), SpO2 99%.  Wt Readings from Last 6 Encounters:   04/12/24 95.1 kg (209 lb 9.6 oz)   10/31/23 95.3 kg (210 lb)   06/27/23 94.8 kg (209 lb)   06/13/23 93 kg (205 lb)   06/01/23 93 kg (205 lb)   05/30/23 93.4 kg (205 lb 12.8 oz)     Constitutional: well-developed, appearing stated age; cooperative  Eyes: nl conjunctiva, sclera  ENT: nl external ears, nose, hearing, lips,   Neck: no mass or thyroid enlargement  Resp: No shortness of breath with normal conversation  Lymph: no cervical, supraclavicular or epitrochlear nodes  MS:  No active synovitis or altered joint anatomy. Full joint ROM. Normal  strength. No dactylitis,  tenosynovitis, enthesopathy.   Skin: no nail pitting, alopecia, rash, nodules or lesions.   Psych: nl judgement, orientation, memory, affect.           Data:   Imaging:  Xray lumbar spine 11/22/2023  IMPRESSION: Multilevel anterior osteophytic changes. Vertebral body heights are maintained. Mild posterior element degenerative changes. Multilevel mild loss of disc height. No abnormal motion on flexion or extension views. Stable fusion of the   sacroiliac joints.    Xray Si joint " 11/22/2023  IMPRESSION: Bones are demineralized. There is partial bony bridging across both SI joints which can be seen with a long-standing inflammatory arthritis or DISH. Atherosclerotic vascular calcifications. Posterior decompressive changes lower lumbar spine.     CT pelvis 11/22/2023  IMPRESSION:  1.  Moderate hypertrophic changes in both SI joints and there is also solid bridging bone across both SI joints. The findings are similar to 07/13/2022.  2.  Postoperative and degenerative changes in the visualized spine as well as degenerative changes in the pubic symphysis and both hips again noted.  3.  No new findings.    CT lumbar spine 10/31/2023  IMPRESSION:  1.  Decompressive laminectomy at L4-L5 and L5-S1.     2.  Degenerative change at L4-L5 with grade I anterior spondylolisthesis of L4 on L5. Bilateral facet degeneration. Disc bulging and right-sided facet hypertrophic change results in right-sided L5 lateral recess stenosis. Bilateral foraminal stenosis   which was seen on previous MRI scan.     3.  Degenerative change at L5-S1 with loss of disc height. Thickening of the ligamentum flavum which results in narrowing of the thecal sac in the lateral dimension. Mild spinal canal stenosis. Subtle encroachment upon the right lateral recess. Bilateral   foraminal narrowing.     4.  Mild degenerative change at L3-L4 with mild disc bulging. Bilateral facet degeneration with thickening of the ligamentum flavum. Mild spinal canal stenosis. Foramen are patent. Overall appearance is similar compared to MRI scan of 08/14/2023.     5.  Anterior/left bridging syndesmophyte at the L2-L3 level is incidentally noted. Spinal canal and foramen are patent at this level.    Laboratory:  12/7/2023  CRP 3.40  Sed rate 27  JOSE negative

## 2024-04-12 ENCOUNTER — OFFICE VISIT (OUTPATIENT)
Dept: RHEUMATOLOGY | Facility: CLINIC | Age: 79
End: 2024-04-12
Payer: MEDICARE

## 2024-04-12 ENCOUNTER — LAB REQUISITION (OUTPATIENT)
Dept: LAB | Facility: CLINIC | Age: 79
End: 2024-04-12
Payer: MEDICARE

## 2024-04-12 ENCOUNTER — TRANSFERRED RECORDS (OUTPATIENT)
Dept: HEALTH INFORMATION MANAGEMENT | Facility: CLINIC | Age: 79
End: 2024-04-12

## 2024-04-12 VITALS
WEIGHT: 209.6 LBS | HEART RATE: 82 BPM | TEMPERATURE: 97.1 F | OXYGEN SATURATION: 99 % | BODY MASS INDEX: 30.01 KG/M2 | SYSTOLIC BLOOD PRESSURE: 139 MMHG | HEIGHT: 70 IN | DIASTOLIC BLOOD PRESSURE: 78 MMHG

## 2024-04-12 DIAGNOSIS — Z01.812 ENCOUNTER FOR PREPROCEDURAL LABORATORY EXAMINATION: ICD-10-CM

## 2024-04-12 DIAGNOSIS — L97.514 DIABETIC ULCER OF TOE OF RIGHT FOOT ASSOCIATED WITH DIABETES MELLITUS DUE TO UNDERLYING CONDITION, WITH NECROSIS OF BONE (H): ICD-10-CM

## 2024-04-12 DIAGNOSIS — Z79.899 HIGH RISK MEDICATION USE: ICD-10-CM

## 2024-04-12 DIAGNOSIS — M45.7 ANKYLOSING SPONDYLITIS OF LUMBOSACRAL REGION (H): Primary | ICD-10-CM

## 2024-04-12 DIAGNOSIS — E08.621 DIABETIC ULCER OF TOE OF RIGHT FOOT ASSOCIATED WITH DIABETES MELLITUS DUE TO UNDERLYING CONDITION, WITH NECROSIS OF BONE (H): ICD-10-CM

## 2024-04-12 LAB
ALBUMIN SERPL BCG-MCNC: 4.6 G/DL (ref 3.5–5.2)
ALT SERPL W P-5'-P-CCNC: 30 U/L (ref 0–70)
AST SERPL W P-5'-P-CCNC: 32 U/L (ref 0–45)
CREAT SERPL-MCNC: 1 MG/DL (ref 0.67–1.17)
EGFRCR SERPLBLD CKD-EPI 2021: 77 ML/MIN/1.73M2
ERYTHROCYTE [DISTWIDTH] IN BLOOD BY AUTOMATED COUNT: 14.3 % (ref 10–15)
HBA1C MFR BLD: 9.3 % (ref 0–5.6)
HCT VFR BLD AUTO: 37.8 % (ref 40–53)
HGB BLD-MCNC: 11.9 G/DL (ref 13.3–17.7)
MCH RBC QN AUTO: 28.8 PG (ref 26.5–33)
MCHC RBC AUTO-ENTMCNC: 31.5 G/DL (ref 31.5–36.5)
MCV RBC AUTO: 92 FL (ref 78–100)
PLATELET # BLD AUTO: 176 10E3/UL (ref 150–450)
RBC # BLD AUTO: 4.13 10E6/UL (ref 4.4–5.9)
WBC # BLD AUTO: 7.9 10E3/UL (ref 4–11)

## 2024-04-12 PROCEDURE — 36415 COLL VENOUS BLD VENIPUNCTURE: CPT | Performed by: PHYSICIAN ASSISTANT

## 2024-04-12 PROCEDURE — 84450 TRANSFERASE (AST) (SGOT): CPT | Performed by: PHYSICIAN ASSISTANT

## 2024-04-12 PROCEDURE — 99204 OFFICE O/P NEW MOD 45 MIN: CPT | Performed by: PHYSICIAN ASSISTANT

## 2024-04-12 PROCEDURE — 82565 ASSAY OF CREATININE: CPT | Performed by: PHYSICIAN ASSISTANT

## 2024-04-12 PROCEDURE — 87081 CULTURE SCREEN ONLY: CPT | Mod: ORL | Performed by: PHYSICIAN ASSISTANT

## 2024-04-12 PROCEDURE — 83036 HEMOGLOBIN GLYCOSYLATED A1C: CPT | Performed by: PHYSICIAN ASSISTANT

## 2024-04-12 PROCEDURE — 84460 ALANINE AMINO (ALT) (SGPT): CPT | Performed by: PHYSICIAN ASSISTANT

## 2024-04-12 PROCEDURE — 82040 ASSAY OF SERUM ALBUMIN: CPT | Performed by: PHYSICIAN ASSISTANT

## 2024-04-12 PROCEDURE — 85027 COMPLETE CBC AUTOMATED: CPT | Performed by: PHYSICIAN ASSISTANT

## 2024-04-12 RX ORDER — SULFASALAZINE 500 MG/1
TABLET ORAL
Qty: 60 TABLET | Refills: 2 | Status: SHIPPED | OUTPATIENT
Start: 2024-04-12 | End: 2024-07-19

## 2024-04-12 ASSESSMENT — PAIN SCALES - GENERAL: PAINLEVEL: EXTREME PAIN (8)

## 2024-04-12 NOTE — PATIENT INSTRUCTIONS
After Visit Instructions:     Thank you for coming to [facility] for your care. It is my goal to partner with you to help you reach your optimal state of health.       Plan:     Schedule follow-up with Jo-Ann Griggs PA-C in 3 months.   Labs: CBC, creatinine, Albumin, AST, ALT today and in 1 month  Medication recommendations:   Start Sulfasalazine 500mg: Take 1 tablet daily for 1 week, then take 1 tablet twice daily Take this medication with food.  # Sulfasalazine Risks and Benefits: The risks and benefits of sulfasalazine were discussed in detail and the patient verbalized understanding.  The risks discussed include, but are not limited to, the risk for hypersensitivity, anaphylaxis, anaphylactoid reactions, infections, bone marrow suppression,  hepatotoxicity, nausea, vomiting, and GI upset.  Oligospermia may occur in males.  I encouraged reviewing the package insert and asking any questions about the medication.            Jo-Ann Griggs PA-C  [facility]  [facility]    Contact information: [facility]  Clinic Number:  [phone]  Please call or send a Yoono message with any questions about your care

## 2024-04-14 LAB — BACTERIA SPEC CULT: NORMAL

## 2024-04-15 ENCOUNTER — ANCILLARY PROCEDURE (OUTPATIENT)
Dept: CARDIOLOGY | Facility: CLINIC | Age: 79
End: 2024-04-15
Attending: INTERNAL MEDICINE
Payer: MEDICARE

## 2024-04-15 DIAGNOSIS — Z95.0 CARDIAC RESYNCHRONIZATION THERAPY PACEMAKER (CRT-P) IN PLACE: Primary | ICD-10-CM

## 2024-04-15 DIAGNOSIS — I50.20 HEART FAILURE WITH REDUCED EJECTION FRACTION (H): ICD-10-CM

## 2024-04-15 DIAGNOSIS — I49.5 SICK SINUS SYNDROME (H): ICD-10-CM

## 2024-04-15 DIAGNOSIS — Z45.02 ICD (IMPLANTABLE CARDIOVERTER-DEFIBRILLATOR) BATTERY DEPLETION: ICD-10-CM

## 2024-04-15 DIAGNOSIS — I25.5 ISCHEMIC CARDIOMYOPATHY: ICD-10-CM

## 2024-04-16 ENCOUNTER — TELEPHONE (OUTPATIENT)
Dept: RHEUMATOLOGY | Facility: CLINIC | Age: 79
End: 2024-04-16
Payer: MEDICARE

## 2024-04-16 ENCOUNTER — TRANSFERRED RECORDS (OUTPATIENT)
Dept: HEALTH INFORMATION MANAGEMENT | Facility: CLINIC | Age: 79
End: 2024-04-16

## 2024-04-16 NOTE — TELEPHONE ENCOUNTER
Received a fax from [facility] regarding Sulfasalazine tabs. Pt is allergic to Aspirin.  ASA not a allergy it is on list because ASA contraindicated with Xeralto anticoaglation med   Please call pharmacy at this number [phone] to let them know if you would like to go forward with medication.     Thank you  Mel Blanca   Clinic Station [place]   [facility]  [phone]

## 2024-04-16 NOTE — TELEPHONE ENCOUNTER
Called pharmacy and spoke to pharmacist Geneva Banuelos, she was given the message that it is ok to proceed with Sulfasalazine.    Mariaa Lindsey  [facility] RN

## 2024-04-18 LAB
MDC_IDC_EPISODE_DTM: NORMAL
MDC_IDC_EPISODE_DURATION: 1 S
MDC_IDC_EPISODE_DURATION: 1 S
MDC_IDC_EPISODE_DURATION: 10 S
MDC_IDC_EPISODE_DURATION: 11 S
MDC_IDC_EPISODE_DURATION: 14 S
MDC_IDC_EPISODE_DURATION: 14 S
MDC_IDC_EPISODE_DURATION: 22 S
MDC_IDC_EPISODE_DURATION: 8 S
MDC_IDC_EPISODE_DURATION: 8 S
MDC_IDC_EPISODE_ID: 1348
MDC_IDC_EPISODE_ID: 1349
MDC_IDC_EPISODE_ID: 1350
MDC_IDC_EPISODE_ID: 1351
MDC_IDC_EPISODE_ID: 1352
MDC_IDC_EPISODE_ID: 1353
MDC_IDC_EPISODE_ID: 1354
MDC_IDC_EPISODE_ID: 463
MDC_IDC_EPISODE_ID: 464
MDC_IDC_EPISODE_TYPE: NORMAL
MDC_IDC_EPISODE_TYPE_INDUCED: NO
MDC_IDC_EPISODE_TYPE_INDUCED: NO
MDC_IDC_LEAD_CONNECTION_STATUS: NORMAL
MDC_IDC_LEAD_IMPLANT_DT: NORMAL
MDC_IDC_LEAD_LOCATION: NORMAL
MDC_IDC_LEAD_LOCATION_DETAIL_1: NORMAL
MDC_IDC_LEAD_MFG: NORMAL
MDC_IDC_LEAD_MODEL: NORMAL
MDC_IDC_LEAD_POLARITY_TYPE: NORMAL
MDC_IDC_LEAD_SERIAL: NORMAL
MDC_IDC_MSMT_BATTERY_DTM: NORMAL
MDC_IDC_MSMT_BATTERY_REMAINING_LONGEVITY: 86 MO
MDC_IDC_MSMT_BATTERY_RRT_TRIGGER: 2.6
MDC_IDC_MSMT_BATTERY_STATUS: NORMAL
MDC_IDC_MSMT_BATTERY_VOLTAGE: 2.97 V
MDC_IDC_MSMT_LEADCHNL_LV_IMPEDANCE_VALUE: 361 OHM
MDC_IDC_MSMT_LEADCHNL_LV_IMPEDANCE_VALUE: 418 OHM
MDC_IDC_MSMT_LEADCHNL_LV_IMPEDANCE_VALUE: 418 OHM
MDC_IDC_MSMT_LEADCHNL_LV_IMPEDANCE_VALUE: 456 OHM
MDC_IDC_MSMT_LEADCHNL_LV_IMPEDANCE_VALUE: 608 OHM
MDC_IDC_MSMT_LEADCHNL_LV_IMPEDANCE_VALUE: 703 OHM
MDC_IDC_MSMT_LEADCHNL_LV_IMPEDANCE_VALUE: 703 OHM
MDC_IDC_MSMT_LEADCHNL_LV_IMPEDANCE_VALUE: 817 OHM
MDC_IDC_MSMT_LEADCHNL_LV_IMPEDANCE_VALUE: 855 OHM
MDC_IDC_MSMT_LEADCHNL_LV_IMPEDANCE_VALUE: 950 OHM
MDC_IDC_MSMT_LEADCHNL_LV_IMPEDANCE_VALUE: 950 OHM
MDC_IDC_MSMT_LEADCHNL_LV_PACING_THRESHOLD_AMPLITUDE: 0.75 V
MDC_IDC_MSMT_LEADCHNL_LV_PACING_THRESHOLD_PULSEWIDTH: 0.4 MS
MDC_IDC_MSMT_LEADCHNL_RA_IMPEDANCE_VALUE: 342 OHM
MDC_IDC_MSMT_LEADCHNL_RA_IMPEDANCE_VALUE: 399 OHM
MDC_IDC_MSMT_LEADCHNL_RV_IMPEDANCE_VALUE: 342 OHM
MDC_IDC_MSMT_LEADCHNL_RV_IMPEDANCE_VALUE: 399 OHM
MDC_IDC_MSMT_LEADCHNL_RV_PACING_THRESHOLD_AMPLITUDE: 0.75 V
MDC_IDC_MSMT_LEADCHNL_RV_PACING_THRESHOLD_PULSEWIDTH: 0.4 MS
MDC_IDC_PG_IMPLANT_DTM: NORMAL
MDC_IDC_PG_MFG: NORMAL
MDC_IDC_PG_MODEL: NORMAL
MDC_IDC_PG_SERIAL: NORMAL
MDC_IDC_PG_TYPE: NORMAL
MDC_IDC_SESS_CLINIC_NAME: NORMAL
MDC_IDC_SESS_DTM: NORMAL
MDC_IDC_SESS_TYPE: NORMAL
MDC_IDC_SET_BRADY_LOWRATE: 60 {BEATS}/MIN
MDC_IDC_SET_BRADY_MAX_SENSOR_RATE: 130 {BEATS}/MIN
MDC_IDC_SET_BRADY_MODE: NORMAL
MDC_IDC_SET_CRT_LVRV_DELAY: 0 MS
MDC_IDC_SET_CRT_PACED_CHAMBERS: NORMAL
MDC_IDC_SET_LEADCHNL_LV_PACING_AMPLITUDE: NORMAL
MDC_IDC_SET_LEADCHNL_LV_PACING_ANODE_ELECTRODE_1: NORMAL
MDC_IDC_SET_LEADCHNL_LV_PACING_ANODE_LOCATION_1: NORMAL
MDC_IDC_SET_LEADCHNL_LV_PACING_CAPTURE_MODE: NORMAL
MDC_IDC_SET_LEADCHNL_LV_PACING_CATHODE_ELECTRODE_1: NORMAL
MDC_IDC_SET_LEADCHNL_LV_PACING_CATHODE_LOCATION_1: NORMAL
MDC_IDC_SET_LEADCHNL_LV_PACING_POLARITY: NORMAL
MDC_IDC_SET_LEADCHNL_LV_PACING_PULSEWIDTH: 0.4 MS
MDC_IDC_SET_LEADCHNL_RA_SENSING_ANODE_ELECTRODE_1: NORMAL
MDC_IDC_SET_LEADCHNL_RA_SENSING_ANODE_LOCATION_1: NORMAL
MDC_IDC_SET_LEADCHNL_RA_SENSING_CATHODE_ELECTRODE_1: NORMAL
MDC_IDC_SET_LEADCHNL_RA_SENSING_CATHODE_LOCATION_1: NORMAL
MDC_IDC_SET_LEADCHNL_RA_SENSING_POLARITY: NORMAL
MDC_IDC_SET_LEADCHNL_RA_SENSING_SENSITIVITY: NORMAL
MDC_IDC_SET_LEADCHNL_RV_PACING_AMPLITUDE: NORMAL
MDC_IDC_SET_LEADCHNL_RV_PACING_ANODE_ELECTRODE_1: NORMAL
MDC_IDC_SET_LEADCHNL_RV_PACING_ANODE_LOCATION_1: NORMAL
MDC_IDC_SET_LEADCHNL_RV_PACING_CAPTURE_MODE: NORMAL
MDC_IDC_SET_LEADCHNL_RV_PACING_CATHODE_ELECTRODE_1: NORMAL
MDC_IDC_SET_LEADCHNL_RV_PACING_CATHODE_LOCATION_1: NORMAL
MDC_IDC_SET_LEADCHNL_RV_PACING_POLARITY: NORMAL
MDC_IDC_SET_LEADCHNL_RV_PACING_PULSEWIDTH: 0.4 MS
MDC_IDC_SET_LEADCHNL_RV_SENSING_ANODE_ELECTRODE_1: NORMAL
MDC_IDC_SET_LEADCHNL_RV_SENSING_ANODE_LOCATION_1: NORMAL
MDC_IDC_SET_LEADCHNL_RV_SENSING_CATHODE_ELECTRODE_1: NORMAL
MDC_IDC_SET_LEADCHNL_RV_SENSING_CATHODE_LOCATION_1: NORMAL
MDC_IDC_SET_LEADCHNL_RV_SENSING_POLARITY: NORMAL
MDC_IDC_SET_LEADCHNL_RV_SENSING_SENSITIVITY: 0.9 MV
MDC_IDC_SET_ZONE_DETECTION_INTERVAL: 400 MS
MDC_IDC_SET_ZONE_STATUS: NORMAL
MDC_IDC_SET_ZONE_TYPE: NORMAL
MDC_IDC_SET_ZONE_VENDOR_TYPE: NORMAL
MDC_IDC_STAT_BRADY_AP_VP_PERCENT: 0 %
MDC_IDC_STAT_BRADY_AP_VS_PERCENT: 0 %
MDC_IDC_STAT_BRADY_AS_VP_PERCENT: 99.62 %
MDC_IDC_STAT_BRADY_AS_VS_PERCENT: 0.38 %
MDC_IDC_STAT_BRADY_DTM_END: NORMAL
MDC_IDC_STAT_BRADY_DTM_START: NORMAL
MDC_IDC_STAT_BRADY_RA_PERCENT_PACED: 0 %
MDC_IDC_STAT_BRADY_RV_PERCENT_PACED: 99.62 %
MDC_IDC_STAT_CRT_DTM_END: NORMAL
MDC_IDC_STAT_CRT_DTM_START: NORMAL
MDC_IDC_STAT_CRT_LV_PERCENT_PACED: 99.59 %
MDC_IDC_STAT_CRT_PERCENT_PACED: 99.59 %
MDC_IDC_STAT_EPISODE_RECENT_COUNT: 0
MDC_IDC_STAT_EPISODE_RECENT_COUNT: 3
MDC_IDC_STAT_EPISODE_RECENT_COUNT_DTM_END: NORMAL
MDC_IDC_STAT_EPISODE_RECENT_COUNT_DTM_START: NORMAL
MDC_IDC_STAT_EPISODE_TOTAL_COUNT: 0
MDC_IDC_STAT_EPISODE_TOTAL_COUNT: 4
MDC_IDC_STAT_EPISODE_TOTAL_COUNT: 460
MDC_IDC_STAT_EPISODE_TOTAL_COUNT_DTM_END: NORMAL
MDC_IDC_STAT_EPISODE_TOTAL_COUNT_DTM_START: NORMAL
MDC_IDC_STAT_EPISODE_TYPE: NORMAL
MDC_IDC_STAT_TACHYTHERAPY_RECENT_DTM_END: NORMAL
MDC_IDC_STAT_TACHYTHERAPY_RECENT_DTM_START: NORMAL
MDC_IDC_STAT_TACHYTHERAPY_TOTAL_DTM_END: NORMAL
MDC_IDC_STAT_TACHYTHERAPY_TOTAL_DTM_START: NORMAL

## 2024-04-18 PROCEDURE — 93281 PM DEVICE PROGR EVAL MULTI: CPT | Performed by: INTERNAL MEDICINE

## 2024-04-23 NOTE — TELEPHONE ENCOUNTER
Writer called patient.  Patient stated he began taking the sulfasalzine yesterday 4/22/24.  He catherine take one tab daily x 1 week then step up to 1 tab BID per the instructions given.  Tony will have labs drawn in about a month around 5/23/24.  Encounter closed.    [facility] GRIJALVA [facility]

## 2024-04-30 DIAGNOSIS — I48.21 PERMANENT ATRIAL FIBRILLATION (H): ICD-10-CM

## 2024-04-30 DIAGNOSIS — I73.9 CLAUDICATION OF BOTH LOWER EXTREMITIES (H): ICD-10-CM

## 2024-04-30 DIAGNOSIS — I25.5 ISCHEMIC CARDIOMYOPATHY: ICD-10-CM

## 2024-04-30 RX ORDER — LOSARTAN POTASSIUM 50 MG/1
50 TABLET ORAL DAILY
Qty: 90 TABLET | Refills: 0 | Status: SHIPPED | OUTPATIENT
Start: 2024-04-30 | End: 2024-05-23

## 2024-05-23 ENCOUNTER — TELEPHONE (OUTPATIENT)
Dept: CARDIOLOGY | Facility: CLINIC | Age: 79
End: 2024-05-23
Payer: MEDICARE

## 2024-05-23 DIAGNOSIS — I25.5 ISCHEMIC CARDIOMYOPATHY: ICD-10-CM

## 2024-05-23 DIAGNOSIS — I48.21 PERMANENT ATRIAL FIBRILLATION (H): ICD-10-CM

## 2024-05-23 DIAGNOSIS — I73.9 CLAUDICATION OF BOTH LOWER EXTREMITIES (H): ICD-10-CM

## 2024-05-23 RX ORDER — LOSARTAN POTASSIUM 50 MG/1
50 TABLET ORAL DAILY
Qty: 30 TABLET | Refills: 0 | Status: SHIPPED | OUTPATIENT
Start: 2024-05-23 | End: 2024-06-03

## 2024-05-23 NOTE — TELEPHONE ENCOUNTER
[facility] Call Center    Phone Message    May a detailed message be left on voicemail: yes     Reason for Call: Medication Refill Request    Has the patient contacted the pharmacy for the refill? Yes   Name of medication being requested: losartan (COZAAR) 50 MG tablet  rivaroxaban ANTICOAGULANT (XARELTO) 20 MG TABS tablet  Provider who prescribed the medication: Dr. Brumfield  Pharmacy:      [address]    Date medication is needed: 05/23/24   Patient needs temp supply (30 days) sent to his local pharmacy. He has spoken with [facility], but the meds will not arrive in time. He will be out by Sunday.     Action Taken: Other: cardiology    Travel Screening: Not Applicable  Thank you!  [facility]

## 2024-05-23 NOTE — TELEPHONE ENCOUNTER
Refills granted- small supply sent to [facility]. Pt updated.  -[facility]

## 2024-05-24 ENCOUNTER — LAB (OUTPATIENT)
Dept: LAB | Facility: CLINIC | Age: 79
End: 2024-05-24
Payer: MEDICARE

## 2024-05-24 DIAGNOSIS — Z79.899 HIGH RISK MEDICATION USE: ICD-10-CM

## 2024-05-24 DIAGNOSIS — M45.7 ANKYLOSING SPONDYLITIS OF LUMBOSACRAL REGION (H): ICD-10-CM

## 2024-05-24 LAB
ALBUMIN SERPL BCG-MCNC: 4.5 G/DL (ref 3.5–5.2)
ALT SERPL W P-5'-P-CCNC: 29 U/L (ref 0–70)
AST SERPL W P-5'-P-CCNC: 32 U/L (ref 0–45)
CREAT SERPL-MCNC: 1.08 MG/DL (ref 0.67–1.17)
CRP SERPL-MCNC: 3.96 MG/L
EGFRCR SERPLBLD CKD-EPI 2021: 70 ML/MIN/1.73M2
ERYTHROCYTE [DISTWIDTH] IN BLOOD BY AUTOMATED COUNT: 14.2 % (ref 10–15)
ERYTHROCYTE [SEDIMENTATION RATE] IN BLOOD BY WESTERGREN METHOD: 21 MM/HR (ref 0–20)
HCT VFR BLD AUTO: 39.7 % (ref 40–53)
HGB BLD-MCNC: 12.6 G/DL (ref 13.3–17.7)
MCH RBC QN AUTO: 29.1 PG (ref 26.5–33)
MCHC RBC AUTO-ENTMCNC: 31.7 G/DL (ref 31.5–36.5)
MCV RBC AUTO: 92 FL (ref 78–100)
PLATELET # BLD AUTO: 157 10E3/UL (ref 150–450)
RBC # BLD AUTO: 4.33 10E6/UL (ref 4.4–5.9)
WBC # BLD AUTO: 8.3 10E3/UL (ref 4–11)

## 2024-05-24 PROCEDURE — 84450 TRANSFERASE (AST) (SGOT): CPT

## 2024-05-24 PROCEDURE — 85027 COMPLETE CBC AUTOMATED: CPT

## 2024-05-24 PROCEDURE — 84460 ALANINE AMINO (ALT) (SGPT): CPT

## 2024-05-24 PROCEDURE — 82565 ASSAY OF CREATININE: CPT

## 2024-05-24 PROCEDURE — 85652 RBC SED RATE AUTOMATED: CPT

## 2024-05-24 PROCEDURE — 82040 ASSAY OF SERUM ALBUMIN: CPT

## 2024-05-24 PROCEDURE — 86140 C-REACTIVE PROTEIN: CPT

## 2024-05-24 PROCEDURE — 36415 COLL VENOUS BLD VENIPUNCTURE: CPT

## 2024-06-02 NOTE — PROGRESS NOTES
HEART CARE ENCOUNTER CONSULTATON NOTE      [facility]  [phone]      Assessment/Recommendations   Assessment:   Fatigue, dyspnea: Slowly progressive fatigue over the last year. Also reportedly normal echo with unremarkable BNP.  Attributes this to SI joint issues and back pain limiting exertion, but feeling more tired with walking.  Weight is stable, no evidence of active heart failure on exam.  Ischemic cardiomyopathy, mildly reduced LVEF: LVEF 45-50% echo 2022 - losartan, Bumex  Coronary artery disease: s/p CABG 2012, denies chest pain, cholesterol controlled on rosuvastatin  Chronic atrial fibrillation: Anticoagulation with Xarelto, rates controlled per device check  s/p cardiac pacemaker: Most recent device check shows normal device function, BiVP 100%. 3 episodes asymptomatic NSVT since 7/2023 EGMs suggest 8-11 beats NSVT.  RBBB   T2DM, CORAZON: A1c 9.3, BiPAP machine nightly  Valvular disease: Echo 6/2022 with mild aortic stenosis, mild mitral regurgitation, moderately severe tricuspid regurgitation.      Plan:   Repeat echocardiogram with fatigue, shortness of breath and mod-severe tricuspid regurgitation  Continue losartan, Bumex, rosuvastatin, Xarelto  Continue regular device checks  Continue with aggressive DM management with PCP      Follow up in 1 year or sooner if results/as needed     History of Present Illness/Subjective    HPI: Thomas Steve is a 79 year old male with PMHx of CAD, CABG presents for follow up.    Patient has noticed he becomes tired more easily over the last year.  Feels this is a gradual and not a sudden change.  More so with exertion, exertion is reduced due to SI joint problems and patient wonders if reduced activities leading to fatigue.  Needs to walk his dogs every day, but has more SI pain with walking further distances.  No increase in lower extremity swelling, which worsens mildly throughout the day and improves in the morning.  Orthopnea uses BiPAP  "nightly and tolerates it well.  Nonischemic NM stress test in 2021.  Does not have any chest pain with exertion.    Did report some shortness of breath 1 year ago at visit with Dr. Brumfield and BNP/BMP were unremarkable.  3 episodes of short NSVT on device check and patient was asymptomatic and denies palpitations.  No blood in stool or nosebleeds on Xarelto.        Encounter Type: Patient seen in clinic for annual device evaluation and iterative programming  Device: Medtronic Percepta CRT-P  Pacing %/Programmed: BiVP 100%, VVIR 60bpm  Lead(s): Stable  Battery longevity: Estimating 7.1 years remaining  Presenting rhythm: BiVP 87bpm  Underlying rhythm: AF w/CHB, no R's @ VVI 30bpm  Heart rates: Stable, HR's per histogram range 60-130bpm and primarily 60-100bpm  Atrial High rates: Documented chronic AF, programmed VVIR  Anticoagulant: Xarelto  Ventricular High rates: 3 VHR episodes logged, EGMs suggest 8-11 beats NSVT, patient denies symptoms.  Comments: Normal device function. Patient newly pacemaker dependent. RV Safety Margin changed from 1.5x to 2x per clinic protocol.   Plan: Remote device check scheduled for 7/9/24. Cierra Graf RN     Echocardiogram 2022 Results:  The left ventricle is normal in size.  Left ventricular function is decreased. The ejection fraction is 45-50%  (mildly reduced).  There is a pacemaker lead in the right ventricle.  The left atrium is mildly dilated.  There is mild (1+) mitral regurgitation.  There is moderately severe (3+) tricuspid regurgitation.  Right ventricular systolic pressure is elevated, consistent with mild  pulmonary hypertension.  Mild valvular aortic stenosis.     Physical Examination  Review of Systems   Vitals: /74 (BP Location: Left arm, Patient Position: Sitting, Cuff Size: Adult Large)   Pulse 64   Resp 16   Ht 1.765 m (5' 9.5\")   Wt 95.7 kg (211 lb)   BMI 30.71 kg/m    BMI= Body mass index is 30.71 kg/m .  Wt Readings from Last 3 Encounters:   06/03/24 " 95.7 kg (211 lb)   04/12/24 95.1 kg (209 lb 9.6 oz)   10/31/23 95.3 kg (210 lb)           ENT/Mouth: membranes moist, no oral lesions or bleeding gums.      EYES:  no scleral icterus, normal conjunctivae                    Neck: No carotid bruit or thyromegaly   Chest/Lungs:   lungs are clear to auscultation, no rales or wheezing, equal chest wall expansion    Cardiovascular:   Regular. Normal first and second heart sounds with soft systolic murmurs, rubs, or gallops; the carotid, radial and posterior tibial pulses are intact, no JVD, absent edema bilaterally        Extremities: no cyanosis or clubbing   Skin: no xanthelasma, warm.    Neurologic: no tremors     Psychiatric: alert and oriented x3, calm        Please refer above for cardiac ROS details.        Medical History  Surgical History Family History Social History   Past Medical History:   Diagnosis Date    Acute sinusitis     Atrial fibrillation (H)     Resolved after medication and CPAP    Back pain     CHF (congestive heart failure) (H)     Coronary artery disease     Diabetes mellitus (H)     Gastroesophageal reflux disease with esophagitis     HTN (hypertension)     Hyperlipemia     Hyperlipidemia     Joint pain     Lipoma of neck     Nocturia     Sciatic leg pain     Shoulder impingement syndrome     BILATERAL    Sleep apnea     uses CPAP    Typical atrial flutter (H) 07/07/2016    Demonstrated on 12-lead EKG July 7, 2016     Past Surgical History:   Procedure Laterality Date    CORONARY ARTERY BYPASS      EP BIV PACEMAKER INSERT N/A 06/11/2019    Procedure: EP Biventricular Pacemaker Insertion;  Surgeon: Durga Rajput MD;  Location: [facility];  Service: Cardiology    IMPLANT PACEMAKER      IR FINE NEEDLE ASPIRATION W ULTRASOUND  06/23/2022    LAMINECTOMY LUMBAR TWO LEVELS Bilateral 06/16/2022    Procedure: DECOMPRESSIVE LUMBAR LAMINECTOMY LUMBAR 4-LUMBAR 5 AND LUMBAR 5-SACRAL 1 BILATERAL-LEFT SIDE FIRST-PLUS BILATERAL FORAMINOTOMIES PLUS  REMOVAL OF HERNIATED DISC LUMBAR 5-SACRAL 1 LEFT;  Surgeon: Israel Saumel MD;  Location: [facility] Main OR    CO CARDIOVERSION ELECTIVE ARRHYTHMIA EXTERNAL  2014    Description: Elective Cardioversion External;  Recorded: 2014;    C CABG, VEIN, SINGLE  2011    Description: CABG (CABG);  Proc Date: 2011;  Comments: LIMA^LAD, SVG^OM, SVG^distal RCA     Family History   Problem Relation Age of Onset    Heart Disease Mother     Snoring Father     Heart Disease Father     Hypertension Father     Alzheimer Disease Father     No Known Problems Daughter     No Known Problems Daughter     No Known Problems Daughter     Chronic Obstructive Pulmonary Disease Sister     Hodgkin's lymphoma Brother     No Known Problems Son     Diabetes Sister     Substance Abuse Sister     Chronic Obstructive Pulmonary Disease Brother     Heart Disease Brother     Diabetes Brother     Substance Abuse Brother         Social History     Socioeconomic History    Marital status:      Spouse name: Not on file    Number of children: Not on file    Years of education: Not on file    Highest education level: Not on file   Occupational History    Not on file   Tobacco Use    Smoking status: Former     Current packs/day: 0.00     Average packs/day: 1.5 packs/day for 23.0 years (34.5 ttl pk-yrs)     Types: Cigarettes     Start date: 1963     Quit date: 1986     Years since quittin.4    Smokeless tobacco: Never   Substance and Sexual Activity    Alcohol use: Yes     Alcohol/week: 1.0 standard drink of alcohol     Comment: one beer per day    Drug use: No    Sexual activity: Never   Other Topics Concern    Not on file   Social History Narrative    .  3 children.  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     Social Determinants of Health     Financial Resource Strain: Not on File (2023)    Received from [facility]     Financial Resource Strain     Financial Resource Strain: 0   Food  Insecurity: Not on File (2023)    Received from [facility]     Food Insecurity     Food: 0   Transportation Needs: Not on File (2023)    Received from [facility]     Transportation Needs     Transportation: 0   Physical Activity: Not on File (2023)    Received from [facility]     Physical Activity     Physical Activity: 0   Stress: Not on File (2023)    Received from [facility]     Stress     Stress: 0   Social Connections: Not on File (2023)    Received from [facility]     Social Connections     Social Connections and Isolation: 0   Interpersonal Safety: Not on file   Housing Stability: Not on File (2023)    Received from [facility]     Housing Stability     Housin           Medications  Allergies   Current Outpatient Medications   Medication Sig Dispense Refill    acetaminophen (TYLENOL) 500 MG tablet Take 1,000-1,500 mg by mouth every 6 hours as needed for mild pain      bumetanide (BUMEX) 1 MG tablet Take 2 tablets (2 mg) by mouth daily 180 tablet 3    cholecalciferol 50 MCG (2000 UT) CAPS Take 2 capsules by mouth daily      coenzyme Q-10 200 MG CAPS capsule Take 200 mg by mouth daily      diphenhydrAMINE-acetaminophen (TYLENOL PM)  MG tablet Take 3 tablets by mouth nightly as needed for sleep      ferrous sulfate (FEROSUL) 325 (65 Fe) MG tablet Take 1 tablet by mouth 3 times daily (with meals)      glimepiride (AMARYL) 4 MG tablet Take 4 mg by mouth every morning (before breakfast)      insulin glargine (LANTUS PEN) 100 UNIT/ML pen Inject 20 Units Subcutaneous At Bedtime (Patient taking differently: Inject 30 Units Subcutaneous at bedtime) 15 mL     levothyroxine (SYNTHROID/LEVOTHROID) 25 MCG tablet Take 25 mcg by mouth daily      losartan (COZAAR) 50 MG tablet Take 1 tablet (50 mg) by mouth daily 30 tablet 0    Melatonin 10 MG TABS tablet Take 10 mg by mouth at bedtime      Omega-3 Fatty Acids (FISH OIL) 1200 MG capsule Take 1,200 mg by mouth daily       omeprazole (PRILOSEC) 20 MG DR capsule Take 1 capsule by mouth 2 times daily Take one morning and one at night time      POTASSIUM PO Take by mouth daily      pregabalin (LYRICA) 75 MG capsule Take 2 capsule by mouth once daily.      rivaroxaban ANTICOAGULANT (XARELTO) 20 MG TABS tablet Take 1 tablet (20 mg) by mouth at bedtime 30 tablet 0    rosuvastatin (CRESTOR) 10 MG tablet Take 10 mg by mouth At Bedtime      senna-docusate (SENOKOT-S/PERICOLACE) 8.6-50 MG tablet Take 1 tablet by mouth 2 times daily      sulfaSALAzine (AZULFIDINE) 500 MG tablet Take 1 tablet daily for 1 week, then take 1 tablet twice daily. Take this medication with food. 60 tablet 2    vitamin C (ASCORBIC ACID) 500 MG tablet Take 500 mg by mouth 3 times daily (with meals) Take with iron supplement      zinc gluconate 50 MG tablet Take 1 tablet (50 mg) by mouth daily         Allergies   Allergen Reactions    Aspirin Other (See Comments)     Contraindicated due to xarelto use    Bee Pollen Unknown     Scratchy throat    Codeine Nausea and Vomiting    Pollen Extracts [Pollen Extract] Unknown     Scratchy throat    Vicodin [Hydrocodone-Acetaminophen] Nausea and Vomiting          Lab Results    Chemistry/lipid CBC Cardiac Enzymes/BNP/TSH/INR   Recent Labs   Lab Test 11/21/23  1541   CHOL 128   HDL 37*   LDL 51   TRIG 199*     Recent Labs   Lab Test 11/21/23  1541 09/21/22  1521 06/08/22  1608   LDL 51 57 42     Recent Labs   Lab Test 05/24/24  1406 04/12/24  1141 11/21/23  1541   NA  --   --  139   POTASSIUM  --   --  4.4   CHLORIDE  --   --  99   CO2  --   --  30*   GLC  --   --  348*   BUN  --   --  17.7   CR 1.08   < > 1.02   GFRESTIMATED 70   < > 76   BARBARA  --   --  9.2    < > = values in this interval not displayed.     Recent Labs   Lab Test 05/24/24  1406 04/12/24  1141 11/21/23  1541   CR 1.08 1.00 1.02     Recent Labs   Lab Test 04/12/24  1141 06/17/22  0536 06/12/19  0613   A1C 9.3* 7.5* 7.2*          Recent Labs   Lab Test 05/24/24  1406    WBC 8.3   HGB 12.6*   HCT 39.7*   MCV 92        Recent Labs   Lab Test 05/24/24  1406 04/12/24  1141 05/30/23  1008   HGB 12.6* 11.9* 12.4*    Recent Labs   Lab Test 07/14/22  0110   TROPONINI 0.04     Recent Labs   Lab Test 05/26/23  1510 07/14/22  0110 06/20/22  1300   BNP  --  197* 108*   NTBNP 792  --   --      Recent Labs   Lab Test 06/08/22  1608   TSH 2.05     Recent Labs   Lab Test 07/14/22  1154 07/14/22  0110 07/06/22  0530   INR 2.22* 2.27* 1.14          This note has been dictated using voice recognition software. Any grammatical, typographical, or context distortions are unintentional and inherent to the software    Annette Malik PA-C

## 2024-06-03 ENCOUNTER — OFFICE VISIT (OUTPATIENT)
Dept: CARDIOLOGY | Facility: CLINIC | Age: 79
End: 2024-06-03
Payer: MEDICARE

## 2024-06-03 VITALS
WEIGHT: 211 LBS | HEIGHT: 70 IN | BODY MASS INDEX: 30.21 KG/M2 | RESPIRATION RATE: 16 BRPM | SYSTOLIC BLOOD PRESSURE: 134 MMHG | DIASTOLIC BLOOD PRESSURE: 74 MMHG | HEART RATE: 64 BPM

## 2024-06-03 DIAGNOSIS — I25.10 CORONARY ARTERY DISEASE INVOLVING NATIVE CORONARY ARTERY OF NATIVE HEART WITHOUT ANGINA PECTORIS: ICD-10-CM

## 2024-06-03 DIAGNOSIS — G47.33 OBSTRUCTIVE SLEEP APNEA: ICD-10-CM

## 2024-06-03 DIAGNOSIS — I10 ESSENTIAL HYPERTENSION: ICD-10-CM

## 2024-06-03 DIAGNOSIS — R53.83 OTHER FATIGUE: ICD-10-CM

## 2024-06-03 DIAGNOSIS — I25.5 ISCHEMIC CARDIOMYOPATHY: Primary | ICD-10-CM

## 2024-06-03 DIAGNOSIS — Z95.0 BIVENTRICULAR CARDIAC PACEMAKER IN SITU: ICD-10-CM

## 2024-06-03 DIAGNOSIS — I48.21 PERMANENT ATRIAL FIBRILLATION (H): ICD-10-CM

## 2024-06-03 LAB
ANION GAP SERPL CALCULATED.3IONS-SCNC: 13 MMOL/L (ref 7–15)
BUN SERPL-MCNC: 18.6 MG/DL (ref 8–23)
CALCIUM SERPL-MCNC: 9.1 MG/DL (ref 8.8–10.2)
CHLORIDE SERPL-SCNC: 100 MMOL/L (ref 98–107)
CREAT SERPL-MCNC: 0.93 MG/DL (ref 0.67–1.17)
DEPRECATED HCO3 PLAS-SCNC: 26 MMOL/L (ref 22–29)
EGFRCR SERPLBLD CKD-EPI 2021: 84 ML/MIN/1.73M2
GLUCOSE SERPL-MCNC: 182 MG/DL (ref 70–99)
POTASSIUM SERPL-SCNC: 4.3 MMOL/L (ref 3.4–5.3)
SODIUM SERPL-SCNC: 139 MMOL/L (ref 135–145)

## 2024-06-03 PROCEDURE — 99214 OFFICE O/P EST MOD 30 MIN: CPT

## 2024-06-03 PROCEDURE — 36415 COLL VENOUS BLD VENIPUNCTURE: CPT

## 2024-06-03 PROCEDURE — 80048 BASIC METABOLIC PNL TOTAL CA: CPT

## 2024-06-03 RX ORDER — LOSARTAN POTASSIUM 50 MG/1
50 TABLET ORAL DAILY
Qty: 90 TABLET | Refills: 3 | Status: SHIPPED | OUTPATIENT
Start: 2024-06-03

## 2024-06-03 NOTE — LETTER
June 4, 2024      Thomas Steve  [address] [facility] [address]        Dear ,    We are writing to inform you of your test results.       Edit Comments   Add Notifications  Back to Top    There are no concerns on your lab work from yesterday, continue current medications.         Resulted Orders   Basic metabolic panel   Result Value Ref Range    Sodium 139 135 - 145 mmol/L      Comment:      Reference intervals for this test were updated on 09/26/2023 to more accurately reflect our healthy population. There may be differences in the flagging of prior results with similar values performed with this method. Interpretation of those prior results can be made in the context of the updated reference intervals.     Potassium 4.3 3.4 - 5.3 mmol/L    Chloride 100 98 - 107 mmol/L    Carbon Dioxide (CO2) 26 22 - 29 mmol/L    Anion Gap 13 7 - 15 mmol/L    Urea Nitrogen 18.6 8.0 - 23.0 mg/dL    Creatinine 0.93 0.67 - 1.17 mg/dL    GFR Estimate 84 >60 mL/min/1.73m2    Calcium 9.1 8.8 - 10.2 mg/dL    Glucose 182 (H) 70 - 99 mg/dL       If you have any questions or concerns, please call the clinic at the number listed above.       Sincerely,      Annette Malik PA-C

## 2024-06-03 NOTE — PATIENT INSTRUCTIONS
It was a pleasure taking part in your care today:    - Schedule echocardiogram   - Continue current medications  - Follow up in 1 year or sooner as needed  -Diabetes  management with your primary doctor    Please call the [facility] clinic with any questions or concerns at [phone].     Annette Malik PA-C

## 2024-06-03 NOTE — LETTER
6/3/2024    Moni Mcclain MD  [address]  [facility] [address]    RE: Thomas Steve       Dear Colleague,     I [facility] the [facility].    HEART CARE ENCOUNTER CONSULTATON NOTE      [facility]  [phone]      Assessment/Recommendations   Assessment:   Fatigue, dyspnea: Slowly progressive fatigue over the last year. Also reportedly normal echo with unremarkable BNP.  Attributes this to SI joint issues and back pain limiting exertion, but feeling more tired with walking.  Weight is stable, no evidence of active heart failure on exam.  Ischemic cardiomyopathy, mildly reduced LVEF: LVEF 45-50% echo 2022 - losartan, Bumex  Coronary artery disease: s/p CABG 2012, denies chest pain, cholesterol controlled on rosuvastatin  Chronic atrial fibrillation: Anticoagulation with Xarelto, rates controlled per device check  s/p cardiac pacemaker: Most recent device check shows normal device function, BiVP 100%. 3 episodes asymptomatic NSVT since 7/2023 EGMs suggest 8-11 beats NSVT.  RBBB   T2DM, CORAZON: A1c 9.3, BiPAP machine nightly  Valvular disease: Echo 6/2022 with mild aortic stenosis, mild mitral regurgitation, moderately severe tricuspid regurgitation.      Plan:   Repeat echocardiogram with fatigue, shortness of breath and mod-severe tricuspid regurgitation  Continue losartan, Bumex, rosuvastatin, Xarelto  Continue regular device checks  Continue with aggressive DM management with PCP      Follow up in 1 year or sooner if results/as needed     History of Present Illness/Subjective    HPI: Thomas Steve is a 79 year old male with PMHx of CAD, CABG presents for follow up.    Patient has noticed he becomes tired more easily over the last year.  Feels this is a gradual and not a sudden change.  More so with exertion, exertion is reduced due to SI joint problems and patient wonders if reduced activities leading to fatigue.  Needs to  walk his dogs every day, but has more SI pain with walking further distances.  No increase in lower extremity swelling, which worsens mildly throughout the day and improves in the morning.  Orthopnea uses BiPAP nightly and tolerates it well.  Nonischemic NM stress test in 2021.  Does not have any chest pain with exertion.    Did report some shortness of breath 1 year ago at visit with Dr. Brumfield and BNP/BMP were unremarkable.  3 episodes of short NSVT on device check and patient was asymptomatic and denies palpitations.  No blood in stool or nosebleeds on Xarelto.        Encounter Type: Patient seen in clinic for annual device evaluation and iterative programming  Device: Medtronic Percepta CRT-P  Pacing %/Programmed: BiVP 100%, VVIR 60bpm  Lead(s): Stable  Battery longevity: Estimating 7.1 years remaining  Presenting rhythm: BiVP 87bpm  Underlying rhythm: AF w/CHB, no R's @ VVI 30bpm  Heart rates: Stable, HR's per histogram range 60-130bpm and primarily 60-100bpm  Atrial High rates: Documented chronic AF, programmed VVIR  Anticoagulant: Xarelto  Ventricular High rates: 3 VHR episodes logged, EGMs suggest 8-11 beats NSVT, patient denies symptoms.  Comments: Normal device function. Patient newly pacemaker dependent. RV Safety Margin changed from 1.5x to 2x per clinic protocol.   Plan: Remote device check scheduled for 7/9/24. Cierra Graf RN     Echocardiogram 2022 Results:  The left ventricle is normal in size.  Left ventricular function is decreased. The ejection fraction is 45-50%  (mildly reduced).  There is a pacemaker lead in the right ventricle.  The left atrium is mildly dilated.  There is mild (1+) mitral regurgitation.  There is moderately severe (3+) tricuspid regurgitation.  Right ventricular systolic pressure is elevated, consistent with mild  pulmonary hypertension.  Mild valvular aortic stenosis.     Physical Examination  Review of Systems   Vitals: /74 (BP Location: Left arm, Patient Position:  "Sitting, Cuff Size: Adult Large)   Pulse 64   Resp 16   Ht 1.765 m (5' 9.5\")   Wt 95.7 kg (211 lb)   BMI 30.71 kg/m    BMI= Body mass index is 30.71 kg/m .  Wt Readings from Last 3 Encounters:   06/03/24 95.7 kg (211 lb)   04/12/24 95.1 kg (209 lb 9.6 oz)   10/31/23 95.3 kg (210 lb)           ENT/Mouth: membranes moist, no oral lesions or bleeding gums.      EYES:  no scleral icterus, normal conjunctivae                    Neck: No carotid bruit or thyromegaly   Chest/Lungs:   lungs are clear to auscultation, no rales or wheezing, equal chest wall expansion    Cardiovascular:   Regular. Normal first and second heart sounds with soft systolic murmurs, rubs, or gallops; the carotid, radial and posterior tibial pulses are intact, no JVD, absent edema bilaterally        Extremities: no cyanosis or clubbing   Skin: no xanthelasma, warm.    Neurologic: no tremors     Psychiatric: alert and oriented x3, calm        Please refer above for cardiac ROS details.        Medical History  Surgical History Family History Social History   Past Medical History:   Diagnosis Date    Acute sinusitis     Atrial fibrillation (H)     Resolved after medication and CPAP    Back pain     CHF (congestive heart failure) (H)     Coronary artery disease     Diabetes mellitus (H)     Gastroesophageal reflux disease with esophagitis     HTN (hypertension)     Hyperlipemia     Hyperlipidemia     Joint pain     Lipoma of neck     Nocturia     Sciatic leg pain     Shoulder impingement syndrome     BILATERAL    Sleep apnea     uses CPAP    Typical atrial flutter (H) 07/07/2016    Demonstrated on 12-lead EKG July 7, 2016     Past Surgical History:   Procedure Laterality Date    CORONARY ARTERY BYPASS      EP BIV PACEMAKER INSERT N/A 06/11/2019    Procedure: EP Biventricular Pacemaker Insertion;  Surgeon: Durga Rajput MD;  Location: [facility];  Service: Cardiology    IMPLANT PACEMAKER      IR FINE NEEDLE ASPIRATION W ULTRASOUND  " 2022    LAMINECTOMY LUMBAR TWO LEVELS Bilateral 2022    Procedure: DECOMPRESSIVE LUMBAR LAMINECTOMY LUMBAR 4-LUMBAR 5 AND LUMBAR 5-SACRAL 1 BILATERAL-LEFT SIDE FIRST-PLUS BILATERAL FORAMINOTOMIES PLUS REMOVAL OF HERNIATED DISC LUMBAR 5-SACRAL 1 LEFT;  Surgeon: Israel Samuel MD;  Location: [facility] Main OR    [place] CARDIOVERSION ELECTIVE ARRHYTHMIA EXTERNAL  2014    Description: Elective Cardioversion External;  Recorded: 2014;    [facility] CABG, VEIN, SINGLE  2011    Description: CABG (CABG);  Proc Date: 2011;  Comments: LIMA^LAD, SVG^OM, SVG^distal RCA     Family History   Problem Relation Age of Onset    Heart Disease Mother     Snoring Father     Heart Disease Father     Hypertension Father     Alzheimer Disease Father     No Known Problems Daughter     No Known Problems Daughter     No Known Problems Daughter     Chronic Obstructive Pulmonary Disease Sister     Hodgkin's lymphoma Brother     No Known Problems Son     Diabetes Sister     Substance Abuse Sister     Chronic Obstructive Pulmonary Disease Brother     Heart Disease Brother     Diabetes Brother     Substance Abuse Brother         Social History     Socioeconomic History    Marital status:      Spouse name: Not on file    Number of children: Not on file    Years of education: Not on file    Highest education level: Not on file   Occupational History    Not on file   Tobacco Use    Smoking status: Former     Current packs/day: 0.00     Average packs/day: 1.5 packs/day for 23.0 years (34.5 ttl pk-yrs)     Types: Cigarettes     Start date: 1963     Quit date: 1986     Years since quittin.4    Smokeless tobacco: Never   Substance and Sexual Activity    Alcohol use: Yes     Alcohol/week: 1.0 standard drink of alcohol     Comment: one beer per day    Drug use: No    Sexual activity: Never   Other Topics Concern    Not on file   Social History Narrative    .  3 children.  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     Social Determinants of Health     Financial Resource Strain: Not on File (2023)    Received from [facility]     Financial Resource Strain     Financial Resource Strain: 0   Food Insecurity: Not on File (2023)    Received from [facility]     Food Insecurity     Food: 0   Transportation Needs: Not on File (2023)    Received from [facility]     Transportation Needs     Transportation: 0   Physical Activity: Not on File (2023)    Received from [facility]     Physical Activity     Physical Activity: 0   Stress: Not on File (2023)    Received from [facility]     Stress     Stress: 0   Social Connections: Not on File (2023)    Received from [facility]     Social Connections     Social Connections and Isolation: 0   Interpersonal Safety: Not on file   Housing Stability: Not on File (2023)    Received from [facility]     Housing Stability     Housin           Medications  Allergies   Current Outpatient Medications   Medication Sig Dispense Refill    acetaminophen (TYLENOL) 500 MG tablet Take 1,000-1,500 mg by mouth every 6 hours as needed for mild pain      bumetanide (BUMEX) 1 MG tablet Take 2 tablets (2 mg) by mouth daily 180 tablet 3    cholecalciferol 50 MCG (2000 UT) CAPS Take 2 capsules by mouth daily      coenzyme Q-10 200 MG CAPS capsule Take 200 mg by mouth daily      diphenhydrAMINE-acetaminophen (TYLENOL PM)  MG tablet Take 3 tablets by mouth nightly as needed for sleep      ferrous sulfate (FEROSUL) 325 (65 Fe) MG tablet Take 1 tablet by mouth 3 times daily (with meals)      glimepiride (AMARYL) 4 MG tablet Take 4 mg by mouth every morning (before breakfast)      insulin glargine (LANTUS PEN) 100 UNIT/ML pen Inject 20 Units Subcutaneous At Bedtime (Patient taking differently: Inject 30 Units Subcutaneous at bedtime) 15 mL     levothyroxine (SYNTHROID/LEVOTHROID) 25 MCG tablet Take 25 mcg by mouth daily      losartan  (COZAAR) 50 MG tablet Take 1 tablet (50 mg) by mouth daily 30 tablet 0    Melatonin 10 MG TABS tablet Take 10 mg by mouth at bedtime      Omega-3 Fatty Acids (FISH OIL) 1200 MG capsule Take 1,200 mg by mouth daily      omeprazole (PRILOSEC) 20 MG DR capsule Take 1 capsule by mouth 2 times daily Take one morning and one at night time      POTASSIUM PO Take by mouth daily      pregabalin (LYRICA) 75 MG capsule Take 2 capsule by mouth once daily.      rivaroxaban ANTICOAGULANT (XARELTO) 20 MG TABS tablet Take 1 tablet (20 mg) by mouth at bedtime 30 tablet 0    rosuvastatin (CRESTOR) 10 MG tablet Take 10 mg by mouth At Bedtime      senna-docusate (SENOKOT-S/PERICOLACE) 8.6-50 MG tablet Take 1 tablet by mouth 2 times daily      sulfaSALAzine (AZULFIDINE) 500 MG tablet Take 1 tablet daily for 1 week, then take 1 tablet twice daily. Take this medication with food. 60 tablet 2    vitamin C (ASCORBIC ACID) 500 MG tablet Take 500 mg by mouth 3 times daily (with meals) Take with iron supplement      zinc gluconate 50 MG tablet Take 1 tablet (50 mg) by mouth daily         Allergies   Allergen Reactions    Aspirin Other (See Comments)     Contraindicated due to xarelto use    Bee Pollen Unknown     Scratchy throat    Codeine Nausea and Vomiting    Pollen Extracts [Pollen Extract] Unknown     Scratchy throat    Vicodin [Hydrocodone-Acetaminophen] Nausea and Vomiting          Lab Results    Chemistry/lipid CBC Cardiac Enzymes/BNP/TSH/INR   Recent Labs   Lab Test 11/21/23  1541   CHOL 128   HDL 37*   LDL 51   TRIG 199*     Recent Labs   Lab Test 11/21/23  1541 09/21/22  1521 06/08/22  1608   LDL 51 57 42     Recent Labs   Lab Test 05/24/24  1406 04/12/24  1141 11/21/23  1541   NA  --   --  139   POTASSIUM  --   --  4.4   CHLORIDE  --   --  99   CO2  --   --  30*   GLC  --   --  348*   BUN  --   --  17.7   CR 1.08   < > 1.02   GFRESTIMATED 70   < > 76   BARBARA  --   --  9.2    < > = values in this interval not displayed.     Recent  Labs   Lab Test 05/24/24  1406 04/12/24  1141 11/21/23  1541   CR 1.08 1.00 1.02     Recent Labs   Lab Test 04/12/24  1141 06/17/22  0536 06/12/19  0613   A1C 9.3* 7.5* 7.2*          Recent Labs   Lab Test 05/24/24  1406   WBC 8.3   HGB 12.6*   HCT 39.7*   MCV 92        Recent Labs   Lab Test 05/24/24  1406 04/12/24  1141 05/30/23  1008   HGB 12.6* 11.9* 12.4*    Recent Labs   Lab Test 07/14/22  0110   TROPONINI 0.04     Recent Labs   Lab Test 05/26/23  1510 07/14/22  0110 06/20/22  1300   BNP  --  197* 108*   NTBNP 792  --   --      Recent Labs   Lab Test 06/08/22  1608   TSH 2.05     Recent Labs   Lab Test 07/14/22  1154 07/14/22  0110 07/06/22  0530   INR 2.22* 2.27* 1.14          This note has been dictated using voice recognition software. Any grammatical, typographical, or context distortions are unintentional and inherent to the software    Annette Malik PA-C      Thank you for allowing me to participate in the care of your patient.      Sincerely,     Annette Valdes PA-C     [facility]  cc:   Mac Brumfield MD  [address]  [address]  [place] [address]

## 2024-06-16 ENCOUNTER — HEALTH MAINTENANCE LETTER (OUTPATIENT)
Age: 79
End: 2024-06-16

## 2024-06-26 ENCOUNTER — LAB REQUISITION (OUTPATIENT)
Dept: LAB | Facility: CLINIC | Age: 79
End: 2024-06-26
Payer: MEDICARE

## 2024-06-26 DIAGNOSIS — E11.622 TYPE 2 DIABETES MELLITUS WITH OTHER SKIN ULCER (CODE) (H): ICD-10-CM

## 2024-06-26 PROCEDURE — 80053 COMPREHEN METABOLIC PANEL: CPT | Mod: ORL | Performed by: FAMILY MEDICINE

## 2024-06-26 PROCEDURE — 80061 LIPID PANEL: CPT | Mod: ORL | Performed by: FAMILY MEDICINE

## 2024-06-27 ENCOUNTER — LAB REQUISITION (OUTPATIENT)
Dept: LAB | Facility: CLINIC | Age: 79
End: 2024-06-27
Payer: MEDICARE

## 2024-06-27 ENCOUNTER — HOSPITAL ENCOUNTER (OUTPATIENT)
Dept: CARDIOLOGY | Facility: HOSPITAL | Age: 79
Discharge: HOME | End: 2024-06-27
Payer: MEDICARE

## 2024-06-27 DIAGNOSIS — I25.5 ISCHEMIC CARDIOMYOPATHY: ICD-10-CM

## 2024-06-27 DIAGNOSIS — E11.65 TYPE 2 DIABETES MELLITUS WITH HYPERGLYCEMIA (H): ICD-10-CM

## 2024-06-27 LAB
ALBUMIN SERPL BCG-MCNC: 4.7 G/DL (ref 3.5–5.2)
ALP SERPL-CCNC: 197 U/L (ref 40–150)
ALT SERPL W P-5'-P-CCNC: 33 U/L (ref 0–70)
ANION GAP SERPL CALCULATED.3IONS-SCNC: 15 MMOL/L (ref 7–15)
AST SERPL W P-5'-P-CCNC: 34 U/L (ref 0–45)
BILIRUB SERPL-MCNC: 0.5 MG/DL
BUN SERPL-MCNC: 19.7 MG/DL (ref 8–23)
CALCIUM SERPL-MCNC: 9.1 MG/DL (ref 8.8–10.2)
CHLORIDE SERPL-SCNC: 98 MMOL/L (ref 98–107)
CHOLEST SERPL-MCNC: 134 MG/DL
CREAT SERPL-MCNC: 0.97 MG/DL (ref 0.67–1.17)
DEPRECATED HCO3 PLAS-SCNC: 25 MMOL/L (ref 22–29)
EGFRCR SERPLBLD CKD-EPI 2021: 80 ML/MIN/1.73M2
FASTING STATUS PATIENT QL REPORTED: ABNORMAL
FASTING STATUS PATIENT QL REPORTED: ABNORMAL
GLUCOSE SERPL-MCNC: 279 MG/DL (ref 70–99)
HDLC SERPL-MCNC: 34 MG/DL
LDLC SERPL CALC-MCNC: 61 MG/DL
LVEF ECHO: NORMAL
NONHDLC SERPL-MCNC: 100 MG/DL
POTASSIUM SERPL-SCNC: 3.9 MMOL/L (ref 3.4–5.3)
PROT SERPL-MCNC: 7.6 G/DL (ref 6.4–8.3)
SODIUM SERPL-SCNC: 138 MMOL/L (ref 135–145)
TRIGL SERPL-MCNC: 196 MG/DL

## 2024-06-27 PROCEDURE — 999N000208 ECHOCARDIOGRAM COMPLETE

## 2024-06-27 PROCEDURE — 93306 TTE W/DOPPLER COMPLETE: CPT | Mod: 26 | Performed by: INTERNAL MEDICINE

## 2024-06-27 PROCEDURE — 255N000002 HC RX 255 OP 636

## 2024-06-27 PROCEDURE — 82570 ASSAY OF URINE CREATININE: CPT | Mod: ORL | Performed by: FAMILY MEDICINE

## 2024-06-27 RX ADMIN — PERFLUTREN 3 ML: 6.52 INJECTION, SUSPENSION INTRAVENOUS at 13:20

## 2024-06-28 LAB
CREAT UR-MCNC: 28.5 MG/DL
MICROALBUMIN UR-MCNC: <12 MG/L
MICROALBUMIN/CREAT UR: NORMAL MG/G{CREAT}

## 2024-07-09 ENCOUNTER — ANCILLARY PROCEDURE (OUTPATIENT)
Dept: CARDIOLOGY | Facility: CLINIC | Age: 79
End: 2024-07-09
Attending: INTERNAL MEDICINE
Payer: MEDICARE

## 2024-07-09 DIAGNOSIS — I49.5 SICK SINUS SYNDROME (H): ICD-10-CM

## 2024-07-09 DIAGNOSIS — Z95.0 CARDIAC PACEMAKER IN SITU: ICD-10-CM

## 2024-07-09 LAB
MDC_IDC_EPISODE_DTM: NORMAL
MDC_IDC_EPISODE_DURATION: 10 S
MDC_IDC_EPISODE_DURATION: 11 S
MDC_IDC_EPISODE_DURATION: 9 S
MDC_IDC_EPISODE_ID: 1355
MDC_IDC_EPISODE_ID: 1356
MDC_IDC_EPISODE_ID: 1357
MDC_IDC_EPISODE_TYPE: NORMAL
MDC_IDC_LEAD_CONNECTION_STATUS: NORMAL
MDC_IDC_LEAD_IMPLANT_DT: NORMAL
MDC_IDC_LEAD_LOCATION: NORMAL
MDC_IDC_LEAD_LOCATION_DETAIL_1: NORMAL
MDC_IDC_LEAD_MFG: NORMAL
MDC_IDC_LEAD_MODEL: NORMAL
MDC_IDC_LEAD_POLARITY_TYPE: NORMAL
MDC_IDC_LEAD_SERIAL: NORMAL
MDC_IDC_MSMT_BATTERY_DTM: NORMAL
MDC_IDC_MSMT_BATTERY_REMAINING_LONGEVITY: 80 MO
MDC_IDC_MSMT_BATTERY_RRT_TRIGGER: 2.6
MDC_IDC_MSMT_BATTERY_STATUS: NORMAL
MDC_IDC_MSMT_BATTERY_VOLTAGE: 2.97 V
MDC_IDC_MSMT_LEADCHNL_LV_IMPEDANCE_VALUE: 361 OHM
MDC_IDC_MSMT_LEADCHNL_LV_IMPEDANCE_VALUE: 380 OHM
MDC_IDC_MSMT_LEADCHNL_LV_IMPEDANCE_VALUE: 399 OHM
MDC_IDC_MSMT_LEADCHNL_LV_IMPEDANCE_VALUE: 418 OHM
MDC_IDC_MSMT_LEADCHNL_LV_IMPEDANCE_VALUE: 532 OHM
MDC_IDC_MSMT_LEADCHNL_LV_IMPEDANCE_VALUE: 665 OHM
MDC_IDC_MSMT_LEADCHNL_LV_IMPEDANCE_VALUE: 665 OHM
MDC_IDC_MSMT_LEADCHNL_LV_IMPEDANCE_VALUE: 779 OHM
MDC_IDC_MSMT_LEADCHNL_LV_IMPEDANCE_VALUE: 798 OHM
MDC_IDC_MSMT_LEADCHNL_LV_IMPEDANCE_VALUE: 817 OHM
MDC_IDC_MSMT_LEADCHNL_LV_PACING_THRESHOLD_AMPLITUDE: 0.75 V
MDC_IDC_MSMT_LEADCHNL_LV_PACING_THRESHOLD_PULSEWIDTH: 0.4 MS
MDC_IDC_MSMT_LEADCHNL_RA_IMPEDANCE_VALUE: 342 OHM
MDC_IDC_MSMT_LEADCHNL_RA_IMPEDANCE_VALUE: 399 OHM
MDC_IDC_MSMT_LEADCHNL_RA_PACING_THRESHOLD_AMPLITUDE: 0.5 V
MDC_IDC_MSMT_LEADCHNL_RA_PACING_THRESHOLD_PULSEWIDTH: 0.4 MS
MDC_IDC_MSMT_LEADCHNL_RA_SENSING_INTR_AMPL: 0.25 MV
MDC_IDC_MSMT_LEADCHNL_RA_SENSING_INTR_AMPL: 0.38 MV
MDC_IDC_MSMT_LEADCHNL_RV_IMPEDANCE_VALUE: 323 OHM
MDC_IDC_MSMT_LEADCHNL_RV_IMPEDANCE_VALUE: 380 OHM
MDC_IDC_MSMT_LEADCHNL_RV_PACING_THRESHOLD_AMPLITUDE: 0.5 V
MDC_IDC_MSMT_LEADCHNL_RV_PACING_THRESHOLD_PULSEWIDTH: 0.4 MS
MDC_IDC_MSMT_LEADCHNL_RV_SENSING_INTR_AMPL: 11.25 MV
MDC_IDC_MSMT_LEADCHNL_RV_SENSING_INTR_AMPL: 11.25 MV
MDC_IDC_PG_IMPLANT_DTM: NORMAL
MDC_IDC_PG_MFG: NORMAL
MDC_IDC_PG_MODEL: NORMAL
MDC_IDC_PG_SERIAL: NORMAL
MDC_IDC_PG_TYPE: NORMAL
MDC_IDC_SESS_CLINIC_NAME: NORMAL
MDC_IDC_SESS_DTM: NORMAL
MDC_IDC_SESS_TYPE: NORMAL
MDC_IDC_SET_BRADY_LOWRATE: 60 {BEATS}/MIN
MDC_IDC_SET_BRADY_MAX_SENSOR_RATE: 130 {BEATS}/MIN
MDC_IDC_SET_BRADY_MODE: NORMAL
MDC_IDC_SET_CRT_LVRV_DELAY: 0 MS
MDC_IDC_SET_CRT_PACED_CHAMBERS: NORMAL
MDC_IDC_SET_LEADCHNL_LV_PACING_AMPLITUDE: 1.75 V
MDC_IDC_SET_LEADCHNL_LV_PACING_ANODE_ELECTRODE_1: NORMAL
MDC_IDC_SET_LEADCHNL_LV_PACING_ANODE_LOCATION_1: NORMAL
MDC_IDC_SET_LEADCHNL_LV_PACING_CAPTURE_MODE: NORMAL
MDC_IDC_SET_LEADCHNL_LV_PACING_CATHODE_ELECTRODE_1: NORMAL
MDC_IDC_SET_LEADCHNL_LV_PACING_CATHODE_LOCATION_1: NORMAL
MDC_IDC_SET_LEADCHNL_LV_PACING_POLARITY: NORMAL
MDC_IDC_SET_LEADCHNL_LV_PACING_PULSEWIDTH: 0.4 MS
MDC_IDC_SET_LEADCHNL_RA_SENSING_ANODE_ELECTRODE_1: NORMAL
MDC_IDC_SET_LEADCHNL_RA_SENSING_ANODE_LOCATION_1: NORMAL
MDC_IDC_SET_LEADCHNL_RA_SENSING_CATHODE_ELECTRODE_1: NORMAL
MDC_IDC_SET_LEADCHNL_RA_SENSING_CATHODE_LOCATION_1: NORMAL
MDC_IDC_SET_LEADCHNL_RA_SENSING_POLARITY: NORMAL
MDC_IDC_SET_LEADCHNL_RA_SENSING_SENSITIVITY: NORMAL
MDC_IDC_SET_LEADCHNL_RV_PACING_AMPLITUDE: 1.5 V
MDC_IDC_SET_LEADCHNL_RV_PACING_ANODE_ELECTRODE_1: NORMAL
MDC_IDC_SET_LEADCHNL_RV_PACING_ANODE_LOCATION_1: NORMAL
MDC_IDC_SET_LEADCHNL_RV_PACING_CAPTURE_MODE: NORMAL
MDC_IDC_SET_LEADCHNL_RV_PACING_CATHODE_ELECTRODE_1: NORMAL
MDC_IDC_SET_LEADCHNL_RV_PACING_CATHODE_LOCATION_1: NORMAL
MDC_IDC_SET_LEADCHNL_RV_PACING_POLARITY: NORMAL
MDC_IDC_SET_LEADCHNL_RV_PACING_PULSEWIDTH: 0.4 MS
MDC_IDC_SET_LEADCHNL_RV_SENSING_ANODE_ELECTRODE_1: NORMAL
MDC_IDC_SET_LEADCHNL_RV_SENSING_ANODE_LOCATION_1: NORMAL
MDC_IDC_SET_LEADCHNL_RV_SENSING_CATHODE_ELECTRODE_1: NORMAL
MDC_IDC_SET_LEADCHNL_RV_SENSING_CATHODE_LOCATION_1: NORMAL
MDC_IDC_SET_LEADCHNL_RV_SENSING_POLARITY: NORMAL
MDC_IDC_SET_LEADCHNL_RV_SENSING_SENSITIVITY: 0.9 MV
MDC_IDC_SET_ZONE_DETECTION_INTERVAL: 400 MS
MDC_IDC_SET_ZONE_STATUS: NORMAL
MDC_IDC_SET_ZONE_TYPE: NORMAL
MDC_IDC_SET_ZONE_VENDOR_TYPE: NORMAL
MDC_IDC_STAT_BRADY_AP_VP_PERCENT: 0 %
MDC_IDC_STAT_BRADY_AP_VS_PERCENT: 0 %
MDC_IDC_STAT_BRADY_AS_VP_PERCENT: 99.82 %
MDC_IDC_STAT_BRADY_AS_VS_PERCENT: 0.18 %
MDC_IDC_STAT_BRADY_DTM_END: NORMAL
MDC_IDC_STAT_BRADY_DTM_START: NORMAL
MDC_IDC_STAT_BRADY_RA_PERCENT_PACED: 0 %
MDC_IDC_STAT_BRADY_RV_PERCENT_PACED: 99.82 %
MDC_IDC_STAT_CRT_DTM_END: NORMAL
MDC_IDC_STAT_CRT_DTM_START: NORMAL
MDC_IDC_STAT_CRT_LV_PERCENT_PACED: 99.79 %
MDC_IDC_STAT_CRT_PERCENT_PACED: 99.79 %
MDC_IDC_STAT_EPISODE_RECENT_COUNT: 0
MDC_IDC_STAT_EPISODE_RECENT_COUNT_DTM_END: NORMAL
MDC_IDC_STAT_EPISODE_RECENT_COUNT_DTM_START: NORMAL
MDC_IDC_STAT_EPISODE_TOTAL_COUNT: 0
MDC_IDC_STAT_EPISODE_TOTAL_COUNT: 4
MDC_IDC_STAT_EPISODE_TOTAL_COUNT: 460
MDC_IDC_STAT_EPISODE_TOTAL_COUNT_DTM_END: NORMAL
MDC_IDC_STAT_EPISODE_TOTAL_COUNT_DTM_START: NORMAL
MDC_IDC_STAT_EPISODE_TYPE: NORMAL
MDC_IDC_STAT_TACHYTHERAPY_RECENT_DTM_END: NORMAL
MDC_IDC_STAT_TACHYTHERAPY_RECENT_DTM_START: NORMAL
MDC_IDC_STAT_TACHYTHERAPY_TOTAL_DTM_END: NORMAL
MDC_IDC_STAT_TACHYTHERAPY_TOTAL_DTM_START: NORMAL

## 2024-07-09 PROCEDURE — 93294 REM INTERROG EVL PM/LDLS PM: CPT | Performed by: INTERNAL MEDICINE

## 2024-07-09 PROCEDURE — 93296 REM INTERROG EVL PM/IDS: CPT | Performed by: INTERNAL MEDICINE

## 2024-07-16 NOTE — PROGRESS NOTES
"Rheumatology Clinic Visit  [facility]  MAT Turcios     Thomas Steve MRN# [id number]   YOB: 1945 Age: 79 year old   Date of Visit: 7/19/2024  Primary care provider: Moin Mcclain          Assessment and Plan:     1.  Ankylosing spondylitis  2.  High-risk medication use    Patient presents today for follow up. Unfortunately he has not noticed any change to his back or joints with the start of Sulfasalazine. He is tolerating it well. He has been having some increased pain in his left knee. On exam there is some swelling and warmth over the left knee when compared to the right. At this time, we'll get an xray of the left knee. Will increase his Sulfasalazine to the maintenance dose. Recheck labs in 1 month. Consider biologic therapy if no improvement with dose increase. Follow-up with me in 3 months, sooner if needed.    Plan:     Schedule follow-up with Jo-Ann Griggs PA-C in 3 months.   Labs: CBC, creatinine, Albumin, AST, ALT, CRP and Sed Rate in 1 month  Imaging: xray left knee  Medication recommendations:   Increase Sulfasalazine 500mg: Take 1 tablet in AM and 2 tabs in PM for 1 week, then take 2 tablet twice daily. Take this medication with food.    MAT Turcios  Rheumatology         History of Present Illness:   Thomas Steve presents for evaluation of back pain.      Interval history July 19, 2024:  He has pain with standing and walking. He does not have too much pain with sitting. He states that he has a \"bad knee\", this is his left knee.     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 He had back surgery in 2022. It helped with some of the pain that he had. He states that he has pain in his buttock. He has pain with standing and walking. He feels better with sitting and resting. The pain does not wake him up at night. He states that he has pain in his left leg. He states that since 12/2022 he has " had issues. He states that when getting into his vehicle. He has to lift it with his hands. When he lays down he need a pillow under the leg due to the pressure of it trying settle. He does get eczema. No swelling in his joints. He tends to feel worse after walking his dogs. He has pain in the buttocks with lifting his dogs.     He has a history of a significant infection where his vein was grafted for his heart. He has also had cellulitis in the left leg recently. History of a bone infection as well from an injury to his toe.          Review of Systems:     Constitutional: negative  Skin: negative  Eyes: negative  Ears/Nose/Throat: negative  Respiratory: No shortness of breath, dyspnea on exertion, cough, or hemoptysis  Cardiovascular: negative  Gastrointestinal: negative  Genitourinary: negative  Musculoskeletal: as above  Neurologic: negative  Psychiatric: negative  Hematologic/Lymphatic/Immunologic: negative  Endocrine: negative         Active Problem List:     Patient Active Problem List    Diagnosis Date Noted    Diabetic ulcer of toe of right foot associated with diabetes mellitus due to underlying condition, with necrosis of bone (H) 06/01/2023     Priority: Medium    Spinal stenosis, lumbar region, without neurogenic claudication 07/15/2022     Priority: Medium    Lumbar herniated disc 07/15/2022     Priority: Medium    Traumatic hematoma of buttock, initial encounter 07/15/2022     Priority: Medium    Anemia due to blood loss, acute 07/14/2022     Priority: Medium    Atrial fibrillation (H) 07/11/2022     Priority: Medium     Dx in 2013  CHADS -Vasc = 5 (age, HTN, ASCVD, DM, CHF)  CV Aug 2013 and repeat on Amio Nov 2013 (early recurrence < 48hrs)  Spontaneous conversion to NSR in Apr 2014 (by history)  Amiodarone stopped Oct 2014      Formatting of this note might be different from the original.  Dx in 2013  CHADS -Vasc = 5 (age, HTN, ASCVD, DM, CHF)  CV Aug 2013 and repeat on Amio Nov 2013 (early  recurrence < 48hrs)  Spontaneous conversion to NSR in Apr 2014 (by history)  Amiodarone stopped Oct 2014      ARF (acute renal failure) (H24) 07/07/2022     Priority: Medium    Acute deep vein thrombosis (DVT) of femoral vein of right lower extremity (H) 06/29/2022     Priority: Medium    Lumbar spinal stenosis 06/17/2022     Priority: Medium    Lumbar stenosis with neurogenic claudication 06/16/2022     Priority: Medium    Gastroesophageal reflux disease without esophagitis 06/16/2022     Priority: Medium    Hypothyroidism 06/16/2022     Priority: Medium    Normocytic anemia 06/16/2022     Priority: Medium    Chronic left shoulder pain 06/16/2022     Priority: Medium    Biventricular cardiac pacemaker in situ 06/18/2019     Priority: Medium     Medtronic [id number] Percepta Quad CRT-P  DOI: 6/11/19      Formatting of this note might be different from the original.  Medtronic [id number] Percepta Quad CRT-P  DOI: 6/11/19      Essential hypertension      Priority: Medium     Created by Conversion  Replacement Utility updated for latest IMO load      Formatting of this note might be different from the original.  Created by Conversion    Replacement Utility updated for latest IMO load      Coronary atherosclerosis      Priority: Medium     Created by Conversion  Replacement Utility updated for latest IMO load      Formatting of this note might be different from the original.  Created by Conversion    Replacement Utility updated for latest IMO load      Ischemic cardiomyopathy      Priority: Medium     Created by Conversion      Formatting of this note might be different from the original.  Created by Conversion      Mixed hyperlipidemia      Priority: Medium     Created by Conversion      Formatting of this note might be different from the original.  Created by Conversion      Obstructive sleep apnea      Priority: Medium     Severe and using BIPAP routinely      Formatting of this note might be different from the  original.  Severe and using BIPAP routinely      Acute systolic heart failure (H)      Priority: Medium    SSS (sick sinus syndrome) (H) 06/07/2019     Priority: Medium     Added automatically from request for surgery [id number]      Formatting of this note might be different from the original.  Added automatically from request for surgery [id number]      Congestive Heart Failure      Priority: Medium     LVEF 55% by echo Sept 2013  Replacement Utility updated for latest IMO load      Formatting of this note might be different from the original.  LVEF 55% by echo Sept 2013  Replacement Utility updated for latest IMO load      Venous insufficiency of both lower extremities 07/25/2016     Priority: Medium    Acquired lymphedema of leg 07/25/2016     Priority: Medium    Claudication of both lower extremities (H24) 07/25/2016     Priority: Medium    Diabetic peripheral neuropathy associated with type 2 diabetes mellitus (H) 07/25/2016     Priority: Medium    Typical atrial flutter (H) 07/07/2016     Priority: Medium     Typical flutter on 12-lead EKG July 7, 2016      Formatting of this note might be different from the original.  Typical flutter on 12-lead EKG July 7, 2016      Right bundle branch block      Priority: Medium     Created by Conversion      Formatting of this note might be different from the original.  Created by Conversion      Type 2 diabetes mellitus (H)      Priority: Medium     Created by Conversion      Formatting of this note might be different from the original.  Created by Conversion              Past Medical History:     Past Medical History:   Diagnosis Date    Acute sinusitis     Atrial fibrillation (H)     Resolved after medication and CPAP    Back pain     CHF (congestive heart failure) (H)     Coronary artery disease     Diabetes mellitus (H)     Gastroesophageal reflux disease with esophagitis     HTN (hypertension)     Hyperlipemia     Hyperlipidemia     Joint pain     Lipoma of neck     Nocturia      Sciatic leg pain     Shoulder impingement syndrome     BILATERAL    Sleep apnea     uses CPAP    Typical atrial flutter (H) 2016    Demonstrated on 12-lead EKG 2016     Past Surgical History:   Procedure Laterality Date    CORONARY ARTERY BYPASS      EP BIV PACEMAKER INSERT N/A 2019    Procedure: EP Biventricular Pacemaker Insertion;  Surgeon: Durga Rajput MD;  Location: [facility] Lab;  Service: Cardiology    IMPLANT PACEMAKER      IR FINE NEEDLE ASPIRATION W ULTRASOUND  2022    LAMINECTOMY LUMBAR TWO LEVELS Bilateral 2022    Procedure: DECOMPRESSIVE LUMBAR LAMINECTOMY LUMBAR 4-LUMBAR 5 AND LUMBAR 5-SACRAL 1 BILATERAL-LEFT SIDE FIRST-PLUS BILATERAL FORAMINOTOMIES PLUS REMOVAL OF HERNIATED DISC LUMBAR 5-SACRAL 1 LEFT;  Surgeon: Israel Samuel MD;  Location: [facility] Main OR    FL CARDIOVERSION ELECTIVE ARRHYTHMIA EXTERNAL  2014    Description: Elective Cardioversion External;  Recorded: 2014;    ZZC CABG, VEIN, SINGLE  2011    Description: CABG (CABG);  Proc Date: 2011;  Comments: LIMA^LAD, SVG^OM, SVG^distal RCA            Social History:     Social History     Socioeconomic History    Marital status:      Spouse name: Not on file    Number of children: Not on file    Years of education: Not on file    Highest education level: Not on file   Occupational History    Not on file   Tobacco Use    Smoking status: Former     Current packs/day: 0.00     Average packs/day: 1.5 packs/day for 23.0 years (34.5 ttl pk-yrs)     Types: Cigarettes     Start date: 1963     Quit date: 1986     Years since quittin.5    Smokeless tobacco: Never   Substance and Sexual Activity    Alcohol use: Yes     Alcohol/week: 1.0 standard drink of alcohol     Comment: one beer per day    Drug use: No    Sexual activity: Never   Other Topics Concern    Not on file   Social History Narrative    .  3 children.  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     Social Determinants of Health     Financial Resource Strain: Not on File (2023)    Received from [facility]    Financial Resource Strain     Financial Resource Strain: 0   Food Insecurity: Not on File (2023)    Received from [facility]    Food Insecurity     Food: 0   Transportation Needs: Not on File (2023)    Received from [facility]    Transportation Needs     Transportation: 0   Physical Activity: Not on File (2023)    Received from [facility]    Physical Activity     Physical Activity: 0   Stress: Not on File (2023)    Received from [facility]    Stress     Stress: 0   Social Connections: Not on File (2023)    Received from [facility]    Social Connections     Social Connections and Isolation: 0   Interpersonal Safety: Not on file   Housing Stability: Not on File (2023)    Received from [facility]    Housing Stability     Housin          Family History:     Family History   Problem Relation Age of Onset    Heart Disease Mother     Snoring Father     Heart Disease Father     Hypertension Father     Alzheimer Disease Father     No Known Problems Daughter     No Known Problems Daughter     No Known Problems Daughter     Chronic Obstructive Pulmonary Disease Sister     Hodgkin's lymphoma Brother     No Known Problems Son     Diabetes Sister     Substance Abuse Sister     Chronic Obstructive Pulmonary Disease Brother     Heart Disease Brother     Diabetes Brother     Substance Abuse Brother             Allergies:     Allergies   Allergen Reactions    Aspirin Other (See Comments)     Contraindicated due to xarelto use    Bee Pollen Unknown     Scratchy throat    Codeine Nausea and Vomiting    Pollen Extracts [Pollen Extract] Unknown     Scratchy throat    Vicodin [Hydrocodone-Acetaminophen] Nausea and Vomiting            Medications:     Current Outpatient Medications   Medication Sig Dispense Refill    acetaminophen (TYLENOL) 500 MG tablet  Take 1,000-1,500 mg by mouth every 6 hours as needed for mild pain      bumetanide (BUMEX) 1 MG tablet Take 2 tablets (2 mg) by mouth daily 180 tablet 3    cholecalciferol 50 MCG (2000 UT) CAPS Take 2 capsules by mouth daily      coenzyme Q-10 200 MG CAPS capsule Take 200 mg by mouth daily      diphenhydrAMINE-acetaminophen (TYLENOL PM)  MG tablet Take 3 tablets by mouth at bedtime      ferrous sulfate (FEROSUL) 325 (65 Fe) MG tablet Take 1 tablet by mouth 3 times daily (with meals)      glimepiride (AMARYL) 4 MG tablet Take 4 mg by mouth every morning (before breakfast)      insulin glargine (LANTUS PEN) 100 UNIT/ML pen Inject 20 Units Subcutaneous At Bedtime (Patient taking differently: Inject 30 Units subcutaneously at bedtime) 15 mL     levothyroxine (SYNTHROID/LEVOTHROID) 25 MCG tablet Take 25 mcg by mouth daily      losartan (COZAAR) 50 MG tablet Take 1 tablet (50 mg) by mouth daily 90 tablet 3    Melatonin 10 MG TABS tablet Take 10 mg by mouth at bedtime      Multiple Vitamins-Minerals (PRESERVISION AREDS PO) Take by mouth 2 times daily      Omega-3 Fatty Acids (FISH OIL) 1200 MG capsule Take 1,200 mg by mouth daily      omeprazole (PRILOSEC) 20 MG DR capsule Take 1 capsule by mouth 2 times daily Take one morning and one at night time      pregabalin (LYRICA) 75 MG capsule Take 2 capsule by mouth once daily.      rivaroxaban ANTICOAGULANT (XARELTO) 20 MG TABS tablet Take 1 tablet (20 mg) by mouth at bedtime 30 tablet 11    rosuvastatin (CRESTOR) 10 MG tablet Take 10 mg by mouth At Bedtime      senna-docusate (SENOKOT-S/PERICOLACE) 8.6-50 MG tablet Take 1 tablet by mouth 2 times daily      sulfaSALAzine (AZULFIDINE) 500 MG tablet Take 1 tablet daily for 1 week, then take 1 tablet twice daily. Take this medication with food. (Patient taking differently: Take 500 mg by mouth 2 times daily) 60 tablet 2    vitamin C (ASCORBIC ACID) 500 MG tablet Take 500 mg by mouth 3 times daily (with meals) Take with iron  "supplement      zinc gluconate 50 MG tablet Take 1 tablet (50 mg) by mouth daily      POTASSIUM PO Take by mouth daily (Patient not taking: Reported on 7/19/2024)              Physical Exam:   Blood pressure 135/77, pulse 68, temperature 98  F (36.7  C), temperature source Tympanic, resp. rate 16, height 1.765 m (5' 9.5\"), weight 95.9 kg (211 lb 6.4 oz), SpO2 97%.  Wt Readings from Last 6 Encounters:   06/03/24 95.7 kg (211 lb)   04/12/24 95.1 kg (209 lb 9.6 oz)   10/31/23 95.3 kg (210 lb)   06/27/23 94.8 kg (209 lb)   06/13/23 93 kg (205 lb)   06/01/23 93 kg (205 lb)     Constitutional: well-developed, appearing stated age; cooperative  Eyes: nl conjunctiva, sclera  ENT: nl external ears, nose, hearing, lips,   Neck: no mass or thyroid enlargement  Resp: No shortness of breath with normal conversation  Lymph: no cervical, supraclavicular or epitrochlear nodes  MS:  mild swelling over the left knee associated with some warmth of the knee as well.   Skin: no nail pitting, alopecia, rash, nodules or lesions.   Psych: nl judgement, orientation, memory, affect.           Data:   Imaging:  Xray lumbar spine 11/22/2023  IMPRESSION: Multilevel anterior osteophytic changes. Vertebral body heights are maintained. Mild posterior element degenerative changes. Multilevel mild loss of disc height. No abnormal motion on flexion or extension views. Stable fusion of the   sacroiliac joints.    Xray Si joint 11/22/2023  IMPRESSION: Bones are demineralized. There is partial bony bridging across both SI joints which can be seen with a long-standing inflammatory arthritis or DISH. Atherosclerotic vascular calcifications. Posterior decompressive changes lower lumbar spine.     CT pelvis 11/22/2023  IMPRESSION:  1.  Moderate hypertrophic changes in both SI joints and there is also solid bridging bone across both SI joints. The findings are similar to 07/13/2022.  2.  Postoperative and degenerative changes in the visualized spine as well " as degenerative changes in the pubic symphysis and both hips again noted.  3.  No new findings.    CT lumbar spine 10/31/2023  IMPRESSION:  1.  Decompressive laminectomy at L4-L5 and L5-S1.     2.  Degenerative change at L4-L5 with grade I anterior spondylolisthesis of L4 on L5. Bilateral facet degeneration. Disc bulging and right-sided facet hypertrophic change results in right-sided L5 lateral recess stenosis. Bilateral foraminal stenosis   which was seen on previous MRI scan.     3.  Degenerative change at L5-S1 with loss of disc height. Thickening of the ligamentum flavum which results in narrowing of the thecal sac in the lateral dimension. Mild spinal canal stenosis. Subtle encroachment upon the right lateral recess. Bilateral   foraminal narrowing.     4.  Mild degenerative change at L3-L4 with mild disc bulging. Bilateral facet degeneration with thickening of the ligamentum flavum. Mild spinal canal stenosis. Foramen are patent. Overall appearance is similar compared to MRI scan of 08/14/2023.     5.  Anterior/left bridging syndesmophyte at the L2-L3 level is incidentally noted. Spinal canal and foramen are patent at this level.    Laboratory:  12/7/2023  CRP 3.40  Sed rate 27  JOSE negative    5/24/2024  WBC 8.3, Hgb 12.6,, Plt 157  Sed rate 21  CRP 3.96      6/26/2024  Creatinine 0.97, GFR 80  Albumin 4.7  ALT 33, AST 34

## 2024-07-19 ENCOUNTER — ANCILLARY PROCEDURE (OUTPATIENT)
Dept: GENERAL RADIOLOGY | Facility: CLINIC | Age: 79
End: 2024-07-19
Attending: PHYSICIAN ASSISTANT
Payer: MEDICARE

## 2024-07-19 ENCOUNTER — OFFICE VISIT (OUTPATIENT)
Dept: RHEUMATOLOGY | Facility: CLINIC | Age: 79
End: 2024-07-19
Payer: MEDICARE

## 2024-07-19 VITALS
OXYGEN SATURATION: 97 % | DIASTOLIC BLOOD PRESSURE: 77 MMHG | RESPIRATION RATE: 16 BRPM | BODY MASS INDEX: 30.26 KG/M2 | SYSTOLIC BLOOD PRESSURE: 135 MMHG | HEART RATE: 68 BPM | TEMPERATURE: 98 F | WEIGHT: 211.4 LBS | HEIGHT: 70 IN

## 2024-07-19 DIAGNOSIS — M45.7 ANKYLOSING SPONDYLITIS OF LUMBOSACRAL REGION (H): ICD-10-CM

## 2024-07-19 DIAGNOSIS — Z79.899 HIGH RISK MEDICATION USE: ICD-10-CM

## 2024-07-19 PROCEDURE — 99214 OFFICE O/P EST MOD 30 MIN: CPT | Performed by: PHYSICIAN ASSISTANT

## 2024-07-19 PROCEDURE — 73562 X-RAY EXAM OF KNEE 3: CPT | Mod: TC | Performed by: RADIOLOGY

## 2024-07-19 RX ORDER — SULFASALAZINE 500 MG/1
TABLET ORAL
Qty: 360 TABLET | Refills: 0 | Status: SHIPPED | OUTPATIENT
Start: 2024-07-19

## 2024-07-19 ASSESSMENT — PAIN SCALES - GENERAL: PAINLEVEL: SEVERE PAIN (7)

## 2024-07-19 NOTE — PATIENT INSTRUCTIONS
After Visit Instructions:     Thank you for coming to [facility] for your care. It is my goal to partner with you to help you reach your optimal state of health.       Plan:     Schedule follow-up with Jo-Ann Griggs PA-C in 3 months.   Labs: CBC, creatinine, Albumin, AST, ALT, CRP and Sed Rate in 1 month  Imaging: xray left knee  Medication recommendations:   Increase Sulfasalazine 500mg: Take 1 tablet in AM and 2 tabs in PM for 1 week, then take 2 tablet twice daily. Take this medication with food.            Jo-Ann Griggs PA-C  [facility]  [facility]    Contact information: [facility]  Clinic Number:  [phone]  Please call or send a [facility] message with any questions about your care

## 2024-07-22 ENCOUNTER — TELEPHONE (OUTPATIENT)
Dept: CARDIOLOGY | Facility: CLINIC | Age: 79
End: 2024-07-22
Payer: MEDICARE

## 2024-07-22 NOTE — TELEPHONE ENCOUNTER
"Per Annette's 6-3-24 visit note:  Plan:   Repeat echocardiogram with fatigue, shortness of breath and mod-severe tricuspid regurgitation  Continue losartan, Bumex, rosuvastatin, Xarelto  Continue regular device checks  Continue with aggressive DM management with PCP    Follow up in 1 year or sooner if results/as needed    Copied from 6-27-24 echo results note, Annette's response:  \"Given these findings can try taking 3 mg of Bumex daily for 3 days and then go back to 2 mg daily.  If fatigue improves notify clinic as diuretic increase may be indicated.\"      Return call to patient who confirmed he had increased Bumex x 3 days at beginning of July and did not notice any changes to sx (no improvement or worsening of sx) - patient questioning if any other chgs recommended.    Patient also inquired if he could use \"Arthritis pain cream\" or Voltaren cream for his joints as recommended by rheumatologist - reassured patient that questions would be forwarded to [facility] for recommendations - understanding verbalized.  mg     "

## 2024-07-22 NOTE — TELEPHONE ENCOUNTER
[facility] Call Center    Phone Message    May a detailed message be left on voicemail: yes     Reason for Call: Other: Patient called stating when they last seen Annette on 06/03, they were advised to increase their Bumex medication to see if it helps with tiredness/weakness, and to call with updates on if it helps. Patient stated it did not help, and wanting to know what they should do next. Patient also stated they were recommended on using arthritis pain cream, but there is a warning in regards to patients with heart condition and wanting to know if they should use it or not. Please call patient back to further discuss.     Action Taken: Other: Cardiology    Travel Screening: Not Applicable     Thank you!  [facility]

## 2024-07-22 NOTE — TELEPHONE ENCOUNTER
Please review sx update and address questions r.e. diuretic dose and use of arthritis / pain creams.  mg

## 2024-07-23 ENCOUNTER — TELEPHONE (OUTPATIENT)
Dept: RHEUMATOLOGY | Facility: CLINIC | Age: 79
End: 2024-07-23
Payer: MEDICARE

## 2024-07-23 DIAGNOSIS — M45.7 ANKYLOSING SPONDYLITIS OF LUMBOSACRAL REGION (H): Primary | ICD-10-CM

## 2024-07-23 RX ORDER — SULFASALAZINE 500 MG/1
1000 TABLET, DELAYED RELEASE ORAL 2 TIMES DAILY
Qty: 28 TABLET | Refills: 0 | Status: SHIPPED | OUTPATIENT
Start: 2024-07-23 | End: 2024-07-30

## 2024-07-23 NOTE — TELEPHONE ENCOUNTER
Will issue new Rx for treatment x 1 wk, while pt is waiting on his mail order prescription to come in.    Mariaa Lindsey  [facility] RN

## 2024-07-23 NOTE — TELEPHONE ENCOUNTER
[facility] Call Center    Phone Message    May a detailed message be left on voicemail: yes     Reason for Call: Other: Pt is calling to speak to a nurse. Pt is wondering what he should do if he runs out of the sulfasalazine (AZULFIDINE) 500 MG tablet before the new supply gets delivered from [facility]. Pt is wondering if he could possibly get a partial prescription filled at his local pharmacy so he does not miss any doses. Please call pt back today at [phone].     Action Taken: Message routed to:  Other: [facility] [facility]    Travel Screening: Not Applicable     Date of Service: 7/23

## 2024-07-24 NOTE — TELEPHONE ENCOUNTER
"Return call to patient - informed him of Annette's response / recommendations - patient verbalized understanding after questions addressed and agreed to follow-up with PCP herb jerry and use of \"arthritis crm\" from [facility].  mg  "

## 2024-07-24 NOTE — TELEPHONE ENCOUNTER
Msg rec'd 7-24-24 @ 4017:  Annette Malik, Enma Alcala, RUDI  Unlikely cardiovascular side effects of topical Voltaren gel and can do that if needed.    Without any improvement with increased Bumex likely would not benefit to try again.  According to his last device check there is no noted arrhythmias and OptiVol index indicating elevated fluid has returned to baseline.  Would continue to discuss shortness of breath with primary doctor if bothersome.

## 2024-10-06 NOTE — PROGRESS NOTES
NEUROLOGY CONSULTATION NOTE       [facility]UROLOG[facility] [place]  [address] [place] [address]  Tel: [phone]  Fax: [phone]  [email or web address]     Thomas Steve,  1945, MRN [id number]  PCP: Moni Mcclain  Date: 10/08/2024     ASSESSMENT & PLAN     Visit Diagnosis  Paresthesia  Diabetic peripheral neuropathy associated with type 2 diabetes mellitus (H)     Diabetic polyneuropathy  79-year-old male with DM, diabetic polyneuropathy, lumbar stenosis s/p decompressive laminectomy L4-L5 and L5-S1 with persistent and gait difficulty with back pain.  His MRI scan shows inflammatory changes in the sacrum and is scheduled to see orthopedics.  Previously his EMG showed sensorimotor polyneuropathy likely due to diabetes.  I have recommended:    1.  Repeat EMG bilateral lower extremity  2.  Lab work to include JOSE, folate, hemoglobin A1c, Lyme titer, MMA, paraneoplastic panel, SPEP, immunofixation, B1, B6, B12, zinc and vitamin E  3.  Follow-up the day scheduled for EMG    Thank you again for this referral, please feel free to contact me if you have any questions.    Yamil Sheppard MD  [facility] NEUROLOG[facility], [place]     REASON FOR CONSULTATION Extremity Weakness        HISTORY OF PRESENT ILLNESS     We have been requested by Noreen Lynn to evaluate Thomas Steve who is a 79 year old  male for paresthesia    79-year-old male with history of DM, diabetic polyneuropathy, lumbar stenosis, HTN, ischemic cardiomyopathy, A-fib s/p decompressive laminectomy L4-L5 and L5-S1 who was referred for evaluation of bilateral leg numbness and tingling along with discomfort.  According to patient his symptoms started in  and eventually had decompressive surgery done.  He continued to have symptoms and had a repeat MRI scan that showed enhancement in the posterior postoperative soft tissue from L4 through sacrum and possibility of inflammatory facet facet inflammation was raised.   He also had an EMG last year that suggested length-dependent mixed sensorimotor polyneuropathy.  Due to persistent symptom he is scheduled to see orthopedics.  Patient also reports progressive decline in his ability to walk and reports that he has not yet seen any physical therapist.     PROBLEM LIST   Patient Active Problem List   Diagnosis    Type 2 diabetes mellitus (H)    Essential hypertension    Coronary atherosclerosis    Ischemic cardiomyopathy    Right bundle branch block    Mixed hyperlipidemia    Atrial fibrillation (H)    Obstructive sleep apnea    Congestive Heart Failure    Typical atrial flutter (H)    Venous insufficiency of both lower extremities    Acquired lymphedema of leg    Claudication of both lower extremities (H)    Diabetic peripheral neuropathy associated with type 2 diabetes mellitus (H)    SSS (sick sinus syndrome) (H)    Acute systolic heart failure (H)    Biventricular cardiac pacemaker in situ    Lumbar stenosis with neurogenic claudication    Gastroesophageal reflux disease without esophagitis    Hypothyroidism    Normocytic anemia    Chronic left shoulder pain    Lumbar spinal stenosis    Acute deep vein thrombosis (DVT) of femoral vein of right lower extremity (H)    ARF (acute renal failure) (H)    Anemia due to blood loss, acute    Spinal stenosis, lumbar region, without neurogenic claudication    Lumbar herniated disc    Traumatic hematoma of buttock, initial encounter    Diabetic ulcer of toe of right foot associated with diabetes mellitus due to underlying condition, with necrosis of bone (H)         PAST MEDICAL & SURGICAL HISTORY     Past Medical History:   Patient  has a past medical history of Acute sinusitis, Atrial fibrillation (H), Back pain, CHF (congestive heart failure) (H), Coronary artery disease, Diabetes mellitus (H), Gastroesophageal reflux disease with esophagitis, HTN (hypertension), Hyperlipemia, Hyperlipidemia, Joint pain, Lipoma of neck, Nocturia, Sciatic  leg pain, Shoulder impingement syndrome, Sleep apnea, and Typical atrial flutter (H) (07/07/2016).    Surgical History:  He  has a past surgical history that includes CABG, VEIN, SINGLE (01/01/2011); Pr Cardioversion Elective Arrhythmia External (06/03/2014); Ep Biv Pacemaker Insert (N/A, 06/11/2019); Laminectomy lumbar two levels (Bilateral, 06/16/2022); IR Fine Needle Aspiration w Ultrasound (06/23/2022); coronary artery bypass; and Implant pacemaker.     SOCIAL HISTORY     Reviewed, and he  reports that he quit smoking about 38 years ago. His smoking use included cigarettes. He started smoking about 61 years ago. He has a 34.5 pack-year smoking history. He has never used smokeless tobacco. He reports current alcohol use of about 1.0 standard drink of alcohol per week. He reports that he does not use drugs.     FAMILY HISTORY     Reviewed, and family history includes Alzheimer Disease in his father; Cerebrovascular Disease in his father; Chronic Obstructive Pulmonary Disease in his brother and sister; Diabetes in his brother and sister; Heart Disease in his brother, father, and mother; Hodgkin's lymphoma in his brother; Hypertension in his father and mother; No Known Problems in his daughter, daughter, daughter, and son; Snoring in his father; Substance Abuse in his brother and sister.     ALLERGIES     Allergies   Allergen Reactions    Aspirin Other (See Comments)     Contraindicated due to xarelto use    Bee Pollen Unknown     Scratchy throat    Codeine Nausea and Vomiting    Pollen Extracts [Pollen Extract] Unknown     Scratchy throat    Vicodin [Hydrocodone-Acetaminophen] Nausea and Vomiting         REVIEW OF SYSTEMS     A 12 point review of system was performed and was negative except as outlined in the history of present illness.     HOME MEDICATIONS     Current Outpatient Rx   Medication Sig Dispense Refill    acetaminophen (TYLENOL) 500 MG tablet Take 1,000-1,500 mg by mouth every 6 hours as needed for  mild pain      bumetanide (BUMEX) 1 MG tablet Take 2 tablets (2 mg) by mouth daily 180 tablet 3    cholecalciferol 50 MCG (2000 UT) CAPS Take 2 capsules by mouth daily      coenzyme Q-10 200 MG CAPS capsule Take 200 mg by mouth daily      diphenhydrAMINE-acetaminophen (TYLENOL PM)  MG tablet Take 3 tablets by mouth at bedtime      ferrous sulfate (FEROSUL) 325 (65 Fe) MG tablet Take 1 tablet by mouth 3 times daily (with meals)      glimepiride (AMARYL) 4 MG tablet Take 4 mg by mouth every morning (before breakfast)      insulin glargine (LANTUS PEN) 100 UNIT/ML pen Inject 20 Units Subcutaneous At Bedtime (Patient taking differently: Inject 30 Units subcutaneously at bedtime.) 15 mL     levothyroxine (SYNTHROID/LEVOTHROID) 25 MCG tablet Take 25 mcg by mouth daily      losartan (COZAAR) 50 MG tablet Take 1 tablet (50 mg) by mouth daily 90 tablet 3    Melatonin 10 MG TABS tablet Take 10 mg by mouth at bedtime      Multiple Vitamins-Minerals (PRESERVISION AREDS PO) Take by mouth 2 times daily      Omega-3 Fatty Acids (FISH OIL) 1200 MG capsule Take 1,200 mg by mouth daily      omeprazole (PRILOSEC) 20 MG DR capsule Take 1 capsule by mouth 2 times daily Take one morning and one at night time      POTASSIUM PO Take by mouth daily.      pregabalin (LYRICA) 75 MG capsule Take 2 capsule by mouth once daily.      rivaroxaban ANTICOAGULANT (XARELTO) 20 MG TABS tablet Take 1 tablet (20 mg) by mouth at bedtime 30 tablet 11    rosuvastatin (CRESTOR) 10 MG tablet Take 10 mg by mouth At Bedtime      senna-docusate (SENOKOT-S/PERICOLACE) 8.6-50 MG tablet Take 1 tablet by mouth 2 times daily      sulfaSALAzine (AZULFIDINE) 500 MG tablet Take 1 tablet in and 2 tabs in PM for 1 week, then take 2 tablets twice daily. Take this medication with food. 360 tablet 0    vitamin C (ASCORBIC ACID) 500 MG tablet Take 500 mg by mouth 3 times daily (with meals) Take with iron supplement      zinc gluconate 50 MG tablet Take 1 tablet (50 mg)  "by mouth daily           PHYSICAL EXAM     Vital signs  BP (!) 142/77 (BP Location: Left arm, Patient Position: Sitting)   Pulse 80   Ht 1.765 m (5' 9.5\")   Wt 94.8 kg (209 lb)   BMI 30.42 kg/m      Weight:   209 lbs 0 oz    Elderly gentleman who is alert and oriented vital signs are reviewed and documented in electronic medical record.  Neck supple.  Neurologically speech was normal.  Cranial nerves II through XII are intact.  Motor strength 5/5 reflexes absent sensation decreased to light touch pinprick.  He has a wide-based gait unable to tandem walk.     PERTINENT DIAGNOSTIC STUDIES     Following studies were reviewed:     MRI LUMBAR SPINE 8/14/2023  1.  Significant interval decrease in postoperative seroma status post decompressive laminectomies at the L4-L5 and the L5-S1 disc space levels.  2.  Enhancement posterior postoperative enhancement posterior soft tissues from L4 to sacrum along with enhancement of the L4-L5 facet complexes and associated synovial cysts. This could represent a postoperative changes or possibly inflammatory facet   hiatus. Recommend clinical correlation.  3.  At the L3-L4 disc space level there is mild progression to mild canal compromise, mild right lateral recess narrowing and mild right neural foraminal narrowing.  4.  At the L4-L5 disc space level there is improvement to minimal canal compromise, bilateral lateral recess narrowing and moderate bilateral neural foraminal narrowing.  5.  At the L5-S1 disc space level there is bilateral lateral recess narrowing and moderate bilateral neural foraminal narrowing.    EMG 10/31/2023  There is electrodiagnostic evidence of:  1.  Electrodiagnostic findings are consistent with a length-dependent sensorimotor polyneuropathy, predominantly axonal.  This results in denervation through the medial gastrocnemius muscles in the bilateral lower extremities.    2.  Left S1 and/or L5 radiculopathy, chronic.  Cannot completely exclude an active " component   3.  There is no convincing electrodiagnostic evidence of lumbosacral radiculopathy in the right lower extremity or focal neuropathy in the bilateral lower extremities.     PERTINENT LABS  Following labs were reviewed:  Ancillary Procedure on 07/09/2024   Component Date Value Ref Range Status    Date Time Interrogation Session 07/09/2024 [id number]   Final    Implantable Pulse Generator Manufa* 07/09/2024 Medtronic   Final    Implantable Pulse Generator Model 07/09/2024 [id number] Percepta Quad CRT-P   Final    Implantable Pulse Generator Serial* 07/09/2024 [id number]   Final    Type Interrogation Session 07/09/2024 Remote Scheduled   Final    Clinic Name 07/09/2024 [facility]   Final    Implantable Pulse Generator Type 07/09/2024 Cardiac Resynchronization Therapy - Pacemaker   Final    Implantable Pulse Generator Implan* 07/09/2024 [id number]   Final    Implantable Lead  07/09/2024 Medtronic   Final    Implantable Lead Model 07/09/2024 4398 Attain Performa Straight MRI SureScan   Final    Implantable Lead Serial Number 07/09/2024 [id number]   Final    Implantable Lead Implant Date 07/09/2024 [id number]   Final    Implantable Lead Polarity Type 07/09/2024 Quadripolar Lead   Final    Implantable Lead Location Detail 1 07/09/2024 UNKNOWN   Final    Implantable Lead Location 07/09/2024 Left Ventricle   Final    Implantable Lead Connection Status 07/09/2024 Connected   Final    Implantable Lead  07/09/2024 Medtronic   Final    Implantable Lead Model 07/09/2024 5076 CapSureFix Novus MRI SureScan   Final    Implantable Lead Serial Number 07/09/2024 [id number]   Final    Implantable Lead Implant Date 07/09/2024 [id number]   Final    Implantable Lead Polarity Type 07/09/2024 Bipolar Lead   Final    Implantable Lead Location Detail 1 07/09/2024 UNKNOWN   Final    Implantable Lead Location 07/09/2024 Right Atrium   Final    Implantable Lead Connection Status 07/09/2024 Connected    Final    Implantable Lead  07/09/2024 Medtronic   Final    Implantable Lead Model 07/09/2024 5076 CapSureFix Novus MRI SureScan   Final    Implantable Lead Serial Number 07/09/2024 [id number]   Final    Implantable Lead Implant Date 07/09/2024 [id number]   Final    Implantable Lead Polarity Type 07/09/2024 Bipolar Lead   Final    Implantable Lead Location Detail 1 07/09/2024 UNKNOWN   Final    Implantable Lead Location 07/09/2024 Right Ventricle   Final    Implantable Lead Connection Status 07/09/2024 Connected   Final    Hansel Setting Mode (NBG Code) 07/09/2024 VVIR   Final    Hansel Setting Lower Rate Limit 07/09/2024 60  [beats]/min Final    Hansel Setting Maximum Sensor Rate 07/09/2024 130  [beats]/min Final    Lead Channel Setting Sensing Polar* 07/09/2024 Bipolar   Final    Lead Channel Setting Sensing Anode* 07/09/2024 Right Atrium   Final    Lead Channel Setting Sensing Anode* 07/09/2024 Ring   Final    Lead Channel Setting Sensing Catho* 07/09/2024 Right Atrium   Final    Lead Channel Setting Sensing Catho* 07/09/2024 Tip   Final    Lead Channel Setting Sensing Sensi* 07/09/2024 Off   Final    Lead Channel Setting Sensing Polar* 07/09/2024 Bipolar   Final    Lead Channel Setting Sensing Anode* 07/09/2024 Right Ventricle   Final    Lead Channel Setting Sensing Anode* 07/09/2024 Ring   Final    Lead Channel Setting Sensing Catho* 07/09/2024 Right Ventricle   Final    Lead Channel Setting Sensing Catho* 07/09/2024 Tip   Final    Lead Channel Setting Sensing Sensi* 07/09/2024 0.9  mV Final    Ventricular chambers paced during * 07/09/2024 BiV   Final    CRT LV-RV Delay 07/09/2024 0  ms Final    Lead Channel Setting Pacing Polari* 07/09/2024 Bipolar   Final    Lead Channel Setting Pacing Anode * 07/09/2024 Right Ventricle   Final    Lead Channel Setting Pacing Anode * 07/09/2024 Ring   Final    Lead Channel Setting Sensing Catho* 07/09/2024 Right Ventricle   Final    Lead Channel Setting Sensing Catho*  07/09/2024 Tip   Final    Lead Channel Setting Pacing Pulse * 07/09/2024 0.4  ms Final    Lead Channel Setting Pacing Amplit* 07/09/2024 1.5  V Final    Lead Channel Setting Pacing Captur* 07/09/2024 Adaptive   Final    Lead Channel Setting Pacing Polari* 07/09/2024 Bipolar   Final    Lead Channel Setting Pacing Anode * 07/09/2024 Left Ventricle   Final    Lead Channel Setting Pacing Anode * 07/09/2024 Ring4   Final    Lead Channel Setting Sensing Catho* 07/09/2024 Left Ventricle   Final    Lead Channel Setting Sensing Catho* 07/09/2024 Tip   Final    Lead Channel Setting Pacing Pulse * 07/09/2024 0.4  ms Final    Lead Channel Setting Pacing Amplit* 07/09/2024 1.75  V Final    Lead Channel Setting Pacing Captur* 07/09/2024 Adaptive   Final    Zone Setting Type Category 07/09/2024 VF   Final    Zone Setting Vendor Type Category 07/09/2024 V High Rate   Final    Zone Setting Type Category 07/09/2024 VT   Final    Zone Setting Vendor Type Category 07/09/2024 FastVT   Final    Zone Setting Type Category 07/09/2024 VT   Final    Zone Setting Vendor Type Category 07/09/2024 VT   Final    Zone Setting Type Category 07/09/2024 VT   Final    Zone Setting Vendor Type Category 07/09/2024 MonVT   Final    Zone Setting Status 07/09/2024 Monitor   Final    Zone Setting Detection Interval 07/09/2024 400  ms Final    Zone Setting Type Category 07/09/2024 ATRIAL_FIBRILLATION   Final    Zone Setting Vendor Type Category 07/09/2024 FastATAF   Final    Zone Setting Type Category 07/09/2024 AT/AF   Final    Lead Channel Impedance Value 07/09/2024 399  ohm Final    Lead Channel Impedance Value 07/09/2024 342  ohm Final    Lead Channel Sensing Intrinsic Amp* 07/09/2024 0.25  mV Final    Lead Channel Sensing Intrinsic Amp* 07/09/2024 0.375  mV Final    Lead Channel Pacing Threshold Ampl* 07/09/2024 0.5  V Final    Lead Channel Pacing Threshold Puls* 07/09/2024 0.4  ms Final    Lead Channel Impedance Value 07/09/2024 380  ohm Final    Lead  Channel Impedance Value 07/09/2024 323  ohm Final    Lead Channel Sensing Intrinsic Amp* 07/09/2024 11.25  mV Final    Lead Channel Sensing Intrinsic Amp* 07/09/2024 11.25  mV Final    Lead Channel Pacing Threshold Ampl* 07/09/2024 0.5  V Final    Lead Channel Pacing Threshold Puls* 07/09/2024 0.4  ms Final    Lead Channel Impedance Value 07/09/2024 532  ohm Final    Lead Channel Impedance Value 07/09/2024 361  ohm Final    Lead Channel Impedance Value 07/09/2024 380  ohm Final    Lead Channel Impedance Value 07/09/2024 399  ohm Final    Lead Channel Impedance Value 07/09/2024 779  ohm Final    Lead Channel Impedance Value 07/09/2024 798  ohm Final    Lead Channel Impedance Value 07/09/2024 817  ohm Final    Lead Channel Impedance Value 07/09/2024 418  ohm Final    Lead Channel Impedance Value 07/09/2024 665  ohm Final    Lead Channel Impedance Value 07/09/2024 665  ohm Final    Lead Channel Pacing Threshold Ampl* 07/09/2024 0.75  V Final    Lead Channel Pacing Threshold Puls* 07/09/2024 0.4  ms Final    Battery Date Time of Measurements 07/09/2024 20240708231502   Final    Battery Status 07/09/2024 OK   Final    Battery RRT Trigger 07/09/2024 2.595   Final    Battery Remaining Longevity 07/09/2024 80  mo Final    Battery Voltage 07/09/2024 2.97  V Final    Hansel Statistic Date Time Start 07/09/2024 20240415151444   Final    Hansel Statistic Date Time End 07/09/2024 20240709004519   Final    Hansel Statistic RA Percent Paced 07/09/2024 0  % Final    Hansel Statistic RV Percent Paced 07/09/2024 99.82  % Final    CRT Statistic LV Percent Paced 07/09/2024 99.79  % Final    Hansel Statistic AP  Percent 07/09/2024 0  % Final    Hansel Statistic AS  Percent 07/09/2024 99.82  % Final    Hansel Statistic AP VS Percent 07/09/2024 0  % Final    Hansel Statistic AS VS Percent 07/09/2024 0.18  % Final    CRT Statistic Date Time Start 07/09/2024 20240415151444   Final    CRT Statistic Date Time End 07/09/2024 20240709004519   Final     CRT Statistic CRT Percent Paced 07/09/2024 99.79  % Final    Therapy Statistic Recent Date Time* 07/09/2024 75927309315444   Final    Therapy Statistic Recent Date Time* 07/09/2024 16573154371721   Final    Therapy Statistic Total  Date Time* 07/09/2024 [id number]   Final    Therapy Statistic Total  Date Time* 07/09/2024 47024194957515   Final    Episode Statistic Recent Count 07/09/2024 0   Final    Episode Statistic Type Category 07/09/2024 AT/AF   Final    Episode Statistic Recent Count 07/09/2024 0   Final    Episode Statistic Type Category 07/09/2024 Patient Activated   Final    Episode Statistic Recent Count 07/09/2024 0   Final    Episode Statistic Type Category 07/09/2024 SVT   Final    Episode Statistic Recent Count 07/09/2024 0   Final    Episode Statistic Type Category 07/09/2024 VT   Final    Episode Statistic Recent Count 07/09/2024 0   Final    Episode Statistic Type Category 07/09/2024 VT   Final    Episode Statistic Recent Date Time* 07/09/2024 52261362299253   Final    Episode Statistic Recent Date Time* 07/09/2024 59771016274775   Final    Episode Statistic Recent Date Time* 07/09/2024 88398945144025   Final    Episode Statistic Recent Date Time* 07/09/2024 81966364763984   Final    Episode Statistic Recent Date Time* 07/09/2024 21448862202764   Final    Episode Statistic Recent Date Time* 07/09/2024 42205744644857   Final    Episode Statistic Recent Date Time* 07/09/2024 79828635989246   Final    Episode Statistic Recent Date Time* 07/09/2024 65650324656304   Final    Episode Statistic Recent Date Time* 07/09/2024 11789874949435   Final    Episode Statistic Recent Date Time* 07/09/2024 15780337990789   Final    Episode Statistic Total Count 07/09/2024 460   Final    Episode Statistic Type Category 07/09/2024 AT/AF   Final    Episode Statistic Total Count 07/09/2024 0   Final    Episode Statistic Type Category 07/09/2024 Patient Activated   Final    Episode Statistic Total Count 07/09/2024 0    Final    Episode Statistic Type Category 07/09/2024 SVT   Final    Episode Statistic Total Count 07/09/2024 4   Final    Episode Statistic Type Category 07/09/2024 VT   Final    Episode Statistic Total Count 07/09/2024 0   Final    Episode Statistic Type Category 07/09/2024 VT   Final    Episode Statistic Total Date Time * 07/09/2024 53289167215076   Final    Episode Statistic Total Date Time * 07/09/2024 83095357856211   Final    Episode Statistic Total Date Time * 07/09/2024 42039296288436   Final    Episode Statistic Total Date Time * 07/09/2024 48434819023226   Final    Episode Statistic Total Date Time * 07/09/2024 08703732252942   Final    Episode Statistic Total Date Time * 07/09/2024 56625686462745   Final    Episode Statistic Total Date Time * 07/09/2024 83619023513634   Final    Episode Statistic Total Date Time * 07/09/2024 33897761203870   Final    Episode Statistic Total Date Time * 07/09/2024 69488449117588   Final    Episode Statistic Total Date Time * 07/09/2024 72541695713927   Final    Episode Identifier 07/09/2024 1357   Final    Episode Type Category 07/09/2024 VSE   Final    Episode Date Time 07/09/2024 07053589052862   Final    Episode Duration 07/09/2024 10  s Final    Episode Identifier 07/09/2024 1356   Final    Episode Type Category 07/09/2024 VSE   Final    Episode Date Time 07/09/2024 84522065214890   Final    Episode Duration 07/09/2024 11  s Final    Episode Identifier 07/09/2024 1355   Final    Episode Type Category 07/09/2024 VSE   Final    Episode Date Time 07/09/2024 68775437220744   Final    Episode Duration 07/09/2024 9  s Final        Total time spent for face to face visit, reviewing labs/imaging studies, counseling and coordination of care was: 1 Hour spent on the date of the encounter doing chart review, review of outside records, review of test results, interpretation of tests, patient visit, and documentation     This note was dictated using voice recognition software.   Any grammatical or context distortions are unintentional and inherent to the software.    No orders of the defined types were placed in this encounter.     New Prescriptions    No medications on file      Modified Medications    No medications on file

## 2024-10-08 ENCOUNTER — OFFICE VISIT (OUTPATIENT)
Dept: NEUROLOGY | Facility: CLINIC | Age: 79
End: 2024-10-08
Attending: NURSE PRACTITIONER
Payer: MEDICARE

## 2024-10-08 VITALS
HEART RATE: 80 BPM | BODY MASS INDEX: 29.92 KG/M2 | SYSTOLIC BLOOD PRESSURE: 142 MMHG | WEIGHT: 209 LBS | DIASTOLIC BLOOD PRESSURE: 77 MMHG | HEIGHT: 70 IN

## 2024-10-08 DIAGNOSIS — R29.898 LEG FATIGUE: ICD-10-CM

## 2024-10-08 DIAGNOSIS — R20.2 PARESTHESIA: Primary | ICD-10-CM

## 2024-10-08 DIAGNOSIS — K90.9 INTESTINAL MALABSORPTION, UNSPECIFIED TYPE: ICD-10-CM

## 2024-10-08 DIAGNOSIS — G62.9 POLYNEUROPATHY: ICD-10-CM

## 2024-10-08 DIAGNOSIS — E11.42 DIABETIC PERIPHERAL NEUROPATHY ASSOCIATED WITH TYPE 2 DIABETES MELLITUS (H): ICD-10-CM

## 2024-10-08 PROCEDURE — 99205 OFFICE O/P NEW HI 60 MIN: CPT | Performed by: PSYCHIATRY & NEUROLOGY

## 2024-10-08 PROCEDURE — G2211 COMPLEX E/M VISIT ADD ON: HCPCS | Performed by: PSYCHIATRY & NEUROLOGY

## 2024-10-08 NOTE — LETTER
10/8/2024      Thomas Steve  [address] [address]      Dear Colleague,    Thank you for referring your patient, Thomas Steve, to the [facility] [place]. Please see a copy of my visit note below.    NEUROLOGY CONSULTATION NOTE       [facility] [place]  [address] [place] [address]  Tel: [phone]  Fax: [phone]  [email or web address]     Thomas Steve,  1945, MRN [id number]  PCP: Moni Mcclain  Date: 10/08/2024     ASSESSMENT & PLAN     Visit Diagnosis  Paresthesia  Diabetic peripheral neuropathy associated with type 2 diabetes mellitus (H)     Diabetic polyneuropathy  79-year-old male with DM, diabetic polyneuropathy, lumbar stenosis s/p decompressive laminectomy L4-L5 and L5-S1 with persistent and gait difficulty with back pain.  His MRI scan shows inflammatory changes in the sacrum and is scheduled to see orthopedics.  Previously his EMG showed sensorimotor polyneuropathy likely due to diabetes.  I have recommended:    1.  Repeat EMG bilateral lower extremity  2.  Lab work to include JOSE, folate, hemoglobin A1c, Lyme titer, MMA, paraneoplastic panel, SPEP, immunofixation, B1, B6, B12, zinc and vitamin E  3.  Follow-up the day scheduled for EMG    Thank you again for this referral, please feel free to contact me if you have any questions.    Yamil Sheppard MD  [facility] NEUROLOG[facility], [place]     REASON FOR CONSULTATION Extremity Weakness        HISTORY OF PRESENT ILLNESS     We have been requested by Noreen Lynn to evaluate Thomas Steve who is a 79 year old  male for paresthesia    79-year-old male with history of DM, diabetic polyneuropathy, lumbar stenosis, HTN, ischemic cardiomyopathy, A-fib s/p decompressive laminectomy L4-L5 and L5-S1 who was referred for evaluation of bilateral leg numbness and tingling along with discomfort.  According to patient his symptoms started in  and  eventually had decompressive surgery done.  He continued to have symptoms and had a repeat MRI scan that showed enhancement in the posterior postoperative soft tissue from L4 through sacrum and possibility of inflammatory facet facet inflammation was raised.  He also had an EMG last year that suggested length-dependent mixed sensorimotor polyneuropathy.  Due to persistent symptom he is scheduled to see orthopedics.  Patient also reports progressive decline in his ability to walk and reports that he has not yet seen any physical therapist.     PROBLEM LIST   Patient Active Problem List   Diagnosis     Type 2 diabetes mellitus (H)     Essential hypertension     Coronary atherosclerosis     Ischemic cardiomyopathy     Right bundle branch block     Mixed hyperlipidemia     Atrial fibrillation (H)     Obstructive sleep apnea     Congestive Heart Failure     Typical atrial flutter (H)     Venous insufficiency of both lower extremities     Acquired lymphedema of leg     Claudication of both lower extremities (H)     Diabetic peripheral neuropathy associated with type 2 diabetes mellitus (H)     SSS (sick sinus syndrome) (H)     Acute systolic heart failure (H)     Biventricular cardiac pacemaker in situ     Lumbar stenosis with neurogenic claudication     Gastroesophageal reflux disease without esophagitis     Hypothyroidism     Normocytic anemia     Chronic left shoulder pain     Lumbar spinal stenosis     Acute deep vein thrombosis (DVT) of femoral vein of right lower extremity (H)     ARF (acute renal failure) (H)     Anemia due to blood loss, acute     Spinal stenosis, lumbar region, without neurogenic claudication     Lumbar herniated disc     Traumatic hematoma of buttock, initial encounter     Diabetic ulcer of toe of right foot associated with diabetes mellitus due to underlying condition, with necrosis of bone (H)         PAST MEDICAL & SURGICAL HISTORY     Past Medical History:   Patient  has a past medical  history of Acute sinusitis, Atrial fibrillation (H), Back pain, CHF (congestive heart failure) (H), Coronary artery disease, Diabetes mellitus (H), Gastroesophageal reflux disease with esophagitis, HTN (hypertension), Hyperlipemia, Hyperlipidemia, Joint pain, Lipoma of neck, Nocturia, Sciatic leg pain, Shoulder impingement syndrome, Sleep apnea, and Typical atrial flutter (H) (07/07/2016).    Surgical History:  He  has a past surgical history that includes CABG, VEIN, SINGLE (01/01/2011); Pr Cardioversion Elective Arrhythmia External (06/03/2014); Ep Biv Pacemaker Insert (N/A, 06/11/2019); Laminectomy lumbar two levels (Bilateral, 06/16/2022); IR Fine Needle Aspiration w Ultrasound (06/23/2022); coronary artery bypass; and Implant pacemaker.     SOCIAL HISTORY     Reviewed, and he  reports that he quit smoking about 38 years ago. His smoking use included cigarettes. He started smoking about 61 years ago. He has a 34.5 pack-year smoking history. He has never used smokeless tobacco. He reports current alcohol use of about 1.0 standard drink of alcohol per week. He reports that he does not use drugs.     FAMILY HISTORY     Reviewed, and family history includes Alzheimer Disease in his father; Cerebrovascular Disease in his father; Chronic Obstructive Pulmonary Disease in his brother and sister; Diabetes in his brother and sister; Heart Disease in his brother, father, and mother; Hodgkin's lymphoma in his brother; Hypertension in his father and mother; No Known Problems in his daughter, daughter, daughter, and son; Snoring in his father; Substance Abuse in his brother and sister.     ALLERGIES     Allergies   Allergen Reactions     Aspirin Other (See Comments)     Contraindicated due to xarelto use     Bee Pollen Unknown     Scratchy throat     Codeine Nausea and Vomiting     Pollen Extracts [Pollen Extract] Unknown     Scratchy throat     Vicodin [Hydrocodone-Acetaminophen] Nausea and Vomiting         REVIEW OF SYSTEMS      A 12 point review of system was performed and was negative except as outlined in the history of present illness.     HOME MEDICATIONS     Current Outpatient Rx   Medication Sig Dispense Refill     acetaminophen (TYLENOL) 500 MG tablet Take 1,000-1,500 mg by mouth every 6 hours as needed for mild pain       bumetanide (BUMEX) 1 MG tablet Take 2 tablets (2 mg) by mouth daily 180 tablet 3     cholecalciferol 50 MCG (2000 UT) CAPS Take 2 capsules by mouth daily       coenzyme Q-10 200 MG CAPS capsule Take 200 mg by mouth daily       diphenhydrAMINE-acetaminophen (TYLENOL PM)  MG tablet Take 3 tablets by mouth at bedtime       ferrous sulfate (FEROSUL) 325 (65 Fe) MG tablet Take 1 tablet by mouth 3 times daily (with meals)       glimepiride (AMARYL) 4 MG tablet Take 4 mg by mouth every morning (before breakfast)       insulin glargine (LANTUS PEN) 100 UNIT/ML pen Inject 20 Units Subcutaneous At Bedtime (Patient taking differently: Inject 30 Units subcutaneously at bedtime.) 15 mL      levothyroxine (SYNTHROID/LEVOTHROID) 25 MCG tablet Take 25 mcg by mouth daily       losartan (COZAAR) 50 MG tablet Take 1 tablet (50 mg) by mouth daily 90 tablet 3     Melatonin 10 MG TABS tablet Take 10 mg by mouth at bedtime       Multiple Vitamins-Minerals (PRESERVISION AREDS PO) Take by mouth 2 times daily       Omega-3 Fatty Acids (FISH OIL) 1200 MG capsule Take 1,200 mg by mouth daily       omeprazole (PRILOSEC) 20 MG DR capsule Take 1 capsule by mouth 2 times daily Take one morning and one at night time       POTASSIUM PO Take by mouth daily.       pregabalin (LYRICA) 75 MG capsule Take 2 capsule by mouth once daily.       rivaroxaban ANTICOAGULANT (XARELTO) 20 MG TABS tablet Take 1 tablet (20 mg) by mouth at bedtime 30 tablet 11     rosuvastatin (CRESTOR) 10 MG tablet Take 10 mg by mouth At Bedtime       senna-docusate (SENOKOT-S/PERICOLACE) 8.6-50 MG tablet Take 1 tablet by mouth 2 times daily       sulfaSALAzine  "(AZULFIDINE) 500 MG tablet Take 1 tablet in and 2 tabs in PM for 1 week, then take 2 tablets twice daily. Take this medication with food. 360 tablet 0     vitamin C (ASCORBIC ACID) 500 MG tablet Take 500 mg by mouth 3 times daily (with meals) Take with iron supplement       zinc gluconate 50 MG tablet Take 1 tablet (50 mg) by mouth daily           PHYSICAL EXAM     Vital signs  BP (!) 142/77 (BP Location: Left arm, Patient Position: Sitting)   Pulse 80   Ht 1.765 m (5' 9.5\")   Wt 94.8 kg (209 lb)   BMI 30.42 kg/m      Weight:   209 lbs 0 oz    Elderly gentleman who is alert and oriented vital signs are reviewed and documented in electronic medical record.  Neck supple.  Neurologically speech was normal.  Cranial nerves II through XII are intact.  Motor strength 5/5 reflexes absent sensation decreased to light touch pinprick.  He has a wide-based gait unable to tandem walk.     PERTINENT DIAGNOSTIC STUDIES     Following studies were reviewed:     MRI LUMBAR SPINE 8/14/2023  1.  Significant interval decrease in postoperative seroma status post decompressive laminectomies at the L4-L5 and the L5-S1 disc space levels.  2.  Enhancement posterior postoperative enhancement posterior soft tissues from L4 to sacrum along with enhancement of the L4-L5 facet complexes and associated synovial cysts. This could represent a postoperative changes or possibly inflammatory facet   hiatus. Recommend clinical correlation.  3.  At the L3-L4 disc space level there is mild progression to mild canal compromise, mild right lateral recess narrowing and mild right neural foraminal narrowing.  4.  At the L4-L5 disc space level there is improvement to minimal canal compromise, bilateral lateral recess narrowing and moderate bilateral neural foraminal narrowing.  5.  At the L5-S1 disc space level there is bilateral lateral recess narrowing and moderate bilateral neural foraminal narrowing.    EMG 10/31/2023  There is electrodiagnostic " evidence of:  1.  Electrodiagnostic findings are consistent with a length-dependent sensorimotor polyneuropathy, predominantly axonal.  This results in denervation through the medial gastrocnemius muscles in the bilateral lower extremities.    2.  Left S1 and/or L5 radiculopathy, chronic.  Cannot completely exclude an active component   3.  There is no convincing electrodiagnostic evidence of lumbosacral radiculopathy in the right lower extremity or focal neuropathy in the bilateral lower extremities.     PERTINENT LABS  Following labs were reviewed:  Ancillary Procedure on 07/09/2024   Component Date Value Ref Range Status     Date Time Interrogation Session 07/09/2024 [id number]   Final     Implantable Pulse Generator Manufa* 07/09/2024 Medtronic   Final     Implantable Pulse Generator Model 07/09/2024 [id number] Percepta Quad CRT-P   Final     Implantable Pulse Generator Serial* 07/09/2024 [id number]   Final     Type Interrogation Session 07/09/2024 Remote Scheduled   Final     Clinic Name 07/09/2024 [facility]   Final     Implantable Pulse Generator Type 07/09/2024 Cardiac Resynchronization Therapy - Pacemaker   Final     Implantable Pulse Generator Implan* 07/09/2024 [id number]   Final     Implantable Lead  07/09/2024 Medtronic   Final     Implantable Lead Model 07/09/2024 4398 Attain Performa Straight MRI SureScan   Final     Implantable Lead Serial Number 07/09/2024 [id number]   Final     Implantable Lead Implant Date 07/09/2024 [id number]   Final     Implantable Lead Polarity Type 07/09/2024 Quadripolar Lead   Final     Implantable Lead Location Detail 1 07/09/2024 UNKNOWN   Final     Implantable Lead Location 07/09/2024 Left Ventricle   Final     Implantable Lead Connection Status 07/09/2024 Connected   Final     Implantable Lead  07/09/2024 Medtronic   Final     Implantable Lead Model 07/09/2024 5076 CapSureFix Novus MRI SureScan   Final     Implantable Lead Serial  Number 07/09/2024 [id number]   Final     Implantable Lead Implant Date 07/09/2024 [id number]   Final     Implantable Lead Polarity Type 07/09/2024 Bipolar Lead   Final     Implantable Lead Location Detail 1 07/09/2024 UNKNOWN   Final     Implantable Lead Location 07/09/2024 Right Atrium   Final     Implantable Lead Connection Status 07/09/2024 Connected   Final     Implantable Lead  07/09/2024 Medtronic   Final     Implantable Lead Model 07/09/2024 5076 CapSureFix Novus MRI SureScan   Final     Implantable Lead Serial Number 07/09/2024 [id number]   Final     Implantable Lead Implant Date 07/09/2024 [id number]   Final     Implantable Lead Polarity Type 07/09/2024 Bipolar Lead   Final     Implantable Lead Location Detail 1 07/09/2024 UNKNOWN   Final     Implantable Lead Location 07/09/2024 Right Ventricle   Final     Implantable Lead Connection Status 07/09/2024 Connected   Final     Hansel Setting Mode (NBG Code) 07/09/2024 VVIR   Final     Hansel Setting Lower Rate Limit 07/09/2024 60  [beats]/min Final     Hansel Setting Maximum Sensor Rate 07/09/2024 130  [beats]/min Final     Lead Channel Setting Sensing Polar* 07/09/2024 Bipolar   Final     Lead Channel Setting Sensing Anode* 07/09/2024 Right Atrium   Final     Lead Channel Setting Sensing Anode* 07/09/2024 Ring   Final     Lead Channel Setting Sensing Catho* 07/09/2024 Right Atrium   Final     Lead Channel Setting Sensing Catho* 07/09/2024 Tip   Final     Lead Channel Setting Sensing Sensi* 07/09/2024 Off   Final     Lead Channel Setting Sensing Polar* 07/09/2024 Bipolar   Final     Lead Channel Setting Sensing Anode* 07/09/2024 Right Ventricle   Final     Lead Channel Setting Sensing Anode* 07/09/2024 Ring   Final     Lead Channel Setting Sensing Catho* 07/09/2024 Right Ventricle   Final     Lead Channel Setting Sensing Catho* 07/09/2024 Tip   Final     Lead Channel Setting Sensing Sensi* 07/09/2024 0.9  mV Final     Ventricular chambers paced during *  07/09/2024 BiV   Final     CRT LV-RV Delay 07/09/2024 0  ms Final     Lead Channel Setting Pacing Polari* 07/09/2024 Bipolar   Final     Lead Channel Setting Pacing Anode * 07/09/2024 Right Ventricle   Final     Lead Channel Setting Pacing Anode * 07/09/2024 Ring   Final     Lead Channel Setting Sensing Catho* 07/09/2024 Right Ventricle   Final     Lead Channel Setting Sensing Catho* 07/09/2024 Tip   Final     Lead Channel Setting Pacing Pulse * 07/09/2024 0.4  ms Final     Lead Channel Setting Pacing Amplit* 07/09/2024 1.5  V Final     Lead Channel Setting Pacing Captur* 07/09/2024 Adaptive   Final     Lead Channel Setting Pacing Polari* 07/09/2024 Bipolar   Final     Lead Channel Setting Pacing Anode * 07/09/2024 Left Ventricle   Final     Lead Channel Setting Pacing Anode * 07/09/2024 Ring4   Final     Lead Channel Setting Sensing Catho* 07/09/2024 Left Ventricle   Final     Lead Channel Setting Sensing Catho* 07/09/2024 Tip   Final     Lead Channel Setting Pacing Pulse * 07/09/2024 0.4  ms Final     Lead Channel Setting Pacing Amplit* 07/09/2024 1.75  V Final     Lead Channel Setting Pacing Captur* 07/09/2024 Adaptive   Final     Zone Setting Type Category 07/09/2024 VF   Final     Zone Setting Vendor Type Category 07/09/2024 V High Rate   Final     Zone Setting Type Category 07/09/2024 VT   Final     Zone Setting Vendor Type Category 07/09/2024 FastVT   Final     Zone Setting Type Category 07/09/2024 VT   Final     Zone Setting Vendor Type Category 07/09/2024 VT   Final     Zone Setting Type Category 07/09/2024 VT   Final     Zone Setting Vendor Type Category 07/09/2024 MonVT   Final     Zone Setting Status 07/09/2024 Monitor   Final     Zone Setting Detection Interval 07/09/2024 400  ms Final     Zone Setting Type Category 07/09/2024 ATRIAL_FIBRILLATION   Final     Zone Setting Vendor Type Category 07/09/2024 FastATAF   Final     Zone Setting Type Category 07/09/2024 AT/AF   Final     Lead Channel Impedance  Value 07/09/2024 399  ohm Final     Lead Channel Impedance Value 07/09/2024 342  ohm Final     Lead Channel Sensing Intrinsic Amp* 07/09/2024 0.25  mV Final     Lead Channel Sensing Intrinsic Amp* 07/09/2024 0.375  mV Final     Lead Channel Pacing Threshold Ampl* 07/09/2024 0.5  V Final     Lead Channel Pacing Threshold Puls* 07/09/2024 0.4  ms Final     Lead Channel Impedance Value 07/09/2024 380  ohm Final     Lead Channel Impedance Value 07/09/2024 323  ohm Final     Lead Channel Sensing Intrinsic Amp* 07/09/2024 11.25  mV Final     Lead Channel Sensing Intrinsic Amp* 07/09/2024 11.25  mV Final     Lead Channel Pacing Threshold Ampl* 07/09/2024 0.5  V Final     Lead Channel Pacing Threshold Puls* 07/09/2024 0.4  ms Final     Lead Channel Impedance Value 07/09/2024 532  ohm Final     Lead Channel Impedance Value 07/09/2024 361  ohm Final     Lead Channel Impedance Value 07/09/2024 380  ohm Final     Lead Channel Impedance Value 07/09/2024 399  ohm Final     Lead Channel Impedance Value 07/09/2024 779  ohm Final     Lead Channel Impedance Value 07/09/2024 798  ohm Final     Lead Channel Impedance Value 07/09/2024 817  ohm Final     Lead Channel Impedance Value 07/09/2024 418  ohm Final     Lead Channel Impedance Value 07/09/2024 665  ohm Final     Lead Channel Impedance Value 07/09/2024 665  ohm Final     Lead Channel Pacing Threshold Ampl* 07/09/2024 0.75  V Final     Lead Channel Pacing Threshold Puls* 07/09/2024 0.4  ms Final     Battery Date Time of Measurements 07/09/2024 20240708231502   Final     Battery Status 07/09/2024 OK   Final     Battery RRT Trigger 07/09/2024 2.595   Final     Battery Remaining Longevity 07/09/2024 80  mo Final     Battery Voltage 07/09/2024 2.97  V Final     Hansel Statistic Date Time Start 07/09/2024 20240415151444   Final     Hansel Statistic Date Time End 07/09/2024 20240709004519   Final     Hansel Statistic RA Percent Paced 07/09/2024 0  % Final     Hansel Statistic RV Percent  Paced 07/09/2024 99.82  % Final     CRT Statistic LV Percent Paced 07/09/2024 99.79  % Final     Hansel Statistic AP  Percent 07/09/2024 0  % Final     Hansel Statistic AS  Percent 07/09/2024 99.82  % Final     Hansel Statistic AP VS Percent 07/09/2024 0  % Final     Hansel Statistic AS VS Percent 07/09/2024 0.18  % Final     CRT Statistic Date Time Start 07/09/2024 73266806490758   Final     CRT Statistic Date Time End 07/09/2024 21886276438672   Final     CRT Statistic CRT Percent Paced 07/09/2024 99.79  % Final     Therapy Statistic Recent Date Time* 07/09/2024 79766893022699   Final     Therapy Statistic Recent Date Time* 07/09/2024 68344492230167   Final     Therapy Statistic Total  Date Time* 07/09/2024 38704479550384   Final     Therapy Statistic Total  Date Time* 07/09/2024 84503071922326   Final     Episode Statistic Recent Count 07/09/2024 0   Final     Episode Statistic Type Category 07/09/2024 AT/AF   Final     Episode Statistic Recent Count 07/09/2024 0   Final     Episode Statistic Type Category 07/09/2024 Patient Activated   Final     Episode Statistic Recent Count 07/09/2024 0   Final     Episode Statistic Type Category 07/09/2024 SVT   Final     Episode Statistic Recent Count 07/09/2024 0   Final     Episode Statistic Type Category 07/09/2024 VT   Final     Episode Statistic Recent Count 07/09/2024 0   Final     Episode Statistic Type Category 07/09/2024 VT   Final     Episode Statistic Recent Date Time* 07/09/2024 69266324501387   Final     Episode Statistic Recent Date Time* 07/09/2024 65097527233313   Final     Episode Statistic Recent Date Time* 07/09/2024 07553618768884   Final     Episode Statistic Recent Date Time* 07/09/2024 68326224230586   Final     Episode Statistic Recent Date Time* 07/09/2024 79422473639053   Final     Episode Statistic Recent Date Time* 07/09/2024 97468650916669   Final     Episode Statistic Recent Date Time* 07/09/2024 21292016035659   Final     Episode Statistic  Recent Date Time* 07/09/2024 33099587750294   Final     Episode Statistic Recent Date Time* 07/09/2024 41334339475334   Final     Episode Statistic Recent Date Time* 07/09/2024 47016938697345   Final     Episode Statistic Total Count 07/09/2024 460   Final     Episode Statistic Type Category 07/09/2024 AT/AF   Final     Episode Statistic Total Count 07/09/2024 0   Final     Episode Statistic Type Category 07/09/2024 Patient Activated   Final     Episode Statistic Total Count 07/09/2024 0   Final     Episode Statistic Type Category 07/09/2024 SVT   Final     Episode Statistic Total Count 07/09/2024 4   Final     Episode Statistic Type Category 07/09/2024 VT   Final     Episode Statistic Total Count 07/09/2024 0   Final     Episode Statistic Type Category 07/09/2024 VT   Final     Episode Statistic Total Date Time * 07/09/2024 30167632597423   Final     Episode Statistic Total Date Time * 07/09/2024 73084267868511   Final     Episode Statistic Total Date Time * 07/09/2024 79975602203110   Final     Episode Statistic Total Date Time * 07/09/2024 26411422354795   Final     Episode Statistic Total Date Time * 07/09/2024 24901994148343   Final     Episode Statistic Total Date Time * 07/09/2024 52083480545774   Final     Episode Statistic Total Date Time * 07/09/2024 91972029210310   Final     Episode Statistic Total Date Time * 07/09/2024 18054384672907   Final     Episode Statistic Total Date Time * 07/09/2024 33465545462129   Final     Episode Statistic Total Date Time * 07/09/2024 18605441294675   Final     Episode Identifier 07/09/2024 1357   Final     Episode Type Category 07/09/2024 VSE   Final     Episode Date Time 07/09/2024 04848505755750   Final     Episode Duration 07/09/2024 10  s Final     Episode Identifier 07/09/2024 1356   Final     Episode Type Category 07/09/2024 VSE   Final     Episode Date Time 07/09/2024 96196087012090   Final     Episode Duration 07/09/2024 11  s Final     Episode Identifier  07/09/2024 1355   Final     Episode Type Category 07/09/2024 VSE   Final     Episode Date Time 07/09/2024 96885852674731   Final     Episode Duration 07/09/2024 9  s Final        Total time spent for face to face visit, reviewing labs/imaging studies, counseling and coordination of care was: 1 Hour spent on the date of the encounter doing chart review, review of outside records, review of test results, interpretation of tests, patient visit, and documentation     This note was dictated using voice recognition software.  Any grammatical or context distortions are unintentional and inherent to the software.    No orders of the defined types were placed in this encounter.     New Prescriptions    No medications on file      Modified Medications    No medications on file                Again, thank you for allowing me to participate in the care of your patient.        Sincerely,        Yamil Sheppard MD

## 2024-10-08 NOTE — NURSING NOTE
Chief Complaint   Patient presents with    Extremity Weakness     bilateral leg fatigue, weakness     Miley Farias CMA on 10/8/2024 at 12:39 PM  [facility]

## 2024-10-21 ENCOUNTER — ANCILLARY PROCEDURE (OUTPATIENT)
Dept: CARDIOLOGY | Facility: CLINIC | Age: 79
End: 2024-10-21
Attending: INTERNAL MEDICINE
Payer: MEDICARE

## 2024-10-21 ENCOUNTER — TELEPHONE (OUTPATIENT)
Dept: CARDIOLOGY | Facility: CLINIC | Age: 79
End: 2024-10-21

## 2024-10-21 DIAGNOSIS — I25.5 ISCHEMIC CARDIOMYOPATHY: ICD-10-CM

## 2024-10-21 DIAGNOSIS — I49.5 SICK SINUS SYNDROME (H): ICD-10-CM

## 2024-10-21 DIAGNOSIS — Z95.0 CARDIAC RESYNCHRONIZATION THERAPY PACEMAKER (CRT-P) IN PLACE: ICD-10-CM

## 2024-10-21 DIAGNOSIS — I50.20 HEART FAILURE WITH REDUCED EJECTION FRACTION (H): ICD-10-CM

## 2024-10-21 LAB
MDC_IDC_EPISODE_DTM: NORMAL
MDC_IDC_EPISODE_DURATION: 1 S
MDC_IDC_EPISODE_DURATION: 1 S
MDC_IDC_EPISODE_DURATION: 10 S
MDC_IDC_EPISODE_DURATION: 14 S
MDC_IDC_EPISODE_DURATION: 6 S
MDC_IDC_EPISODE_DURATION: 6 S
MDC_IDC_EPISODE_DURATION: 7 S
MDC_IDC_EPISODE_DURATION: 8 S
MDC_IDC_EPISODE_DURATION: 9 S
MDC_IDC_EPISODE_ID: 1358
MDC_IDC_EPISODE_ID: 1359
MDC_IDC_EPISODE_ID: 1360
MDC_IDC_EPISODE_ID: 1361
MDC_IDC_EPISODE_ID: 1362
MDC_IDC_EPISODE_ID: 1363
MDC_IDC_EPISODE_ID: 1364
MDC_IDC_EPISODE_ID: 465
MDC_IDC_EPISODE_ID: 466
MDC_IDC_EPISODE_TYPE: NORMAL
MDC_IDC_LEAD_CONNECTION_STATUS: NORMAL
MDC_IDC_LEAD_IMPLANT_DT: NORMAL
MDC_IDC_LEAD_LOCATION: NORMAL
MDC_IDC_LEAD_LOCATION_DETAIL_1: NORMAL
MDC_IDC_LEAD_MFG: NORMAL
MDC_IDC_LEAD_MODEL: NORMAL
MDC_IDC_LEAD_POLARITY_TYPE: NORMAL
MDC_IDC_LEAD_SERIAL: NORMAL
MDC_IDC_MSMT_BATTERY_DTM: NORMAL
MDC_IDC_MSMT_BATTERY_REMAINING_LONGEVITY: 75 MO
MDC_IDC_MSMT_BATTERY_RRT_TRIGGER: 2.6
MDC_IDC_MSMT_BATTERY_STATUS: NORMAL
MDC_IDC_MSMT_BATTERY_VOLTAGE: 2.97 V
MDC_IDC_MSMT_LEADCHNL_LV_IMPEDANCE_VALUE: 418 OHM
MDC_IDC_MSMT_LEADCHNL_LV_IMPEDANCE_VALUE: 418 OHM
MDC_IDC_MSMT_LEADCHNL_LV_IMPEDANCE_VALUE: 437 OHM
MDC_IDC_MSMT_LEADCHNL_LV_IMPEDANCE_VALUE: 475 OHM
MDC_IDC_MSMT_LEADCHNL_LV_IMPEDANCE_VALUE: 589 OHM
MDC_IDC_MSMT_LEADCHNL_LV_IMPEDANCE_VALUE: 722 OHM
MDC_IDC_MSMT_LEADCHNL_LV_IMPEDANCE_VALUE: 741 OHM
MDC_IDC_MSMT_LEADCHNL_LV_IMPEDANCE_VALUE: 874 OHM
MDC_IDC_MSMT_LEADCHNL_LV_IMPEDANCE_VALUE: 893 OHM
MDC_IDC_MSMT_LEADCHNL_LV_IMPEDANCE_VALUE: 931 OHM
MDC_IDC_MSMT_LEADCHNL_LV_PACING_THRESHOLD_AMPLITUDE: 0.62 V
MDC_IDC_MSMT_LEADCHNL_LV_PACING_THRESHOLD_PULSEWIDTH: 0.4 MS
MDC_IDC_MSMT_LEADCHNL_RA_IMPEDANCE_VALUE: 342 OHM
MDC_IDC_MSMT_LEADCHNL_RA_IMPEDANCE_VALUE: 399 OHM
MDC_IDC_MSMT_LEADCHNL_RA_PACING_THRESHOLD_AMPLITUDE: 0.5 V
MDC_IDC_MSMT_LEADCHNL_RA_PACING_THRESHOLD_PULSEWIDTH: 0.4 MS
MDC_IDC_MSMT_LEADCHNL_RA_SENSING_INTR_AMPL: 0.25 MV
MDC_IDC_MSMT_LEADCHNL_RA_SENSING_INTR_AMPL: 0.38 MV
MDC_IDC_MSMT_LEADCHNL_RV_IMPEDANCE_VALUE: 342 OHM
MDC_IDC_MSMT_LEADCHNL_RV_IMPEDANCE_VALUE: 380 OHM
MDC_IDC_MSMT_LEADCHNL_RV_PACING_THRESHOLD_AMPLITUDE: 0.5 V
MDC_IDC_MSMT_LEADCHNL_RV_PACING_THRESHOLD_PULSEWIDTH: 0.4 MS
MDC_IDC_MSMT_LEADCHNL_RV_SENSING_INTR_AMPL: 10.38 MV
MDC_IDC_MSMT_LEADCHNL_RV_SENSING_INTR_AMPL: 10.38 MV
MDC_IDC_PG_IMPLANT_DTM: NORMAL
MDC_IDC_PG_MFG: NORMAL
MDC_IDC_PG_MODEL: NORMAL
MDC_IDC_PG_SERIAL: NORMAL
MDC_IDC_PG_TYPE: NORMAL
MDC_IDC_SESS_CLINIC_NAME: NORMAL
MDC_IDC_SESS_DTM: NORMAL
MDC_IDC_SESS_TYPE: NORMAL
MDC_IDC_SET_BRADY_LOWRATE: 60 {BEATS}/MIN
MDC_IDC_SET_BRADY_MAX_SENSOR_RATE: 130 {BEATS}/MIN
MDC_IDC_SET_BRADY_MODE: NORMAL
MDC_IDC_SET_CRT_LVRV_DELAY: 0 MS
MDC_IDC_SET_CRT_PACED_CHAMBERS: NORMAL
MDC_IDC_SET_LEADCHNL_LV_PACING_AMPLITUDE: 1.75 V
MDC_IDC_SET_LEADCHNL_LV_PACING_ANODE_ELECTRODE_1: NORMAL
MDC_IDC_SET_LEADCHNL_LV_PACING_ANODE_LOCATION_1: NORMAL
MDC_IDC_SET_LEADCHNL_LV_PACING_CAPTURE_MODE: NORMAL
MDC_IDC_SET_LEADCHNL_LV_PACING_CATHODE_ELECTRODE_1: NORMAL
MDC_IDC_SET_LEADCHNL_LV_PACING_CATHODE_LOCATION_1: NORMAL
MDC_IDC_SET_LEADCHNL_LV_PACING_POLARITY: NORMAL
MDC_IDC_SET_LEADCHNL_LV_PACING_PULSEWIDTH: 0.4 MS
MDC_IDC_SET_LEADCHNL_RA_SENSING_ANODE_ELECTRODE_1: NORMAL
MDC_IDC_SET_LEADCHNL_RA_SENSING_ANODE_LOCATION_1: NORMAL
MDC_IDC_SET_LEADCHNL_RA_SENSING_CATHODE_ELECTRODE_1: NORMAL
MDC_IDC_SET_LEADCHNL_RA_SENSING_CATHODE_LOCATION_1: NORMAL
MDC_IDC_SET_LEADCHNL_RA_SENSING_POLARITY: NORMAL
MDC_IDC_SET_LEADCHNL_RA_SENSING_SENSITIVITY: NORMAL
MDC_IDC_SET_LEADCHNL_RV_PACING_AMPLITUDE: 1.5 V
MDC_IDC_SET_LEADCHNL_RV_PACING_ANODE_ELECTRODE_1: NORMAL
MDC_IDC_SET_LEADCHNL_RV_PACING_ANODE_LOCATION_1: NORMAL
MDC_IDC_SET_LEADCHNL_RV_PACING_CAPTURE_MODE: NORMAL
MDC_IDC_SET_LEADCHNL_RV_PACING_CATHODE_ELECTRODE_1: NORMAL
MDC_IDC_SET_LEADCHNL_RV_PACING_CATHODE_LOCATION_1: NORMAL
MDC_IDC_SET_LEADCHNL_RV_PACING_POLARITY: NORMAL
MDC_IDC_SET_LEADCHNL_RV_PACING_PULSEWIDTH: 0.4 MS
MDC_IDC_SET_LEADCHNL_RV_SENSING_ANODE_ELECTRODE_1: NORMAL
MDC_IDC_SET_LEADCHNL_RV_SENSING_ANODE_LOCATION_1: NORMAL
MDC_IDC_SET_LEADCHNL_RV_SENSING_CATHODE_ELECTRODE_1: NORMAL
MDC_IDC_SET_LEADCHNL_RV_SENSING_CATHODE_LOCATION_1: NORMAL
MDC_IDC_SET_LEADCHNL_RV_SENSING_POLARITY: NORMAL
MDC_IDC_SET_LEADCHNL_RV_SENSING_SENSITIVITY: 0.9 MV
MDC_IDC_SET_ZONE_DETECTION_INTERVAL: 400 MS
MDC_IDC_SET_ZONE_STATUS: NORMAL
MDC_IDC_SET_ZONE_TYPE: NORMAL
MDC_IDC_SET_ZONE_VENDOR_TYPE: NORMAL
MDC_IDC_STAT_BRADY_AP_VP_PERCENT: 0 %
MDC_IDC_STAT_BRADY_AP_VS_PERCENT: 0 %
MDC_IDC_STAT_BRADY_AS_VP_PERCENT: 99.82 %
MDC_IDC_STAT_BRADY_AS_VS_PERCENT: 0.18 %
MDC_IDC_STAT_BRADY_DTM_END: NORMAL
MDC_IDC_STAT_BRADY_DTM_START: NORMAL
MDC_IDC_STAT_BRADY_RA_PERCENT_PACED: 0 %
MDC_IDC_STAT_BRADY_RV_PERCENT_PACED: 99.81 %
MDC_IDC_STAT_CRT_DTM_END: NORMAL
MDC_IDC_STAT_CRT_DTM_START: NORMAL
MDC_IDC_STAT_CRT_LV_PERCENT_PACED: 99.79 %
MDC_IDC_STAT_CRT_PERCENT_PACED: 99.79 %
MDC_IDC_STAT_EPISODE_RECENT_COUNT: 0
MDC_IDC_STAT_EPISODE_RECENT_COUNT: 2
MDC_IDC_STAT_EPISODE_RECENT_COUNT_DTM_END: NORMAL
MDC_IDC_STAT_EPISODE_RECENT_COUNT_DTM_START: NORMAL
MDC_IDC_STAT_EPISODE_TOTAL_COUNT: 0
MDC_IDC_STAT_EPISODE_TOTAL_COUNT: 460
MDC_IDC_STAT_EPISODE_TOTAL_COUNT: 6
MDC_IDC_STAT_EPISODE_TOTAL_COUNT_DTM_END: NORMAL
MDC_IDC_STAT_EPISODE_TOTAL_COUNT_DTM_START: NORMAL
MDC_IDC_STAT_EPISODE_TYPE: NORMAL
MDC_IDC_STAT_TACHYTHERAPY_RECENT_DTM_END: NORMAL
MDC_IDC_STAT_TACHYTHERAPY_RECENT_DTM_START: NORMAL
MDC_IDC_STAT_TACHYTHERAPY_TOTAL_DTM_END: NORMAL
MDC_IDC_STAT_TACHYTHERAPY_TOTAL_DTM_START: NORMAL

## 2024-10-21 PROCEDURE — 93294 REM INTERROG EVL PM/LDLS PM: CPT | Performed by: INTERNAL MEDICINE

## 2024-10-21 PROCEDURE — 93296 REM INTERROG EVL PM/IDS: CPT | Performed by: INTERNAL MEDICINE

## 2024-10-21 NOTE — TELEPHONE ENCOUNTER
10/21/2024: Called patient to see if any symptoms with RV lead noise, any awareness of anything that may be causing noise on his RV lead. He denied feeling anything. 2 short RV Lead noise episodes, 14 short V-V counters- No history of any short V-V counters previously or RV lead noise.Reassured patient that usually we just continue to monitor the lead at this phase. Karuna Rosales RN on 10/21/2024 at 2:23 PM      Encounter Type: 3 month scheduled remote transmission  Device: [employer] Percepta CRT-P  Pacing % /Programmed: BiVP 99.8%, Effective 99.8%/ VVIR   Lead(s): RV, LB both stable  Battery longevity: Estimates 6.3 years remaining  Presenting: BiVP 60 bpm  Atrial high rates: chronic Afib, VR >= 120 bpm ~0-1%  Anticoagulant: Xarelto  Ventricular High rates: 2 listed, EGMs show brief RV lead noise. Device lists 14 short V-V intervals. History of 0 short V-V's.  Heart rates: , primarily 60-90's.  Comments: Normal device function.   Plan: Next remote scheduled on 2/5/2025, reminder letter sent to patient. Called pt to discuss findings- see Epic note.  Connie Helppi, Device RN      10/19/24 23:23 pm    8/10/24

## 2024-10-28 DIAGNOSIS — Z95.0 CARDIAC RESYNCHRONIZATION THERAPY PACEMAKER (CRT-P) IN PLACE: Primary | ICD-10-CM

## 2024-10-28 DIAGNOSIS — I25.5 ISCHEMIC CARDIOMYOPATHY: ICD-10-CM

## 2024-10-28 NOTE — TELEPHONE ENCOUNTER
10/28/24: Called patient and explained that Dr Rivera would like 2 view CXR to assess his PPM leads. He will try to get this done before [place] go [place]. Karuna Rosales RN on 10/28/2024 at 12:59 PM

## 2024-10-28 NOTE — TELEPHONE ENCOUNTER
10/28/2024: Called patient, L/M to have him call back.     Patient's remote device interrogation reviewed.   RV noise (new) is observed without obvious cause.   Remaining RV lead parameters are within normal limits.   Recommend patient undergo PA and lateral chest x-ray to assess leads. Per Dr Rivera.  Karuna Rosales RN on 10/28/2024 at 9:51 AM

## 2024-10-29 ENCOUNTER — LAB REQUISITION (OUTPATIENT)
Dept: LAB | Facility: CLINIC | Age: 79
End: 2024-10-29
Payer: MEDICARE

## 2024-10-29 DIAGNOSIS — E11.65 TYPE 2 DIABETES MELLITUS WITH HYPERGLYCEMIA (H): ICD-10-CM

## 2024-10-29 PROCEDURE — 80053 COMPREHEN METABOLIC PANEL: CPT | Mod: ORL | Performed by: FAMILY MEDICINE

## 2024-10-30 LAB
ALBUMIN SERPL BCG-MCNC: 4.4 G/DL (ref 3.5–5.2)
ALP SERPL-CCNC: 153 U/L (ref 40–150)
ALT SERPL W P-5'-P-CCNC: 35 U/L (ref 0–70)
ANION GAP SERPL CALCULATED.3IONS-SCNC: 14 MMOL/L (ref 7–15)
AST SERPL W P-5'-P-CCNC: 40 U/L (ref 0–45)
BILIRUB SERPL-MCNC: 0.4 MG/DL
BUN SERPL-MCNC: 24.6 MG/DL (ref 8–23)
CALCIUM SERPL-MCNC: 9 MG/DL (ref 8.8–10.4)
CHLORIDE SERPL-SCNC: 102 MMOL/L (ref 98–107)
CREAT SERPL-MCNC: 1.06 MG/DL (ref 0.67–1.17)
EGFRCR SERPLBLD CKD-EPI 2021: 72 ML/MIN/1.73M2
GLUCOSE SERPL-MCNC: 159 MG/DL (ref 70–99)
HCO3 SERPL-SCNC: 25 MMOL/L (ref 22–29)
POTASSIUM SERPL-SCNC: 4.4 MMOL/L (ref 3.4–5.3)
PROT SERPL-MCNC: 6.9 G/DL (ref 6.4–8.3)
SODIUM SERPL-SCNC: 141 MMOL/L (ref 135–145)

## 2024-10-31 ENCOUNTER — HOSPITAL ENCOUNTER (OUTPATIENT)
Dept: RADIOLOGY | Facility: HOSPITAL | Age: 79
Discharge: HOME | End: 2024-10-31
Attending: INTERNAL MEDICINE
Payer: MEDICARE

## 2024-10-31 ENCOUNTER — LAB (OUTPATIENT)
Dept: LAB | Facility: HOSPITAL | Age: 79
End: 2024-10-31
Attending: INTERNAL MEDICINE
Payer: MEDICARE

## 2024-10-31 DIAGNOSIS — Z95.0 CARDIAC RESYNCHRONIZATION THERAPY PACEMAKER (CRT-P) IN PLACE: ICD-10-CM

## 2024-10-31 DIAGNOSIS — R20.2 PARESTHESIA: ICD-10-CM

## 2024-10-31 DIAGNOSIS — I25.5 ISCHEMIC CARDIOMYOPATHY: ICD-10-CM

## 2024-10-31 DIAGNOSIS — K90.9 INTESTINAL MALABSORPTION, UNSPECIFIED TYPE: ICD-10-CM

## 2024-10-31 DIAGNOSIS — R29.898 LEG FATIGUE: ICD-10-CM

## 2024-10-31 DIAGNOSIS — G62.9 POLYNEUROPATHY: ICD-10-CM

## 2024-10-31 DIAGNOSIS — E11.42 DIABETIC PERIPHERAL NEUROPATHY ASSOCIATED WITH TYPE 2 DIABETES MELLITUS (H): ICD-10-CM

## 2024-10-31 LAB
EST. AVERAGE GLUCOSE BLD GHB EST-MCNC: 194 MG/DL
FOLATE SERPL-MCNC: 13.9 NG/ML (ref 4.6–34.8)
HBA1C MFR BLD: 8.4 %
TOTAL PROTEIN SERUM FOR ELP: 6.8 G/DL (ref 6.4–8.3)

## 2024-10-31 PROCEDURE — 82607 VITAMIN B-12: CPT

## 2024-10-31 PROCEDURE — 36415 COLL VENOUS BLD VENIPUNCTURE: CPT

## 2024-10-31 PROCEDURE — 84630 ASSAY OF ZINC: CPT

## 2024-10-31 PROCEDURE — 83036 HEMOGLOBIN GLYCOSYLATED A1C: CPT

## 2024-10-31 PROCEDURE — 84165 PROTEIN E-PHORESIS SERUM: CPT | Mod: TC | Performed by: PATHOLOGY

## 2024-10-31 PROCEDURE — 71046 X-RAY EXAM CHEST 2 VIEWS: CPT

## 2024-10-31 PROCEDURE — 84425 ASSAY OF VITAMIN B-1: CPT

## 2024-10-31 PROCEDURE — 86618 LYME DISEASE ANTIBODY: CPT

## 2024-10-31 PROCEDURE — 86255 FLUORESCENT ANTIBODY SCREEN: CPT

## 2024-10-31 PROCEDURE — 84207 ASSAY OF VITAMIN B-6: CPT

## 2024-10-31 PROCEDURE — 82746 ASSAY OF FOLIC ACID SERUM: CPT

## 2024-10-31 PROCEDURE — 86334 IMMUNOFIX E-PHORESIS SERUM: CPT | Mod: 26 | Performed by: PATHOLOGY

## 2024-10-31 PROCEDURE — 83921 ORGANIC ACID SINGLE QUANT: CPT

## 2024-10-31 PROCEDURE — 84165 PROTEIN E-PHORESIS SERUM: CPT | Mod: 26 | Performed by: PATHOLOGY

## 2024-10-31 PROCEDURE — 86334 IMMUNOFIX E-PHORESIS SERUM: CPT | Performed by: PATHOLOGY

## 2024-10-31 PROCEDURE — 84446 ASSAY OF VITAMIN E: CPT

## 2024-10-31 PROCEDURE — 86038 ANTINUCLEAR ANTIBODIES: CPT

## 2024-10-31 PROCEDURE — 84155 ASSAY OF PROTEIN SERUM: CPT

## 2024-11-01 LAB
ALBUMIN SERPL ELPH-MCNC: 4.3 G/DL (ref 3.7–5.1)
ALPHA1 GLOB SERPL ELPH-MCNC: 0.3 G/DL (ref 0.2–0.4)
ALPHA2 GLOB SERPL ELPH-MCNC: 0.7 G/DL (ref 0.5–0.9)
ANA SER QL IF: NEGATIVE
B BURGDOR IGG+IGM SER QL: 0.1
B-GLOBULIN SERPL ELPH-MCNC: 0.7 G/DL (ref 0.6–1)
GAMMA GLOB SERPL ELPH-MCNC: 0.8 G/DL (ref 0.7–1.6)
LOCATION OF TASK: NORMAL
LOCATION OF TASK: NORMAL
M PROTEIN SERPL ELPH-MCNC: 0 G/DL
PROT PATTERN SERPL ELPH-IMP: NORMAL
PROT PATTERN SERPL IFE-IMP: NORMAL
VIT B12 SERPL-MCNC: 548 PG/ML (ref 232–1245)

## 2024-11-02 LAB — ZINC SERPL-MCNC: 156.1 UG/DL

## 2024-11-03 LAB
A-TOCOPHEROL VIT E SERPL-MCNC: 10.4 MG/L
BETA+GAMMA TOCOPHEROL SERPL-MCNC: 0.2 MG/L
PARANEOPLASTIC AB SER QL IF: NORMAL

## 2024-11-03 NOTE — RESULT ENCOUNTER NOTE
Dear Thomas    Zinc level is elevated and is likely due to taking supplements.  If you are taking zinc supplements, please discontinue.  Hemoglobin A1c is also elevated suggesting poorly controlled diabetes    Regards,  Yamil Sheppard MD

## 2024-11-05 LAB
PYRIDOXAL PHOS SERPL-SCNC: 41.3 NMOL/L
VIT B1 PYROPHOSHATE BLD-SCNC: 179 NMOL/L

## 2024-11-07 LAB — METHYLMALONATE SERPL-SCNC: 0.21 UMOL/L (ref 0–0.4)

## 2025-02-05 ENCOUNTER — ANCILLARY PROCEDURE (OUTPATIENT)
Dept: CARDIOLOGY | Facility: CLINIC | Age: 80
End: 2025-02-05
Attending: INTERNAL MEDICINE
Payer: MEDICARE

## 2025-02-05 DIAGNOSIS — Z95.0 CARDIAC RESYNCHRONIZATION THERAPY PACEMAKER (CRT-P) IN PLACE: ICD-10-CM

## 2025-02-05 DIAGNOSIS — I50.20 HEART FAILURE WITH REDUCED EJECTION FRACTION (H): ICD-10-CM

## 2025-02-05 DIAGNOSIS — I25.5 ISCHEMIC CARDIOMYOPATHY: ICD-10-CM

## 2025-02-05 DIAGNOSIS — I49.5 SICK SINUS SYNDROME (H): ICD-10-CM

## 2025-02-06 ENCOUNTER — TELEPHONE (OUTPATIENT)
Dept: CARDIOLOGY | Facility: CLINIC | Age: 80
End: 2025-02-06
Payer: MEDICARE

## 2025-02-06 LAB
MDC_IDC_EPISODE_DTM: NORMAL
MDC_IDC_EPISODE_DURATION: 1 S
MDC_IDC_EPISODE_DURATION: 10 S
MDC_IDC_EPISODE_DURATION: 14 S
MDC_IDC_EPISODE_DURATION: 2 S
MDC_IDC_EPISODE_DURATION: 5 S
MDC_IDC_EPISODE_DURATION: 6 S
MDC_IDC_EPISODE_DURATION: 7 S
MDC_IDC_EPISODE_ID: 1365
MDC_IDC_EPISODE_ID: 1366
MDC_IDC_EPISODE_ID: 1367
MDC_IDC_EPISODE_ID: 1368
MDC_IDC_EPISODE_ID: 1369
MDC_IDC_EPISODE_ID: 1370
MDC_IDC_EPISODE_ID: 1371
MDC_IDC_EPISODE_ID: 467
MDC_IDC_EPISODE_ID: 468
MDC_IDC_EPISODE_ID: 469
MDC_IDC_EPISODE_ID: 470
MDC_IDC_EPISODE_TYPE: NORMAL
MDC_IDC_LEAD_CONNECTION_STATUS: NORMAL
MDC_IDC_LEAD_IMPLANT_DT: NORMAL
MDC_IDC_LEAD_LOCATION: NORMAL
MDC_IDC_LEAD_LOCATION_DETAIL_1: NORMAL
MDC_IDC_LEAD_MFG: NORMAL
MDC_IDC_LEAD_MODEL: NORMAL
MDC_IDC_LEAD_POLARITY_TYPE: NORMAL
MDC_IDC_LEAD_SERIAL: NORMAL
MDC_IDC_MSMT_BATTERY_DTM: NORMAL
MDC_IDC_MSMT_BATTERY_REMAINING_LONGEVITY: 70 MO
MDC_IDC_MSMT_BATTERY_RRT_TRIGGER: 2.6
MDC_IDC_MSMT_BATTERY_STATUS: NORMAL
MDC_IDC_MSMT_BATTERY_VOLTAGE: 2.96 V
MDC_IDC_MSMT_LEADCHNL_LV_IMPEDANCE_VALUE: 342 OHM
MDC_IDC_MSMT_LEADCHNL_LV_IMPEDANCE_VALUE: 361 OHM
MDC_IDC_MSMT_LEADCHNL_LV_IMPEDANCE_VALUE: 399 OHM
MDC_IDC_MSMT_LEADCHNL_LV_IMPEDANCE_VALUE: 418 OHM
MDC_IDC_MSMT_LEADCHNL_LV_IMPEDANCE_VALUE: 551 OHM
MDC_IDC_MSMT_LEADCHNL_LV_IMPEDANCE_VALUE: 646 OHM
MDC_IDC_MSMT_LEADCHNL_LV_IMPEDANCE_VALUE: 665 OHM
MDC_IDC_MSMT_LEADCHNL_LV_IMPEDANCE_VALUE: 779 OHM
MDC_IDC_MSMT_LEADCHNL_LV_IMPEDANCE_VALUE: 798 OHM
MDC_IDC_MSMT_LEADCHNL_LV_IMPEDANCE_VALUE: 874 OHM
MDC_IDC_MSMT_LEADCHNL_LV_PACING_THRESHOLD_AMPLITUDE: 0.62 V
MDC_IDC_MSMT_LEADCHNL_LV_PACING_THRESHOLD_PULSEWIDTH: 0.4 MS
MDC_IDC_MSMT_LEADCHNL_RA_IMPEDANCE_VALUE: 342 OHM
MDC_IDC_MSMT_LEADCHNL_RA_IMPEDANCE_VALUE: 399 OHM
MDC_IDC_MSMT_LEADCHNL_RA_PACING_THRESHOLD_AMPLITUDE: 0.5 V
MDC_IDC_MSMT_LEADCHNL_RA_PACING_THRESHOLD_PULSEWIDTH: 0.4 MS
MDC_IDC_MSMT_LEADCHNL_RA_SENSING_INTR_AMPL: 0.25 MV
MDC_IDC_MSMT_LEADCHNL_RA_SENSING_INTR_AMPL: 0.38 MV
MDC_IDC_MSMT_LEADCHNL_RV_IMPEDANCE_VALUE: 323 OHM
MDC_IDC_MSMT_LEADCHNL_RV_IMPEDANCE_VALUE: 361 OHM
MDC_IDC_MSMT_LEADCHNL_RV_PACING_THRESHOLD_AMPLITUDE: 0.5 V
MDC_IDC_MSMT_LEADCHNL_RV_PACING_THRESHOLD_PULSEWIDTH: 0.4 MS
MDC_IDC_MSMT_LEADCHNL_RV_SENSING_INTR_AMPL: 9 MV
MDC_IDC_MSMT_LEADCHNL_RV_SENSING_INTR_AMPL: 9 MV
MDC_IDC_PG_IMPLANT_DTM: NORMAL
MDC_IDC_PG_MFG: NORMAL
MDC_IDC_PG_MODEL: NORMAL
MDC_IDC_PG_SERIAL: NORMAL
MDC_IDC_PG_TYPE: NORMAL
MDC_IDC_SESS_CLINIC_NAME: NORMAL
MDC_IDC_SESS_DTM: NORMAL
MDC_IDC_SESS_TYPE: NORMAL
MDC_IDC_SET_BRADY_LOWRATE: 60 {BEATS}/MIN
MDC_IDC_SET_BRADY_MAX_SENSOR_RATE: 130 {BEATS}/MIN
MDC_IDC_SET_BRADY_MODE: NORMAL
MDC_IDC_SET_CRT_LVRV_DELAY: 0 MS
MDC_IDC_SET_CRT_PACED_CHAMBERS: NORMAL
MDC_IDC_SET_LEADCHNL_LV_PACING_AMPLITUDE: 1.75 V
MDC_IDC_SET_LEADCHNL_LV_PACING_ANODE_ELECTRODE_1: NORMAL
MDC_IDC_SET_LEADCHNL_LV_PACING_ANODE_LOCATION_1: NORMAL
MDC_IDC_SET_LEADCHNL_LV_PACING_CAPTURE_MODE: NORMAL
MDC_IDC_SET_LEADCHNL_LV_PACING_CATHODE_ELECTRODE_1: NORMAL
MDC_IDC_SET_LEADCHNL_LV_PACING_CATHODE_LOCATION_1: NORMAL
MDC_IDC_SET_LEADCHNL_LV_PACING_POLARITY: NORMAL
MDC_IDC_SET_LEADCHNL_LV_PACING_PULSEWIDTH: 0.4 MS
MDC_IDC_SET_LEADCHNL_RA_SENSING_ANODE_ELECTRODE_1: NORMAL
MDC_IDC_SET_LEADCHNL_RA_SENSING_ANODE_LOCATION_1: NORMAL
MDC_IDC_SET_LEADCHNL_RA_SENSING_CATHODE_ELECTRODE_1: NORMAL
MDC_IDC_SET_LEADCHNL_RA_SENSING_CATHODE_LOCATION_1: NORMAL
MDC_IDC_SET_LEADCHNL_RA_SENSING_POLARITY: NORMAL
MDC_IDC_SET_LEADCHNL_RA_SENSING_SENSITIVITY: NORMAL
MDC_IDC_SET_LEADCHNL_RV_PACING_AMPLITUDE: 1.5 V
MDC_IDC_SET_LEADCHNL_RV_PACING_ANODE_ELECTRODE_1: NORMAL
MDC_IDC_SET_LEADCHNL_RV_PACING_ANODE_LOCATION_1: NORMAL
MDC_IDC_SET_LEADCHNL_RV_PACING_CAPTURE_MODE: NORMAL
MDC_IDC_SET_LEADCHNL_RV_PACING_CATHODE_ELECTRODE_1: NORMAL
MDC_IDC_SET_LEADCHNL_RV_PACING_CATHODE_LOCATION_1: NORMAL
MDC_IDC_SET_LEADCHNL_RV_PACING_POLARITY: NORMAL
MDC_IDC_SET_LEADCHNL_RV_PACING_PULSEWIDTH: 0.4 MS
MDC_IDC_SET_LEADCHNL_RV_SENSING_ANODE_ELECTRODE_1: NORMAL
MDC_IDC_SET_LEADCHNL_RV_SENSING_ANODE_LOCATION_1: NORMAL
MDC_IDC_SET_LEADCHNL_RV_SENSING_CATHODE_ELECTRODE_1: NORMAL
MDC_IDC_SET_LEADCHNL_RV_SENSING_CATHODE_LOCATION_1: NORMAL
MDC_IDC_SET_LEADCHNL_RV_SENSING_POLARITY: NORMAL
MDC_IDC_SET_LEADCHNL_RV_SENSING_SENSITIVITY: 0.9 MV
MDC_IDC_SET_ZONE_DETECTION_INTERVAL: 400 MS
MDC_IDC_SET_ZONE_STATUS: NORMAL
MDC_IDC_SET_ZONE_TYPE: NORMAL
MDC_IDC_SET_ZONE_VENDOR_TYPE: NORMAL
MDC_IDC_STAT_BRADY_AP_VP_PERCENT: 0 %
MDC_IDC_STAT_BRADY_AP_VS_PERCENT: 0 %
MDC_IDC_STAT_BRADY_AS_VP_PERCENT: 98.89 %
MDC_IDC_STAT_BRADY_AS_VS_PERCENT: 1.11 %
MDC_IDC_STAT_BRADY_DTM_END: NORMAL
MDC_IDC_STAT_BRADY_DTM_START: NORMAL
MDC_IDC_STAT_BRADY_RA_PERCENT_PACED: 0 %
MDC_IDC_STAT_BRADY_RV_PERCENT_PACED: 98.88 %
MDC_IDC_STAT_CRT_DTM_END: NORMAL
MDC_IDC_STAT_CRT_DTM_START: NORMAL
MDC_IDC_STAT_CRT_LV_PERCENT_PACED: 98.86 %
MDC_IDC_STAT_CRT_PERCENT_PACED: 98.86 %
MDC_IDC_STAT_EPISODE_RECENT_COUNT: 0
MDC_IDC_STAT_EPISODE_RECENT_COUNT: 4
MDC_IDC_STAT_EPISODE_RECENT_COUNT_DTM_END: NORMAL
MDC_IDC_STAT_EPISODE_RECENT_COUNT_DTM_START: NORMAL
MDC_IDC_STAT_EPISODE_TOTAL_COUNT: 0
MDC_IDC_STAT_EPISODE_TOTAL_COUNT: 10
MDC_IDC_STAT_EPISODE_TOTAL_COUNT: 460
MDC_IDC_STAT_EPISODE_TOTAL_COUNT_DTM_END: NORMAL
MDC_IDC_STAT_EPISODE_TOTAL_COUNT_DTM_START: NORMAL
MDC_IDC_STAT_EPISODE_TYPE: NORMAL
MDC_IDC_STAT_TACHYTHERAPY_RECENT_DTM_END: NORMAL
MDC_IDC_STAT_TACHYTHERAPY_RECENT_DTM_START: NORMAL
MDC_IDC_STAT_TACHYTHERAPY_TOTAL_DTM_END: NORMAL
MDC_IDC_STAT_TACHYTHERAPY_TOTAL_DTM_START: NORMAL

## 2025-02-06 NOTE — TELEPHONE ENCOUNTER
Encounter Type: routine remote CRT-P transmission.   Device: Medtronic Percepta Quad.   Pacing % /Programmed: biVP 98% at VVIR 60 ppm.   Lead(s): stable.   Battery longevity: 5yrs, 10mo estimated.   Presenting: biventricular pacing 83 bpm.  Atrial high rates: n/a.  Anticoagulant: Xarelto.   Ventricular High rates: since 10/21/24; Four ventricular high rate episodes, all appear to be NSVT 7-13bts 160-200 bpm. Episode #469 appears to be RV lead noise.   Comments: Normal device function. Optivol has been elevated since mid December 2024. Routed to device RN for review.  Plan: Patient due for annual device check in April 2025. Reminder letter sent. SILVIANO Christianson, Device Specialist      3 NSVTs noted, longest back on 10/30/24 lasting 13 bts. Last EF 45-50% per Echo 6/27/24. Last episode on 12/30/24.     1 episode labeled as NSVT appears to be lead noise, seen on both near/far field signals, possible JANELL causing very brief pause (patient is  dependent)  RV lead measurements good/stable.     Also of note: OptiVol shows changes and crossed thresholds around mid-December indicating possible fluid accumulation.       Reviewed findings with patient.  He does not recall any JANELL or procedures on 11/30/24.  No further lead noise since then.  Will continue monitoring and test for noise at his next upcoming visit in April 2025.     Also discussed the OptiVol findings.  He states he is currently waiting for results on an leg ultrasound as his left leg is much larger than his right and there is a concern for a blood clot.  Other than that, he does not notice any other swelling, unusual weight gain, or significantly worse shortness of breath than usual.      Patient states he is actually in [place] right now and seeing a cardiologist today.  He will mention these things to his cardiologist and make sure they evaluate for fluid retention.    In the meantime, I reminded him he is due for his annual visit with us and device check in April.      Remedios Madden, RN

## 2025-02-10 PROCEDURE — 93294 REM INTERROG EVL PM/LDLS PM: CPT | Performed by: INTERNAL MEDICINE

## 2025-02-10 PROCEDURE — 93296 REM INTERROG EVL PM/IDS: CPT | Performed by: INTERNAL MEDICINE

## 2025-03-10 ENCOUNTER — ANCILLARY PROCEDURE (OUTPATIENT)
Dept: CARDIOLOGY | Facility: CLINIC | Age: 80
End: 2025-03-10
Attending: INTERNAL MEDICINE
Payer: MEDICARE

## 2025-03-10 DIAGNOSIS — Z95.0 CARDIAC PACEMAKER IN SITU: ICD-10-CM

## 2025-03-10 DIAGNOSIS — I49.5 SICK SINUS SYNDROME (H): ICD-10-CM

## 2025-03-12 ENCOUNTER — TELEPHONE (OUTPATIENT)
Dept: CARDIOLOGY | Facility: CLINIC | Age: 80
End: 2025-03-12
Payer: MEDICARE

## 2025-03-12 NOTE — TELEPHONE ENCOUNTER
Encounter Type: Alert remote pacemaker transmission for VT episodes, courtesy check  Device: Medtronic Percepta Quad CRT-P   Pacing % /Programmed: BiVP 99% @ VVIR 60 bpm   Lead(s): Stable measurements and trends.   Battery longevity: 5 years, 8 months estimated   Presenting: BiVP 72 bpm   Atrial high rates: N/A   Anticoagulant: Xarelto   Ventricular High rates: Since 2025 5 VHR episodes on 3/10/2025, 4 EGMs appear to be NSVT/VF, duration ~5-8 seconds, avg rates 207-270 bpm, some episodes overlapping/unable to see terminations with some R wave undersensing at times. Episode #471 appears to be NSVT 7 beats, duration 2 seconds, avg rate 171 bpm.   Comments: Normal device function. Optivol trending upwards since Dec 2024.   Plan: Routed to device RN for review. Uriah, Device Specialist      Call placed to patient to review findings. Wife answered phone and stated Thomas had passed away that night and that they were still wintering in [place]. Condolences given. Wife asked about returning monitor - will order return kit. [facility] updated with  status.     Remedios Madden, RN

## (undated) DEVICE — TUBE KAIRISON DISPOSABLE

## (undated) DEVICE — PREP POVIDONE-IODINE 7.5% SCRUB 4OZ BOTTLE MDS093945

## (undated) DEVICE — ESU PENCIL SMOKE EVAC W/ROCKER SWITCH 0703-047-000

## (undated) DEVICE — KIT SEALER DURASEAL EXACT SP HYDROGEL 5ML SGL USE 20-6520

## (undated) DEVICE — SOL WATER IRRIG 1000ML BOTTLE 2F7114

## (undated) DEVICE — SOL NACL 0.9% IRRIG 1000ML BOTTLE 2F7124

## (undated) DEVICE — DRAIN FLAT 10MM FULL PERF SIL 0070440

## (undated) DEVICE — GOWN IMPERVIOUS BREATHABLE 2XL/XLONG

## (undated) DEVICE — CATH TRAY FOLEY SURESTEP 16FR DRAIN BAG STATOCK A899916

## (undated) DEVICE — RX SURGIFLO HEMOSTATIC MATRIX W/THROMBIN 8ML 2994

## (undated) DEVICE — GOWN LG DISP 9515

## (undated) DEVICE — PITCHER STERILE 1000ML  SSK9004A

## (undated) DEVICE — TOOL DISSECT MIDAS MR8 14CM MATCH HEAD 3MM MR8-14MH30

## (undated) DEVICE — TRAY PREP DRY SKIN SCRUB 067

## (undated) DEVICE — GOWN XLG DISP 9545

## (undated) DEVICE — GLOVE SURG PI ULTRA TOUCH M SZ 8-1/2 LF

## (undated) DEVICE — GLOVE BIOGEL PI INDICATOR 9.0 LF  41690

## (undated) DEVICE — DRAPE STERI 23X17 NON STERILE 1010NSD

## (undated) DEVICE — SPONGE NEURO 1/2X1/2 WECK 200100

## (undated) DEVICE — GLOVE UNDER INDICATOR PI SZ 6.5 LF 41665

## (undated) DEVICE — DRSG PRIMAPORE 03 1/8X6" 66000318

## (undated) DEVICE — DURASEAL SPINE SEALANT SYSTEM 3ML

## (undated) DEVICE — DRSG PRIMAPORE 04X11 3/4"

## (undated) DEVICE — DRSG DRAIN 4X4" 7086

## (undated) DEVICE — MARKER SURG SKIN STRL 77734

## (undated) DEVICE — GLOVE BIOGEL PI ULTRATOUCH G SZ 7.0 42170

## (undated) DEVICE — PLATE GROUNDING ADULT W/CORD 9165L

## (undated) DEVICE — ALCOHOL ISOPROPYL 4 OZ 70% IA7004

## (undated) DEVICE — SU VICRYL+ 0 8-18 CT1/CR UND VCP840D

## (undated) DEVICE — STPL SKIN 35W 6.9MM  PXW35

## (undated) DEVICE — SYR BULB IRRIG DOVER 60 ML LATEX FREE 67000

## (undated) DEVICE — CUSTOM PACK LUMBAR FUSION SNE5BLFHEA

## (undated) DEVICE — CUSHION INSERT LG PRONE VIEW JACKSON TABLE

## (undated) DEVICE — Device

## (undated) DEVICE — POSITIONER ARM CRADLE LAMINECTOMY DISP

## (undated) DEVICE — SUTURE VICRYL+ 2-0 18 CT1/CR VLT VCP839D

## (undated) DEVICE — NDL BLUNT 18GA 1.5" W/O FILTER 305180

## (undated) DEVICE — SU SILK 2-0 FS-1 18" 685G

## (undated) DEVICE — DRSG STERI STRIP 1/4X3" R1541

## (undated) DEVICE — NEEDLE SPINAL DISP 18GA X 3.5" QUINCKE 333350

## (undated) DEVICE — DRAIN RESERVOIR 100ML JP 0070740

## (undated) DEVICE — GLOVE UNDER INDICATOR PI SZ 7.0 LF 41670

## (undated) RX ORDER — PROPOFOL 10 MG/ML
INJECTION, EMULSION INTRAVENOUS
Status: DISPENSED
Start: 2022-06-16

## (undated) RX ORDER — CEFAZOLIN SODIUM 1 G/3ML
INJECTION, POWDER, FOR SOLUTION INTRAMUSCULAR; INTRAVENOUS
Status: DISPENSED
Start: 2022-06-16

## (undated) RX ORDER — LIDOCAINE HYDROCHLORIDE 10 MG/ML
INJECTION, SOLUTION EPIDURAL; INFILTRATION; INTRACAUDAL; PERINEURAL
Status: DISPENSED
Start: 2022-06-23

## (undated) RX ORDER — FENTANYL CITRATE-0.9 % NACL/PF 10 MCG/ML
PLASTIC BAG, INJECTION (ML) INTRAVENOUS
Status: DISPENSED
Start: 2022-06-16

## (undated) RX ORDER — LIDOCAINE HYDROCHLORIDE 10 MG/ML
INJECTION, SOLUTION EPIDURAL; INFILTRATION; INTRACAUDAL; PERINEURAL
Status: DISPENSED
Start: 2022-07-01

## (undated) RX ORDER — PHENYLEPHRINE HYDROCHLORIDE 10 MG/ML
INJECTION INTRAVENOUS
Status: DISPENSED
Start: 2022-06-16

## (undated) RX ORDER — FENTANYL CITRATE 50 UG/ML
INJECTION, SOLUTION INTRAMUSCULAR; INTRAVENOUS
Status: DISPENSED
Start: 2022-07-01

## (undated) RX ORDER — FENTANYL CITRATE 50 UG/ML
INJECTION, SOLUTION INTRAMUSCULAR; INTRAVENOUS
Status: DISPENSED
Start: 2022-06-23

## (undated) RX ORDER — FENTANYL CITRATE 50 UG/ML
INJECTION, SOLUTION INTRAMUSCULAR; INTRAVENOUS
Status: DISPENSED
Start: 2022-06-16

## (undated) RX ORDER — BACITRACIN ZINC 500 [USP'U]/G
OINTMENT TOPICAL
Status: DISPENSED
Start: 2022-06-16